# Patient Record
Sex: FEMALE | Race: WHITE | NOT HISPANIC OR LATINO | Employment: OTHER | URBAN - METROPOLITAN AREA
[De-identification: names, ages, dates, MRNs, and addresses within clinical notes are randomized per-mention and may not be internally consistent; named-entity substitution may affect disease eponyms.]

---

## 2017-01-03 VITALS
TEMPERATURE: 97.9 F | HEIGHT: 69 IN | RESPIRATION RATE: 16 BRPM | DIASTOLIC BLOOD PRESSURE: 62 MMHG | WEIGHT: 147 LBS | SYSTOLIC BLOOD PRESSURE: 152 MMHG | BODY MASS INDEX: 21.77 KG/M2 | HEART RATE: 66 BPM

## 2017-02-13 ENCOUNTER — OFFICE VISIT (OUTPATIENT)
Dept: CARDIOLOGY | Facility: MEDICAL CENTER | Age: 82
End: 2017-02-13
Payer: MEDICARE

## 2017-02-13 VITALS
HEIGHT: 69 IN | BODY MASS INDEX: 22.81 KG/M2 | HEART RATE: 92 BPM | SYSTOLIC BLOOD PRESSURE: 120 MMHG | DIASTOLIC BLOOD PRESSURE: 60 MMHG | WEIGHT: 154 LBS | OXYGEN SATURATION: 91 %

## 2017-02-13 DIAGNOSIS — I25.10 CORONARY ARTERY DISEASE INVOLVING NATIVE CORONARY ARTERY OF NATIVE HEART WITHOUT ANGINA PECTORIS: ICD-10-CM

## 2017-02-13 DIAGNOSIS — E78.1 PURE HYPERGLYCERIDEMIA: ICD-10-CM

## 2017-02-13 DIAGNOSIS — I10 ESSENTIAL HYPERTENSION: ICD-10-CM

## 2017-02-13 DIAGNOSIS — Z95.5 STENTED CORONARY ARTERY: ICD-10-CM

## 2017-02-13 DIAGNOSIS — R09.89 BILATERAL CAROTID BRUITS: ICD-10-CM

## 2017-02-13 PROCEDURE — 99214 OFFICE O/P EST MOD 30 MIN: CPT | Performed by: INTERNAL MEDICINE

## 2017-02-13 PROCEDURE — G8598 ASA/ANTIPLAT THER USED: HCPCS | Performed by: INTERNAL MEDICINE

## 2017-02-13 PROCEDURE — 1101F PT FALLS ASSESS-DOCD LE1/YR: CPT | Mod: 8P | Performed by: INTERNAL MEDICINE

## 2017-02-13 PROCEDURE — G8484 FLU IMMUNIZE NO ADMIN: HCPCS | Performed by: INTERNAL MEDICINE

## 2017-02-13 PROCEDURE — 1036F TOBACCO NON-USER: CPT | Performed by: INTERNAL MEDICINE

## 2017-02-13 PROCEDURE — G8432 DEP SCR NOT DOC, RNG: HCPCS | Performed by: INTERNAL MEDICINE

## 2017-02-13 PROCEDURE — G8419 CALC BMI OUT NRM PARAM NOF/U: HCPCS | Performed by: INTERNAL MEDICINE

## 2017-02-13 PROCEDURE — 4040F PNEUMOC VAC/ADMIN/RCVD: CPT | Performed by: INTERNAL MEDICINE

## 2017-02-13 RX ORDER — NITROGLYCERIN 0.4 MG/1
0.4 TABLET SUBLINGUAL PRN
Qty: 25 TAB | Refills: 1 | Status: SHIPPED | OUTPATIENT
Start: 2017-02-13 | End: 2018-09-01

## 2017-02-13 ASSESSMENT — ENCOUNTER SYMPTOMS
SHORTNESS OF BREATH: 1
ROS GI COMMENTS: DARK STOOLS
CONSTIPATION: 1
BRUISES/BLEEDS EASILY: 0
HEADACHES: 0
FEVER: 0
BACK PAIN: 1
NEUROLOGICAL NEGATIVE: 1

## 2017-02-13 NOTE — MR AVS SNAPSHOT
"        Brooke SOSA Lebron   2017 2:00 PM   Office Visit   MRN: [id number]    Department:  [facility]   Dept Phone:  [phone]    Description:  Female : 1934   Provider:  Severo Mancilla M.D.           Reason for Visit     Follow-Up           Allergies as of 2017     Allergen Noted Reactions    Penicillins 10/04/2013   Rash, Vomiting    Rxn = unknown  Pt tolerates cephalosporins    Hydrocodone 2016   Vomiting, Nausea    Rxn = unknown      You were diagnosed with     Coronary artery disease involving native coronary artery of native heart without angina pectoris   [0959969]       Essential hypertension   [6860670]       Bilateral carotid bruits   [5453670]       Stented coronary artery   [027155]       Pure hyperglyceridemia   [272.1.ICD-9-CM]         Vital Signs     Blood Pressure Pulse Height Weight Body Mass Index Oxygen Saturation    120/60 mmHg 92 1.753 m (5' 9\") 69.854 kg (154 lb) 22.73 kg/m2 91%    Smoking Status                   Former Smoker           Basic Information     Date Of Birth Sex Race Ethnicity Preferred Language    1934 Female White Non- English      Your appointments     2017  1:00 PM   Established Patient Pul with JIMENEZ Herrmann   [facility] Pulmonary Medicine (--)    [address] [facility] [address]  [place] [id number] [phone]              Problem List              ICD-10-CM Priority Class Noted - Resolved    Peripheral arterial disease (CMS-HCC) I73.9 Medium  2012 - Present    PVD (peripheral vascular disease) (CMS-HCC) I73.9 Medium  2013 - Present    Anxiety F41.9 Low  Unknown - Present    Hyperlipidemia E78.5 Medium  Unknown - Present    Hypertension I10 High  Unknown - Present    Angina effort (CMS-HCC) I20.8   2014 - Present    Peripheral neuropathy (CMS-HCC) G62.9 Low  2014 - Present    CAD (coronary artery disease) I25.10 High  2015 - Present    Fall W19.XXXA Low  2015 - Present    Tobacco " abuse Z72.0 Low  2/11/2016 - Present    Acute GI bleeding K92.2 Low  2/11/2016 - Present    Stented coronary artery Z95.5 Medium  4/19/2016 - Present    Hypoxia R09.02   8/17/2016 - Present    Bilateral carotid bruits R09.89   2/13/2017 - Present      Health Maintenance        Date Due Completion Dates    IMM DTaP/Tdap/Td Vaccine (1 - Tdap) 2/1/1953 ---    PAP SMEAR 2/1/1955 ---    MAMMOGRAM 2/1/1974 ---    COLONOSCOPY 2/1/1984 ---    IMM ZOSTER VACCINE 2/1/1994 ---    BONE DENSITY 2/1/1999 ---    IMM PNEUMOCOCCAL 65+ (ADULT) LOW/MEDIUM RISK SERIES (2 of 2 - PCV13) 1/1/2003 1/1/2002    IMM INFLUENZA (1) 9/1/2016 11/11/2015, 10/4/2013            Current Immunizations     Influenza TIV (IM) 10/4/2013  6:07 PM    Influenza Vaccine Adult HD 11/11/2015    Pneumococcal Vaccine (UF)Historical Data 11/11/2015    Pneumococcal polysaccharide vaccine (PPSV-23) 1/1/2002      Below and/or attached are the medications your provider expects you to take. Review all of your home medications and newly ordered medications with your provider and/or pharmacist. Follow medication instructions as directed by your provider and/or pharmacist. Please keep your medication list with you and share with your provider. Update the information when medications are discontinued, doses are changed, or new medications (including over-the-counter products) are added; and carry medication information at all times in the event of emergency situations     Allergies:  PENICILLINS - Rash,Vomiting     HYDROCODONE - Vomiting,Nausea               Medications  Valid as of: February 13, 2017 -  2:55 PM    Generic Name Brand Name Tablet Size Instructions for use    AmLODIPine Besylate (Tab) NORVASC 5 MG Take 1 Tab by mouth every day.        Aspirin (Tablet Delayed Response) ECOTRIN 81 MG Take 1 Tab by mouth every day.        Atorvastatin Calcium (Tab) LIPITOR 20 MG Take 1 Tab by mouth every evening.        Benazepril HCl (Tab) LOTENSIN 20 MG TAKE 1 TABLET TWICE A  DAY        Cholecalciferol (Tab) vitamin D 2000 UNIT Take 2,000 Units by mouth every day.        Clopidogrel Bisulfate (Tab) PLAVIX 75 MG TAKE 1 TABLET DAILY        Estradiol (Tab) ESTRACE 1 MG Take 1 mg by mouth every day.        Gabapentin (Cap) NEURONTIN 300 MG Take 300 mg by mouth every day. Indications: Neurogenic Pain        Isosorbide Mononitrate (TABLET SR 24 HR) IMDUR 30 MG Take 1 Tab by mouth every morning.        Metoprolol Tartrate (Tab) LOPRESSOR 50 MG Take 1.5 Tabs by mouth 2 times a day.        Nitroglycerin (SL Tab) NITROSTAT 0.4 MG Place 1 Tab under tongue as needed for Chest Pain.        .                 Medicines prescribed today were sent to:     [facility] [id number] [place] [address] AT [address] [address]    Phone: [phone] Fax: [phone]    Open 24 Hours?: No    [facility] - [address] [address]    Phone: [phone] Fax: [phone]    Open 24 Hours?: No      Medication refill instructions:       If your prescription bottle indicates you have medication refills left, it is not necessary to call your provider’s office. Please contact your pharmacy and they will refill your medication.    If your prescription bottle indicates you do not have any refills left, you may request refills at any time through one of the following ways: The online [employer] system (except Urgent Care), by calling your provider’s office, or by asking your pharmacy to contact your provider’s office with a refill request. Medication refills are processed only during regular business hours and may not be available until the next business day. Your provider may request additional information or to have a follow-up visit with you prior to refilling your medication.   *Please Note: Medication refills are assigned a new Rx number when refilled electronically. Your  pharmacy may indicate that no refills were authorized even though a new prescription for the same medication is available at the pharmacy. Please request the medicine by name with the pharmacy before contacting your provider for a refill.        Your To Do List     Future Labs/Procedures Complete By Expires    CAROTID DUPLEX  As directed 8/15/2017      Other Notes About Your Plan     MD Marianna           MyChart Status: Patient Declined

## 2017-02-13 NOTE — PROGRESS NOTES
Subjective:   Brooke Diana is a 83 y.o. female who presents today for follow-up of coronary artery disease with remote stenting of her circumflex marginal artery in 2010 and known chronic occlusion of her LAD with collateral filling. She's had no further angina. The patient is a tough time of low back pain and also noted that her stools are dark. She stopped her aspirin on her own but is taking Plavix. No yahaira rectal bleeding.  The patient's trying to get an appointment with her GI doctor.  Past Medical History   Diagnosis Date   • Unspecified urinary incontinence    • Hypertension    • Urinary bladder disorder    • Anxiety    • Dizziness    • Hyperlipidemia    • Unspecified hemorrhagic conditions (CMS-HCC)      pt takes plavix   • Dental disorder      Partial upper   • Cholesterol blood decreased    • Myocardial infarct (CMS-HCC) 6/10/10   • Acute myocardial infarction, true posterior wall infarction, episode of care unspecified    • CAD (coronary artery disease)      S/P stent placement   • Pulmonary disease      bronchitis   • GI bleed    • Peripheral vascular disease (CMS-HCC)      Past Surgical History   Procedure Laterality Date   • Rectus repair  7/13/2012     Performed by SHEILA PLATT at [facility]   • Recovery  11/26/2012     Performed by Ir-Recovery Surgery at SURGERY SAME DAY [facility] ORS   • Recovery  4/9/2013     Performed by Ir-Recovery Surgery at SURGERY SAME DAY [facility] ORS   • Femoral endarterectomy  10/4/2013     Performed by Kacie Baker M.D. at [facility] ORS   • Angioplasty balloon  10/4/2013     Performed by Kacie Baker M.D. at [facility]S   • Hysterectomy, total abdominal     • Other cardiac surgery       stent in heart   • Cholecystectomy     • Other  12/10/10     stents in legs   • Other  6/10/10     cardiac stents   • Other  2005     KNEE SURGERY   • Recovery  7/24/2014     Performed by Cath-Recovery Surgery at [facility] SAME DAY [facility]  ORS   • Cardiac cath  6/2010     BMS to Om   • Cardiac cath  7/24/14     Diffuse disease, see report.  % with collterals.   • Stent placement       treated by Dr Baker, multiple surgeries to legs.   • Gastroscopy with biopsy  2/13/2016     Procedure: GASTROSCOPY WITH BIOPSY;  Surgeon: Susan Miller M.D.;  Location: ENDOSCOPY [facility];  Service:      Family History   Problem Relation Age of Onset   • Stroke Mother 68     CVA   • Heart Disease Mother 65     valve surgery   • Other Son      wgt 465 lbs     History   Smoking status   • Former Smoker -- 0.25 packs/day for 45 years   • Types: Cigarettes   • Quit date: 02/01/2016   Smokeless tobacco   • Never Used     Allergies   Allergen Reactions   • Penicillins Rash and Vomiting     Rxn = unknown  Pt tolerates cephalosporins   • Hydrocodone Vomiting and Nausea     Rxn = unknown     Outpatient Encounter Prescriptions as of 2/13/2017   Medication Sig Dispense Refill   • nitroglycerin (NITROSTAT) 0.4 MG SL Tab Place 1 Tab under tongue as needed for Chest Pain. 25 Tab 1   • atorvastatin (LIPITOR) 20 MG Tab Take 1 Tab by mouth every evening. 90 Tab 3   • isosorbide mononitrate SR (IMDUR) 30 MG TABLET SR 24 HR Take 1 Tab by mouth every morning. 90 Tab 3   • estradiol (ESTRACE) 1 MG Tab Take 1 mg by mouth every day.     • amlodipine (NORVASC) 5 MG Tab Take 1 Tab by mouth every day. 90 Tab 3   • clopidogrel (PLAVIX) 75 MG Tab TAKE 1 TABLET DAILY 90 Tab 3   • benazepril (LOTENSIN) 20 MG Tab TAKE 1 TABLET TWICE A  Tab 3   • Cholecalciferol (VITAMIN D) 2000 UNIT Tab Take 2,000 Units by mouth every day.     • gabapentin (NEURONTIN) 300 MG CAPS Take 300 mg by mouth every day. Indications: Neurogenic Pain     • aspirin EC (ECOTRIN) 81 MG Tablet Delayed Response Take 1 Tab by mouth every day. 30 Tab 11   • metoprolol (LOPRESSOR) 50 MG Tab Take 1.5 Tabs by mouth 2 times a day. (Patient not taking: Reported on 2/13/2017) 90 Tab 3     No  "facility-administered encounter medications on file as of 2/13/2017.     Review of Systems   Constitutional: Negative for fever.   Respiratory: Positive for shortness of breath.    Cardiovascular: Negative for chest pain and leg swelling.   Gastrointestinal: Positive for constipation.        Dark stools   Genitourinary:        Kidney disease or kidney stones   Musculoskeletal: Positive for back pain.   Neurological: Negative.  Negative for headaches.   Endo/Heme/Allergies: Does not bruise/bleed easily.        Objective:   /60 mmHg  Pulse 92  Ht 1.753 m (5' 9\")  Wt 69.854 kg (154 lb)  BMI 22.73 kg/m2  SpO2 91%    Physical Exam   Constitutional: She is oriented to person, place, and time. She appears well-developed and well-nourished. No distress.   HENT:   Head: Atraumatic.   Mallampati score of one   Eyes: Conjunctivae and EOM are normal. Pupils are equal, round, and reactive to light.   Neck: Neck supple. No JVD present.   Cardiovascular: Normal rate and regular rhythm.   No extrasystoles are present. Exam reveals gallop and S4.    No murmur heard.  Pulses:       Carotid pulses are on the right side with bruit, and on the left side with bruit.       Femoral pulses are 2+ on the right side, and 1+ on the left side.       Dorsalis pedis pulses are 1+ on the right side, and 1+ on the left side.        Posterior tibial pulses are 1+ on the right side, and 0 on the left side.   Pulmonary/Chest: No respiratory distress. She has no wheezes. She has no rales.   Decreased breath sounds and increased resonance to percussion compatible with COPD   Abdominal: Soft. There is no tenderness.   Musculoskeletal: She exhibits no edema.   Neurological: She is alert and oriented to person, place, and time.   Skin: Skin is warm and dry. She is not diaphoretic.       Assessment:     1. Coronary artery disease involving native coronary artery of native heart without angina pectoris  nitroglycerin (NITROSTAT) 0.4 MG SL Tab   2. " Essential hypertension     3. Bilateral carotid bruits  CAROTID DUPLEX   4. Stented coronary artery  nitroglycerin (NITROSTAT) 0.4 MG SL Tab   5. Pure hyperglyceridemia  CAROTID DUPLEX       Medical Decision Making:  Today's Assessment / Status / Plan:     On exam today, she has bilateral carotid bruits I the right than the left. A carotid ultrasound will be obtained. She'll myocardial perfusion scan in 2016 revealed relative ischemia in the anterior wall which did be expected, as she has a known occlusion of her LAD.  Lab last November revealed the LDL is less than 70.    She has bilateral carotid bruits and a carotid ultrasound will be obtained. She is scheduled to see Dr. Baker in the near future.    The patient is having some GI upset, I recommend that she stop her Plavix and go back on enteric aspirin 81 mg a day.

## 2017-02-13 NOTE — Clinical Note
[facility]   [address] [facility] [address]  [place] [address]  Phone: [phone]  Fax: [phone]              Brooke Diana  2/1/1934    Encounter Date: 2/13/2017    Severo Mancilla M.D.          PROGRESS NOTE:  Subjective:   Brooke Diana is a 83 y.o. female who presents today for follow-up of coronary artery disease with remote stenting of her circumflex marginal artery in 2010 and known chronic occlusion of her LAD with collateral filling. She's had no further angina. The patient is a tough time of low back pain and also noted that her stools are dark. She stopped her aspirin on her own but is taking Plavix. No yahaira rectal bleeding.  The patient's trying to get an appointment with her GI doctor.  Past Medical History   Diagnosis Date   • Unspecified urinary incontinence    • Hypertension    • Urinary bladder disorder    • Anxiety    • Dizziness    • Hyperlipidemia    • Unspecified hemorrhagic conditions (CMS-HCC)      pt takes plavix   • Dental disorder      Partial upper   • Cholesterol blood decreased    • Myocardial infarct (CMS-[facility]) 6/10/10   • Acute myocardial infarction, true posterior wall infarction, episode of care unspecified    • CAD (coronary artery disease)      S/P stent placement   • Pulmonary disease      bronchitis   • GI bleed    • Peripheral vascular disease (CMS-HCC)      Past Surgical History   Procedure Laterality Date   • Rectus repair  7/13/2012     Performed by SHEILA PLATT [facility]   • Recovery  11/26/2012     Performed by Ir-Recovery Surgery at SURGERY [facility] DAY [facility] ORS   • Recovery  4/9/2013     Performed by Ir-Recovery Surgery at SURGERY [facility] DAY [facility] ORS   • Femoral endarterectomy  10/4/2013     Performed by Kacie Baker M.D. at SURGERY [facility] ORS   • Angioplasty balloon  10/4/2013     Performed by Kacie Baker M.D. at SURGERY [facility] ORS   • Hysterectomy, total abdominal     •  Other cardiac surgery       stent in heart   • Cholecystectomy     • Other  12/10/10     stents in legs   • Other  6/10/10     cardiac stents   • Other  2005     KNEE SURGERY   • Recovery  7/24/2014     Performed by Cath-Recovery Surgery at SURGERY SAME DAY [facility]   • Cardiac cath  6/2010     BMS to Om   • Cardiac cath  7/24/14     Diffuse disease, see report.  % with collterals.   • Stent placement       treated by Dr Baker, multiple surgeries to legs.   • Gastroscopy with biopsy  2/13/2016     Procedure: GASTROSCOPY WITH BIOPSY;  Surgeon: Susan Miller M.D.;  Location: ENDOSCOPY [facility];  Service:      Family History   Problem Relation Age of Onset   • Stroke Mother 68     CVA   • Heart Disease Mother 65     valve surgery   • Other Son      wgt 465 lbs     History   Smoking status   • Former Smoker -- 0.25 packs/day for 45 years   • Types: Cigarettes   • Quit date: 02/01/2016   Smokeless tobacco   • Never Used     Allergies   Allergen Reactions   • Penicillins Rash and Vomiting     Rxn = unknown  Pt tolerates cephalosporins   • Hydrocodone Vomiting and Nausea     Rxn = unknown     Outpatient Encounter Prescriptions as of 2/13/2017   Medication Sig Dispense Refill   • nitroglycerin (NITROSTAT) 0.4 MG SL Tab Place 1 Tab under tongue as needed for Chest Pain. 25 Tab 1   • atorvastatin (LIPITOR) 20 MG Tab Take 1 Tab by mouth every evening. 90 Tab 3   • isosorbide mononitrate SR (IMDUR) 30 MG TABLET SR 24 HR Take 1 Tab by mouth every morning. 90 Tab 3   • estradiol (ESTRACE) 1 MG Tab Take 1 mg by mouth every day.     • amlodipine (NORVASC) 5 MG Tab Take 1 Tab by mouth every day. 90 Tab 3   • clopidogrel (PLAVIX) 75 MG Tab TAKE 1 TABLET DAILY 90 Tab 3   • benazepril (LOTENSIN) 20 MG Tab TAKE 1 TABLET TWICE A  Tab 3   • Cholecalciferol (VITAMIN D) 2000 UNIT Tab Take 2,000 Units by mouth every day.     • gabapentin (NEURONTIN) 300 MG CAPS Take 300 mg by mouth every day. Indications:  "Neurogenic Pain     • aspirin EC (ECOTRIN) 81 MG Tablet Delayed Response Take 1 Tab by mouth every day. 30 Tab 11   • metoprolol (LOPRESSOR) 50 MG Tab Take 1.5 Tabs by mouth 2 times a day. (Patient not taking: Reported on 2/13/2017) 90 Tab 3     No facility-administered encounter medications on file as of 2/13/2017.     Review of Systems   Constitutional: Negative for fever.   Respiratory: Positive for shortness of breath.    Cardiovascular: Negative for chest pain and leg swelling.   Gastrointestinal: Positive for constipation.        Dark stools   Genitourinary:        Kidney disease or kidney stones   Musculoskeletal: Positive for back pain.   Neurological: Negative.  Negative for headaches.   Endo/Heme/Allergies: Does not bruise/bleed easily.        Objective:   /60 mmHg  Pulse 92  Ht 1.753 m (5' 9\")  Wt 69.854 kg (154 lb)  BMI 22.73 kg/m2  SpO2 91%    Physical Exam   Constitutional: She is oriented to person, place, and time. She appears well-developed and well-nourished. No distress.   HENT:   Head: Atraumatic.   Mallampati score of one   Eyes: Conjunctivae and EOM are normal. Pupils are equal, round, and reactive to light.   Neck: Neck supple. No JVD present.   Cardiovascular: Normal rate and regular rhythm.   No extrasystoles are present. Exam reveals gallop and S4.    No murmur heard.  Pulses:       Carotid pulses are on the right side with bruit, and on the left side with bruit.       Femoral pulses are 2+ on the right side, and 1+ on the left side.       Dorsalis pedis pulses are 1+ on the right side, and 1+ on the left side.        Posterior tibial pulses are 1+ on the right side, and 0 on the left side.   Pulmonary/Chest: No respiratory distress. She has no wheezes. She has no rales.   Decreased breath sounds and increased resonance to percussion compatible with COPD   Abdominal: Soft. There is no tenderness.   Musculoskeletal: She exhibits no edema.   Neurological: She is alert and oriented " to person, place, and time.   Skin: Skin is warm and dry. She is not diaphoretic.       Assessment:     1. Coronary artery disease involving native coronary artery of native heart without angina pectoris  nitroglycerin (NITROSTAT) 0.4 MG SL Tab   2. Essential hypertension     3. Bilateral carotid bruits  CAROTID DUPLEX   4. Stented coronary artery  nitroglycerin (NITROSTAT) 0.4 MG SL Tab   5. Pure hyperglyceridemia  CAROTID DUPLEX       Medical Decision Making:  Today's Assessment / Status / Plan:     On exam today, she has bilateral carotid bruits I the right than the left. A carotid ultrasound will be obtained. She'll myocardial perfusion scan in 2016 revealed relative ischemia in the anterior wall which did be expected, as she has a known occlusion of her LAD.  Lab last November revealed the LDL is less than 70.    She has bilateral carotid bruits and a carotid ultrasound will be obtained. She is scheduled to see Dr. Baker in the near future.    The patient is having some GI upset, I recommend that she stop her Plavix and go back on enteric aspirin 81 mg a day.      Benjamin Bernard M.D.  [address]  [place] [address]  VIA Facsimile: [phone]     Kacie Baker M.D.  [address]  [address]  [place] [address]  VIA Facsimile: [phone]

## 2017-02-17 ENCOUNTER — APPOINTMENT (OUTPATIENT)
Dept: PULMONOLOGY | Facility: HOSPICE | Age: 82
End: 2017-02-17
Payer: MEDICARE

## 2017-02-20 ENCOUNTER — HOSPITAL ENCOUNTER (OUTPATIENT)
Dept: RADIOLOGY | Facility: MEDICAL CENTER | Age: 82
End: 2017-02-20
Attending: INTERNAL MEDICINE
Payer: MEDICARE

## 2017-02-20 DIAGNOSIS — R09.89 BILATERAL CAROTID BRUITS: ICD-10-CM

## 2017-02-20 DIAGNOSIS — E78.1 PURE HYPERGLYCERIDEMIA: ICD-10-CM

## 2017-02-20 PROCEDURE — 93880 EXTRACRANIAL BILAT STUDY: CPT | Mod: 26 | Performed by: INTERNAL MEDICINE

## 2017-02-20 PROCEDURE — 93880 EXTRACRANIAL BILAT STUDY: CPT

## 2017-02-24 ENCOUNTER — OFFICE VISIT (OUTPATIENT)
Dept: PULMONOLOGY | Facility: HOSPICE | Age: 82
End: 2017-02-24
Payer: MEDICARE

## 2017-02-24 VITALS
DIASTOLIC BLOOD PRESSURE: 62 MMHG | WEIGHT: 156 LBS | RESPIRATION RATE: 16 BRPM | TEMPERATURE: 98.1 F | HEART RATE: 81 BPM | SYSTOLIC BLOOD PRESSURE: 128 MMHG | OXYGEN SATURATION: 90 % | HEIGHT: 69 IN | BODY MASS INDEX: 23.11 KG/M2

## 2017-02-24 DIAGNOSIS — G47.34 NOCTURNAL HYPOXIA: ICD-10-CM

## 2017-02-24 PROCEDURE — 1101F PT FALLS ASSESS-DOCD LE1/YR: CPT | Mod: 8P | Performed by: NURSE PRACTITIONER

## 2017-02-24 PROCEDURE — G8420 CALC BMI NORM PARAMETERS: HCPCS | Performed by: NURSE PRACTITIONER

## 2017-02-24 PROCEDURE — 99214 OFFICE O/P EST MOD 30 MIN: CPT | Mod: 25 | Performed by: NURSE PRACTITIONER

## 2017-02-24 PROCEDURE — 1036F TOBACCO NON-USER: CPT | Performed by: NURSE PRACTITIONER

## 2017-02-24 PROCEDURE — 4040F PNEUMOC VAC/ADMIN/RCVD: CPT | Performed by: NURSE PRACTITIONER

## 2017-02-24 PROCEDURE — G8598 ASA/ANTIPLAT THER USED: HCPCS | Performed by: NURSE PRACTITIONER

## 2017-02-24 PROCEDURE — G8484 FLU IMMUNIZE NO ADMIN: HCPCS | Performed by: NURSE PRACTITIONER

## 2017-02-24 PROCEDURE — G8432 DEP SCR NOT DOC, RNG: HCPCS | Performed by: NURSE PRACTITIONER

## 2017-02-24 PROCEDURE — 94761 N-INVAS EAR/PLS OXIMETRY MLT: CPT | Performed by: NURSE PRACTITIONER

## 2017-02-24 NOTE — PROGRESS NOTES
CC:  Here for f/u pulmonary issues as listed below    HPI:   Brooke presents today for follow up for hypoxia and to requalify for nocturnal oxygen. PFTs from 4/2015 indicate a Fev1 of 2.20L or 100% predicted without bronchodilator response, Fev1/FVC ratio of 70, DLCO 70%. Patient is not interested in completing these until she becomes symptomatic. Patient is wearing her 2 L of oxygen at night every day for 6-8 hours. Patient originally qualify for nocturnal oxygen April 2015 without overnight oximetry showing desaturation with a low of 76% and time below 88% was over 3 hours. Multi-ox today to requalify her for oxygen did not show desaturation with exertion with a low of 91%. Reviewed with patient Medicare guidelines to complete a sleep study at this time to continue nocturnal oxygen, which the patient declines. We reviewed pathophysiology of hypoxemia including cardio neurological risk factors which the patient understands and still will decline a further sleep study to qualify for oxygen at this time. She understands she is welcome to come back at any time in case she changes her mind.  Patient has not had any hospitalizations for pulmonary reasons or infections since we last saw her.   Patient denies shortness of breath. She does cough occasionally producing white phlegm. She is not on any inhalers at this time.      Patient Active Problem List    Diagnosis Date Noted   • CAD (coronary artery disease) 06/26/2015     Priority: High   • Hypertension      Priority: High   • Stented coronary artery 04/19/2016     Priority: Medium   • Hyperlipidemia      Priority: Medium   • PVD (peripheral vascular disease) (CMS-HCC) 04/09/2013     Priority: Medium   • Peripheral arterial disease (CMS-HCC) 11/26/2012     Priority: Medium   • Tobacco abuse 02/11/2016     Priority: Low   • Acute GI bleeding 02/11/2016     Priority: Low   • Fall 12/23/2015     Priority: Low   • Peripheral neuropathy (CMS-HCC) 08/14/2014     Priority: Low    • Anxiety      Priority: Low   • Bilateral carotid bruits 02/13/2017   • Hypoxia 08/17/2016   • Angina effort (CMS-HCC) 07/17/2014       Past Medical History   Diagnosis Date   • Unspecified urinary incontinence    • Hypertension    • Urinary bladder disorder    • Anxiety    • Dizziness    • Hyperlipidemia    • Unspecified hemorrhagic conditions (CMS-HCC)      pt takes plavix   • Dental disorder      Partial upper   • Cholesterol blood decreased    • Myocardial infarct (CMS-HCC) 6/10/10   • Acute myocardial infarction, true posterior wall infarction, episode of care unspecified    • CAD (coronary artery disease)      S/P stent placement   • Pulmonary disease      bronchitis   • GI bleed    • Peripheral vascular disease (CMS-HCC)        Past Surgical History   Procedure Laterality Date   • Rectus repair  7/13/2012     Performed by SHEILA PLATT at SURGERY SURGICAL [facility] ORS   • Recovery  11/26/2012     Performed by [facility] at SURGERY SAME DAY [facility] ORS   • Recovery  4/9/2013     Performed by [facility]Rec[facility] Surgery at SURGERY SAME DAY [facility] ORS   • Femoral endarterectomy  10/4/2013     Performed by Kacie Baker M.D. at SURGERY [facility] ORS   • Angioplasty balloon  10/4/2013     Performed by Kacie Baker M.D. at SURGERY [facility] ORS   • Hysterectomy, total abdominal     • Other cardiac surgery       stent in heart   • Cholecystectomy     • Other  12/10/10     stents in legs   • Other  6/10/10     cardiac stents   • Other  2005     KNEE SURGERY   • Recovery  7/24/2014     Performed by Cath-Recovery Surgery at SURGERY SAME DAY [facility] ORS   • Cardiac cath  6/2010     BMS to Om   • Cardiac cath  7/24/14     Diffuse disease, see report.  % with collterals.   • Stent placement       treated by Dr Baker, multiple surgeries to legs.   • Gastroscopy with biopsy  2/13/2016     Procedure: GASTROSCOPY WITH BIOPSY;  Surgeon: Susan Miller M.D.;  Location: ENDOSCOPY [facility]  ORS;  Service:        Family History   Problem Relation Age of Onset   • Stroke Mother 68     CVA   • Heart Disease Mother 65     valve surgery   • Other Son      wgt 465 lbs       Social History   Substance Use Topics   • Smoking status: Former Smoker -- 0.25 packs/day for 45 years     Types: Cigarettes     Quit date: 02/01/2016   • Smokeless tobacco: Never Used   • Alcohol Use: 0.6 oz/week     1 Glasses of wine per week      Comment: 1 drink per month       Current Outpatient Prescriptions   Medication Sig Dispense Refill   • atorvastatin (LIPITOR) 20 MG Tab Take 1 Tab by mouth every evening. 90 Tab 3   • estradiol (ESTRACE) 1 MG Tab Take 1 mg by mouth every day.     • aspirin EC (ECOTRIN) 81 MG Tablet Delayed Response Take 1 Tab by mouth every day. 30 Tab 11   • amlodipine (NORVASC) 5 MG Tab Take 1 Tab by mouth every day. 90 Tab 3   • metoprolol (LOPRESSOR) 50 MG Tab Take 1.5 Tabs by mouth 2 times a day. 90 Tab 3   • Cholecalciferol (VITAMIN D) 2000 UNIT Tab Take 2,000 Units by mouth every day.     • gabapentin (NEURONTIN) 300 MG CAPS Take 300 mg by mouth every day. Indications: Neurogenic Pain     • nitroglycerin (NITROSTAT) 0.4 MG SL Tab Place 1 Tab under tongue as needed for Chest Pain. 25 Tab 1   • isosorbide mononitrate SR (IMDUR) 30 MG TABLET SR 24 HR Take 1 Tab by mouth every morning. 90 Tab 3   • benazepril (LOTENSIN) 20 MG Tab TAKE 1 TABLET TWICE A  Tab 3     No current facility-administered medications for this visit.          Allergies: Penicillins and Hydrocodone          ROS   Gen: Denies fever, chills, unintentional weight loss, fatigue, night sweats  E/N/T: Denies nasal congestion, ear pain  Resp:Denies Dyspnea, wheezing, cough, sputum production, hemoptysis  CV: Denies chest pain, chest tightness, palpitations  Sleep:Denies morning headache  Neuro: Denies frequent headaches, weakness, dizziness  GI: Denies acid reflux, N/V  See HPI.  All other systems reviewed and negative          Vital  "signs for this encounter:  Filed Vitals:    02/24/17 1412   Height: 1.753 m (5' 9.02\")   Weight: 70.761 kg (156 lb)   Weight % change since last entry.: 0 %   BP: 128/62   Pulse: 81   BMI (Calculated): 23.03   Resp: 16   Temp: 36.7 °C (98.1 °F)   O2 sat % room air: 90 %     Multi-Ox Readings  Multi Ox #1     O2 sat % at rest     O2 sat % on exertion     O2 sat average on exertion     Multi Ox #2     O2 sat % at rest     O2 sat % on exertion     O2 sat average on exertion       Oxygen Use 2   Oxygen Frequency Nocturnal   Duration of need     Is the patient mobile within the home?     CPAP Use?     BIPAP Use?     Servo Titration                     Physical Exam:   Appearance: well developed, well nourished, no acute distress.   Eyes: PERRL, EOM intact, sclere white, conjunctiva moist.  Ears: no lesions or deformities.  Hearing: grossly intact.  Nose: no lesions or deformities.  Dentition: good dentition.   Oropharynx: tongue normal, posterior pharynx without erythema or exudate.  Neck: supple, trachea midline, no masses.  Respiratory effort: no intercostal retractions or use of accessory muscles.  Lung auscultation: Bilateral diminished   Heart auscultation: no murmur, rub, or gallop   Extremities: no cyanosis or edema.  Abdomen: soft, non-tender, no masses.  Gait and station: without difficulty   Digits and Nails: no clubbing, cyanosis, petechiae, or nodes.  Cranial nerves: grossly normal.  Motor: no focal deficits observed.   Skin: no rashes, lesions, or ulcers noted.  Orientation: oriented to time, place, and person.  Mood and affect: mood and affect appropriate, normal interaction with examiner.      Assessment   1. Nocturnal hypoxia  DME O2 NEW SET UP    AMB MULTIPLE OXIMETRY       PLAN:   Patient Instructions   1) Continue without inhalers  2) Continue nocturnal oxygen at 2L   3) Vaccines: Up to date with flu, pneumovax in 2015  4) Return in about 1 year (around 2/24/2018) for review of symptoms, if not sooner, " follow up with SAMAN Franz.

## 2017-02-24 NOTE — MR AVS SNAPSHOT
"        Brooke SOSA Lebron   2017 2:20 PM   Office Visit   MRN: [id number]    Department:  [facility]   Dept Phone:  [phone]    Description:  Female : 1934   Provider:  JIMENEZ Herrmann           Reason for Visit     Follow-Up hypoxia Last seen 16      Allergies as of 2017     Allergen Noted Reactions    Penicillins 10/04/2013   Rash, Vomiting    Rxn = unknown  Pt tolerates cephalosporins    Hydrocodone 2016   Vomiting, Nausea    Rxn = unknown      You were diagnosed with     Nocturnal hypoxia   [029502]         Vital Signs     Blood Pressure Pulse Temperature Respirations Height Weight    128/62 mmHg 81 36.7 °C (98.1 °F) 16 1.753 m (5' 9.02\") 70.761 kg (156 lb)    Body Mass Index Oxygen Saturation Smoking Status             23.03 kg/m2 90% Former Smoker         Basic Information     Date Of Birth Sex Race Ethnicity Preferred Language    1934 Female White Non- English      Problem List              ICD-10-CM Priority Class Noted - Resolved    Peripheral arterial disease (CMS-HCC) I73.9 Medium  2012 - Present    PVD (peripheral vascular disease) (CMS-[facility]) I73.9 Medium  2013 - Present    Anxiety F41.9 Low  Unknown - Present    Hyperlipidemia E78.5 Medium  Unknown - Present    Hypertension I10 High  Unknown - Present    Angina effort (CMS-[facility]) I20.8   2014 - Present    Peripheral neuropathy (CMS-[facility]) G62.9 Low  2014 - Present    CAD (coronary artery disease) I25.10 High  2015 - Present    Fall W19.XXXA Low  2015 - Present    Tobacco abuse Z72.0 Low  2016 - Present    Acute GI bleeding K92.2 Low  2016 - Present    Stented coronary artery Z95.5 Medium  2016 - Present    Hypoxia R09.02   2016 - Present    Bilateral carotid bruits R09.89   2017 - Present      Health Maintenance        Date Due Completion Dates    IMM DTaP/Tdap/Td Vaccine (1 - Tdap) 1953 ---    PAP SMEAR 1955 ---    MAMMOGRAM 1974 " ---    COLONOSCOPY 2/1/1984 ---    IMM ZOSTER VACCINE 2/1/1994 ---    BONE DENSITY 2/1/1999 ---    IMM PNEUMOCOCCAL 65+ (ADULT) LOW/MEDIUM RISK SERIES (2 of 2 - PCV13) 1/1/2003 1/1/2002    IMM INFLUENZA (1) 9/1/2016 11/11/2015, 10/4/2013            Current Immunizations     Influenza TIV (IM) 10/4/2013  6:07 PM    Influenza Vaccine Adult HD 11/11/2015    Pneumococcal Vaccine (UF)Historical Data 11/11/2015    Pneumococcal polysaccharide vaccine (PPSV-23) 1/1/2002      Below and/or attached are the medications your provider expects you to take. Review all of your home medications and newly ordered medications with your provider and/or pharmacist. Follow medication instructions as directed by your provider and/or pharmacist. Please keep your medication list with you and share with your provider. Update the information when medications are discontinued, doses are changed, or new medications (including over-the-counter products) are added; and carry medication information at all times in the event of emergency situations     Allergies:  PENICILLINS - Rash,Vomiting     HYDROCODONE - Vomiting,Nausea               Medications  Valid as of: February 24, 2017 -  3:06 PM    Generic Name Brand Name Tablet Size Instructions for use    AmLODIPine Besylate (Tab) NORVASC 5 MG Take 1 Tab by mouth every day.        Aspirin (Tablet Delayed Response) ECOTRIN 81 MG Take 1 Tab by mouth every day.        Atorvastatin Calcium (Tab) LIPITOR 20 MG Take 1 Tab by mouth every evening.        Benazepril HCl (Tab) LOTENSIN 20 MG TAKE 1 TABLET TWICE A DAY        Cholecalciferol (Tab) vitamin D 2000 UNIT Take 2,000 Units by mouth every day.        Estradiol (Tab) ESTRACE 1 MG Take 1 mg by mouth every day.        Gabapentin (Cap) NEURONTIN 300 MG Take 300 mg by mouth every day. Indications: Neurogenic Pain        Isosorbide Mononitrate (TABLET SR 24 HR) IMDUR 30 MG Take 1 Tab by mouth every morning.        Metoprolol Tartrate (Tab) LOPRESSOR 50 MG  Take 1.5 Tabs by mouth 2 times a day.        Nitroglycerin (SL Tab) NITROSTAT 0.4 MG Place 1 Tab under tongue as needed for Chest Pain.        .                 Medicines prescribed today were sent to:     [id number] - [place], [place] - [address] [facility] [address]    [address] [place] [address]    Phone: [phone] Fax: [phone]    Open 24 Hours?: No    [facility] - [place], [place] - [address]    [address] [address]    Phone: [phone] Fax: [phone]    Open 24 Hours?: No      Medication refill instructions:       If your prescription bottle indicates you have medication refills left, it is not necessary to call your provider’s office. Please contact your pharmacy and they will refill your medication.    If your prescription bottle indicates you do not have any refills left, you may request refills at any time through one of the following ways: The online [employer] system (except Urgent Care), by calling your provider’s office, or by asking your pharmacy to contact your provider’s office with a refill request. Medication refills are processed only during regular business hours and may not be available until the next business day. Your provider may request additional information or to have a follow-up visit with you prior to refilling your medication.   *Please Note: Medication refills are assigned a new Rx number when refilled electronically. Your pharmacy may indicate that no refills were authorized even though a new prescription for the same medication is available at the pharmacy. Please request the medicine by name with the pharmacy before contacting your provider for a refill.        Your To Do List     Future Labs/Procedures Complete By Expires    AMB MULTIPLE OXIMETRY  As directed 2/24/2018      Instructions    1) Continue without inhalers  2) Continue nocturnal oxygen at 2L   3) Vaccines: Up to date with  flu, pneumovax in 2015  4) Return in about 1 year (around 2/24/2018) for review of symptoms, if not sooner, follow up with SAMAN Franz.           Other Notes About Your Plan     MD Marianna           MyChart Status: Patient Declined

## 2017-02-24 NOTE — PATIENT INSTRUCTIONS
1) Continue without inhalers  2) Continue nocturnal oxygen at 2L   3) Vaccines: Up to date with flu, pneumovax in 2015  4) Return in about 1 year (around 2/24/2018) for review of symptoms, if not sooner, follow up with SAMAN Franz.

## 2017-02-27 ENCOUNTER — TELEPHONE (OUTPATIENT)
Dept: CARDIOLOGY | Facility: MEDICAL CENTER | Age: 82
End: 2017-02-27

## 2017-02-27 ENCOUNTER — TELEPHONE (OUTPATIENT)
Dept: PULMONOLOGY | Facility: HOSPICE | Age: 82
End: 2017-02-27

## 2017-02-27 NOTE — TELEPHONE ENCOUNTER
----- Message from Deisi Gaona sent at 2/27/2017 11:03 AM PST -----  Regarding: Patient calling for test results  Reyes    Patient is calling to get her test results and can be reached at [phone].

## 2017-02-27 NOTE — TELEPHONE ENCOUNTER
Per Dr. Mancilla: carotid US shows only mild blockages, no need for surgery. Cholesterol medication will help blockage.  Called pt. To advise.

## 2017-02-28 NOTE — TELEPHONE ENCOUNTER
Patient confused on why she needs to do the OPO through [facility]. I do not see one ordered through our office. I will call [facility]. There is an order for new oxygen set up, I explained to the patient that Medicare might need OPO for them to pay for oxygen and that I would work on it as fast as I can. She is very upset and confused on everything that has happened today. She has called multiple times today and left messages.

## 2017-02-28 NOTE — TELEPHONE ENCOUNTER
"Called patient, LEFT VOICEMAIL, to advise further research on this \"requalification\" for oxygen is needed.  [facility] is not open until 9am.  It looks like this patient has already had her oxygen for about a year and just needs the recert CMN completed with an office visit within 90 calendar days prior to the recert date.  Last two office notes with SAMAN Flowers do not specify RAMESH as a diagnosis.  "

## 2017-02-28 NOTE — TELEPHONE ENCOUNTER
Spoke to Winnie at [facility], she states they did not call her for OPO but she told them to  her concentrator but would not sign AMA form.   Winnie states we can try OPO but does not guarentee it will work since her chart mentions RAMESH. Please order OPO through [facility] and we need to call patient back with our plan. Patient was very frustrated when she was on the phone with me.

## 2017-02-28 NOTE — TELEPHONE ENCOUNTER
Spoke with Yamel regarding patient case.  She is Medicare and per last OV, she needed to requalify for oxygen.  Given medicare new guidelines she would need to complete a sleep study to qualify for nocturnal oxygen, not a OPO.

## 2017-03-02 NOTE — TELEPHONE ENCOUNTER
VOICEMAIL received from patient regarding the oxygen.  Called [facility], spoke with Kandace.  She needs a more recent note that states patient is using and benefits from O2 therapy.  Will fax 2/24/17 office note to [facility].  Patient notified there is nothing further for her to do while we work on the paperwork, except to continue using her O2 everyday.

## 2017-03-07 DIAGNOSIS — Z01.812 PRE-OPERATIVE LABORATORY EXAMINATION: ICD-10-CM

## 2017-03-07 LAB
ANION GAP SERPL CALC-SCNC: 6 MMOL/L (ref 0–11.9)
BASOPHILS # BLD AUTO: 0.9 % (ref 0–1.8)
BASOPHILS # BLD: 0.09 K/UL (ref 0–0.12)
BUN SERPL-MCNC: 16 MG/DL (ref 8–22)
CALCIUM SERPL-MCNC: 9.5 MG/DL (ref 8.5–10.5)
CHLORIDE SERPL-SCNC: 106 MMOL/L (ref 96–112)
CO2 SERPL-SCNC: 26 MMOL/L (ref 20–33)
CREAT SERPL-MCNC: 1.11 MG/DL (ref 0.5–1.4)
EOSINOPHIL # BLD AUTO: 0.19 K/UL (ref 0–0.51)
EOSINOPHIL NFR BLD: 2 % (ref 0–6.9)
ERYTHROCYTE [DISTWIDTH] IN BLOOD BY AUTOMATED COUNT: 47.5 FL (ref 35.9–50)
GFR SERPL CREATININE-BSD FRML MDRD: 47 ML/MIN/1.73 M 2
GLUCOSE SERPL-MCNC: 97 MG/DL (ref 65–99)
HCT VFR BLD AUTO: 44.9 % (ref 37–47)
HGB BLD-MCNC: 14.5 G/DL (ref 12–16)
IMM GRANULOCYTES # BLD AUTO: 0.03 K/UL (ref 0–0.11)
IMM GRANULOCYTES NFR BLD AUTO: 0.3 % (ref 0–0.9)
LYMPHOCYTES # BLD AUTO: 1.81 K/UL (ref 1–4.8)
LYMPHOCYTES NFR BLD: 18.9 % (ref 22–41)
MCH RBC QN AUTO: 32 PG (ref 27–33)
MCHC RBC AUTO-ENTMCNC: 32.3 G/DL (ref 33.6–35)
MCV RBC AUTO: 99.1 FL (ref 81.4–97.8)
MONOCYTES # BLD AUTO: 0.75 K/UL (ref 0–0.85)
MONOCYTES NFR BLD AUTO: 7.8 % (ref 0–13.4)
NEUTROPHILS # BLD AUTO: 6.73 K/UL (ref 2–7.15)
NEUTROPHILS NFR BLD: 70.1 % (ref 44–72)
NRBC # BLD AUTO: 0 K/UL
NRBC BLD AUTO-RTO: 0 /100 WBC
PLATELET # BLD AUTO: 268 K/UL (ref 164–446)
PMV BLD AUTO: 10.1 FL (ref 9–12.9)
POTASSIUM SERPL-SCNC: 4.3 MMOL/L (ref 3.6–5.5)
RBC # BLD AUTO: 4.53 M/UL (ref 4.2–5.4)
SODIUM SERPL-SCNC: 138 MMOL/L (ref 135–145)
WBC # BLD AUTO: 9.6 K/UL (ref 4.8–10.8)

## 2017-03-07 PROCEDURE — 36415 COLL VENOUS BLD VENIPUNCTURE: CPT

## 2017-03-07 PROCEDURE — 85025 COMPLETE CBC W/AUTO DIFF WBC: CPT

## 2017-03-07 PROCEDURE — 80048 BASIC METABOLIC PNL TOTAL CA: CPT

## 2017-03-09 ENCOUNTER — APPOINTMENT (OUTPATIENT)
Dept: RADIOLOGY | Facility: MEDICAL CENTER | Age: 82
End: 2017-03-09
Attending: SURGERY
Payer: MEDICARE

## 2017-03-09 ENCOUNTER — HOSPITAL ENCOUNTER (OUTPATIENT)
Facility: MEDICAL CENTER | Age: 82
End: 2017-03-09
Attending: SURGERY | Admitting: SURGERY
Payer: MEDICARE

## 2017-03-09 VITALS
DIASTOLIC BLOOD PRESSURE: 67 MMHG | SYSTOLIC BLOOD PRESSURE: 125 MMHG | RESPIRATION RATE: 14 BRPM | WEIGHT: 156.75 LBS | OXYGEN SATURATION: 95 % | TEMPERATURE: 98 F | HEART RATE: 70 BPM | HEIGHT: 69 IN | BODY MASS INDEX: 23.22 KG/M2

## 2017-03-09 DIAGNOSIS — I77.1 STRICTURE OF ARTERY (HCC): ICD-10-CM

## 2017-03-09 PROCEDURE — 37236 OPEN/PERQ PLACE STENT 1ST: CPT

## 2017-03-09 PROCEDURE — 700111 HCHG RX REV CODE 636 W/ 250 OVERRIDE (IP): Performed by: SURGERY

## 2017-03-09 PROCEDURE — 700111 HCHG RX REV CODE 636 W/ 250 OVERRIDE (IP)

## 2017-03-09 PROCEDURE — 75726 ARTERY X-RAYS ABDOMEN: CPT

## 2017-03-09 PROCEDURE — C1876 STENT, NON-COA/NON-COV W/DEL: HCPCS

## 2017-03-09 RX ORDER — SODIUM CHLORIDE 9 MG/ML
500 INJECTION, SOLUTION INTRAVENOUS PRN
Status: DISCONTINUED | OUTPATIENT
Start: 2017-03-09 | End: 2017-03-09 | Stop reason: HOSPADM

## 2017-03-09 RX ORDER — ONDANSETRON 2 MG/ML
4 INJECTION INTRAMUSCULAR; INTRAVENOUS EVERY 4 HOURS PRN
Status: DISCONTINUED | OUTPATIENT
Start: 2017-03-09 | End: 2017-03-09 | Stop reason: HOSPADM

## 2017-03-09 RX ORDER — DEXTROSE AND SODIUM CHLORIDE 5; .45 G/100ML; G/100ML
INJECTION, SOLUTION INTRAVENOUS CONTINUOUS
Status: DISCONTINUED | OUTPATIENT
Start: 2017-03-09 | End: 2017-03-09 | Stop reason: HOSPADM

## 2017-03-09 RX ORDER — MIDAZOLAM HYDROCHLORIDE 1 MG/ML
INJECTION INTRAMUSCULAR; INTRAVENOUS
Status: COMPLETED
Start: 2017-03-09 | End: 2017-03-09

## 2017-03-09 RX ORDER — HEPARIN SODIUM,PORCINE 1000/ML
VIAL (ML) INJECTION
Status: COMPLETED
Start: 2017-03-09 | End: 2017-03-09

## 2017-03-09 RX ORDER — HEPARIN SODIUM 1000 [USP'U]/ML
5000 INJECTION, SOLUTION INTRAVENOUS; SUBCUTANEOUS ONCE
Status: COMPLETED | OUTPATIENT
Start: 2017-03-09 | End: 2017-03-09

## 2017-03-09 RX ORDER — MORPHINE SULFATE 4 MG/ML
1-2 INJECTION, SOLUTION INTRAMUSCULAR; INTRAVENOUS
Status: DISCONTINUED | OUTPATIENT
Start: 2017-03-09 | End: 2017-03-09 | Stop reason: HOSPADM

## 2017-03-09 RX ORDER — MIDAZOLAM HYDROCHLORIDE 1 MG/ML
.5-2 INJECTION INTRAMUSCULAR; INTRAVENOUS PRN
Status: ACTIVE | OUTPATIENT
Start: 2017-03-09 | End: 2017-03-09

## 2017-03-09 RX ADMIN — MIDAZOLAM HYDROCHLORIDE 1 MG: 1 INJECTION, SOLUTION INTRAMUSCULAR; INTRAVENOUS at 08:25

## 2017-03-09 RX ADMIN — DEXTROSE AND SODIUM CHLORIDE: 5; .45 INJECTION, SOLUTION INTRAVENOUS at 07:37

## 2017-03-09 RX ADMIN — MIDAZOLAM HYDROCHLORIDE 1 MG: 1 INJECTION, SOLUTION INTRAMUSCULAR; INTRAVENOUS at 08:15

## 2017-03-09 RX ADMIN — FENTANYL CITRATE 25 MCG: 50 INJECTION, SOLUTION INTRAMUSCULAR; INTRAVENOUS at 08:42

## 2017-03-09 RX ADMIN — MIDAZOLAM HYDROCHLORIDE 1 MG: 1 INJECTION, SOLUTION INTRAMUSCULAR; INTRAVENOUS at 09:28

## 2017-03-09 RX ADMIN — FENTANYL CITRATE 25 MCG: 50 INJECTION, SOLUTION INTRAMUSCULAR; INTRAVENOUS at 09:28

## 2017-03-09 RX ADMIN — FENTANYL CITRATE 25 MCG: 50 INJECTION, SOLUTION INTRAMUSCULAR; INTRAVENOUS at 08:15

## 2017-03-09 RX ADMIN — HEPARIN SODIUM 5000 UNITS: 1000 INJECTION, SOLUTION INTRAVENOUS; SUBCUTANEOUS at 09:00

## 2017-03-09 RX ADMIN — FENTANYL CITRATE 25 MCG: 50 INJECTION, SOLUTION INTRAMUSCULAR; INTRAVENOUS at 09:35

## 2017-03-09 RX ADMIN — MIDAZOLAM HYDROCHLORIDE 2 MG: 1 INJECTION, SOLUTION INTRAMUSCULAR; INTRAVENOUS at 08:33

## 2017-03-09 RX ADMIN — MIDAZOLAM 1 MG: 1 INJECTION INTRAMUSCULAR; INTRAVENOUS at 08:15

## 2017-03-09 RX ADMIN — MIDAZOLAM HYDROCHLORIDE 1 MG: 1 INJECTION, SOLUTION INTRAMUSCULAR; INTRAVENOUS at 09:31

## 2017-03-09 ASSESSMENT — PAIN SCALES - GENERAL
PAINLEVEL_OUTOF10: 0

## 2017-03-09 NOTE — OR SURGEON
Immediate Post-Operative Note      PreOp Diagnosis: Mesenteric arterial stenosis    PostOp Diagnosis:  Same    Procedure(s):AOROTOGRAM WITH MESENTERIC ANGIOGRAM  Celiac and superior mesenteric artery angioplasty  Celiac artery stent - 5 x 18mm    Surgeon(s):KAYLA Baker MD  [facility]    Anesthesiologist/Type of Anesthesia:Conscious sedation    Specimen: none    Estimated Blood Loss: <100    Findings: Celiac artery stenosis - severe, SMA stenosis, mild    Complications: None    [id number]    3/9/2017 9:47 AM Kacie Baker

## 2017-03-09 NOTE — PROGRESS NOTES
IR RN note:    Aortogram with mesenteric Angiogram possible Angioplasty/ Stent by MD Baker assisted by RT Salamanca, right groin femoral artery access site;  Stent placed in celiac artery    ABBOTT RX Herculink Elite Renal and Biliary Stent 5.0 mm x 18 mm, 135 cm  REF#  [id number]  LOT#  [id number]   Angio seal Right femoral artery     [facility] Angio Seal VIP Vascular Closure Device 6 Fr REF# [id number] LOT# [id number]    Patient tolerated procedure, hemodynamically stable; report given to PPU DOUGLAS Zepeda;   patient transported back to PPU via RN

## 2017-03-09 NOTE — OR NURSING
0945: Patient from radiology to PPU via gurney. Patient is awake and on bedrest/flat. VSS. RUE CMS intact. R groin incision site soft and dressing clean, dry and intact. Vascular access care management per MD order (see assessment). No c/o pain or nausea at this time. Will monitor closely. Vital signs q 15 min x 4, q 30 min x 2 and q 1 hour per MD order.   1015: VSS. Patient tolerating PO well. R groin soft, CDI.  1200: VSS. R groin soft, CDI.  1230: DC instructions given. Questions answered. Family at bedside. R groin soft,CDI. No c/o pain or nausea. Patient wide awake. VSS. Patient met criteria for discharge.

## 2017-03-09 NOTE — IP AVS SNAPSHOT
" Home Care Instructions                                                                                                                Name:Brooke Diana  Medical Record Number:[id number]  CSN: [id number]    YOB: 1934   Age: 83 y.o.  Sex: female  HT:1.753 m (5' 9\") WT: 71.1 kg (156 lb 12 oz)          Admit Date: 3/9/2017     Discharge Date:   Today's Date: 3/9/2017  Attending Doctor:  Kacie Baker M.D.                  Allergies:  Penicillins; Hydrocodone; and Tape                Discharge Instructions         ACTIVITY: Rest and take it easy for the first 24 hours.  A responsible adult is recommended to remain with you during that time.  It is normal to feel sleepy.  We encourage you to not do anything that requires balance, judgment or coordination.    MILD FLU-LIKE SYMPTOMS ARE NORMAL. YOU MAY EXPERIENCE GENERALIZED MUSCLE ACHES, THROAT IRRITATION, HEADACHE AND/OR SOME NAUSEA.    FOR 24 HOURS DO NOT:  Drive, operate machinery or run household appliances.  Drink beer or alcoholic beverages.   Make important decisions or sign legal documents.      DIET: To avoid nausea, slowly advance diet as tolerated, avoiding spicy or greasy foods for the first day.  Add more substantial food to your diet according to your physician's instructions.  Babies can be fed formula or breast milk as soon as they are hungry.  INCREASE FLUIDS AND FIBER TO AVOID CONSTIPATION.    SURGICAL DRESSING/BATHING:     Keep the dressing clean and dry for 48 hours.  May remove dressing tomorrow at 11 am and shower.  Do not submerge for 1 week.  No heavy lifting.    FOLLOW-UP APPOINTMENT:  A follow-up appointment should be arranged with your doctor ; call to schedule.  In 1-2 weeks or 7 - 10 days, call [phone] to make appointment.    Or if appointment not made yet, call [phone] after 4/3 in our new office to make appointment    You should CALL YOUR PHYSICIAN if you develop:  Fever greater than 101 degrees F.  Pain not relieved by " medication, or persistent nausea or vomiting.  Excessive bleeding (blood soaking through dressing) or unexpected drainage from the wound.  Extreme redness or swelling around the incision site, drainage of pus or foul smelling drainage.  Inability to urinate or empty your bladder within 8 hours.  Problems with breathing or chest pain.    You should call 911 if you develop problems with breathing or chest pain.  If you are unable to contact your doctor or surgical center, you should go to the nearest emergency room or urgent care center.      Physician's telephone #: ROBERTA [phone]    If any questions arise, call your doctor.  If your doctor is not available, please feel free to call the Surgical Center at [phone].  The Center is open Monday through Friday from 7AM to 7PM.  You can also call the [employer] HOTLINE open 24 hours/day, 7 days/week and speak to a nurse at [phone], or toll free at [phone].    A registered nurse may call you a few days after your surgery to see how you are doing after your procedure.    MEDICATIONS: Resume taking daily medication.  Take prescribed pain medication with food.  If no medication is prescribed, you may take non-aspirin pain medication if needed.  PAIN MEDICATION CAN BE VERY CONSTIPATING.  Take a stool softener or laxative such as senokot, pericolace, or milk of magnesia if needed.      If your physician has prescribed pain medication that includes Acetaminophen (Tylenol), do not take additional Acetaminophen (Tylenol) while taking the prescribed medication.    Depression / Suicide Risk    As you are discharged from this [facility] facility, it is important to learn how to keep safe from harming yourself.    Recognize the warning signs:  · Abrupt changes in personality, positive or negative- including increase in energy   · Giving away possessions  · Change in eating patterns- significant weight changes-  positive or negative  · Change in sleeping patterns-  "unable to sleep or sleeping all the time   · Unwillingness or inability to communicate  · Depression  · Unusual sadness, discouragement and loneliness  · Talk of wanting to die  · Neglect of personal appearance   · Rebelliousness- reckless behavior  · Withdrawal from people/activities they love  · Confusion- inability to concentrate     If you or a loved one observes any of these behaviors or has concerns about self-harm, here's what you can do:  · Talk about it- your feelings and reasons for harming yourself  · Remove any means that you might use to hurt yourself (examples: pills, rope, extension cords, firearm)  · Get professional help from the community (Mental Health, Substance Abuse, psychological counseling)  · Do not be alone:Call your Safe Contact- someone whom you trust who will be there for you.  · Call your local CRISIS HOTLINE 682-9098 or 706-457-2301  · Call your local Children's Mobile Crisis Response Team [place] (170) 269-3000 or www.[employer]  · Call the toll free National Suicide Prevention Hotlines   · National Suicide Prevention Lifeline 379-631-MTLR (2010)  · National Hope Line Network 800-SUICIDE (584-6131)    Post Angiogram Groin Care Instructions     INSTRUCTIONS  2. Examine (look and feel) the site of your incision site TODAY so you can recognize changes that should be called to your doctor (see below).  3. Avoid straining either by lifting or pulling objects for 4-5 days. Avoid lifting over 5 pounds.   4. For at least 72 hours, if you should sneeze or cough, please hold pressure over your groin area.  5. If you should begin to have oozing from the catheterization site, please hold firm pressure and call your doctor's office immediately.  6. If profuse bleeding occurs from the catheterization site, hold firm pressure and call \"[phone]\" immediately for assistance.  7. Remove bandage after 24 hours.     ACTIVITY  2. Limit activity as instructed by your doctor.  3. No driving or very " limited driving with frequent stops for one week.   4. If you must take a long car ride, stop every hour and walk around the car.   5. Warm showers or baths are permitted after the bandage is removed. Avoid hot showers, baths, hot tubs, and swimming for one week.    PLEASE CALL YOUR DOCTOR IF:  1. Temperature elevation occurs.  2. Catheterization site becomes reddened or begins to drain.   3. Bruising appears to be new or not resolving. The bruise may move down your leg. This is normal.  4. The small round lump in the groin increases in size.  5. Any leg numbness, aching, or discomfort (immediately).  6. Increasing discomfort in the leg at the insertion site.  7. Chest pains, even if relieved by Nitroglycerin.    MISCELLANEOUS INSTRUCTIONS  1. Bruising may occur as a result of heart catheterization. Some of the discoloration may travel down the leg, going from blue to green in color.  2. A small round lump under the catheterization site will remain for up to six weeks.  3. If any questions arise call your physician's office. You can also call the [employer] HOTLINE open 24 hours/day, 7 days/week and speak to a nurse at [phone], or toll free at [phone].   4. You should call 911 if you develop problems with breathing or chest pain.      I acknowledge receipt and understanding of these Home Care instructions.         Medication List      ASK your doctor about these medications        Instructions    amlodipine 5 MG Tabs   Commonly known as:  NORVASC    Take 1 Tab by mouth every day.   Dose:  5 mg       aspirin EC 81 MG Tbec   Commonly known as:  ECOTRIN    Take 1 Tab by mouth every day.   Dose:  81 mg       atorvastatin 20 MG Tabs   Commonly known as:  LIPITOR    Take 1 Tab by mouth every evening.   Dose:  20 mg       benazepril 20 MG Tabs   Commonly known as:  LOTENSIN    TAKE 1 TABLET TWICE A DAY       estradiol 1 MG Tabs   Commonly known as:  ESTRACE    Take 1 mg by mouth every day.   Dose:  1 mg        isosorbide mononitrate SR 30 MG Tb24   Commonly known as:  IMDUR    Take 1 Tab by mouth every morning.   Dose:  30 mg       metoprolol 50 MG Tabs   Commonly known as:  LOPRESSOR    Take 1.5 Tabs by mouth 2 times a day.   Dose:  75 mg       NEURONTIN 300 MG Caps   Generic drug:  gabapentin    Take 300 mg by mouth every day. Indications: Neurogenic Pain   Dose:  300 mg       nitroglycerin 0.4 MG Subl   Commonly known as:  NITROSTAT    Place 1 Tab under tongue as needed for Chest Pain.   Dose:  0.4 mg       vitamin D 2000 UNIT Tabs    Take 2,000 Units by mouth every day.   Dose:  2000 Units               Medication Information     Above and/or attached are the medications your physician expects you to take upon discharge. Review all of your home medications and newly ordered medications with your doctor and/or pharmacist. Follow medication instructions as directed by your doctor and/or pharmacist. Please keep your medication list with you and share with your physician. Update the information when medications are discontinued, doses are changed, or new medications (including over-the-counter products) are added; and carry medication information at all times in the event of emergency situations.        Resources     Quit Smoking / Tobacco Use:    I understand the use of any tobacco products increases my chance of suffering from future heart disease or stroke and could cause other illnesses which may shorten my life. Quitting the use of tobacco products is the single most important thing I can do to improve my health. For further information on smoking / tobacco cessation call a Toll Free Quit Line at 1-187.813.7056 (*National Cancer [place]) or 1-570.388.4349 (American Lung Association) or you can access the web based program at www.lungusa.org.    [place] Tobacco Users Help Line:  (113) 506-7697       Toll Free: 1-231.511.1310  Quit Tobacco Program [facility] [phone]    [facility]  Hotline:    National Crisis Hotline:  1-824-GSHSFZD or 1-814.530.5540    [place] Crisis Hotline:    1-685.420.3965 or 189-829-0632    Discharge Survey:   Thank you for choosing [facility]. We hope we did everything we could to make your hospital stay a pleasant one. You may be receiving a survey and we would appreciate your time and participation in answering the questions. Your input is very valuable to us in our efforts to improve our service to our patients and their families.            Signatures     My signature on this form indicates that:    1. I acknowledge receipt and understanding of these Home Care Instruction.  2. My questions regarding this information have been answered to my satisfaction.  3. I have formulated a plan with my discharge nurse to obtain my prescribed medications for home.    __________________________________      __________________________________                   Patient Signature                                 Guardian/Responsible Adult Signature      __________________________________                 __________       ________                       Nurse Signature                                               Date                 Time

## 2017-03-09 NOTE — DISCHARGE INSTRUCTIONS
ACTIVITY: Rest and take it easy for the first 24 hours.  A responsible adult is recommended to remain with you during that time.  It is normal to feel sleepy.  We encourage you to not do anything that requires balance, judgment or coordination.    MILD FLU-LIKE SYMPTOMS ARE NORMAL. YOU MAY EXPERIENCE GENERALIZED MUSCLE ACHES, THROAT IRRITATION, HEADACHE AND/OR SOME NAUSEA.    FOR 24 HOURS DO NOT:  Drive, operate machinery or run household appliances.  Drink beer or alcoholic beverages.   Make important decisions or sign legal documents.      DIET: To avoid nausea, slowly advance diet as tolerated, avoiding spicy or greasy foods for the first day.  Add more substantial food to your diet according to your physician's instructions.  Babies can be fed formula or breast milk as soon as they are hungry.  INCREASE FLUIDS AND FIBER TO AVOID CONSTIPATION.    SURGICAL DRESSING/BATHING:     Keep the dressing clean and dry for 48 hours.  May remove dressing tomorrow at 11 am and shower.  Do not submerge for 1 week.  No heavy lifting.    FOLLOW-UP APPOINTMENT:  A follow-up appointment should be arranged with your doctor ; call to schedule.  In 1-2 weeks or 7 - 10 days, call [phone] to make appointment.    Or if appointment not made yet, call [phone] after 4/3 in our new office to make appointment    You should CALL YOUR PHYSICIAN if you develop:  Fever greater than 101 degrees F.  Pain not relieved by medication, or persistent nausea or vomiting.  Excessive bleeding (blood soaking through dressing) or unexpected drainage from the wound.  Extreme redness or swelling around the incision site, drainage of pus or foul smelling drainage.  Inability to urinate or empty your bladder within 8 hours.  Problems with breathing or chest pain.    You should call [phone] if you develop problems with breathing or chest pain.  If you are unable to contact your doctor or surgical center, you should go to the nearest emergency room or urgent  care center.      Physician's telephone #: ROBERTA [phone]    If any questions arise, call your doctor.  If your doctor is not available, please feel free to call the Surgical Center at [phone].  The Center is open Monday through Friday from 7AM to 7PM.  You can also call the HEALTH HOTLINE open 24 hours/day, 7 days/week and speak to a nurse at [phone], or toll free at [phone].    A registered nurse may call you a few days after your surgery to see how you are doing after your procedure.    MEDICATIONS: Resume taking daily medication.  Take prescribed pain medication with food.  If no medication is prescribed, you may take non-aspirin pain medication if needed.  PAIN MEDICATION CAN BE VERY CONSTIPATING.  Take a stool softener or laxative such as senokot, pericolace, or milk of magnesia if needed.      If your physician has prescribed pain medication that includes Acetaminophen (Tylenol), do not take additional Acetaminophen (Tylenol) while taking the prescribed medication.    Depression / Suicide Risk    As you are discharged from this [facility] facility, it is important to learn how to keep safe from harming yourself.    Recognize the warning signs:  · Abrupt changes in personality, positive or negative- including increase in energy   · Giving away possessions  · Change in eating patterns- significant weight changes-  positive or negative  · Change in sleeping patterns- unable to sleep or sleeping all the time   · Unwillingness or inability to communicate  · Depression  · Unusual sadness, discouragement and loneliness  · Talk of wanting to die  · Neglect of personal appearance   · Rebelliousness- reckless behavior  · Withdrawal from people/activities they love  · Confusion- inability to concentrate     If you or a loved one observes any of these behaviors or has concerns about self-harm, here's what you can do:  · Talk about it- your feelings and reasons for harming yourself  · Remove any  "means that you might use to hurt yourself (examples: pills, rope, extension cords, firearm)  · Get professional help from the community (Mental Health, Substance Abuse, psychological counseling)  · Do not be alone:Call your Safe Contact- someone whom you trust who will be there for you.  · Call your local CRISIS HOTLINE 669-5297 or 487-062-1583  · Call your local Children's Mobile Crisis Response Team [place] (478) 679-8908 or wwwOpen Network Entertainment  · Call the toll free National Suicide Prevention Hotlines   · National Suicide Prevention Lifeline 338-254-VRIZ (5871)  · National Hope Line Network 800-SUICIDE (976-9546)    Post Angiogram Groin Care Instructions     INSTRUCTIONS  2. Examine (look and feel) the site of your incision site TODAY so you can recognize changes that should be called to your doctor (see below).  3. Avoid straining either by lifting or pulling objects for 4-5 days. Avoid lifting over 5 pounds.   4. For at least 72 hours, if you should sneeze or cough, please hold pressure over your groin area.  5. If you should begin to have oozing from the catheterization site, please hold firm pressure and call your doctor's office immediately.  6. If profuse bleeding occurs from the catheterization site, hold firm pressure and call \"[phone]\" immediately for assistance.  7. Remove bandage after 24 hours.     ACTIVITY  2. Limit activity as instructed by your doctor.  3. No driving or very limited driving with frequent stops for one week.   4. If you must take a long car ride, stop every hour and walk around the car.   5. Warm showers or baths are permitted after the bandage is removed. Avoid hot showers, baths, hot tubs, and swimming for one week.    PLEASE CALL YOUR DOCTOR IF:  1. Temperature elevation occurs.  2. Catheterization site becomes reddened or begins to drain.   3. Bruising appears to be new or not resolving. The bruise may move down your leg. This is normal.  4. The small round lump in the groin " increases in size.  5. Any leg numbness, aching, or discomfort (immediately).  6. Increasing discomfort in the leg at the insertion site.  7. Chest pains, even if relieved by Nitroglycerin.    MISCELLANEOUS INSTRUCTIONS  1. Bruising may occur as a result of heart catheterization. Some of the discoloration may travel down the leg, going from blue to green in color.  2. A small round lump under the catheterization site will remain for up to six weeks.  3. If any questions arise call your physician's office. You can also call the [employer] HOTLINE open 24 hours/day, 7 days/week and speak to a nurse at [phone], or toll free at [phone].   4. You should call 911 if you develop problems with breathing or chest pain.      I acknowledge receipt and understanding of these Home Care instructions.

## 2017-03-09 NOTE — OP REPORT
DATE OF SERVICE:  03/09/2017    PREOPERATIVE DIAGNOSIS:  Mesenteric arterial stenosis.    POSTPROCEDURE DIAGNOSIS:  Mesenteric arterial stenosis.    PROCEDURES PERFORMED:  1.  Aortogram.  2.  Mesenteric angiogram.  3.  Celiac artery angioplasty and stent.  4.  Superior mesenteric artery angioplasty.    SURGEON:  Kacie Baker MD.    ANESTHESIA:  Conscious sedation, DOUGLAS [place].    INDICATIONS:  The patient is an 83-year-old woman who presents with known   peripheral arterial disease of fairly severe significance.  She presents with   no evidence of lower extremity complaints.  She has no claudication, but   presents with a history of an ulcer and symptoms consistent with mesenteric   arterial stenosis such as postprandial pain and weight loss.  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  She is brought to the   operating room for treatment as indicated by findings.  Risks and benefits   were explained to the patient and include bleeding, infection, access site   complications and arterial thrombosis requiring surgery.  She understands and   agrees to proceed.    DESCRIPTION OF PROCEDURE:  The patient was taken to the angio suite, placed in   the supine position.  After adequate sedation, both groins were prepped and   draped in the usual sterile fashion with Chloraseptic prep, cloth towels and   paper drapes.  A time-out was called and the intended procedure confirmed with   all members of the team.    Using a sterile ultrasound probe, the right femoral artery was identified and   accessed with a 19-gauge thin-walled needle.  A guidewire was threaded and due   to a previous patch in the femoral artery, access was difficult.  I exchanged   the standard wire for a stiff wire and was able to then place a 4-Croatian   sheath.  Omni flush catheter was placed at the level of what I believe were   the mesenteric arteries.  An aortogram was performed.  Images of the   mesenteric artery could not be  clearly identified, so stenosis could not be   ruled out, but no evidence of significant aortoiliac or renal artery stenosis   was identified.    The image intensifier was then placed in a lateral plane.  An Amplatz sheath   was placed into the infrarenal aorta and Omni flush catheter used at the level   of the mesenteric arteries.  With some difficulty, I was able to put a 0.018   wire into the celiac artery.  Its size was monitored and I then attempted to   place a 5x18 mm stent in the celiac artery.  This would not thread past the   celiac artery orifice.  It was backed out with the wire position confirmed,   having exchanged the wire for a [employer] stiff wire.  We then passed a 4 mm   balloon into the celiac artery orifice and performed simple angioplasty.  This   allowed placement of a 5x18 mm balloon expandable stent.  It was postdilated   above and below with the balloon used to deploy the stent.  I attempted to   pass a 6 mm balloon into the orifice to post-dilate, but could not gain   position within the stent.  Final images showed good and full expansion of the   stent and therefore further pursuit of angioplasty was not performed.    I then placed the Omni flush catheter into the aorta and engage the superior   mesenteric artery.  A 0.018 wire was used and the images were performed.  For   diagnostic and therapeutic methods, I then passed a 6 mm balloon and performed   simple angioplasty of the orifice of the superior mesenteric artery with full   dilatation of the balloon and only a slight waste.  I did not feel there was   significant stenosis to place a stent in the superior mesenteric artery.    Final images showed good flow through both the celiac and superior mesenteric   artery.  The stiff wire was in place and the sheath was removed with a   6-Malay Angio-Seal deployed.    FINDINGS:  Aortogram demonstrates 2 right renal arteries.  The left renal   artery is patent.  Both the right renal  arteries are patent with only mild   stenosis of the more superior renal artery.  The infrarenal aorta is patent   and a more inferior left renal artery is seen perfusing the lower pole of the   left kidney from the mid aorta.  The bilateral common iliac arteries are   widely patent without evidence of stenosis above or below previously placed   stents.  The external iliac artery in a single view shows no evidence of   significant restenosis.    Mesenteric angiogram suggested a significant stenosis in the celiac artery.    The superior mesenteric artery appeared to fill well, but stenosis was   difficult to rule out on initial images.    The celiac artery appeared to have a stenosis at the orifice and it was   successfully treated with stent.  The superior mesenteric artery was treated   with simple balloon angioplasty both as a diagnostic and therapeutic maneuver.    No evidence of significant stenosis was identified after angioplasty that   required stent placement.  The inferior mesenteric artery is widely patent.    IMPRESSION:  1.  No evidence of significant renal artery stenosis with dual renal arteries   bilaterally.  2.  No evidence of infrarenal aortic or iliac artery stenosis.  3.  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  4.  The superior mesenteric artery shows no evidence of residual stenosis   after simple angioplasty.       ____________________________________     MD RAFAELA BAIG / SONIA    DD:  03/09/2017 09:59:59  DT:  03/09/2017 10:59:08    D#:  [id number]  Job#:  [id number]

## 2017-03-09 NOTE — IP AVS SNAPSHOT
3/9/2017          Brooke Diana  [address] [address]    Dear Brooke:    Critical access hospital wants to ensure your discharge home is safe and you or your loved ones have had all your questions answered regarding your care after you leave the hospital.    You may receive a telephone call within two days of your discharge.  This call is to make certain you understand your discharge instructions as well as ensure we provided you with the best care possible during your stay with us.     The call will only last approximately 3-5 minutes and will be done by a nurse.    Once again, we want to ensure your discharge home is safe and that you have a clear understanding of any next steps in your care.  If you have any questions or concerns, please do not hesitate to contact us, we are here for you.  Thank you for choosing [facility] for your healthcare needs.    Sincerely,    David Odell    [facility]

## 2017-03-21 DIAGNOSIS — I25.119 CORONARY ARTERY DISEASE INVOLVING NATIVE CORONARY ARTERY OF NATIVE HEART WITH ANGINA PECTORIS (HCC): ICD-10-CM

## 2017-03-21 DIAGNOSIS — E78.5 HYPERLIPIDEMIA, UNSPECIFIED HYPERLIPIDEMIA TYPE: ICD-10-CM

## 2017-03-21 DIAGNOSIS — I10 ESSENTIAL HYPERTENSION: ICD-10-CM

## 2017-03-23 RX ORDER — BENAZEPRIL HYDROCHLORIDE 20 MG/1
20 TABLET ORAL 2 TIMES DAILY
Qty: 180 TAB | Refills: 3 | Status: SHIPPED | OUTPATIENT
Start: 2017-03-23 | End: 2018-03-21 | Stop reason: SDUPTHER

## 2017-06-10 DIAGNOSIS — I10 ESSENTIAL HYPERTENSION: ICD-10-CM

## 2017-06-10 DIAGNOSIS — I25.10 CORONARY ARTERY DISEASE INVOLVING NATIVE CORONARY ARTERY OF NATIVE HEART WITHOUT ANGINA PECTORIS: ICD-10-CM

## 2017-06-12 RX ORDER — ISOSORBIDE MONONITRATE 30 MG/1
30 TABLET, EXTENDED RELEASE ORAL EVERY MORNING
Qty: 90 TAB | Refills: 2 | Status: SHIPPED | OUTPATIENT
Start: 2017-06-12 | End: 2018-03-12 | Stop reason: SDUPTHER

## 2017-08-31 ENCOUNTER — OFFICE VISIT (OUTPATIENT)
Dept: CARDIOLOGY | Facility: MEDICAL CENTER | Age: 82
End: 2017-08-31
Payer: MEDICARE

## 2017-08-31 VITALS
HEIGHT: 69 IN | SYSTOLIC BLOOD PRESSURE: 110 MMHG | BODY MASS INDEX: 22.36 KG/M2 | DIASTOLIC BLOOD PRESSURE: 50 MMHG | HEART RATE: 62 BPM | OXYGEN SATURATION: 92 % | WEIGHT: 151 LBS

## 2017-08-31 DIAGNOSIS — I25.10 CORONARY ARTERY DISEASE INVOLVING NATIVE CORONARY ARTERY OF NATIVE HEART, ANGINA PRESENCE UNSPECIFIED: ICD-10-CM

## 2017-08-31 DIAGNOSIS — E78.2 MIXED HYPERLIPIDEMIA: ICD-10-CM

## 2017-08-31 DIAGNOSIS — Z95.5 STENTED CORONARY ARTERY: ICD-10-CM

## 2017-08-31 DIAGNOSIS — I20.0 UNSTABLE ANGINA PECTORIS (HCC): ICD-10-CM

## 2017-08-31 DIAGNOSIS — R09.89 BILATERAL CAROTID BRUITS: ICD-10-CM

## 2017-08-31 PROCEDURE — 99214 OFFICE O/P EST MOD 30 MIN: CPT | Performed by: INTERNAL MEDICINE

## 2017-08-31 ASSESSMENT — ENCOUNTER SYMPTOMS
SHORTNESS OF BREATH: 1
CONSTIPATION: 1
HEADACHES: 0
FEVER: 0
BACK PAIN: 1
ROS GI COMMENTS: DARK STOOLS
NEUROLOGICAL NEGATIVE: 1
BRUISES/BLEEDS EASILY: 0

## 2017-08-31 NOTE — PROGRESS NOTES
Subjective:   Brooke Diana is a 83 y.o. female who presents today For follow-up of coronary disease. She underwent angiography initially in June 2010 at [place] that revealed a chronically occluded mid LAD and a subtotal lesion occluded large OM. Her OM was stented with a 3.0×15 mm Vision stent. Repeat angiography in 2014, revealed patent stent in the OM, and nonobstructive disease of the RCA.  For the past month the patient has not felt well and complained of fatigue. Also, she complains of exertional chest discomfort that lasts 5-10 minutes and is relieved by rest. She has not used any of her nitroglycerin. She underwent recent stenting of the mesenteric artery by Dr. Baker. The patient has had no melena or bright red rectal bleeding.    Past Medical History:   Diagnosis Date   • Breath shortness 2017    uses oxygen at 2 liters/min; AT NIGHT ONLY   • Myocardial infarct (CMS-[facility]) 6/10/10   • Acute myocardial infarction, true posterior wall infarction, episode of care unspecified    • Anxiety    • Bowel habit changes    • CAD (coronary artery disease)     S/P stent placement   • Cholesterol blood decreased    • Dental disorder     Partial upper   • Dizziness    • GI bleed    • Glaucoma    • Heart burn    • Hyperlipidemia    • Hypertension    • Peripheral vascular disease (CMS-[facility])    • Pulmonary disease     bronchitis   • Unspecified hemorrhagic conditions     pt takes plavix   • Unspecified urinary incontinence    • Urinary bladder disorder      Past Surgical History:   Procedure Laterality Date   • GASTROSCOPY WITH BIOPSY  2/13/2016    Procedure: GASTROSCOPY WITH BIOPSY;  Surgeon: Susan Miller M.D.;  Location: ENDOSCOPY [facility]  Service:    • RECOVERY  7/24/2014    Performed by Cath-Recovery Surgery at SURGERY SAME DAY [facility]   • CARDIAC CATH  7/24/14    Diffuse disease, see report.  % with collterals.   • FEMORAL ENDARTERECTOMY  10/4/2013    Performed by Kacie Baker,  M.D. at SURGERY [facility] ORS   • ANGIOPLASTY BALLOON  10/4/2013    Performed by Kacie Baker M.D. at SURGERY [facility] ORS   • RECOVERY  4/9/2013    Performed by Ir-Recovery Surgery at SURGERY SAME DAY [facility] ORS   • RECOVERY  11/26/2012    Performed by Ir-Recovery Surgery at SURGERY SAME DAY [facility] ORS   • RECTUS REPAIR  7/13/2012    Performed by SHEILA PLATT at SURGERY [facility]   • OTHER  12/10/10    stents in legs   • OTHER  6/10/10    cardiac stents   • CARDIAC CATH  6/2010    BMS to Om   • OTHER  2005    KNEE SURGERY   • CHOLECYSTECTOMY     • HYSTERECTOMY, TOTAL ABDOMINAL     • OTHER CARDIAC SURGERY      stent in heart   • STENT PLACEMENT      treated by Dr Baker, multiple surgeries to legs.     Family History   Problem Relation Age of Onset   • Stroke Mother 68     CVA   • Heart Disease Mother 65     valve surgery   • Other Son      wgt 465 lbs     History   Smoking Status   • Former Smoker   • Packs/day: 0.25   • Years: 45.00   • Types: Cigarettes   • Quit date: 2/1/2016   Smokeless Tobacco   • Never Used     Allergies   Allergen Reactions   • Penicillins Rash and Vomiting     Rxn = unknown  Pt tolerates cephalosporins   • Hydrocodone Vomiting and Nausea     Rxn = unknown   • Tape Rash     RASH     Outpatient Encounter Prescriptions as of 8/31/2017   Medication Sig Dispense Refill   • isosorbide mononitrate SR (IMDUR) 30 MG TABLET SR 24 HR Take 1 Tab by mouth every morning. 90 Tab 2   • amlodipine (NORVASC) 5 MG Tab TAKE 1 TABLET DAILY 90 Tab 3   • benazepril (LOTENSIN) 20 MG Tab Take 1 Tab by mouth 2 Times a Day. 180 Tab 3   • atorvastatin (LIPITOR) 20 MG Tab Take 1 Tab by mouth every evening. 90 Tab 3   • estradiol (ESTRACE) 1 MG Tab Take 1 mg by mouth every day.     • aspirin EC (ECOTRIN) 81 MG Tablet Delayed Response Take 1 Tab by mouth every day. 30 Tab 11   • metoprolol (LOPRESSOR) 50 MG Tab Take 1.5 Tabs by mouth 2 times a day. 90 Tab 3   • Cholecalciferol (VITAMIN D) 2000  "UNIT Tab Take 2,000 Units by mouth every day.     • gabapentin (NEURONTIN) 300 MG CAPS Take 300 mg by mouth every day. Indications: Neurogenic Pain     • nitroglycerin (NITROSTAT) 0.4 MG SL Tab Place 1 Tab under tongue as needed for Chest Pain. 25 Tab 1     No facility-administered encounter medications on file as of 8/31/2017.      Review of Systems   Constitutional: Negative for fever.   Respiratory: Positive for shortness of breath.    Cardiovascular: Positive for chest pain. Negative for leg swelling.   Gastrointestinal: Positive for constipation.        Dark stools   Genitourinary:        Kidney disease or kidney stones   Musculoskeletal: Positive for back pain.   Neurological: Negative.  Negative for headaches.   Endo/Heme/Allergies: Does not bruise/bleed easily.        Objective:   /50   Pulse 62   Ht 1.753 m (5' 9.02\")   Wt 68.5 kg (151 lb)   SpO2 92%   BMI 22.29 kg/m²     Physical Exam   Constitutional: She is oriented to person, place, and time. She appears well-developed and well-nourished. No distress.   HENT:   Head: Atraumatic.   Mallampati score of one   Eyes: Conjunctivae and EOM are normal. Pupils are equal, round, and reactive to light.   Neck: Neck supple. No JVD present.   Cardiovascular: Normal rate and regular rhythm.   No extrasystoles are present. Exam reveals gallop and S4.    No murmur heard.  Pulses:       Carotid pulses are on the right side with bruit, and on the left side with bruit.       Femoral pulses are 2+ on the right side, and 1+ on the left side.       Dorsalis pedis pulses are 1+ on the right side, and 1+ on the left side.        Posterior tibial pulses are 1+ on the right side, and 0 on the left side.   Pulmonary/Chest: No respiratory distress. She has no wheezes. She has no rales.   Decreased breath sounds and increased resonance to percussion compatible with COPD   Abdominal: Soft. There is no tenderness.   Musculoskeletal: She exhibits no edema.   Neurological: " She is alert and oriented to person, place, and time.   Skin: Skin is warm and dry. She is not diaphoretic.       Assessment:     1. Stented coronary artery  PET SCAN MYOCARDIAL PERFUSION   2. Bilateral carotid bruits     3. Mixed hyperlipidemia     4. Coronary artery disease involving native coronary artery of native heart, angina presence unspecified  PET SCAN MYOCARDIAL PERFUSION       Medical Decision Making:  Today's Assessment / Status / Plan:   The patient has known coronary disease. For the last month she's been having chest pain which is quite worrisome for angina. She has known carotid artery disease, peripheral vascular disease and there is mesenteric disease.  A myocardial PET scan will be obtained to ensure there are no flow-limiting lesions in the right coronary artery and circumflex distributions. If the scan is abnormal, would recommend angiography. In the interim, her blood pressure is well-controlled. The use of nitroglycerin was discussed with her. Return for follow-up one month, sooner if problems arise.

## 2017-09-11 ENCOUNTER — APPOINTMENT (OUTPATIENT)
Dept: RADIOLOGY | Facility: MEDICAL CENTER | Age: 82
End: 2017-09-11
Attending: INTERNAL MEDICINE
Payer: MEDICARE

## 2017-09-11 PROCEDURE — 700111 HCHG RX REV CODE 636 W/ 250 OVERRIDE (IP)

## 2017-09-11 PROCEDURE — A9555 RB82 RUBIDIUM: HCPCS

## 2017-09-12 ENCOUNTER — TELEPHONE (OUTPATIENT)
Dept: CARDIOLOGY | Facility: MEDICAL CENTER | Age: 82
End: 2017-09-12

## 2017-09-12 NOTE — TELEPHONE ENCOUNTER
----- Message from Severo Mancilla M.D. sent at 9/12/2017  2:34 PM PDT -----  Scan shows expected result. She has known occlusion of LAD and muscle in this territory has reduced blood flow.  Muscle is the distribution of RCA and Circ have normal flow flow.  No evidence of coronary stenosis in these other arteries, continue same meds.

## 2017-09-25 DIAGNOSIS — E78.5 HYPERLIPIDEMIA, UNSPECIFIED HYPERLIPIDEMIA TYPE: ICD-10-CM

## 2017-09-26 RX ORDER — ATORVASTATIN CALCIUM 20 MG/1
TABLET, FILM COATED ORAL
Qty: 90 TAB | Refills: 3 | Status: ON HOLD | OUTPATIENT
Start: 2017-09-26 | End: 2018-09-05

## 2017-10-18 ENCOUNTER — OFFICE VISIT (OUTPATIENT)
Dept: CARDIOLOGY | Facility: MEDICAL CENTER | Age: 82
End: 2017-10-18
Payer: MEDICARE

## 2017-10-18 VITALS
SYSTOLIC BLOOD PRESSURE: 128 MMHG | HEIGHT: 69 IN | OXYGEN SATURATION: 94 % | DIASTOLIC BLOOD PRESSURE: 58 MMHG | HEART RATE: 80 BPM | WEIGHT: 151 LBS | BODY MASS INDEX: 22.36 KG/M2

## 2017-10-18 DIAGNOSIS — Z72.0 TOBACCO ABUSE: ICD-10-CM

## 2017-10-18 DIAGNOSIS — I10 ESSENTIAL HYPERTENSION: ICD-10-CM

## 2017-10-18 DIAGNOSIS — E78.2 MIXED HYPERLIPIDEMIA: ICD-10-CM

## 2017-10-18 DIAGNOSIS — Z95.5 STENTED CORONARY ARTERY: ICD-10-CM

## 2017-10-18 DIAGNOSIS — I25.10 CORONARY ARTERY DISEASE INVOLVING NATIVE CORONARY ARTERY OF NATIVE HEART, ANGINA PRESENCE UNSPECIFIED: ICD-10-CM

## 2017-10-18 DIAGNOSIS — R09.89 BILATERAL CAROTID BRUITS: ICD-10-CM

## 2017-10-18 DIAGNOSIS — I73.9 PVD (PERIPHERAL VASCULAR DISEASE) (HCC): ICD-10-CM

## 2017-10-18 DIAGNOSIS — I20.89 ANGINA EFFORT: ICD-10-CM

## 2017-10-18 DIAGNOSIS — I73.9 PERIPHERAL ARTERIAL DISEASE (HCC): ICD-10-CM

## 2017-10-18 PROCEDURE — 99214 OFFICE O/P EST MOD 30 MIN: CPT | Performed by: NURSE PRACTITIONER

## 2017-10-18 RX ORDER — INFLUENZA A VIRUS A/MICHIGAN/45/2015 X-275 (H1N1) ANTIGEN (FORMALDEHYDE INACTIVATED), INFLUENZA A VIRUS A/SINGAPORE/INFIMH-16-0019/2016 IVR-186 (H3N2) ANTIGEN (FORMALDEHYDE INACTIVATED), AND INFLUENZA B VIRUS B/MARYLAND/15/2016 BX-69A (A B/COLORADO/6/2017-LIKE VIRUS) ANTIGEN (FORMALDEHYDE INACTIVATED) 60; 60; 60 UG/.5ML; UG/.5ML; UG/.5ML
INJECTION, SUSPENSION INTRAMUSCULAR
Refills: 0 | COMMUNITY
Start: 2017-10-10 | End: 2017-10-18

## 2017-10-18 ASSESSMENT — ENCOUNTER SYMPTOMS
MYALGIAS: 0
ORTHOPNEA: 0
FEVER: 0
FALLS: 0
DIZZINESS: 0
PALPITATIONS: 0
CLAUDICATION: 0
SHORTNESS OF BREATH: 0
ABDOMINAL PAIN: 0
PND: 0
COUGH: 0

## 2017-10-18 NOTE — LETTER
[facility]   [address]  [place] [address]  Phone: [phone]  Fax: [phone]              Brooke Diana  2/1/1934    Encounter Date: 10/18/2017    JIMENEZ Brooks          PROGRESS NOTE:  Subjective:   Brooke Diana is a 81 y.o. female who presents today for two week follow up on PET scan results.    She is a patient of Dr. Mancilla in our office. Hx of HTN, CAD with previous stent to the obtuse marginal artery and  LAD, HLD, previous smoker, hypoxemia at night with oxygen, PVD and PAD.    She is overall doing okay. She has been more active. She has intermittent chest discomfort that comes on at times with heavy exertion.     We discussed her stress imaging.    She has had no episodes of palpitations, dizziness/lightheadedness, shortness of breath, orthopnea, or peripheral edema.    Past Medical History:   Diagnosis Date   • Acute myocardial infarction, true posterior wall infarction, episode of care unspecified    • Anxiety    • Bowel habit changes    • Breath shortness 2017    uses oxygen at 2 liters/min; AT NIGHT ONLY   • CAD (coronary artery disease)     S/P stent placement   • Cholesterol blood decreased    • Dental disorder     Partial upper   • Dizziness    • GI bleed    • Glaucoma    • Heart burn    • Hyperlipidemia    • Hypertension    • Myocardial infarct 6/10/10   • Peripheral vascular disease (CMS-[facility])    • Pulmonary disease     bronchitis   • Unspecified hemorrhagic conditions     pt takes plavix   • Unspecified urinary incontinence    • Urinary bladder disorder      Past Surgical History:   Procedure Laterality Date   • ANGIOPLASTY BALLOON  10/4/2013    Performed by Kacie Baker M.D. at SURGERY [facility]S   • CARDIAC CATH  6/2010    BMS to Om   • CARDIAC CATH  7/24/14    Diffuse disease, see report.  % with collterals.   • CHOLECYSTECTOMY     • FEMORAL ENDARTERECTOMY  10/4/2013    Performed by Kacie SOSA  DAVON Baker at SURGERY [facility] ORS   • GASTROSCOPY WITH BIOPSY  2/13/2016    Procedure: GASTROSCOPY WITH BIOPSY;  Surgeon: Susan Miller M.D.;  Location: ENDOSCOPY [facility];  Service:    • HYSTERECTOMY, TOTAL ABDOMINAL     • OTHER  12/10/10    stents in legs   • OTHER  6/10/10    cardiac stents   • OTHER  2005    KNEE SURGERY   • OTHER CARDIAC SURGERY      stent in heart   • RECOVERY  11/26/2012    Performed by Ir-Recovery Surgery at SURGERY SAME DAY [facility] ORS   • RECOVERY  4/9/2013    Performed by Ir-Recovery Surgery at SURGERY SAME DAY [facility] ORS   • RECOVERY  7/24/2014    Performed by Cath-Recovery Surgery at SURGERY SAME DAY [facility] ORS   • RECTUS REPAIR  7/13/2012    Performed by SHEILA PLATT at SURGERY [facility] ORS   • STENT PLACEMENT      treated by Dr Baker, multiple surgeries to legs.     Family History   Problem Relation Age of Onset   • Stroke Mother 68     CVA   • Heart Disease Mother 65     valve surgery   • Other Son      wgt 465 lbs     History   Smoking Status   • Former Smoker   • Packs/day: 0.25   • Years: 45.00   • Types: Cigarettes   • Quit date: 2/1/2016   Smokeless Tobacco   • Never Used     Allergies   Allergen Reactions   • Penicillins Rash and Vomiting     Rxn = unknown  Pt tolerates cephalosporins   • Hydrocodone Vomiting and Nausea     Rxn = unknown   • Tape Rash     RASH     Outpatient Encounter Prescriptions as of 10/18/2017   Medication Sig Dispense Refill   • atorvastatin (LIPITOR) 20 MG Tab TAKE 1 TABLET EVERY EVENING 90 Tab 3   • isosorbide mononitrate SR (IMDUR) 30 MG TABLET SR 24 HR Take 1 Tab by mouth every morning. 90 Tab 2   • amlodipine (NORVASC) 5 MG Tab TAKE 1 TABLET DAILY 90 Tab 3   • benazepril (LOTENSIN) 20 MG Tab Take 1 Tab by mouth 2 Times a Day. 180 Tab 3   • estradiol (ESTRACE) 1 MG Tab Take 1 mg by mouth every day.     • aspirin EC (ECOTRIN) 81 MG Tablet Delayed Response Take 1 Tab by mouth every day. 30 Tab 11   • metoprolol  "(LOPRESSOR) 50 MG Tab Take 1.5 Tabs by mouth 2 times a day. 90 Tab 3   • Cholecalciferol (VITAMIN D) 2000 UNIT Tab Take 2,000 Units by mouth every day.     • gabapentin (NEURONTIN) 300 MG CAPS Take 300 mg by mouth every day. Indications: Neurogenic Pain     • [DISCONTINUED] FLUZONE HIGH-DOSE 0.5 ML Suspension Prefilled Syringe injection TO BE ADMINISTERED BY PHARMACIST FOR IMMUNIZATION  0   • nitroglycerin (NITROSTAT) 0.4 MG SL Tab Place 1 Tab under tongue as needed for Chest Pain. 25 Tab 1     No facility-administered encounter medications on file as of 10/18/2017.      Review of Systems   Constitutional: Negative for fever and malaise/fatigue.   Respiratory: Negative for cough and shortness of breath.    Cardiovascular: Positive for chest pain. Negative for palpitations, orthopnea, claudication, leg swelling and PND.   Gastrointestinal: Negative for abdominal pain.   Musculoskeletal: Negative for falls and myalgias.   Neurological: Negative for dizziness.   All other systems reviewed and are negative.       Objective:   /58   Pulse 80   Ht 1.753 m (5' 9\")   Wt 68.5 kg (151 lb)   SpO2 94%   BMI 22.30 kg/m²      Physical Exam   Constitutional: She is oriented to person, place, and time. She appears well-developed and well-nourished. No distress.   HENT:   Head: Normocephalic and atraumatic.   Eyes: EOM are normal. Pupils are equal, round, and reactive to light.   Neck: Normal range of motion. No JVD present.   Cardiovascular: Normal rate, regular rhythm, normal heart sounds and intact distal pulses.    No murmur heard.  Pulmonary/Chest: Effort normal and breath sounds normal. No respiratory distress.   Abdominal: Soft. Bowel sounds are normal.   Musculoskeletal: Normal range of motion. She exhibits no edema.   Neurological: She is alert and oriented to person, place, and time.   Skin: Skin is warm and dry.   Psychiatric: She has a normal mood and affect.   Nursing note and vitals reviewed.    CATH 2014   "   BMS to the obtuse marginal artery, 80% proximal LAD lesion followed by complete occlusion of the LAD with collateral filling. The right coronary artery at a 50% lesion.    4/19/16 ECHO CONCLUSIONS  No prior study is available for comparison.   Normal left ventricular chamber size. Moderate concentric left   ventricular hypertrophy. Normal left ventricular systolic function.   Left ventricular ejection fraction is visually estimated to be 65%.   Normal regional wall motion. No significant diastolic dysfunction.  Moderate to severe aortic insufficiency.  Ascending aorta diameter is 3.4 cm.    2/11/16 NUCLEAR IMAGING IMPRESSIONS   Moderate sized partial fixed defect of moderately decreased counts in the    apical anterior, apical septal, apical inferior wall, and mid anteroseptum    consistent with mainly ischemia with some scar.    Hypokinesis of the anteroseptum and septal walls.  LV ejection fraction =    69%.    2017 PET CARDIAC   SCINTOGRAPHIC FINDINGS:  There is positive evidence of large area of ischemia seen at the mid to apical septo-anterior wall of the left ventricle.  GATED WALL MOTION FINDINGS:  The left ventricle wall motion is normal with stress and rest  imagings.  Measured resting ejection fraction is 64 %.       Reviewed with patient  Lab Results   Component Value Date/Time    CHOLSTRLTOT 126 10/18/2016 08:42 AM    LDL 45 10/18/2016 08:42 AM    HDL 47 10/18/2016 08:42 AM    TRIGLYCERIDE 172 (H) 10/18/2016 08:42 AM       Lab Results   Component Value Date/Time    SODIUM 138 03/07/2017 03:06 PM    POTASSIUM 4.3 03/07/2017 03:06 PM    CHLORIDE 106 03/07/2017 03:06 PM    CO2 26 03/07/2017 03:06 PM    GLUCOSE 97 03/07/2017 03:06 PM    BUN 16 03/07/2017 03:06 PM    CREATININE 1.11 03/07/2017 03:06 PM     Lab Results   Component Value Date/Time    WBC 9.6 03/07/2017 03:06 PM    RBC 4.53 03/07/2017 03:06 PM    HEMOGLOBIN 14.5 03/07/2017 03:06 PM    HEMATOCRIT 44.9 03/07/2017 03:06 PM    MCV 99.1 (H)  03/07/2017 03:06 PM    MCH 32.0 03/07/2017 03:06 PM    MCHC 32.3 (L) 03/07/2017 03:06 PM    MPV 10.1 03/07/2017 03:06 PM      Assessment:     1. Coronary artery disease involving native coronary artery of native heart, angina presence unspecified     2. Mixed hyperlipidemia  LIPID PANEL    COMP METABOLIC PANEL   3. Essential hypertension     4. Peripheral arterial disease (CMS-HCC)     5. PVD (peripheral vascular disease) (CMS-HCC)     6. Stented coronary artery     7. Bilateral carotid bruits     8. Tobacco abuse     9. Angina effort (CMS-HCC)       Medical Decision Making:  Today's Assessment / Status / Plan:     1. HTN, good control. Continue amlodipine 5 mg D, metoprolol at 75 mg BID, lotensin 20 mg BID, and isosorbide 30 mg QD.    2. CAD with previous BMS to OM,  to LAD, large area of ischemia seen on PET again, Dr Mancilla reviewed and believes the area of ischemia is related to her  of LAD, no further interventions per his recommendation and continue with med management at this time. If patient is to have worsening angina, we can trial ranexa on top of isosorbide if wanted. Continue ASA, plavix, lipitor, and metoprolol.    3. PVD and PAD, stable no problems. Continue ASA, plavix, Lipitor. FU with vascular yearly.    4. Smoker, cessation continued.    5. Hypoxemia at night, oxygen at night. Tolerating well.    6. HLD, Lipitor. Great control. LDL goal <70 with CAD. Yearly CMP and lipid ordered     FU in clinic in 6 months with RS or sooner if chest pain worsens    Patient verbalizes understanding and agrees with the plan of care.     Collaborating MD: Kristen CLOUD         No Recipients

## 2017-10-18 NOTE — PROGRESS NOTES
Subjective:   Brooke Diana is a 81 y.o. female who presents today for two week follow up on PET scan results.    She is a patient of Dr. Mancilla in our office. Hx of HTN, CAD with previous stent to the obtuse marginal artery and  LAD, HLD, previous smoker, hypoxemia at night with oxygen, PVD and PAD.    She is overall doing okay. She has been more active. She has intermittent chest discomfort that comes on at times with heavy exertion.     We discussed her stress imaging.    She has had no episodes of palpitations, dizziness/lightheadedness, shortness of breath, orthopnea, or peripheral edema.    Past Medical History:   Diagnosis Date   • Acute myocardial infarction, true posterior wall infarction, episode of care unspecified    • Anxiety    • Bowel habit changes    • Breath shortness 2017    uses oxygen at 2 liters/min; AT NIGHT ONLY   • CAD (coronary artery disease)     S/P stent placement   • Cholesterol blood decreased    • Dental disorder     Partial upper   • Dizziness    • GI bleed    • Glaucoma    • Heart burn    • Hyperlipidemia    • Hypertension    • Myocardial infarct 6/10/10   • Peripheral vascular disease (CMS-[facility])    • Pulmonary disease     bronchitis   • Unspecified hemorrhagic conditions     pt takes plavix   • Unspecified urinary incontinence    • Urinary bladder disorder      Past Surgical History:   Procedure Laterality Date   • ANGIOPLASTY BALLOON  10/4/2013    Performed by Kacie Baker M.D. at SURGERY [facility]   • CARDIAC CATH  6/2010    BMS to Om   • CARDIAC CATH  7/24/14    Diffuse disease, see report.  % with collterals.   • CHOLECYSTECTOMY     • FEMORAL ENDARTERECTOMY  10/4/2013    Performed by Kacie Baker M.D. at SURGERY [facility]   • GASTROSCOPY WITH BIOPSY  2/13/2016    Procedure: GASTROSCOPY WITH BIOPSY;  Surgeon: Susan Miller M.D.;  Location: ENDOSCOPY [facility];  Service:    • HYSTERECTOMY, TOTAL ABDOMINAL     • OTHER  12/10/10    stents  in legs   • OTHER  6/10/10    cardiac stents   • OTHER  2005    KNEE SURGERY   • OTHER CARDIAC SURGERY      stent in heart   • RECOVERY  11/26/2012    Performed by [facility] at SURGERY SAME DAY [facility]   • RECOVERY  4/9/2013    Performed by [facility] at SURGERY SAME DAY [facility]   • RECOVERY  7/24/2014    Performed by Cath-Recovery Surgery at SURGERY SAME DAY [facility]   • RECTUS REPAIR  7/13/2012    Performed by SHEILA PLATT at SURGERY [facility]   • STENT PLACEMENT      treated by Dr Baker, multiple surgeries to legs.     Family History   Problem Relation Age of Onset   • Stroke Mother 68     CVA   • Heart Disease Mother 65     valve surgery   • Other Son      wgt 465 lbs     History   Smoking Status   • Former Smoker   • Packs/day: 0.25   • Years: 45.00   • Types: Cigarettes   • Quit date: 2/1/2016   Smokeless Tobacco   • Never Used     Allergies   Allergen Reactions   • Penicillins Rash and Vomiting     Rxn = unknown  Pt tolerates cephalosporins   • Hydrocodone Vomiting and Nausea     Rxn = unknown   • Tape Rash     RASH     Outpatient Encounter Prescriptions as of 10/18/2017   Medication Sig Dispense Refill   • atorvastatin (LIPITOR) 20 MG Tab TAKE 1 TABLET EVERY EVENING 90 Tab 3   • isosorbide mononitrate SR (IMDUR) 30 MG TABLET SR 24 HR Take 1 Tab by mouth every morning. 90 Tab 2   • amlodipine (NORVASC) 5 MG Tab TAKE 1 TABLET DAILY 90 Tab 3   • benazepril (LOTENSIN) 20 MG Tab Take 1 Tab by mouth 2 Times a Day. 180 Tab 3   • estradiol (ESTRACE) 1 MG Tab Take 1 mg by mouth every day.     • aspirin EC (ECOTRIN) 81 MG Tablet Delayed Response Take 1 Tab by mouth every day. 30 Tab 11   • metoprolol (LOPRESSOR) 50 MG Tab Take 1.5 Tabs by mouth 2 times a day. 90 Tab 3   • Cholecalciferol (VITAMIN D) 2000 UNIT Tab Take 2,000 Units by mouth every day.     • gabapentin (NEURONTIN) 300 MG CAPS Take 300 mg by mouth every day. Indications: Neurogenic Pain     • [DISCONTINUED]  "FLUZONE HIGH-DOSE 0.5 ML Suspension Prefilled Syringe injection TO BE ADMINISTERED BY PHARMACIST FOR IMMUNIZATION  0   • nitroglycerin (NITROSTAT) 0.4 MG SL Tab Place 1 Tab under tongue as needed for Chest Pain. 25 Tab 1     No facility-administered encounter medications on file as of 10/18/2017.      Review of Systems   Constitutional: Negative for fever and malaise/fatigue.   Respiratory: Negative for cough and shortness of breath.    Cardiovascular: Positive for chest pain. Negative for palpitations, orthopnea, claudication, leg swelling and PND.   Gastrointestinal: Negative for abdominal pain.   Musculoskeletal: Negative for falls and myalgias.   Neurological: Negative for dizziness.   All other systems reviewed and are negative.       Objective:   /58   Pulse 80   Ht 1.753 m (5' 9\")   Wt 68.5 kg (151 lb)   SpO2 94%   BMI 22.30 kg/m²     Physical Exam   Constitutional: She is oriented to person, place, and time. She appears well-developed and well-nourished. No distress.   HENT:   Head: Normocephalic and atraumatic.   Eyes: EOM are normal. Pupils are equal, round, and reactive to light.   Neck: Normal range of motion. No JVD present.   Cardiovascular: Normal rate, regular rhythm, normal heart sounds and intact distal pulses.    No murmur heard.  Pulmonary/Chest: Effort normal and breath sounds normal. No respiratory distress.   Abdominal: Soft. Bowel sounds are normal.   Musculoskeletal: Normal range of motion. She exhibits no edema.   Neurological: She is alert and oriented to person, place, and time.   Skin: Skin is warm and dry.   Psychiatric: She has a normal mood and affect.   Nursing note and vitals reviewed.    CATH 2014   BMS to the obtuse marginal artery, 80% proximal LAD lesion followed by complete occlusion of the LAD with collateral filling. The right coronary artery at a 50% lesion.    4/19/16 ECHO CONCLUSIONS  No prior study is available for comparison.   Normal left ventricular chamber " size. Moderate concentric left   ventricular hypertrophy. Normal left ventricular systolic function.   Left ventricular ejection fraction is visually estimated to be 65%.   Normal regional wall motion. No significant diastolic dysfunction.  Moderate to severe aortic insufficiency.  Ascending aorta diameter is 3.4 cm.    2/11/16 NUCLEAR IMAGING IMPRESSIONS   Moderate sized partial fixed defect of moderately decreased counts in the    apical anterior, apical septal, apical inferior wall, and mid anteroseptum    consistent with mainly ischemia with some scar.    Hypokinesis of the anteroseptum and septal walls.  LV ejection fraction =    69%.    2017 PET CARDIAC   SCINTOGRAPHIC FINDINGS:  There is positive evidence of large area of ischemia seen at the mid to apical septo-anterior wall of the left ventricle.  GATED WALL MOTION FINDINGS:  The left ventricle wall motion is normal with stress and rest  imagings.  Measured resting ejection fraction is 64 %.       Reviewed with patient  Lab Results   Component Value Date/Time    CHOLSTRLTOT 126 10/18/2016 08:42 AM    LDL 45 10/18/2016 08:42 AM    HDL 47 10/18/2016 08:42 AM    TRIGLYCERIDE 172 (H) 10/18/2016 08:42 AM       Lab Results   Component Value Date/Time    SODIUM 138 03/07/2017 03:06 PM    POTASSIUM 4.3 03/07/2017 03:06 PM    CHLORIDE 106 03/07/2017 03:06 PM    CO2 26 03/07/2017 03:06 PM    GLUCOSE 97 03/07/2017 03:06 PM    BUN 16 03/07/2017 03:06 PM    CREATININE 1.11 03/07/2017 03:06 PM     Lab Results   Component Value Date/Time    WBC 9.6 03/07/2017 03:06 PM    RBC 4.53 03/07/2017 03:06 PM    HEMOGLOBIN 14.5 03/07/2017 03:06 PM    HEMATOCRIT 44.9 03/07/2017 03:06 PM    MCV 99.1 (H) 03/07/2017 03:06 PM    MCH 32.0 03/07/2017 03:06 PM    MCHC 32.3 (L) 03/07/2017 03:06 PM    MPV 10.1 03/07/2017 03:06 PM      Assessment:     1. Coronary artery disease involving native coronary artery of native heart, angina presence unspecified     2. Mixed hyperlipidemia  LIPID  PANEL    COMP METABOLIC PANEL   3. Essential hypertension     4. Peripheral arterial disease (CMS-HCC)     5. PVD (peripheral vascular disease) (CMS-HCC)     6. Stented coronary artery     7. Bilateral carotid bruits     8. Tobacco abuse     9. Angina effort (CMS-HCC)       Medical Decision Making:  Today's Assessment / Status / Plan:     1. HTN, good control. Continue amlodipine 5 mg D, metoprolol at 75 mg BID, lotensin 20 mg BID, and isosorbide 30 mg QD.    2. CAD with previous BMS to OM,  to LAD, large area of ischemia seen on PET again, Dr Mancilla reviewed and believes the area of ischemia is related to her  of LAD, no further interventions per his recommendation and continue with med management at this time. If patient is to have worsening angina, we can trial ranexa on top of isosorbide if wanted. Continue ASA, plavix, lipitor, and metoprolol.    3. PVD and PAD, stable no problems. Continue ASA, plavix, Lipitor. FU with vascular yearly.    4. Smoker, cessation continued.    5. Hypoxemia at night, oxygen at night. Tolerating well.    6. HLD, Lipitor. Great control. LDL goal <70 with CAD. Yearly CMP and lipid ordered     FU in clinic in 6 months with RS or sooner if chest pain worsens    Patient verbalizes understanding and agrees with the plan of care.     Collaborating MD: Kristen CLOUD

## 2017-12-14 ENCOUNTER — HOSPITAL ENCOUNTER (OUTPATIENT)
Dept: LAB | Facility: MEDICAL CENTER | Age: 82
End: 2017-12-14
Attending: NURSE PRACTITIONER
Payer: MEDICARE

## 2017-12-14 LAB — 25(OH)D3 SERPL-MCNC: 35 NG/ML (ref 30–100)

## 2017-12-14 PROCEDURE — 36415 COLL VENOUS BLD VENIPUNCTURE: CPT

## 2017-12-14 PROCEDURE — 82306 VITAMIN D 25 HYDROXY: CPT

## 2018-03-07 ENCOUNTER — OFFICE VISIT (OUTPATIENT)
Dept: PULMONOLOGY | Facility: HOSPICE | Age: 83
End: 2018-03-07
Payer: MEDICARE

## 2018-03-07 VITALS
TEMPERATURE: 97.7 F | SYSTOLIC BLOOD PRESSURE: 130 MMHG | BODY MASS INDEX: 22.22 KG/M2 | HEART RATE: 89 BPM | WEIGHT: 150 LBS | DIASTOLIC BLOOD PRESSURE: 62 MMHG | RESPIRATION RATE: 16 BRPM | OXYGEN SATURATION: 92 % | HEIGHT: 69 IN

## 2018-03-07 DIAGNOSIS — G47.34 NOCTURNAL HYPOXIA: ICD-10-CM

## 2018-03-07 PROCEDURE — 99213 OFFICE O/P EST LOW 20 MIN: CPT | Performed by: NURSE PRACTITIONER

## 2018-03-07 NOTE — PROGRESS NOTES
CC:  Here for f/u pulmonary issues as listed below    HPI:   Brooke presents today for follow up for hypoxia for nocturnal oxygen. PFTs from 4/2015 indicate a Fev1 of 2.20L or 100% predicted without bronchodilator response, Fev1/FVC ratio of 70, DLCO 70%. Patient is not interested in completing these until she becomes symptomatic. Patient is wearing her 2 L of oxygen at night every day for 6-8 hours. Patient originally qualify for nocturnal oxygen April 2015 with overnight oximetry showing desaturation with a low of 76% and time below 88% was over 3 hours. She continues to benefit from nocturnal oxygen. She sleeps better and denies morning H/A.   Patient has not had any hospitalizations for pulmonary reasons or infections since we last saw her.  She did require hospitalization for internal bleeding with recovery in 2017.  Patient denies shortness of breath. She does cough occasionally producing white phlegm, typically in the morning. She is not on any inhalers at this time.  She admits she is sedentary. Encouraged light conditioning. Sister has recently been hospitalized for a few months.            Patient Active Problem List    Diagnosis Date Noted   • Bilateral carotid bruits 02/13/2017     Priority: Medium   • Stented coronary artery 04/19/2016     Priority: Medium   • CAD (coronary artery disease) 06/26/2015     Priority: Medium   • Angina effort (CMS-HCC) 07/17/2014     Priority: Medium   • Hyperlipidemia      Priority: Medium   • Hypertension      Priority: Medium   • PVD (peripheral vascular disease) (CMS-HCC) 04/09/2013     Priority: Medium   • Peripheral arterial disease (CMS-HCC) 11/26/2012     Priority: Medium   • Tobacco abuse 02/11/2016     Priority: Low   • Acute GI bleeding 02/11/2016     Priority: Low   • Fall 12/23/2015     Priority: Low   • Peripheral neuropathy (CMS-HCC) 08/14/2014     Priority: Low   • Anxiety      Priority: Low   • Nocturnal hypoxia 08/17/2016       Past Medical History:    Diagnosis Date   • Acute myocardial infarction, true posterior wall infarction, episode of care unspecified    • Anxiety    • Bowel habit changes    • Breath shortness 2017    uses oxygen at 2 liters/min; AT NIGHT ONLY   • CAD (coronary artery disease)     S/P stent placement   • Cholesterol blood decreased    • Dental disorder     Partial upper   • Dizziness    • GI bleed    • Glaucoma    • Heart burn    • Hyperlipidemia    • Hypertension    • Myocardial infarct 6/10/10   • Peripheral vascular disease (CMS-[facility])    • Pulmonary disease     bronchitis   • Unspecified hemorrhagic conditions     pt takes plavix   • Unspecified urinary incontinence    • Urinary bladder disorder        Past Surgical History:   Procedure Laterality Date   • GASTROSCOPY WITH BIOPSY  2/13/2016    Procedure: GASTROSCOPY WITH BIOPSY;  Surgeon: Susan Miller M.D.;  Location: ENDOSCOPY [facility];  Service:    • RECOVERY  7/24/2014    Performed by [facility]Rec[facility] Surgery at SURGERY SAME DAY [facility]   • CARDIAC CATH  7/24/14    Diffuse disease, see report.  % with collterals.   • FEMORAL ENDARTERECTOMY  10/4/2013    Performed by Kacie Baker M.D. at SURGERY [facility]   • ANGIOPLASTY BALLOON  10/4/2013    Performed by Kacie Baker M.D. at SURGERY [facility]   • RECOVERY  4/9/2013    Performed by [facility] at SURGERY SAME DAY [facility]   • RECOVERY  11/26/2012    Performed by [facility] at SURGERY SAME DAY [facility]   • RECTUS REPAIR  7/13/2012    Performed by SHEILA PLATT at SURGERY [facility]   • OTHER  12/10/10    stents in legs   • OTHER  6/10/10    cardiac stents   • CARDIAC CATH  6/2010    BMS to Om   • OTHER  2005    KNEE SURGERY   • CHOLECYSTECTOMY     • HYSTERECTOMY, TOTAL ABDOMINAL     • OTHER CARDIAC SURGERY      stent in heart   • STENT PLACEMENT      treated by Dr Baker, multiple surgeries to legs.       Family History   Problem Relation Age of Onset   •  "Stroke Mother 68     CVA   • Heart Disease Mother 65     valve surgery   • Other Son      wgt 465 lbs       Social History   Substance Use Topics   • Smoking status: Former Smoker     Packs/day: 0.25     Years: 45.00     Types: Cigarettes     Quit date: 2/1/2016   • Smokeless tobacco: Never Used   • Alcohol use 0.0 - 0.6 oz/week      Comment: 1 drink per month       Current Outpatient Prescriptions   Medication Sig Dispense Refill   • atorvastatin (LIPITOR) 20 MG Tab TAKE 1 TABLET EVERY EVENING 90 Tab 3   • isosorbide mononitrate SR (IMDUR) 30 MG TABLET SR 24 HR Take 1 Tab by mouth every morning. 90 Tab 2   • amlodipine (NORVASC) 5 MG Tab TAKE 1 TABLET DAILY 90 Tab 3   • benazepril (LOTENSIN) 20 MG Tab Take 1 Tab by mouth 2 Times a Day. 180 Tab 3   • aspirin EC (ECOTRIN) 81 MG Tablet Delayed Response Take 1 Tab by mouth every day. 30 Tab 11   • metoprolol (LOPRESSOR) 50 MG Tab Take 1.5 Tabs by mouth 2 times a day. 90 Tab 3   • Cholecalciferol (VITAMIN D) 2000 UNIT Tab Take 2,000 Units by mouth every day.     • gabapentin (NEURONTIN) 300 MG CAPS Take 300 mg by mouth every day. Indications: Neurogenic Pain     • nitroglycerin (NITROSTAT) 0.4 MG SL Tab Place 1 Tab under tongue as needed for Chest Pain. 25 Tab 1   • estradiol (ESTRACE) 1 MG Tab Take 1 mg by mouth every day.       No current facility-administered medications for this visit.           Allergies: Penicillins; Hydrocodone; and Tape          ROS   Gen: Denies fever, chills, unintentional weight loss, fatigue, night sweats  E/N/T: Denies nasal congestion, ear pain  Resp:Denies Dyspnea, wheezing, cough, sputum production, hemoptysis  CV: Denies chest pain, chest tightness, palpitations  Sleep:Denies morning headache  Neuro: Denies frequent headaches, weakness, dizziness  GI: Denies acid reflux, N/V  See HPI.  All other systems reviewed and negative          Vital signs for this encounter:  Vitals:    03/07/18 1312   Height: 1.753 m (5' 9\")   Weight: 68 kg (150 " lb)   Weight % change since last entry.: 0 %   BP: 130/62   Pulse: 89   BMI (Calculated): 22.15   Resp: 16   Temp: 36.5 °C (97.7 °F)   O2 sat % room air: 92 %                 Physical Exam:   Appearance: well developed, well nourished, no acute distress.   Eyes: PERRL, EOM intact, sclere white, conjunctiva moist.  Ears: no lesions or deformities.  Hearing: grossly intact.  Nose: no lesions or deformities.  Dentition: good dentition.   Oropharynx: tongue normal, posterior pharynx without erythema or exudate.  Neck: supple, trachea midline, no masses.  Respiratory effort: no intercostal retractions or use of accessory muscles.  Lung auscultation: Bilateral diminished   Heart auscultation: no murmur, rub, or gallop   Extremities: no cyanosis or edema.  Abdomen: soft, non-tender, no masses.  Gait and station: without difficulty   Digits and Nails: no clubbing, cyanosis, petechiae, or nodes.  Cranial nerves: grossly normal.  Motor: no focal deficits observed.   Skin: no rashes, lesions, or ulcers noted.  Orientation: oriented to time, place, and person.  Mood and affect: mood and affect appropriate, normal interaction with examiner.      Assessment   1. Nocturnal hypoxia         Patient was seen for 20 minutes, more than 50% of time spent in face to face review, counseling, and arranging future evaluation and follow up of medical conditions and care.     PLAN:   Patient Instructions   1) Continue without inhalers  2) Continue nocturnal oxygen at 2L   3) Vaccines: Up to date with flu, pneumovax in 2015, Prevnar 13  4) Return in about 1 year (around 3/7/2019) for review of symptoms, if not sooner, follow up with SAMAN Franz.

## 2018-03-07 NOTE — PATIENT INSTRUCTIONS
1) Continue without inhalers  2) Continue nocturnal oxygen at 2L   3) Vaccines: Up to date with flu, pneumovax in 2015, Prevnar 13  4) Return in about 1 year (around 3/7/2019) for review of symptoms, if not sooner, follow up with SAMAN Franz.

## 2018-03-12 DIAGNOSIS — I10 ESSENTIAL HYPERTENSION: ICD-10-CM

## 2018-03-12 DIAGNOSIS — I25.10 CORONARY ARTERY DISEASE INVOLVING NATIVE CORONARY ARTERY OF NATIVE HEART WITHOUT ANGINA PECTORIS: ICD-10-CM

## 2018-03-12 RX ORDER — ISOSORBIDE MONONITRATE 30 MG/1
30 TABLET, EXTENDED RELEASE ORAL EVERY MORNING
Qty: 90 TAB | Refills: 3 | Status: SHIPPED | OUTPATIENT
Start: 2018-03-12 | End: 2018-05-09 | Stop reason: SDUPTHER

## 2018-03-21 DIAGNOSIS — E78.5 HYPERLIPIDEMIA, UNSPECIFIED HYPERLIPIDEMIA TYPE: ICD-10-CM

## 2018-03-21 DIAGNOSIS — I10 ESSENTIAL HYPERTENSION: ICD-10-CM

## 2018-03-21 DIAGNOSIS — I25.119 CORONARY ARTERY DISEASE INVOLVING NATIVE CORONARY ARTERY OF NATIVE HEART WITH ANGINA PECTORIS (HCC): ICD-10-CM

## 2018-03-22 RX ORDER — BENAZEPRIL HYDROCHLORIDE 20 MG/1
TABLET ORAL
Qty: 180 TAB | Refills: 2 | Status: ON HOLD | OUTPATIENT
Start: 2018-03-22 | End: 2018-09-05

## 2018-04-28 DIAGNOSIS — I10 BENIGN ESSENTIAL HTN: ICD-10-CM

## 2018-04-30 RX ORDER — AMLODIPINE BESYLATE 5 MG/1
TABLET ORAL
Qty: 90 TAB | Refills: 2 | Status: ON HOLD | OUTPATIENT
Start: 2018-04-30 | End: 2018-11-04

## 2018-05-04 ENCOUNTER — TELEPHONE (OUTPATIENT)
Dept: CARDIOLOGY | Facility: MEDICAL CENTER | Age: 83
End: 2018-05-04

## 2018-05-04 NOTE — TELEPHONE ENCOUNTER
Patient has upcoming office visit with [facility], spoke to patient and she will try and get labs done prior to her visit.

## 2018-05-09 ENCOUNTER — OFFICE VISIT (OUTPATIENT)
Dept: CARDIOLOGY | Facility: MEDICAL CENTER | Age: 83
End: 2018-05-09
Payer: MEDICARE

## 2018-05-09 VITALS
SYSTOLIC BLOOD PRESSURE: 113 MMHG | OXYGEN SATURATION: 89 % | BODY MASS INDEX: 21.18 KG/M2 | HEIGHT: 69 IN | DIASTOLIC BLOOD PRESSURE: 45 MMHG | HEART RATE: 89 BPM | WEIGHT: 143 LBS

## 2018-05-09 DIAGNOSIS — I20.89 ANGINA EFFORT: ICD-10-CM

## 2018-05-09 DIAGNOSIS — I25.10 CORONARY ARTERY DISEASE INVOLVING NATIVE CORONARY ARTERY OF NATIVE HEART, ANGINA PRESENCE UNSPECIFIED: ICD-10-CM

## 2018-05-09 DIAGNOSIS — I10 ESSENTIAL HYPERTENSION: ICD-10-CM

## 2018-05-09 DIAGNOSIS — I25.10 CORONARY ARTERY DISEASE INVOLVING NATIVE CORONARY ARTERY OF NATIVE HEART WITHOUT ANGINA PECTORIS: ICD-10-CM

## 2018-05-09 DIAGNOSIS — I73.9 PERIPHERAL ARTERIAL DISEASE (HCC): ICD-10-CM

## 2018-05-09 DIAGNOSIS — E78.2 MIXED HYPERLIPIDEMIA: ICD-10-CM

## 2018-05-09 DIAGNOSIS — Z95.5 STENTED CORONARY ARTERY: ICD-10-CM

## 2018-05-09 DIAGNOSIS — I73.9 PVD (PERIPHERAL VASCULAR DISEASE) (HCC): ICD-10-CM

## 2018-05-09 DIAGNOSIS — R09.89 BILATERAL CAROTID BRUITS: ICD-10-CM

## 2018-05-09 PROCEDURE — 99214 OFFICE O/P EST MOD 30 MIN: CPT | Performed by: NURSE PRACTITIONER

## 2018-05-09 RX ORDER — ISOSORBIDE MONONITRATE 30 MG/1
30 TABLET, EXTENDED RELEASE ORAL DAILY
Qty: 30 TAB | Refills: 11 | Status: ON HOLD | COMMUNITY
Start: 2018-05-09 | End: 2018-11-04

## 2018-05-09 RX ORDER — CLOPIDOGREL BISULFATE 75 MG/1
75 TABLET ORAL DAILY
Qty: 90 TAB | Refills: 3 | Status: ON HOLD | OUTPATIENT
Start: 2018-05-09 | End: 2018-10-18

## 2018-05-09 ASSESSMENT — ENCOUNTER SYMPTOMS
PND: 0
PALPITATIONS: 0
DIZZINESS: 0
COUGH: 0
ORTHOPNEA: 0
CLAUDICATION: 0
MYALGIAS: 0
FEVER: 0
ABDOMINAL PAIN: 0
FALLS: 0
SHORTNESS OF BREATH: 0

## 2018-05-09 NOTE — PROGRESS NOTES
Subjective:   Brooke Diana is a 81 y.o. female who presents today for two week follow up on PET scan results.    She is a patient of Dr. Mancilla in our office. Hx of HTN, CAD with previous stent to the obtuse marginal artery and  LAD, HLD, previous smoker, hypoxemia at night with oxygen, PVD and PAD.    She is suffering from an upper respiratory infection with congestion, cough, and mild shortness of breath.    Her SBP is a little lower than normal and her oxygen saturation is at 89%. Recommend PCP review of her symptoms and wheezing on exam. Encouraged hydration with low BP and to watch BP closely, if SBP <100 to hold BP medications until improved in afternoon. She will call for concerns.    She has had no episodes of palpitations, dizziness/lightheadedness, shortness of breath, orthopnea, or peripheral edema.    Past Medical History:   Diagnosis Date   • Acute myocardial infarction, true posterior wall infarction, episode of care unspecified    • Anxiety    • Bowel habit changes    • Breath shortness 2017    uses oxygen at 2 liters/min; AT NIGHT ONLY   • CAD (coronary artery disease)     S/P stent placement   • Cholesterol blood decreased    • Dental disorder     Partial upper   • Dizziness    • GI bleed    • Glaucoma    • Heart burn    • Hyperlipidemia    • Hypertension    • Myocardial infarct (HCC) 6/10/10   • Peripheral vascular disease (HCC)    • Pulmonary disease     bronchitis   • Unspecified hemorrhagic conditions     pt takes plavix   • Unspecified urinary incontinence    • Urinary bladder disorder      Past Surgical History:   Procedure Laterality Date   • GASTROSCOPY WITH BIOPSY  2/13/2016    Procedure: GASTROSCOPY WITH BIOPSY;  Surgeon: Susan Miller M.D.;  Location: ENDOSCOPY [facility];  Service:    • RECOVERY  7/24/2014    Performed by Cath-Recovery Surgery at SURGERY SAME DAY [facility]   • CARDIAC CATH  7/24/14    Diffuse disease, see report.  % with collterals.   • FEMORAL  ENDARTERECTOMY  10/4/2013    Performed by Kacie Baker M.D. at SURGERY [facility] ORS   • ANGIOPLASTY BALLOON  10/4/2013    Performed by Kacie Baker M.D. at SURGERY [facility] ORS   • RECOVERY  4/9/2013    Performed by Ir-Recovery Surgery at SURGERY SAME DAY [facility] ORS   • RECOVERY  11/26/2012    Performed by Ir-Recovery Surgery at SURGERY SAME DAY [facility] ORS   • RECTUS REPAIR  7/13/2012    Performed by SHEILA PLATT at SURGERY SURGICAL [facility] ORS   • OTHER  12/10/10    stents in legs   • OTHER  6/10/10    cardiac stents   • CARDIAC CATH  6/2010    BMS to Om   • OTHER  2005    KNEE SURGERY   • CHOLECYSTECTOMY     • HYSTERECTOMY, TOTAL ABDOMINAL     • OTHER CARDIAC SURGERY      stent in heart   • STENT PLACEMENT      treated by Dr Baker, multiple surgeries to legs.     Family History   Problem Relation Age of Onset   • Stroke Mother 68     CVA   • Heart Disease Mother 65     valve surgery   • Other Son      wgt 465 lbs     History   Smoking Status   • Former Smoker   • Packs/day: 0.25   • Years: 45.00   • Types: Cigarettes   • Quit date: 2/1/2016   Smokeless Tobacco   • Never Used     Allergies   Allergen Reactions   • Penicillins Rash and Vomiting     Rxn = unknown  Pt tolerates cephalosporins   • Hydrocodone Vomiting and Nausea     Rxn = unknown   • Tape Rash     RASH     Outpatient Encounter Prescriptions as of 5/9/2018   Medication Sig Dispense Refill   • OXYGEN-HELIUM INH Inhale 2 L by mouth.     • clopidogrel (PLAVIX) 75 MG Tab Take 1 Tab by mouth every day. 90 Tab 3   • isosorbide mononitrate SR (IMDUR) 30 MG TABLET SR 24 HR Take 1 Tab by mouth every day. 30 Tab 11   • amLODIPine (NORVASC) 5 MG Tab TAKE 1 TABLET DAILY 90 Tab 2   • benazepril (LOTENSIN) 20 MG Tab TAKE 1 TABLET TWICE A  Tab 2   • atorvastatin (LIPITOR) 20 MG Tab TAKE 1 TABLET EVERY EVENING 90 Tab 3   • estradiol (ESTRACE) 1 MG Tab Take 1 mg by mouth every day.     • aspirin EC (ECOTRIN) 81 MG Tablet Delayed Response  "Take 1 Tab by mouth every day. 30 Tab 11   • metoprolol (LOPRESSOR) 50 MG Tab Take 1.5 Tabs by mouth 2 times a day. 90 Tab 3   • Cholecalciferol (VITAMIN D) 2000 UNIT Tab Take 2,000 Units by mouth every day.     • gabapentin (NEURONTIN) 300 MG CAPS Take 300 mg by mouth every day. Indications: Neurogenic Pain     • [DISCONTINUED] isosorbide mononitrate SR (IMDUR) 30 MG TABLET SR 24 HR Take 1 Tab by mouth every morning. 90 Tab 3   • nitroglycerin (NITROSTAT) 0.4 MG SL Tab Place 1 Tab under tongue as needed for Chest Pain. 25 Tab 1     No facility-administered encounter medications on file as of 5/9/2018.      Review of Systems   Constitutional: Negative for fever and malaise/fatigue.   Respiratory: Negative for cough and shortness of breath.    Cardiovascular: Negative for chest pain, palpitations, orthopnea, claudication, leg swelling and PND.   Gastrointestinal: Negative for abdominal pain.   Musculoskeletal: Negative for falls and myalgias.   Neurological: Negative for dizziness.   All other systems reviewed and are negative.       Objective:   /45   Pulse 89   Ht 1.753 m (5' 9\")   Wt 64.9 kg (143 lb)   SpO2 89%   BMI 21.12 kg/m²     Physical Exam   Constitutional: She is oriented to person, place, and time. She appears well-developed and well-nourished. No distress.   HENT:   Head: Normocephalic and atraumatic.   Eyes: EOM are normal. Pupils are equal, round, and reactive to light.   Neck: Normal range of motion. No JVD present.   Cardiovascular: Normal rate, regular rhythm, normal heart sounds and intact distal pulses.    No murmur heard.  Pulmonary/Chest: Effort normal. No respiratory distress. She has wheezes.   Abdominal: Soft. Bowel sounds are normal.   Musculoskeletal: Normal range of motion. She exhibits no edema.   Neurological: She is alert and oriented to person, place, and time.   Skin: Skin is warm and dry.   Psychiatric: She has a normal mood and affect.   Nursing note and vitals " reviewed.    CATH 2014   BMS to the obtuse marginal artery, 80% proximal LAD lesion followed by complete occlusion of the LAD with collateral filling. The right coronary artery at a 50% lesion.    4/19/16 ECHO CONCLUSIONS  No prior study is available for comparison.   Normal left ventricular chamber size. Moderate concentric left   ventricular hypertrophy. Normal left ventricular systolic function.   Left ventricular ejection fraction is visually estimated to be 65%.   Normal regional wall motion. No significant diastolic dysfunction.  Moderate to severe aortic insufficiency.  Ascending aorta diameter is 3.4 cm.    2/11/16 NUCLEAR IMAGING IMPRESSIONS   Moderate sized partial fixed defect of moderately decreased counts in the    apical anterior, apical septal, apical inferior wall, and mid anteroseptum    consistent with mainly ischemia with some scar.    Hypokinesis of the anteroseptum and septal walls.  LV ejection fraction =    69%.    2017 PET CARDIAC   SCINTOGRAPHIC FINDINGS:  There is positive evidence of large area of ischemia seen at the mid to apical septo-anterior wall of the left ventricle.  GATED WALL MOTION FINDINGS:  The left ventricle wall motion is normal with stress and rest  imagings.  Measured resting ejection fraction is 64 %.       Reviewed with patient  Lab Results   Component Value Date/Time    CHOLSTRLTOT 126 10/18/2016 08:42 AM    LDL 45 10/18/2016 08:42 AM    HDL 47 10/18/2016 08:42 AM    TRIGLYCERIDE 172 (H) 10/18/2016 08:42 AM       Lab Results   Component Value Date/Time    SODIUM 138 03/07/2017 03:06 PM    POTASSIUM 4.3 03/07/2017 03:06 PM    CHLORIDE 106 03/07/2017 03:06 PM    CO2 26 03/07/2017 03:06 PM    GLUCOSE 97 03/07/2017 03:06 PM    BUN 16 03/07/2017 03:06 PM    CREATININE 1.11 03/07/2017 03:06 PM     Lab Results   Component Value Date/Time    WBC 9.6 03/07/2017 03:06 PM    RBC 4.53 03/07/2017 03:06 PM    HEMOGLOBIN 14.5 03/07/2017 03:06 PM    HEMATOCRIT 44.9 03/07/2017 03:06 PM     MCV 99.1 (H) 03/07/2017 03:06 PM    MCH 32.0 03/07/2017 03:06 PM    MCHC 32.3 (L) 03/07/2017 03:06 PM    MPV 10.1 03/07/2017 03:06 PM      Assessment:     1. Bilateral carotid bruits  CAROTID DUPLEX   2. Coronary artery disease involving native coronary artery of native heart, angina presence unspecified     3. Mixed hyperlipidemia  CAROTID DUPLEX   4. Essential hypertension  isosorbide mononitrate SR (IMDUR) 30 MG TABLET SR 24 HR   5. Peripheral arterial disease (HCC)     6. PVD (peripheral vascular disease) (HCC)     7. Stented coronary artery     8. Angina effort ([facility])     9. Coronary artery disease involving native coronary artery of native heart without angina pectoris  isosorbide mononitrate SR (IMDUR) 30 MG TABLET SR 24 HR     Medical Decision Making:  Today's Assessment / Status / Plan:     1. HTN, good control but low normal today (asymptomatic to this reading but has recent chest cold-recommend FU with PCP and continue to monitor BP readings at home). Continue amlodipine 5 mg QD, metoprolol 75 mg BID, lotensin 20 mg BID, and isosorbide 30 mg QD.    2. CAD with prior BMS to OM,  to LAD, large area of ischemia seen on PET again, Dr Mancilla reviewed and believes the area of ischemia is related to her  of LAD, no further interventions per his recommendation and continue with med management at this time. If patient is to have worsening angina, we can trial ranexa on top of isosorbide if wanted. Continue ASA, plavix, lipitor, and metoprolol. Chest pain has resolved    3. PVD and PAD, stable no problems. Continue ASA, plavix, Lipitor. FU with vascular yearly.    4. Smoker, cessation continued for 9 months per patient, congratulated her!     5. Hypoxemia at night, oxygen at night. Tolerating well.    6. HLD, Lipitor. Great control. LDL goal <70 with CAD. Yearly CMP and lipid ordered     FU in clinic in 6 months with RS; recommend FU with PCP soon regarding chest cold with wheezing    Patient verbalizes  understanding and agrees with the plan of care.     Collaborating MD: Jaswinder CLOUD     Physical Exam   Constitutional: She is oriented to person, place, and time. She appears well-developed and well-nourished. No distress.   HENT:   Head: Normocephalic and atraumatic.   Eyes: EOM are normal. Pupils are equal, round, and reactive to light.   Neck: Normal range of motion. No JVD present.   Cardiovascular: Normal rate, regular rhythm, normal heart sounds and intact distal pulses.    No murmur heard.  Pulmonary/Chest: Effort normal. No respiratory distress. She has wheezes.   Abdominal: Soft. Bowel sounds are normal.   Musculoskeletal: Normal range of motion. She exhibits no edema.   Neurological: She is alert and oriented to person, place, and time.   Skin: Skin is warm and dry.   Psychiatric: She has a normal mood and affect.   Nursing note and vitals reviewed.

## 2018-05-09 NOTE — LETTER
[facility]   [address], [address]  [place] [address]  Phone: [phone]  Fax: [phone]              Brooke Diana  2/1/1934    Encounter Date: 5/9/2018    JIMENEZ Brooks          PROGRESS NOTE:  Subjective:   Brooke Diana is a 81 y.o. female who presents today for two week follow up on PET scan results.    She is a patient of Dr. Mancilla in our office. Hx of HTN, CAD with previous stent to the obtuse marginal artery and  LAD, HLD, previous smoker, hypoxemia at night with oxygen, PVD and PAD.    She is suffering from an upper respiratory infection with congestion, cough, and mild shortness of breath.    Her SBP is a little lower than normal and her oxygen saturation is at 89%. Recommend PCP review of her symptoms and wheezing on exam. Encouraged hydration with low BP and to watch BP closely, if SBP <100 to hold BP medications until improved in afternoon. She will call for concerns.    She has had no episodes of palpitations, dizziness/lightheadedness, shortness of breath, orthopnea, or peripheral edema.    Past Medical History:   Diagnosis Date   • Acute myocardial infarction, true posterior wall infarction, episode of care unspecified    • Anxiety    • Bowel habit changes    • Breath shortness 2017    uses oxygen at 2 liters/min; AT NIGHT ONLY   • CAD (coronary artery disease)     S/P stent placement   • Cholesterol blood decreased    • Dental disorder     Partial upper   • Dizziness    • GI bleed    • Glaucoma    • Heart burn    • Hyperlipidemia    • Hypertension    • Myocardial infarct (HCC) 6/10/10   • Peripheral vascular disease (HCC)    • Pulmonary disease     bronchitis   • Unspecified hemorrhagic conditions     pt takes plavix   • Unspecified urinary incontinence    • Urinary bladder disorder      Past Surgical History:   Procedure Laterality Date   • GASTROSCOPY WITH BIOPSY  2/13/2016    Procedure: GASTROSCOPY WITH BIOPSY;   Surgeon: Susan Miller M.D.;  Location: [facility];  Service:    • RECOVERY  7/24/2014    Performed by Cath-Recovery Surgery at SURGERY SAME DAY [facility]   • CARDIAC CATH  7/24/14    Diffuse disease, see report.  % with collterals.   • FEMORAL ENDARTERECTOMY  10/4/2013    Performed by Kacie Baker M.D. at SURGERY [facility]   • ANGIOPLASTY BALLOON  10/4/2013    Performed by Kacie Baker M.D. at SURGERY [facility] ORS   • RECOVERY  4/9/2013    Performed by Ir-Recovery Surgery at SURGERY SAME DAY [facility] ORS   • RECOVERY  11/26/2012    Performed by Ir-Recovery Surgery at SURGERY SAME DAY [facility]   • RECTUS REPAIR  7/13/2012    Performed by SHEILA PLATT at SURGERY [facility]   • OTHER  12/10/10    stents in legs   • OTHER  6/10/10    cardiac stents   • CARDIAC CATH  6/2010    BMS to Om   • OTHER  2005    KNEE SURGERY   • CHOLECYSTECTOMY     • HYSTERECTOMY, TOTAL ABDOMINAL     • OTHER CARDIAC SURGERY      stent in heart   • STENT PLACEMENT      treated by Dr Baker, multiple surgeries to legs.     Family History   Problem Relation Age of Onset   • Stroke Mother 68     CVA   • Heart Disease Mother 65     valve surgery   • Other Son      wgt 465 lbs     History   Smoking Status   • Former Smoker   • Packs/day: 0.25   • Years: 45.00   • Types: Cigarettes   • Quit date: 2/1/2016   Smokeless Tobacco   • Never Used     Allergies   Allergen Reactions   • Penicillins Rash and Vomiting     Rxn = unknown  Pt tolerates cephalosporins   • Hydrocodone Vomiting and Nausea     Rxn = unknown   • Tape Rash     RASH     Outpatient Encounter Prescriptions as of 5/9/2018   Medication Sig Dispense Refill   • OXYGEN-HELIUM INH Inhale 2 L by mouth.     • clopidogrel (PLAVIX) 75 MG Tab Take 1 Tab by mouth every day. 90 Tab 3   • isosorbide mononitrate SR (IMDUR) 30 MG TABLET SR 24 HR Take 1 Tab by mouth every day. 30 Tab 11   • amLODIPine (NORVASC) 5 MG Tab TAKE 1 TABLET DAILY  "90 Tab 2   • benazepril (LOTENSIN) 20 MG Tab TAKE 1 TABLET TWICE A  Tab 2   • atorvastatin (LIPITOR) 20 MG Tab TAKE 1 TABLET EVERY EVENING 90 Tab 3   • estradiol (ESTRACE) 1 MG Tab Take 1 mg by mouth every day.     • aspirin EC (ECOTRIN) 81 MG Tablet Delayed Response Take 1 Tab by mouth every day. 30 Tab 11   • metoprolol (LOPRESSOR) 50 MG Tab Take 1.5 Tabs by mouth 2 times a day. 90 Tab 3   • Cholecalciferol (VITAMIN D) 2000 UNIT Tab Take 2,000 Units by mouth every day.     • gabapentin (NEURONTIN) 300 MG CAPS Take 300 mg by mouth every day. Indications: Neurogenic Pain     • [DISCONTINUED] isosorbide mononitrate SR (IMDUR) 30 MG TABLET SR 24 HR Take 1 Tab by mouth every morning. 90 Tab 3   • nitroglycerin (NITROSTAT) 0.4 MG SL Tab Place 1 Tab under tongue as needed for Chest Pain. 25 Tab 1     No facility-administered encounter medications on file as of 5/9/2018.      Review of Systems   Constitutional: Negative for fever and malaise/fatigue.   Respiratory: Negative for cough and shortness of breath.    Cardiovascular: Negative for chest pain, palpitations, orthopnea, claudication, leg swelling and PND.   Gastrointestinal: Negative for abdominal pain.   Musculoskeletal: Negative for falls and myalgias.   Neurological: Negative for dizziness.   All other systems reviewed and are negative.       Objective:   /45   Pulse 89   Ht 1.753 m (5' 9\")   Wt 64.9 kg (143 lb)   SpO2 89%   BMI 21.12 kg/m²      Physical Exam   Constitutional: She is oriented to person, place, and time. She appears well-developed and well-nourished. No distress.   HENT:   Head: Normocephalic and atraumatic.   Eyes: EOM are normal. Pupils are equal, round, and reactive to light.   Neck: Normal range of motion. No JVD present.   Cardiovascular: Normal rate, regular rhythm, normal heart sounds and intact distal pulses.    No murmur heard.  Pulmonary/Chest: Effort normal. No respiratory distress. She has wheezes.   Abdominal: Soft. " Bowel sounds are normal.   Musculoskeletal: Normal range of motion. She exhibits no edema.   Neurological: She is alert and oriented to person, place, and time.   Skin: Skin is warm and dry.   Psychiatric: She has a normal mood and affect.   Nursing note and vitals reviewed.    CATH 2014   BMS to the obtuse marginal artery, 80% proximal LAD lesion followed by complete occlusion of the LAD with collateral filling. The right coronary artery at a 50% lesion.    4/19/16 ECHO CONCLUSIONS  No prior study is available for comparison.   Normal left ventricular chamber size. Moderate concentric left   ventricular hypertrophy. Normal left ventricular systolic function.   Left ventricular ejection fraction is visually estimated to be 65%.   Normal regional wall motion. No significant diastolic dysfunction.  Moderate to severe aortic insufficiency.  Ascending aorta diameter is 3.4 cm.    2/11/16 NUCLEAR IMAGING IMPRESSIONS   Moderate sized partial fixed defect of moderately decreased counts in the    apical anterior, apical septal, apical inferior wall, and mid anteroseptum    consistent with mainly ischemia with some scar.    Hypokinesis of the anteroseptum and septal walls.  LV ejection fraction =    69%.    2017 PET CARDIAC   SCINTOGRAPHIC FINDINGS:  There is positive evidence of large area of ischemia seen at the mid to apical septo-anterior wall of the left ventricle.  GATED WALL MOTION FINDINGS:  The left ventricle wall motion is normal with stress and rest  imagings.  Measured resting ejection fraction is 64 %.       Reviewed with patient  Lab Results   Component Value Date/Time    CHOLSTRLTOT 126 10/18/2016 08:42 AM    LDL 45 10/18/2016 08:42 AM    HDL 47 10/18/2016 08:42 AM    TRIGLYCERIDE 172 (H) 10/18/2016 08:42 AM       Lab Results   Component Value Date/Time    SODIUM 138 03/07/2017 03:06 PM    POTASSIUM 4.3 03/07/2017 03:06 PM    CHLORIDE 106 03/07/2017 03:06 PM    CO2 26 03/07/2017 03:06 PM    GLUCOSE 97  03/07/2017 03:06 PM    BUN 16 03/07/2017 03:06 PM    CREATININE 1.11 03/07/2017 03:06 PM     Lab Results   Component Value Date/Time    WBC 9.6 03/07/2017 03:06 PM    RBC 4.53 03/07/2017 03:06 PM    HEMOGLOBIN 14.5 03/07/2017 03:06 PM    HEMATOCRIT 44.9 03/07/2017 03:06 PM    MCV 99.1 (H) 03/07/2017 03:06 PM    MCH 32.0 03/07/2017 03:06 PM    MCHC 32.3 (L) 03/07/2017 03:06 PM    MPV 10.1 03/07/2017 03:06 PM      Assessment:     1. Bilateral carotid bruits  CAROTID DUPLEX   2. Coronary artery disease involving native coronary artery of native heart, angina presence unspecified     3. Mixed hyperlipidemia  CAROTID DUPLEX   4. Essential hypertension  isosorbide mononitrate SR (IMDUR) 30 MG TABLET SR 24 HR   5. Peripheral arterial disease ([facility])     6. PVD (peripheral vascular disease) ([facility])     7. Stented coronary artery     8. Angina effort ([facility])     9. Coronary artery disease involving native coronary artery of native heart without angina pectoris  isosorbide mononitrate SR (IMDUR) 30 MG TABLET SR 24 HR     Medical Decision Making:  Today's Assessment / Status / Plan:     1. HTN, good control but low normal today (asymptomatic to this reading but has recent chest cold-recommend FU with PCP and continue to monitor BP readings at home). Continue amlodipine 5 mg QD, metoprolol 75 mg BID, lotensin 20 mg BID, and isosorbide 30 mg QD.    2. CAD with prior BMS to OM,  to LAD, large area of ischemia seen on PET again, Dr Mancilla reviewed and believes the area of ischemia is related to her  of LAD, no further interventions per his recommendation and continue with med management at this time. If patient is to have worsening angina, we can trial ranexa on top of isosorbide if wanted. Continue ASA, plavix, lipitor, and metoprolol. Chest pain has resolved    3. PVD and PAD, stable no problems. Continue ASA, plavix, Lipitor. FU with vascular yearly.    4. Smoker, cessation continued for 9 months per patient, congratulated her!       5. Hypoxemia at night, oxygen at night. Tolerating well.    6. HLD, Lipitor. Great control. LDL goal <70 with CAD. Yearly CMP and lipid ordered     FU in clinic in 6 months with RS; recommend FU with PCP soon regarding chest cold with wheezing    Patient verbalizes understanding and agrees with the plan of care.     Collaborating MD: Jaswinder CLOUD     Physical Exam   Constitutional: She is oriented to person, place, and time. She appears well-developed and well-nourished. No distress.   HENT:   Head: Normocephalic and atraumatic.   Eyes: EOM are normal. Pupils are equal, round, and reactive to light.   Neck: Normal range of motion. No JVD present.   Cardiovascular: Normal rate, regular rhythm, normal heart sounds and intact distal pulses.    No murmur heard.  Pulmonary/Chest: Effort normal. No respiratory distress. She has wheezes.   Abdominal: Soft. Bowel sounds are normal.   Musculoskeletal: Normal range of motion. She exhibits no edema.   Neurological: She is alert and oriented to person, place, and time.   Skin: Skin is warm and dry.   Psychiatric: She has a normal mood and affect.   Nursing note and vitals reviewed.          No Recipients

## 2018-05-13 ENCOUNTER — OFFICE VISIT (OUTPATIENT)
Dept: URGENT CARE | Facility: PHYSICIAN GROUP | Age: 83
End: 2018-05-13
Payer: MEDICARE

## 2018-05-13 ENCOUNTER — HOSPITAL ENCOUNTER (OUTPATIENT)
Dept: RADIOLOGY | Facility: MEDICAL CENTER | Age: 83
End: 2018-05-13
Attending: PHYSICIAN ASSISTANT
Payer: MEDICARE

## 2018-05-13 VITALS
RESPIRATION RATE: 18 BRPM | OXYGEN SATURATION: 92 % | BODY MASS INDEX: 21.33 KG/M2 | WEIGHT: 144 LBS | DIASTOLIC BLOOD PRESSURE: 52 MMHG | HEIGHT: 69 IN | SYSTOLIC BLOOD PRESSURE: 138 MMHG | HEART RATE: 78 BPM | TEMPERATURE: 98.2 F

## 2018-05-13 DIAGNOSIS — R06.02 SOB (SHORTNESS OF BREATH): ICD-10-CM

## 2018-05-13 DIAGNOSIS — J44.0 CHRONIC OBSTRUCTIVE PULMONARY DISEASE WITH ACUTE LOWER RESPIRATORY INFECTION (HCC): ICD-10-CM

## 2018-05-13 PROCEDURE — 99204 OFFICE O/P NEW MOD 45 MIN: CPT | Performed by: PHYSICIAN ASSISTANT

## 2018-05-13 PROCEDURE — 71046 X-RAY EXAM CHEST 2 VIEWS: CPT

## 2018-05-13 RX ORDER — ALBUTEROL SULFATE 90 UG/1
2 AEROSOL, METERED RESPIRATORY (INHALATION) EVERY 6 HOURS PRN
Qty: 8.5 G | Refills: 0 | Status: SHIPPED | OUTPATIENT
Start: 2018-05-13 | End: 2018-08-28

## 2018-05-13 RX ORDER — DOXYCYCLINE HYCLATE 100 MG
100 TABLET ORAL 2 TIMES DAILY
Qty: 20 TAB | Refills: 0 | Status: SHIPPED | OUTPATIENT
Start: 2018-05-13 | End: 2018-08-28

## 2018-05-13 ASSESSMENT — ENCOUNTER SYMPTOMS
COUGH: 1
HEADACHES: 0
SPUTUM PRODUCTION: 0
SINUS PAIN: 0
GASTROINTESTINAL NEGATIVE: 1
WHEEZING: 1
PALPITATIONS: 0
SORE THROAT: 0
HEMOPTYSIS: 0
FEVER: 0
MYALGIAS: 0
RHINORRHEA: 0
CHILLS: 0
CARDIOVASCULAR NEGATIVE: 1
DIZZINESS: 0
SHORTNESS OF BREATH: 1

## 2018-05-13 ASSESSMENT — COPD QUESTIONNAIRES: COPD: 1

## 2018-05-13 NOTE — PROGRESS NOTES
Subjective:      Brooke Diana is a 84 y.o. female who presents with Cough (congestion x 4 days)            Cough   This is a new problem. The current episode started in the past 7 days (3-4 days). The problem has been unchanged. The problem occurs every few minutes. The cough is non-productive. Associated symptoms include shortness of breath and wheezing. Pertinent negatives include no chest pain, chills, ear pain, fever, headaches, hemoptysis, myalgias, nasal congestion, postnasal drip, rhinorrhea or sore throat. The symptoms are aggravated by lying down. She has tried OTC cough suppressant for the symptoms. The treatment provided mild relief. Her past medical history is significant for bronchitis and COPD. There is no history of asthma or pneumonia.       PMH:  has a past medical history of Acute myocardial infarction, true posterior wall infarction, episode of care unspecified; Anxiety; Bowel habit changes; Breath shortness (2017); CAD (coronary artery disease); Cholesterol blood decreased; Dental disorder; Dizziness; GI bleed; Glaucoma; Heart burn; Hyperlipidemia; Hypertension; Myocardial infarct (HCC) (6/10/10); Peripheral vascular disease (HCC); Pulmonary disease; Unspecified hemorrhagic conditions; Unspecified urinary incontinence; and Urinary bladder disorder.  MEDS:   Current Outpatient Prescriptions:   •  albuterol 108 (90 Base) MCG/ACT Aero Soln inhalation aerosol, Inhale 2 Puffs by mouth every 6 hours as needed for Shortness of Breath., Disp: 8.5 g, Rfl: 0  •  doxycycline (VIBRAMYCIN) 100 MG Tab, Take 1 Tab by mouth 2 times a day., Disp: 20 Tab, Rfl: 0  •  amLODIPine (NORVASC) 5 MG Tab, TAKE 1 TABLET DAILY, Disp: 90 Tab, Rfl: 2  •  benazepril (LOTENSIN) 20 MG Tab, TAKE 1 TABLET TWICE A DAY, Disp: 180 Tab, Rfl: 2  •  atorvastatin (LIPITOR) 20 MG Tab, TAKE 1 TABLET EVERY EVENING, Disp: 90 Tab, Rfl: 3  •  estradiol (ESTRACE) 1 MG Tab, Take 1 mg by mouth every day., Disp: , Rfl:   •  aspirin EC (ECOTRIN)  81 MG Tablet Delayed Response, Take 1 Tab by mouth every day., Disp: 30 Tab, Rfl: 11  •  metoprolol (LOPRESSOR) 50 MG Tab, Take 1.5 Tabs by mouth 2 times a day., Disp: 90 Tab, Rfl: 3  •  Cholecalciferol (VITAMIN D) 2000 UNIT Tab, Take 2,000 Units by mouth every day., Disp: , Rfl:   •  gabapentin (NEURONTIN) 300 MG CAPS, Take 300 mg by mouth every day. Indications: Neurogenic Pain, Disp: , Rfl:   •  OXYGEN-HELIUM INH, Inhale 2 L by mouth., Disp: , Rfl:   •  clopidogrel (PLAVIX) 75 MG Tab, Take 1 Tab by mouth every day., Disp: 90 Tab, Rfl: 3  •  isosorbide mononitrate SR (IMDUR) 30 MG TABLET SR 24 HR, Take 1 Tab by mouth every day., Disp: 30 Tab, Rfl: 11  •  nitroglycerin (NITROSTAT) 0.4 MG SL Tab, Place 1 Tab under tongue as needed for Chest Pain., Disp: 25 Tab, Rfl: 1  ALLERGIES:   Allergies   Allergen Reactions   • Penicillins Rash and Vomiting     Rxn = unknown  Pt tolerates cephalosporins   • Hydrocodone Vomiting and Nausea     Rxn = unknown   • Tape Rash     RASH     SURGHX:   Past Surgical History:   Procedure Laterality Date   • GASTROSCOPY WITH BIOPSY  2/13/2016    Procedure: GASTROSCOPY WITH BIOPSY;  Surgeon: Susan Miller M.D.;  Location: [facility];  Service:    • RECOVERY  7/24/2014    Performed by Cath-Recovery Surgery at SURGERY SAME DAY [facility]   • CARDIAC CATH  7/24/14    Diffuse disease, see report.  % with collterals.   • FEMORAL ENDARTERECTOMY  10/4/2013    Performed by Kacie Baker M.D. at SURGERY [facility]   • ANGIOPLASTY BALLOON  10/4/2013    Performed by Kacie Baker M.D. at [facility]   • RECOVERY  4/9/2013    Performed by Ir-Recovery Surgery at SURGERY SAME DAY [facility]   • RECOVERY  11/26/2012    Performed by Ir-Recovery Surgery at SURGERY SAME DAY [facility]   • RECTUS REPAIR  7/13/2012    Performed by SHEILA PLATT at SURGERY [facility]   • OTHER  12/10/10    stents in legs   • OTHER  6/10/10    cardiac stents  "  • CARDIAC CATH  6/2010    BMS to Om   • OTHER  2005    KNEE SURGERY   • CHOLECYSTECTOMY     • HYSTERECTOMY, TOTAL ABDOMINAL     • OTHER CARDIAC SURGERY      stent in heart   • STENT PLACEMENT      treated by Dr Baker, multiple surgeries to legs.     SOCHX:  reports that she quit smoking about 2 years ago. Her smoking use included Cigarettes. She has a 11.25 pack-year smoking history. She has never used smokeless tobacco. She reports that she drinks alcohol. She reports that she does not use drugs.  FH: family history includes Heart Disease (age of onset: 65) in her mother; Other in her son; Stroke (age of onset: 68) in her mother.    Review of Systems   Constitutional: Negative for chills and fever.   HENT: Negative for congestion, ear pain, postnasal drip, rhinorrhea, sinus pain and sore throat.    Respiratory: Positive for cough, shortness of breath and wheezing. Negative for hemoptysis and sputum production.    Cardiovascular: Negative.  Negative for chest pain, palpitations and leg swelling.   Gastrointestinal: Negative.    Musculoskeletal: Negative for joint pain and myalgias.   Neurological: Negative for dizziness and headaches.   All other systems reviewed and are negative.      Medications, Allergies, and current problem list reviewed today in Epic  Family history reviewed with patient and is not pertinent for today's visit     Objective:     /52   Pulse 78   Temp 36.8 °C (98.2 °F)   Resp 18   Ht 1.753 m (5' 9\")   Wt 65.3 kg (144 lb)   SpO2 92%   BMI 21.27 kg/m²      Physical Exam   Constitutional: She is oriented to person, place, and time. She appears well-developed and well-nourished. No distress.   HENT:   Head: Normocephalic and atraumatic.   Right Ear: Tympanic membrane and external ear normal.   Left Ear: Tympanic membrane and external ear normal.   Nose: Nose normal.   Mouth/Throat: Oropharynx is clear and moist. No oropharyngeal exudate.   Eyes: Conjunctivae and EOM are normal. " Pupils are equal, round, and reactive to light. Right eye exhibits no discharge. Left eye exhibits no discharge.   Neck: Normal range of motion. Neck supple.   Cardiovascular: Normal rate, regular rhythm, normal heart sounds and intact distal pulses.    Pulmonary/Chest: Effort normal. No accessory muscle usage. No tachypnea. No respiratory distress. She has decreased breath sounds. She has no wheezes. She has no rhonchi. She has no rales.   Musculoskeletal:   No leg swelling or tenderness bilaterally   Lymphadenopathy:     She has no cervical adenopathy.   Neurological: She is alert and oriented to person, place, and time.   Skin: Skin is warm and dry. She is not diaphoretic.   Psychiatric: She has a normal mood and affect. Her behavior is normal. Judgment and thought content normal.   Nursing note and vitals reviewed.              Assessment/Plan:     1. SOB (shortness of breath)  DX-CHEST-2 VIEWS    doxycycline (VIBRAMYCIN) 100 MG Tab   2. Chronic obstructive pulmonary disease with acute lower respiratory infection (HCC)  albuterol 108 (90 Base) MCG/ACT Aero Soln inhalation aerosol    doxycycline (VIBRAMYCIN) 100 MG Tab     PO2 mildly decreased. Decreased breath sounds. Dry cough or shortness of breath and wheezing. History of COPD and bronchitis. An x-ray was ordered which was negative for acute cardiopulmonary pathology.  Doxycycline  Albuterol  OTC meds and conservative measures as discussed  Return to clinic or go to ED if symptoms worsen or persist. Indications for ED discussed at length. Patient voices understanding. Follow-up with your primary care provider in 3-5 days. Red flags discussed. All side effects of medication discussed including allergic response, GI upset, tendon injury, etc.    Please note that this dictation was created using voice recognition software. I have made every reasonable attempt to correct obvious errors, but I expect that there are errors of grammar and possibly content that I did  not discover before finalizing the note.

## 2018-06-11 ENCOUNTER — HOSPITAL ENCOUNTER (OUTPATIENT)
Dept: LAB | Facility: MEDICAL CENTER | Age: 83
End: 2018-06-11
Attending: PHYSICIAN ASSISTANT
Payer: MEDICARE

## 2018-06-11 LAB
ALBUMIN SERPL BCP-MCNC: 3.8 G/DL (ref 3.2–4.9)
ALBUMIN/GLOB SERPL: 1.4 G/DL
ALP SERPL-CCNC: 64 U/L (ref 30–99)
ALT SERPL-CCNC: 16 U/L (ref 2–50)
ANION GAP SERPL CALC-SCNC: 10 MMOL/L (ref 0–11.9)
AST SERPL-CCNC: 20 U/L (ref 12–45)
BASOPHILS # BLD AUTO: 1 % (ref 0–1.8)
BASOPHILS # BLD: 0.08 K/UL (ref 0–0.12)
BILIRUB SERPL-MCNC: 0.4 MG/DL (ref 0.1–1.5)
BUN SERPL-MCNC: 15 MG/DL (ref 8–22)
CALCIUM SERPL-MCNC: 9.5 MG/DL (ref 8.5–10.5)
CHLORIDE SERPL-SCNC: 107 MMOL/L (ref 96–112)
CHOLEST SERPL-MCNC: 121 MG/DL (ref 100–199)
CO2 SERPL-SCNC: 25 MMOL/L (ref 20–33)
CREAT SERPL-MCNC: 1.35 MG/DL (ref 0.5–1.4)
EOSINOPHIL # BLD AUTO: 0.23 K/UL (ref 0–0.51)
EOSINOPHIL NFR BLD: 2.8 % (ref 0–6.9)
ERYTHROCYTE [DISTWIDTH] IN BLOOD BY AUTOMATED COUNT: 50.3 FL (ref 35.9–50)
GLOBULIN SER CALC-MCNC: 2.7 G/DL (ref 1.9–3.5)
GLUCOSE SERPL-MCNC: 97 MG/DL (ref 65–99)
HCT VFR BLD AUTO: 41.5 % (ref 37–47)
HDLC SERPL-MCNC: 45 MG/DL
HGB BLD-MCNC: 13.4 G/DL (ref 12–16)
IMM GRANULOCYTES # BLD AUTO: 0.03 K/UL (ref 0–0.11)
IMM GRANULOCYTES NFR BLD AUTO: 0.4 % (ref 0–0.9)
LDLC SERPL CALC-MCNC: 42 MG/DL
LYMPHOCYTES # BLD AUTO: 1.27 K/UL (ref 1–4.8)
LYMPHOCYTES NFR BLD: 15.6 % (ref 22–41)
MCH RBC QN AUTO: 32.5 PG (ref 27–33)
MCHC RBC AUTO-ENTMCNC: 32.3 G/DL (ref 33.6–35)
MCV RBC AUTO: 100.7 FL (ref 81.4–97.8)
MONOCYTES # BLD AUTO: 0.55 K/UL (ref 0–0.85)
MONOCYTES NFR BLD AUTO: 6.8 % (ref 0–13.4)
NEUTROPHILS # BLD AUTO: 5.98 K/UL (ref 2–7.15)
NEUTROPHILS NFR BLD: 73.4 % (ref 44–72)
NRBC # BLD AUTO: 0 K/UL
NRBC BLD-RTO: 0 /100 WBC
PLATELET # BLD AUTO: 293 K/UL (ref 164–446)
PMV BLD AUTO: 10.2 FL (ref 9–12.9)
POTASSIUM SERPL-SCNC: 4.1 MMOL/L (ref 3.6–5.5)
PROT SERPL-MCNC: 6.5 G/DL (ref 6–8.2)
RBC # BLD AUTO: 4.12 M/UL (ref 4.2–5.4)
SODIUM SERPL-SCNC: 142 MMOL/L (ref 135–145)
TRIGL SERPL-MCNC: 172 MG/DL (ref 0–149)
TSH SERPL DL<=0.005 MIU/L-ACNC: 2.82 UIU/ML (ref 0.38–5.33)
WBC # BLD AUTO: 8.1 K/UL (ref 4.8–10.8)

## 2018-06-11 PROCEDURE — 85025 COMPLETE CBC W/AUTO DIFF WBC: CPT

## 2018-06-11 PROCEDURE — 84443 ASSAY THYROID STIM HORMONE: CPT

## 2018-06-11 PROCEDURE — 36415 COLL VENOUS BLD VENIPUNCTURE: CPT

## 2018-06-11 PROCEDURE — 80061 LIPID PANEL: CPT

## 2018-06-11 PROCEDURE — 80053 COMPREHEN METABOLIC PANEL: CPT

## 2018-06-20 ENCOUNTER — APPOINTMENT (RX ONLY)
Dept: URBAN - METROPOLITAN AREA CLINIC 4 | Facility: CLINIC | Age: 83
Setting detail: DERMATOLOGY
End: 2018-06-20

## 2018-06-20 DIAGNOSIS — L82.1 OTHER SEBORRHEIC KERATOSIS: ICD-10-CM

## 2018-06-20 DIAGNOSIS — L82.0 INFLAMED SEBORRHEIC KERATOSIS: ICD-10-CM

## 2018-06-20 PROBLEM — I10 ESSENTIAL (PRIMARY) HYPERTENSION: Status: ACTIVE | Noted: 2018-06-20

## 2018-06-20 PROBLEM — D48.5 NEOPLASM OF UNCERTAIN BEHAVIOR OF SKIN: Status: ACTIVE | Noted: 2018-06-20

## 2018-06-20 PROBLEM — L57.0 ACTINIC KERATOSIS: Status: ACTIVE | Noted: 2018-06-20

## 2018-06-20 PROCEDURE — ? BIOPSY BY SHAVE METHOD

## 2018-06-20 PROCEDURE — 99212 OFFICE O/P EST SF 10 MIN: CPT | Mod: 25

## 2018-06-20 PROCEDURE — 11101: CPT

## 2018-06-20 PROCEDURE — 11100: CPT

## 2018-06-20 ASSESSMENT — LOCATION SIMPLE DESCRIPTION DERM
LOCATION SIMPLE: LEFT CHEEK
LOCATION SIMPLE: RIGHT FOREARM
LOCATION SIMPLE: POSTERIOR NECK
LOCATION SIMPLE: RIGHT CHEEK
LOCATION SIMPLE: LEFT UPPER BACK
LOCATION SIMPLE: LEFT ANTERIOR NECK
LOCATION SIMPLE: LEFT FOREARM

## 2018-06-20 ASSESSMENT — LOCATION DETAILED DESCRIPTION DERM
LOCATION DETAILED: LEFT DISTAL DORSAL FOREARM
LOCATION DETAILED: LEFT SUPERIOR MEDIAL UPPER BACK
LOCATION DETAILED: RIGHT INFERIOR LATERAL MALAR CHEEK
LOCATION DETAILED: LEFT INFERIOR ANTERIOR NECK
LOCATION DETAILED: LEFT INFERIOR CENTRAL MALAR CHEEK
LOCATION DETAILED: RIGHT LATERAL TRAPEZIAL NECK
LOCATION DETAILED: RIGHT DISTAL DORSAL FOREARM

## 2018-06-20 ASSESSMENT — LOCATION ZONE DERM
LOCATION ZONE: TRUNK
LOCATION ZONE: ARM
LOCATION ZONE: NECK
LOCATION ZONE: FACE

## 2018-06-20 NOTE — PROCEDURE: BIOPSY BY SHAVE METHOD
Anesthesia Type: 1% lidocaine with epinephrine and a 1:10 solution of 8.4% sodium bicarbonate
X Size Of Lesion In Cm: 0
Detail Level: Detailed
Biopsy Method: Personna blade
Dressing: Band-Aid
Render Post-Care Instructions In Note?: yes
Curettage Text: The wound bed was treated with curettage after the biopsy was performed.
Anticipated Plan (Based On Presumed Biopsy Results): Exc if +
Biopsy Type: H and E
Electrodesiccation Text: The wound bed was treated with electrodesiccation after the biopsy was performed.
Anesthesia Volume In Cc: 0.5
Bill For Surgical Tray: no
Post-Care Instructions: I reviewed with the patient in detail post-care instructions. Patient is to keep the biopsy site dry overnight, and then apply vasaline twice daily until healed.
Billing Type: Third-Party Bill
Consent: Written consent was obtained and risks were reviewed including but not limited to scarring, infection, bleeding, scabbing, incomplete removal, nerve damage and allergy to anesthesia.
Type Of Destruction Used: Curettage
Hemostasis: Drysol and Electrocautery
Lab Facility: 
Notification Instructions: Patient will be notified of biopsy results. However, patient instructed to call the office if not contacted within 2 weeks.
Depth Of Biopsy: dermis
Wound Care: Vaseline
Lab: [id number]
Electrodesiccation And Curettage Text: The wound bed was treated with electrodesiccation and curettage after the biopsy was performed.

## 2018-07-18 ENCOUNTER — APPOINTMENT (RX ONLY)
Dept: URBAN - METROPOLITAN AREA CLINIC 4 | Facility: CLINIC | Age: 83
Setting detail: DERMATOLOGY
End: 2018-07-18

## 2018-07-18 PROBLEM — D04.39 CARCINOMA IN SITU OF SKIN OF OTHER PARTS OF FACE: Status: ACTIVE | Noted: 2018-07-18

## 2018-07-18 PROBLEM — E78.5 HYPERLIPIDEMIA, UNSPECIFIED: Status: ACTIVE | Noted: 2018-07-18

## 2018-07-18 PROCEDURE — 11642 EXC F/E/E/N/L MAL+MRG 1.1-2: CPT

## 2018-07-18 PROCEDURE — 12052 INTMD RPR FACE/MM 2.6-5.0 CM: CPT

## 2018-07-18 PROCEDURE — ? EXCISION

## 2018-07-25 ENCOUNTER — APPOINTMENT (RX ONLY)
Dept: URBAN - METROPOLITAN AREA CLINIC 4 | Facility: CLINIC | Age: 83
Setting detail: DERMATOLOGY
End: 2018-07-25

## 2018-07-25 DIAGNOSIS — Z48.02 ENCOUNTER FOR REMOVAL OF SUTURES: ICD-10-CM

## 2018-07-25 PROBLEM — Z85.828 PERSONAL HISTORY OF OTHER MALIGNANT NEOPLASM OF SKIN: Status: ACTIVE | Noted: 2018-07-25

## 2018-07-25 PROCEDURE — 99024 POSTOP FOLLOW-UP VISIT: CPT

## 2018-07-25 PROCEDURE — ? SUTURE REMOVAL (GLOBAL PERIOD)

## 2018-07-25 ASSESSMENT — LOCATION ZONE DERM: LOCATION ZONE: FACE

## 2018-07-25 ASSESSMENT — LOCATION DETAILED DESCRIPTION DERM: LOCATION DETAILED: RIGHT INFERIOR LATERAL MALAR CHEEK

## 2018-07-25 ASSESSMENT — LOCATION SIMPLE DESCRIPTION DERM: LOCATION SIMPLE: RIGHT CHEEK

## 2018-07-25 NOTE — PROCEDURE: SUTURE REMOVAL (GLOBAL PERIOD)
Detail Level: Detailed
Add 41223 Cpt? (Important Note: In 2017 The Use Of 36623 Is Being Tracked By Cms To Determine Future Global Period Reimbursement For Global Periods): yes

## 2018-08-28 ENCOUNTER — OFFICE VISIT (OUTPATIENT)
Dept: CARDIOLOGY | Facility: MEDICAL CENTER | Age: 83
End: 2018-08-28
Payer: MEDICARE

## 2018-08-28 ENCOUNTER — TELEPHONE (OUTPATIENT)
Dept: CARDIOLOGY | Facility: MEDICAL CENTER | Age: 83
End: 2018-08-28

## 2018-08-28 VITALS
OXYGEN SATURATION: 93 % | BODY MASS INDEX: 20.03 KG/M2 | DIASTOLIC BLOOD PRESSURE: 50 MMHG | HEART RATE: 92 BPM | HEIGHT: 69 IN | SYSTOLIC BLOOD PRESSURE: 122 MMHG | WEIGHT: 135.25 LBS

## 2018-08-28 DIAGNOSIS — I73.9 PERIPHERAL ARTERIAL DISEASE (HCC): ICD-10-CM

## 2018-08-28 DIAGNOSIS — I10 ESSENTIAL HYPERTENSION: ICD-10-CM

## 2018-08-28 DIAGNOSIS — Z72.0 TOBACCO ABUSE: ICD-10-CM

## 2018-08-28 DIAGNOSIS — R27.8 CLUMSINESS: ICD-10-CM

## 2018-08-28 DIAGNOSIS — E78.2 MIXED HYPERLIPIDEMIA: ICD-10-CM

## 2018-08-28 DIAGNOSIS — R29.898 RIGHT ARM WEAKNESS: ICD-10-CM

## 2018-08-28 DIAGNOSIS — R42 LIGHTHEADEDNESS: ICD-10-CM

## 2018-08-28 DIAGNOSIS — Z95.5 STENTED CORONARY ARTERY: ICD-10-CM

## 2018-08-28 DIAGNOSIS — R09.89 BILATERAL CAROTID BRUITS: ICD-10-CM

## 2018-08-28 DIAGNOSIS — I73.9 PVD (PERIPHERAL VASCULAR DISEASE) (HCC): ICD-10-CM

## 2018-08-28 DIAGNOSIS — I25.10 CORONARY ARTERY DISEASE INVOLVING NATIVE CORONARY ARTERY OF NATIVE HEART, ANGINA PRESENCE UNSPECIFIED: ICD-10-CM

## 2018-08-28 PROCEDURE — 99214 OFFICE O/P EST MOD 30 MIN: CPT | Performed by: NURSE PRACTITIONER

## 2018-08-28 ASSESSMENT — ENCOUNTER SYMPTOMS
CLAUDICATION: 0
FALLS: 0
ORTHOPNEA: 0
DIZZINESS: 0
MYALGIAS: 0
WEAKNESS: 1
ABDOMINAL PAIN: 0
FEVER: 0
PND: 0
PALPITATIONS: 0
COUGH: 0
SHORTNESS OF BREATH: 0

## 2018-08-28 NOTE — PROGRESS NOTES
Subjective:   Brooke Diana is a 81 y.o. female who presents today for follow up regarding some right sided arm weakness, clumsiness as well. She sees her PCP tomorrow but wanted to move her carotid duplex up.    She is a patient of Dr. Mancilla in our office.     Hx of HTN, CAD with previous stent to the obtuse marginal artery and  of her LAD, HLD, previous smoker, hypoxemia at night with oxygen, PVD and PAD.     She tells me she came in today due to some right hand weakness. She has been clumsy lately. No memory loss or slurred speech. She is able to eat but is not hungry. She sees her PCP tomorrow. She wanted to move her carotid duplex up. She doesn't want to get a head CT or go to the ER. She knows to call 911 for worsening stroke symptoms we discussed today.    She will get a life alert button too.    She has had no episodes of palpitations, dizziness/lightheadedness, shortness of breath, orthopnea, or peripheral edema.    Past Medical History:   Diagnosis Date   • Acute myocardial infarction, true posterior wall infarction, episode of care unspecified    • Anxiety    • Bowel habit changes    • Breath shortness 2017    uses oxygen at 2 liters/min; AT NIGHT ONLY   • CAD (coronary artery disease)     S/P stent placement   • Cholesterol blood decreased    • Dental disorder     Partial upper   • Dizziness    • GI bleed    • Glaucoma    • Heart burn    • Hyperlipidemia    • Hypertension    • Myocardial infarct (HCC) 6/10/10   • Peripheral vascular disease (HCC)    • Pulmonary disease     bronchitis   • Unspecified hemorrhagic conditions     pt takes plavix   • Unspecified urinary incontinence    • Urinary bladder disorder      Past Surgical History:   Procedure Laterality Date   • GASTROSCOPY WITH BIOPSY  2/13/2016    Procedure: GASTROSCOPY WITH BIOPSY;  Surgeon: Susan Miller M.D.;  Location: ENDOSCOPY [facility];  Service:    • RECOVERY  7/24/2014    Performed by Cath-Recovery Surgery at SURGERY [facility]  DAY [facility] ORS   • CARDIAC CATH  7/24/14    Diffuse disease, see report.  % with collterals.   • FEMORAL ENDARTERECTOMY  10/4/2013    Performed by Kacie Baker M.D. at SURGERY [facility] ORS   • ANGIOPLASTY BALLOON  10/4/2013    Performed by Kacie Baker M.D. at SURGERY [facility] ORS   • RECOVERY  4/9/2013    Performed by Ir-Recovery Surgery at SURGERY SAME DAY [facility] ORS   • RECOVERY  11/26/2012    Performed by Ir-Recovery Surgery at SURGERY SAME DAY [facility] ORS   • RECTUS REPAIR  7/13/2012    Performed by SHEILA PLATT at SURGERY SURGICAL [facility] ORS   • OTHER  12/10/10    stents in legs   • OTHER  6/10/10    cardiac stents   • CARDIAC CATH  6/2010    BMS to Om   • OTHER  2005    KNEE SURGERY   • CHOLECYSTECTOMY     • HYSTERECTOMY, TOTAL ABDOMINAL     • OTHER CARDIAC SURGERY      stent in heart   • STENT PLACEMENT      treated by Dr Baker, multiple surgeries to legs.     Family History   Problem Relation Age of Onset   • Stroke Mother 68        CVA   • Heart Disease Mother 65        valve surgery   • Other Son         wgt 465 lbs     History   Smoking Status   • Former Smoker   • Packs/day: 0.25   • Years: 45.00   • Types: Cigarettes   • Quit date: 2/1/2016   Smokeless Tobacco   • Never Used     Allergies   Allergen Reactions   • Penicillins Rash and Vomiting     Rxn = unknown  Pt tolerates cephalosporins   • Hydrocodone Vomiting and Nausea     Rxn = unknown   • Tape Rash     RASH     Outpatient Encounter Prescriptions as of 8/28/2018   Medication Sig Dispense Refill   • OXYGEN-HELIUM INH Inhale 2 L by mouth.     • clopidogrel (PLAVIX) 75 MG Tab Take 1 Tab by mouth every day. 90 Tab 3   • isosorbide mononitrate SR (IMDUR) 30 MG TABLET SR 24 HR Take 1 Tab by mouth every day. 30 Tab 11   • amLODIPine (NORVASC) 5 MG Tab TAKE 1 TABLET DAILY 90 Tab 2   • benazepril (LOTENSIN) 20 MG Tab TAKE 1 TABLET TWICE A  Tab 2   • atorvastatin (LIPITOR) 20 MG Tab TAKE 1 TABLET EVERY EVENING 90 Tab  "3   • estradiol (ESTRACE) 1 MG Tab Take 1 mg by mouth every day.     • aspirin EC (ECOTRIN) 81 MG Tablet Delayed Response Take 1 Tab by mouth every day. 30 Tab 11   • metoprolol (LOPRESSOR) 50 MG Tab Take 1.5 Tabs by mouth 2 times a day. 90 Tab 3   • Cholecalciferol (VITAMIN D) 2000 UNIT Tab Take 2,000 Units by mouth every day.     • gabapentin (NEURONTIN) 300 MG CAPS Take 300 mg by mouth every day. Indications: Neurogenic Pain     • [DISCONTINUED] albuterol 108 (90 Base) MCG/ACT Aero Soln inhalation aerosol Inhale 2 Puffs by mouth every 6 hours as needed for Shortness of Breath. 8.5 g 0   • [DISCONTINUED] doxycycline (VIBRAMYCIN) 100 MG Tab Take 1 Tab by mouth 2 times a day. 20 Tab 0   • nitroglycerin (NITROSTAT) 0.4 MG SL Tab Place 1 Tab under tongue as needed for Chest Pain. 25 Tab 1     No facility-administered encounter medications on file as of 8/28/2018.      Review of Systems   Constitutional: Negative for fever and malaise/fatigue.        R sided weakness slightly in hand, feels clumsy    Respiratory: Negative for cough and shortness of breath.    Cardiovascular: Negative for chest pain, palpitations, orthopnea, claudication, leg swelling and PND.   Gastrointestinal: Negative for abdominal pain.   Musculoskeletal: Negative for falls and myalgias.   Neurological: Positive for weakness. Negative for dizziness.   All other systems reviewed and are negative.       Objective:   /50   Pulse 92   Ht 1.753 m (5' 9\")   Wt 61.4 kg (135 lb 4 oz)   SpO2 93%   BMI 19.97 kg/m²     Physical Exam   Constitutional: She is oriented to person, place, and time. She appears well-developed and well-nourished. No distress.   HENT:   Head: Normocephalic and atraumatic.   Eyes: Pupils are equal, round, and reactive to light. EOM are normal.   Neck: Normal range of motion. No JVD present.   Cardiovascular: Normal rate, regular rhythm, normal heart sounds and intact distal pulses.    No murmur heard.  Pulmonary/Chest: " Effort normal and breath sounds normal. No respiratory distress. She has no wheezes.   Abdominal: Soft. Bowel sounds are normal.   Musculoskeletal: Normal range of motion. She exhibits no edema.   Neurological: She is alert and oriented to person, place, and time.   Skin: Skin is warm and dry.   Psychiatric: She has a normal mood and affect.   Nursing note and vitals reviewed.    CATH 2014   BMS to the obtuse marginal artery, 80% proximal LAD lesion followed by complete occlusion of the LAD with collateral filling. The right coronary artery at a 50% lesion.    4/19/16 ECHO CONCLUSIONS  No prior study is available for comparison.   Normal left ventricular chamber size. Moderate concentric left   ventricular hypertrophy. Normal left ventricular systolic function.   Left ventricular ejection fraction is visually estimated to be 65%.   Normal regional wall motion. No significant diastolic dysfunction.  Moderate to severe aortic insufficiency.  Ascending aorta diameter is 3.4 cm.    2/11/16 NUCLEAR IMAGING IMPRESSIONS   Moderate sized partial fixed defect of moderately decreased counts in the    apical anterior, apical septal, apical inferior wall, and mid anteroseptum    consistent with mainly ischemia with some scar.    Hypokinesis of the anteroseptum and septal walls.  LV ejection fraction =    69%.    2017 PET CARDIAC   SCINTOGRAPHIC FINDINGS:  There is positive evidence of large area of ischemia seen at the mid to apical septo-anterior wall of the left ventricle.  GATED WALL MOTION FINDINGS:  The left ventricle wall motion is normal with stress and rest  imagings.  Measured resting ejection fraction is 64 %.       Reviewed with patient  Lab Results   Component Value Date/Time    CHOLSTRLTOT 121 06/11/2018 08:43 AM    LDL 42 06/11/2018 08:43 AM    HDL 45 06/11/2018 08:43 AM    TRIGLYCERIDE 172 (H) 06/11/2018 08:43 AM       Lab Results   Component Value Date/Time    SODIUM 142 06/11/2018 08:43 AM    POTASSIUM 4.1  06/11/2018 08:43 AM    CHLORIDE 107 06/11/2018 08:43 AM    CO2 25 06/11/2018 08:43 AM    GLUCOSE 97 06/11/2018 08:43 AM    BUN 15 06/11/2018 08:43 AM    CREATININE 1.35 06/11/2018 08:43 AM     Lab Results   Component Value Date/Time    WBC 8.1 06/11/2018 08:43 AM    RBC 4.12 (L) 06/11/2018 08:43 AM    HEMOGLOBIN 13.4 06/11/2018 08:43 AM    HEMATOCRIT 41.5 06/11/2018 08:43 AM    .7 (H) 06/11/2018 08:43 AM    MCH 32.5 06/11/2018 08:43 AM    MCHC 32.3 (L) 06/11/2018 08:43 AM    MPV 10.2 06/11/2018 08:43 AM      Assessment:     1. Bilateral carotid bruits     2. Coronary artery disease involving native coronary artery of native heart, angina presence unspecified     3. Mixed hyperlipidemia     4. Essential hypertension     5. Peripheral arterial disease (HCC)     6. PVD (peripheral vascular disease) (HCC)     7. Stented coronary artery     8. Tobacco abuse     9. Right arm weakness     10. Lightheadedness     11. Clumsiness       Medical Decision Making:  Today's Assessment / Status / Plan:     1. HTN, good control. Continue amlodipine 5 mg QD, metoprolol 75 mg BID, lotensin 20 mg BID, and isosorbide 30 mg QD.    2. CAD with prior BMS to OM,  to LAD, large area of ischemia seen on prior PET scan, Dr Mancilla reviewed and believes the area of ischemia is related to her  of LAD, no further interventions per his recommendation and continue with med management at this time. If patient is to have worsening angina, we can trial ranexa on top of isosorbide if wanted. Continue ASA, plavix, lipitor, and metoprolol. Chest pain has resolved since our last apt.    3. PVD and PAD, stable no problems. Continue ASA, plavix, Lipitor. FU with vascular yearly.     4. Smoker, cessation continued.    5. Hypoxemia at night, oxygen at night. Tolerating well.    6. HLD, Lipitor. Great control. LDL goal <70 with CAD. Yearly CMP and lipid     7. New onset R sided weakness (mild), move carotid duplex up and see PCP tomorrow as planned.  ER or 911 for worsening symptoms. Recommended life alert button with living alone, she agrees.    FU in clinic in 3 months with RS; PCP tomorrow and move carotid duplex up, ER or 911 with worsening symptoms    Patient verbalizes understanding and agrees with the plan of care.     Collaborating MD: NO ADD TODAY     Physical Exam   Constitutional: She is oriented to person, place, and time. She appears well-developed and well-nourished. No distress.   HENT:   Head: Normocephalic and atraumatic.   Eyes: Pupils are equal, round, and reactive to light. EOM are normal.   Neck: Normal range of motion. No JVD present.   Cardiovascular: Normal rate, regular rhythm, normal heart sounds and intact distal pulses.    No murmur heard.  Pulmonary/Chest: Effort normal and breath sounds normal. No respiratory distress. She has no wheezes.   Abdominal: Soft. Bowel sounds are normal.   Musculoskeletal: Normal range of motion. She exhibits no edema.   Neurological: She is alert and oriented to person, place, and time.   Skin: Skin is warm and dry.   Psychiatric: She has a normal mood and affect.   Nursing note and vitals reviewed.

## 2018-08-28 NOTE — LETTER
[facility]   [address], [facility] [address]  [place] [address]  Phone: [phone]  Fax: [phone]              Brooke Diana  2/1/1934    Encounter Date: 8/28/2018    JIMENEZ Brooks          PROGRESS NOTE:  Subjective:   Brooke Diana is a 81 y.o. female who presents today for follow up regarding some right sided arm weakness, clumsiness as well. She sees her PCP tomorrow but wanted to move her carotid duplex up.    She is a patient of Dr. Mancilla in our office.     Hx of HTN, CAD with previous stent to the obtuse marginal artery and  of her LAD, HLD, previous smoker, hypoxemia at night with oxygen, PVD and PAD.     She tells me she came in today due to some right hand weakness. She has been clumsy lately. No memory loss or slurred speech. She is able to eat but is not hungry. She sees her PCP tomorrow. She wanted to move her carotid duplex up. She doesn't want to get a head CT or go to the ER. She knows to call 911 for worsening stroke symptoms we discussed today.    She will get a life alert button too.    She has had no episodes of palpitations, dizziness/lightheadedness, shortness of breath, orthopnea, or peripheral edema.    Past Medical History:   Diagnosis Date   • Acute myocardial infarction, true posterior wall infarction, episode of care unspecified    • Anxiety    • Bowel habit changes    • Breath shortness 2017    uses oxygen at 2 liters/min; AT NIGHT ONLY   • CAD (coronary artery disease)     S/P stent placement   • Cholesterol blood decreased    • Dental disorder     Partial upper   • Dizziness    • GI bleed    • Glaucoma    • Heart burn    • Hyperlipidemia    • Hypertension    • Myocardial infarct (HCC) 6/10/10   • Peripheral vascular disease (HCC)    • Pulmonary disease     bronchitis   • Unspecified hemorrhagic conditions     pt takes plavix   • Unspecified urinary incontinence    • Urinary bladder disorder      Past Surgical History:    Procedure Laterality Date   • GASTROSCOPY WITH BIOPSY  2/13/2016    Procedure: GASTROSCOPY WITH BIOPSY;  Surgeon: Susan Miller M.D.;  Location: [facility];  Service:    • RECOVERY  7/24/2014    Performed by Cath-Recovery Surgery at SURGERY SAME DAY [facility]   • CARDIAC CATH  7/24/14    Diffuse disease, see report.  % with collterals.   • FEMORAL ENDARTERECTOMY  10/4/2013    Performed by Kacie Baker M.D. at SURGERY [facility]   • ANGIOPLASTY BALLOON  10/4/2013    Performed by Kacie Baker M.D. at SURGERY [facility]   • RECOVERY  4/9/2013    Performed by Ir-Recovery Surgery at SURGERY SAME DAY [facility]   • RECOVERY  11/26/2012    Performed by Ir-Recovery Surgery at SURGERY SAME DAY [facility]   • RECTUS REPAIR  7/13/2012    Performed by SHEILA PLATT at SURGERY [facility]   • OTHER  12/10/10    stents in legs   • OTHER  6/10/10    cardiac stents   • CARDIAC CATH  6/2010    BMS to Om   • OTHER  2005    KNEE SURGERY   • CHOLECYSTECTOMY     • HYSTERECTOMY, TOTAL ABDOMINAL     • OTHER CARDIAC SURGERY      stent in heart   • STENT PLACEMENT      treated by Dr Baker, multiple surgeries to legs.     Family History   Problem Relation Age of Onset   • Stroke Mother 68        CVA   • Heart Disease Mother 65        valve surgery   • Other Son         wgt 465 lbs     History   Smoking Status   • Former Smoker   • Packs/day: 0.25   • Years: 45.00   • Types: Cigarettes   • Quit date: 2/1/2016   Smokeless Tobacco   • Never Used     Allergies   Allergen Reactions   • Penicillins Rash and Vomiting     Rxn = unknown  Pt tolerates cephalosporins   • Hydrocodone Vomiting and Nausea     Rxn = unknown   • Tape Rash     RASH     Outpatient Encounter Prescriptions as of 8/28/2018   Medication Sig Dispense Refill   • OXYGEN-HELIUM INH Inhale 2 L by mouth.     • clopidogrel (PLAVIX) 75 MG Tab Take 1 Tab by mouth every day. 90 Tab 3   • isosorbide mononitrate SR (IMDUR)  "30 MG TABLET SR 24 HR Take 1 Tab by mouth every day. 30 Tab 11   • amLODIPine (NORVASC) 5 MG Tab TAKE 1 TABLET DAILY 90 Tab 2   • benazepril (LOTENSIN) 20 MG Tab TAKE 1 TABLET TWICE A  Tab 2   • atorvastatin (LIPITOR) 20 MG Tab TAKE 1 TABLET EVERY EVENING 90 Tab 3   • estradiol (ESTRACE) 1 MG Tab Take 1 mg by mouth every day.     • aspirin EC (ECOTRIN) 81 MG Tablet Delayed Response Take 1 Tab by mouth every day. 30 Tab 11   • metoprolol (LOPRESSOR) 50 MG Tab Take 1.5 Tabs by mouth 2 times a day. 90 Tab 3   • Cholecalciferol (VITAMIN D) 2000 UNIT Tab Take 2,000 Units by mouth every day.     • gabapentin (NEURONTIN) 300 MG CAPS Take 300 mg by mouth every day. Indications: Neurogenic Pain     • [DISCONTINUED] albuterol 108 (90 Base) MCG/ACT Aero Soln inhalation aerosol Inhale 2 Puffs by mouth every 6 hours as needed for Shortness of Breath. 8.5 g 0   • [DISCONTINUED] doxycycline (VIBRAMYCIN) 100 MG Tab Take 1 Tab by mouth 2 times a day. 20 Tab 0   • nitroglycerin (NITROSTAT) 0.4 MG SL Tab Place 1 Tab under tongue as needed for Chest Pain. 25 Tab 1     No facility-administered encounter medications on file as of 8/28/2018.      Review of Systems   Constitutional: Negative for fever and malaise/fatigue.        R sided weakness slightly in hand, feels clumsy    Respiratory: Negative for cough and shortness of breath.    Cardiovascular: Negative for chest pain, palpitations, orthopnea, claudication, leg swelling and PND.   Gastrointestinal: Negative for abdominal pain.   Musculoskeletal: Negative for falls and myalgias.   Neurological: Positive for weakness. Negative for dizziness.   All other systems reviewed and are negative.       Objective:   /50   Pulse 92   Ht 1.753 m (5' 9\")   Wt 61.4 kg (135 lb 4 oz)   SpO2 93%   BMI 19.97 kg/m²      Physical Exam   Constitutional: She is oriented to person, place, and time. She appears well-developed and well-nourished. No distress.   HENT:   Head: Normocephalic " and atraumatic.   Eyes: Pupils are equal, round, and reactive to light. EOM are normal.   Neck: Normal range of motion. No JVD present.   Cardiovascular: Normal rate, regular rhythm, normal heart sounds and intact distal pulses.    No murmur heard.  Pulmonary/Chest: Effort normal and breath sounds normal. No respiratory distress. She has no wheezes.   Abdominal: Soft. Bowel sounds are normal.   Musculoskeletal: Normal range of motion. She exhibits no edema.   Neurological: She is alert and oriented to person, place, and time.   Skin: Skin is warm and dry.   Psychiatric: She has a normal mood and affect.   Nursing note and vitals reviewed.    CATH 2014   BMS to the obtuse marginal artery, 80% proximal LAD lesion followed by complete occlusion of the LAD with collateral filling. The right coronary artery at a 50% lesion.    4/19/16 ECHO CONCLUSIONS  No prior study is available for comparison.   Normal left ventricular chamber size. Moderate concentric left   ventricular hypertrophy. Normal left ventricular systolic function.   Left ventricular ejection fraction is visually estimated to be 65%.   Normal regional wall motion. No significant diastolic dysfunction.  Moderate to severe aortic insufficiency.  Ascending aorta diameter is 3.4 cm.    2/11/16 NUCLEAR IMAGING IMPRESSIONS   Moderate sized partial fixed defect of moderately decreased counts in the    apical anterior, apical septal, apical inferior wall, and mid anteroseptum    consistent with mainly ischemia with some scar.    Hypokinesis of the anteroseptum and septal walls.  LV ejection fraction =    69%.    2017 PET CARDIAC   SCINTOGRAPHIC FINDINGS:  There is positive evidence of large area of ischemia seen at the mid to apical septo-anterior wall of the left ventricle.  GATED WALL MOTION FINDINGS:  The left ventricle wall motion is normal with stress and rest  imagings.  Measured resting ejection fraction is 64 %.       Reviewed with patient  Lab Results      Component Value Date/Time    CHOLSTRLTOT 121 06/11/2018 08:43 AM    LDL 42 06/11/2018 08:43 AM    HDL 45 06/11/2018 08:43 AM    TRIGLYCERIDE 172 (H) 06/11/2018 08:43 AM       Lab Results   Component Value Date/Time    SODIUM 142 06/11/2018 08:43 AM    POTASSIUM 4.1 06/11/2018 08:43 AM    CHLORIDE 107 06/11/2018 08:43 AM    CO2 25 06/11/2018 08:43 AM    GLUCOSE 97 06/11/2018 08:43 AM    BUN 15 06/11/2018 08:43 AM    CREATININE 1.35 06/11/2018 08:43 AM     Lab Results   Component Value Date/Time    WBC 8.1 06/11/2018 08:43 AM    RBC 4.12 (L) 06/11/2018 08:43 AM    HEMOGLOBIN 13.4 06/11/2018 08:43 AM    HEMATOCRIT 41.5 06/11/2018 08:43 AM    .7 (H) 06/11/2018 08:43 AM    MCH 32.5 06/11/2018 08:43 AM    MCHC 32.3 (L) 06/11/2018 08:43 AM    MPV 10.2 06/11/2018 08:43 AM      Assessment:     1. Bilateral carotid bruits     2. Coronary artery disease involving native coronary artery of native heart, angina presence unspecified     3. Mixed hyperlipidemia     4. Essential hypertension     5. Peripheral arterial disease (HCC)     6. PVD (peripheral vascular disease) (HCC)     7. Stented coronary artery     8. Tobacco abuse     9. Right arm weakness     10. Lightheadedness     11. Clumsiness       Medical Decision Making:  Today's Assessment / Status / Plan:     1. HTN, good control. Continue amlodipine 5 mg QD, metoprolol 75 mg BID, lotensin 20 mg BID, and isosorbide 30 mg QD.    2. CAD with prior BMS to OM,  to LAD, large area of ischemia seen on prior PET scan, Dr Mancilla reviewed and believes the area of ischemia is related to her  of LAD, no further interventions per his recommendation and continue with med management at this time. If patient is to have worsening angina, we can trial ranexa on top of isosorbide if wanted. Continue ASA, plavix, lipitor, and metoprolol. Chest pain has resolved since our last apt.    3. PVD and PAD, stable no problems. Continue ASA, plavix, Lipitor. FU with vascular yearly.      4. Smoker, cessation continued.    5. Hypoxemia at night, oxygen at night. Tolerating well.    6. HLD, Lipitor. Great control. LDL goal <70 with CAD. Yearly CMP and lipid     7. New onset R sided weakness (mild), move carotid duplex up and see PCP tomorrow as planned. ER or 911 for worsening symptoms. Recommended life alert button with living alone, she agrees.    FU in clinic in 3 months with RS; PCP tomorrow and move carotid duplex up, ER or 911 with worsening symptoms    Patient verbalizes understanding and agrees with the plan of care.     Collaborating MD: NO ADD TODAY     Physical Exam   Constitutional: She is oriented to person, place, and time. She appears well-developed and well-nourished. No distress.   HENT:   Head: Normocephalic and atraumatic.   Eyes: Pupils are equal, round, and reactive to light. EOM are normal.   Neck: Normal range of motion. No JVD present.   Cardiovascular: Normal rate, regular rhythm, normal heart sounds and intact distal pulses.    No murmur heard.  Pulmonary/Chest: Effort normal and breath sounds normal. No respiratory distress. She has no wheezes.   Abdominal: Soft. Bowel sounds are normal.   Musculoskeletal: Normal range of motion. She exhibits no edema.   Neurological: She is alert and oriented to person, place, and time.   Skin: Skin is warm and dry.   Psychiatric: She has a normal mood and affect.   Nursing note and vitals reviewed.          No Recipients

## 2018-09-01 ENCOUNTER — APPOINTMENT (OUTPATIENT)
Dept: RADIOLOGY | Facility: MEDICAL CENTER | Age: 83
DRG: 037 | End: 2018-09-01
Attending: EMERGENCY MEDICINE
Payer: MEDICARE

## 2018-09-01 ENCOUNTER — HOSPITAL ENCOUNTER (INPATIENT)
Facility: MEDICAL CENTER | Age: 83
LOS: 3 days | DRG: 037 | End: 2018-09-05
Attending: EMERGENCY MEDICINE | Admitting: SURGERY
Payer: MEDICARE

## 2018-09-01 ENCOUNTER — APPOINTMENT (OUTPATIENT)
Dept: RADIOLOGY | Facility: MEDICAL CENTER | Age: 83
DRG: 037 | End: 2018-09-01
Attending: HOSPITALIST
Payer: MEDICARE

## 2018-09-01 DIAGNOSIS — G89.18 POSTOPERATIVE PAIN: ICD-10-CM

## 2018-09-01 DIAGNOSIS — R29.898 RIGHT HAND WEAKNESS: ICD-10-CM

## 2018-09-01 DIAGNOSIS — I65.21 SYMPTOMATIC STENOSIS OF RIGHT CAROTID ARTERY: Primary | ICD-10-CM

## 2018-09-01 DIAGNOSIS — I63.9 CEREBROVASCULAR ACCIDENT (CVA), UNSPECIFIED MECHANISM (HCC): ICD-10-CM

## 2018-09-01 DIAGNOSIS — I65.29 STENOSIS OF CAROTID ARTERY, UNSPECIFIED LATERALITY: ICD-10-CM

## 2018-09-01 PROBLEM — G45.9 TIA (TRANSIENT ISCHEMIC ATTACK): Status: ACTIVE | Noted: 2018-09-01

## 2018-09-01 LAB
ALBUMIN SERPL BCP-MCNC: 3.9 G/DL (ref 3.2–4.9)
ALBUMIN/GLOB SERPL: 1.6 G/DL
ALP SERPL-CCNC: 60 U/L (ref 30–99)
ALT SERPL-CCNC: 8 U/L (ref 2–50)
ANION GAP SERPL CALC-SCNC: 8 MMOL/L (ref 0–11.9)
AST SERPL-CCNC: 15 U/L (ref 12–45)
BASOPHILS # BLD AUTO: 0.4 % (ref 0–1.8)
BASOPHILS # BLD: 0.05 K/UL (ref 0–0.12)
BILIRUB SERPL-MCNC: 0.3 MG/DL (ref 0.1–1.5)
BUN SERPL-MCNC: 16 MG/DL (ref 8–22)
CALCIUM SERPL-MCNC: 9.3 MG/DL (ref 8.5–10.5)
CHLORIDE SERPL-SCNC: 108 MMOL/L (ref 96–112)
CO2 SERPL-SCNC: 23 MMOL/L (ref 20–33)
CREAT SERPL-MCNC: 1.23 MG/DL (ref 0.5–1.4)
EOSINOPHIL # BLD AUTO: 0.11 K/UL (ref 0–0.51)
EOSINOPHIL NFR BLD: 0.8 % (ref 0–6.9)
ERYTHROCYTE [DISTWIDTH] IN BLOOD BY AUTOMATED COUNT: 44.5 FL (ref 35.9–50)
EST. AVERAGE GLUCOSE BLD GHB EST-MCNC: 108 MG/DL
FERRITIN SERPL-MCNC: 13.1 NG/ML (ref 10–291)
FOLATE SERPL-MCNC: 23.3 NG/ML
GLOBULIN SER CALC-MCNC: 2.4 G/DL (ref 1.9–3.5)
GLUCOSE SERPL-MCNC: 112 MG/DL (ref 65–99)
HBA1C MFR BLD: 5.4 % (ref 0–5.6)
HCT VFR BLD AUTO: 32.8 % (ref 37–47)
HGB BLD-MCNC: 10.5 G/DL (ref 12–16)
IMM GRANULOCYTES # BLD AUTO: 0.06 K/UL (ref 0–0.11)
IMM GRANULOCYTES NFR BLD AUTO: 0.4 % (ref 0–0.9)
LYMPHOCYTES # BLD AUTO: 1.33 K/UL (ref 1–4.8)
LYMPHOCYTES NFR BLD: 9.7 % (ref 22–41)
MCH RBC QN AUTO: 30.3 PG (ref 27–33)
MCHC RBC AUTO-ENTMCNC: 32 G/DL (ref 33.6–35)
MCV RBC AUTO: 94.8 FL (ref 81.4–97.8)
MONOCYTES # BLD AUTO: 1.01 K/UL (ref 0–0.85)
MONOCYTES NFR BLD AUTO: 7.3 % (ref 0–13.4)
NEUTROPHILS # BLD AUTO: 11.21 K/UL (ref 2–7.15)
NEUTROPHILS NFR BLD: 81.4 % (ref 44–72)
NRBC # BLD AUTO: 0 K/UL
NRBC BLD-RTO: 0 /100 WBC
PLATELET # BLD AUTO: 313 K/UL (ref 164–446)
PMV BLD AUTO: 9.3 FL (ref 9–12.9)
POTASSIUM SERPL-SCNC: 4.3 MMOL/L (ref 3.6–5.5)
PROT SERPL-MCNC: 6.3 G/DL (ref 6–8.2)
RBC # BLD AUTO: 3.46 M/UL (ref 4.2–5.4)
SODIUM SERPL-SCNC: 139 MMOL/L (ref 135–145)
TROPONIN I SERPL-MCNC: <0.01 NG/ML (ref 0–0.04)
TSH SERPL DL<=0.005 MIU/L-ACNC: 1.3 UIU/ML (ref 0.38–5.33)
VIT B12 SERPL-MCNC: 498 PG/ML (ref 211–911)
WBC # BLD AUTO: 13.8 K/UL (ref 4.8–10.8)

## 2018-09-01 PROCEDURE — 84443 ASSAY THYROID STIM HORMONE: CPT

## 2018-09-01 PROCEDURE — A9270 NON-COVERED ITEM OR SERVICE: HCPCS | Performed by: HOSPITALIST

## 2018-09-01 PROCEDURE — G0378 HOSPITAL OBSERVATION PER HR: HCPCS

## 2018-09-01 PROCEDURE — 70551 MRI BRAIN STEM W/O DYE: CPT

## 2018-09-01 PROCEDURE — 82746 ASSAY OF FOLIC ACID SERUM: CPT

## 2018-09-01 PROCEDURE — 84484 ASSAY OF TROPONIN QUANT: CPT

## 2018-09-01 PROCEDURE — 36415 COLL VENOUS BLD VENIPUNCTURE: CPT

## 2018-09-01 PROCEDURE — 85025 COMPLETE CBC W/AUTO DIFF WBC: CPT

## 2018-09-01 PROCEDURE — 99220 PR INITIAL OBSERVATION CARE,LEVL III: CPT | Performed by: HOSPITALIST

## 2018-09-01 PROCEDURE — 83036 HEMOGLOBIN GLYCOSYLATED A1C: CPT

## 2018-09-01 PROCEDURE — 82728 ASSAY OF FERRITIN: CPT

## 2018-09-01 PROCEDURE — 306588 SLEEVE,VASO CALF MED: Performed by: HOSPITALIST

## 2018-09-01 PROCEDURE — 700102 HCHG RX REV CODE 250 W/ 637 OVERRIDE(OP): Performed by: HOSPITALIST

## 2018-09-01 PROCEDURE — 82607 VITAMIN B-12: CPT

## 2018-09-01 PROCEDURE — 99285 EMERGENCY DEPT VISIT HI MDM: CPT

## 2018-09-01 PROCEDURE — 80053 COMPREHEN METABOLIC PANEL: CPT

## 2018-09-01 PROCEDURE — 70450 CT HEAD/BRAIN W/O DYE: CPT

## 2018-09-01 RX ORDER — ATORVASTATIN CALCIUM 80 MG/1
80 TABLET, FILM COATED ORAL
Status: DISCONTINUED | OUTPATIENT
Start: 2018-09-01 | End: 2018-09-05 | Stop reason: HOSPADM

## 2018-09-01 RX ORDER — BISACODYL 10 MG
10 SUPPOSITORY, RECTAL RECTAL
Status: DISCONTINUED | OUTPATIENT
Start: 2018-09-01 | End: 2018-09-05 | Stop reason: HOSPADM

## 2018-09-01 RX ORDER — AMLODIPINE BESYLATE 5 MG/1
5 TABLET ORAL
Status: DISCONTINUED | OUTPATIENT
Start: 2018-09-02 | End: 2018-09-02

## 2018-09-01 RX ORDER — LABETALOL HYDROCHLORIDE 5 MG/ML
10 INJECTION, SOLUTION INTRAVENOUS EVERY 4 HOURS PRN
Status: DISCONTINUED | OUTPATIENT
Start: 2018-09-01 | End: 2018-09-05 | Stop reason: HOSPADM

## 2018-09-01 RX ORDER — AMOXICILLIN 250 MG
2 CAPSULE ORAL 2 TIMES DAILY
Status: DISCONTINUED | OUTPATIENT
Start: 2018-09-01 | End: 2018-09-05 | Stop reason: HOSPADM

## 2018-09-01 RX ORDER — ONDANSETRON 2 MG/ML
4 INJECTION INTRAMUSCULAR; INTRAVENOUS EVERY 4 HOURS PRN
Status: DISCONTINUED | OUTPATIENT
Start: 2018-09-01 | End: 2018-09-05 | Stop reason: HOSPADM

## 2018-09-01 RX ORDER — ISOSORBIDE MONONITRATE 30 MG/1
30 TABLET, EXTENDED RELEASE ORAL DAILY
Status: DISCONTINUED | OUTPATIENT
Start: 2018-09-02 | End: 2018-09-02

## 2018-09-01 RX ORDER — ONDANSETRON 4 MG/1
4 TABLET, ORALLY DISINTEGRATING ORAL EVERY 4 HOURS PRN
Status: DISCONTINUED | OUTPATIENT
Start: 2018-09-01 | End: 2018-09-05 | Stop reason: HOSPADM

## 2018-09-01 RX ORDER — GABAPENTIN 300 MG/1
300 CAPSULE ORAL
Status: DISCONTINUED | OUTPATIENT
Start: 2018-09-01 | End: 2018-09-05 | Stop reason: HOSPADM

## 2018-09-01 RX ORDER — BENAZEPRIL HYDROCHLORIDE 20 MG/1
20 TABLET ORAL
Status: DISCONTINUED | OUTPATIENT
Start: 2018-09-02 | End: 2018-09-02

## 2018-09-01 RX ORDER — HYDRALAZINE HYDROCHLORIDE 20 MG/ML
10 INJECTION INTRAMUSCULAR; INTRAVENOUS
Status: DISCONTINUED | OUTPATIENT
Start: 2018-09-01 | End: 2018-09-05 | Stop reason: HOSPADM

## 2018-09-01 RX ORDER — CLOPIDOGREL BISULFATE 75 MG/1
75 TABLET ORAL DAILY
Status: DISCONTINUED | OUTPATIENT
Start: 2018-09-02 | End: 2018-09-03

## 2018-09-01 RX ORDER — POLYETHYLENE GLYCOL 3350 17 G/17G
1 POWDER, FOR SOLUTION ORAL
Status: DISCONTINUED | OUTPATIENT
Start: 2018-09-01 | End: 2018-09-05 | Stop reason: HOSPADM

## 2018-09-01 RX ORDER — METOPROLOL TARTRATE 50 MG/1
75 TABLET, FILM COATED ORAL 2 TIMES DAILY
Status: DISCONTINUED | OUTPATIENT
Start: 2018-09-01 | End: 2018-09-02

## 2018-09-01 RX ADMIN — ATORVASTATIN CALCIUM 80 MG: 80 TABLET, FILM COATED ORAL at 20:28

## 2018-09-01 RX ADMIN — GABAPENTIN 300 MG: 300 CAPSULE ORAL at 20:28

## 2018-09-01 RX ADMIN — METOPROLOL TARTRATE 75 MG: 50 TABLET ORAL at 21:43

## 2018-09-01 ASSESSMENT — ENCOUNTER SYMPTOMS
CHILLS: 0
ABDOMINAL PAIN: 0
DIZZINESS: 0
SEIZURES: 0
COUGH: 0
DIARRHEA: 0
FEVER: 0
WHEEZING: 0
HEMOPTYSIS: 0
RESPIRATORY NEGATIVE: 1
LOSS OF CONSCIOUSNESS: 0
BRUISES/BLEEDS EASILY: 0
BLOOD IN STOOL: 0
GASTROINTESTINAL NEGATIVE: 1
PALPITATIONS: 0
PSYCHIATRIC NEGATIVE: 1
EYES NEGATIVE: 1
DOUBLE VISION: 0
SPEECH CHANGE: 1
HEADACHES: 1
VOMITING: 0
DIAPHORESIS: 0
MUSCULOSKELETAL NEGATIVE: 1
NERVOUS/ANXIOUS: 0
HEARTBURN: 0
CONSTIPATION: 0
CARDIOVASCULAR NEGATIVE: 1
CONSTITUTIONAL NEGATIVE: 1
NAUSEA: 0
FOCAL WEAKNESS: 1

## 2018-09-01 ASSESSMENT — PATIENT HEALTH QUESTIONNAIRE - PHQ9
1. LITTLE INTEREST OR PLEASURE IN DOING THINGS: NOT AT ALL
2. FEELING DOWN, DEPRESSED, IRRITABLE, OR HOPELESS: NOT AT ALL
SUM OF ALL RESPONSES TO PHQ9 QUESTIONS 1 AND 2: 0

## 2018-09-01 ASSESSMENT — PAIN SCALES - GENERAL
PAINLEVEL_OUTOF10: 0

## 2018-09-01 ASSESSMENT — LIFESTYLE VARIABLES
DO YOU DRINK ALCOHOL: NO
ALCOHOL_USE: NO
EVER_SMOKED: YES

## 2018-09-01 NOTE — ED TRIAGE NOTES
Chief Complaint   Patient presents with   • Extremity Weakness     right hand weakness for 1 week, pt c/o dropping a lot of things this week

## 2018-09-01 NOTE — ED PROVIDER NOTES
ED Provider Note    Scribed for Lia Ward M.D. by Jono Amor. 9/1/2018, 2:44 PM.    Primary care provider: Benjamin Bernard M.D.  Means of arrival: Ambulance  History obtained from: Patient  History limited by: None    CHIEF COMPLAINT  Chief Complaint   Patient presents with   • Extremity Weakness     right hand weakness for 1 week, pt c/o dropping a lot of things this week       HPI  Brooke Diana is a 84 y.o. female who presents to the Emergency Department for evaluation of intermittent weakness with associated numbness in her right hand onset one week ago. Per patient, she continuously keeps dropping things out of her right hand. Initially, the weakness lasted about 5-15 minutes every time she experienced it, however, today she says she has been feeling the weakness all day. The patient reports that she saw her cardiologist for this issue a couple of days ago but today she came into the ED because the issue became constant. The patient also endorses a headache that she experienced both yesterday and today. She denies pain to the hand and similar effects in other extremities.      REVIEW OF SYSTEMS  Pertinent positives include right hand weakness, right hand numbness. Pertinent negatives include no pain to right hand, similar effects in other extremities.  All other systems reviewed and negative.  C.     PAST MEDICAL HISTORY   has a past medical history of Acute myocardial infarction, true posterior wall infarction, episode of care unspecified; Anxiety; Bowel habit changes; Breath shortness (2017); CAD (coronary artery disease); Cholesterol blood decreased; Dental disorder; Dizziness; GI bleed; Glaucoma; Heart burn; Hyperlipidemia; Hypertension; Myocardial infarct (HCC) (6/10/10); Peripheral vascular disease (HCC); Pulmonary disease; Unspecified hemorrhagic conditions; Unspecified urinary incontinence; and Urinary bladder disorder.    SURGICAL HISTORY   has a past surgical history that includes  "rectus repair (7/13/2012); recovery (11/26/2012); recovery (4/9/2013); femoral endarterectomy (10/4/2013); angioplasty balloon (10/4/2013); hysterectomy, total abdominal; other cardiac surgery; cholecystectomy; other (12/10/10); other (6/10/10); other (2005); recovery (7/24/2014); cardiac cath (6/2010); cardiac cath (7/24/14); stent placement; and gastroscopy with biopsy (2/13/2016).    SOCIAL HISTORY  Social History   Substance Use Topics   • Smoking status: Former Smoker     Packs/day: 0.25     Years: 45.00     Types: Cigarettes     Quit date: 2/1/2016   • Smokeless tobacco: Never Used   • Alcohol use 0.0 - 0.6 oz/week      Comment: 1 drink per month      History   Drug Use No       FAMILY HISTORY  Family History   Problem Relation Age of Onset   • Stroke Mother 68        CVA   • Heart Disease Mother 65        valve surgery   • Other Son         wgt 465 lbs       CURRENT MEDICATIONS  None reported.     ALLERGIES  Allergies   Allergen Reactions   • Penicillins Rash and Vomiting     Rxn = unknown  Pt tolerates cephalosporins   • Hydrocodone Vomiting and Nausea     Rxn = unknown   • Tape Rash     RASH       PHYSICAL EXAM  VITAL SIGNS: Pulse 77   Temp 37 °C (98.6 °F)   Resp (!) 23   Ht 1.753 m (5' 9\")   Wt 61.2 kg (135 lb)   SpO2 96%   BMI 19.94 kg/m²   Constitutional: Alert in no apparent distress.  HENT: No signs of trauma, Bilateral external ears normal, Nose normal.   Eyes: Pupils are equal and reactive, Conjunctiva normal, Non-icteric.   Neck: Normal range of motion, No tenderness, Supple, No stridor.   Cardiovascular: Regular rate and rhythm, no murmurs.   Thorax & Lungs: Normal breath sounds, No respiratory distress, No wheezing, No chest tenderness.   Abdomen: Bowel sounds normal, Soft, No tenderness, No masses, No peritoneal signs.  Skin: Warm, Dry, No erythema, No rash.   Musculoskeletal:  No major deformities noted.   Neurologic: Alert, moving all extremities without difficulty, no focal deficits. " Normal finger to nose test. Normal 4/4 strength in all extremities.     LABS  Labs Reviewed   CBC WITH DIFFERENTIAL - Abnormal; Notable for the following:        Result Value    WBC 13.8 (*)     RBC 3.46 (*)     Hemoglobin 10.5 (*)     Hematocrit 32.8 (*)     MCHC 32.0 (*)     Neutrophils-Polys 81.40 (*)     Lymphocytes 9.70 (*)     Neutrophils (Absolute) 11.21 (*)     Monos (Absolute) 1.01 (*)     All other components within normal limits   COMP METABOLIC PANEL - Abnormal; Notable for the following:     Glucose 112 (*)     All other components within normal limits   ESTIMATED GFR - Abnormal; Notable for the following:     GFR If  50 (*)     GFR If Non  42 (*)     All other components within normal limits   TROPONIN   HEMOGLOBIN A1C   TSH   FERRITIN   FOLATE   VITAMIN B12   MAGNESIUM   URINE DRUG SCREEN   TROPONIN   IRON/TOTAL IRON BIND     All labs reviewed by me.      RADIOLOGY  CT-HEAD W/O   Final Result      1.  Cerebral atrophy.      2.  White matter lucencies most consistent with small vessel ischemic change versus demyelination or gliosis.      3.  Otherwise, Head CT without contrast with no acute findings. No evidence of acute cerebral infarction, hemorrhage or mass lesion.      MR-BRAIN-W/O    (Results Pending)   Carotid Duplex ([facility] [place] [facility] Only) - Do not order if CTA - Neck done in ER    (Results Pending)   Echocardiogram Comp W/O cont    (Results Pending)     The radiologist's interpretation of all radiological studies have been reviewed by me.    COURSE & MEDICAL DECISION MAKING  Pertinent Labs & Imaging studies reviewed. (See chart for details)    2:47 PM I obtained and independently reviewed the patient's old records indicating that she saw her cardiologist last week for the same thing. They tried to move up her carotid duplex. She has a history of cardiac disease.     Differential diagnoses include but are not limited to: TIA, CVA, and Brachial Plexus  Neuropathy.     2:52 PM - Patient seen and examined at bedside. Ordered CT-head, CBC, CMP, and Troponin to evaluate her symptoms. I informed the patient of the likely possibility of hospital admission to rule out stroke. She understands and agrees to plan of care.     3:57 PM Consult with Dr. Green (Hospitalist) who agrees to admit the patient. The patient's care was transferred at this time.     4:22 PM Patient reevaluated at bedside. I informed her of the diagnostic results. I updated her on the plan of care including plan of admission. The patient understands and agrees to hospital admission.     Decision Making:  This is a 84 y.o. year old female who presents with right arm discoordination and weakness.  She does not have any objective findings on exam.  However she does have a significant vasculopath history and is at risk for stroke.  She is scheduled for outpatient workup however given her worsening symptoms I am concerned for TIA and will admit her to the CDU for appropriate workup.    DISPOSITION:  Patient will be admitted to Dr. Green (Hospitalist) in guarded condition.      FINAL IMPRESSION  1. Right hand weakness         This dictation has been created using voice recognition software and/or scribes. The accuracy of the dictation is limited by the abilities of the software and the expertise of the scribes. I expect there may be some errors of grammar and possibly content. I made every attempt to manually correct the errors within my dictation. However, errors related to voice recognition software and/or scribes may still exist and should be interpreted within the appropriate context.     I, Jono Amor (Maryibnicolas), am scribing for, and in the presence of, Lia Ward M.D..    Electronically signed by: Jono Amor (Scribe), 9/1/2018    ILia M.D. personally performed the services described in this documentation, as scribed by Jono Amor in my presence, and it is  both accurate and complete.    The note accurately reflects work and decisions made by me.  Lia Ward  9/1/2018  5:15 PM

## 2018-09-02 ENCOUNTER — APPOINTMENT (OUTPATIENT)
Dept: RADIOLOGY | Facility: MEDICAL CENTER | Age: 83
DRG: 037 | End: 2018-09-02
Attending: SURGERY
Payer: MEDICARE

## 2018-09-02 ENCOUNTER — APPOINTMENT (OUTPATIENT)
Dept: RADIOLOGY | Facility: MEDICAL CENTER | Age: 83
DRG: 037 | End: 2018-09-02
Attending: PSYCHIATRY & NEUROLOGY
Payer: MEDICARE

## 2018-09-02 ENCOUNTER — PATIENT OUTREACH (OUTPATIENT)
Dept: HEALTH INFORMATION MANAGEMENT | Facility: OTHER | Age: 83
End: 2018-09-02

## 2018-09-02 PROBLEM — N18.30 CKD (CHRONIC KIDNEY DISEASE) STAGE 3, GFR 30-59 ML/MIN: Status: ACTIVE | Noted: 2018-09-02

## 2018-09-02 PROBLEM — I63.9 CVA (CEREBRAL VASCULAR ACCIDENT) (HCC): Status: ACTIVE | Noted: 2018-09-01

## 2018-09-02 PROBLEM — I65.29 CAROTID STENOSIS: Status: ACTIVE | Noted: 2018-09-02

## 2018-09-02 PROBLEM — D50.9 IRON DEFICIENCY ANEMIA: Status: ACTIVE | Noted: 2018-09-02

## 2018-09-02 LAB
ANION GAP SERPL CALC-SCNC: 7 MMOL/L (ref 0–11.9)
BASOPHILS # BLD AUTO: 0.5 % (ref 0–1.8)
BASOPHILS # BLD: 0.05 K/UL (ref 0–0.12)
BUN SERPL-MCNC: 15 MG/DL (ref 8–22)
CALCIUM SERPL-MCNC: 8.9 MG/DL (ref 8.5–10.5)
CHLORIDE SERPL-SCNC: 111 MMOL/L (ref 96–112)
CHOLEST SERPL-MCNC: 77 MG/DL (ref 100–199)
CO2 SERPL-SCNC: 22 MMOL/L (ref 20–33)
CREAT SERPL-MCNC: 1.03 MG/DL (ref 0.5–1.4)
EOSINOPHIL # BLD AUTO: 0.25 K/UL (ref 0–0.51)
EOSINOPHIL NFR BLD: 2.6 % (ref 0–6.9)
ERYTHROCYTE [DISTWIDTH] IN BLOOD BY AUTOMATED COUNT: 45.1 FL (ref 35.9–50)
ERYTHROCYTE [SEDIMENTATION RATE] IN BLOOD BY WESTERGREN METHOD: 34 MM/HOUR (ref 0–30)
GLUCOSE SERPL-MCNC: 100 MG/DL (ref 65–99)
HCT VFR BLD AUTO: 31.6 % (ref 37–47)
HDLC SERPL-MCNC: 37 MG/DL
HGB BLD-MCNC: 10.1 G/DL (ref 12–16)
IMM GRANULOCYTES # BLD AUTO: 0.04 K/UL (ref 0–0.11)
IMM GRANULOCYTES NFR BLD AUTO: 0.4 % (ref 0–0.9)
IRON SATN MFR SERPL: 5 % (ref 15–55)
IRON SERPL-MCNC: 16 UG/DL (ref 40–170)
LDLC SERPL CALC-MCNC: 18 MG/DL
LV EJECT FRACT  99904: 70
LV EJECT FRACT MOD 2C 99903: 65.97
LV EJECT FRACT MOD 4C 99902: 76.57
LV EJECT FRACT MOD BP 99901: 72.45
LYMPHOCYTES # BLD AUTO: 1.52 K/UL (ref 1–4.8)
LYMPHOCYTES NFR BLD: 15.8 % (ref 22–41)
MAGNESIUM SERPL-MCNC: 1.9 MG/DL (ref 1.5–2.5)
MCH RBC QN AUTO: 30.9 PG (ref 27–33)
MCHC RBC AUTO-ENTMCNC: 32 G/DL (ref 33.6–35)
MCV RBC AUTO: 96.6 FL (ref 81.4–97.8)
MONOCYTES # BLD AUTO: 1.06 K/UL (ref 0–0.85)
MONOCYTES NFR BLD AUTO: 11 % (ref 0–13.4)
NEUTROPHILS # BLD AUTO: 6.72 K/UL (ref 2–7.15)
NEUTROPHILS NFR BLD: 69.7 % (ref 44–72)
NRBC # BLD AUTO: 0 K/UL
NRBC BLD-RTO: 0 /100 WBC
PLATELET # BLD AUTO: 298 K/UL (ref 164–446)
PMV BLD AUTO: 9.5 FL (ref 9–12.9)
POTASSIUM SERPL-SCNC: 5.1 MMOL/L (ref 3.6–5.5)
RBC # BLD AUTO: 3.27 M/UL (ref 4.2–5.4)
SODIUM SERPL-SCNC: 140 MMOL/L (ref 135–145)
TIBC SERPL-MCNC: 319 UG/DL (ref 250–450)
TRIGL SERPL-MCNC: 112 MG/DL (ref 0–149)
TROPONIN I SERPL-MCNC: <0.01 NG/ML (ref 0–0.04)
WBC # BLD AUTO: 9.6 K/UL (ref 4.8–10.8)

## 2018-09-02 PROCEDURE — 700102 HCHG RX REV CODE 250 W/ 637 OVERRIDE(OP): Performed by: HOSPITALIST

## 2018-09-02 PROCEDURE — 83735 ASSAY OF MAGNESIUM: CPT

## 2018-09-02 PROCEDURE — 70553 MRI BRAIN STEM W/O & W/DYE: CPT

## 2018-09-02 PROCEDURE — G8988 SELF CARE GOAL STATUS: HCPCS | Mod: CI

## 2018-09-02 PROCEDURE — 80048 BASIC METABOLIC PNL TOTAL CA: CPT

## 2018-09-02 PROCEDURE — 770006 HCHG ROOM/CARE - MED/SURG/GYN SEMI*

## 2018-09-02 PROCEDURE — 700105 HCHG RX REV CODE 258: Performed by: PSYCHIATRY & NEUROLOGY

## 2018-09-02 PROCEDURE — A9585 GADOBUTROL INJECTION: HCPCS | Performed by: PSYCHIATRY & NEUROLOGY

## 2018-09-02 PROCEDURE — 96365 THER/PROPH/DIAG IV INF INIT: CPT

## 2018-09-02 PROCEDURE — 80061 LIPID PANEL: CPT

## 2018-09-02 PROCEDURE — 70496 CT ANGIOGRAPHY HEAD: CPT

## 2018-09-02 PROCEDURE — 70498 CT ANGIOGRAPHY NECK: CPT

## 2018-09-02 PROCEDURE — 700111 HCHG RX REV CODE 636 W/ 250 OVERRIDE (IP): Performed by: PSYCHIATRY & NEUROLOGY

## 2018-09-02 PROCEDURE — 36415 COLL VENOUS BLD VENIPUNCTURE: CPT

## 2018-09-02 PROCEDURE — 96366 THER/PROPH/DIAG IV INF ADDON: CPT

## 2018-09-02 PROCEDURE — 700102 HCHG RX REV CODE 250 W/ 637 OVERRIDE(OP): Performed by: NURSE PRACTITIONER

## 2018-09-02 PROCEDURE — 85025 COMPLETE CBC W/AUTO DIFF WBC: CPT

## 2018-09-02 PROCEDURE — 700117 HCHG RX CONTRAST REV CODE 255: Performed by: PSYCHIATRY & NEUROLOGY

## 2018-09-02 PROCEDURE — G8987 SELF CARE CURRENT STATUS: HCPCS | Mod: CJ

## 2018-09-02 PROCEDURE — 84484 ASSAY OF TROPONIN QUANT: CPT

## 2018-09-02 PROCEDURE — 700117 HCHG RX CONTRAST REV CODE 255: Performed by: SURGERY

## 2018-09-02 PROCEDURE — 97165 OT EVAL LOW COMPLEX 30 MIN: CPT

## 2018-09-02 PROCEDURE — 93306 TTE W/DOPPLER COMPLETE: CPT | Mod: 26 | Performed by: INTERNAL MEDICINE

## 2018-09-02 PROCEDURE — 99233 SBSQ HOSP IP/OBS HIGH 50: CPT | Performed by: HOSPITALIST

## 2018-09-02 PROCEDURE — A9270 NON-COVERED ITEM OR SERVICE: HCPCS | Performed by: NURSE PRACTITIONER

## 2018-09-02 PROCEDURE — 85652 RBC SED RATE AUTOMATED: CPT

## 2018-09-02 PROCEDURE — 83550 IRON BINDING TEST: CPT

## 2018-09-02 PROCEDURE — 93880 EXTRACRANIAL BILAT STUDY: CPT

## 2018-09-02 PROCEDURE — 93306 TTE W/DOPPLER COMPLETE: CPT

## 2018-09-02 PROCEDURE — A9270 NON-COVERED ITEM OR SERVICE: HCPCS | Performed by: HOSPITALIST

## 2018-09-02 PROCEDURE — 83540 ASSAY OF IRON: CPT

## 2018-09-02 RX ORDER — FERROUS GLUCONATE 324(38)MG
324 TABLET ORAL
Status: DISCONTINUED | OUTPATIENT
Start: 2018-09-02 | End: 2018-09-05 | Stop reason: HOSPADM

## 2018-09-02 RX ORDER — GADOBUTROL 604.72 MG/ML
6 INJECTION INTRAVENOUS ONCE
Status: COMPLETED | OUTPATIENT
Start: 2018-09-02 | End: 2018-09-02

## 2018-09-02 RX ORDER — HEPARIN SODIUM 5000 [USP'U]/ML
5000 INJECTION, SOLUTION INTRAVENOUS; SUBCUTANEOUS EVERY 8 HOURS
Status: DISCONTINUED | OUTPATIENT
Start: 2018-09-02 | End: 2018-09-05 | Stop reason: HOSPADM

## 2018-09-02 RX ADMIN — ISOSORBIDE MONONITRATE 30 MG: 30 TABLET ORAL at 05:05

## 2018-09-02 RX ADMIN — VALPROATE SODIUM 500 MG: 100 INJECTION, SOLUTION INTRAVENOUS at 23:36

## 2018-09-02 RX ADMIN — BENAZEPRIL HYDROCHLORIDE 20 MG: 20 TABLET, COATED ORAL at 05:06

## 2018-09-02 RX ADMIN — ATORVASTATIN CALCIUM 80 MG: 80 TABLET, FILM COATED ORAL at 21:49

## 2018-09-02 RX ADMIN — ASPIRIN 162 MG: 81 TABLET, DELAYED RELEASE ORAL at 05:06

## 2018-09-02 RX ADMIN — AMLODIPINE BESYLATE 5 MG: 5 TABLET ORAL at 05:07

## 2018-09-02 RX ADMIN — CLOPIDOGREL 75 MG: 75 TABLET, FILM COATED ORAL at 05:06

## 2018-09-02 RX ADMIN — METOPROLOL TARTRATE 75 MG: 50 TABLET ORAL at 05:05

## 2018-09-02 RX ADMIN — VALPROATE SODIUM 500 MG: 100 INJECTION, SOLUTION INTRAVENOUS at 13:54

## 2018-09-02 RX ADMIN — FERROUS GLUCONATE 324 MG: 324 TABLET ORAL at 13:49

## 2018-09-02 RX ADMIN — GADOBUTROL 6 ML: 604.72 INJECTION INTRAVENOUS at 21:27

## 2018-09-02 RX ADMIN — GABAPENTIN 300 MG: 300 CAPSULE ORAL at 21:49

## 2018-09-02 RX ADMIN — IOHEXOL 100 ML: 350 INJECTION, SOLUTION INTRAVENOUS at 10:23

## 2018-09-02 RX ADMIN — VITAMIN D, TAB 1000IU (100/BT) 2000 UNITS: 25 TAB at 05:06

## 2018-09-02 ASSESSMENT — ENCOUNTER SYMPTOMS
HEADACHES: 1
CONSTIPATION: 0
FEVER: 0
CHILLS: 0
BLURRED VISION: 1
ABDOMINAL PAIN: 0
PALPITATIONS: 0
SENSORY CHANGE: 0
LOSS OF CONSCIOUSNESS: 0
NAUSEA: 0
MEMORY LOSS: 0
FOCAL WEAKNESS: 1
SPUTUM PRODUCTION: 0
DIARRHEA: 0
COUGH: 0
SINUS PAIN: 0
DIZZINESS: 1
SHORTNESS OF BREATH: 0
VOMITING: 0
TINGLING: 0
SPEECH CHANGE: 1
SORE THROAT: 0
SEIZURES: 0
MUSCULOSKELETAL NEGATIVE: 1

## 2018-09-02 ASSESSMENT — COGNITIVE AND FUNCTIONAL STATUS - GENERAL
DRESSING REGULAR LOWER BODY CLOTHING: A LITTLE
TOILETING: A LITTLE
SUGGESTED CMS G CODE MODIFIER DAILY ACTIVITY: CJ
DAILY ACTIVITIY SCORE: 21
HELP NEEDED FOR BATHING: A LITTLE

## 2018-09-02 ASSESSMENT — PAIN SCALES - GENERAL
PAINLEVEL_OUTOF10: 0
PAINLEVEL_OUTOF10: 0

## 2018-09-02 ASSESSMENT — ACTIVITIES OF DAILY LIVING (ADL): TOILETING: INDEPENDENT

## 2018-09-02 NOTE — ASSESSMENT & PLAN NOTE
Acute CVA noted on MRI, but likely not responsible for her right sided weakness.  PT/OT eval pending. May need outpatient therapy.  No overt weakness noted on exam.

## 2018-09-02 NOTE — ASSESSMENT & PLAN NOTE
Held home antihypertensives & allowed for permissive hypertension as above  - will restart home BP meds as tolerated  - Continue ASA & atorvastatin. Also on plavix.

## 2018-09-02 NOTE — PROGRESS NOTES
On sinus rhythm, HR is 60. VS stable. Neuro the same.  Ambulated to bathroom, unsteady and slightly dizzy (orthostatics?). No other needs at this time. Will continue to monitor

## 2018-09-02 NOTE — PROGRESS NOTES
Received report from evening RN.  Pt is A/Ox4.  Respirations are even and unlabored on 2L NC.  Pt speech is clear, pt states she had an episode last night where she had trouble finding her words.  Pt states continued left arm dysfunction, where she drops things.   equal and strong.  Plan of care reviewed, verbalized understanding. Call light within reach.

## 2018-09-02 NOTE — PROGRESS NOTES
Assumed  Care at 1600, patient is A&O x4, DNR, denied any distress a this time. Vital WNL,oriented to new unit. Call light within reach, bed in low position, belongings within reach. Will continue to monitor patient.

## 2018-09-02 NOTE — PROGRESS NOTES
Seen pt, AOx 4. On cardiac monitor with SR. Denies any pain. Neuro intact, except she is having trouble on/off when it comes to grasping things on Rt hand (muscle strength 5/5), slight tremors seen. Plan of care discussed includes Safety, labs, neuro monitoring, MRI, ECHO, Car Dup, PT/OT and pt understands.

## 2018-09-02 NOTE — PROGRESS NOTES
Received report from Cris COLE. Assume Pt care. Pt is sitting up on bed, family at bedtime. Instructed to call for assistance. Will continue to monitor

## 2018-09-02 NOTE — H&P
Hospital Medicine History & Physical Note    Date of Service  9/1/2018    Primary Care Physician  Benjamin Bernard M.D.    Consultants  None    Code Status  DNR/DNI    Chief Complaint  Right-sided weakness    History of Presenting Illness  84 y.o. female who presented 9/1/2018 with right-sided weakness. The patient presented with weakness about a week ago. The weakness then resolves. It returned today. It lasted a bout a 15 minutes duration. Since then it has not been there. Patient at this point we'll get a complete neurological workup for TIA. In the meantime we'll put on Lipitor and statin if this is all normal the patient will be discharged tomorrow if not the patient will have a neurological evaluation with a cupful consult.    Review of Systems  Review of Systems   Constitutional: Negative.  Negative for chills, diaphoresis and fever.   HENT: Negative.    Eyes: Negative.  Negative for double vision.   Respiratory: Negative.  Negative for cough, hemoptysis and wheezing.    Cardiovascular: Negative.  Negative for chest pain, palpitations and leg swelling.   Gastrointestinal: Negative.  Negative for abdominal pain, blood in stool, constipation, diarrhea, heartburn, nausea and vomiting.   Genitourinary: Negative.  Negative for frequency, hematuria and urgency.   Musculoskeletal: Negative.  Negative for joint pain.   Skin: Negative.  Negative for itching and rash.   Neurological: Positive for speech change, focal weakness (right side with right hand having lost  strength) and headaches. Negative for dizziness, seizures and loss of consciousness.   Endo/Heme/Allergies: Negative.  Does not bruise/bleed easily.   Psychiatric/Behavioral: Negative.  Negative for suicidal ideas. The patient is not nervous/anxious.    All other systems reviewed and are negative.      Past Medical History   has a past medical history of Acute myocardial infarction, true posterior wall infarction, episode of care unspecified; Anxiety;  Bowel habit changes; Breath shortness (2017); CAD (coronary artery disease); Cholesterol blood decreased; Dental disorder; Dizziness; GI bleed; Glaucoma; Heart burn; Hyperlipidemia; Hypertension; Myocardial infarct (HCC) (6/10/10); Peripheral vascular disease (HCC); Pulmonary disease; Unspecified hemorrhagic conditions; Unspecified urinary incontinence; and Urinary bladder disorder.    Surgical History   has a past surgical history that includes rectus repair (7/13/2012); recovery (11/26/2012); recovery (4/9/2013); femoral endarterectomy (10/4/2013); angioplasty balloon (10/4/2013); hysterectomy, total abdominal; other cardiac surgery; cholecystectomy; other (12/10/10); other (6/10/10); other (2005); recovery (7/24/2014); cardiac cath (6/2010); cardiac cath (7/24/14); stent placement; and gastroscopy with biopsy (2/13/2016).     Family History  family history includes Heart Disease (age of onset: 65) in her mother; Other in her son; Stroke (age of onset: 68) in her mother.     Social History   reports that she quit smoking about 2 years ago. Her smoking use included Cigarettes. She has a 11.25 pack-year smoking history. She has never used smokeless tobacco. She reports that she drinks alcohol. She reports that she does not use drugs.    Allergies  Allergies   Allergen Reactions   • Penicillins Rash and Vomiting     Rxn = unknown  Pt tolerates cephalosporins   • Hydrocodone Vomiting and Nausea     Rxn = unknown   • Tape Rash     RASH       Medications  Prior to Admission Medications   Prescriptions Last Dose Informant Patient Reported? Taking?   Cholecalciferol (VITAMIN D) 2000 UNIT Tab 9/1/2018 at 0800 Patient Yes No   Sig: Take 2,000 Units by mouth every day.   amLODIPine (NORVASC) 5 MG Tab 9/1/2018 at 0800 Patient No No   Sig: TAKE 1 TABLET DAILY   aspirin EC (ECOTRIN) 81 MG Tablet Delayed Response 9/1/2018 at 0800 Patient Yes No   Sig: Take 1 Tab by mouth every day.   atorvastatin (LIPITOR) 20 MG Tab 8/31/2018 at  1930 Patient No No   Sig: TAKE 1 TABLET EVERY EVENING   benazepril (LOTENSIN) 20 MG Tab 9/1/2018 at 0800 Patient No No   Sig: TAKE 1 TABLET TWICE A DAY   clopidogrel (PLAVIX) 75 MG Tab 9/1/2018 at 0800 Patient No No   Sig: Take 1 Tab by mouth every day.   gabapentin (NEURONTIN) 300 MG CAPS 8/31/2018 at 2130 Patient Yes No   Sig: Take 300 mg by mouth every bedtime.   isosorbide mononitrate SR (IMDUR) 30 MG TABLET SR 24 HR 9/1/2018 at 0800 Patient Yes No   Sig: Take 1 Tab by mouth every day.   metoprolol (LOPRESSOR) 50 MG Tab 9/1/2018 at 0800 Patient No No   Sig: Take 1.5 Tabs by mouth 2 times a day.      Facility-Administered Medications: None       Physical Exam  Blood Pressure : 116/58   Temperature: 36.1 °C (97 °F)   Pulse: 86   Respiration: 17   Pulse Oximetry: 91 %     Physical Exam   Constitutional: She is oriented to person, place, and time. She appears well-developed and well-nourished.   HENT:   Head: Normocephalic and atraumatic.   Right Ear: External ear normal.   Left Ear: External ear normal.   Nose: Nose normal.   Mouth/Throat: Oropharynx is clear and moist.   Eyes: Pupils are equal, round, and reactive to light. Conjunctivae and EOM are normal.   Neck: Normal range of motion. Neck supple. No JVD present. No thyromegaly present.   Cardiovascular: Normal rate and regular rhythm.    No murmur heard.  Pulmonary/Chest: She has no wheezes. She has no rales. She exhibits no tenderness.   Abdominal: She exhibits no distension and no mass. There is no tenderness. There is no rebound and no guarding.   Musculoskeletal: Normal range of motion. She exhibits no edema or tenderness.   Lymphadenopathy:     She has no cervical adenopathy.   Neurological: She is alert and oriented to person, place, and time. She has normal reflexes. No cranial nerve deficit.   Skin: Skin is warm and dry. No rash noted. No erythema.   Psychiatric: She has a normal mood and affect.   Nursing note and vitals  reviewed.      Laboratory:  Recent Labs      09/01/18   1523   WBC  13.8*   RBC  3.46*   HEMOGLOBIN  10.5*   HEMATOCRIT  32.8*   MCV  94.8   MCH  30.3   MCHC  32.0*   RDW  44.5   PLATELETCT  313   MPV  9.3     Recent Labs      09/01/18   1523   SODIUM  139   POTASSIUM  4.3   CHLORIDE  108   CO2  23   GLUCOSE  112*   BUN  16   CREATININE  1.23   CALCIUM  9.3     Recent Labs      09/01/18   1523   ALTSGPT  8   ASTSGOT  15   ALKPHOSPHAT  60   TBILIRUBIN  0.3   GLUCOSE  112*                 Lab Results   Component Value Date    TROPONINI <0.01 09/01/2018       Urinalysis:    No results found     Imaging:  CT-HEAD W/O   Final Result      1.  Cerebral atrophy.      2.  White matter lucencies most consistent with small vessel ischemic change versus demyelination or gliosis.      3.  Otherwise, Head CT without contrast with no acute findings. No evidence of acute cerebral infarction, hemorrhage or mass lesion.      MR-BRAIN-W/O    (Results Pending)   Carotid Duplex ([facility] Only) - Do not order if CTA - Neck done in ER    (Results Pending)   Echocardiogram Comp W/O cont    (Results Pending)         Assessment/Plan:  I anticipate this patient is appropriate for observation status at this time.    TIA (transient ischemic attack)   Assessment & Plan    Patient at this point was having some weakness about a week ago on the right side. It lasted for about 15 minutes and resolved this morning A came back when she dropped the coffee out of her hand. Patient does came back to the emergency room for evaluation. At this point she is admitted to clinical decision unit will put her at this point on statin and aspirin. The patient at this point will be value with an echocardiogram, carotid ultrasound, MRI of the head. If those come back positive we will then take further action with neurology consult and physical therapy and occupational therapy evaluations.        CAD (coronary artery disease)- (present on admission)    Assessment & Plan    Continue at this point with metoprolol, Imdur, been as approach, Lipitor, Norvasc.  Monitor at this point telemetrically. Monitor for any chest pain.        Hypertension- (present on admission)   Assessment & Plan    For right now patient will be allowed to have permissive hypertension minus her medications that she takes at home she will continue at this point with the home blood pressure management And this includes metoprolol, benazepril and Norvasc.        Tobacco abuse- (present on admission)   Assessment & Plan    -nicotine replacement protocol and cessation education provided for more than 10 minutes, discussed options of nicotine patch, acupuncture, medical treatment with wellbutrin and chantix. Discussed other options. Code 39867        Hyperlipidemia- (present on admission)   Assessment & Plan    Lab Results   Component Value Date/Time    CHOLSTRLTOT 121 06/11/2018 08:43 AM    LDL 42 06/11/2018 08:43 AM    HDL 45 06/11/2018 08:43 AM    TRIGLYCERIDE 172 (H) 06/11/2018 08:43 AM      Continue at this point low-fat low-cholesterol diet and statin. The patient is on Lipitor 80 mg daily at bedtime.            VTE prophylaxis: SCDs

## 2018-09-02 NOTE — ASSESSMENT & PLAN NOTE
Carotid duplex done this morning showed increasing stenosis from last exam, now severe.  Vascular surgery Dr. Abad consulted. Per him, the patient needs either a CEA or carotid stent.  He ordered a CTA which will help determine which is more appropriate.  Continue atorvastatin, ASA, & plavix.  On 9/3 Dr. Singleton plans to do CEA

## 2018-09-02 NOTE — ASSESSMENT & PLAN NOTE
Acute stroke confirmed by MRI.  Allowed permissive hypertension x 72 hours.   - restarting Imdur, ACE-I and Amlodipine; monitoring BPs

## 2018-09-02 NOTE — THERAPY
"Occupational Therapy Evaluation completed.   Functional Status:  Pt is a delightful 85 y/o female admitted for R sided weakness.Pt lives alone in a 1st floor apartment.  MRI revealed L frontal and parietal infarcts. Pt found to have R ICA stenosis of >80% as well. Pt reports she has been feeling lightheaded for about a month when standing up initially. Pt reports numbness of distal fingers in R hand for past 2 days. Pt has gross bilateral strength of 3+/5. Pt transfers to EOB and to ST level at SP. Pt ambulated to bathroom 50' away with CGA. Pt has fair+ balance when walking. Pt transfers Mod I to toilet. Pt would benefit from home health OT to ensure good transition home and home evaluation to ensure safety.   Plan of Care: Will benefit from Occupational Therapy 2 times per week  Discharge Recommendations:  Equipment: Will Continue to Assess for Equipment Needs. Post-acute therapy Discharge to home with outpatient or home health for additional skilled therapy services.    See \"Rehab Therapy-Acute\" Patient Summary Report for complete documentation.    "

## 2018-09-02 NOTE — PROGRESS NOTES
Received pt from ED.  Pt is A/Ox4.  Respirations are even and unlabored on RA, pt wear O2 in the evening.  Plan of care reviewed, verbalized understanding call light within reach.  Tena COLE completed swallow eval, pt passed and diet ordered

## 2018-09-02 NOTE — CONSULTS
Vascular Surgery Consult Note  -------------------------------------------------------------------------------------------------  Date: 9/2/2018    Consulting Physician: Naga Abad M.D. [facility]  -------------------------------------------------------------------------------------------------    Reason for consultation:  Carotid stenosis    HPI:  This is a 84 y.o. female who is presenting with worsening right arm weakness over the past 2 weeks. Stroke workup was done and MRI showed two small resolving infarcts in the left brain and on acute infarct in the right front lobe. Right carotid is severely stenosed, the left is less than 50%.    Currently alert and conversant with no acute complaints. Says her right arm may be feeling a little better. She says she was dropping things over the past 2 weeks. Today she hasn't noticed this as much.    Past Medical History:   Diagnosis Date   • Acute myocardial infarction, true posterior wall infarction, episode of care unspecified    • Anxiety    • Bowel habit changes    • Breath shortness 2017    uses oxygen at 2 liters/min; AT NIGHT ONLY   • CAD (coronary artery disease)     S/P stent placement   • Cholesterol blood decreased    • Dental disorder     Partial upper   • Dizziness    • GI bleed    • Glaucoma    • Heart burn    • Hyperlipidemia    • Hypertension    • Myocardial infarct (HCC) 6/10/10   • Peripheral vascular disease (HCC)    • Pulmonary disease     bronchitis   • Unspecified hemorrhagic conditions     pt takes plavix   • Unspecified urinary incontinence    • Urinary bladder disorder        Past Surgical History:   Procedure Laterality Date   • GASTROSCOPY WITH BIOPSY  2/13/2016    Procedure: GASTROSCOPY WITH BIOPSY;  Surgeon: Susan Miller M.D.;  Location: ENDOSCOPY [facility];  Service:    • RECOVERY  7/24/2014    Performed by Cath-Recovery Surgery at SURGERY SAME DAY [facility]   • CARDIAC CATH  7/24/14    Diffuse disease, see  report.  % with collterals.   • FEMORAL ENDARTERECTOMY  10/4/2013    Performed by Kacie Baker M.D. at SURGERY [facility]   • ANGIOPLASTY BALLOON  10/4/2013    Performed by Kacie Baker M.D. at SURGERY [facility]S   • RECOVERY  4/9/2013    Performed by Ir-Recovery Surgery at SURGERY SAME DAY [facility] ORS   • RECOVERY  11/26/2012    Performed by Ir-Recovery Surgery at SURGERY SAME DAY [facility] ORS   • RECTUS REPAIR  7/13/2012    Performed by SHEILA PLATT at SURGERY SURG[facility] ORS   • OTHER  12/10/10    stents in legs   • OTHER  6/10/10    cardiac stents   • CARDIAC CATH  6/2010    BMS to Om   • OTHER  2005    KNEE SURGERY   • CHOLECYSTECTOMY     • HYSTERECTOMY, TOTAL ABDOMINAL     • OTHER CARDIAC SURGERY      stent in heart   • STENT PLACEMENT      treated by Dr Baker, multiple surgeries to legs.       Current Facility-Administered Medications   Medication Dose Route Frequency Provider Last Rate Last Dose   • amLODIPine (NORVASC) tablet 5 mg  5 mg Oral Q DAY Levshannon Green, M.D.   5 mg at 09/02/18 0507   • aspirin EC (ECOTRIN) tablet 162 mg  162 mg Oral DAILY Levente Levkeyon, M.D.   162 mg at 09/02/18 0506   • atorvastatin (LIPITOR) tablet 80 mg  80 mg Oral QHS Levshannon Green, M.D.   80 mg at 09/01/18 2028   • benazepril (LOTENSIN) tablet 20 mg  20 mg Oral Q DAY Levente Norma, M.D.   20 mg at 09/02/18 0506   • vitamin D (cholecalciferol) tablet 2,000 Units  2,000 Units Oral DAILY Levente Levkeyon, M.D.   2,000 Units at 09/02/18 0506   • clopidogrel (PLAVIX) tablet 75 mg  75 mg Oral DAILY Levente Levkeyon, M.D.   75 mg at 09/02/18 0506   • gabapentin (NEURONTIN) capsule 300 mg  300 mg Oral QHS Levente Levkeyon, M.D.   300 mg at 09/01/18 2028   • isosorbide mononitrate SR (IMDUR) tablet 30 mg  30 mg Oral DAILY Levente Norma, M.D.   30 mg at 09/02/18 0505   • metoprolol (LOPRESSOR) tablet 75 mg  75 mg Oral BID Levente Levkeyon, M.D.   75 mg at 09/02/18 0505   • Pharmacy Consult Request - Allow for  "Permissive Hypertension   Other PRN Casey Green M.D.       • senna-docusate (PERICOLACE or SENOKOT S) 8.6-50 MG per tablet 2 Tab  2 Tab Oral BID Casey Green M.D.        And   • polyethylene glycol/lytes (MIRALAX) PACKET 1 Packet  1 Packet Oral QDAY PRN Casey Green M.D.        And   • magnesium hydroxide (MILK OF MAGNESIA) suspension 30 mL  30 mL Oral QDAY PRN Casey Green M.D.        And   • bisacodyl (DULCOLAX) suppository 10 mg  10 mg Rectal QDAY PRN Casey Green M.D.       • labetalol (NORMODYNE,TRANDATE) injection 10 mg  10 mg Intravenous Q4HRS PRN Casey Green M.D.        Or   • hydrALAZINE (APRESOLINE) injection 10 mg  10 mg Intravenous Q2HRS PRN Casey Green M.D.       • ondansetron (ZOFRAN) syringe/vial injection 4 mg  4 mg Intravenous Q4HRS PRGAVI Green M.D.       • ondansetron (ZOFRAN ODT) dispertab 4 mg  4 mg Oral Q4HRS PRN Casey Green M.D.           Social History     Social History   • Marital status:      Spouse name: N/A   • Number of children: N/A   • Years of education: N/A     Occupational History   • Not on file.     Social History Main Topics   • Smoking status: Former Smoker     Packs/day: 0.25     Years: 45.00     Types: Cigarettes     Quit date: 2/1/2016   • Smokeless tobacco: Never Used   • Alcohol use 0.0 - 0.6 oz/week      Comment: 1 drink per month   • Drug use: No   • Sexual activity: Not on file     Other Topics Concern   • Not on file     Social History Narrative   • No narrative on file       Family History   Problem Relation Age of Onset   • Stroke Mother 68        CVA   • Heart Disease Mother 65        valve surgery   • Other Son         wgt 465 lbs       Allergies:  Penicillins; Hydrocodone; and Tape    Review of Systems:  Noncontributory except as per HPI      Physical Exam:  Blood pressure 138/59, pulse 61, temperature 36.4 °C (97.5 °F), resp. rate 13, height 1.753 m (5' 9\"), weight 60.8 kg (134 lb 0.6 oz), SpO2 93 %, not currently " breastfeeding.    Constitutional: Alert, oriented, no acute distress  HEENT:  Normocephalic and atraumatic, EOMI  Neck:   Supple, no JVD, no bruits  Cardiovascular: Regular rate and rhythm, no murmurs  Pulmonary:  Good air entry bilaterally, no wheezing   Abdominal:  Soft, non-tender, non-distended     Aortic impulse not widened  Musculoskeletal: No edema, no tenderness  Neurological:  CN II-XII grossly intact, no focal deficits  Skin:   Skin is warm and dry. No rash noted.  Psychiatric:  Normal mood and affect.    Labs:  Recent Labs      09/01/18   1523  09/02/18   0916   WBC  13.8*  9.6   RBC  3.46*  3.27*   HEMOGLOBIN  10.5*  10.1*   HEMATOCRIT  32.8*  31.6*   MCV  94.8  96.6   MCH  30.3  30.9   MCHC  32.0*  32.0*   RDW  44.5  45.1   PLATELETCT  313  298   MPV  9.3  9.5     Recent Labs      09/01/18   1523  09/02/18   0916   SODIUM  139  140   POTASSIUM  4.3  5.1   CHLORIDE  108  111   CO2  23  22   GLUCOSE  112*  100*   BUN  16  15   CREATININE  1.23  1.03   CALCIUM  9.3  8.9         Recent Labs      09/01/18   1523   ASTSGOT  15   ALTSGPT  8   TBILIRUBIN  0.3   ALKPHOSPHAT  60   GLOBULIN  2.4       Radiology:  MRI:  1.  Mild cerebral atrophy. Age-appropriate.  2.  Mild-moderate supratentorial white matter disease most consistent with microvascular ischemic change.  3.  Acute lacunar size infarct in the right frontal deep white matter which would not correlate with the patient's right upper extremity weakness.  4.  2 Subtle punctate foci of slightly increased diffusion signal in the left frontal deep white matter and left parietal subcortical white matter respectively. These could represent subacute diminishing lacunar size white matter infarcts.  5.  10 mm focus of gradient echo hypointensity in the left medial basal ganglia near the genu of the internal capsule. Likely hemosiderin deposition from an old hemorrhage. No corresponding acute hemorrhagic density or calcific density seen on today's CT   scan.  6.   Moderate pontine ischemic gliosis    Carotid Duplex:  Severe stenosis of the right internal carotid (>70%).   Mild stenosis of the left internal carotid (< 50%).     CTA: pending      Assessment/Plan:  - Symptomatic right carotid stenosis  - Hypertension  - Hyperlipidemia  - Diabetes  - Coronary Artery Disease  - COPD on home oxygen    This is a 84 y.o. female with symptomatic high-grade right carotid stenosis.    Despite her right arm weakness and two small resolving infarcts in the left brain, her left carotid is less than 50% stenosed so no carotid intervention is indicated on the left side.    However, the unexpected finding of the right frontal stroke combined with the finding of severe right ICA stenosis means the right ICA should be considered for intervention, either endarterectomy or stent.    The stenosis is located more distally in the right ICA and so stent may be a better option than endarterectomy.  I have ordered a CTA to further delineate the anatomy and based on those results I will discuss the options with her.      ASA and Plavix       Naga Abad MD  [facility] (General and Vascular Surgery)  Cell: [phone] (text or call is fine, if you don't reach me please try my office)  Office: [phone]    9/2/2018    10:01 AM  ___________________________________________________________________  Patient:Brooke Diana   MRN:[id number]   CSN:[id number]

## 2018-09-02 NOTE — CARE PLAN
Problem: Knowledge Deficit  Goal: Patient/Significant other demonstrates understanding of disease process, treatment plan, medications and discharge instructions  Outcome: PROGRESSING AS EXPECTED      Problem: Hemodynamic Status  Goal: Stable Vital Signs and Fluid Balance  Outcome: PROGRESSING AS EXPECTED

## 2018-09-02 NOTE — PROGRESS NOTES
Vascular    CTA reviewed and discussed with patient.  Stent and endarterectomy options discussed and patient would like to proceed with right endarterectomy.  She ate breakfast this morning and finished around 10:30 so wouldn't be able to start surgery today until 18:30, so will schedule for tomorrow, time TBD.    Continue ASA and Plavix until after surgery.      Naga Abad MD  [facility] (General and Vascular Surgery)  Cell: [phone] (text or call is fine, if you don't reach me please try my office)  Office: [phone]  __________________________________________________________________  Patient:Brooke Diana   MRN:[id number]   CSN:[id number]    9/2/2018    12:17 PM

## 2018-09-02 NOTE — PROGRESS NOTES
[facility] Hospitalist Progress Note    Date of Service: 2018    Chief Complaint  84 y.o. female admitted 2018 with right sided weakness x 1 week.      Interval Problem Update  Minimal right-sided weakness noted on exam. MRI did confirm stroke so we will allow for permissive hypertension for 72 hours.    Vital signs are stable    Abnormal carotid duplex, consulted vascular surgery.  Will obtain CTA to determine carotid endarterectomy versus carotid stent.    Consultants/Specialty  Neurology-Dr. Ramires  Vascular surgery-Dr. Abad    Disposition  Admit to neurology as inpatient.        Review of Systems   Constitutional: Negative for chills, fever and malaise/fatigue.   HENT: Negative for congestion, sinus pain and sore throat.    Eyes: Positive for blurred vision (Chronic, left eye).   Respiratory: Negative for cough, sputum production and shortness of breath.    Cardiovascular: Negative for chest pain, palpitations and leg swelling.   Gastrointestinal: Negative for abdominal pain, constipation, diarrhea, nausea and vomiting.   Genitourinary: Negative for dysuria, frequency, hematuria and urgency.   Musculoskeletal: Negative.    Neurological: Positive for dizziness, speech change (Word finding impairment, aphasia), focal weakness (Right side) and headaches (Intermittent, located in the right frontal area). Negative for tingling, sensory change, seizures and loss of consciousness.   Psychiatric/Behavioral: Negative for memory loss.      Physical Exam  Laboratory/Imaging   Hemodynamics  Temp (24hrs), Av.7 °C (98 °F), Min:36.1 °C (97 °F), Max:37 °C (98.6 °F)   Temperature: 36.4 °C (97.5 °F)  Pulse  Av.4  Min: 60  Max: 89 Heart Rate (Monitored): 79  Blood Pressure : 138/59, NIBP: 152/43      Respiratory  Respiration: 13, Pulse Oximetry: 93 %    Fluids    Intake/Output Summary (Last 24 hours) at 18 1048  Last data filed at 18   Gross per 24 hour   Intake              240 ml   Output                 0 ml   Net              240 ml     Nutrition  Orders Placed This Encounter   Procedures   • Diet Order Cardiac     Standing Status:   Standing     Number of Occurrences:   1     Order Specific Question:   Diet:     Answer:   Cardiac [6]     Physical Exam   Constitutional: She is oriented to person, place, and time. She appears well-developed and well-nourished. No distress.   HENT:   Head: Normocephalic and atraumatic.   Eyes: Pupils are equal, round, and reactive to light. Right eye exhibits no discharge. Left eye exhibits no discharge. No scleral icterus.   Neck: Normal range of motion. Neck supple. No JVD present. No thyromegaly present.   Cardiovascular: Normal rate, regular rhythm and intact distal pulses.  Exam reveals no gallop and no friction rub.    Murmur heard.  Pulmonary/Chest: Effort normal and breath sounds normal. No stridor. No respiratory distress. She has no wheezes. She has no rales.   Not currently requiring oxygen   Abdominal: Soft. She exhibits no distension. Bowel sounds are decreased. There is no tenderness. There is no rebound and no guarding.   Musculoskeletal: Normal range of motion. She exhibits no edema.   Neurological: She is alert and oriented to person, place, and time. She has normal strength. No cranial nerve deficit or sensory deficit. GCS eye subscore is 4. GCS verbal subscore is 5. GCS motor subscore is 6.   Skin: Skin is warm and dry. No rash noted. She is not diaphoretic. No erythema. No pallor.   Psychiatric: She has a normal mood and affect. Her speech is normal and behavior is normal. Judgment and thought content normal. Cognition and memory are normal.   Nursing note and vitals reviewed.    Recent Labs      09/01/18   1523  09/02/18   0916   WBC  13.8*  9.6   RBC  3.46*  3.27*   HEMOGLOBIN  10.5*  10.1*   HEMATOCRIT  32.8*  31.6*   MCV  94.8  96.6   MCH  30.3  30.9   MCHC  32.0*  32.0*   RDW  44.5  45.1   PLATELETCT  313  298   MPV  9.3  9.5     Recent Labs       09/01/18   1523  09/02/18   0916   SODIUM  139  140   POTASSIUM  4.3  5.1   CHLORIDE  108  111   CO2  23  22   GLUCOSE  112*  100*   BUN  16  15   CREATININE  1.23  1.03   CALCIUM  9.3  8.9             Recent Labs      09/02/18   0044   TRIGLYCERIDE  112   HDL  37*   LDL  18          Assessment/Plan     CVA (cerebral vascular accident), acute, right frontal deep white matter   Assessment & Plan    MRI findings confirmed acute stroke.  Allow permissive hypertension x 72 hours.  Continue ASA, plavix, & lipitor.  Carotids prelim showing severe ICA stenosis & irregular plaque.  Echo showed moderate LVH, moderate AI, mild MR. EF 60%.  PT/OT pending.  Neuro consulted.          Carotid stenosis, bilateral   Assessment & Plan    Carotid duplex done this morning showed increasing stenosis from last exam, now severe.  Vascular surgery Dr. Abad consulted. Per him, the patient needs either a CEA or carotid stent.  He ordered a CTA which will help determine which is more appropriate.  Continue atorvastatin, ASA, & plavix.        Right arm weakness- (present on admission)   Assessment & Plan    Acute CVA noted on MRI, but likely not responsible for her right sided weakness.  PT/OT eval pending. May need outpatient therapy.  No overt weakness noted on exam.        CAD (coronary artery disease)- (present on admission)   Assessment & Plan    Holding home antihypertensives & allowing for permissive hypertension.  Continue ASA & atorvastatin. Also on plavix.        CKD (chronic kidney disease) stage 3, GFR 30-59 ml/min   Assessment & Plan    At baseline.  Recheck tomorrow.        Iron deficiency anemia   Assessment & Plan    Start PO iron.  Follow up outpatient.        Nocturnal hypoxia- (present on admission)   Assessment & Plan    Use O2 at night as needed to maintain O2 > 92%.        Tobacco abuse- (present on admission)   Assessment & Plan    Smoking cessation discussed by previous hospitalist.  We will continue to  re-encourage.        Hypertension- (present on admission)   Assessment & Plan    Acute stroke confirmed by MRI.  Will allow permissive hypertension x 72 hours. Discontinued home antihypertensives.          Hyperlipidemia- (present on admission)   Assessment & Plan    Lab Results   Component Value Date/Time    CHOLSTRLTOT 121 06/11/2018 08:43 AM    LDL 42 06/11/2018 08:43 AM    HDL 45 06/11/2018 08:43 AM    TRIGLYCERIDE 172 (H) 06/11/2018 08:43 AM     Continue high dose atorvastatin.          Quality-Core Measures   Reviewed items::  Radiology images reviewed, EKG reviewed, Labs reviewed and Medications reviewed  Benavides catheter::  No Benavides  DVT prophylaxis pharmacological::  Heparin  DVT prophylaxis - mechanical:  SCDs  Ulcer Prophylaxis::  Not indicated  Assessed for rehabilitation services:  Patient was assess for and/or received rehabilitation services during this hospitalization

## 2018-09-02 NOTE — ASSESSMENT & PLAN NOTE
MRI findings confirmed acute stroke.  Allowed permissive hypertension x 72 hours; restarting meds as tolerated  Continue ASA, plavix & lipitor.  Carotids prelim showing severe ICA stenosis & irregular plaque.  Echo showed moderate LVH, moderate AI, mild MR. EF 60%.  Neuro following  Dr. Abad, Vascular Surgery completed R CEA 9/3; post op recs appreciated  - post op recs and PT recs appreciated; SNF referral placed

## 2018-09-02 NOTE — PROGRESS NOTES
Patients necklace was placed in a pink denture cup, sealed, taped, and patients information was written in permanent marker on the cup. Patient has it in her hand on her way back to her room from CT scan

## 2018-09-02 NOTE — ASSESSMENT & PLAN NOTE
Lab Results   Component Value Date/Time    CHOLSTRLTOT 121 06/11/2018 08:43 AM    LDL 42 06/11/2018 08:43 AM    HDL 45 06/11/2018 08:43 AM    TRIGLYCERIDE 172 (H) 06/11/2018 08:43 AM     Continue high dose atorvastatin.

## 2018-09-03 LAB
ANION GAP SERPL CALC-SCNC: 8 MMOL/L (ref 0–11.9)
BASOPHILS # BLD AUTO: 0.7 % (ref 0–1.8)
BASOPHILS # BLD: 0.06 K/UL (ref 0–0.12)
BUN SERPL-MCNC: 17 MG/DL (ref 8–22)
CALCIUM SERPL-MCNC: 9.2 MG/DL (ref 8.5–10.5)
CHLORIDE SERPL-SCNC: 108 MMOL/L (ref 96–112)
CO2 SERPL-SCNC: 26 MMOL/L (ref 20–33)
CREAT SERPL-MCNC: 1.02 MG/DL (ref 0.5–1.4)
EOSINOPHIL # BLD AUTO: 0.28 K/UL (ref 0–0.51)
EOSINOPHIL NFR BLD: 3.3 % (ref 0–6.9)
ERYTHROCYTE [DISTWIDTH] IN BLOOD BY AUTOMATED COUNT: 43.1 FL (ref 35.9–50)
GLUCOSE SERPL-MCNC: 89 MG/DL (ref 65–99)
HCT VFR BLD AUTO: 29.7 % (ref 37–47)
HGB BLD-MCNC: 9.7 G/DL (ref 12–16)
IMM GRANULOCYTES # BLD AUTO: 0.01 K/UL (ref 0–0.11)
IMM GRANULOCYTES NFR BLD AUTO: 0.1 % (ref 0–0.9)
LYMPHOCYTES # BLD AUTO: 1.98 K/UL (ref 1–4.8)
LYMPHOCYTES NFR BLD: 23.7 % (ref 22–41)
MAGNESIUM SERPL-MCNC: 1.9 MG/DL (ref 1.5–2.5)
MCH RBC QN AUTO: 30.7 PG (ref 27–33)
MCHC RBC AUTO-ENTMCNC: 32.7 G/DL (ref 33.6–35)
MCV RBC AUTO: 94 FL (ref 81.4–97.8)
MONOCYTES # BLD AUTO: 0.89 K/UL (ref 0–0.85)
MONOCYTES NFR BLD AUTO: 10.6 % (ref 0–13.4)
NEUTROPHILS # BLD AUTO: 5.15 K/UL (ref 2–7.15)
NEUTROPHILS NFR BLD: 61.6 % (ref 44–72)
NRBC # BLD AUTO: 0 K/UL
NRBC BLD-RTO: 0 /100 WBC
PLATELET # BLD AUTO: 285 K/UL (ref 164–446)
PMV BLD AUTO: 9.6 FL (ref 9–12.9)
POTASSIUM SERPL-SCNC: 3.9 MMOL/L (ref 3.6–5.5)
RBC # BLD AUTO: 3.16 M/UL (ref 4.2–5.4)
SODIUM SERPL-SCNC: 142 MMOL/L (ref 135–145)
WBC # BLD AUTO: 8.4 K/UL (ref 4.8–10.8)

## 2018-09-03 PROCEDURE — 700111 HCHG RX REV CODE 636 W/ 250 OVERRIDE (IP)

## 2018-09-03 PROCEDURE — 501838 HCHG SUTURE GENERAL: Performed by: SURGERY

## 2018-09-03 PROCEDURE — 160041 HCHG SURGERY MINUTES - EA ADDL 1 MIN LEVEL 4: Performed by: SURGERY

## 2018-09-03 PROCEDURE — 700111 HCHG RX REV CODE 636 W/ 250 OVERRIDE (IP): Performed by: NURSE PRACTITIONER

## 2018-09-03 PROCEDURE — G8979 MOBILITY GOAL STATUS: HCPCS | Mod: CI

## 2018-09-03 PROCEDURE — 160009 HCHG ANES TIME/MIN: Performed by: SURGERY

## 2018-09-03 PROCEDURE — C1768 GRAFT, VASCULAR: HCPCS | Performed by: SURGERY

## 2018-09-03 PROCEDURE — 500368 HCHG DRAIN, 7MM FLAT-FLUTED: Performed by: SURGERY

## 2018-09-03 PROCEDURE — 160002 HCHG RECOVERY MINUTES (STAT): Performed by: SURGERY

## 2018-09-03 PROCEDURE — 85025 COMPLETE CBC W/AUTO DIFF WBC: CPT

## 2018-09-03 PROCEDURE — 83735 ASSAY OF MAGNESIUM: CPT

## 2018-09-03 PROCEDURE — 03UK0KZ SUPPLEMENT RIGHT INTERNAL CAROTID ARTERY WITH NONAUTOLOGOUS TISSUE SUBSTITUTE, OPEN APPROACH: ICD-10-PCS | Performed by: SURGERY

## 2018-09-03 PROCEDURE — 700102 HCHG RX REV CODE 250 W/ 637 OVERRIDE(OP): Performed by: SURGERY

## 2018-09-03 PROCEDURE — 97161 PT EVAL LOW COMPLEX 20 MIN: CPT

## 2018-09-03 PROCEDURE — 770006 HCHG ROOM/CARE - MED/SURG/GYN SEMI*

## 2018-09-03 PROCEDURE — 501445 HCHG STAPLER, SKIN DISP: Performed by: SURGERY

## 2018-09-03 PROCEDURE — 700102 HCHG RX REV CODE 250 W/ 637 OVERRIDE(OP): Performed by: NURSE PRACTITIONER

## 2018-09-03 PROCEDURE — A9270 NON-COVERED ITEM OR SERVICE: HCPCS | Performed by: HOSPITALIST

## 2018-09-03 PROCEDURE — 110454 HCHG SHELL REV 250: Performed by: SURGERY

## 2018-09-03 PROCEDURE — 160036 HCHG PACU - EA ADDL 30 MINS PHASE I: Performed by: SURGERY

## 2018-09-03 PROCEDURE — 36415 COLL VENOUS BLD VENIPUNCTURE: CPT

## 2018-09-03 PROCEDURE — 95937 NEUROMUSCULAR JUNCTION TEST: CPT | Performed by: SURGERY

## 2018-09-03 PROCEDURE — P9045 ALBUMIN (HUMAN), 5%, 250 ML: HCPCS | Mod: JG

## 2018-09-03 PROCEDURE — 4A11X4G MONITORING OF PERIPHERAL NERVOUS ELECTRICAL ACTIVITY, INTRAOPERATIVE, EXTERNAL APPROACH: ICD-10-PCS | Performed by: SURGERY

## 2018-09-03 PROCEDURE — 700102 HCHG RX REV CODE 250 W/ 637 OVERRIDE(OP): Performed by: HOSPITALIST

## 2018-09-03 PROCEDURE — 95938 SOMATOSENSORY TESTING: CPT | Performed by: SURGERY

## 2018-09-03 PROCEDURE — A9270 NON-COVERED ITEM OR SERVICE: HCPCS | Performed by: NURSE PRACTITIONER

## 2018-09-03 PROCEDURE — 500389 HCHG DRAIN, RESERVOIR SUCT JP 100CC: Performed by: SURGERY

## 2018-09-03 PROCEDURE — 99232 SBSQ HOSP IP/OBS MODERATE 35: CPT | Performed by: INTERNAL MEDICINE

## 2018-09-03 PROCEDURE — 03CK0ZZ EXTIRPATION OF MATTER FROM RIGHT INTERNAL CAROTID ARTERY, OPEN APPROACH: ICD-10-PCS | Performed by: SURGERY

## 2018-09-03 PROCEDURE — 160035 HCHG PACU - 1ST 60 MINS PHASE I: Performed by: SURGERY

## 2018-09-03 PROCEDURE — A9270 NON-COVERED ITEM OR SERVICE: HCPCS | Performed by: SURGERY

## 2018-09-03 PROCEDURE — 700111 HCHG RX REV CODE 636 W/ 250 OVERRIDE (IP): Mod: JG

## 2018-09-03 PROCEDURE — 95955 EEG DURING SURGERY: CPT | Performed by: SURGERY

## 2018-09-03 PROCEDURE — 4A10X4G MONITORING OF CENTRAL NERVOUS ELECTRICAL ACTIVITY, INTRAOPERATIVE, EXTERNAL APPROACH: ICD-10-PCS | Performed by: SURGERY

## 2018-09-03 PROCEDURE — G8978 MOBILITY CURRENT STATUS: HCPCS | Mod: CI

## 2018-09-03 PROCEDURE — 700105 HCHG RX REV CODE 258: Performed by: PSYCHIATRY & NEUROLOGY

## 2018-09-03 PROCEDURE — 80048 BASIC METABOLIC PNL TOTAL CA: CPT

## 2018-09-03 PROCEDURE — A6402 STERILE GAUZE <= 16 SQ IN: HCPCS | Performed by: SURGERY

## 2018-09-03 PROCEDURE — 501837 HCHG SUTURE CV: Performed by: SURGERY

## 2018-09-03 PROCEDURE — 700111 HCHG RX REV CODE 636 W/ 250 OVERRIDE (IP): Performed by: PSYCHIATRY & NEUROLOGY

## 2018-09-03 PROCEDURE — 95940 IONM IN OPERATNG ROOM 15 MIN: CPT | Performed by: SURGERY

## 2018-09-03 PROCEDURE — 500257: Performed by: SURGERY

## 2018-09-03 PROCEDURE — 700101 HCHG RX REV CODE 250

## 2018-09-03 PROCEDURE — 160029 HCHG SURGERY MINUTES - 1ST 30 MINS LEVEL 4: Performed by: SURGERY

## 2018-09-03 PROCEDURE — 160048 HCHG OR STATISTICAL LEVEL 1-5: Performed by: SURGERY

## 2018-09-03 DEVICE — PATCH .8X8CM XENOSURE BIOLOGIC VASCULAR---ORDER IN MULTIPLES OF 5---: Type: IMPLANTABLE DEVICE | Status: FUNCTIONAL

## 2018-09-03 RX ORDER — HEPARIN SODIUM,PORCINE 1000/ML
VIAL (ML) INJECTION
Status: DISCONTINUED | OUTPATIENT
Start: 2018-09-03 | End: 2018-09-03 | Stop reason: HOSPADM

## 2018-09-03 RX ORDER — ALBUMIN, HUMAN INJ 5% 5 %
12.5 SOLUTION INTRAVENOUS ONCE
Status: COMPLETED | OUTPATIENT
Start: 2018-09-03 | End: 2018-09-03

## 2018-09-03 RX ORDER — BUPIVACAINE HYDROCHLORIDE AND EPINEPHRINE 5; 5 MG/ML; UG/ML
INJECTION, SOLUTION EPIDURAL; INTRACAUDAL; PERINEURAL
Status: DISCONTINUED | OUTPATIENT
Start: 2018-09-03 | End: 2018-09-03 | Stop reason: HOSPADM

## 2018-09-03 RX ORDER — ALBUMIN, HUMAN INJ 5% 5 %
SOLUTION INTRAVENOUS
Status: COMPLETED
Start: 2018-09-03 | End: 2018-09-03

## 2018-09-03 RX ORDER — HYDROCODONE BITARTRATE AND ACETAMINOPHEN 5; 325 MG/1; MG/1
1-2 TABLET ORAL EVERY 4 HOURS PRN
Status: DISCONTINUED | OUTPATIENT
Start: 2018-09-03 | End: 2018-09-05 | Stop reason: HOSPADM

## 2018-09-03 RX ADMIN — HEPARIN SODIUM 5000 UNITS: 5000 INJECTION, SOLUTION INTRAVENOUS; SUBCUTANEOUS at 21:13

## 2018-09-03 RX ADMIN — ALBUMIN (HUMAN) 12.5 G: 2.5 SOLUTION INTRAVENOUS at 12:40

## 2018-09-03 RX ADMIN — ALBUMIN, HUMAN INJ 5% 12.5 G: 5 SOLUTION at 12:40

## 2018-09-03 RX ADMIN — ATORVASTATIN CALCIUM 80 MG: 80 TABLET, FILM COATED ORAL at 21:13

## 2018-09-03 RX ADMIN — VALPROATE SODIUM 500 MG: 100 INJECTION, SOLUTION INTRAVENOUS at 05:10

## 2018-09-03 RX ADMIN — ASPIRIN 162 MG: 81 TABLET, DELAYED RELEASE ORAL at 19:52

## 2018-09-03 RX ADMIN — GABAPENTIN 300 MG: 300 CAPSULE ORAL at 21:14

## 2018-09-03 RX ADMIN — HYDROCODONE BITARTRATE AND ACETAMINOPHEN 1 TABLET: 5; 325 TABLET ORAL at 21:14

## 2018-09-03 RX ADMIN — FERROUS GLUCONATE 324 MG: 324 TABLET ORAL at 19:23

## 2018-09-03 ASSESSMENT — ENCOUNTER SYMPTOMS
FOCAL WEAKNESS: 1
NAUSEA: 0
PALPITATIONS: 0
FEVER: 0
TINGLING: 0
DIZZINESS: 1
CONSTIPATION: 0
SPUTUM PRODUCTION: 0
SORE THROAT: 0
SHORTNESS OF BREATH: 0
LOSS OF CONSCIOUSNESS: 0
MUSCULOSKELETAL NEGATIVE: 1
ABDOMINAL PAIN: 0
HEADACHES: 1
SEIZURES: 0
VOMITING: 0
BLURRED VISION: 1
SPEECH CHANGE: 1
MEMORY LOSS: 0
DIARRHEA: 0
SENSORY CHANGE: 0
SINUS PAIN: 0
CHILLS: 0
COUGH: 0

## 2018-09-03 ASSESSMENT — PAIN SCALES - GENERAL
PAINLEVEL_OUTOF10: 0
PAINLEVEL_OUTOF10: 8
PAINLEVEL_OUTOF10: 0

## 2018-09-03 ASSESSMENT — GAIT ASSESSMENTS
GAIT LEVEL OF ASSIST: CONTACT GUARD ASSIST
ASSISTIVE DEVICE: SINGLE POINT CANE
DISTANCE (FEET): 200
DEVIATION: DECREASED BASE OF SUPPORT

## 2018-09-03 ASSESSMENT — COGNITIVE AND FUNCTIONAL STATUS - GENERAL
WALKING IN HOSPITAL ROOM: A LITTLE
SUGGESTED CMS G CODE MODIFIER MOBILITY: CJ
MOBILITY SCORE: 22
CLIMB 3 TO 5 STEPS WITH RAILING: A LITTLE

## 2018-09-03 NOTE — THERAPY
"Physical Therapy Evaluation completed.   Bed Mobility:  Supine to Sit: Supervised  Transfers: Sit to Stand: Stand by Assist  Gait: Level Of Assist: Contact Guard Assist with Single Point Cane       Plan of Care: Will benefit from Physical Therapy 3 times per week (anticipate 1x more)  Discharge Recommendations: Equipment: Will Continue to Assess for Equipment Needs. Post-acute therapy Discharge to home with home health for additional skilled therapy services.    Pt admitted for stroke workup after a week of difficulty moving R UE. Pt presents minorly unsteady during gait with SPC and reports she would not utilize a FWW or 4WW if given one. She demonstrated x2 LOB initially during gait requiring PT CGA to min A for stabilization. Anticipate she will self-progress as she continues to mobilize. Per chart, she is scheduled for carotid endarectomy later today. Will see again post-op as able to ensure safety. Anticipate pt to be appropriate to return home.     See \"Rehab Therapy-Acute\" Patient Summary Report for complete documentation.     "

## 2018-09-03 NOTE — OP REPORT
Vascular Surgery Operative Note  --------------------------------------------    Date of Service: 9/3/2018    Patient Name:  Brooke Diana    Patient MRN:  [id number]    --------------------------------------------------------------------------------------------------    Preoperative Diagnosis:  - Symptomatic right carotid stenosis  - Hypertension  - Hyperlipidemia  - Diabetes  - Coronary Artery Disease  - COPD on home oxygen    Postoperative Diagnosis:  same    Procedure:  1) right carotid endarterectomy    -------------------------------------------------------------------------------------------------    Surgeon:   Naga Abad MD    Assistant:   Jack DAVISON    Anesthesia:   GETA    UOP:    Jacque miller    EBL:    40 cc    Blood Products:  none    Heparin:   Systemically heparinized, 6000 units    Specimen:   None sent    Complications:  None     Disposition:   PACU in stable condition     -----------------------------------------------------------------------------------------------------    History:  Brooke Diana is a 84 y.o. female with symptomatic right carotid stenosis, with right frontal stroke diagnosed on MRI.    I had a very detailed, thorough discussion with the patient regarding the severity of her carotid disease and that she is at high risk of stroke.  I explained the primary purpose of this operation is to prevent stroke from the plaque in the carotid artery.  I explained the details of the operation including neuro monitoring and possible use of a shunt.  I discussed the alternatives of optimal medical management or stenting, although carotid endarterectomy is preferable in the setting of such severe plaque and in someone who is acceptable risk for surgery.  I discussed the potential risks, including but not limited to bleeding, infection, injury to vessels or nerves, and risks of anesthesia. I explained the risk that the operation itself can cause a stroke, although the risk of stroke from  the operation is less than the risk of stroke if the plaque is not treated, and thus carotid endarterectomy is recommended.  All of their questions were answered. Patient understands and agrees to proceed.    Procedure Summary:  Following informed consent, patient was brought to the OR where general anesthesia was administered. Patient was positioned, neuromointoring was put in place and the right neck was prepped and draped in the usual sterile fashion.  Timeout was called to identify the correct patient, team and equipment.  Everyone was in agreement.  Procedure began with injection of local anesthesia along the right SCM and then a longitudinal incision was made and carried down through each layer using cautery to assure hemostasis.  The common facial vein was identified and ligated.  The common, external and internal carotid were identified and dissected circumferentially proximally and distally and encircled with loops.    Patient was systemically heparinized and then the artery was clamped, with the ICA being applied first.  The carotid was opened with a longitudinal arteriotomy and an ulcerative plaque was seen. There were changes on neuromonitoring.  An argyle shunt was inserted first into the ICA and then the common was flushed and the shunt inserted. Endarterectomy of the plaque was performed and the distal ICA endpoint was tacked with prolene sutures.  Once complete, a bovine pericardial patch was sutured with a running 5-0 prolene suture.  The artery was flushed and copiously irrigated, the shunt was removed and the suture line was completed.  The clamps were removed, with the ICA being removed last, and flow was restored.  There was a continuous flow signal in the ICA at this point.  The heparin was reversed with protamine. Topical hemostatic was applied.  A 7 flat TYRELL drain was placed and secured with a nylon suture.     At this point we had good hemostasis.  The platysma was reapproximated with  interrupted vicryl sutures.  The skin was reapproximated with a running subcuticular suture.  A sterile dressing was placed. Patient was extubated and returned to PACU in stable condition.  All counts were correct at the conclusion of the case.         Naga Abad MD  General and Vascular Surgery  [facility]  Cell: [phone]

## 2018-09-03 NOTE — OR NURSING
Pt on 2 L NC. Pt uses 2 L NC at night.  No c/o nausea, tolerated PO fluid.  No difficulty swallowing.   Denies pain.  Right neck dressing CDI, TYRELL drain compressed, patent and draining.  Due to low BP in OR and post procedure pt has received 250 ml of 5% Albumin per surgeon on Recovery and was on Phenylephrine gtt initiated in surgery and continued in PACU.  Phenylephrine weaned down and off at 1440.  VSS, afberile, SANCHEZ, A/O x4.  Pt is sleepy but wakes to voice.      No belongings in Recovery.   Notified pt's RN, Berna from Neuro [id number] that pt was transferring to [place] [id number].    1503: Surgeon Pollo phoned and updated on pt BP status off Phenylephrine gtt.    Right BP cuff 109/42 MAP 56.   Surgeon ok with pt going to the floor.      Pt's grand-daughter, Ingrid and pt's sister, Erin both phoned and updated on pt status in Recovery and transfer to her new room in [place] [id number].

## 2018-09-03 NOTE — CARE PLAN
Problem: Safety  Goal: Free from accidental injury    Intervention: Initiate Safety Measures  Patient is at risk for falls. Fall precautions in place at this time. Bed in lowest position, call light within reach, bed alarm on, nonskid socks are on, and patient belongings/bedside table are within reach. Nurse will continue to monitor through shift.       Problem: Safety  Goal: Will remain free from injury    Intervention: Provide assistance with mobility  Patient assisted to bathroom with use of a walker and rn. Patient tolerated well

## 2018-09-03 NOTE — PROGRESS NOTES
Assumed care of patient at 0700.  Report received at bedside.  Patient is A&O x 4, no apparent neurological deficits present.  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  Ambulates with FWW.  NIHSS of 0, completed during report.  Hourly rounding in place.

## 2018-09-03 NOTE — PROGRESS NOTES
Neurology Consult - Progress Note:  Resident:  Praful Devlin M.D.  Attending:  Benoit Galan M.D.  Date:  9/3/2018     Patient ID:  Brooke Mcclure [id number]   Age/Sex 84 y.o. female   MRN [id number]       Reason for Consult:  Right-hand weakness    Interval Problem, Daily Status Update:  Patient had multiple episodes of dropping things with her right hand, over the past week.    Today, she notes that she has previously been moving her hand ok, but that she has noted some decrease sensation to the right hand (e.g., when holding the coffee cups, that fell), and has had trouble dropping items, as a result.   She states that recently, this has been intermittent, over the past week.    She states that currently it feels ok.      She is previously noted to have plaquing of the internal carotid, on the right, with approximately 70-80% stenosis.  Prior notes had considered stenting versus surgery.    Today, 9/3/2018, she was taken to (vascular) surgery, for right internal carotid endarterectomy.  Patient seen, after return from PACU.  Notes a mild discomfort, over right neck (site of surgery).        Review of Symptoms  + occasional / intermittent decrease sensation to right hand.  + mild discomfort to right neck (post-surgery).  + poor handwriting for a couple months.   Denies confusion, joint/muscle pains, difficulty moving.    Denies current change or problem with sensation (currently).     All other systems were reviewed and were negative.         Physical Exam     Vitals:    18 0000 18 0400 18 0700 18 0951   BP: 110/40 121/45 (!) 136/38    Pulse: 71 67 66    Resp: 16 16 16 14   Temp: 36.7 °C (98 °F) 36.7 °C (98 °F) 36.2 °C (97.2 °F) 36.3 °C (97.4 °F)   SpO2: 98% 100% 96% 92%   Weight:       Height:         Body mass index is 19.79 kg/m².    Oxygen Therapy:  Pulse Oximetry: 92 %, O2 (LPM): 2, O2 Delivery: None (Room Air)    GEN:   Alert and oriented, but a bit tired  (post-surgery).  No apparent distress.    H / E:   Normocephalic, Atraumatic.  No scleral icterus.     Mild cloudiness to eyes.  But PERRLA / EOMI.   ENT:   No erythema.  No exudates or discharges from eyes.    Trachea midline.  Bandage and drain, in place on right side of neck from surgery.    (wound exam deferred to surgical team / just post-surgery).   HEART:  Regular rate and rhythm. No murmurs, rubs or gallops.    LUNGS:   Clear to auscultation and percussion bilaterally.    Occasional sonorous breathing, on 2L O2.    (especially through nose, decreased through mouth exhales).   ABD/GI:  Benign. No rebound or guarding noted.   EXT/MSK:  No pedal edema.    NEURO:    Mental status: Awake, alert, oriented x3, but slightly tired (post-surgery).    Fund of knowledge - within normal.   Cranial nerves:  CN II-XII - intact.  PERRLA.  EOMI.  No nystagmus   Motor - good general movement (but slightly weak -  5- /5) globally (and symmetrical).    Difficulty with fine motor movements, on right hand / writing.   Sensory - UE and LE-  good sensation to light touch, bilaterally.      Coordination/Gait - not tested (post-surgery).    Reflexes - DTR - UE and LE - brisk, but symmetrical bilaterally and upper and lower.   Testing of plantar reflexes, limited by jerking of foot, away from test (bilaterally).    Negative/normal Cortez's test.   No tremor.   PSYCH:  Normal thought process, but tired (post-surgery).       Lab Data Review:     Recent Labs      09/01/18   1523  09/02/18 0916 09/03/18   0218   WBC  13.8*  9.6  8.4   NEUTSPOLYS  81.40*  69.70  61.60   LYMPHOCYTES  9.70*  15.80*  23.70   MONOCYTES  7.30  11.00  10.60   EOSINOPHILS  0.80  2.60  3.30   BASOPHILS  0.40  0.50  0.70   ASTSGOT  15   --    --    ALTSGPT  8   --    --    ALKPHOSPHAT  60   --    --    TBILIRUBIN  0.3   --    --      Recent Labs      09/01/18   1523  09/02/18   0044  09/02/18   0916  09/03/18   0218   RBC  3.46*   --   3.27*  3.16*   HEMOGLOBIN   10.5*   --   10.1*  9.7*   HEMATOCRIT  32.8*   --   31.6*  29.7*   PLATELETCT  313   --   298  285   IRON   --   16*   --    --    FERRITIN  13.1   --    --    --    TOTIRONBC   --   319   --    --        Recent Labs      09/01/18   1523  09/02/18   0044  09/02/18   0916  09/03/18   0218   SODIUM  139   --   140  142   POTASSIUM  4.3   --   5.1  3.9   CHLORIDE  108   --   111  108   CO2  23   --   22  26   BUN  16   --   15  17   CREATININE  1.23   --   1.03  1.02   MAGNESIUM   --   1.9   --   1.9   CALCIUM  9.3   --   8.9  9.2       Recent Labs      09/01/18   1523  09/02/18   0916  09/03/18   0218   ALTSGPT  8   --    --    ASTSGOT  15   --    --    ALKPHOSPHAT  60   --    --    TBILIRUBIN  0.3   --    --    GLUCOSE  112*  100*  89       Carotid duplex and CTA neck, noted plaquing of the internal carotid on the right, with 70-80% stenosis.    MRI-brain, with/without:  (9/2/18):   1.  Mild cerebral atrophy.  Age-appropriate.  2.  Mild-moderate supratentorial white matter disease most consistent with microvascular ischemic change.  3.  Acute lacunar size right frontal deep white matter infarct. No change from yesterday's exam.  4.  No new area of acute cerebral infarction. The questionable punctate foci of slightly increased diffusion signal seen in the left frontal deep white matter and left parietal white matter on yesterday's exam are not conspicuous on today's exam and may have represented artifact.  5.  10 mm focus of gradient echo hypointensity in the left basal ganglia which may represent hemosiderin deposition from old hemorrhage.  6.  Moderate pontine ischemic gliosis.  7.  No abnormal parenchymal or meningeal enhancement on today's exam.        Assessment & Plan       Right Hand - weakness :    Weakness likely from prior stroke (per history) that is not currently visualized on MRI, due to timeframe.  No LVO noted.    Continue on high-dose statins.  Will defer use of antiplatelets, to vascular surgery (as  she was on ASA 81 and plavix prior to stroke, and was adherent).      Encephalopathy:  Resolved     Right internal Carotid artery stenosis/ clinically silent, MR proven, small subcortical right hemispheric acute ischemic stroke:    underwent carotid endarterectomy today.   Returned from surgery, without complication noted.     Left ICA mod stenosis - may be symptomatic.   Will defer care to CV surgery.   Until then, max-medical therapy.  - dual antiplatelet and max statin.       Recommend cardiology consult, as concern for bilateral strokes, with abnormal TTE and stroke on aspirin and plavix.       A computerized dictation system may have been used for this note.    Despite review, there may be some spelling or grammatical errors.    Praful Devlin M.D.  9/3/2018   Service:  Neurology   Attending:  Benoit Galan M.D.

## 2018-09-03 NOTE — PROGRESS NOTES
Patient is resting in bed comfortably at this time and no c/o of pain. Patient is npo for procedure today. Fall precautions are in place, call light within reach, bed alarm on, bed in lowest position, and belongings within reach. Patient is a&o x4, is in no distress at this time, and nurse will continue to monitor patient.

## 2018-09-03 NOTE — PROGRESS NOTES
[facility] Hospitalist Progress Note    Date of Service: 9/3/2018    Chief Complaint  84 y.o. female admitted 2018 with right sided weakness x 1 week.      Interval Problem Update  9/3. Anticipating CEA today.    Consultants/Specialty  Neurology-Dr. Ramires  Vascular surgery-Dr. Abad    Disposition  CEA today  PT/OT rehab        Review of Systems   Constitutional: Negative for chills, fever and malaise/fatigue.   HENT: Negative for congestion, sinus pain and sore throat.    Eyes: Positive for blurred vision (Chronic, left eye).   Respiratory: Negative for cough, sputum production and shortness of breath.    Cardiovascular: Negative for chest pain, palpitations and leg swelling.   Gastrointestinal: Negative for abdominal pain, constipation, diarrhea, nausea and vomiting.   Genitourinary: Negative for dysuria, frequency, hematuria and urgency.   Musculoskeletal: Negative.    Neurological: Positive for dizziness, speech change (Word finding impairment, aphasia), focal weakness (Right side) and headaches (Intermittent, located in the right frontal area). Negative for tingling, sensory change, seizures and loss of consciousness.   Psychiatric/Behavioral: Negative for memory loss.      Physical Exam  Laboratory/Imaging   Hemodynamics  Temp (24hrs), Av.6 °C (97.8 °F), Min:36.2 °C (97.2 °F), Max:36.8 °C (98.2 °F)   Temperature: 36.4 °C (97.6 °F)  Pulse  Av.4  Min: 60  Max: 89 Heart Rate (Monitored): 76  Blood Pressure : (!) 136/38 (R.N. notified), Arterial BP: (!) 112/27, NIBP: (!) 129/37      Respiratory  Respiration: 14, Pulse Oximetry: 97 %    Fluids    Intake/Output Summary (Last 24 hours) at 18 1542  Last data filed at 18 1538   Gross per 24 hour   Intake             2020 ml   Output              150 ml   Net             1870 ml     Nutrition  Orders Placed This Encounter   Procedures   • Diet Order Regular     Standing Status:   Standing     Number of Occurrences:   1     Order Specific Question:    Diet:     Answer:   Regular [1]     Physical Exam   Constitutional: She appears well-developed and well-nourished.   Elderly, frail   HENT:   Head: Normocephalic and atraumatic.   Eyes: Conjunctivae and EOM are normal. No scleral icterus.   Neck: Normal range of motion. Neck supple.   Cardiovascular: Normal rate and regular rhythm.  Exam reveals no gallop and no friction rub.    Murmur heard.  Pulmonary/Chest: Effort normal and breath sounds normal. No respiratory distress. She has no wheezes. She has no rales.   Abdominal: Soft. Bowel sounds are normal. She exhibits no distension. There is no tenderness. There is no rebound and no guarding.   Musculoskeletal: She exhibits no edema or tenderness.   Neurological: She is alert.   R sided hemiparesis, mild and improvedMild cognitive deficits   Skin: Skin is warm.   Psychiatric: She has a normal mood and affect. Her behavior is normal.     Recent Labs      09/01/18   1523  09/02/18   0916  09/03/18   0218   WBC  13.8*  9.6  8.4   RBC  3.46*  3.27*  3.16*   HEMOGLOBIN  10.5*  10.1*  9.7*   HEMATOCRIT  32.8*  31.6*  29.7*   MCV  94.8  96.6  94.0   MCH  30.3  30.9  30.7   MCHC  32.0*  32.0*  32.7*   RDW  44.5  45.1  43.1   PLATELETCT  313  298  285   MPV  9.3  9.5  9.6     Recent Labs      09/01/18   1523  09/02/18   0916  09/03/18   0218   SODIUM  139  140  142   POTASSIUM  4.3  5.1  3.9   CHLORIDE  108  111  108   CO2  23  22  26   GLUCOSE  112*  100*  89   BUN  16  15  17   CREATININE  1.23  1.03  1.02   CALCIUM  9.3  8.9  9.2             Recent Labs      09/02/18   0044   TRIGLYCERIDE  112   HDL  37*   LDL  18          Assessment/Plan     * CVA (cerebral vascular accident), acute, right frontal deep white matter   Assessment & Plan    MRI findings confirmed acute stroke.  Allow permissive hypertension x 72 hours.  Continue ASA, plavix, & lipitor.  Carotids prelim showing severe ICA stenosis & irregular plaque.  Echo showed moderate LVH, moderate AI, mild MR. EF  60%.  PT/OT pending.  Neuro following  Dr. Singleton, Vascular Surgery plans CEA 9/3.          Carotid stenosis, bilateral   Assessment & Plan    Carotid duplex done this morning showed increasing stenosis from last exam, now severe.  Vascular surgery Dr. Abad consulted. Per him, the patient needs either a CEA or carotid stent.  He ordered a CTA which will help determine which is more appropriate.  Continue atorvastatin, ASA, & plavix.  On 9/3 Dr. Singleton plans to do CEA        Right arm weakness- (present on admission)   Assessment & Plan    Acute CVA noted on MRI, but likely not responsible for her right sided weakness.  PT/OT eval pending. May need outpatient therapy.  No overt weakness noted on exam.        CAD (coronary artery disease)- (present on admission)   Assessment & Plan    Holding home antihypertensives & allowing for permissive hypertension.  Continue ASA & atorvastatin. Also on plavix.        CKD (chronic kidney disease) stage 3, GFR 30-59 ml/min   Assessment & Plan    At baseline.  Recheck tomorrow.        Iron deficiency anemia   Assessment & Plan    Start PO iron.  Follow up outpatient.        Nocturnal hypoxia- (present on admission)   Assessment & Plan    Use O2 at night as needed to maintain O2 > 92%.        Tobacco abuse- (present on admission)   Assessment & Plan    Smoking cessation discussed by previous hospitalist.  We will continue to re-encourage.        Hypertension- (present on admission)   Assessment & Plan    Acute stroke confirmed by MRI.  Will allow permissive hypertension x 72 hours. Discontinued home antihypertensives.          Hyperlipidemia- (present on admission)   Assessment & Plan    Lab Results   Component Value Date/Time    CHOLSTRLTOT 121 06/11/2018 08:43 AM    LDL 42 06/11/2018 08:43 AM    HDL 45 06/11/2018 08:43 AM    TRIGLYCERIDE 172 (H) 06/11/2018 08:43 AM     Continue high dose atorvastatin.          Quality-Core Measures   Reviewed items::  Radiology images reviewed,  EKG reviewed, Labs reviewed and Medications reviewed  Benavides catheter::  No Benavides  DVT prophylaxis pharmacological::  Heparin  DVT prophylaxis - mechanical:  SCDs  Ulcer Prophylaxis::  Not indicated  Assessed for rehabilitation services:  Patient was assess for and/or received rehabilitation services during this hospitalization

## 2018-09-03 NOTE — PROGRESS NOTES
Vascular    OR today for right carotid endarterectomy with neuromonitoring    Naga Abad MD  [facility] (General and Vascular Surgery)  Cell: [phone] (text or call is fine, if you don't reach me please try my office)  Office: [phone]  __________________________________________________________________  Patient:Brooke Diana   MRN:[id number]   CSN:[id number]    9/3/2018    10:20 AM

## 2018-09-04 ENCOUNTER — HOSPITAL ENCOUNTER (INPATIENT)
Facility: REHABILITATION | Age: 83
End: 2018-09-04
Attending: PHYSICAL MEDICINE & REHABILITATION | Admitting: PHYSICAL MEDICINE & REHABILITATION
Payer: MEDICARE

## 2018-09-04 ENCOUNTER — PATIENT OUTREACH (OUTPATIENT)
Dept: HEALTH INFORMATION MANAGEMENT | Facility: OTHER | Age: 83
End: 2018-09-04

## 2018-09-04 PROBLEM — I63.9 CVA (CEREBRAL VASCULAR ACCIDENT) (HCC): Status: RESOLVED | Noted: 2018-09-01 | Resolved: 2018-09-04

## 2018-09-04 PROBLEM — I65.21 SYMPTOMATIC STENOSIS OF RIGHT CAROTID ARTERY: Status: RESOLVED | Noted: 2018-09-02 | Resolved: 2018-09-04

## 2018-09-04 PROBLEM — I65.21 SYMPTOMATIC STENOSIS OF RIGHT CAROTID ARTERY: Status: ACTIVE | Noted: 2018-09-02

## 2018-09-04 PROBLEM — I65.22 ASYMPTOMATIC STENOSIS OF LEFT CAROTID ARTERY: Status: ACTIVE | Noted: 2018-09-04

## 2018-09-04 PROCEDURE — 700102 HCHG RX REV CODE 250 W/ 637 OVERRIDE(OP): Performed by: INTERNAL MEDICINE

## 2018-09-04 PROCEDURE — 97530 THERAPEUTIC ACTIVITIES: CPT

## 2018-09-04 PROCEDURE — 99232 SBSQ HOSP IP/OBS MODERATE 35: CPT | Performed by: INTERNAL MEDICINE

## 2018-09-04 PROCEDURE — 770006 HCHG ROOM/CARE - MED/SURG/GYN SEMI*

## 2018-09-04 PROCEDURE — A9270 NON-COVERED ITEM OR SERVICE: HCPCS | Performed by: INTERNAL MEDICINE

## 2018-09-04 PROCEDURE — 700102 HCHG RX REV CODE 250 W/ 637 OVERRIDE(OP): Performed by: NURSE PRACTITIONER

## 2018-09-04 PROCEDURE — A9270 NON-COVERED ITEM OR SERVICE: HCPCS | Performed by: NURSE PRACTITIONER

## 2018-09-04 PROCEDURE — A9270 NON-COVERED ITEM OR SERVICE: HCPCS | Performed by: SURGERY

## 2018-09-04 PROCEDURE — 97116 GAIT TRAINING THERAPY: CPT

## 2018-09-04 PROCEDURE — 700111 HCHG RX REV CODE 636 W/ 250 OVERRIDE (IP): Performed by: NURSE PRACTITIONER

## 2018-09-04 PROCEDURE — 700102 HCHG RX REV CODE 250 W/ 637 OVERRIDE(OP): Performed by: SURGERY

## 2018-09-04 PROCEDURE — A9270 NON-COVERED ITEM OR SERVICE: HCPCS | Performed by: HOSPITALIST

## 2018-09-04 PROCEDURE — 700102 HCHG RX REV CODE 250 W/ 637 OVERRIDE(OP): Performed by: HOSPITALIST

## 2018-09-04 RX ORDER — TRAMADOL HYDROCHLORIDE 50 MG/1
50-100 TABLET ORAL EVERY 6 HOURS PRN
Qty: 20 TAB | Refills: 0 | Status: SHIPPED | OUTPATIENT
Start: 2018-09-04 | End: 2018-09-05

## 2018-09-04 RX ORDER — CLOPIDOGREL BISULFATE 75 MG/1
75 TABLET ORAL DAILY
Status: DISCONTINUED | OUTPATIENT
Start: 2018-09-04 | End: 2018-09-05 | Stop reason: HOSPADM

## 2018-09-04 RX ADMIN — VITAMIN D, TAB 1000IU (100/BT) 2000 UNITS: 25 TAB at 04:50

## 2018-09-04 RX ADMIN — HEPARIN SODIUM 5000 UNITS: 5000 INJECTION, SOLUTION INTRAVENOUS; SUBCUTANEOUS at 15:09

## 2018-09-04 RX ADMIN — ASPIRIN 162 MG: 81 TABLET, DELAYED RELEASE ORAL at 04:50

## 2018-09-04 RX ADMIN — FERROUS GLUCONATE 324 MG: 324 TABLET ORAL at 09:52

## 2018-09-04 RX ADMIN — SENNOSIDES AND DOCUSATE SODIUM 2 TABLET: 8.6; 5 TABLET ORAL at 04:51

## 2018-09-04 RX ADMIN — ATORVASTATIN CALCIUM 80 MG: 80 TABLET, FILM COATED ORAL at 22:19

## 2018-09-04 RX ADMIN — HEPARIN SODIUM 5000 UNITS: 5000 INJECTION, SOLUTION INTRAVENOUS; SUBCUTANEOUS at 04:51

## 2018-09-04 RX ADMIN — GABAPENTIN 300 MG: 300 CAPSULE ORAL at 22:19

## 2018-09-04 RX ADMIN — CLOPIDOGREL 75 MG: 75 TABLET, FILM COATED ORAL at 09:52

## 2018-09-04 RX ADMIN — HEPARIN SODIUM 5000 UNITS: 5000 INJECTION, SOLUTION INTRAVENOUS; SUBCUTANEOUS at 22:19

## 2018-09-04 RX ADMIN — HYDROCODONE BITARTRATE AND ACETAMINOPHEN 2 TABLET: 5; 325 TABLET ORAL at 20:34

## 2018-09-04 ASSESSMENT — COGNITIVE AND FUNCTIONAL STATUS - GENERAL
SUGGESTED CMS G CODE MODIFIER MOBILITY: CK
CLIMB 3 TO 5 STEPS WITH RAILING: A LOT
MOBILITY SCORE: 19
MOVING FROM LYING ON BACK TO SITTING ON SIDE OF FLAT BED: A LITTLE
STANDING UP FROM CHAIR USING ARMS: A LITTLE
WALKING IN HOSPITAL ROOM: A LITTLE

## 2018-09-04 ASSESSMENT — ENCOUNTER SYMPTOMS
LOSS OF CONSCIOUSNESS: 0
MEMORY LOSS: 0
FEVER: 0
ABDOMINAL PAIN: 0
BLURRED VISION: 1
SEIZURES: 0
DIZZINESS: 0
COUGH: 0
HEADACHES: 0
VOMITING: 0
NAUSEA: 0
SENSORY CHANGE: 0
SPEECH CHANGE: 0
SHORTNESS OF BREATH: 0
SORE THROAT: 0
PALPITATIONS: 0
CONSTIPATION: 0
DIARRHEA: 0
TINGLING: 0
FOCAL WEAKNESS: 1
CHILLS: 0
MUSCULOSKELETAL NEGATIVE: 1
SPUTUM PRODUCTION: 0
SINUS PAIN: 0

## 2018-09-04 ASSESSMENT — GAIT ASSESSMENTS
GAIT LEVEL OF ASSIST: MINIMAL ASSIST
DISTANCE (FEET): 75
ASSISTIVE DEVICE: SINGLE POINT CANE;NONE

## 2018-09-04 ASSESSMENT — PAIN SCALES - GENERAL
PAINLEVEL_OUTOF10: 2
PAINLEVEL_OUTOF10: 8

## 2018-09-04 NOTE — PROGRESS NOTES
Assumed care of patient at 1900    Pt is A&O X  4  Pain 8/10.  Pt medicated per MAR  Pt denies nausea  Tolerating Diet  Rt neck incision.  Dressing in place, small amount of old drainage  TYRELL insertion, Incisional dressing in place.  TYRELL compressed to self suction, line stripped, small amount of serosanguineous  Positive Urine Void   Positive Flatus  Positive BM Void  Up 1 assist   SCD's on  Bed in lowest position and locked.    ** Bed alarm on      Reviewed plan of care with patient, bed in lowest position and locked, pt resting comfortably now, call light within reach, all needs met at this time

## 2018-09-04 NOTE — CARE PLAN
Problem: Safety  Goal: Will remain free from falls  Outcome: PROGRESSING AS EXPECTED  Pt remains free from fall at this time.  Pt educated on fall risk.  Pt demonstrates understanding by appropriate use of call light to call for assistance.  CLIP, hourly rounding in place    Problem: Venous Thromboembolism (VTW)/Deep Vein Thrombosis (DVT) Prevention:  Goal: Patient will participate in Venous Thrombosis (VTE)/Deep Vein Thrombosis (DVT)Prevention Measures  Outcome: PROGRESSING AS EXPECTED   09/03/18 2104   Mechanical/VTE Prophylaxis   Mechanical Prophylaxis  SCDs, Sequential Compression Device   SCDs, Sequential Compression Device On   OTHER   Risk Assessment Score 2   VTE RISK Moderate   Pharmacologic Prophylaxis Used Unfractionated Heparin

## 2018-09-04 NOTE — CONSULTS
Medical chart review completed. Patient is a 84 y.o. year-old female admitted on 9/1 with right sided weakness. Negative Head CT. MRI with right frontal deep white matter infarction, left frontal deep white matter infarction (not seen on repeat MRI with contrast the following day), remote infarction in left basal ganglia, with moderate pontine ischemic gliosis. ECHO EF 70%, moderate concentric LVH, moderate AI. Labs with anemia, acute versus chronic kidney injury, iron deficiency, HgbA1c 5.4. LDL 18. She is now s/p right CEA on 9/3 with Dr. Abad. She apparently has mild right hand fine motor deficits, which is though due to old stroke as her current imaging shows small infarct on the right.     Patient with multiple co-morbidities(including but not limited to iron deficiency anemia, tobacco use, hypertension, chronic kidney disease, nocturnal hypoxia); with cognitive deficits and functional deficits in mobility/self-care/swallowing/speech, and Severe de-conditioning. Pre-morbidly, this patient lived in a single level home with One steps to enter, alone and able to care for self. The patient was evaluated by acute care Physical Therapy and Occupational Therapy; currently requiring contact-guard to minimum assistance for mobility and contact-guard assistance for ADLs.    6 clicks score 19 mobility, 21 ADLs    The patient is not a good candidate for acute inpatient as she is too high level with therapy services (contact guard assist), and she does not have a new deficit that corresponds with the findings of an acute stroke in the right brain (so would not expect a significant practical improvement).    Recommend discharge home with home health therapies, versus a short course of rehab at SNF as she lives alone.    Joseline Gibson M.D.  Physical Medicine and Rehabilitation

## 2018-09-04 NOTE — PROGRESS NOTES
Discharge Instructions  Procedure: carotid endarterectomy    1. ACTIVITIES: Upon discharge from the hospital, the day of surgery it is requested that you do no significant physical activity and no driving as you have had sedation. The day after surgery, you may resume activities of daily living, but for two weeks, no strenuous activities or heavy lifting (greater than 15 pounds).     2. DRIVING: You may drive whenever you are off pain medications and are able to perform the activities needed to drive, i.e. turning, bending, twisting, etc.     3. WOUND: It is not unusual for patients to experience swelling and even bruising, as well as a small amount of drainage from the incision. This is normal and will resolve over the next few days.     4. ICE: you may place ice on the wound to decrease the swelling for the first 24 hours and then discontinue. Apply ice for 20 minutes and then remove for 20 minutes, alternating while awake.    5. BATHING: Remove your white bandage 2 days after discharge, no new bandage is required. OK to shower with the bandage on and after the bandage is removed the incision can get wet directly.    6. PAIN MEDICATION: You will be given a prescription for pain medication at discharge. Please take these as directed. It is important to remember not to take medications on an empty stomach as this may cause nausea.     7. BOWEL FUNCTION: After surgery, it is not uncommon for patients to experience constipation. This is due to decreasing activity levels as well as pain medications. You may wish to use a stool softener beginning immediately after surgery, and you may or may not need to use a laxative (Milk of Magnesia, Ex-lax; Senokot, etc.) as well.     8 .CALL IF YOU HAVE: (1) Fevers to more than 101.0 F, (2) Unusual or excessive pain, (3) Drainage or fluid from incision that may be foul smelling, increased tenderness or soreness at the wound or the wound edges are no longer together, redness or  swelling at the incision site. Please do not hesitate to call with any other questions.     9. APPOINTMENT: Contact our office at [phone] for a follow-up appointment 2 weeks following your procedure.     If you have any additional questions, please do not hesitate to call the office and speak to either myself or the physician on call.     Office addresses:   [address] [place] [address] [address]  [facility] [address]  [phone]    Naga Abad MD  [facility]  [phone]

## 2018-09-04 NOTE — PROGRESS NOTES
Assumed care at 0700. Received report from night shift RN. Bedside report completed. AOx4.    Denies pain.  Denies nausea.  Tolerating diet. +voiding. LBM 9/3. -flatus  R. Neck dressing changed.  Ambulating with 1 assist and FWW. Pt call light and belongings within reach, fall precautions in place. Bed alarm on.

## 2018-09-04 NOTE — PROGRESS NOTES
RN called to room by PT who stated pt was having difficulty speaking.  RN performed neuro check, pt appears to be at baseline, able to speak, stick tongue out straight, good  B/L and follow commands.    Pt agrees a FWW will be better for her to use than a cane due to continued inability to hold onto objects.

## 2018-09-04 NOTE — DISCHARGE PLANNING
PMR referral from Dr. Mosqueda     Acute lacunar size right frontal deep white matter infarct ongoing medical management as well as therapy need Anticipate post acute services to facilitate a successful transition to community home alone with outpatient/family support. Dr. Gibson to consult per protocol.

## 2018-09-04 NOTE — PROGRESS NOTES
[facility] Hospitalist Progress Note    Date of Service: 2018    Chief Complaint  84 y.o. female admitted 2018 with right sided weakness x 1 week.      Interval Problem Update  S/P CEA.  Resting comfortably.  No acute issues or complaints.  Denies lightheadedness, dizziness, right sided weakness.    Consultants/Specialty  Neurology-Dr. Ramires/Kiner  Vascular surgery-Dr. Abad    Disposition  TBD; SNF referral sent        Review of Systems   Constitutional: Negative for chills, fever and malaise/fatigue.   HENT: Negative for congestion, sinus pain and sore throat.    Eyes: Positive for blurred vision (Chronic, left eye).   Respiratory: Negative for cough, sputum production and shortness of breath.    Cardiovascular: Negative for chest pain, palpitations and leg swelling.   Gastrointestinal: Negative for abdominal pain, constipation, diarrhea, nausea and vomiting.   Genitourinary: Negative for dysuria, frequency, hematuria and urgency.   Musculoskeletal: Negative.    Neurological: Positive for focal weakness (Right side; much improved). Negative for dizziness, tingling, sensory change, speech change, seizures, loss of consciousness and headaches.   Psychiatric/Behavioral: Negative for memory loss.      Physical Exam  Laboratory/Imaging   Hemodynamics  Temp (24hrs), Av.6 °C (97.8 °F), Min:36.4 °C (97.5 °F), Max:36.8 °C (98.3 °F)   Temperature: 36.7 °C (98 °F)  Pulse  Av.1  Min: 60  Max: 89    Blood Pressure : 137/52      Respiratory  Respiration: 18, Pulse Oximetry: 93 %    Fluids    Intake/Output Summary (Last 24 hours) at 18 1622  Last data filed at 18 0900   Gross per 24 hour   Intake              400 ml   Output              540 ml   Net             -140 ml     Nutrition  Orders Placed This Encounter   Procedures   • Diet Order Regular     Standing Status:   Standing     Number of Occurrences:   1     Order Specific Question:   Diet:     Answer:   Regular [1]     Physical Exam    Constitutional: She appears well-developed and well-nourished.   Elderly, frail   HENT:   Head: Normocephalic and atraumatic.   Eyes: Conjunctivae and EOM are normal. No scleral icterus.   Neck: Normal range of motion. Neck supple.   Cardiovascular: Normal rate and regular rhythm.  Exam reveals no gallop and no friction rub.    Murmur heard.  Pulmonary/Chest: Effort normal and breath sounds normal. No respiratory distress. She has no wheezes. She has no rales.   Abdominal: Soft. Bowel sounds are normal. She exhibits no distension. There is no tenderness. There is no rebound and no guarding.   Musculoskeletal: She exhibits no edema or tenderness.   Neurological: She is alert.   R sided hemiparesis, mild and improvedMild cognitive deficits   Skin: Skin is warm.   Psychiatric: She has a normal mood and affect. Her behavior is normal.     Recent Labs      09/02/18   0916  09/03/18   0218   WBC  9.6  8.4   RBC  3.27*  3.16*   HEMOGLOBIN  10.1*  9.7*   HEMATOCRIT  31.6*  29.7*   MCV  96.6  94.0   MCH  30.9  30.7   MCHC  32.0*  32.7*   RDW  45.1  43.1   PLATELETCT  298  285   MPV  9.5  9.6     Recent Labs      09/02/18   0916  09/03/18   0218   SODIUM  140  142   POTASSIUM  5.1  3.9   CHLORIDE  111  108   CO2  22  26   GLUCOSE  100*  89   BUN  15  17   CREATININE  1.03  1.02   CALCIUM  8.9  9.2             Recent Labs      09/02/18   0044   TRIGLYCERIDE  112   HDL  37*   LDL  18          Assessment/Plan     * CVA (cerebral vascular accident), acute, right frontal deep white matter- (present on admission)   Assessment & Plan    MRI findings confirmed acute stroke.  Allowed permissive hypertension x 72 hours; restarting meds as tolerated  Continue ASA, plavix & lipitor.  Carotids prelim showing severe ICA stenosis & irregular plaque.  Echo showed moderate LVH, moderate AI, mild MR. EF 60%.  Neuro following  Dr. Abad, Vascular Surgery completed R CEA 9/3; post op recs appreciated  - post op recs and PT recs appreciated;  SNF referral placed          Right arm weakness- (present on admission)   Assessment & Plan    Acute CVA noted on MRI, but likely not responsible for her right sided weakness.  PT/OT eval pending. May need outpatient therapy.  No overt weakness noted on exam.        CAD (coronary artery disease)- (present on admission)   Assessment & Plan    Held home antihypertensives & allowed for permissive hypertension as above  - will restart home BP meds as tolerated  - Continue ASA & atorvastatin. Also on plavix.        CKD (chronic kidney disease) stage 3, GFR 30-59 ml/min- (present on admission)   Assessment & Plan    - stable        Iron deficiency anemia- (present on admission)   Assessment & Plan    Cont PO iron.  Follow up outpatient.        Nocturnal hypoxia- (present on admission)   Assessment & Plan    Use O2 at night as needed to maintain O2 > 92%.        Tobacco abuse- (present on admission)   Assessment & Plan    Smoking cessation discussed by previous hospitalist.  We will continue to re-encourage.        Hypertension- (present on admission)   Assessment & Plan    Acute stroke confirmed by MRI.  Allowed permissive hypertension x 72 hours.   - will restart home BP meds as tolerated        Hyperlipidemia- (present on admission)   Assessment & Plan    Lab Results   Component Value Date/Time    CHOLSTRLTOT 121 06/11/2018 08:43 AM    LDL 42 06/11/2018 08:43 AM    HDL 45 06/11/2018 08:43 AM    TRIGLYCERIDE 172 (H) 06/11/2018 08:43 AM     Continue high dose atorvastatin.          Quality-Core Measures   Reviewed items::  Radiology images reviewed, EKG reviewed, Labs reviewed and Medications reviewed  Benavides catheter::  No Benavides  DVT prophylaxis pharmacological::  Heparin  DVT prophylaxis - mechanical:  SCDs  Ulcer Prophylaxis::  Not indicated  Assessed for rehabilitation services:  Patient was assess for and/or received rehabilitation services during this hospitalization

## 2018-09-04 NOTE — PROGRESS NOTES
Surgery    Minimal pain  Tolerating PO  Ambulating  AFVSS  Abd SND mild TTP  Incisions CDI  Drain removed    A/P)  POD 1 Right carotid endarterectomy doing well    - The left carotid has less than 50% stenosis on duplex velocity images and on CTA measurements (specifically on the CTA the narrowest point on the ICA is 3.06mm and the normal caliber artery distal to this is 4.21mm, which equals about a 25% stenosis)    - Patient only needs ASA 81 mg daily from carotid standpoint, however it appears she was already taking plavix at home, possibly for coronary stents, so we can continue the plavix with or without aspirin    - Home today from my standpoint  - Instructions and Rx for tramadol printed and in the chart.    Naga Abad MD  General and Vascular Surgery  [facility]  Cell: [phone]

## 2018-09-04 NOTE — DISCHARGE PLANNING
ZANDER met with patient and grandson at bedside to discuss her transitional care options of SNF vs HH. Patient lives in a ground floor apartment with her grandson who assists her intermittently.     ZANDER offered information on SNF vs HH. followign discussion of her assistance at home compared to her current functional needs patient was agreeable to SNF. Choice faxed to [facility], [facility] and [facility]. Grandson plans to visit SNF facilities today and tomorrow and make final choice tomorrow.

## 2018-09-04 NOTE — PROGRESS NOTES
Received report from PAC-U.  Pt arrived to T415 bed 1  No immediate distress.  A&Ox4  C/O pain, medicated per MAR  Denies n/v  R. Neck TYRELL drain with SS drainage, dressing CDI   Oriented to room, white board, and call light.  Call light and personal belongings within reach.  Fall precautions and hourly rounding in place

## 2018-09-04 NOTE — DISCHARGE PLANNING
Received Choice form at [phone]  Agency/Facility Name: [facility], [facility], and [facility]  Referral sent per Choice form @ [phone]

## 2018-09-04 NOTE — THERAPY
"Physical Therapy Treatment completed.   Bed Mobility:  Supine to Sit: Supervised  Transfers: Sit to Stand: Stand by Assist  Gait: Level Of Assist: Minimum Assist with Single Point Cane       Plan of Care: Will benefit from Physical Therapy 3 times per week  Discharge Recommendations: Equipment: Will Continue to Assess for Equipment Needs.     See \"Rehab Therapy-Acute\" Patient Summary Report for complete documentation.     Pt initially presenting w/ improved functional mobility. During ambulation pt began to drag the cane and then was unable to hold it in her R hand. Pt also w/ increased dysarthria and poor topic maintenance. Pt w/ increased R facial droop as well. Pt veering to the R to return to room. Once sitting upon neuro assessment, pt sticking tongue to the R and unable to bring to midline w/ continued speech deficits. Discussed w/ RN and then came back to visit the pt w/ speech symptoms starting to resolve. Pt aware stating \"I don't know what just happened, I can't explain it.\" Currently pt is not safe for DC home as she lives alone and presenting w/ signs and symptoms that require medical attention and make pt a high fall risk. Will continue to follow to assess pts functional mobility.  "

## 2018-09-05 VITALS
HEIGHT: 69 IN | WEIGHT: 134.04 LBS | BODY MASS INDEX: 19.85 KG/M2 | SYSTOLIC BLOOD PRESSURE: 151 MMHG | DIASTOLIC BLOOD PRESSURE: 50 MMHG | RESPIRATION RATE: 18 BRPM | OXYGEN SATURATION: 96 % | HEART RATE: 63 BPM | TEMPERATURE: 97.5 F

## 2018-09-05 PROCEDURE — A9270 NON-COVERED ITEM OR SERVICE: HCPCS | Performed by: INTERNAL MEDICINE

## 2018-09-05 PROCEDURE — 700102 HCHG RX REV CODE 250 W/ 637 OVERRIDE(OP): Performed by: NURSE PRACTITIONER

## 2018-09-05 PROCEDURE — 700102 HCHG RX REV CODE 250 W/ 637 OVERRIDE(OP): Performed by: INTERNAL MEDICINE

## 2018-09-05 PROCEDURE — A9270 NON-COVERED ITEM OR SERVICE: HCPCS | Performed by: NURSE PRACTITIONER

## 2018-09-05 PROCEDURE — 99239 HOSP IP/OBS DSCHRG MGMT >30: CPT | Performed by: INTERNAL MEDICINE

## 2018-09-05 PROCEDURE — 700111 HCHG RX REV CODE 636 W/ 250 OVERRIDE (IP): Performed by: NURSE PRACTITIONER

## 2018-09-05 PROCEDURE — A9270 NON-COVERED ITEM OR SERVICE: HCPCS | Performed by: HOSPITALIST

## 2018-09-05 PROCEDURE — 700102 HCHG RX REV CODE 250 W/ 637 OVERRIDE(OP): Performed by: HOSPITALIST

## 2018-09-05 RX ORDER — TRAMADOL HYDROCHLORIDE 50 MG/1
50-100 TABLET ORAL EVERY 6 HOURS PRN
Qty: 20 TAB | Refills: 0 | Status: SHIPPED | OUTPATIENT
Start: 2018-09-05 | End: 2018-09-08

## 2018-09-05 RX ORDER — BENAZEPRIL HYDROCHLORIDE 20 MG/1
20 TABLET ORAL
Status: DISCONTINUED | OUTPATIENT
Start: 2018-09-05 | End: 2018-09-05 | Stop reason: HOSPADM

## 2018-09-05 RX ORDER — BENAZEPRIL HYDROCHLORIDE 20 MG/1
20 TABLET ORAL DAILY
Qty: 30 TAB | Status: ON HOLD
Start: 2018-09-06 | End: 2018-11-04

## 2018-09-05 RX ORDER — ATORVASTATIN CALCIUM 80 MG/1
80 TABLET, FILM COATED ORAL
Qty: 30 TAB | Status: ON HOLD
Start: 2018-09-05 | End: 2018-11-04

## 2018-09-05 RX ORDER — FERROUS GLUCONATE 324(38)MG
324 TABLET ORAL
Qty: 30 TAB
Start: 2018-09-06 | End: 2018-10-12 | Stop reason: CLARIF

## 2018-09-05 RX ORDER — AMLODIPINE BESYLATE 10 MG/1
5 TABLET ORAL
Status: DISCONTINUED | OUTPATIENT
Start: 2018-09-05 | End: 2018-09-05 | Stop reason: HOSPADM

## 2018-09-05 RX ORDER — ISOSORBIDE MONONITRATE 30 MG/1
30 TABLET, EXTENDED RELEASE ORAL DAILY
Status: DISCONTINUED | OUTPATIENT
Start: 2018-09-05 | End: 2018-09-05 | Stop reason: HOSPADM

## 2018-09-05 RX ADMIN — CLOPIDOGREL 75 MG: 75 TABLET, FILM COATED ORAL at 05:53

## 2018-09-05 RX ADMIN — VITAMIN D, TAB 1000IU (100/BT) 2000 UNITS: 25 TAB at 05:55

## 2018-09-05 RX ADMIN — SENNOSIDES AND DOCUSATE SODIUM 2 TABLET: 8.6; 5 TABLET ORAL at 05:55

## 2018-09-05 RX ADMIN — ISOSORBIDE MONONITRATE 30 MG: 30 TABLET ORAL at 09:32

## 2018-09-05 RX ADMIN — FERROUS GLUCONATE 324 MG: 324 TABLET ORAL at 05:56

## 2018-09-05 RX ADMIN — ASPIRIN 162 MG: 81 TABLET, DELAYED RELEASE ORAL at 05:53

## 2018-09-05 RX ADMIN — HEPARIN SODIUM 5000 UNITS: 5000 INJECTION, SOLUTION INTRAVENOUS; SUBCUTANEOUS at 14:00

## 2018-09-05 RX ADMIN — AMLODIPINE BESYLATE 5 MG: 10 TABLET ORAL at 09:32

## 2018-09-05 RX ADMIN — HEPARIN SODIUM 5000 UNITS: 5000 INJECTION, SOLUTION INTRAVENOUS; SUBCUTANEOUS at 05:55

## 2018-09-05 RX ADMIN — BENAZEPRIL HYDROCHLORIDE 20 MG: 20 TABLET, COATED ORAL at 09:32

## 2018-09-05 NOTE — DISCHARGE INSTRUCTIONS
Discharge Instructions    Discharged to skilled nursing facility by medical transportation with escort. Discharged via wheelchair, hospital escort: Yes.  Special equipment needed: Wheelchair    Be sure to schedule a follow-up appointment with your primary care doctor or any specialists as instructed.     Discharge Plan:   Diet Plan: Discussed  Activity Level: Discussed  Smoking Cessation Offered: Patient Counseled  Confirmed Follow up Appointment: Patient to Call and Schedule Appointment  Confirmed Symptoms Management: Discussed  Medication Reconciliation Updated: Yes  Influenza Vaccine Indication: Patient Refuses    I understand that a diet low in cholesterol, fat, and sodium is recommended for good health. Unless I have been given specific instructions below for another diet, I accept this instruction as my diet prescription.   Other diet: Regular diet as tolerated    Special Instructions:   Monitor for signs and symptoms of infection (fever, chills, nausea, vomiting)  Monitor incisions for swelling, redness, or drainage    Discharge Instructions  Procedure: carotid endarterectomy     1. ACTIVITIES: Upon discharge from the hospital, the day of surgery it is requested that you do no significant physical activity and no driving as you have had sedation. The day after surgery, you may resume activities of daily living, but for two weeks, no strenuous activities or heavy lifting (greater than 15 pounds).      2. DRIVING: You may drive whenever you are off pain medications and are able to perform the activities needed to drive, i.e. turning, bending, twisting, etc.      3. WOUND: It is not unusual for patients to experience swelling and even bruising, as well as a small amount of drainage from the incision. This is normal and will resolve over the next few days.      4. ICE: you may place ice on the wound to decrease the swelling for the first 24 hours and then discontinue. Apply ice for 20 minutes and then remove for  20 minutes, alternating while awake.     5. BATHING: Remove your white bandage 2 days after discharge, no new bandage is required. OK to shower with the bandage on and after the bandage is removed the incision can get wet directly.     6. PAIN MEDICATION: You will be given a prescription for pain medication at discharge. Please take these as directed. It is important to remember not to take medications on an empty stomach as this may cause nausea.      7. BOWEL FUNCTION: After surgery, it is not uncommon for patients to experience constipation. This is due to decreasing activity levels as well as pain medications. You may wish to use a stool softener beginning immediately after surgery, and you may or may not need to use a laxative (Milk of Magnesia, Ex-lax; Senokot, etc.) as well.      8 .CALL IF YOU HAVE: (1) Fevers to more than 101.0 F, (2) Unusual or excessive pain, (3) Drainage or fluid from incision that may be foul smelling, increased tenderness or soreness at the wound or the wound edges are no longer together, redness or swelling at the incision site. Please do not hesitate to call with any other questions.      9. APPOINTMENT: Contact our office at [phone] for a follow-up appointment 2 weeks following your procedure.      If you have any additional questions, please do not hesitate to call the office and speak to either myself or the physician on call.      Office addresses:   [address] [place] [address] [address]  [facility] [address]  [phone]     Naga Abad MD  [facility]  [phone]        Carotid Endarterectomy   A carotid endarterectomy is a surgery to remove a blockage in the carotid arteries. The carotid arteries are the large blood vessels on both sides of the neck that supply blood to the brain. Carotid artery disease, also called carotid artery stenosis, is the narrowing or blockage of one or both carotid arteries. Carotid artery disease is usually caused by atherosclerosis, which is a  buildup of fat and plaque in the arteries. Some plaque buildup normally occurs with aging. The plaque may partially or totally block blood flow or cause a clot to form in the carotid arteries.   LET YOUR HEALTH CARE PROVIDER KNOW ABOUT:  · Any allergies you have.    · All medicines you are taking, including vitamins, herbs, eye drops, creams, and over-the-counter medicines.    · Use of steroids (by mouth or creams).    · Previous problems you or members of your family have had with the use of anesthetics.    · Any blood disorders you have.    · Previous surgeries you have had.    · Medical conditions you have, including diabetes and kidney problems.    · Possibility of pregnancy, if this applies.    RISKS AND COMPLICATIONS   Generally, carotid endarterectomy is a safe procedure. However, problems can occur and include:  · Bleeding.    · Infection.    · Transient ischemic attacks (TIAs). A TIA results from poor blood flow to the brain, but there is no permanent loss of brain function.    · Stroke.    · Heart attack (myocardial infarction).    · High blood pressure (hypertension).    · Injury to nerves near the carotid arteries.    BEFORE THE PROCEDURE     · Ask your health care provider about changing or stopping your regular medicines. This is especially important if you are taking diabetes medicines or blood thinners.  · Do not eat or drink anything after midnight on the night before the procedure or as directed by your health care provider. Ask your health care provider if it is okay to take any needed medicines with a sip of water.    · Do not smoke for as long as possible before the surgery. Smoking will increase the chance of a healing problem after surgery.    · You may need to have blood tests, a test to check heart rhythm (electrocardiography), or a test to check blood flow (angiography) done before the surgery.    PROCEDURE   · You will be given a medicine that makes you fall asleep (general  anesthetic).  · After you receive the anesthetic, the surgeon will make a small cut (incision) in your neck to expose the carotid artery.  · A tube may be inserted into the carotid artery above and below the blockage. This tube will temporarily allow blood to flow around the blockage during the surgery.  · An incision will be made in the carotid artery at the location of the blockage, and the blockage will then be removed. In some cases, a section of the carotid artery may be removed and a graft patch used to repair the artery.  · The carotid artery will then be closed with stitches (sutures).  · If a tube was inserted into the artery to allow blood flow around the blockage during surgery, the tube will be removed. With the tube removal, blood flow to the brain will be restored through the carotid artery.  · The incision in the neck will then be closed with sutures.  AFTER THE PROCEDURE     You may have some pain or an ache in your neck for a couple weeks. This is normal.   This information is not intended to replace advice given to you by your health care provider. Make sure you discuss any questions you have with your health care provider.  Document Released: 08/20/2014 Document Revised: 01/08/2016 Document Reviewed: 08/20/2014  [employer] Interactive Patient Education © 2017 [employer]    Carotid Endarterectomy, Care After  Refer to this sheet in the next few weeks. These instructions provide you with information on caring for yourself after your procedure. Your health care provider may also give you more specific instructions. Your treatment has been planned according to current medical practices, but problems sometimes occur. Call your health care provider if you have any problems or questions after your procedure.   WHAT TO EXPECT AFTER THE PROCEDURE  You may have some pain or an ache in your neck for up to 2 weeks. This is normal. Recovery time varies depending on your age, condition, general health, and  other factors. You will likely be able to return to a normal lifestyle within a few weeks.   HOME CARE INSTRUCTIONS   · Take showers if your health care provider approves. Do not take baths, swim, or use a hot tub until your health care provider approves.    · Take medicines only as directed by your health care provider. If a blood thinner (anticoagulant) is prescribed after surgery, take this medicine exactly as directed.  · Change bandages (dressings) as directed by your health care provider.    · Avoid heavy lifting or strenuous activity until your health care provider says it is okay. Resume your normal activities as directed.    · Stop smoking if you smoke. This is a risk factor for poor wound healing.    · Stop taking the pill (oral contraceptives) unless your health care provider recommends otherwise.    · Maintain good control of your blood pressure.    · Exercise regularly or as instructed by your health care provider.    · Eat a heart-healthy diet. Talk to your health care provider about how to lower blood lipids (cholesterol and triglycerides).    · Keep all follow-up visits as directed by your health care provider. Make an appointment for the removal of stitches (sutures) or staples.  SEEK MEDICAL CARE IF:   · You have increased bleeding from the incision site.    · You notice redness, swelling, or increasing pain at the incision site.    · You notice swelling in your neck or have difficulty breathing or talking.    · You notice a bad smell or pus coming from the incision site or dressing.    · You have a fever.   · You develop a rash.    · You develop any reaction or side effects to medicine given.    SEEK IMMEDIATE MEDICAL CARE IF:   · Your initial symptoms are getting worse rather than better.    · You develop any abnormal bruising or bleeding.    · You have difficulty breathing.    · You develop chest pain, shortness of breath, or pain or swelling in your legs.    · You have a return of symptoms or  problems that caused you to have this surgery.    · You develop a temporary loss of vision.    · You develop temporary numbness on one side.    · You develop a temporary inability to speak (aphasia).    · You develop temporary weakness.    MAKE SURE YOU:   · Understand these instructions.  · Will watch your condition.  · Will get help right away if you are not doing well or get worse.     This information is not intended to replace advice given to you by your health care provider. Make sure you discuss any questions you have with your health care provider.     Document Released: 07/07/2006 Document Revised: 01/08/2016 Document Reviewed: 05/21/2014  [employer] Interactive Patient Education ©2016 [employer]        Depression / Suicide Risk    As you are discharged from this [facility] facility, it is important to learn how to keep safe from harming yourself.    Recognize the warning signs:  · Abrupt changes in personality, positive or negative- including increase in energy   · Giving away possessions  · Change in eating patterns- significant weight changes-  positive or negative  · Change in sleeping patterns- unable to sleep or sleeping all the time   · Unwillingness or inability to communicate  · Depression  · Unusual sadness, discouragement and loneliness  · Talk of wanting to die  · Neglect of personal appearance   · Rebelliousness- reckless behavior  · Withdrawal from people/activities they love  · Confusion- inability to concentrate     If you or a loved one observes any of these behaviors or has concerns about self-harm, here's what you can do:  · Talk about it- your feelings and reasons for harming yourself  · Remove any means that you might use to hurt yourself (examples: pills, rope, extension cords, firearm)  · Get professional help from the community (Mental Health, Substance Abuse, psychological counseling)  · Do not be alone:Call your Safe Contact- someone whom you trust who will be there for  you.  · Call your local CRISIS HOTLINE 423-5519 or 344-165-4974  · Call your local Children's Mobile Crisis Response Team [place] (202) 879-9504 or www.[employer]  · Call the toll free National Suicide Prevention Hotlines   · National Suicide Prevention Lifeline 615-160-WQMA (3459)  · National [employer] Line Network 800-SUICIDE (250-7120)

## 2018-09-05 NOTE — PROGRESS NOTES
Bedside report received.  Assessment and neuro assessment complete.  A&O x 4. Patient calls appropriately.  Patient up with standby assist, and FWW. Bed alarm on.   Patient has 8/10 pain. Medicated per MAR.  Denies N&V. Tolerating Regular diet..  + void, + flatus  Patient denies SOB.  SCD's on.  Patient laying in bed resting.  Review plan with of care with patient. Call light and personal belongings with in reach. Hourly rounding in place. All needs met at this time.

## 2018-09-05 NOTE — DISCHARGE PLANNING
Received Transport Form @ 1110  Spoke to Nubia @ [facility]    Transport is scheduled for 9/5/18 @1300 going to [facility].    DOUGLAS Carpio aware

## 2018-09-05 NOTE — THERAPY
Attempted to see pt for Occupational Therapy treatment today.  RN/SW informed OT that pt is discharging to SNF and transport in route. OT treatment withheld. Recommend continued OT services to increase strength, safety and Indep with self care tasks, I ADL's and ADL transfers to return to PLOF. RN/SW informed and agreed.

## 2018-09-05 NOTE — PROGRESS NOTES
Assumed care at 0700. Received report from night shift RN. Bedside report completed. AOx4.    Denies pain.  Denies nausea.  Tolerating diet. +voiding. LBM 9/4.  R. Neck dressing CDI.  Ambulating with 1 assist and FWW. Pt call light and belongings within reach, fall precautions in place. Bed alarm on

## 2018-09-05 NOTE — DISCHARGE SUMMARY
Discharge Summary    CHIEF COMPLAINT ON ADMISSION  Chief Complaint   Patient presents with   • Extremity Weakness     right hand weakness for 1 week, pt c/o dropping a lot of things this week       Reason for Admission  EMS 09     CODE STATUS  DNAR/DNI    HPI & HOSPITAL COURSE  This is a 84 y.o. female here with PMHx of HTN, CAD, PAD s/p femoral endarterectomy was admitted to the hospital for acute right-sided weakness due to ischemic infarct involving right frontal lobe as well as lfeft frontal and left parietal regions as noted on MRI findings.  CVA workup revealed severe right ICA stenosis on carotid U/S.  Vascular Surgery and Neurology were consulted.  Patient was already on ASA/Plavix and Statin due to CAD.  CTA was ordered per Vasc Surg recommendation, confirming severe R ICA stenosis.  Vasc Surgery performed R CEA on 9/3 with no complications.  Patient resumed outpatient BP medications and antiplatelet therapies with no complications.  PT/OT evaluated patient and recommended SNF for further PT/OT services.  At this time, patient is medically stable for discharge to SNF to continue PT/OT services.           Therefore, she is discharged in fair and stable condition to skilled nursing facility.    The patient met 2-midnight criteria for an inpatient stay at the time of discharge.      FOLLOW UP ITEMS POST DISCHARGE  NONE    DISCHARGE DIAGNOSES  Principal Problem:    CVA (cerebral vascular accident), acute, right frontal deep white matter POA: Yes  Active Problems:    CAD (coronary artery disease) POA: Yes    Right arm weakness POA: Yes    Hyperlipidemia POA: Yes    Hypertension POA: Yes    Tobacco abuse POA: Yes    Nocturnal hypoxia POA: Yes    Iron deficiency anemia POA: Yes    CKD (chronic kidney disease) stage 3, GFR 30-59 ml/min POA: Yes    Mild atherosclerosis of left carotid artery POA: Yes  Resolved Problems:    Symptomatic stenosis of right carotid artery POA: Yes      FOLLOW UP  Future  Appointments  Date Time Provider Department Center   10/29/2018 2:40 PM [facility] None   11/19/2018 1:20 PM Severo Mancilla M.D. [facility] None     Benjamin Bernard M.D.  [address] [facility]  [address]  [facility] [address]  [phone]    Go on 9/11/2018  Please arrive at 10:20am for your appointment with Ilene PAYAN as Dr Borrego was booked out . Thank you     [facility] ([facility])  [address] [place] [address]  [phone]          MEDICATIONS ON DISCHARGE     Medication List      START taking these medications      Instructions   ferrous gluconate 324 (38 Fe) MG Tabs  Commonly known as:  FERGON   Take 1 Tab by mouth every morning with breakfast.  Dose:  324 mg     tramadol 50 MG Tabs  Commonly known as:  ULTRAM   Take 1-2 Tabs by mouth every 6 hours as needed for Moderate Pain or Severe Pain for up to 3 days.  Dose:   mg        CHANGE how you take these medications      Instructions   atorvastatin 80 MG tablet  What changed:  · medication strength  · See the new instructions.  Commonly known as:  LIPITOR   Take 1 Tab by mouth every bedtime.  Dose:  80 mg     benazepril 20 MG Tabs  What changed:  See the new instructions.  Commonly known as:  LOTENSIN   Take 1 Tab by mouth every day.  Dose:  20 mg        CONTINUE taking these medications      Instructions   amLODIPine 5 MG Tabs  Commonly known as:  NORVASC   TAKE 1 TABLET DAILY     aspirin EC 81 MG Tbec  Commonly known as:  ECOTRIN   Take 1 Tab by mouth every day.  Dose:  81 mg     clopidogrel 75 MG Tabs  Commonly known as:  PLAVIX   Take 1 Tab by mouth every day.  Dose:  75 mg     isosorbide mononitrate SR 30 MG Tb24  Commonly known as:  IMDUR   Take 1 Tab by mouth every day.  Dose:  30 mg     metoprolol 50 MG Tabs  Commonly known as:  LOPRESSOR   Take 1.5 Tabs by mouth 2 times a day.  Dose:  75 mg     NEURONTIN 300 MG Caps  Generic drug:  gabapentin   Take 300 mg by mouth every bedtime.  Dose:  300 mg     vitamin D 2000 UNIT  Tabs   Take 2,000 Units by mouth every day.  Dose:  2000 Units            Allergies  Allergies   Allergen Reactions   • Penicillins Rash and Vomiting     Rxn = unknown  Pt tolerates cephalosporins   • Hydrocodone Vomiting and Nausea     Rxn = unknown   • Tape Rash     RASH       DIET  Orders Placed This Encounter   Procedures   • Diet Order Regular     Standing Status:   Standing     Number of Occurrences:   1     Order Specific Question:   Diet:     Answer:   Regular [1]       ACTIVITY  As tolerated.  Weight bearing as tolerated    LINES, DRAINS, AND WOUNDS  This is an automated list. Peripheral IVs will be removed prior to discharge.  PIV Group Left Forearm 22g Flexible Catheter (Active)   Line Secured Taped;Transparent 9/5/2018  8:00 AM   Site Condition / Description Assessed;Patent;Clean;Dry;Intact 9/5/2018  8:00 AM   Dressing Type / Description Transparent;Clean;Dry;Intact 9/5/2018  8:00 AM   Dressing Status Initial Dressing 9/5/2018  8:00 AM   Saline Locked Yes 9/5/2018  8:00 AM   Infiltration Grading Used by [facility] 0 9/5/2018  8:00 AM   Phlebitis Scale (Used by [facility]) 0 9/5/2018  8:00 AM   Date Primary Tubing Changed 09/03/18 9/3/2018  4:30 PM   NEXT Primary Tubing Change  09/04/18 9/3/2018  9:04 PM       PIV Group Right Hand 20g Flexible Catheter (Active)   Line Secured Taped;Transparent 9/5/2018  8:00 AM   Site Condition / Description Assessed;Patent;Clean;Dry;Intact 9/5/2018  8:00 AM   Dressing Type / Description Transparent;Clean;Dry;Intact 9/5/2018  8:00 AM   Dressing Status Initial Dressing 9/5/2018  8:00 AM   Saline Locked Yes 9/5/2018  8:00 AM   Infiltration Grading Used by [facility] 0 9/5/2018  8:00 AM   Phlebitis Scale ([facility] 0 9/5/2018  8:00 AM       Surgical Incision  Incision Right Neck (Active)   Wound Bed Other (comment) 9/5/2018  8:00 AM   Drainage  Minimal;Serosanguinous 9/5/2018  8:00 AM   Periwound Skin Pink;Normal;Intact 9/3/2018  4:30 PM   Daily - Wound Closure  Not Assessed 9/5/2018  8:00 AM   Dressing Options Dry Gauze;Transparent Film 9/5/2018  8:00 AM   Dressing Status / Change Old Drainage;Observed 9/5/2018  8:00 AM   Daily - Dressing Change Observed 9/5/2018  8:00 AM                  MENTAL STATUS ON TRANSFER  Level of Consciousness: Alert  Orientation : Oriented x 4  Speech: Speech Clear    CONSULTATIONS  Neurology  Vascular Surgery  Physical Medicine    PROCEDURES  9/3: Right Carotid Endarterectomy    LABORATORY  Lab Results   Component Value Date    SODIUM 142 09/03/2018    POTASSIUM 3.9 09/03/2018    CHLORIDE 108 09/03/2018    CO2 26 09/03/2018    GLUCOSE 89 09/03/2018    BUN 17 09/03/2018    CREATININE 1.02 09/03/2018        Lab Results   Component Value Date    WBC 8.4 09/03/2018    HEMOGLOBIN 9.7 (L) 09/03/2018    HEMATOCRIT 29.7 (L) 09/03/2018    PLATELETCT 285 09/03/2018        Total time of the discharge process exceeds 42 minutes.

## 2018-09-05 NOTE — PROGRESS NOTES
Discharging Patient to skilled nursing facility per physician order.  Discharged with Jakub turner for [facility].  Demonstrated understanding of discharge instructions, follow up appointments, prescriptions, home care for surgical wound and nursing care instructions.  Ambulating with 1 assist and FWW, voiding without difficulty, pain well controlled, tolerating oral medications, oxygen saturation greater than 90% on 2L O2, tolerating diet.  Educational handouts given and discussed.  Verbalized understanding of discharge instructions and educational handouts.  All questions answered.  Belongings with patient at time of discharge.

## 2018-09-05 NOTE — CARE PLAN
Problem: Acute Care of the Stroke Patient  Goal: Optimal Outcome for the Stroke patient  Outcome: PROGRESSING AS EXPECTED  Neuro assessment every four hours in place.     Problem: Safety  Goal: Will remain free from falls  Outcome: PROGRESSING AS EXPECTED  Will educate pt. On need to utilize call light for assistance and before getting up.   Bed locked and in lowest position, Non-slid slippers on, bed alarm is on.

## 2018-09-05 NOTE — PROGRESS NOTES
Surgery    Minimal pain  Tolerating PO  Ambulating  AFVSS  Abd SND mild TTP  Incision CDI  Drain removed    A/P)  POD 2 Right carotid endarterectomy doing well    - The left carotid has less than 50% stenosis on duplex velocity measurements and on CTA measurements (specifically on the CTA the narrowest point on the ICA is 3.06mm and the normal caliber artery distal to this is 4.21mm, which equals about a 25% stenosis)    - Patient only needs ASA 81 mg daily from carotid standpoint, however it appears she was already taking plavix at home, possibly for coronary stents, so we can continue the plavix with or without aspirin    - Home today from my standpoint  - Instructions and Rx for tramadol printed and in the chart.    Naga Abad MD  General and Vascular Surgery  [facility]  Cell: [phone]

## 2018-09-05 NOTE — DISCHARGE PLANNING
Anticipated Discharge Disposition: [facility]    Action:     This RN CM spoke with the pt and pt's granddaughter at bedside. Per the pt her [facility] choice [facility] then [facility].  The pt has been accepted by [facility].  Dr. Read updated.  Dr. Read spoke with the pt and pt's granddaughter at bedside.  The pt will transfer today.    Barriers to Discharge: none    Plan: Transport form faxed to [facility].       Transport time set up for 1300.

## 2018-09-05 NOTE — DISCHARGE PLANNING
Agency/Facility Name: [facility]  Spoke To: Nubia  Outcome: Bed  available today, requesting  at 1PM.    RN REAL Carpio aware

## 2018-10-02 ENCOUNTER — TELEPHONE (OUTPATIENT)
Dept: CARDIOLOGY | Facility: MEDICAL CENTER | Age: 83
End: 2018-10-02

## 2018-10-02 NOTE — TELEPHONE ENCOUNTER
"metoprolol causing numbness   Received: Today   Message Contents   Ellyn Branch R.N.   Phone Number: [phone]             SC/jaswant Monterroso RN with [facility], calling to report pt is experiencing numbness on right side of body which could be caused by metoprolol. Please call Kriss on cell# [phone]      Attempted to contact Kriss, no answer. LVM    Attempted to contact patient, no answer. LVM    ______________________________________________________    Kriss called back, transferred by .  She reports:    Patient states when she started Metoprolol she develops right sided numbness.  (upon chart review, patient has existing right sided weakness, previous stroke).      [facility] nurse, notes that BP was: 142/76.  Patient has not taken metoprolol consistently and avoided taking it because of the \"side effects\".  Advised for patient to try taking 1 tab (vs. 1.5 tabs) and see how she feels.  Her BP trends remain high and patient should benefit by taking this medication.  Discussed s/s of stroke, chest pain, chest pressure, shortness of breath, difficulty breathing and when to initiate EMS.  [facility] nurse verbalizes understanding.     Next FV November with RS      "

## 2018-10-12 ENCOUNTER — HOSPITAL ENCOUNTER (INPATIENT)
Facility: MEDICAL CENTER | Age: 83
LOS: 6 days | DRG: 065 | End: 2018-10-18
Attending: EMERGENCY MEDICINE | Admitting: INTERNAL MEDICINE
Payer: MEDICARE

## 2018-10-12 ENCOUNTER — APPOINTMENT (OUTPATIENT)
Dept: RADIOLOGY | Facility: MEDICAL CENTER | Age: 83
DRG: 065 | End: 2018-10-12
Attending: EMERGENCY MEDICINE
Payer: MEDICARE

## 2018-10-12 DIAGNOSIS — I73.9 PERIPHERAL ARTERIAL DISEASE (HCC): ICD-10-CM

## 2018-10-12 DIAGNOSIS — I63.9 CEREBROVASCULAR ACCIDENT (CVA), UNSPECIFIED MECHANISM (HCC): ICD-10-CM

## 2018-10-12 PROBLEM — I63.512 CEREBROVASCULAR ACCIDENT (CVA) DUE TO STENOSIS OF LEFT MIDDLE CEREBRAL ARTERY (HCC): Status: ACTIVE | Noted: 2018-10-12

## 2018-10-12 LAB
ABO GROUP BLD: NORMAL
ALBUMIN SERPL BCP-MCNC: 3.3 G/DL (ref 3.2–4.9)
ALBUMIN/GLOB SERPL: 1.5 G/DL
ALP SERPL-CCNC: 52 U/L (ref 30–99)
ALT SERPL-CCNC: 12 U/L (ref 2–50)
ANION GAP SERPL CALC-SCNC: 12 MMOL/L (ref 0–11.9)
APPEARANCE UR: CLEAR
APTT PPP: 26.7 SEC (ref 24.7–36)
AST SERPL-CCNC: 21 U/L (ref 12–45)
BASOPHILS # BLD AUTO: 0.6 % (ref 0–1.8)
BASOPHILS # BLD: 0.05 K/UL (ref 0–0.12)
BILIRUB SERPL-MCNC: 0.3 MG/DL (ref 0.1–1.5)
BILIRUB UR QL STRIP.AUTO: NEGATIVE
BLD GP AB SCN SERPL QL: NORMAL
BUN SERPL-MCNC: 14 MG/DL (ref 8–22)
CALCIUM SERPL-MCNC: 9.3 MG/DL (ref 8.4–10.2)
CHLORIDE SERPL-SCNC: 102 MMOL/L (ref 96–112)
CO2 SERPL-SCNC: 24 MMOL/L (ref 20–33)
COLOR UR: YELLOW
CREAT SERPL-MCNC: 1.08 MG/DL (ref 0.5–1.4)
EKG IMPRESSION: NORMAL
EOSINOPHIL # BLD AUTO: 0.13 K/UL (ref 0–0.51)
EOSINOPHIL NFR BLD: 1.6 % (ref 0–6.9)
ERYTHROCYTE [DISTWIDTH] IN BLOOD BY AUTOMATED COUNT: 48.3 FL (ref 35.9–50)
GLOBULIN SER CALC-MCNC: 2.2 G/DL (ref 1.9–3.5)
GLUCOSE SERPL-MCNC: 121 MG/DL (ref 65–99)
GLUCOSE UR STRIP.AUTO-MCNC: NEGATIVE MG/DL
HCT VFR BLD AUTO: 28.8 % (ref 37–47)
HGB BLD-MCNC: 9.1 G/DL (ref 12–16)
IMM GRANULOCYTES # BLD AUTO: 0.03 K/UL (ref 0–0.11)
IMM GRANULOCYTES NFR BLD AUTO: 0.4 % (ref 0–0.9)
INR PPP: 0.99 (ref 0.87–1.13)
KETONES UR STRIP.AUTO-MCNC: NEGATIVE MG/DL
LEUKOCYTE ESTERASE UR QL STRIP.AUTO: NEGATIVE
LYMPHOCYTES # BLD AUTO: 1.5 K/UL (ref 1–4.8)
LYMPHOCYTES NFR BLD: 18.4 % (ref 22–41)
MCH RBC QN AUTO: 28.8 PG (ref 27–33)
MCHC RBC AUTO-ENTMCNC: 31.6 G/DL (ref 33.6–35)
MCV RBC AUTO: 91.1 FL (ref 81.4–97.8)
MICRO URNS: NORMAL
MONOCYTES # BLD AUTO: 0.77 K/UL (ref 0–0.85)
MONOCYTES NFR BLD AUTO: 9.5 % (ref 0–13.4)
NEUTROPHILS # BLD AUTO: 5.66 K/UL (ref 2–7.15)
NEUTROPHILS NFR BLD: 69.5 % (ref 44–72)
NITRITE UR QL STRIP.AUTO: NEGATIVE
NRBC # BLD AUTO: 0 K/UL
NRBC BLD-RTO: 0 /100 WBC
PH UR STRIP.AUTO: 6.5 [PH]
PLATELET # BLD AUTO: 314 K/UL (ref 164–446)
PMV BLD AUTO: 8.9 FL (ref 9–12.9)
POTASSIUM SERPL-SCNC: 4 MMOL/L (ref 3.6–5.5)
PROT SERPL-MCNC: 5.5 G/DL (ref 6–8.2)
PROT UR QL STRIP: NEGATIVE MG/DL
PROTHROMBIN TIME: 13.2 SEC (ref 12–14.6)
RBC # BLD AUTO: 3.16 M/UL (ref 4.2–5.4)
RBC UR QL AUTO: NEGATIVE
RH BLD: NORMAL
SODIUM SERPL-SCNC: 138 MMOL/L (ref 135–145)
SP GR UR STRIP.AUTO: 1.03
TROPONIN I SERPL-MCNC: <0.01 NG/ML (ref 0–0.04)
UROBILINOGEN UR STRIP.AUTO-MCNC: 0.2 MG/DL
WBC # BLD AUTO: 8.1 K/UL (ref 4.8–10.8)

## 2018-10-12 PROCEDURE — 99223 1ST HOSP IP/OBS HIGH 75: CPT | Mod: AI | Performed by: INTERNAL MEDICINE

## 2018-10-12 PROCEDURE — 85610 PROTHROMBIN TIME: CPT

## 2018-10-12 PROCEDURE — 84484 ASSAY OF TROPONIN QUANT: CPT

## 2018-10-12 PROCEDURE — A9270 NON-COVERED ITEM OR SERVICE: HCPCS | Performed by: NURSE PRACTITIONER

## 2018-10-12 PROCEDURE — 700111 HCHG RX REV CODE 636 W/ 250 OVERRIDE (IP): Performed by: INTERNAL MEDICINE

## 2018-10-12 PROCEDURE — 93005 ELECTROCARDIOGRAM TRACING: CPT | Performed by: EMERGENCY MEDICINE

## 2018-10-12 PROCEDURE — 71045 X-RAY EXAM CHEST 1 VIEW: CPT

## 2018-10-12 PROCEDURE — 700117 HCHG RX CONTRAST REV CODE 255: Performed by: EMERGENCY MEDICINE

## 2018-10-12 PROCEDURE — 86900 BLOOD TYPING SEROLOGIC ABO: CPT

## 2018-10-12 PROCEDURE — 85025 COMPLETE CBC W/AUTO DIFF WBC: CPT

## 2018-10-12 PROCEDURE — 36415 COLL VENOUS BLD VENIPUNCTURE: CPT

## 2018-10-12 PROCEDURE — 770006 HCHG ROOM/CARE - MED/SURG/GYN SEMI*

## 2018-10-12 PROCEDURE — 700102 HCHG RX REV CODE 250 W/ 637 OVERRIDE(OP): Performed by: INTERNAL MEDICINE

## 2018-10-12 PROCEDURE — 0042T CT-CEREBRAL PERFUSION ANALYSIS: CPT

## 2018-10-12 PROCEDURE — 70498 CT ANGIOGRAPHY NECK: CPT

## 2018-10-12 PROCEDURE — 85730 THROMBOPLASTIN TIME PARTIAL: CPT

## 2018-10-12 PROCEDURE — 70450 CT HEAD/BRAIN W/O DYE: CPT

## 2018-10-12 PROCEDURE — 80053 COMPREHEN METABOLIC PANEL: CPT

## 2018-10-12 PROCEDURE — 70496 CT ANGIOGRAPHY HEAD: CPT

## 2018-10-12 PROCEDURE — 700102 HCHG RX REV CODE 250 W/ 637 OVERRIDE(OP): Performed by: NURSE PRACTITIONER

## 2018-10-12 PROCEDURE — 86901 BLOOD TYPING SEROLOGIC RH(D): CPT

## 2018-10-12 PROCEDURE — 86850 RBC ANTIBODY SCREEN: CPT

## 2018-10-12 PROCEDURE — 81003 URINALYSIS AUTO W/O SCOPE: CPT

## 2018-10-12 PROCEDURE — 304561 HCHG STAT O2

## 2018-10-12 PROCEDURE — 99285 EMERGENCY DEPT VISIT HI MDM: CPT

## 2018-10-12 PROCEDURE — A9270 NON-COVERED ITEM OR SERVICE: HCPCS | Performed by: INTERNAL MEDICINE

## 2018-10-12 PROCEDURE — 94760 N-INVAS EAR/PLS OXIMETRY 1: CPT

## 2018-10-12 RX ORDER — GABAPENTIN 300 MG/1
300 CAPSULE ORAL 2 TIMES DAILY
Status: DISCONTINUED | OUTPATIENT
Start: 2018-10-12 | End: 2018-10-18 | Stop reason: HOSPADM

## 2018-10-12 RX ORDER — ASPIRIN 300 MG/1
300 SUPPOSITORY RECTAL DAILY
Status: CANCELLED | OUTPATIENT
Start: 2018-10-12

## 2018-10-12 RX ORDER — ONDANSETRON 2 MG/ML
4 INJECTION INTRAMUSCULAR; INTRAVENOUS EVERY 4 HOURS PRN
Status: DISCONTINUED | OUTPATIENT
Start: 2018-10-12 | End: 2018-10-18 | Stop reason: HOSPADM

## 2018-10-12 RX ORDER — CLOPIDOGREL BISULFATE 75 MG/1
75 TABLET ORAL DAILY
Status: DISCONTINUED | OUTPATIENT
Start: 2018-10-13 | End: 2018-10-12

## 2018-10-12 RX ORDER — CLOPIDOGREL BISULFATE 75 MG/1
75 TABLET ORAL DAILY
Status: DISCONTINUED | OUTPATIENT
Start: 2018-10-12 | End: 2018-10-13

## 2018-10-12 RX ORDER — ONDANSETRON 4 MG/1
4 TABLET, ORALLY DISINTEGRATING ORAL EVERY 4 HOURS PRN
Status: DISCONTINUED | OUTPATIENT
Start: 2018-10-12 | End: 2018-10-18 | Stop reason: HOSPADM

## 2018-10-12 RX ORDER — ATORVASTATIN CALCIUM 20 MG/1
80 TABLET, FILM COATED ORAL
Status: DISCONTINUED | OUTPATIENT
Start: 2018-10-12 | End: 2018-10-12

## 2018-10-12 RX ORDER — POLYETHYLENE GLYCOL 3350 17 G/17G
1 POWDER, FOR SOLUTION ORAL
Status: DISCONTINUED | OUTPATIENT
Start: 2018-10-12 | End: 2018-10-18 | Stop reason: HOSPADM

## 2018-10-12 RX ORDER — ASPIRIN 325 MG
325 TABLET ORAL DAILY
Status: DISCONTINUED | OUTPATIENT
Start: 2018-10-12 | End: 2018-10-13

## 2018-10-12 RX ORDER — BISACODYL 10 MG
10 SUPPOSITORY, RECTAL RECTAL
Status: DISCONTINUED | OUTPATIENT
Start: 2018-10-12 | End: 2018-10-18 | Stop reason: HOSPADM

## 2018-10-12 RX ORDER — LATANOPROST 50 UG/ML
1 SOLUTION/ DROPS OPHTHALMIC
Status: DISCONTINUED | OUTPATIENT
Start: 2018-10-12 | End: 2018-10-18 | Stop reason: HOSPADM

## 2018-10-12 RX ORDER — ATORVASTATIN CALCIUM 40 MG/1
40 TABLET, FILM COATED ORAL EVERY EVENING
Status: DISCONTINUED | OUTPATIENT
Start: 2018-10-12 | End: 2018-10-18 | Stop reason: HOSPADM

## 2018-10-12 RX ORDER — AMOXICILLIN 250 MG
2 CAPSULE ORAL 2 TIMES DAILY
Status: DISCONTINUED | OUTPATIENT
Start: 2018-10-12 | End: 2018-10-18 | Stop reason: HOSPADM

## 2018-10-12 RX ORDER — HEPARIN SODIUM 5000 [USP'U]/ML
5000 INJECTION, SOLUTION INTRAVENOUS; SUBCUTANEOUS EVERY 8 HOURS
Status: DISCONTINUED | OUTPATIENT
Start: 2018-10-12 | End: 2018-10-18 | Stop reason: HOSPADM

## 2018-10-12 RX ORDER — ASPIRIN 81 MG/1
324 TABLET, CHEWABLE ORAL DAILY
Status: CANCELLED | OUTPATIENT
Start: 2018-10-12

## 2018-10-12 RX ADMIN — ATORVASTATIN CALCIUM 40 MG: 40 TABLET, FILM COATED ORAL at 21:26

## 2018-10-12 RX ADMIN — ASPIRIN 325 MG: 325 TABLET, COATED ORAL at 22:55

## 2018-10-12 RX ADMIN — IOHEXOL 100 ML: 350 INJECTION, SOLUTION INTRAVENOUS at 16:33

## 2018-10-12 RX ADMIN — CLOPIDOGREL 75 MG: 75 TABLET, FILM COATED ORAL at 22:54

## 2018-10-12 RX ADMIN — IOHEXOL 40 ML: 350 INJECTION, SOLUTION INTRAVENOUS at 16:32

## 2018-10-12 RX ADMIN — HEPARIN SODIUM 5000 UNITS: 5000 INJECTION, SOLUTION INTRAVENOUS; SUBCUTANEOUS at 22:55

## 2018-10-12 RX ADMIN — GABAPENTIN 300 MG: 300 CAPSULE ORAL at 21:26

## 2018-10-12 RX ADMIN — LATANOPROST 1 DROP: 50 SOLUTION OPHTHALMIC at 22:55

## 2018-10-12 ASSESSMENT — ENCOUNTER SYMPTOMS
BRUISES/BLEEDS EASILY: 0
DIZZINESS: 0
BLURRED VISION: 0
WEAKNESS: 1
HEMOPTYSIS: 0
MYALGIAS: 0
LOSS OF CONSCIOUSNESS: 0
NAUSEA: 0
NECK PAIN: 0
PALPITATIONS: 0
DEPRESSION: 0
SPUTUM PRODUCTION: 0
FOCAL WEAKNESS: 1
SENSORY CHANGE: 1
PSYCHIATRIC NEGATIVE: 1
CHILLS: 0
TINGLING: 0
VOMITING: 0
FEVER: 0
SHORTNESS OF BREATH: 0
HEADACHES: 0
ABDOMINAL PAIN: 0

## 2018-10-12 ASSESSMENT — COGNITIVE AND FUNCTIONAL STATUS - GENERAL
DRESSING REGULAR UPPER BODY CLOTHING: A LITTLE
CLIMB 3 TO 5 STEPS WITH RAILING: A LITTLE
HELP NEEDED FOR BATHING: A LITTLE
MOBILITY SCORE: 21
TOILETING: A LITTLE
STANDING UP FROM CHAIR USING ARMS: A LITTLE
WALKING IN HOSPITAL ROOM: A LITTLE
DAILY ACTIVITIY SCORE: 21
SUGGESTED CMS G CODE MODIFIER DAILY ACTIVITY: CJ
SUGGESTED CMS G CODE MODIFIER MOBILITY: CJ

## 2018-10-12 ASSESSMENT — PATIENT HEALTH QUESTIONNAIRE - PHQ9
2. FEELING DOWN, DEPRESSED, IRRITABLE, OR HOPELESS: NOT AT ALL
SUM OF ALL RESPONSES TO PHQ9 QUESTIONS 1 AND 2: 0
1. LITTLE INTEREST OR PLEASURE IN DOING THINGS: NOT AT ALL
SUM OF ALL RESPONSES TO PHQ9 QUESTIONS 1 AND 2: 0
2. FEELING DOWN, DEPRESSED, IRRITABLE, OR HOPELESS: NOT AT ALL
1. LITTLE INTEREST OR PLEASURE IN DOING THINGS: NOT AT ALL

## 2018-10-12 ASSESSMENT — LIFESTYLE VARIABLES
ALCOHOL_USE: NO
EVER_SMOKED: YES
SUBSTANCE_ABUSE: 0

## 2018-10-12 ASSESSMENT — COPD QUESTIONNAIRES
DO YOU EVER COUGH UP ANY MUCUS OR PHLEGM?: NO/ONLY WITH OCCASIONAL COLDS OR INFECTIONS
COPD SCREENING SCORE: 5
DURING THE PAST 4 WEEKS HOW MUCH DID YOU FEEL SHORT OF BREATH: SOME OF THE TIME
IN THE PAST 12 MONTHS DO YOU DO LESS THAN YOU USED TO BECAUSE OF YOUR BREATHING PROBLEMS: DISAGREE/UNSURE
HAVE YOU SMOKED AT LEAST 100 CIGARETTES IN YOUR ENTIRE LIFE: YES

## 2018-10-12 ASSESSMENT — PAIN SCALES - GENERAL
PAINLEVEL_OUTOF10: 0
PAINLEVEL_OUTOF10: 0

## 2018-10-12 NOTE — TELEPHONE ENCOUNTER
I don't know what you are wanting me to do. She needs to be seen to discuss her symptoms. She has severe carotid disease, recent hospitalization for weakness, she needs further discussion. I don't think her weakness is from metoprolol. Please advise for further questions. SC

## 2018-10-12 NOTE — TELEPHONE ENCOUNTER
Received fax from [place] notifying us that pts metoprolol was stopped 10/03 due to side effects including numbness to the right side.     Called Fredy to clarify and s/w Enriqueta. She will have Kriss, the nurse who is seeing the pt call back to discuss. Would like to know her current blood pressure readings off metoprolol and her current symptoms.      S/w Kriss RN, who reports her BP readings which are labile since off metoprolol:  10/08  BP 90/59, 9th 141/65, 10th 110/60, 11th 148/64. Kriss explains that since being off of metoprolol she did have another episode of one sided weakness and was instructed to go to the ER for stroke evaluation however pt refused. Kriss said she just talked to pt today and she has not had symptoms for the last few days and PT is working with her daily to improve strength.  They were hoping to get her in sooner with cardiology (current follow up 11/19 w/ Dr. Mccallum) however pt does not want to see SC and only wants to see Dr. mccallum and he has no sooner openings.       Kriss can be reached at [phone].     ANT to SAMAN

## 2018-10-12 NOTE — CONSULTS
"Chief Complaint   Patient presents with   • Possible Stroke     pt states numbness to midline of tongue starting at 0600.  pt reports a 1330 she had right hand weakness, dropping several drinking cups.  pt reports hx of TIA a few months ago. TIA symptoms last week that self resolved.     • Weakness   • Numbness       Problem List Items Addressed This Visit     None        Stroke Alert Consult    History of present illness:  This is an 84-year old female with PMHx significant for hypertension, dyslipidemia, CAD, MI, Right CEA following a Right frontal ischemic CVA (September 2018), remote hx of tobacco abuse (quite 6 months ago) and multiple recent TIAs (self-diagnosed) who presented to [facility] on 10/12/18 for a chief complaint of slurred speech, Right hand numbness/weakness and RLE weakness. The patient states that she awoke this morning around 0800 and noted her tongue felt \"heavy.\" Noted that her speech was slurred as she began to talk, also reports feeling generally fatigued. Shortly after she began to do work around the house; noted persistent Right hand weakness (was dropping items, spilled coffee) thus she decided to lay down/rest. When she awoke this afternoon (around 1400), she reports that symptoms were mildly improved but not resolved, prompting her to call EMS. At time of presentation here Stroke Alert was activated, however patient was determined not to be a candidate for IV tPA secondary to LKW > 4.5 hours. CT Brain at time of presentation revealed no acute intracranial abnormality. Of note, patient admits to multiple (2-3) similar episodes over the past several weeks; transient, with resolved symptoms after each episode.  Currently, patient is sitting up in bed; awake and alert. Still admits to very mild Right hand weakness and mild RLE weakness. Denies headache or dizziness, denies new problems with vision or problems swallowing. Denies recent falls or trauma. Neurology consulted by the ED to " further evaluate the findings noted above.     Past medical history:   Past Medical History:   Diagnosis Date   • Acute myocardial infarction, true posterior wall infarction, episode of care unspecified    • Anxiety    • Bowel habit changes    • Breath shortness 2017    uses oxygen at 2 liters/min; AT NIGHT ONLY   • CAD (coronary artery disease)     S/P stent placement   • Cholesterol blood decreased    • Dental disorder     Partial upper   • Dizziness    • GI bleed    • Glaucoma    • Heart burn    • Hyperlipidemia    • Hypertension    • Myocardial infarct (HCC) 6/10/10   • Peripheral vascular disease (HCC)    • Pulmonary disease     bronchitis   • TIA (transient ischemic attack)    • Unspecified hemorrhagic conditions     pt takes plavix   • Unspecified urinary incontinence    • Urinary bladder disorder        Past surgical history:   Past Surgical History:   Procedure Laterality Date   • CAROTID ENDARTERECTOMY Right 9/3/2018    Procedure: CAROTID ENDARTERECTOMY WITH NEUROMONITORING;  Surgeon: Naga Abad M.D.;  Location: SURGERY [facility];  Service: Vascular   • GASTROSCOPY WITH BIOPSY  2/13/2016    Procedure: GASTROSCOPY WITH BIOPSY;  Surgeon: Susan Miller M.D.;  Location: [facility];  Service:    • RECOVERY  7/24/2014    Performed by [facility]Recovery Surgery at [facility] SAME DAY [facility]   • CARDIAC CATH  7/24/14    Diffuse disease, see report.  % with collterals.   • FEMORAL ENDARTERECTOMY  10/4/2013    Performed by Kacie Baker M.D. at [facility]   • ANGIOPLASTY BALLOON  10/4/2013    Performed by Kacie Baker M.D. at [facility]   • RECOVERY  4/9/2013    Performed by Ir[facility]Recovery Surgery at [facility] SAME DAY [facility]   • RECOVERY  11/26/2012    Performed by Ir[facility]Recovery Surgery at [facility] SAME DAY [facility]   • RECTUS REPAIR  7/13/2012    Performed by SHEILA PLATT at [facility]   • OTHER  12/10/10    stents in  legs   • OTHER  6/10/10    cardiac stents   • CARDIAC CATH  6/2010    BMS to Om   • OTHER  2005    KNEE SURGERY   • CHOLECYSTECTOMY     • HYSTERECTOMY, TOTAL ABDOMINAL     • OTHER CARDIAC SURGERY      stent in heart   • STENT PLACEMENT      treated by Dr Baker, multiple surgeries to legs.       Family history:   Family History   Problem Relation Age of Onset   • Stroke Mother 68        CVA   • Heart Disease Mother 65        valve surgery   • Other Son         wgt 465 lbs       Social history:   Social History     Social History   • Marital status:      Spouse name: N/A   • Number of children: N/A   • Years of education: N/A     Occupational History   • Not on file.     Social History Main Topics   • Smoking status: Former Smoker     Packs/day: 0.25     Years: 45.00     Types: Cigarettes     Quit date: 2/1/2016   • Smokeless tobacco: Never Used   • Alcohol use 0.0 - 0.6 oz/week      Comment: 1 drink per month   • Drug use: No   • Sexual activity: Not on file     Other Topics Concern   • Not on file     Social History Narrative   • No narrative on file       Current medications:   No current facility-administered medications for this encounter.      Current Outpatient Prescriptions   Medication   • atorvastatin (LIPITOR) 80 MG tablet   • benazepril (LOTENSIN) 20 MG Tab   • ferrous gluconate (FERGON) 324 (38 Fe) MG Tab   • clopidogrel (PLAVIX) 75 MG Tab   • isosorbide mononitrate SR (IMDUR) 30 MG TABLET SR 24 HR   • amLODIPine (NORVASC) 5 MG Tab   • aspirin EC (ECOTRIN) 81 MG Tablet Delayed Response   • metoprolol (LOPRESSOR) 50 MG Tab   • Cholecalciferol (VITAMIN D) 2000 UNIT Tab   • gabapentin (NEURONTIN) 300 MG CAPS       Medication Allergy:  Allergies   Allergen Reactions   • Penicillins Rash and Vomiting     Rxn = unknown  Pt tolerates cephalosporins   • Hydrocodone Vomiting and Nausea     Rxn = unknown   • Tape Rash     RASH       Review of systems:   Constitutional: denies fever, night sweats, weight  "loss, or malaise/fatigue.   Eyes: denies acute vision change, eye pain or secretion.   Ears, Nose, Mouth, Throat: denies nasal bleeding, difficulty swallowing, hearing loss, tinnitus, vertigo, ear pain, acute dental problems, oral ulcers or lesions.   Endocrine: denies recent weight changes, heat or cold intolerance, neck pain or swelling, polyuria, polydypsia, polyphagia.  Cardiovascular: denies new onset of chest pain, palpitations, syncope, lower extremity edema, or dyspnea of exertion.  Pulmonary: denies shortness of breath, new onset of cough, hemoptysis, wheezing, chest pain or flu-like symptoms.   GI: denies nausea, vomiting, diarrhea, GI bleeding, change in appetite, abdominal pain, and change in bowel habits.  : denies dysuria, urinary incontinence, hematuria.  Heme/oncology: denies history of easy bruising or bleeding. No history of cancer or DVT/PE.  Allergy/immunology: denies hives/urticaria.   Dermatologic: denies new rash or lesions.  Musculoskeletal:denies joint swelling or pain, muscle pain, neck and back pain.  Neurologic: As noted above.   Psychiatric: denies symptoms of depression, anxiety, hallucinations, mood swings or changes, suicidal or homicidal thoughts.     Physical examination:   Vitals:    10/12/18 1521 10/12/18 1522   BP:  (!) 170/60   Pulse:  92   Resp:  18   Temp:  36 °C (96.8 °F)   SpO2:  99%   Weight: 60.8 kg (134 lb)    Height: 1.651 m (5' 5\")      General: Patient in no acute distress, pleasant and cooperative.  HEENT: Normocephalic, no signs of acute trauma.   Neck: supple, no meningeal signs or carotid bruits. There is normal range of motion. No tenderness on exam.   Chest: clear to auscultation. No cough.   CV: RRR, no murmurs.   Abdomen: soft, non distended or tender.   Skin: no signs of acute rashes or trauma.   Musculoskeletal: joints exhibit full range of motion, without any pain to palpation. There are no signs of joint or muscle swelling. There is no tenderness to deep " palpation of muscles.   Psychiatric: No hallucinatory behavior. Denies symptoms of depression or suicidal ideation. Mood and affect appear normal on exam.     NEUROLOGICAL EXAM:  Mental status, orientation: Awake, alert and fully oriented.   Speech and language: speech is clear and fluent. The patient is able to name, repeat and comprehend.   Memory: There is intact recollection of recent and remote events.   Cranial nerve exam: Pupils are 3-4 mm bilaterally and equally reactive to light and accommodation. Visual fields are intact by confrontation. Fundoscopic exam was unremarkable. There is no nystagmus on primary or secondary gaze. Intact full EOM in all directions of gaze. Face appears symmetric. Sensation in the face is intact to light touch. Uvula is midline. Palate elevates symmetrically. Tongue is midline and without any signs of tongue biting or fasciculations. Sternocleidomastoid muscles exhibit is normal strength bilaterally. Shoulder shrug is intact bilaterally.   Motor exam: Strength is 5/5 BUE; 5/5 LLE, 4/5 RLE with very mild drift. Tone is normal. No abnormal movements were seen on exam.   Sensory exam Admits to mild decreased sensation to light touch to Right face-- admits that this has been present since CEA in September. Otherwise normal sense of light touch, proprioception, vibration and pinprick in all extremities.   Deep tendon reflexes:  2+ throughout. Plantar responses are flexor. There is no clonus.   Coordination: shows a normal finger-nose-finger. Normal rapidly alternating movements.   Gait: not assessed at this time.     NIH Stroke Scale     1a Level of Consciousness  1b Orientation Questions  1c Response to Commands  2 Gaze  3 Visual Fields  4 Facial Movement  5 Motor Function (arm)      a Left      b Right  6 Motor Function (leg)      a Left      b Right 1  7 Limb Ataxia  8 Sensory  9 Language  10 Articulation  11 Extinction/Inattention     Score: 1    ANCILLARY DATA REVIEWED:     Lab  Data Review:  Recent Results (from the past 24 hour(s))   CBC WITH DIFFERENTIAL    Collection Time: 10/12/18  3:35 PM   Result Value Ref Range    WBC 8.1 4.8 - 10.8 K/uL    RBC 3.16 (L) 4.20 - 5.40 M/uL    Hemoglobin 9.1 (L) 12.0 - 16.0 g/dL    Hematocrit 28.8 (L) 37.0 - 47.0 %    MCV 91.1 81.4 - 97.8 fL    MCH 28.8 27.0 - 33.0 pg    MCHC 31.6 (L) 33.6 - 35.0 g/dL    RDW 48.3 35.9 - 50.0 fL    Platelet Count 314 164 - 446 K/uL    MPV 8.9 (L) 9.0 - 12.9 fL    Neutrophils-Polys 69.50 44.00 - 72.00 %    Lymphocytes 18.40 (L) 22.00 - 41.00 %    Monocytes 9.50 0.00 - 13.40 %    Eosinophils 1.60 0.00 - 6.90 %    Basophils 0.60 0.00 - 1.80 %    Immature Granulocytes 0.40 0.00 - 0.90 %    Nucleated RBC 0.00 /100 WBC    Neutrophils (Absolute) 5.66 2.00 - 7.15 K/uL    Lymphs (Absolute) 1.50 1.00 - 4.80 K/uL    Monos (Absolute) 0.77 0.00 - 0.85 K/uL    Eos (Absolute) 0.13 0.00 - 0.51 K/uL    Baso (Absolute) 0.05 0.00 - 0.12 K/uL    Immature Granulocytes (abs) 0.03 0.00 - 0.11 K/uL    NRBC (Absolute) 0.00 K/uL       Imaging:     CT Brain:  No acute intracranial abnormality.     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     All imaging reviewed by me.     ASSESSMENT AND PLAN:  84-year old female with hypertension, dyslipidemia, CAD, MI, Right CEA following a Right frontal ischemia CVA (September 2018), remote history of tobacco abuse (quite 6 months ago) and multiple recent TIAs (self-diagnosed) who presented to [facility] on 10/12/18 for a chief complaint of slurred speech, Right hand numbness/weakness and RLE weakness. Patient was determined not to be a candidate for IV tPA secondary to LKW > 4.5 hours. NIHSS 1, significant only for mild RLE hemiparesis/drift. CT Brain revealed no acute intracranial abnormality, however will note that CTA brain/neck revealed 50-69% stenosis to Left ICA with chronic appearing Left M1 occlusion, with sufficient  collateral blood supply.     Impression:   Acute ischemic stroke (small vessel vs atheroembolic) vs. TIA.   Hypertension  Dyslipidemia  Remote hx of tobacco abuse.     Recommendations/Plan:     -q4h and PRN neuro assessment, VS per unit protocol. Permissive hypertension OK for 24 hours, not to exceed SBP > 220, DBP >105.   -MRI Brain wo contrast has been ordered and is pending.   -EKG, screen for arrhythmia/A fib. Echocardiogram with bubble study.  -Check Hemoglobin A1c, Lipid panel, TSH.  -ASA, 325 mg PO q day, Plavix 75 mg PO q day (patient admits to taking both of these at home)  -Atorvastatin 80 mg PO q HS.   -PT/OT/Speech eval and treat.  -Counseled patient at length regarding lifestyle and risk factor modification for secondary stroke prevention.   -DVT Prophylaxis: SCDs.     The plan of care above has been discussed with Dr. Ramires.       AMISHA Vickers.

## 2018-10-12 NOTE — ED PROVIDER NOTES
ED Provider Note    Scribed for Naveen Paz M.D. by Juan Malin. 10/12/2018  3:30 PM    Primary care provider: Benjamin Bernard M.D.  Means of arrival: EMS  History obtained from: Patient  History limited by: None    CHIEF COMPLAINT  Chief Complaint   Patient presents with   • Possible Stroke     pt states numbness to midline of tongue starting at 0600.  pt reports a 1330 she had right hand weakness, dropping several drinking cups.  pt reports hx of TIA a few months ago. TIA symptoms last week that self resolved.     • Weakness   • Numbness       HPI  Brooke Diana is a 84 y.o. female with a history of TIA who presents to the Emergency Department for evaluation of a possible wake up stroke. The patient awoke to tongue numbness and right sided facial tingling at 6:30 AM. The numbness and tingling have resolved. Around 1:30 PM the patient began to drop experienced right hand numbness and dropped several cups. She states the numbness in her hand is still present, but slowly resolving. She denies new vision changes. Patient experienced a TIA on 09/01 and she underwent a right carotid endarterectomy on 09/03/2018. She also reports a history of glaucoma. The patient is taking regular Plavix and 81 mg aspirin.    REVIEW OF SYSTEMS  Pertinent positives include tongue numbness, facial tingling, and hand numbness. Pertinent negatives include no new vision changes.  All other systems reviewed and negative.    PAST MEDICAL HISTORY   has a past medical history of Acute myocardial infarction, true posterior wall infarction, episode of care unspecified; Anxiety; Bowel habit changes; Breath shortness (2017); CAD (coronary artery disease); Cholesterol blood decreased; Dental disorder; Dizziness; GI bleed; Glaucoma; Heart burn; Hyperlipidemia; Hypertension; Myocardial infarct (HCC) (6/10/10); Peripheral vascular disease (HCC); Pulmonary disease; TIA (transient ischemic attack); Unspecified hemorrhagic conditions;  Unspecified urinary incontinence; and Urinary bladder disorder.    SURGICAL HISTORY   has a past surgical history that includes rectus repair (7/13/2012); recovery (11/26/2012); recovery (4/9/2013); femoral endarterectomy (10/4/2013); angioplasty balloon (10/4/2013); hysterectomy, total abdominal; other cardiac surgery; cholecystectomy; other (12/10/10); other (6/10/10); other (2005); recovery (7/24/2014); cardiac cath (6/2010); cardiac cath (7/24/14); stent placement; gastroscopy with biopsy (2/13/2016); and carotid endarterectomy (Right, 9/3/2018).    SOCIAL HISTORY  Social History   Substance Use Topics   • Smoking status: Former Smoker     Packs/day: 0.25     Years: 45.00     Types: Cigarettes     Quit date: 2/1/2016   • Smokeless tobacco: Never Used   • Alcohol use 0.0 - 0.6 oz/week      Comment: 1 drink per month      History   Drug Use No       FAMILY HISTORY  Family History   Problem Relation Age of Onset   • Stroke Mother 68        CVA   • Heart Disease Mother 65        valve surgery   • Other Son         wgt 465 lbs       CURRENT MEDICATIONS  Home Medications     Reviewed by Rick Tobar R.N. (Registered Nurse) on 10/12/18 at 1524  Med List Status: Partial   Medication Last Dose Status   amLODIPine (NORVASC) 5 MG Tab 9/1/2018 Active   aspirin EC (ECOTRIN) 81 MG Tablet Delayed Response 9/1/2018 Active   atorvastatin (LIPITOR) 80 MG tablet  Active   benazepril (LOTENSIN) 20 MG Tab  Active   Cholecalciferol (VITAMIN D) 2000 UNIT Tab 9/1/2018 Active   clopidogrel (PLAVIX) 75 MG Tab 9/1/2018 Active   ferrous gluconate (FERGON) 324 (38 Fe) MG Tab  Active   gabapentin (NEURONTIN) 300 MG CAPS 8/31/2018 Active   isosorbide mononitrate SR (IMDUR) 30 MG TABLET SR 24 HR 9/1/2018 Active   metoprolol (LOPRESSOR) 50 MG Tab 9/1/2018 Active                ALLERGIES  Allergies   Allergen Reactions   • Penicillins Rash and Vomiting     Rxn = unknown  Pt tolerates cephalosporins   • Hydrocodone Vomiting and Nausea     Rxn  "= unknown   • Tape Rash     RASH       PHYSICAL EXAM  VITAL SIGNS: BP (!) 170/60   Pulse 92   Temp 36 °C (96.8 °F)   Resp 18   Ht 1.651 m (5' 5\")   Wt 60.8 kg (134 lb)   SpO2 99%   BMI 22.30 kg/m²     Vital signs reviewed.  Constitutional:  No acute distress  Head: Atraumatic  Mouth/Throat: Oropharynx is moist  Eyes: Normal conjunctiva, pupils equal and reactive  Neck: Well-healing right carotid endarterectomy scar.   Cardiovascular: Regular rate and rhythm  Pulmonary/Chest: Clear to auscultation bilaterally  Abdominal: Non tender, normal bowel sounds, soft  Musculoskeletal: Moves all extremities  Neurological: She has an NIH score of Zero. No pronator drift of the arm.  I did not appreciate  drift in the lower extremities either.  Normal strength and sensation  Skin: Warm, dry, no rash    LABS  Results for orders placed or performed during the hospital encounter of 10/12/18   CBC WITH DIFFERENTIAL   Result Value Ref Range    WBC 8.1 4.8 - 10.8 K/uL    RBC 3.16 (L) 4.20 - 5.40 M/uL    Hemoglobin 9.1 (L) 12.0 - 16.0 g/dL    Hematocrit 28.8 (L) 37.0 - 47.0 %    MCV 91.1 81.4 - 97.8 fL    MCH 28.8 27.0 - 33.0 pg    MCHC 31.6 (L) 33.6 - 35.0 g/dL    RDW 48.3 35.9 - 50.0 fL    Platelet Count 314 164 - 446 K/uL    MPV 8.9 (L) 9.0 - 12.9 fL    Neutrophils-Polys 69.50 44.00 - 72.00 %    Lymphocytes 18.40 (L) 22.00 - 41.00 %    Monocytes 9.50 0.00 - 13.40 %    Eosinophils 1.60 0.00 - 6.90 %    Basophils 0.60 0.00 - 1.80 %    Immature Granulocytes 0.40 0.00 - 0.90 %    Nucleated RBC 0.00 /100 WBC    Neutrophils (Absolute) 5.66 2.00 - 7.15 K/uL    Lymphs (Absolute) 1.50 1.00 - 4.80 K/uL    Monos (Absolute) 0.77 0.00 - 0.85 K/uL    Eos (Absolute) 0.13 0.00 - 0.51 K/uL    Baso (Absolute) 0.05 0.00 - 0.12 K/uL    Immature Granulocytes (abs) 0.03 0.00 - 0.11 K/uL    NRBC (Absolute) 0.00 K/uL   COMP METABOLIC PANEL   Result Value Ref Range    Sodium 138 135 - 145 mmol/L    Potassium 4.0 3.6 - 5.5 mmol/L    Chloride 102 96 - " 112 mmol/L    Co2 24 20 - 33 mmol/L    Anion Gap 12.0 (H) 0.0 - 11.9    Glucose 121 (H) 65 - 99 mg/dL    Bun 14 8 - 22 mg/dL    Creatinine 1.08 0.50 - 1.40 mg/dL    Calcium 9.3 8.4 - 10.2 mg/dL    AST(SGOT) 21 12 - 45 U/L    ALT(SGPT) 12 2 - 50 U/L    Alkaline Phosphatase 52 30 - 99 U/L    Total Bilirubin 0.3 0.1 - 1.5 mg/dL    Albumin 3.3 3.2 - 4.9 g/dL    Total Protein 5.5 (L) 6.0 - 8.2 g/dL    Globulin 2.2 1.9 - 3.5 g/dL    A-G Ratio 1.5 g/dL   PROTHROMBIN TIME   Result Value Ref Range    PT 13.2 12.0 - 14.6 sec    INR 0.99 0.87 - 1.13   APTT   Result Value Ref Range    APTT 26.7 24.7 - 36.0 sec   COD (ADULT)   Result Value Ref Range    ABO Grouping Only O     Rh Grouping Only NEG     Antibody Screen-Cod NEG    TROPONIN   Result Value Ref Range    Troponin I <0.01 0.00 - 0.04 ng/mL   ESTIMATED GFR   Result Value Ref Range    GFR If  58 (A) >60 mL/min/1.73 m 2    GFR If Non  48 (A) >60 mL/min/1.73 m 2   EKG (NOW)   Result Value Ref Range    Report       [facility] Emergency Dept.    Test Date:  2018-10-12  Pt Name:    LEIDA RENNER                Department: ER  MRN:        [id number]                      Room:       RD 07  Gender:     Female                       Technician: [id number]  :        [id number]                   Requested By:PATRICK LUNA  Order #:    [id number]                    Reading MD:    Measurements  Intervals                                Axis  Rate:       89                           P:          72  WI:         160                          QRS:        41  QRSD:       86                           T:          -68  QT:         400  QTc:        487    Interpretive Statements  SINUS RHYTHM  PROBABLE LEFT ATRIAL ABNORMALITY  ABNRM R PROG, CONSIDER ASMI OR LEAD PLACEMENT  BORDERLINE REPOLARIZATION ABNORMALITY  BORDERLINE PROLONGED QT INTERVAL  Compared to ECG 2016 14:50:54  T-wave abnormality no longer present  Myocardial infarct finding still  present         All labs reviewed by me.    EKG  12 Lead EKG interpreted by me to show sinus rhythm at 90.  Normal P waves.  Abnormal QRS with Q waves in V1 through V4.  Normal ST segments normal T waves.  Abnormal EKG    RADIOLOGY  CT-CTA NECK WITH & W/O-POST PROCESSING   Final Result         1. Postendarterectomy changes in the right neck. There is now approximately 20-30% stenosis of the right carotid bulb.   2. Unchanged 50-69% stenosis of the left carotid bulb.   3. No evidence of carotid dissection or aneurysm.   4. Intracranial vessels will be discussed in a separate report.      CT-CEREBRAL PERFUSION ANALYSIS   Final Result      1.  CT perfusion examination over the limited section of brain reveals 6 mL of brain parenchyma has less than 30% of cerebral blood flow (CBF).      2.  Abnormal perfusion of the left hemisphere in the left MCA territory. Please see head CTA report for further detail.      Case was discussed with Dr. Patrick Paz at approximately 4:38 PM on 10/12/2018.      CT-CTA HEAD WITH & W/O-POST PROCESS   Final Result      1.  Occlusion of the left M1 segment of the middle cerebral artery.   2.  Extensive cortical collateral supply.      These findings were discussed with PATRICK PAZ on 10/12/2018 4:38 PM.      CT-HEAD W/O   Final Result         1. No CT evidence of acute infarct, hemorrhage or mass.   2. Mild global parenchymal atrophy and chronic small vessel ischemic changes.      DX-CHEST-PORTABLE (1 VIEW)    (Results Pending)   MR-BRAIN-W/O    (Results Pending)   EC-ECHOCARDIOGRAM COMPLETE W/O CONT    (Results Pending)     The radiologist's interpretation of all radiological studies have been reviewed by me.    COURSE & MEDICAL DECISION MAKING  Pertinent Labs & Imaging studies reviewed. (See chart for details) The patient's [facility] and past medical records were reviewed    3:30 PM - Patient seen and examined at bedside. Ordered DX chest portable 1 view, CT head without contrast,  CT cerebral perfusion analysis, CT CTA head with and without post processing, CT CTA neck with and without post processing, CBC with differential, CMP, PTT, APTT, COD, Troponin, EKG, and U/A culture if indicated to evaluate her symptoms. I informed the patient that she does not meet the criteria for treatment with thrombolytics because her symptoms have been present since 6:30 AM and are outside the window for treatment. She understands and agrees. The differential diagnoses include but are not limited to: CVA, TIA, hypoglycemia.    4:34 PM Radiology informed me that the patient's CT demonstrated a possible left distal M1 occlusion.  I ordered MR brain without contrast and EC echocardiogram complete without contrast to evaluate. Neurologist and interventionalist and reviewing possible thrombectomy.    4:41 PM Paged DAYANNA Crockett.    CRITICAL CARE  The very real possibility of a deterioration of this patient's condition required the highest level of my preparedness for sudden, emergent intervention.  I provided critical care services, which included medication orders, frequent reevaluations of the patient's condition and response to treatment, ordering and reviewing test results, and discussing the case with various consultants.  The critical care time associated with the care of the patient was 35 minutes. Review chart for interventions. This time is exclusive of any other billable procedures.     DISPOSITION:  Patient will be admitted to Dr. Weinstein with neurologic and  interventional  radiology consultation in critical condition.    FINAL IMPRESSION  1. Cerebrovascular accident (CVA), unspecified mechanism (HCC)    2.       Total critical care time of 35 minutes, separate of any billable procedures.     Juan GREEN ([email or web address]), am scribing for, and in the presence of, Naveen Paz M.D..    Electronically signed by: Juan Malin ([email or web address]), 10/12/2018    Naveen GREEN M.D. personally performed  the services described in this documentation, as scribed by Juan Malin in my presence, and it is both accurate and complete. C    The note accurately reflects work and decisions made by me.  Naveen Paz  10/12/2018  5:17 PM

## 2018-10-13 ENCOUNTER — APPOINTMENT (OUTPATIENT)
Dept: RADIOLOGY | Facility: MEDICAL CENTER | Age: 83
DRG: 065 | End: 2018-10-13
Attending: NURSE PRACTITIONER
Payer: MEDICARE

## 2018-10-13 LAB
ANION GAP SERPL CALC-SCNC: 7 MMOL/L (ref 0–11.9)
BASOPHILS # BLD AUTO: 0.6 % (ref 0–1.8)
BASOPHILS # BLD: 0.05 K/UL (ref 0–0.12)
BUN SERPL-MCNC: 15 MG/DL (ref 8–22)
CALCIUM SERPL-MCNC: 8.9 MG/DL (ref 8.5–10.5)
CFT BLD TEG: 6.2 MIN (ref 5–10)
CFT P HPASE BLD TEG: 5.1 MIN (ref 5–10)
CHLORIDE SERPL-SCNC: 106 MMOL/L (ref 96–112)
CHOLEST SERPL-MCNC: 91 MG/DL (ref 100–199)
CLOT ANGLE BLD TEG: 65.8 DEGREES (ref 53–72)
CLOT ANGLE P HPASE BLD TEG: 74.8 DEGREES (ref 53–72)
CLOT INIT P HPASE BLD TEG: 1 MIN (ref 1–3)
CLOT LYSIS 30M P MA LENFR BLD TEG: 0 % (ref 0–8)
CLOT LYSIS 30M P MA LENFR BLD TEG: 0.1 % (ref 0–8)
CO2 SERPL-SCNC: 26 MMOL/L (ref 20–33)
CREAT SERPL-MCNC: 1.09 MG/DL (ref 0.5–1.4)
CT.EXTRINSIC BLD ROTEM: 1.6 MIN (ref 1–3)
EOSINOPHIL # BLD AUTO: 0.26 K/UL (ref 0–0.51)
EOSINOPHIL NFR BLD: 3.2 % (ref 0–6.9)
ERYTHROCYTE [DISTWIDTH] IN BLOOD BY AUTOMATED COUNT: 47.2 FL (ref 35.9–50)
EST. AVERAGE GLUCOSE BLD GHB EST-MCNC: 123 MG/DL
GLUCOSE SERPL-MCNC: 93 MG/DL (ref 65–99)
HBA1C MFR BLD: 5.9 % (ref 0–5.6)
HCT VFR BLD AUTO: 30 % (ref 37–47)
HDLC SERPL-MCNC: 42 MG/DL
HGB BLD-MCNC: 9.4 G/DL (ref 12–16)
IMM GRANULOCYTES # BLD AUTO: 0.03 K/UL (ref 0–0.11)
IMM GRANULOCYTES NFR BLD AUTO: 0.4 % (ref 0–0.9)
LDLC SERPL CALC-MCNC: 28 MG/DL
LYMPHOCYTES # BLD AUTO: 2.01 K/UL (ref 1–4.8)
LYMPHOCYTES NFR BLD: 24.8 % (ref 22–41)
MCF BLD TEG: 73.5 MM (ref 50–70)
MCF P HPASE BLD TEG: 74.4 MM (ref 50–70)
MCH RBC QN AUTO: 28.2 PG (ref 27–33)
MCHC RBC AUTO-ENTMCNC: 31.3 G/DL (ref 33.6–35)
MCV RBC AUTO: 90.1 FL (ref 81.4–97.8)
MONOCYTES # BLD AUTO: 0.74 K/UL (ref 0–0.85)
MONOCYTES NFR BLD AUTO: 9.1 % (ref 0–13.4)
NEUTROPHILS # BLD AUTO: 5 K/UL (ref 2–7.15)
NEUTROPHILS NFR BLD: 61.9 % (ref 44–72)
NRBC # BLD AUTO: 0 K/UL
NRBC BLD-RTO: 0 /100 WBC
PA AA BLD-ACNC: 58.2 %
PA ADP BLD-ACNC: 16.6 %
PLATELET # BLD AUTO: 327 K/UL (ref 164–446)
PMV BLD AUTO: 9.2 FL (ref 9–12.9)
POTASSIUM SERPL-SCNC: 4.4 MMOL/L (ref 3.6–5.5)
RBC # BLD AUTO: 3.33 M/UL (ref 4.2–5.4)
SODIUM SERPL-SCNC: 139 MMOL/L (ref 135–145)
TEG ALGORITHM TGALG: ABNORMAL
TRIGL SERPL-MCNC: 107 MG/DL (ref 0–149)
WBC # BLD AUTO: 8.1 K/UL (ref 4.8–10.8)

## 2018-10-13 PROCEDURE — 99233 SBSQ HOSP IP/OBS HIGH 50: CPT | Performed by: HOSPITALIST

## 2018-10-13 PROCEDURE — G8979 MOBILITY GOAL STATUS: HCPCS | Mod: CI

## 2018-10-13 PROCEDURE — 80048 BASIC METABOLIC PNL TOTAL CA: CPT

## 2018-10-13 PROCEDURE — 97166 OT EVAL MOD COMPLEX 45 MIN: CPT

## 2018-10-13 PROCEDURE — G8988 SELF CARE GOAL STATUS: HCPCS | Mod: CI

## 2018-10-13 PROCEDURE — A9270 NON-COVERED ITEM OR SERVICE: HCPCS | Performed by: INTERNAL MEDICINE

## 2018-10-13 PROCEDURE — 700111 HCHG RX REV CODE 636 W/ 250 OVERRIDE (IP): Performed by: INTERNAL MEDICINE

## 2018-10-13 PROCEDURE — G8978 MOBILITY CURRENT STATUS: HCPCS | Mod: CJ

## 2018-10-13 PROCEDURE — 700102 HCHG RX REV CODE 250 W/ 637 OVERRIDE(OP): Performed by: PSYCHIATRY & NEUROLOGY

## 2018-10-13 PROCEDURE — 700102 HCHG RX REV CODE 250 W/ 637 OVERRIDE(OP): Performed by: INTERNAL MEDICINE

## 2018-10-13 PROCEDURE — 770020 HCHG ROOM/CARE - TELE (206)

## 2018-10-13 PROCEDURE — 99233 SBSQ HOSP IP/OBS HIGH 50: CPT | Performed by: PSYCHIATRY & NEUROLOGY

## 2018-10-13 PROCEDURE — 36415 COLL VENOUS BLD VENIPUNCTURE: CPT

## 2018-10-13 PROCEDURE — 83036 HEMOGLOBIN GLYCOSYLATED A1C: CPT

## 2018-10-13 PROCEDURE — 85025 COMPLETE CBC W/AUTO DIFF WBC: CPT

## 2018-10-13 PROCEDURE — G8987 SELF CARE CURRENT STATUS: HCPCS | Mod: CJ

## 2018-10-13 PROCEDURE — 700102 HCHG RX REV CODE 250 W/ 637 OVERRIDE(OP): Performed by: NURSE PRACTITIONER

## 2018-10-13 PROCEDURE — 85576 BLOOD PLATELET AGGREGATION: CPT | Mod: 91

## 2018-10-13 PROCEDURE — 85384 FIBRINOGEN ACTIVITY: CPT

## 2018-10-13 PROCEDURE — A9270 NON-COVERED ITEM OR SERVICE: HCPCS | Performed by: PSYCHIATRY & NEUROLOGY

## 2018-10-13 PROCEDURE — 85347 COAGULATION TIME ACTIVATED: CPT | Mod: 91

## 2018-10-13 PROCEDURE — 97162 PT EVAL MOD COMPLEX 30 MIN: CPT

## 2018-10-13 PROCEDURE — A9270 NON-COVERED ITEM OR SERVICE: HCPCS | Performed by: NURSE PRACTITIONER

## 2018-10-13 PROCEDURE — 80061 LIPID PANEL: CPT

## 2018-10-13 PROCEDURE — 70551 MRI BRAIN STEM W/O DYE: CPT

## 2018-10-13 RX ORDER — ASPIRIN 81 MG/1
81 TABLET, CHEWABLE ORAL DAILY
Status: DISCONTINUED | OUTPATIENT
Start: 2018-10-14 | End: 2018-10-18 | Stop reason: HOSPADM

## 2018-10-13 RX ADMIN — HEPARIN SODIUM 5000 UNITS: 5000 INJECTION, SOLUTION INTRAVENOUS; SUBCUTANEOUS at 22:00

## 2018-10-13 RX ADMIN — LATANOPROST 1 DROP: 50 SOLUTION OPHTHALMIC at 21:23

## 2018-10-13 RX ADMIN — HEPARIN SODIUM 5000 UNITS: 5000 INJECTION, SOLUTION INTRAVENOUS; SUBCUTANEOUS at 05:11

## 2018-10-13 RX ADMIN — STANDARDIZED SENNA CONCENTRATE AND DOCUSATE SODIUM 2 TABLET: 8.6; 5 TABLET, FILM COATED ORAL at 18:12

## 2018-10-13 RX ADMIN — ASPIRIN 325 MG: 325 TABLET, COATED ORAL at 05:12

## 2018-10-13 RX ADMIN — HEPARIN SODIUM 5000 UNITS: 5000 INJECTION, SOLUTION INTRAVENOUS; SUBCUTANEOUS at 14:30

## 2018-10-13 RX ADMIN — GABAPENTIN 300 MG: 300 CAPSULE ORAL at 18:12

## 2018-10-13 RX ADMIN — CLOPIDOGREL 75 MG: 75 TABLET, FILM COATED ORAL at 05:11

## 2018-10-13 RX ADMIN — GABAPENTIN 300 MG: 300 CAPSULE ORAL at 05:11

## 2018-10-13 RX ADMIN — ATORVASTATIN CALCIUM 40 MG: 40 TABLET, FILM COATED ORAL at 18:11

## 2018-10-13 RX ADMIN — TICAGRELOR 180 MG: 90 TABLET ORAL at 10:20

## 2018-10-13 ASSESSMENT — GAIT ASSESSMENTS
DISTANCE (FEET): 150
ASSISTIVE DEVICE: FRONT WHEEL WALKER
GAIT LEVEL OF ASSIST: CONTACT GUARD ASSIST

## 2018-10-13 ASSESSMENT — ENCOUNTER SYMPTOMS
BACK PAIN: 0
HEADACHES: 0
DOUBLE VISION: 0
COUGH: 0
NERVOUS/ANXIOUS: 0
DEPRESSION: 0
SHORTNESS OF BREATH: 0
MYALGIAS: 0
CHILLS: 0
NAUSEA: 0
FOCAL WEAKNESS: 1
BLURRED VISION: 0
SORE THROAT: 0
PALPITATIONS: 0
DIZZINESS: 0
FEVER: 0
VOMITING: 0

## 2018-10-13 ASSESSMENT — COGNITIVE AND FUNCTIONAL STATUS - GENERAL
DAILY ACTIVITIY SCORE: 21
MOBILITY SCORE: 22
SUGGESTED CMS G CODE MODIFIER MOBILITY: CJ
HELP NEEDED FOR BATHING: A LITTLE
WALKING IN HOSPITAL ROOM: A LITTLE
CLIMB 3 TO 5 STEPS WITH RAILING: A LITTLE
SUGGESTED CMS G CODE MODIFIER DAILY ACTIVITY: CJ
DRESSING REGULAR LOWER BODY CLOTHING: A LITTLE
TOILETING: A LITTLE

## 2018-10-13 ASSESSMENT — PAIN SCALES - GENERAL: PAINLEVEL_OUTOF10: 0

## 2018-10-13 ASSESSMENT — ACTIVITIES OF DAILY LIVING (ADL): TOILETING: INDEPENDENT

## 2018-10-13 ASSESSMENT — PATIENT HEALTH QUESTIONNAIRE - PHQ9
SUM OF ALL RESPONSES TO PHQ9 QUESTIONS 1 AND 2: 0
SUM OF ALL RESPONSES TO PHQ9 QUESTIONS 1 AND 2: 0
1. LITTLE INTEREST OR PLEASURE IN DOING THINGS: NOT AT ALL
2. FEELING DOWN, DEPRESSED, IRRITABLE, OR HOPELESS: NOT AT ALL
1. LITTLE INTEREST OR PLEASURE IN DOING THINGS: NOT AT ALL
2. FEELING DOWN, DEPRESSED, IRRITABLE, OR HOPELESS: NOT AT ALL

## 2018-10-13 NOTE — CARE PLAN
Problem: Communication  Goal: The ability to communicate needs accurately and effectively will improve  Outcome: PROGRESSING AS EXPECTED  Pt. Is able to clearly express her needs, expresses understanding of why she is here, her disease process, and plan of care.

## 2018-10-13 NOTE — PROGRESS NOTES
Patient Diagnosis and Management daily plan per neurology:        1) Acute  Left MCA stroke: due to intracranial stenosis; was on dual antiplatelet, but TEG showed no plavix response  We will switch to ticagrelor 180 mg and then 90 mg bid  Possible angio on Monday per Dr Bhatti to see if possible consideration for stenting.  Keep permissive HTN for now.    2) recent a month ago  R side stroke: unclear etiology, was questionable due to large artery ( carotid disease); but in her case , A FIB was never quite excluded.    3) Carotid Stenosis: Bilateral R>L  Has supposedly follow up with Vascular soon.            Complete neurological note to support plan is below.    SUBJECTIVE: No changes, no further weakness episodes        ROS:unchanged      PHYSICAL EXAMINATION:    Constitutional: lying in bed, resting, comfortable    CARDIOVASCULAR:S1,S2    PULMONARY: CTA      EXTREMITIES:No edema    NEUROLOGICAL:    MENTAL:awake, alert, oriented #5  Normal speech and language    CRANIAL NERVES:2-12 normal.    MOTOR:5/5    SENSORY:intact    DTR:+1    BABINSKI:neg    Movements: No abnormal noticed.    CEREBELLAR:  No   dysmetria     GAIT: not tested    NIH 0                      Vitals:    10/12/18 1830 10/12/18 2150 10/12/18 2348 10/13/18 0400   BP:  (!) 182/58 149/51 150/50   Pulse: 89 88 85 (!) 55   Resp: 18 18 16 16   Temp:  36.5 °C (97.7 °F) 36.2 °C (97.2 °F) 36.2 °C (97.1 °F)   SpO2: 90% 92% 94% 93%   Weight:  61.5 kg (135 lb 9.3 oz)     Height:                      Imaging: neuroimaging reviewed and directly visualized by me  DX-CHEST-PORTABLE (1 VIEW)   Final Result      No acute cardiopulmonary abnormality. No interstitial edema.      CT-CTA NECK WITH & W/O-POST PROCESSING   Final Result         1. Postendarterectomy changes in the right neck. There is now approximately 20-30% stenosis of the right carotid bulb.   2. Unchanged 50-69% stenosis of the left carotid bulb.   3. No evidence of carotid dissection or aneurysm.   4.  Intracranial vessels will be discussed in a separate report.      CT-CEREBRAL PERFUSION ANALYSIS   Final Result      1.  CT perfusion examination over the limited section of brain reveals 6 mL of brain parenchyma has less than 30% of cerebral blood flow (CBF).      2.  Abnormal perfusion of the left hemisphere in the left MCA territory. Please see head CTA report for further detail.      Case was discussed with Dr. Patrick Paz at approximately 4:38 PM on 10/12/2018.      CT-CTA HEAD WITH & W/O-POST PROCESS   Final Result      1.  Occlusion of the left M1 segment of the middle cerebral artery.   2.  Extensive cortical collateral supply.      These findings were discussed with PATRICK PAZ on 10/12/2018 4:38 PM.      CT-HEAD W/O   Final Result         1. No CT evidence of acute infarct, hemorrhage or mass.   2. Mild global parenchymal atrophy and chronic small vessel ischemic changes.      MR-BRAIN-W/O    (Results Pending)   EC-ECHOCARDIOGRAM COMPLETE W/O CONT    (Results Pending)         Labs:  Recent Labs      10/12/18   1535  10/13/18   0227   WBC  8.1  8.1   RBC  3.16*  3.33*   HEMOGLOBIN  9.1*  9.4*   HEMATOCRIT  28.8*  30.0*   MCV  91.1  90.1   MCH  28.8  28.2   MCHC  31.6*  31.3*   RDW  48.3  47.2   PLATELETCT  314  327   MPV  8.9*  9.2     Recent Labs      10/12/18   1535  10/13/18   0227   SODIUM  138  139   POTASSIUM  4.0  4.4   CHLORIDE  102  106   CO2  24  26   GLUCOSE  121*  93   BUN  14  15   CREATININE  1.08  1.09   CALCIUM  9.3  8.9     Recent Labs      10/12/18   1535   APTT  26.7   INR  0.99         Recent Labs      10/12/18   1535   TROPONINI  <0.01     Recent Labs      10/13/18   0227   TRIGLYCERIDE  107   HDL  42   LDL  28     Recent Labs      10/12/18   1535  10/13/18   0227   SODIUM  138  139   POTASSIUM  4.0  4.4   CHLORIDE  102  106   CO2  24  26   GLUCOSE  121*  93   BUN  14  15     Recent Labs      10/12/18   1535  10/13/18   0227   SODIUM  138  139   POTASSIUM  4.0  4.4   CHLORIDE  102   106   CO2  24  26   BUN  14  15   CREATININE  1.08  1.09   CALCIUM  9.3  8.9     Recent Labs      10/12/18   1535   APTT  26.7   INR  0.99     Results for orders placed or performed during the hospital encounter of 09/01/18   Echocardiogram Comp W/O cont   Result Value Ref Range    Eject.Frac. MOD BP 72.45     Eject.Frac. MOD 4C 76.57     Eject.Frac. MOD 2C 65.97     Left Ventrical Ejection Fraction 70                      Jessie Fernandez M.D.    Diplomate Neurology, Vascular Neurology and Neurophysiology

## 2018-10-13 NOTE — THERAPY
"85 y/o female adm for right hand numbness and decrease strength. Had CVA 1 month ago sent to rehab and DC home with  services. Pt with stenosis left MCA , to undergo possible angio for stenting as pt outside window for embolectomy.  Pt presents with  decreased RUE strength ( BLE strength intact)impacting her safe ambulation with FWW use, fall risk. Acute PT to address transfers, gait with FWW, balance and activity tolerance for pt to achieve higher level of function  to facilitate a safe DC.    Physical Therapy Evaluation completed.   Bed Mobility:  Supine to Sit: Supervised  Transfers: Sit to Stand: Stand by Assist  Gait: Level Of Assist: Contact Guard Assist with Front-Wheel Walker       Plan of Care: Will benefit from Physical Therapy 5 times per week  Discharge Recommendations: Equipment: Will Continue to Assess for Equipment Needs. Post-acute therapy Discharge to home with outpatient or home health for additional skilled therapy services.    See \"Rehab Therapy-Acute\" Patient Summary Report for complete documentation.     "

## 2018-10-13 NOTE — PROGRESS NOTES
Pt. O2 sat 85 on RA when sleeping, placed on NC with O2 @ 2L. Pt. O2 sat came up to 94%.  Pt. stated that she uses 2L O2 NC at home when sleeping.

## 2018-10-13 NOTE — ED NOTES
Pt updated on POC regarding room placement and verbalized understanding. VSS. Pt denies nay needs at this time.

## 2018-10-13 NOTE — ED NOTES
Lydia COLE from SICU called and relayed a doctor's conversation that pt would be more appropriate for the neuroscience floor. Lydia RN to call to update bed control.

## 2018-10-13 NOTE — RESPIRATORY CARE
COPD EDUCATION by COPD CLINICAL EDUCATOR  10/13/2018 at 6:40 AM by Aga Perez     Patient reviewed by COPD education team. Patient does not qualify for COPD program.

## 2018-10-13 NOTE — H&P
Hospital Medicine History & Physical Note    Date of Service  10/12/2018    Primary Care Physician  Benjamin Bernard M.D.    Consultants  neurology    Code Status  dnar/dni    Chief Complaint  Dropping things and numbness of part of tongue    History of Presenting Illness  84 y.o. female who presented 10/12/2018 with above medical issues. Patient was recently here at this hospital for a stroke and found to have an occluded NEELA that underwent successful CEA. Patient claims she has been doing ok at home but today she noticed that when she woke up she had some numbness of tongue and it felt weird along with increasing clumsiness of her hand and dropping cups of coffee repeatedly. She is adherent to all of her medications and denies any other neurological symptoms at this time. She has been off cigarettes for 6 months now. Patient feels that her neurological symptoms have slowly improved but are still present at this time.    Review of Systems  Review of Systems   Constitutional: Negative for chills and fever.   HENT: Negative for hearing loss.    Eyes: Negative for blurred vision.   Respiratory: Negative for hemoptysis, sputum production and shortness of breath.    Cardiovascular: Negative for chest pain, palpitations and leg swelling.   Gastrointestinal: Negative for abdominal pain, nausea and vomiting.   Genitourinary: Negative for dysuria, frequency and urgency.   Musculoskeletal: Negative for myalgias and neck pain.   Skin: Negative for rash.   Neurological: Positive for sensory change, focal weakness and weakness. Negative for dizziness, tingling, loss of consciousness and headaches.   Endo/Heme/Allergies: Negative.  Negative for environmental allergies. Does not bruise/bleed easily.   Psychiatric/Behavioral: Negative.  Negative for depression, substance abuse and suicidal ideas.   All other systems reviewed and are negative.      Past Medical History   has a past medical history of Acute myocardial infarction,  true posterior wall infarction, episode of care unspecified; Anxiety; Bowel habit changes; Breath shortness (2017); CAD (coronary artery disease); Cholesterol blood decreased; Dental disorder; Dizziness; GI bleed; Glaucoma; Heart burn; Hyperlipidemia; Hypertension; Myocardial infarct (HCC) (6/10/10); Peripheral vascular disease (HCC); Pulmonary disease; TIA (transient ischemic attack); Unspecified hemorrhagic conditions; Unspecified urinary incontinence; and Urinary bladder disorder.    Surgical History   has a past surgical history that includes rectus repair (7/13/2012); recovery (11/26/2012); recovery (4/9/2013); femoral endarterectomy (10/4/2013); angioplasty balloon (10/4/2013); hysterectomy, total abdominal; other cardiac surgery; cholecystectomy; other (12/10/10); other (6/10/10); other (2005); recovery (7/24/2014); cardiac cath (6/2010); cardiac cath (7/24/14); stent placement; gastroscopy with biopsy (2/13/2016); and carotid endarterectomy (Right, 9/3/2018).     Family History  family history includes Heart Disease (age of onset: 65) in her mother; Other in her son; Stroke (age of onset: 68) in her mother.     Social History   reports that she quit smoking about 2 years ago. Her smoking use included Cigarettes. She has a 11.25 pack-year smoking history. She has never used smokeless tobacco. She reports that she drinks alcohol. She reports that she does not use drugs.    Allergies  Allergies   Allergen Reactions   • Penicillins Rash and Vomiting     Rxn = unknown  Pt tolerates cephalosporins   • Hydrocodone Vomiting and Nausea     Rxn = unknown   • Tape Rash     RASH       Medications  Prior to Admission Medications   Prescriptions Last Dose Informant Patient Reported? Taking?   Cholecalciferol (VITAMIN D) 2000 UNIT Tab 10/12/2018 at AM Patient's Home Pharmacy Yes No   Sig: Take 2,000 Units by mouth every day.   amLODIPine (NORVASC) 5 MG Tab 10/12/2018 at AM Patient's Home Pharmacy No No   Sig: TAKE 1 TABLET  DAILY   atorvastatin (LIPITOR) 80 MG tablet 10/11/2018 at PM Patient's Home Pharmacy No No   Sig: Take 1 Tab by mouth every bedtime.   benazepril (LOTENSIN) 20 MG Tab 10/12/2018 at AM Patient's Home Pharmacy No No   Sig: Take 1 Tab by mouth every day.   bimatoprost (LUMIGAN) 0.01 % Solution UNK at UNK Patient's Home Pharmacy Yes Yes   Sig: Place 1 Drop in both eyes every bedtime. 1 gtts both eyes   clopidogrel (PLAVIX) 75 MG Tab 10/12/2018 at AM Patient's Home Pharmacy No No   Sig: Take 1 Tab by mouth every day.   gabapentin (NEURONTIN) 300 MG Cap 10/12/2018 at AM Patient's Home Pharmacy Yes No   Sig: Take 300 mg by mouth 2 Times a Day.   isosorbide mononitrate SR (IMDUR) 30 MG TABLET SR 24 HR 10/12/2018 at AM Patient's Home Pharmacy Yes No   Sig: Take 1 Tab by mouth every day.      Facility-Administered Medications: None       Physical Exam  Temp:  [36 °C (96.8 °F)] 36 °C (96.8 °F)  Pulse:  [89-93] 89  Resp:  [17-18] 18  BP: (170)/(60) 170/60    Physical Exam   Constitutional: She is oriented to person, place, and time. She appears well-developed and well-nourished. No distress.   HENT:   Head: Normocephalic and atraumatic.   Mouth/Throat: No oropharyngeal exudate.   Eyes: Pupils are equal, round, and reactive to light. No scleral icterus.   Neck: Normal range of motion. Neck supple. No thyromegaly present.   Cardiovascular: Normal rate, regular rhythm, normal heart sounds and intact distal pulses.    No murmur heard.  Pulmonary/Chest: Effort normal and breath sounds normal. No respiratory distress. She has no wheezes.   Abdominal: Soft. Bowel sounds are normal. She exhibits no distension. There is no tenderness.   Musculoskeletal: Normal range of motion. She exhibits no edema or tenderness.   Neurological: She is alert and oriented to person, place, and time. No cranial nerve deficit. Coordination abnormal.   4/5 strength of the RUE   Skin: Skin is warm and dry. No rash noted.   Psychiatric: She has a normal mood  and affect.   Nursing note and vitals reviewed.      Laboratory:  Recent Labs      10/12/18   1535   WBC  8.1   RBC  3.16*   HEMOGLOBIN  9.1*   HEMATOCRIT  28.8*   MCV  91.1   MCH  28.8   MCHC  31.6*   RDW  48.3   PLATELETCT  314   MPV  8.9*     Recent Labs      10/12/18   1535   SODIUM  138   POTASSIUM  4.0   CHLORIDE  102   CO2  24   GLUCOSE  121*   BUN  14   CREATININE  1.08   CALCIUM  9.3     Recent Labs      10/12/18   1535   ALTSGPT  12   ASTSGOT  21   ALKPHOSPHAT  52   TBILIRUBIN  0.3   GLUCOSE  121*     Recent Labs      10/12/18   1535   APTT  26.7   INR  0.99             Recent Labs      10/12/18   1535   TROPONINI  <0.01       Urinalysis:    Recent Labs      10/12/18   2043   SPECGRAVITY  1.028   GLUCOSEUR  Negative   KETONES  Negative   NITRITE  Negative   LEUKESTERAS  Negative        Imaging:  DX-CHEST-PORTABLE (1 VIEW)   Final Result      No acute cardiopulmonary abnormality. No interstitial edema.      CT-CTA NECK WITH & W/O-POST PROCESSING   Final Result         1. Postendarterectomy changes in the right neck. There is now approximately 20-30% stenosis of the right carotid bulb.   2. Unchanged 50-69% stenosis of the left carotid bulb.   3. No evidence of carotid dissection or aneurysm.   4. Intracranial vessels will be discussed in a separate report.      CT-CEREBRAL PERFUSION ANALYSIS   Final Result      1.  CT perfusion examination over the limited section of brain reveals 6 mL of brain parenchyma has less than 30% of cerebral blood flow (CBF).      2.  Abnormal perfusion of the left hemisphere in the left MCA territory. Please see head CTA report for further detail.      Case was discussed with Dr. Patrick Paz at approximately 4:38 PM on 10/12/2018.      CT-CTA HEAD WITH & W/O-POST PROCESS   Final Result      1.  Occlusion of the left M1 segment of the middle cerebral artery.   2.  Extensive cortical collateral supply.      These findings were discussed with PATRICK PAZ on 10/12/2018 4:38 PM.       CT-HEAD W/O   Final Result         1. No CT evidence of acute infarct, hemorrhage or mass.   2. Mild global parenchymal atrophy and chronic small vessel ischemic changes.      MR-BRAIN-W/O    (Results Pending)   EC-ECHOCARDIOGRAM COMPLETE W/O CONT    (Results Pending)         Assessment/Plan:  I anticipate this patient will require at least two midnights for appropriate medical management, necessitating inpatient admission.    * Cerebrovascular accident (CVA) due to stenosis of left middle cerebral artery (HCC)- (present on admission)   Assessment & Plan    -noted on M1 L side today  -neurology consulted, outside window of embolectomy  -full dose statin, asa, and plavix  -TEG scan  -ICU, Q1 hour neuro checks  SLp/pt/ot  -Neuro IR to consult to see if lesion stentable?  -permissive HTN at this time        PVD (peripheral vascular disease) ([facility])- (present on admission)   Assessment & Plan    -severe, s/p R CEA, functioning well, now with M1 occlusion on the L side as well  -ASA and plavix, will get TEG scan to see if patient non responder to plavix and consider brilinta  -L carotid with 50-69% stenosis, will need outpatient/future treatment        CKD (chronic kidney disease) stage 3, GFR 30-59 ml/min ([facility])- (present on admission)   Assessment & Plan    -at her baseline, monitor closely        Peripheral neuropathy- (present on admission)   Assessment & Plan    -gabapentin, well controlled        Hypertension- (present on admission)   Assessment & Plan    -permissive HTN at this time given new CVA  -BP upwards of 220 allowed, hold outpatient bP medications        Hyperlipidemia- (present on admission)   Assessment & Plan    -full dose statin            VTE prophylaxis: heparin 5k units [id number]

## 2018-10-13 NOTE — DISCHARGE PLANNING
Aware of PMR referral from Dr. Weinstein. Woke with stroke-like symptoms. Recent R side stroke one month ago. CVA due to noted stenosis of left middle cerebral artery. Outside of window for embolectomy. Neurology recommending Possible angio on Monday per Dr Bhatti to see if possible consideration for stenting. Would appreciate therapy evals s/p embolectomy Monday  - as medically appropriate - to assist with determination for post acute needs. No Physiatry consult as yet per protocol. TCC following.

## 2018-10-13 NOTE — PROGRESS NOTES
Pt. Skin is dry and flaky threw out, calloses noted on heels and elbows. Skin is fully intact with not areas of redness.

## 2018-10-13 NOTE — PROGRESS NOTES
Monitor Summary:  SR 76-91, OH .20, QRS .10, QT .42 with Rare coup, R pvc per strip from monitor room.

## 2018-10-13 NOTE — PROGRESS NOTES
This RN spoke with Dr. Victoria, no need for ICU care if this pt has q4hr neuro checks ordered and received no intervention, will notify ER and bed control.

## 2018-10-13 NOTE — PROGRESS NOTES
[facility] Medicine Daily Progress Note    Date of Service  10/13/2018    Chief Complaint  84 y.o. female admitted 10/12/2018 with tongue numbness as well as dropping things.  She is found to have a left-sided CVA secondary to left middle cerebral artery stenosis    Hospital Course     Patient was admitted for stroke workup.  She has a previous stroke secondary to an occluded right internal carotid artery for which she underwent successful carotid endarterectomy.  When she presented she had reported that she awakened and noticed numbness on her tongue as well as clumsiness in her hands and she kept dropping cups of coffee repeatedly.      Interval Problem Update  No improvement in her symptoms  No issues overnight  No events on telemetry    Consultants/Specialty  Neurology  Interventional radiology    Code Status  DNR/DNI    Disposition  To be determined    Review of Systems  Review of Systems   Constitutional: Negative for chills and fever.   HENT: Negative for hearing loss and sore throat.    Eyes: Negative for blurred vision and double vision.   Respiratory: Negative for cough and shortness of breath.    Cardiovascular: Negative for chest pain and palpitations.   Gastrointestinal: Negative for nausea and vomiting.   Genitourinary: Negative for dysuria and frequency.   Musculoskeletal: Negative for back pain and myalgias.   Skin: Negative for itching and rash.   Neurological: Positive for focal weakness. Negative for dizziness and headaches.   Psychiatric/Behavioral: Negative for depression. The patient is not nervous/anxious.         Physical Exam  Temp:  [36 °C (96.8 °F)-36.8 °C (98.3 °F)] 36.8 °C (98.3 °F)  Pulse:  [55-93] 76  Resp:  [16-18] 16  BP: (123-182)/(50-65) 126/65    Physical Exam   Constitutional: She is oriented to person, place, and time. She appears well-developed and well-nourished.   HENT:   Head: Normocephalic and atraumatic.   Eyes: Pupils are equal, round, and reactive to light. Conjunctivae  and EOM are normal.   Neck: Normal range of motion. Neck supple.   Cardiovascular: Normal rate and regular rhythm.    Pulmonary/Chest: Effort normal and breath sounds normal.   Abdominal: Soft. Bowel sounds are normal. She exhibits no distension.   Musculoskeletal: Normal range of motion. She exhibits no edema or tenderness.   Neurological: She is alert and oriented to person, place, and time.   Right-sided weakness   Skin: Skin is warm and dry.   Nursing note and vitals reviewed.      Fluids  No intake or output data in the 24 hours ending 10/13/18 1228    Laboratory  Recent Labs      10/12/18   1535  10/13/18   0227   WBC  8.1  8.1   RBC  3.16*  3.33*   HEMOGLOBIN  9.1*  9.4*   HEMATOCRIT  28.8*  30.0*   MCV  91.1  90.1   MCH  28.8  28.2   MCHC  31.6*  31.3*   RDW  48.3  47.2   PLATELETCT  314  327   MPV  8.9*  9.2     Recent Labs      10/12/18   1535  10/13/18   0227   SODIUM  138  139   POTASSIUM  4.0  4.4   CHLORIDE  102  106   CO2  24  26   GLUCOSE  121*  93   BUN  14  15   CREATININE  1.08  1.09   CALCIUM  9.3  8.9     Recent Labs      10/12/18   1535   APTT  26.7   INR  0.99         Recent Labs      10/13/18   0227   TRIGLYCERIDE  107   HDL  42   LDL  28       Imaging  MR-BRAIN-W/O   Final Result      1.  Small areas of acute infarcts in the left centrum semiovale.   2.  Severe stenosis/occlusion of left M1 segment. This finding is seen in the previous CT angiogram dated 10/12/2018. Intracranial stenosis may be indicated for the patient's with repeated strokes despite of medical management for the dual antiplatelet    therapy. If indicated this finding can be further evaluated with the cerebral angiogram .      DX-CHEST-PORTABLE (1 VIEW)   Final Result      No acute cardiopulmonary abnormality. No interstitial edema.      CT-CTA NECK WITH & W/O-POST PROCESSING   Final Result         1. Postendarterectomy changes in the right neck. There is now approximately 20-30% stenosis of the right carotid bulb.   2.  Unchanged 50-69% stenosis of the left carotid bulb.   3. No evidence of carotid dissection or aneurysm.   4. Intracranial vessels will be discussed in a separate report.      CT-CEREBRAL PERFUSION ANALYSIS   Final Result      1.  CT perfusion examination over the limited section of brain reveals 6 mL of brain parenchyma has less than 30% of cerebral blood flow (CBF).      2.  Abnormal perfusion of the left hemisphere in the left MCA territory. Please see head CTA report for further detail.      Case was discussed with Dr. Patrcik Paz at approximately 4:38 PM on 10/12/2018.      CT-CTA HEAD WITH & W/O-POST PROCESS   Final Result      1.  Occlusion of the left M1 segment of the middle cerebral artery.   2.  Extensive cortical collateral supply.      These findings were discussed with PATRICK PAZ on 10/12/2018 4:38 PM.      CT-HEAD W/O   Final Result         1. No CT evidence of acute infarct, hemorrhage or mass.   2. Mild global parenchymal atrophy and chronic small vessel ischemic changes.      EC-ECHOCARDIOGRAM COMPLETE W/O CONT    (Results Pending)        Assessment/Plan  * Cerebrovascular accident (CVA) due to stenosis of left middle cerebral artery (HCC)- (present on admission)   Assessment & Plan    -noted on M1 L side  -neurology following  -full dose statin, asa  -TEG scan shows no Plavix response  -Started on Brilinta per neurology  -Continue with the neurochecks  SLp/pt/ot  -Dr. Lockett with interventional radiology will be consulted for possible angiogram  -permissive HTN at this time        PVD (peripheral vascular disease) ([facility])- (present on admission)   Assessment & Plan    -severe, s/p R CEA, functioning well, now with M1 occlusion on the L side as well  -ASA and plavix, will get TEG scan to see if patient non responder to plavix and consider brilinta  -L carotid with 50-69% stenosis, will need outpatient/future treatment        CKD (chronic kidney disease) stage 3, GFR 30-59 ml/min ([facility])- (present on  admission)   Assessment & Plan    -at her baseline, monitor closely        Peripheral neuropathy- (present on admission)   Assessment & Plan    -gabapentin, well controlled        Hypertension- (present on admission)   Assessment & Plan    -permissive HTN at this time given new CVA  -BP upwards of 220 allowed, hold outpatient bP medications        Hyperlipidemia- (present on admission)   Assessment & Plan    -full dose statin             VTE prophylaxis: Heparin 5000 units 3 times daily

## 2018-10-13 NOTE — ED NOTES
Med Rec Updated and Complete per Pt and Pts Home Pharmacy  Allergies Reviewed with Pt  No PO ABX last 30 days    Pt states she takes Plavix daily on a regular basis with her last dose being 10/12/18 AM.

## 2018-10-13 NOTE — PROGRESS NOTES
Pt. arrived to room via transport in , pt. ambulated to be stand by assist and was only slightly unsteady. NIHSS don at bedside, admit profile completed. Pt. has no c/o pain at this time. Discussed plan of care with pt. Stroke book given to pt., discussed stroke education with pt.     Talked to pt. Son-in-law and daughter, updated them on pt. Condition, and plan of care.

## 2018-10-13 NOTE — PROGRESS NOTES
Monitor summary: SR 80-85, [place] .12, QRS .10, QT .36 with rare PVCs per strip from monitor room.

## 2018-10-13 NOTE — THERAPY
"Occupational Therapy Evaluation completed.   Functional Status:  Mild right hand weakness, dyscoordination  Plan of Care: will benefit from 1-2 further ot visits, then return to community based therapy intervention  Discharge Recommendations:  Equipment: NA. Post-acute therapy - yes.    See \"Rehab Therapy-Acute\" Patient Summary Report for complete documentation.    "

## 2018-10-14 ENCOUNTER — APPOINTMENT (OUTPATIENT)
Dept: CARDIOLOGY | Facility: MEDICAL CENTER | Age: 83
DRG: 065 | End: 2018-10-14
Attending: NURSE PRACTITIONER
Payer: MEDICARE

## 2018-10-14 PROBLEM — R55 SYNCOPE AND COLLAPSE: Status: ACTIVE | Noted: 2018-10-14

## 2018-10-14 LAB
ALBUMIN SERPL BCP-MCNC: 3.8 G/DL (ref 3.2–4.9)
ALBUMIN/GLOB SERPL: 1.5 G/DL
ALP SERPL-CCNC: 58 U/L (ref 30–99)
ALT SERPL-CCNC: 11 U/L (ref 2–50)
ANION GAP SERPL CALC-SCNC: 9 MMOL/L (ref 0–11.9)
AST SERPL-CCNC: 16 U/L (ref 12–45)
BASOPHILS # BLD AUTO: 0.6 % (ref 0–1.8)
BASOPHILS # BLD: 0.06 K/UL (ref 0–0.12)
BILIRUB SERPL-MCNC: 0.3 MG/DL (ref 0.1–1.5)
BUN SERPL-MCNC: 20 MG/DL (ref 8–22)
CALCIUM SERPL-MCNC: 9.6 MG/DL (ref 8.5–10.5)
CFT BLD TEG: 8.6 MIN (ref 5–10)
CHLORIDE SERPL-SCNC: 109 MMOL/L (ref 96–112)
CLOT ANGLE BLD TEG: 46.1 DEGREES (ref 53–72)
CLOT LYSIS 30M P MA LENFR BLD TEG: 12.2 % (ref 0–8)
CO2 SERPL-SCNC: 24 MMOL/L (ref 20–33)
CREAT SERPL-MCNC: 0.98 MG/DL (ref 0.5–1.4)
CT.EXTRINSIC BLD ROTEM: 1.3 MIN (ref 1–3)
EKG IMPRESSION: NORMAL
EOSINOPHIL # BLD AUTO: 0.28 K/UL (ref 0–0.51)
EOSINOPHIL NFR BLD: 2.9 % (ref 0–6.9)
ERYTHROCYTE [DISTWIDTH] IN BLOOD BY AUTOMATED COUNT: 49.3 FL (ref 35.9–50)
GLOBULIN SER CALC-MCNC: 2.5 G/DL (ref 1.9–3.5)
GLUCOSE BLD-MCNC: 120 MG/DL (ref 65–99)
GLUCOSE SERPL-MCNC: 118 MG/DL (ref 65–99)
HCT VFR BLD AUTO: 32.3 % (ref 37–47)
HGB BLD-MCNC: 9.8 G/DL (ref 12–16)
IMM GRANULOCYTES # BLD AUTO: 0.03 K/UL (ref 0–0.11)
IMM GRANULOCYTES NFR BLD AUTO: 0.3 % (ref 0–0.9)
LV EJECT FRACT  99904: 65
LV EJECT FRACT MOD 2C 99903: 69.14
LV EJECT FRACT MOD 4C 99902: 64.42
LV EJECT FRACT MOD BP 99901: 67.26
LYMPHOCYTES # BLD AUTO: 2.55 K/UL (ref 1–4.8)
LYMPHOCYTES NFR BLD: 26.1 % (ref 22–41)
MCF BLD TEG: 64.1 MM (ref 50–70)
MCH RBC QN AUTO: 28.3 PG (ref 27–33)
MCHC RBC AUTO-ENTMCNC: 30.3 G/DL (ref 33.6–35)
MCV RBC AUTO: 93.4 FL (ref 81.4–97.8)
MONOCYTES # BLD AUTO: 0.84 K/UL (ref 0–0.85)
MONOCYTES NFR BLD AUTO: 8.6 % (ref 0–13.4)
NEUTROPHILS # BLD AUTO: 6.02 K/UL (ref 2–7.15)
NEUTROPHILS NFR BLD: 61.5 % (ref 44–72)
NRBC # BLD AUTO: 0 K/UL
NRBC BLD-RTO: 0 /100 WBC
PA AA BLD-ACNC: 64.5 %
PA ADP BLD-ACNC: 21.5 %
PLATELET # BLD AUTO: 334 K/UL (ref 164–446)
PMV BLD AUTO: 9.2 FL (ref 9–12.9)
POTASSIUM SERPL-SCNC: 4 MMOL/L (ref 3.6–5.5)
PROT SERPL-MCNC: 6.3 G/DL (ref 6–8.2)
RBC # BLD AUTO: 3.46 M/UL (ref 4.2–5.4)
SODIUM SERPL-SCNC: 142 MMOL/L (ref 135–145)
TEG ALGORITHM TGALG: ABNORMAL
TROPONIN I SERPL-MCNC: <0.01 NG/ML (ref 0–0.04)
WBC # BLD AUTO: 9.8 K/UL (ref 4.8–10.8)

## 2018-10-14 PROCEDURE — 90662 IIV NO PRSV INCREASED AG IM: CPT | Performed by: PSYCHIATRY & NEUROLOGY

## 2018-10-14 PROCEDURE — A9270 NON-COVERED ITEM OR SERVICE: HCPCS | Performed by: PSYCHIATRY & NEUROLOGY

## 2018-10-14 PROCEDURE — 85384 FIBRINOGEN ACTIVITY: CPT

## 2018-10-14 PROCEDURE — A9270 NON-COVERED ITEM OR SERVICE: HCPCS | Performed by: NURSE PRACTITIONER

## 2018-10-14 PROCEDURE — 99233 SBSQ HOSP IP/OBS HIGH 50: CPT | Performed by: HOSPITALIST

## 2018-10-14 PROCEDURE — 93010 ELECTROCARDIOGRAM REPORT: CPT | Performed by: INTERNAL MEDICINE

## 2018-10-14 PROCEDURE — 85025 COMPLETE CBC W/AUTO DIFF WBC: CPT

## 2018-10-14 PROCEDURE — 36415 COLL VENOUS BLD VENIPUNCTURE: CPT

## 2018-10-14 PROCEDURE — 700102 HCHG RX REV CODE 250 W/ 637 OVERRIDE(OP): Performed by: PSYCHIATRY & NEUROLOGY

## 2018-10-14 PROCEDURE — 93306 TTE W/DOPPLER COMPLETE: CPT | Mod: 26 | Performed by: INTERNAL MEDICINE

## 2018-10-14 PROCEDURE — 85347 COAGULATION TIME ACTIVATED: CPT

## 2018-10-14 PROCEDURE — 99233 SBSQ HOSP IP/OBS HIGH 50: CPT | Performed by: PSYCHIATRY & NEUROLOGY

## 2018-10-14 PROCEDURE — 93306 TTE W/DOPPLER COMPLETE: CPT

## 2018-10-14 PROCEDURE — 700111 HCHG RX REV CODE 636 W/ 250 OVERRIDE (IP): Performed by: PSYCHIATRY & NEUROLOGY

## 2018-10-14 PROCEDURE — 80053 COMPREHEN METABOLIC PANEL: CPT

## 2018-10-14 PROCEDURE — 95951 EEG: CPT | Mod: 26,52 | Performed by: PSYCHIATRY & NEUROLOGY

## 2018-10-14 PROCEDURE — 95951 EEG: CPT | Mod: 52 | Performed by: PSYCHIATRY & NEUROLOGY

## 2018-10-14 PROCEDURE — 700102 HCHG RX REV CODE 250 W/ 637 OVERRIDE(OP): Performed by: NURSE PRACTITIONER

## 2018-10-14 PROCEDURE — 93005 ELECTROCARDIOGRAM TRACING: CPT | Performed by: HOSPITALIST

## 2018-10-14 PROCEDURE — 3E02340 INTRODUCTION OF INFLUENZA VACCINE INTO MUSCLE, PERCUTANEOUS APPROACH: ICD-10-PCS | Performed by: PSYCHIATRY & NEUROLOGY

## 2018-10-14 PROCEDURE — A9270 NON-COVERED ITEM OR SERVICE: HCPCS | Performed by: INTERNAL MEDICINE

## 2018-10-14 PROCEDURE — 700102 HCHG RX REV CODE 250 W/ 637 OVERRIDE(OP): Performed by: INTERNAL MEDICINE

## 2018-10-14 PROCEDURE — A9270 NON-COVERED ITEM OR SERVICE: HCPCS | Performed by: HOSPITALIST

## 2018-10-14 PROCEDURE — 84484 ASSAY OF TROPONIN QUANT: CPT

## 2018-10-14 PROCEDURE — 700102 HCHG RX REV CODE 250 W/ 637 OVERRIDE(OP): Performed by: HOSPITALIST

## 2018-10-14 PROCEDURE — 770020 HCHG ROOM/CARE - TELE (206)

## 2018-10-14 PROCEDURE — 82962 GLUCOSE BLOOD TEST: CPT

## 2018-10-14 PROCEDURE — 90471 IMMUNIZATION ADMIN: CPT

## 2018-10-14 PROCEDURE — 700111 HCHG RX REV CODE 636 W/ 250 OVERRIDE (IP): Performed by: INTERNAL MEDICINE

## 2018-10-14 PROCEDURE — 85576 BLOOD PLATELET AGGREGATION: CPT

## 2018-10-14 RX ORDER — ACETAMINOPHEN 325 MG/1
650 TABLET ORAL EVERY 6 HOURS PRN
Status: DISCONTINUED | OUTPATIENT
Start: 2018-10-14 | End: 2018-10-18 | Stop reason: HOSPADM

## 2018-10-14 RX ADMIN — ONDANSETRON 4 MG: 4 TABLET, ORALLY DISINTEGRATING ORAL at 02:49

## 2018-10-14 RX ADMIN — GABAPENTIN 300 MG: 300 CAPSULE ORAL at 17:42

## 2018-10-14 RX ADMIN — LATANOPROST 1 DROP: 50 SOLUTION OPHTHALMIC at 20:11

## 2018-10-14 RX ADMIN — ACETAMINOPHEN 650 MG: 325 TABLET, FILM COATED ORAL at 17:52

## 2018-10-14 RX ADMIN — HEPARIN SODIUM 5000 UNITS: 5000 INJECTION, SOLUTION INTRAVENOUS; SUBCUTANEOUS at 06:00

## 2018-10-14 RX ADMIN — ATORVASTATIN CALCIUM 40 MG: 40 TABLET, FILM COATED ORAL at 17:42

## 2018-10-14 RX ADMIN — TICAGRELOR 90 MG: 90 TABLET ORAL at 09:16

## 2018-10-14 RX ADMIN — TICAGRELOR 90 MG: 90 TABLET ORAL at 17:42

## 2018-10-14 RX ADMIN — INFLUENZA A VIRUS A/MICHIGAN/45/2015 X-275 (H1N1) ANTIGEN (FORMALDEHYDE INACTIVATED), INFLUENZA A VIRUS A/SINGAPORE/INFIMH-16-0019/2016 IVR-186 (H3N2) ANTIGEN (FORMALDEHYDE INACTIVATED), AND INFLUENZA B VIRUS B/MARYLAND/15/2016 BX-69A (A B/COLORADO/6/2017-LIKE VIRUS) ANTIGEN (FORMALDEHYDE INACTIVATED) 0.5 ML: 60; 60; 60 INJECTION, SUSPENSION INTRAMUSCULAR at 15:53

## 2018-10-14 RX ADMIN — GABAPENTIN 300 MG: 300 CAPSULE ORAL at 09:15

## 2018-10-14 RX ADMIN — Medication 81 MG: at 09:16

## 2018-10-14 ASSESSMENT — COGNITIVE AND FUNCTIONAL STATUS - GENERAL
DAILY ACTIVITIY SCORE: 21
SUGGESTED CMS G CODE MODIFIER DAILY ACTIVITY: CJ
WALKING IN HOSPITAL ROOM: A LITTLE
SUGGESTED CMS G CODE MODIFIER MOBILITY: CJ
MOBILITY SCORE: 22
DRESSING REGULAR LOWER BODY CLOTHING: A LITTLE
CLIMB 3 TO 5 STEPS WITH RAILING: A LITTLE
TOILETING: A LITTLE
HELP NEEDED FOR BATHING: A LITTLE

## 2018-10-14 ASSESSMENT — ENCOUNTER SYMPTOMS
LOSS OF CONSCIOUSNESS: 1
BLURRED VISION: 0
FEVER: 0
VOMITING: 0
DIZZINESS: 0
SHORTNESS OF BREATH: 0
WEAKNESS: 0
NERVOUS/ANXIOUS: 0
COUGH: 0
BACK PAIN: 0
MYALGIAS: 0
FOCAL WEAKNESS: 1
HEADACHES: 0
SORE THROAT: 0
DOUBLE VISION: 0
DEPRESSION: 0
NAUSEA: 0

## 2018-10-14 ASSESSMENT — PAIN SCALES - GENERAL
PAINLEVEL_OUTOF10: 0
PAINLEVEL_OUTOF10: 4

## 2018-10-14 NOTE — PROGRESS NOTES
[facility] Medicine Daily Progress Note    Date of Service  10/14/2018    Chief Complaint  84 y.o. female admitted 10/12/2018 with tongue numbness as well as dropping things.  She is found to have a left-sided CVA secondary to left middle cerebral artery stenosis    Hospital Course     Patient was admitted for stroke workup.  She has a previous stroke secondary to an occluded right internal carotid artery for which she underwent successful carotid endarterectomy.  When she presented she had reported that she awakened and noticed numbness on her tongue as well as clumsiness in her hands and she kept dropping cups of coffee repeatedly.      Interval Problem Update  Had a syncopal event overnight while using the restroom and having a bowel movement; bradycardic on telemetry overnight  Today feels tired and lethargic  No other complaints at this time    Consultants/Specialty  Neurology  Interventional radiology    Code Status  DNR/DNI    Disposition  To be determined    Review of Systems  Review of Systems   Constitutional: Positive for malaise/fatigue. Negative for fever.   HENT: Negative for hearing loss and sore throat.    Eyes: Negative for blurred vision and double vision.   Respiratory: Negative for cough and shortness of breath.    Cardiovascular: Negative for chest pain and leg swelling.   Gastrointestinal: Negative for nausea and vomiting.   Genitourinary: Negative for dysuria and frequency.   Musculoskeletal: Negative for back pain and myalgias.   Skin: Negative for itching and rash.   Neurological: Positive for focal weakness and loss of consciousness (Had syncopal event overnight when using the restroom). Negative for dizziness, weakness and headaches.   Psychiatric/Behavioral: Negative for depression. The patient is not nervous/anxious.         Physical Exam  Temp:  [35.4 °C (95.8 °F)-36.8 °C (98.3 °F)] 36.5 °C (97.7 °F)  Pulse:  [] 74  Resp:  [14-18] 15  BP: (103-179)/(38-71) 128/38    Physical Exam    Constitutional: She is oriented to person, place, and time. No distress.   HENT:   Head: Normocephalic and atraumatic.   Mouth/Throat: No oropharyngeal exudate.   Eyes: Pupils are equal, round, and reactive to light. EOM are normal. No scleral icterus.   Neck: Normal range of motion. Neck supple.   Cardiovascular: Normal rate and regular rhythm.    No murmur heard.  Pulmonary/Chest: Effort normal and breath sounds normal. No respiratory distress.   Abdominal: Soft. Bowel sounds are normal. She exhibits no distension.   Musculoskeletal: Normal range of motion. She exhibits no edema or tenderness.   Neurological: She is alert and oriented to person, place, and time.   Right-sided weakness   Skin: Skin is warm and dry. She is not diaphoretic.   Nursing note and vitals reviewed.      Fluids    Intake/Output Summary (Last 24 hours) at 10/14/18 1108  Last data filed at 10/14/18 0800   Gross per 24 hour   Intake              250 ml   Output                0 ml   Net              250 ml       Laboratory  Recent Labs      10/12/18   1535  10/13/18   0227  10/14/18   0305   WBC  8.1  8.1  9.8   RBC  3.16*  3.33*  3.46*   HEMOGLOBIN  9.1*  9.4*  9.8*   HEMATOCRIT  28.8*  30.0*  32.3*   MCV  91.1  90.1  93.4   MCH  28.8  28.2  28.3   MCHC  31.6*  31.3*  30.3*   RDW  48.3  47.2  49.3   PLATELETCT  314  327  334   MPV  8.9*  9.2  9.2     Recent Labs      10/12/18   1535  10/13/18   0227  10/14/18   0305   SODIUM  138  139  142   POTASSIUM  4.0  4.4  4.0   CHLORIDE  102  106  109   CO2  24  26  24   GLUCOSE  121*  93  118*   BUN  14  15  20   CREATININE  1.08  1.09  0.98   CALCIUM  9.3  8.9  9.6     Recent Labs      10/12/18   1535   APTT  26.7   INR  0.99         Recent Labs      10/13/18   0227   TRIGLYCERIDE  107   HDL  42   LDL  28       Imaging  MR-BRAIN-W/O   Final Result      1.  Small areas of acute infarcts in the left centrum semiovale.   2.  Severe stenosis/occlusion of left M1 segment. This finding is seen in the  previous CT angiogram dated 10/12/2018. Intracranial stenosis may be indicated for the patient's with repeated strokes despite of medical management for the dual antiplatelet    therapy. If indicated this finding can be further evaluated with the cerebral angiogram .      DX-CHEST-PORTABLE (1 VIEW)   Final Result      No acute cardiopulmonary abnormality. No interstitial edema.      CT-CTA NECK WITH & W/O-POST PROCESSING   Final Result         1. Postendarterectomy changes in the right neck. There is now approximately 20-30% stenosis of the right carotid bulb.   2. Unchanged 50-69% stenosis of the left carotid bulb.   3. No evidence of carotid dissection or aneurysm.   4. Intracranial vessels will be discussed in a separate report.      CT-CEREBRAL PERFUSION ANALYSIS   Final Result      1.  CT perfusion examination over the limited section of brain reveals 6 mL of brain parenchyma has less than 30% of cerebral blood flow (CBF).      2.  Abnormal perfusion of the left hemisphere in the left MCA territory. Please see head CTA report for further detail.      Case was discussed with Dr. Patrick Paz at approximately 4:38 PM on 10/12/2018.      CT-CTA HEAD WITH & W/O-POST PROCESS   Final Result      1.  Occlusion of the left M1 segment of the middle cerebral artery.   2.  Extensive cortical collateral supply.      These findings were discussed with PATRICK PAZ on 10/12/2018 4:38 PM.      CT-HEAD W/O   Final Result         1. No CT evidence of acute infarct, hemorrhage or mass.   2. Mild global parenchymal atrophy and chronic small vessel ischemic changes.      EC-ECHOCARDIOGRAM COMPLETE W/O CONT    (Results Pending)   IR-CONSULT AND TREAT    (Results Pending)   IR-CAROTID ART ANGIO    (Results Pending)        Assessment/Plan  * Cerebrovascular accident (CVA) due to stenosis of left middle cerebral artery (HCC)- (present on admission)   Assessment & Plan    -noted on M1 L side  -neurology following  -full dose statin,  asa  -TEG scan shows no Plavix response; repeat tach scan today  Continue on Brilinta per neurology  -Continue with the neurochecks  SLp/pt/ot  -Dr. Lockett with interventional radiology has scheduled angiogram for tomorrow  -permissive HTN at this time        PVD (peripheral vascular disease) ([facility])- (present on admission)   Assessment & Plan    -severe, s/p R CEA, functioning well, now with M1 occlusion on the L side as well  -ASA  -Teg scan shows no Plavix response therefore started on Brilinta  -L carotid with 50-69% stenosis; will consult vascular surgery in the morning for recommendations and possible intervention during this hospitalization        Syncope and collapse   Assessment & Plan    She had a syncopal event last night  Likely vasovagal as she was having a bowel movement  Noted to be bradycardic on telemetry  Echocardiogram still pending        CKD (chronic kidney disease) stage 3, GFR 30-59 ml/min ([facility])- (present on admission)   Assessment & Plan    -at her baseline, monitor closely        Peripheral neuropathy- (present on admission)   Assessment & Plan    -gabapentin, well controlled        Hypertension- (present on admission)   Assessment & Plan    -permissive HTN at this time given new CVA  -BP upwards of 220 allowed, hold outpatient bP medications        Hyperlipidemia- (present on admission)   Assessment & Plan    -full dose statin             VTE prophylaxis: Heparin 5000 units 3 times daily

## 2018-10-14 NOTE — PROCEDURES
VIDEO ELECTROENCEPHALOGRAM REPORT      Referring provider: Dr. Ramires.     DOS: 10/14/2018 (total recording of 27 minutes).     INDICATION:  Brooke Diana 84 y.o. female presenting with episode of loss of consciousness.     CURRENT ANTIEPILEPTIC REGIMEN: Gabapentin.     TECHNIQUE: 30 channel video electroencephalogram (EEG) was performed in accordance with the international 10-20 system. The study was reviewed in bipolar and referential montages. The recording examined the patient during wakeful and drowsy state(s).     DESCRIPTION OF THE RECORD:  During the wakefulness, the background showed a symmetrical 10 Hz alpha activity posteriorly with amplitude of 70 mV.  There was reactivity to eye closure/opening.  A normal anterior-posterior gradient was noted with faster beta frequencies seen anteriorly.  During drowsiness, theta frequencies were seen.    ACTIVATION PROCEDURES:   Intermittent Photic stimulation was performed in a stepwise fashion from 1 to 30 Hz and elicited a normal response (photic driving), most noticeable in the posterior leads.      ICTAL AND/OR INTERICTAL FINDINGS:   No focal or generalized epileptiform activity noted. No regional slowing was seen during this routine study.  No clinical events or seizures were reported or recorded during the study.     EKG: sampling of the EKG recording demonstrated sinus rhythm.     EVENTS:  None.     INTERPRETATION:  This is a normal video EEG recording in the awake and drowsy state(s).  Clinical correlation is recommended.    Note: A normal EEG does not rule out epilepsy.  If the clinical suspicion remains high for seizures, a prolonged recording to capture clinical or subclinical events may be helpful.        Tyler Genao MD   Epilepsy and Neurodiagnostics.   Clinical  of Neurology [facility].   Diplomate in Neurology, Epilepsy, and Electrodiagnostic Medicine.   Office: [phone]  Fax:  [phone]

## 2018-10-14 NOTE — PROGRESS NOTES
Patient asked to go to the bathroom. Stood up, then slumped over. Unresponsive, tele reported HR was 38, diaphoretic, pupils fixed and unresponsive, patient grunting and slightly drooling. This lasted for 2 minutes. Rapid response called. See RR Note in [facility] for orders. EKG-SR rate 75. VSS now stable, patient a&ox4-baseline. Slightly nauseous-PRN zofran given. Dr. Macias notified of epsiode and RR.

## 2018-10-14 NOTE — PROGRESS NOTES
Patient Diagnosis and Management daily plan per neurology:        1) Acute  Left MCA stroke: due to intracranial stenosis; was on dual antiplatelet, but TEG showed no plavix response  Will get another TEG today.  Angio ordered for tomorrow per Dr Bhatti    2) recent a month ago  R side stroke: unclear etiology, was questionable due to large artery ( carotid disease); but in her case , A FIB was never quite excluded.    3) Carotid Stenosis: Bilateral R>L  Has supposedly follow up with Vascular soon.    3) Transient LOC : for 2 min last night, while in the bathroom; telemetry showed bradicardia, and she was having a bowel movement  Suspected vasovagal; but since she has carotid stenosis as well as strokes, we will need : 1) Get vascular surgeon evaluation again while she is here ; and perhaps they will consider earlier surgery  2) EEG to be done just in case, as after all strokes are risk factor for seizures.    5pm: The TEG showed still decreased % inh ADP;   She will have angio tomorrow, but she cannot be stented unless her ADP inh is >or equal than 60  Reload of ticagrelor can be an option is she will be a candidate for stenting accordingly to angio  Otherwise is not done commonly in stroke patients  Still she cannot be discharged on Ticagrelor unless her ADP inh is more than 60% because if that is the case, it will mean that she still not responsive.    Dr Pereirar to follow this patient as of tomorrow.            Complete neurological note to support plan is below.    SUBJECTIVE: No changes, no further weakness episodes        ROS:unchanged      PHYSICAL EXAMINATION:    Constitutional: lying in bed, resting, comfortable    CARDIOVASCULAR:S1,S2    PULMONARY: CTA      EXTREMITIES:No edema    NEUROLOGICAL:    MENTAL:awake, alert, oriented #5  Normal speech and language    CRANIAL NERVES:2-12 normal.    MOTOR:5/5    SENSORY:intact    DTR:+1    BABINSKI:neg    Movements: No abnormal noticed.    CEREBELLAR:  No   dysmetria      GAIT: not tested    NIH 0                      Vitals:    10/14/18 0249 10/14/18 0257 10/14/18 0400 10/14/18 0800   BP: 138/68 158/66 141/55 (!) 128/38   Pulse: 77 76 78 74   Resp:  14 14 15   Temp:   35.8 °C (96.5 °F) 36.5 °C (97.7 °F)   SpO2: 94% 95% 96% 100%   Weight:       Height:                      Imaging: neuroimaging reviewed and directly visualized by me  MR-BRAIN-W/O   Final Result      1.  Small areas of acute infarcts in the left centrum semiovale.   2.  Severe stenosis/occlusion of left M1 segment. This finding is seen in the previous CT angiogram dated 10/12/2018. Intracranial stenosis may be indicated for the patient's with repeated strokes despite of medical management for the dual antiplatelet    therapy. If indicated this finding can be further evaluated with the cerebral angiogram .      DX-CHEST-PORTABLE (1 VIEW)   Final Result      No acute cardiopulmonary abnormality. No interstitial edema.      CT-CTA NECK WITH & W/O-POST PROCESSING   Final Result         1. Postendarterectomy changes in the right neck. There is now approximately 20-30% stenosis of the right carotid bulb.   2. Unchanged 50-69% stenosis of the left carotid bulb.   3. No evidence of carotid dissection or aneurysm.   4. Intracranial vessels will be discussed in a separate report.      CT-CEREBRAL PERFUSION ANALYSIS   Final Result      1.  CT perfusion examination over the limited section of brain reveals 6 mL of brain parenchyma has less than 30% of cerebral blood flow (CBF).      2.  Abnormal perfusion of the left hemisphere in the left MCA territory. Please see head CTA report for further detail.      Case was discussed with Dr. Patrick Paz at approximately 4:38 PM on 10/12/2018.      CT-CTA HEAD WITH & W/O-POST PROCESS   Final Result      1.  Occlusion of the left M1 segment of the middle cerebral artery.   2.  Extensive cortical collateral supply.      These findings were discussed with PATRICK PAZ on 10/12/2018  4:38 PM.      CT-HEAD W/O   Final Result         1. No CT evidence of acute infarct, hemorrhage or mass.   2. Mild global parenchymal atrophy and chronic small vessel ischemic changes.      EC-ECHOCARDIOGRAM COMPLETE W/O CONT    (Results Pending)   IR-CONSULT AND TREAT    (Results Pending)   IR-CAROTID ART ANGIO    (Results Pending)         Labs:  Recent Labs      10/12/18   1535  10/13/18   0227  10/14/18   0305   WBC  8.1  8.1  9.8   RBC  3.16*  3.33*  3.46*   HEMOGLOBIN  9.1*  9.4*  9.8*   HEMATOCRIT  28.8*  30.0*  32.3*   MCV  91.1  90.1  93.4   MCH  28.8  28.2  28.3   MCHC  31.6*  31.3*  30.3*   RDW  48.3  47.2  49.3   PLATELETCT  314  327  334   MPV  8.9*  9.2  9.2     Recent Labs      10/12/18   1535  10/13/18   0227  10/14/18   0305   SODIUM  138  139  142   POTASSIUM  4.0  4.4  4.0   CHLORIDE  102  106  109   CO2  24  26  24   GLUCOSE  121*  93  118*   BUN  14  15  20   CREATININE  1.08  1.09  0.98   CALCIUM  9.3  8.9  9.6     Recent Labs      10/12/18   1535   APTT  26.7   INR  0.99         Recent Labs      10/12/18   1535  10/14/18   0305   TROPONINI  <0.01  <0.01     Recent Labs      10/13/18   0227   TRIGLYCERIDE  107   HDL  42   LDL  28     Recent Labs      10/12/18   1535  10/13/18   0227  10/14/18   0305   SODIUM  138  139  142   POTASSIUM  4.0  4.4  4.0   CHLORIDE  102  106  109   CO2  24  26  24   GLUCOSE  121*  93  118*   BUN  14  15  20     Recent Labs      10/12/18   1535  10/13/18   0227  10/14/18   0305   SODIUM  138  139  142   POTASSIUM  4.0  4.4  4.0   CHLORIDE  102  106  109   CO2  24  26  24   BUN  14  15  20   CREATININE  1.08  1.09  0.98   CALCIUM  9.3  8.9  9.6     Recent Labs      10/12/18   1535   APTT  26.7   INR  0.99     Results for orders placed or performed during the hospital encounter of 09/01/18   Echocardiogram Comp W/O cont   Result Value Ref Range    Eject.Frac. MOD BP 72.45     Eject.Frac. MOD 4C 76.57     Eject.Frac. MOD 2C 65.97     Left Ventrical Ejection Fraction 70                       Jessie Fernandez M.D.    Diplomate Neurology, Vascular Neurology and Neurophysiology

## 2018-10-14 NOTE — CARE PLAN
Problem: Communication  Goal: The ability to communicate needs accurately and effectively will improve  Outcome: PROGRESSING AS EXPECTED  Updated patient on plan of care, all questions answered.     Problem: Bowel/Gastric:  Goal: Normal bowel function is maintained or improved  Outcome: PROGRESSING AS EXPECTED  Patient has had loose stools and some stomach pain today, holding stool softeners per patient request.

## 2018-10-14 NOTE — PROGRESS NOTES
Pt up out of bed to bathroom for shower. Pt did not require assistance. Student nurse present in bathroom on stand by. Pt tolerated well.

## 2018-10-14 NOTE — PROGRESS NOTES
Received report from day shift RN. Patient resting in bed, call bell in reach, bed alarm on, A&Ox4, right sided weakness, 2L oxygen at bedtime. Patient agrees to call for any needs.

## 2018-10-14 NOTE — CARE PLAN
Problem: Safety  Goal: Will remain free from injury  Outcome: PROGRESSING AS EXPECTED  Patient is A&Ox4, call bell in reach, steady on feet when ambulating, knows to call for assistance.     Problem: Pain Management  Goal: Pain level will decrease to patient's comfort goal  Outcome: PROGRESSING AS EXPECTED   10/13/18 1156 10/13/18 1939   OTHER   Pain Scale 0 - 10  --  0   Therapist Pain Assessment During Activity;Post Activity Pain Same as Prior to Activity;Nurse Notified --        Problem: Safety:  Goal: Will remain free from injury  Outcome: PROGRESSING AS EXPECTED  Patient is A&Ox4, call bell in reach, steady on feet when ambulating, knows to call for assistance.

## 2018-10-14 NOTE — PROGRESS NOTES
Monitor Summary: SR 73-97, NC .18, QRS .10, QT .38 with occasional PVC per strip from monitor room

## 2018-10-14 NOTE — PROGRESS NOTES
Hope from Lab called with critical result of LY30 at 12.2. Critical lab result read back to Hope at 1012.   Dr. Oates notified of critical lab result at 1020.  Critical lab result read back by Dr. Oates.

## 2018-10-15 PROCEDURE — A9270 NON-COVERED ITEM OR SERVICE: HCPCS | Performed by: NURSE PRACTITIONER

## 2018-10-15 PROCEDURE — 700102 HCHG RX REV CODE 250 W/ 637 OVERRIDE(OP): Performed by: PSYCHIATRY & NEUROLOGY

## 2018-10-15 PROCEDURE — 99232 SBSQ HOSP IP/OBS MODERATE 35: CPT | Performed by: HOSPITALIST

## 2018-10-15 PROCEDURE — A9270 NON-COVERED ITEM OR SERVICE: HCPCS | Performed by: PSYCHIATRY & NEUROLOGY

## 2018-10-15 PROCEDURE — 700102 HCHG RX REV CODE 250 W/ 637 OVERRIDE(OP): Performed by: NURSE PRACTITIONER

## 2018-10-15 PROCEDURE — 770020 HCHG ROOM/CARE - TELE (206)

## 2018-10-15 PROCEDURE — 700102 HCHG RX REV CODE 250 W/ 637 OVERRIDE(OP): Performed by: INTERNAL MEDICINE

## 2018-10-15 PROCEDURE — A9270 NON-COVERED ITEM OR SERVICE: HCPCS | Performed by: INTERNAL MEDICINE

## 2018-10-15 RX ADMIN — ATORVASTATIN CALCIUM 40 MG: 40 TABLET, FILM COATED ORAL at 18:25

## 2018-10-15 RX ADMIN — TICAGRELOR 90 MG: 90 TABLET ORAL at 18:37

## 2018-10-15 RX ADMIN — LATANOPROST 1 DROP: 50 SOLUTION OPHTHALMIC at 20:02

## 2018-10-15 RX ADMIN — TICAGRELOR 90 MG: 90 TABLET ORAL at 05:21

## 2018-10-15 RX ADMIN — GABAPENTIN 300 MG: 300 CAPSULE ORAL at 05:19

## 2018-10-15 RX ADMIN — GABAPENTIN 300 MG: 300 CAPSULE ORAL at 18:25

## 2018-10-15 RX ADMIN — Medication 81 MG: at 05:19

## 2018-10-15 ASSESSMENT — ENCOUNTER SYMPTOMS
DOUBLE VISION: 0
CHILLS: 0
LOSS OF CONSCIOUSNESS: 0
BLURRED VISION: 0
DEPRESSION: 0
HEADACHES: 0
SHORTNESS OF BREATH: 0
VOMITING: 0
DIZZINESS: 0
NAUSEA: 0
SORE THROAT: 0
BACK PAIN: 0
FOCAL WEAKNESS: 1
MYALGIAS: 0
NERVOUS/ANXIOUS: 0
COUGH: 0
PALPITATIONS: 0
FEVER: 0

## 2018-10-15 ASSESSMENT — PAIN SCALES - GENERAL
PAINLEVEL_OUTOF10: 0

## 2018-10-15 NOTE — CARE PLAN
Problem: Communication  Goal: The ability to communicate needs accurately and effectively will improve  Outcome: PROGRESSING AS EXPECTED  Patient is alert and oriented times 4. No aphasia noted during assessment. Uses call light appropriately.     Problem: Safety  Goal: Will remain free from injury  Outcome: PROGRESSING AS EXPECTED  Patient uses call light appropriately. Bed alarm maintained. Bed in lowest position with wheels locked. Hourly rounding utilized.     Problem: Venous Thromboembolism (VTW)/Deep Vein Thrombosis (DVT) Prevention:  Goal: Patient will participate in Venous Thrombosis (VTE)/Deep Vein Thrombosis (DVT)Prevention Measures  Outcome: PROGRESSING AS EXPECTED  Patient refuses to wear SCD's. States that she cannot sleep with them on. Education provided. Verbalizes understanding.    Problem: Safety:  Goal: Will remain free from injury  Outcome: PROGRESSING AS EXPECTED  Patient uses call light appropriately. Bed alarm maintained. Bed in lowest position with wheels locked. Hourly rounding utilized.

## 2018-10-15 NOTE — PROGRESS NOTES
[facility] Medicine Daily Progress Note    Date of Service  10/15/2018    Chief Complaint  84 y.o. female admitted 10/12/2018 with tongue numbness as well as dropping things.  She is found to have a left-sided CVA secondary to left middle cerebral artery stenosis    Hospital Course     Patient was admitted for stroke workup.  She has a previous stroke secondary to an occluded right internal carotid artery for which she underwent successful carotid endarterectomy.  When she presented she had reported that she awakened and noticed numbness on her tongue as well as clumsiness in her hands and she kept dropping cups of coffee repeatedly.      Interval Problem Update  No new issues overnight  N.p.o. for angiogram by interventional radiology today  Still complains of weakness and fatigue    Consultants/Specialty  Neurology  Interventional radiology    Code Status  DNR/DNI    Disposition  To be determined    Review of Systems  Review of Systems   Constitutional: Positive for malaise/fatigue. Negative for chills and fever.   HENT: Negative for hearing loss and sore throat.    Eyes: Negative for blurred vision and double vision.   Respiratory: Negative for cough and shortness of breath.    Cardiovascular: Negative for chest pain and palpitations.   Gastrointestinal: Negative for nausea and vomiting.   Genitourinary: Negative for dysuria and frequency.   Musculoskeletal: Negative for back pain and myalgias.   Skin: Negative for itching and rash.   Neurological: Positive for focal weakness. Negative for dizziness, loss of consciousness and headaches.   Psychiatric/Behavioral: Negative for depression. The patient is not nervous/anxious.         Physical Exam  Temp:  [36.2 °C (97.2 °F)-37 °C (98.6 °F)] 36.8 °C (98.2 °F)  Pulse:  [73-84] 81  Resp:  [16-18] 16  BP: (108-152)/(37-98) 143/59    Physical Exam   Constitutional: She is oriented to person, place, and time. She appears well-developed and well-nourished.   HENT:   Head:  Normocephalic and atraumatic.   Mouth/Throat: No oropharyngeal exudate.   Eyes: Pupils are equal, round, and reactive to light. Conjunctivae and EOM are normal.   Neck: Normal range of motion. Neck supple. No JVD present.   Cardiovascular: Normal rate and regular rhythm.    No murmur heard.  Pulmonary/Chest: Effort normal and breath sounds normal. No respiratory distress.   Abdominal: Soft. Bowel sounds are normal. She exhibits no distension.   Musculoskeletal: She exhibits no edema or tenderness.   Neurological: She is alert and oriented to person, place, and time.   Right-sided weakness   Skin: Skin is warm and dry.   Nursing note and vitals reviewed.      Fluids    Intake/Output Summary (Last 24 hours) at 10/15/18 1129  Last data filed at 10/14/18 1800   Gross per 24 hour   Intake              400 ml   Output                0 ml   Net              400 ml       Laboratory  Recent Labs      10/12/18   1535  10/13/18   0227  10/14/18   0305   WBC  8.1  8.1  9.8   RBC  3.16*  3.33*  3.46*   HEMOGLOBIN  9.1*  9.4*  9.8*   HEMATOCRIT  28.8*  30.0*  32.3*   MCV  91.1  90.1  93.4   MCH  28.8  28.2  28.3   MCHC  31.6*  31.3*  30.3*   RDW  48.3  47.2  49.3   PLATELETCT  314  327  334   MPV  8.9*  9.2  9.2     Recent Labs      10/12/18   1535  10/13/18   0227  10/14/18   0305   SODIUM  138  139  142   POTASSIUM  4.0  4.4  4.0   CHLORIDE  102  106  109   CO2  24  26  24   GLUCOSE  121*  93  118*   BUN  14  15  20   CREATININE  1.08  1.09  0.98   CALCIUM  9.3  8.9  9.6     Recent Labs      10/12/18   1535   APTT  26.7   INR  0.99         Recent Labs      10/13/18   0227   TRIGLYCERIDE  107   HDL  42   LDL  28       Imaging  EC-ECHOCARDIOGRAM COMPLETE W/O CONT   Final Result      MR-BRAIN-W/O   Final Result      1.  Small areas of acute infarcts in the left centrum semiovale.   2.  Severe stenosis/occlusion of left M1 segment. This finding is seen in the previous CT angiogram dated 10/12/2018. Intracranial stenosis may be  indicated for the patient's with repeated strokes despite of medical management for the dual antiplatelet    therapy. If indicated this finding can be further evaluated with the cerebral angiogram .      DX-CHEST-PORTABLE (1 VIEW)   Final Result      No acute cardiopulmonary abnormality. No interstitial edema.      CT-CTA NECK WITH & W/O-POST PROCESSING   Final Result         1. Postendarterectomy changes in the right neck. There is now approximately 20-30% stenosis of the right carotid bulb.   2. Unchanged 50-69% stenosis of the left carotid bulb.   3. No evidence of carotid dissection or aneurysm.   4. Intracranial vessels will be discussed in a separate report.      CT-CEREBRAL PERFUSION ANALYSIS   Final Result      1.  CT perfusion examination over the limited section of brain reveals 6 mL of brain parenchyma has less than 30% of cerebral blood flow (CBF).      2.  Abnormal perfusion of the left hemisphere in the left MCA territory. Please see head CTA report for further detail.      Case was discussed with Dr. Patrick Paz at approximately 4:38 PM on 10/12/2018.      CT-CTA HEAD WITH & W/O-POST PROCESS   Final Result      1.  Occlusion of the left M1 segment of the middle cerebral artery.   2.  Extensive cortical collateral supply.      These findings were discussed with PATRICK PAZ on 10/12/2018 4:38 PM.      CT-HEAD W/O   Final Result         1. No CT evidence of acute infarct, hemorrhage or mass.   2. Mild global parenchymal atrophy and chronic small vessel ischemic changes.      IR-CONSULT AND TREAT    (Results Pending)   IR-CAROTID ART ANGIO    (Results Pending)        Assessment/Plan  * Cerebrovascular accident (CVA) due to stenosis of left middle cerebral artery (HCC)- (present on admission)   Assessment & Plan    -noted on M1 L side  -neurology following  -full dose statin, asa  -TEG scan shows no Plavix response  Continue on Brilinta per neurology  -Continue with the neurochecks  SLp/pt/ot  -Dr. Lockett  with interventional radiology has scheduled angiogram today  -permissive HTN at this time        PVD (peripheral vascular disease) ([facility])- (present on admission)   Assessment & Plan    -severe, s/p R CEA, functioning well, now with M1 occlusion on the L side as well  -ASA  -Teg scan shows no Plavix response therefore started on Brilinta  -L carotid with 50-69% stenosis; will need to consult vascular surgery but will determine plan of care with interventional radiology regarding the M1 occlusion first        Syncope and collapse   Assessment & Plan    No further syncopal events  No events on telemetry  Echocardiogram done and reviewed        CKD (chronic kidney disease) stage 3, GFR 30-59 ml/min ([facility])- (present on admission)   Assessment & Plan    -at her baseline, monitor closely        Peripheral neuropathy- (present on admission)   Assessment & Plan    -gabapentin, well controlled        Hypertension- (present on admission)   Assessment & Plan    -permissive HTN at this time given new CVA  -BP upwards of 220 allowed, hold outpatient bP medications        Hyperlipidemia- (present on admission)   Assessment & Plan    -full dose statin             VTE prophylaxis: Heparin 5000 units 3 times daily

## 2018-10-15 NOTE — PROGRESS NOTES
Patient did have an episode of dizziness after ambulating to the bathroom this morning. Feeling resolved after getting back into bed.

## 2018-10-15 NOTE — PROGRESS NOTES
Spoke with IR and they state they are still trying to get to pt but won't know if/when; pt very hungry and doesn't want to keep waiting. Plan to let pt eat now and test in the morning. Notified SAMAN Rausch, okay to order diet.

## 2018-10-15 NOTE — PROGRESS NOTES
Monitor summary: SR 72-88, KS .20, QRS .08, QT .39, with occasional PVCs and 1.1 sec pause per strip from monitor room.

## 2018-10-15 NOTE — PROGRESS NOTES
Pt AOx4, plan to angio in AM. Pt ambulated in hallway, became lightheaded shelter though, helped pt to chair; once pt sat she was able to walk the rest the way back to her room. No c/o pain. Fall precautions in place.

## 2018-10-15 NOTE — CARE PLAN
Problem: Safety  Goal: Will remain free from injury  Outcome: PROGRESSING AS EXPECTED  Precautions in place, pt calls for help.    Problem: Knowledge Deficit  Goal: Knowledge of disease process/condition, treatment plan, diagnostic tests, and medications will improve  Outcome: PROGRESSING AS EXPECTED   Discussed plan for today- Pt for art angio today.     Problem: Safety:  Goal: Will remain free from injury  Outcome: PROGRESSING AS EXPECTED  Precautions in place, pt calls for help.    Problem: Knowledge Deficit:  Goal: Knowledge of disease process/condition, treatment plan, diagnostic tests, and medications will improve  Outcome: PROGRESSING AS EXPECTED   Discussed plan for today- Pt for art angio today.

## 2018-10-16 ENCOUNTER — APPOINTMENT (OUTPATIENT)
Dept: RADIOLOGY | Facility: MEDICAL CENTER | Age: 83
DRG: 065 | End: 2018-10-16
Attending: PSYCHIATRY & NEUROLOGY
Payer: MEDICARE

## 2018-10-16 PROBLEM — D64.9 ANEMIA: Status: ACTIVE | Noted: 2018-10-16

## 2018-10-16 LAB
ANION GAP SERPL CALC-SCNC: 6 MMOL/L (ref 0–11.9)
BASOPHILS # BLD AUTO: 0.6 % (ref 0–1.8)
BASOPHILS # BLD: 0.05 K/UL (ref 0–0.12)
BUN SERPL-MCNC: 21 MG/DL (ref 8–22)
CALCIUM SERPL-MCNC: 10.1 MG/DL (ref 8.5–10.5)
CHLORIDE SERPL-SCNC: 106 MMOL/L (ref 96–112)
CO2 SERPL-SCNC: 28 MMOL/L (ref 20–33)
CREAT SERPL-MCNC: 1.07 MG/DL (ref 0.5–1.4)
EOSINOPHIL # BLD AUTO: 0.12 K/UL (ref 0–0.51)
EOSINOPHIL NFR BLD: 1.4 % (ref 0–6.9)
ERYTHROCYTE [DISTWIDTH] IN BLOOD BY AUTOMATED COUNT: 48 FL (ref 35.9–50)
GLUCOSE SERPL-MCNC: 107 MG/DL (ref 65–99)
HCT VFR BLD AUTO: 35.4 % (ref 37–47)
HGB BLD-MCNC: 10.7 G/DL (ref 12–16)
IMM GRANULOCYTES # BLD AUTO: 0.02 K/UL (ref 0–0.11)
IMM GRANULOCYTES NFR BLD AUTO: 0.2 % (ref 0–0.9)
LYMPHOCYTES # BLD AUTO: 1.38 K/UL (ref 1–4.8)
LYMPHOCYTES NFR BLD: 16.2 % (ref 22–41)
MCH RBC QN AUTO: 27.5 PG (ref 27–33)
MCHC RBC AUTO-ENTMCNC: 30.2 G/DL (ref 33.6–35)
MCV RBC AUTO: 91 FL (ref 81.4–97.8)
MONOCYTES # BLD AUTO: 0.73 K/UL (ref 0–0.85)
MONOCYTES NFR BLD AUTO: 8.6 % (ref 0–13.4)
NEUTROPHILS # BLD AUTO: 6.23 K/UL (ref 2–7.15)
NEUTROPHILS NFR BLD: 73 % (ref 44–72)
NRBC # BLD AUTO: 0 K/UL
NRBC BLD-RTO: 0 /100 WBC
PLATELET # BLD AUTO: 353 K/UL (ref 164–446)
PMV BLD AUTO: 9.3 FL (ref 9–12.9)
POTASSIUM SERPL-SCNC: 5.5 MMOL/L (ref 3.6–5.5)
RBC # BLD AUTO: 3.89 M/UL (ref 4.2–5.4)
SODIUM SERPL-SCNC: 140 MMOL/L (ref 135–145)
WBC # BLD AUTO: 8.5 K/UL (ref 4.8–10.8)

## 2018-10-16 PROCEDURE — B3171ZZ FLUOROSCOPY OF LEFT INTERNAL CAROTID ARTERY USING LOW OSMOLAR CONTRAST: ICD-10-PCS | Performed by: RADIOLOGY

## 2018-10-16 PROCEDURE — 700111 HCHG RX REV CODE 636 W/ 250 OVERRIDE (IP): Performed by: RADIOLOGY

## 2018-10-16 PROCEDURE — A9270 NON-COVERED ITEM OR SERVICE: HCPCS | Performed by: INTERNAL MEDICINE

## 2018-10-16 PROCEDURE — 700102 HCHG RX REV CODE 250 W/ 637 OVERRIDE(OP): Performed by: INTERNAL MEDICINE

## 2018-10-16 PROCEDURE — 80048 BASIC METABOLIC PNL TOTAL CA: CPT

## 2018-10-16 PROCEDURE — A9270 NON-COVERED ITEM OR SERVICE: HCPCS | Performed by: NURSE PRACTITIONER

## 2018-10-16 PROCEDURE — 99232 SBSQ HOSP IP/OBS MODERATE 35: CPT | Performed by: FAMILY MEDICINE

## 2018-10-16 PROCEDURE — 36415 COLL VENOUS BLD VENIPUNCTURE: CPT

## 2018-10-16 PROCEDURE — 97535 SELF CARE MNGMENT TRAINING: CPT

## 2018-10-16 PROCEDURE — B3141ZZ FLUOROSCOPY OF LEFT COMMON CAROTID ARTERY USING LOW OSMOLAR CONTRAST: ICD-10-PCS | Performed by: RADIOLOGY

## 2018-10-16 PROCEDURE — 99153 MOD SED SAME PHYS/QHP EA: CPT

## 2018-10-16 PROCEDURE — 85025 COMPLETE CBC W/AUTO DIFF WBC: CPT

## 2018-10-16 PROCEDURE — 700111 HCHG RX REV CODE 636 W/ 250 OVERRIDE (IP)

## 2018-10-16 PROCEDURE — 700102 HCHG RX REV CODE 250 W/ 637 OVERRIDE(OP): Performed by: NURSE PRACTITIONER

## 2018-10-16 PROCEDURE — 770020 HCHG ROOM/CARE - TELE (206)

## 2018-10-16 PROCEDURE — 97116 GAIT TRAINING THERAPY: CPT

## 2018-10-16 PROCEDURE — 97530 THERAPEUTIC ACTIVITIES: CPT

## 2018-10-16 PROCEDURE — 700117 HCHG RX CONTRAST REV CODE 255: Performed by: RADIOLOGY

## 2018-10-16 RX ORDER — SODIUM CHLORIDE 9 MG/ML
500 INJECTION, SOLUTION INTRAVENOUS
Status: ACTIVE | OUTPATIENT
Start: 2018-10-16 | End: 2018-10-16

## 2018-10-16 RX ORDER — MIDAZOLAM HYDROCHLORIDE 1 MG/ML
INJECTION INTRAMUSCULAR; INTRAVENOUS
Status: COMPLETED
Start: 2018-10-16 | End: 2018-10-16

## 2018-10-16 RX ORDER — MIDAZOLAM HYDROCHLORIDE 1 MG/ML
.5-2 INJECTION INTRAMUSCULAR; INTRAVENOUS PRN
Status: ACTIVE | OUTPATIENT
Start: 2018-10-16 | End: 2018-10-16

## 2018-10-16 RX ORDER — ONDANSETRON 2 MG/ML
4 INJECTION INTRAMUSCULAR; INTRAVENOUS PRN
Status: ACTIVE | OUTPATIENT
Start: 2018-10-16 | End: 2018-10-16

## 2018-10-16 RX ORDER — ONDANSETRON 2 MG/ML
INJECTION INTRAMUSCULAR; INTRAVENOUS
Status: COMPLETED
Start: 2018-10-16 | End: 2018-10-16

## 2018-10-16 RX ADMIN — GABAPENTIN 300 MG: 300 CAPSULE ORAL at 05:04

## 2018-10-16 RX ADMIN — MIDAZOLAM HYDROCHLORIDE 1 MG: 1 INJECTION INTRAMUSCULAR; INTRAVENOUS at 17:18

## 2018-10-16 RX ADMIN — IOHEXOL 30 ML: 300 INJECTION, SOLUTION INTRAVENOUS at 17:52

## 2018-10-16 RX ADMIN — FENTANYL CITRATE 50 MCG: 50 INJECTION, SOLUTION INTRAMUSCULAR; INTRAVENOUS at 17:41

## 2018-10-16 RX ADMIN — LATANOPROST 1 DROP: 50 SOLUTION OPHTHALMIC at 20:45

## 2018-10-16 RX ADMIN — MIDAZOLAM 1 MG: 1 INJECTION INTRAMUSCULAR; INTRAVENOUS at 17:18

## 2018-10-16 RX ADMIN — ATORVASTATIN CALCIUM 40 MG: 40 TABLET, FILM COATED ORAL at 18:34

## 2018-10-16 RX ADMIN — FENTANYL CITRATE 25 MCG: 50 INJECTION, SOLUTION INTRAMUSCULAR; INTRAVENOUS at 17:22

## 2018-10-16 RX ADMIN — GABAPENTIN 300 MG: 300 CAPSULE ORAL at 18:34

## 2018-10-16 RX ADMIN — ONDANSETRON 4 MG: 2 INJECTION INTRAMUSCULAR; INTRAVENOUS at 17:14

## 2018-10-16 RX ADMIN — FENTANYL CITRATE 25 MCG: 50 INJECTION, SOLUTION INTRAMUSCULAR; INTRAVENOUS at 17:17

## 2018-10-16 ASSESSMENT — ENCOUNTER SYMPTOMS
FOCAL WEAKNESS: 1
SHORTNESS OF BREATH: 0
SORE THROAT: 0
SPEECH CHANGE: 1
ABDOMINAL PAIN: 0
NECK PAIN: 0
COUGH: 0
HEADACHES: 0
BACK PAIN: 0
DIARRHEA: 0
SENSORY CHANGE: 0
BLURRED VISION: 0
DIZZINESS: 0
WHEEZING: 0
CHILLS: 0
HEARTBURN: 0
WEAKNESS: 0
NERVOUS/ANXIOUS: 0
VOMITING: 0
NAUSEA: 0
FEVER: 0

## 2018-10-16 ASSESSMENT — PAIN SCALES - GENERAL
PAINLEVEL_OUTOF10: 0

## 2018-10-16 ASSESSMENT — COGNITIVE AND FUNCTIONAL STATUS - GENERAL
HELP NEEDED FOR BATHING: A LITTLE
DRESSING REGULAR LOWER BODY CLOTHING: A LITTLE
TOILETING: A LITTLE
EATING MEALS: A LITTLE
SUGGESTED CMS G CODE MODIFIER DAILY ACTIVITY: CK
DAILY ACTIVITIY SCORE: 19
PERSONAL GROOMING: A LITTLE

## 2018-10-16 ASSESSMENT — GAIT ASSESSMENTS
DISTANCE (FEET): 150
GAIT LEVEL OF ASSIST: CONTACT GUARD ASSIST
ASSISTIVE DEVICE: FRONT WHEEL WALKER

## 2018-10-16 NOTE — CARE PLAN
Problem: Nutritional:  Goal: Achieve adequate nutritional intake  Advance diet as feasible and patient will consume >50% of meals  Outcome: PROGRESSING AS EXPECTED

## 2018-10-16 NOTE — CARE PLAN
Problem: Communication:  Goal: The ability to communicate needs accurately and effectively will improve  Outcome: PROGRESSING AS EXPECTED  Stroke Book provided for pt and reviewed at bedside   Educated patient about modifiable risk factors related to stroke such as:  High blood pressure   Atherosclerosis   Circulation problems   Tobacco use and smoking   Alcohol use   Physical Inactivity          Problem: Nutritional:  Goal: Maintenance of adequate nutrition will improve  Outcome: PROGRESSING AS EXPECTED  Pt tolerating diet, -N/V   No s/s of aspiration or dysphagia   NPO at midnight

## 2018-10-16 NOTE — THERAPY
"Occupational Therapy Treatment completed with focus on ADLs, ADL transfers and patient education.  Functional Status:  Pt seen for OT tx. Up in chair at beginning of session. Pt w/ impaired strength and coordination R UE. R hand continues to drop items and has difficulty manipulating ADL objects and requires extra time to complete tasks. Min A for LB dressing. Pt given red built up  to assist w/ self-feeding and ADL tasks. Pt pleasant and cooperative during session.   Plan of Care: Will benefit from Occupational Therapy 3 times per week  Discharge Recommendations:  Equipment Will Continue to Assess for Equipment Needs.     See \"Rehab Therapy-Acute\" Patient Summary Report for complete documentation.   "

## 2018-10-16 NOTE — PROGRESS NOTES
[facility] Medicine Daily Progress Note    Date of Service  10/16/2018    Chief Complaint  84 y.o. female admitted 10/12/2018 with stroke.    Hospital Course  Admitted with stroke, MRI showed small areas of acute infarcts in the left centrum semiovale, with left M1 stenosis or occlusion.  Recent carotid endarterectomy with known history of stroke as well.    Interval Problem Update  Stroke -still some right leg weakness and trouble finding words  HTN - controlled    Consultants/Specialty  Neurology - Kiner    Code Status  DNR/DNI    Disposition  TBD    Review of Systems  Review of Systems   Constitutional: Negative for chills, fever and malaise/fatigue.   HENT: Negative for hearing loss and sore throat.    Eyes: Negative for blurred vision.   Respiratory: Negative for cough, shortness of breath and wheezing.    Cardiovascular: Negative for chest pain and leg swelling.   Gastrointestinal: Negative for abdominal pain, diarrhea, heartburn, nausea and vomiting.   Genitourinary: Negative for dysuria.   Musculoskeletal: Negative for back pain and neck pain.   Skin: Negative for rash.   Neurological: Positive for speech change and focal weakness. Negative for dizziness, sensory change, weakness and headaches.   Psychiatric/Behavioral: The patient is not nervous/anxious.         Physical Exam  Temp:  [36.4 °C (97.5 °F)-37.2 °C (98.9 °F)] 36.4 °C (97.5 °F)  Pulse:  [74-81] 77  Resp:  [14-16] 14  BP: (105-157)/(35-56) 117/45    Physical Exam   Constitutional: She is oriented to person, place, and time. She appears well-developed and well-nourished.   HENT:   Head: Normocephalic and atraumatic.   Eyes: Pupils are equal, round, and reactive to light. Conjunctivae are normal.   Neck: No tracheal deviation present. No thyromegaly present.   Cardiovascular: Normal rate and regular rhythm.    Pulmonary/Chest: Effort normal and breath sounds normal.   Abdominal: Soft. Bowel sounds are normal. She exhibits no distension. There is no  tenderness.   Musculoskeletal: She exhibits no edema.   Lymphadenopathy:     She has no cervical adenopathy.   Neurological: She is alert and oriented to person, place, and time. No cranial nerve deficit.   MMT BUE 5/5, RLE 4+/5, LLE 5/5   Skin: Skin is warm and dry.   Nursing note and vitals reviewed.      Fluids    Intake/Output Summary (Last 24 hours) at 10/16/18 1220  Last data filed at 10/16/18 1200   Gross per 24 hour   Intake              300 ml   Output                0 ml   Net              300 ml       Laboratory  Recent Labs      10/14/18   0305  10/16/18   1103   WBC  9.8  8.5   RBC  3.46*  3.89*   HEMOGLOBIN  9.8*  10.7*   HEMATOCRIT  32.3*  35.4*   MCV  93.4  91.0   MCH  28.3  27.5   MCHC  30.3*  30.2*   RDW  49.3  48.0   PLATELETCT  334  353   MPV  9.2  9.3     Recent Labs      10/14/18   0305   SODIUM  142   POTASSIUM  4.0   CHLORIDE  109   CO2  24   GLUCOSE  118*   BUN  20   CREATININE  0.98   CALCIUM  9.6                   Imaging  EC-ECHOCARDIOGRAM COMPLETE W/O CONT   Final Result      MR-BRAIN-W/O   Final Result      1.  Small areas of acute infarcts in the left centrum semiovale.   2.  Severe stenosis/occlusion of left M1 segment. This finding is seen in the previous CT angiogram dated 10/12/2018. Intracranial stenosis may be indicated for the patient's with repeated strokes despite of medical management for the dual antiplatelet    therapy. If indicated this finding can be further evaluated with the cerebral angiogram .      DX-CHEST-PORTABLE (1 VIEW)   Final Result      No acute cardiopulmonary abnormality. No interstitial edema.      CT-CTA NECK WITH & W/O-POST PROCESSING   Final Result         1. Postendarterectomy changes in the right neck. There is now approximately 20-30% stenosis of the right carotid bulb.   2. Unchanged 50-69% stenosis of the left carotid bulb.   3. No evidence of carotid dissection or aneurysm.   4. Intracranial vessels will be discussed in a separate report.       CT-CEREBRAL PERFUSION ANALYSIS   Final Result      1.  CT perfusion examination over the limited section of brain reveals 6 mL of brain parenchyma has less than 30% of cerebral blood flow (CBF).      2.  Abnormal perfusion of the left hemisphere in the left MCA territory. Please see head CTA report for further detail.      Case was discussed with Dr. Patrick Paz at approximately 4:38 PM on 10/12/2018.      CT-CTA HEAD WITH & W/O-POST PROCESS   Final Result      1.  Occlusion of the left M1 segment of the middle cerebral artery.   2.  Extensive cortical collateral supply.      These findings were discussed with PATRICK PAZ on 10/12/2018 4:38 PM.      CT-HEAD W/O   Final Result         1. No CT evidence of acute infarct, hemorrhage or mass.   2. Mild global parenchymal atrophy and chronic small vessel ischemic changes.      IR-CAROTID ART ANGIO    (Results Pending)        Assessment/Plan  * Cerebrovascular accident (CVA) due to stenosis of left middle cerebral artery (HCC)- (present on admission)   Assessment & Plan    ASA, Brilinta, Lipitor  permissive HTN  For IR angiogram today        Syncope and collapse- (present on admission)   Assessment & Plan    Telemetry monitoring        Anemia- (present on admission)   Assessment & Plan    Follow CBC        CKD (chronic kidney disease) stage 3, GFR 30-59 ml/min ([facility])- (present on admission)   Assessment & Plan    Follow BMP        CAD (coronary artery disease)- (present on admission)   Assessment & Plan    History of stent  Aspirin, Brilinta, Lipitor        Hypertension- (present on admission)   Assessment & Plan    permissive HTN  Hold Norvasc, Lotensin, Imdur        PVD (peripheral vascular disease) ([facility])- (present on admission)   Assessment & Plan    s/p right carotid endarterectomy 9/3/2018  Aspirin, Brilinta, Lipitor        Peripheral neuropathy- (present on admission)   Assessment & Plan    Gabapentin        Hyperlipidemia- (present on admission)   Assessment &  Plan    Lipitor             VTE prophylaxis: Heparin

## 2018-10-16 NOTE — DIETARY
"Nutrition services: Day 4 of admit.  Brooke Diana is a 84 y.o. female with admitting DX of CVA.  Pt noted with poor PO intake and wt loss on nutrition admit screen.  RD visited pt at bedside however pt did not want to be disturbed.  RD to follow-up with pt at another appropriate time.    Assessment:  Height: 165.1 cm (5' 5\")  Weight: 61.5 kg (135 lb 9.3 oz)  Body mass index is 22.56 kg/m². - WNL.  Diet/Intake: NPO x 1.    Evaluation:   1. Pt noted with syncope and collapse, anemia, CKD stage 3, CAD, HTN, PVD, and HLD.  2. Pt has a hx of MI, GI bleed, TIA, and pulmonary disease.  3. Per chart review, pt weighed 68 kg on 3/7, 65.3 kg on 5/13, 60.8 kg on 9/1, and wt upon current admit is 61.5 kg via bed scale.  Pt appears to be steadily loosing wt - pt noted with a 9.6% wt loss over the last 7 months, which is not severe however notable.  4. Per chart, pt was previously consuming % x 2 on a regular diet.  5. Plan for IR angiogram today per MD and RN progress notes.  6. Pt denies n/v, diarrhea, or abdominal pain per MD note.  7. Labs: Glucose: 107, A1C: 5.9.  Labs and other meds reviewed.  8. Last BM: 10/15.    Recommendations/Plan:  1. Advance diet as medically feasible.  2. Monitor weight.  3. Nutrition rep will continue to see patient for ongoing meal and snack preferences.     RD following.            "

## 2018-10-16 NOTE — PROGRESS NOTES
Planning for IR-carotid angio with possible stenting of left MCA. Spoke with Dr. Macias  to clarify morning heparin, aspirin, and ticagrelor. Telephone order with readback to hold those medications in the morning prior to procedure.

## 2018-10-16 NOTE — THERAPY
"Physical Therapy Treatment completed.   See \"Rehab Therapy-Acute\" Patient Summary Report for complete documentation.       "

## 2018-10-16 NOTE — CARE PLAN
Problem: Safety  Goal: Will remain free from injury  Outcome: PROGRESSING AS EXPECTED  Reviewed pt's mobility status, discussed with care team pt's needs, verifying appropriate safety precautions in place, providing pt education, ensuring call light within reach, non slip socks in use, evaluating needs, alarms and monitoring every shift, continuing with current plan of care.       Problem: Knowledge Deficit  Goal: Knowledge of disease process/condition, treatment plan, diagnostic tests, and medications will improve  Outcome: PROGRESSING AS EXPECTED  Educated pt on POC, activities, encouraging pt to ask questions, providing answerers to pt questions, educating pt about medications, encouraging pt evolvement in care process, and continuing with current POC.       Problem: Safety:  Goal: Will remain free from injury  Outcome: PROGRESSING AS EXPECTED  Reviewed pt's mobility status, discussed with care team pt's needs, verifying appropriate safety precautions in place, providing pt education, ensuring call light within reach, non slip socks in use, evaluating needs, alarms and monitoring every shift, continuing with current plan of care.       Problem: Knowledge Deficit:  Goal: Knowledge of disease process/condition, treatment plan, diagnostic tests, and medications will improve  Outcome: PROGRESSING AS EXPECTED  Educated pt on POC, activities, encouraging pt to ask questions, providing answerers to pt questions, educating pt about medications, encouraging pt evolvement in care process, and continuing with current POC.

## 2018-10-16 NOTE — PROGRESS NOTES
Pt A&Ox4, denies pain, still c/o intermittent numbness/tingling to right hand with decreased fine motor coordination. Pt tolerating diet, -N/V, voiding, ambulating with SBA, tolerates well. 2 L O2 at night. Pt verbalizes understanding of NPO at midnight for radiology in the morning.

## 2018-10-16 NOTE — PROGRESS NOTES
Assumed pt care at 0700 and received bedside report.    Pt alert and oriented X 4. R hand weakness. Pt denies chest pain, sob, nausea and vomiting, headache, and blurry or double vision. Pt c/o of R hand numbness and tingling. Pt denies pain. Pt ambulating SBA for safety. Up to chair this AM. Called IR for plan and time today. No plan at this time, IR aware will notify this RN when plan in place. Pt notified, NPO at this time, oral care provided. Pt performing IS.  POC discussed.  All questions and concerns addressed. Fall precautions, hourly rounding and Q4 hour neuro checks in place.

## 2018-10-17 ENCOUNTER — APPOINTMENT (OUTPATIENT)
Dept: RADIOLOGY | Facility: MEDICAL CENTER | Age: 83
DRG: 065 | End: 2018-10-17
Attending: PSYCHIATRY & NEUROLOGY
Payer: MEDICARE

## 2018-10-17 DIAGNOSIS — I65.22 INTRACRANIAL CAROTID STENOSIS, LEFT: ICD-10-CM

## 2018-10-17 PROCEDURE — 770020 HCHG ROOM/CARE - TELE (206)

## 2018-10-17 PROCEDURE — 97535 SELF CARE MNGMENT TRAINING: CPT

## 2018-10-17 PROCEDURE — 700102 HCHG RX REV CODE 250 W/ 637 OVERRIDE(OP): Performed by: INTERNAL MEDICINE

## 2018-10-17 PROCEDURE — A9270 NON-COVERED ITEM OR SERVICE: HCPCS | Performed by: PSYCHIATRY & NEUROLOGY

## 2018-10-17 PROCEDURE — A9270 NON-COVERED ITEM OR SERVICE: HCPCS | Performed by: INTERNAL MEDICINE

## 2018-10-17 PROCEDURE — 700102 HCHG RX REV CODE 250 W/ 637 OVERRIDE(OP): Performed by: NURSE PRACTITIONER

## 2018-10-17 PROCEDURE — A9270 NON-COVERED ITEM OR SERVICE: HCPCS | Performed by: NURSE PRACTITIONER

## 2018-10-17 PROCEDURE — 700111 HCHG RX REV CODE 636 W/ 250 OVERRIDE (IP): Performed by: INTERNAL MEDICINE

## 2018-10-17 PROCEDURE — 97110 THERAPEUTIC EXERCISES: CPT

## 2018-10-17 PROCEDURE — 99232 SBSQ HOSP IP/OBS MODERATE 35: CPT | Performed by: PSYCHIATRY & NEUROLOGY

## 2018-10-17 PROCEDURE — 99232 SBSQ HOSP IP/OBS MODERATE 35: CPT | Performed by: FAMILY MEDICINE

## 2018-10-17 PROCEDURE — 700102 HCHG RX REV CODE 250 W/ 637 OVERRIDE(OP): Performed by: PSYCHIATRY & NEUROLOGY

## 2018-10-17 RX ADMIN — GABAPENTIN 300 MG: 300 CAPSULE ORAL at 17:11

## 2018-10-17 RX ADMIN — HEPARIN SODIUM 5000 UNITS: 5000 INJECTION, SOLUTION INTRAVENOUS; SUBCUTANEOUS at 20:00

## 2018-10-17 RX ADMIN — ATORVASTATIN CALCIUM 40 MG: 40 TABLET, FILM COATED ORAL at 17:11

## 2018-10-17 RX ADMIN — GABAPENTIN 300 MG: 300 CAPSULE ORAL at 05:00

## 2018-10-17 RX ADMIN — HEPARIN SODIUM 5000 UNITS: 5000 INJECTION, SOLUTION INTRAVENOUS; SUBCUTANEOUS at 14:34

## 2018-10-17 RX ADMIN — TICAGRELOR 90 MG: 90 TABLET ORAL at 17:11

## 2018-10-17 RX ADMIN — LATANOPROST 1 DROP: 50 SOLUTION OPHTHALMIC at 20:00

## 2018-10-17 ASSESSMENT — COGNITIVE AND FUNCTIONAL STATUS - GENERAL
EATING MEALS: A LITTLE
DRESSING REGULAR LOWER BODY CLOTHING: A LITTLE
PERSONAL GROOMING: A LITTLE
SUGGESTED CMS G CODE MODIFIER DAILY ACTIVITY: CJ
DAILY ACTIVITIY SCORE: 20
HELP NEEDED FOR BATHING: A LITTLE

## 2018-10-17 ASSESSMENT — ENCOUNTER SYMPTOMS
BACK PAIN: 0
ABDOMINAL PAIN: 0
WHEEZING: 0
NERVOUS/ANXIOUS: 0
COUGH: 0
SHORTNESS OF BREATH: 0
FEVER: 0
NECK PAIN: 0
HEADACHES: 0
DIARRHEA: 0
NAUSEA: 0
CHILLS: 0
SORE THROAT: 0
HEARTBURN: 0
VOMITING: 0
WEAKNESS: 0
FOCAL WEAKNESS: 1
BLURRED VISION: 0
SPEECH CHANGE: 1
DIZZINESS: 0
SENSORY CHANGE: 0

## 2018-10-17 ASSESSMENT — PAIN SCALES - GENERAL
PAINLEVEL_OUTOF10: 0

## 2018-10-17 NOTE — DISCHARGE PLANNING
Anticipated Discharge Disposition: Home w/ HH    Action: Pt discussed during rounds. Pt is medically clear to dc. Pt will need prior auth for prescription Birlinta 90 mg #60.    Prescription faxed to pt's preferred pharmacy, [facility] ([phone])    LSW made tc to [facility] ([phone])  This worker spoke with Ashtyn who did not get the fax. LSW re-faxed prescription. Ashtyn will call this worker back with price.     LSW spoke with Ashtyn at [facility]. They will have Birlinta back in stock tomorrow after 2 pm. The price will be $28    LSW placed written prescription in pt's chart.     Barriers to Discharge: RX     Plan: LSW to f/u with pharmacy.

## 2018-10-17 NOTE — PROGRESS NOTES
Pt arrived back to unit at 1810. R puncture site assessed. Soft and CDI.  placed on pt. Vitals complete. Assessment complete. Diet ordered per Dr. Wilkerson. Seizure precautions placed per order. Dysphagia screen completed.

## 2018-10-17 NOTE — CARE PLAN
Problem: Communication:  Goal: The ability to communicate needs accurately and effectively will improve  Outcome: PROGRESSING AS EXPECTED  Stroke Book provided for pt and reviewed at bedside   Educated patient about modifiable risk factors related to stroke such as:  High blood pressure   Atherosclerosis   Circulation problems   Tobacco use and smoking   Alcohol use   Physical Inactivity          Problem: Nutritional:  Goal: Maintenance of adequate nutrition will improve  Outcome: PROGRESSING AS EXPECTED  Pt tolerating diet, -N/V   No s/s of aspiration or dysphagia   Pt tolerating finger foods while in bed rest

## 2018-10-17 NOTE — PROGRESS NOTES
Assumed pt care at 0700 and received bedside report.    Pt alert and oriented X 4. Pt denies chest pain, sob, nausea and vomiting, headache, and blurry or double vision. Pt c/o of numbness and tingling to R hand. Pt denies pain. Dressing to groin CDI and site soft with no signs of hematoma. CMS intact. Pt refusing SCD's education provided, pt verbalizes understanding. Pt ambulating SBA. Okay per Dr. Wilkerson to allow to eat.   POC discussed. All questions and concerns addressed. Fall precautions, Seizure precautions, hourly rounding and Q4 hour neuro checks in place.

## 2018-10-17 NOTE — THERAPY
"Occupational Therapy Treatment completed with focus on ADLs, ADL transfers and patient education.  Functional Status:  Pt seen for OT tx. Pt continues to amb w/ SBA. Discussed HEP for R UE to increased strength and coordination. Pt receptive to education and able to given return understanding of HEP. Pt pleasant and cooperative. Pt motivated to regain strength and independence in ADLs and ADL transfers.   Plan of Care: Will benefit from Occupational Therapy 3 times per week  Discharge Recommendations:  Equipment Will Continue to Assess for Equipment Needs.     See \"Rehab Therapy-Acute\" Patient Summary Report for complete documentation.   "

## 2018-10-17 NOTE — CARE PLAN
Problem: Mobility  Goal: Risk for activity intolerance will decrease  Outcome: PROGRESSING AS EXPECTED  Encourage OOB and ambulation.     Problem: Communication:  Goal: The ability to communicate needs accurately and effectively will improve  Outcome: PROGRESSING AS EXPECTED  Discuss POC with pt, update POC on whiteboard, instruct the use of the call light to communicate needs with RN and CNA's.

## 2018-10-17 NOTE — PROGRESS NOTES
0000: pulses +1, CMS intact, dressing CDI to R groin.   0400: pulses +1, CMS intact, dressing CDI to R groin.

## 2018-10-17 NOTE — PROGRESS NOTES
Pt A&Ox4, fatigued, denies pain. Dressing to right groin CDI, pulses +1, BLE warm, denies N/T. Bed rest until 2210. Pt tolerated finger food diet, -N/V, on 2L O2,  in use, VSS. Pt reports right hand continues to have difficulty with find motor coordination. Seizure precautions in place.  and tele in use. Pt continues to refused SCDs, educated about preventing blood clots, pt verbalizes understanding, pt demonstrates ability to perform ROM exercises in bed.

## 2018-10-17 NOTE — CONSULTS
Procedure Requested: intracranial PTA or stent placement  Date of request: 10/17/2018  Date of consultation: 10/17/2018  Requesting Provider: Jessie Fernandez MD    Summary:  84 year old patient presented with right arm/hand weakness and dysarthria. Had been on Plavix but platelet mapping showed poor response; now on ticagrelor & ASA. Found to have intracranial stenosis on angiogram.       Ref. Range 10/13/2018 02:27 10/14/2018 08:10   Reaction Time Initial-R Latest Ref Range: 5.0 - 10.0 min 6.2 8.6   Clot Kinetics-K Latest Ref Range: 1.0 - 3.0 min 1.6 1.3   Clot Angle-Angle Latest Ref Range: 53.0 - 72.0 degrees 65.8 46.1 (L)   Maximum Clot Strength-MA Latest Ref Range: 50.0 - 70.0 mm 73.5 (H) 64.1   Lysis 30 minutes-LY30 Latest Ref Range: 0.0 - 8.0 % 0.0 12.2 (HH)   % Inhibition AA Latest Units: % 58.2 64.5   % Inhibition ADP Latest Units: % 16.6 21.5   React Time Initial Hep Latest Ref Range: 5.0 - 10.0 min 5.1    Clot Kinetics Heparinase Latest Ref Range: 1.0 - 3.0 min 1.0    Clot Angle Heparinase Latest Ref Range: 53.0 - 72.0 degrees 74.8 (H)    Max Clot Heparinase Latest Ref Range: 50.0 - 70.0 mm 74.4 (H)    Lysis 30 min Heparinase Latest Ref Range: 0.0 - 8.0 % 0.1      Imaging Findings:  Angiogram October 16, 2018:    Interventional Radiology Recommendation:  After discussion with the patient, she would like to pursue conservative management with aspirin and ticagrelor. Will continue her antihypertensives and atorvastatin. Pt to f/u in clinic in 2-3 months (we will call).

## 2018-10-17 NOTE — PROGRESS NOTES
Left carotid arteriogram performed by Dr Parson via right femoral access. Procedure BARs explained to patient and consent obtained. Patient was continuously assessed and monitored throughout procedure; baseline ETCO2 35 with consistent waveform. Angio completed without incidence; starclose vascular closure system used; puncture site CDI without swelling and sterile guaze/tegaderm applied. Patient tolerated procedure quite well and was returned to her room in good condition, awake and conversant.

## 2018-10-17 NOTE — OR SURGEON
Immediate Post- Operative Note        PostOp Diagnosis: Very high grade LEFT M1 stenosis      Procedure(s): LEFT ICA arteriogram      Estimated Blood Loss: Less than 5 ml        Complications: None            10/16/2018     5:57 PM     Chris Parson

## 2018-10-17 NOTE — DISCHARGE INSTRUCTIONS
Discharge Instructions    Discharged to home by car with relative. Discharged via wheelchair, hospital escort: Yes.  Special equipment needed: Not Applicable    Be sure to schedule a follow-up appointment with your primary care doctor or any specialists as instructed.     Discharge Plan:   Influenza Vaccine Indication: Indicated: 65 years and older  Influenza Vaccine Given - only chart on this line when given: Influenza Vaccine Given (See MAR)    I understand that a diet low in cholesterol, fat, and sodium is recommended for good health. Unless I have been given specific instructions below for another diet, I accept this instruction as my diet prescription.   Other diet: Regular    Special Instructions:     Stroke/CVA/TIA/Hemorrhagic Ischemia Discharge Instructions  You have had a stroke. Your risk factors have been identified as follows:  Age - Over 55  High blood pressure  Carotid Stenosis   Smoking  High Cholesterol and lipids  It is important that you reduce your risk factors to avoid another stroke in the future. Here are some general guidelines to follow:  · Eat healthy - avoid food high in fat.  · Get regular exercise.  · Maintain a healthy weight.  · Avoid smoking.  · Avoid alcohol and illegal drug use.  · Take your medications as directed.  For more information regarding risk factors, refer to pages 17-19 in your Stroke Patient Education Guide. Stroke Education Guide was given to patient.    Warning signs of a stroke include (which can also be found on page 3 of your Stroke Patient Education Guide):  · Sudden numbness of weakness of the face, arm or leg (especially on one side of the body).  · Sudden confusion, trouble speaking or understanding.  · Sudden trouble seeing in one or both eyes.  · Sudden trouble walking, dizziness, loss of balance or coordination.  · Sudden severe headache with no known cause.  It is very important to get treatment quickly when a stroke occurs. If you experience any of the  above warning signs, call [phone] immediately.     Some patients who have had a stroke will be going home on a blood thinner medication called Warfarin (Coumadin).  This medication requires very close monitoring and follow up.  This follow up can be provided by either your Primary Care Physician or by [facility] Outpatient Anticoagulation Service.  The Outpatient Anticoagulation Service is located at the [facility] at [facility]  If you do not know when your follow up appointment is scheduled, call [phone] to verify your appointment time.      · Is patient discharged on Warfarin / Coumadin?   No     Depression / Suicide Risk    As you are discharged from this [facility], it is important to learn how to keep safe from harming yourself.    Recognize the warning signs:  · Abrupt changes in personality, positive or negative- including increase in energy   · Giving away possessions  · Change in eating patterns- significant weight changes-  positive or negative  · Change in sleeping patterns- unable to sleep or sleeping all the time   · Unwillingness or inability to communicate  · Depression  · Unusual sadness, discouragement and loneliness  · Talk of wanting to die  · Neglect of personal appearance   · Rebelliousness- reckless behavior  · Withdrawal from people/activities they love  · Confusion- inability to concentrate     If you or a loved one observes any of these behaviors or has concerns about self-harm, here's what you can do:  · Talk about it- your feelings and reasons for harming yourself  · Remove any means that you might use to hurt yourself (examples: pills, rope, extension cords, firearm)  · Get professional help from the community (Mental Health, Substance Abuse, psychological counseling)  · Do not be alone:Call your Safe Contact- someone whom you trust who will be there for you.  · Call your local CRISIS HOTLINE 431-1396 or  480.409.1374  · Call your local Children's Mobile Crisis Response Team [place] (143) 368-4679 or www.Job36  · Call the toll free National Suicide Prevention Hotlines   · National Suicide Prevention Lifeline 669-019-YTJU (2161)  · [employer] 800-SUICIDE (248-8271)      Ischemic Stroke  An ischemic stroke is the sudden death of brain tissue. Blood carries oxygen to all areas of the body. This type of stroke happens when your blood does not flow to your brain like normal. Your brain cannot get the oxygen it needs. This is an emergency. It must be treated right away.  Symptoms of a stroke usually happen all of a sudden. You may notice them when you wake up. They can include:  Weakness or loss of feeling in your face, arm, or leg. This often happens on one side of the body.  Trouble walking.  Trouble moving your arms or legs.  Loss of balance or coordination.  Feeling confused.  Trouble talking or understanding what people are saying.  Slurred speech.  Trouble seeing.  Seeing two of one object (double vision).  Feeling dizzy.  Feeling sick to your stomach (nauseous) and throwing up (vomiting).  A very bad headache for no reason.  Get help as soon as any of these problems start. This is important. Some treatments work better if they are given right away. These include:  Aspirin.  Medicines to control blood pressure.  A shot (injection) of medicine to break up the blood clot.  Treatments given in the blood vessel (artery) to take out the clot or break it up.  Other treatments may include:  Oxygen.  Fluids given through an IV tube.  Medicines to thin out your blood.  Procedures to help your blood flow better.  What increases the risk?  Certain things may make you more likely to have a stroke. Some of these are things that you can change, such as:  Being very overweight (obesity).  Smoking.  Taking birth control pills.  Not being active.  Drinking too much alcohol.  Using drugs.  Other risk  factors include:  High blood pressure.  High cholesterol.  Diabetes.  Heart disease.  Being , , , or .  Being over age 60.  Family history of stroke.  Having had blood clots, stroke, or warning stroke (transient ischemic attack, TIA) in the past.  Sickle cell disease.  Being a woman with a history of high blood pressure in pregnancy (preeclampsia).  Migraine headache.  Sleep apnea.  Having an irregular heartbeat (atrial fibrillation).  Long-term (chronic) diseases that cause soreness and swelling (inflammation).  Disorders that affect how your blood clots.  Follow these instructions at home:  Medicines  Take over-the-counter and prescription medicines only as told by your doctor.  If you were told to take aspirin or another medicine to thin your blood, take it exactly as told by your doctor.  Taking too much of the medicine can cause bleeding.  If you do not take enough, it may not work as well.  Know the side effects of your medicines. If you are taking a blood thinner, make sure you:  Hold pressure over any cuts for longer than usual.  Tell your dentist and other doctors that you take this medicine.  Avoid activities that may cause damage or injury to your body.  Eating and drinking  Follow instructions from your doctor about what you cannot eat or drink.  Eat healthy foods.  If you have trouble with swallowing, do these things to avoid choking:  Take small bites when eating.  Eat foods that are soft or pureed.  Safety  Follow instructions from your health care team about physical activity.  Use a walker or cane as told by your doctor.  Keep your home safe so you do not fall. This may include:  Having experts look at your home to make sure it is safe.  Putting grab bars in the bedroom and bathroom.  Using raised toilets.  Putting a seat in the shower.  General instructions  Do not use any tobacco products.  Examples of these are cigarettes, chewing tobacco, and  e-cigarettes.  If you need help quitting, ask your doctor.  Limit how much alcohol you drink. This means no more than 1 drink a day for nonpregnant women and 2 drinks a day for men. One drink equals 12 oz of beer, 5 oz of wine, or 1½ oz of hard liquor.  If you need help to stop using drugs or alcohol, ask your doctor to refer you to a program or specialist.  Stay active. Exercise as told by your doctor.  Keep all follow-up visits as told by your doctor. This is important.  Get help right away if:  You suddenly:  Have weakness or loss of feeling in your face, arm, or leg.  Feel confused.  Have trouble talking or understanding what people are saying.  Have trouble seeing.  Have trouble walking.  Have trouble moving your arms or legs.  Feel dizzy.  Lose your balance or coordination.  Have a very bad headache and you do not know why.  You pass out (lose consciousness) or almost pass out.  You have jerky movements that you cannot control (seizure).  These symptoms may be an emergency. Do not wait to see if the symptoms will go away. Get medical help right away. Call your local emergency services (911 in the [place] Do not drive yourself to the hospital.   This information is not intended to replace advice given to you by your health care provider. Make sure you discuss any questions you have with your health care provider.  Document Released: 12/06/2012 Document Revised: 05/30/2017 Document Reviewed: 03/15/2017  [employer] Interactive Patient Education © 2017 [employer]

## 2018-10-18 ENCOUNTER — HOSPITAL ENCOUNTER (INPATIENT)
Facility: REHABILITATION | Age: 83
End: 2018-10-18
Attending: PHYSICAL MEDICINE & REHABILITATION | Admitting: PHYSICAL MEDICINE & REHABILITATION
Payer: MEDICARE

## 2018-10-18 VITALS
RESPIRATION RATE: 18 BRPM | DIASTOLIC BLOOD PRESSURE: 40 MMHG | WEIGHT: 135.58 LBS | SYSTOLIC BLOOD PRESSURE: 143 MMHG | TEMPERATURE: 97.4 F | OXYGEN SATURATION: 96 % | HEART RATE: 68 BPM | BODY MASS INDEX: 22.59 KG/M2 | HEIGHT: 65 IN

## 2018-10-18 LAB
ANION GAP SERPL CALC-SCNC: 6 MMOL/L (ref 0–11.9)
BUN SERPL-MCNC: 27 MG/DL (ref 8–22)
CALCIUM SERPL-MCNC: 9.2 MG/DL (ref 8.5–10.5)
CHLORIDE SERPL-SCNC: 107 MMOL/L (ref 96–112)
CO2 SERPL-SCNC: 28 MMOL/L (ref 20–33)
CREAT SERPL-MCNC: 1.03 MG/DL (ref 0.5–1.4)
GLUCOSE SERPL-MCNC: 96 MG/DL (ref 65–99)
POTASSIUM SERPL-SCNC: 4.2 MMOL/L (ref 3.6–5.5)
SODIUM SERPL-SCNC: 141 MMOL/L (ref 135–145)

## 2018-10-18 PROCEDURE — 36415 COLL VENOUS BLD VENIPUNCTURE: CPT

## 2018-10-18 PROCEDURE — A9270 NON-COVERED ITEM OR SERVICE: HCPCS | Performed by: PSYCHIATRY & NEUROLOGY

## 2018-10-18 PROCEDURE — 99239 HOSP IP/OBS DSCHRG MGMT >30: CPT | Performed by: FAMILY MEDICINE

## 2018-10-18 PROCEDURE — A9270 NON-COVERED ITEM OR SERVICE: HCPCS | Performed by: INTERNAL MEDICINE

## 2018-10-18 PROCEDURE — 700111 HCHG RX REV CODE 636 W/ 250 OVERRIDE (IP): Performed by: INTERNAL MEDICINE

## 2018-10-18 PROCEDURE — 700102 HCHG RX REV CODE 250 W/ 637 OVERRIDE(OP): Performed by: INTERNAL MEDICINE

## 2018-10-18 PROCEDURE — 80048 BASIC METABOLIC PNL TOTAL CA: CPT

## 2018-10-18 PROCEDURE — 700102 HCHG RX REV CODE 250 W/ 637 OVERRIDE(OP): Performed by: PSYCHIATRY & NEUROLOGY

## 2018-10-18 RX ORDER — ASPIRIN 81 MG/1
81 TABLET, CHEWABLE ORAL DAILY
Qty: 100 TAB | Refills: 0 | Status: SHIPPED | OUTPATIENT
Start: 2018-10-19 | End: 2018-12-26 | Stop reason: CLARIF

## 2018-10-18 RX ADMIN — TICAGRELOR 90 MG: 90 TABLET ORAL at 05:10

## 2018-10-18 RX ADMIN — Medication 81 MG: at 05:10

## 2018-10-18 RX ADMIN — HEPARIN SODIUM 5000 UNITS: 5000 INJECTION, SOLUTION INTRAVENOUS; SUBCUTANEOUS at 05:11

## 2018-10-18 RX ADMIN — GABAPENTIN 300 MG: 300 CAPSULE ORAL at 05:11

## 2018-10-18 ASSESSMENT — PAIN SCALES - GENERAL: PAINLEVEL_OUTOF10: 0

## 2018-10-18 NOTE — CARE PLAN
Problem: Pain Management  Goal: Pain level will decrease to patient's comfort goal  Outcome: PROGRESSING AS EXPECTED  Patient has no complaints of pain at this time; Will continue to assess pain during hourly rounding;     Problem: Discharge Barriers/Planning  Goal: Patient's continuum of care needs will be met  Outcome: PROGRESSING AS EXPECTED  Anticipated discharge home tomorrow 10/18/18. Patient verbalizes understanding.

## 2018-10-18 NOTE — CARE PLAN
Problem: Discharge Barriers/Planning  Goal: Patient's continuum of care needs will be met    Intervention: Assess potential discharge barriers on admission and throughout hospital stay  Updated on plan of care, plan for discharge once medications at pharmacy available.       Problem: Mobility:  Goal: Mobility will improve    Intervention: Encourage mobilization to extent of ability  Ambulating with steady gait.

## 2018-10-18 NOTE — PROGRESS NOTES
Hospital Medicine Daily Progress Note    Date of Service  10/17/2018    Chief Complaint  84 y.o. female admitted 10/12/2018 with stroke.    Hospital Course  Admitted with stroke, MRI showed small areas of acute infarcts in the left centrum semiovale, with left M1 stenosis or occlusion.  Recent carotid endarterectomy with known history of stroke as well.    Interval Problem Update  Stroke - IR angiogram done today, discussed case with Neurology  HTN - controlled    Consultants/Specialty  Neurology - Kiner    Code Status  DNR/DNI    Disposition  SW checked with Pharmacy, Brilinta will not be available until tomorrow afternoon.  Home health    Review of Systems  Review of Systems   Constitutional: Negative for chills, fever and malaise/fatigue.   HENT: Negative for hearing loss and sore throat.    Eyes: Negative for blurred vision.   Respiratory: Negative for cough, shortness of breath and wheezing.    Cardiovascular: Negative for chest pain and leg swelling.   Gastrointestinal: Negative for abdominal pain, diarrhea, heartburn, nausea and vomiting.   Genitourinary: Negative for dysuria.   Musculoskeletal: Negative for back pain and neck pain.   Skin: Negative for rash.   Neurological: Positive for speech change and focal weakness. Negative for dizziness, sensory change, weakness and headaches.   Psychiatric/Behavioral: The patient is not nervous/anxious.         Physical Exam  Temp:  [36.4 °C (97.5 °F)-36.9 °C (98.5 °F)] 36.9 °C (98.5 °F)  Pulse:  [64-96] 83  Resp:  [12-20] 17  BP: ()/(43-75) 153/46    Physical Exam   Constitutional: She is oriented to person, place, and time. She appears well-developed and well-nourished.   HENT:   Head: Normocephalic and atraumatic.   Eyes: Pupils are equal, round, and reactive to light. Conjunctivae are normal.   Neck: No tracheal deviation present. No thyromegaly present.   Cardiovascular: Normal rate and regular rhythm.    Pulmonary/Chest: Effort normal and breath sounds  normal.   Abdominal: Soft. Bowel sounds are normal. She exhibits no distension. There is no tenderness.   Musculoskeletal: She exhibits no edema.   Lymphadenopathy:     She has no cervical adenopathy.   Neurological: She is alert and oriented to person, place, and time. No cranial nerve deficit.   MMT BUE 5/5, RLE 4+/5, LLE 5/5   Skin: Skin is warm and dry.   Nursing note and vitals reviewed.      Fluids    Intake/Output Summary (Last 24 hours) at 10/17/18 1703  Last data filed at 10/17/18 1600   Gross per 24 hour   Intake              860 ml   Output                0 ml   Net              860 ml       Laboratory  Recent Labs      10/16/18   1103   WBC  8.5   RBC  3.89*   HEMOGLOBIN  10.7*   HEMATOCRIT  35.4*   MCV  91.0   MCH  27.5   MCHC  30.2*   RDW  48.0   PLATELETCT  353   MPV  9.3     Recent Labs      10/16/18   1103   SODIUM  140   POTASSIUM  5.5   CHLORIDE  106   CO2  28   GLUCOSE  107*   BUN  21   CREATININE  1.07   CALCIUM  10.1                   Imaging  EC-ECHOCARDIOGRAM COMPLETE W/O CONT   Final Result      MR-BRAIN-W/O   Final Result      1.  Small areas of acute infarcts in the left centrum semiovale.   2.  Severe stenosis/occlusion of left M1 segment. This finding is seen in the previous CT angiogram dated 10/12/2018. Intracranial stenosis may be indicated for the patient's with repeated strokes despite of medical management for the dual antiplatelet    therapy. If indicated this finding can be further evaluated with the cerebral angiogram .      DX-CHEST-PORTABLE (1 VIEW)   Final Result      No acute cardiopulmonary abnormality. No interstitial edema.      CT-CTA NECK WITH & W/O-POST PROCESSING   Final Result         1. Postendarterectomy changes in the right neck. There is now approximately 20-30% stenosis of the right carotid bulb.   2. Unchanged 50-69% stenosis of the left carotid bulb.   3. No evidence of carotid dissection or aneurysm.   4. Intracranial vessels will be discussed in a separate  report.      CT-CEREBRAL PERFUSION ANALYSIS   Final Result      1.  CT perfusion examination over the limited section of brain reveals 6 mL of brain parenchyma has less than 30% of cerebral blood flow (CBF).      2.  Abnormal perfusion of the left hemisphere in the left MCA territory. Please see head CTA report for further detail.      Case was discussed with Dr. Patrick Paz at approximately 4:38 PM on 10/12/2018.      CT-CTA HEAD WITH & W/O-POST PROCESS   Final Result      1.  Occlusion of the left M1 segment of the middle cerebral artery.   2.  Extensive cortical collateral supply.      These findings were discussed with PATRICK PAZ on 10/12/2018 4:38 PM.      CT-HEAD W/O   Final Result         1. No CT evidence of acute infarct, hemorrhage or mass.   2. Mild global parenchymal atrophy and chronic small vessel ischemic changes.      IR-CAROTID ART ANGIO    (Results Pending)   IR-NEURO INTERVENTIONAL CONSULT-IP    (Results Pending)        Assessment/Plan  * Cerebrovascular accident (CVA) due to stenosis of left middle cerebral artery (HCC)- (present on admission)   Assessment & Plan    ASA, Brilinta, Lipitor  permissive HTN  IR angiogram done today        Syncope and collapse- (present on admission)   Assessment & Plan    Telemetry monitoring        Anemia- (present on admission)   Assessment & Plan    Follow CBC        CKD (chronic kidney disease) stage 3, GFR 30-59 ml/min ([facility])- (present on admission)   Assessment & Plan    Follow BMP        CAD (coronary artery disease)- (present on admission)   Assessment & Plan    History of stent  Aspirin, Brilinta, Lipitor        Hypertension- (present on admission)   Assessment & Plan    permissive HTN  Hold Norvasc, Lotensin, Imdur        PVD (peripheral vascular disease) ([facility])- (present on admission)   Assessment & Plan    s/p right carotid endarterectomy 9/3/2018  Aspirin, Brilinta, Lipitor        Peripheral neuropathy- (present on admission)   Assessment & Plan     Gabapentin        Hyperlipidemia- (present on admission)   Assessment & Plan    Lipitor             VTE prophylaxis: Heparin

## 2018-10-18 NOTE — PROGRESS NOTES
Patient seen Aox4 and resting comfortably in bed.   Plan of care updated. Pending discharge home tomorrow. Patient verbalizes understanding.   No complaints of pain or numbness/tingling at this time;   Patient SANCHEZ without difficulty.  Dressing to right groin appears CDI. No signs of hematoma present.   Patient ambulatory with standby assist.   Patient placed on 2L of Oxygen via NC (baseline at night time).   All needs addressed.   Call light and personal belongings within reach, bed locked and in lowest position, bed alarm on and hourly rounding in place.

## 2018-10-18 NOTE — PROGRESS NOTES
CC- F/U FOR ISCHEMIC STROKE  S- RIGHT FACE A LITTLE NUMB ONLY REMAINS.  No other complaints.     O-   Allergies:   Allergies   Allergen Reactions   • Penicillins Rash and Vomiting     Rxn = unknown  Pt tolerates cephalosporins   • Hydrocodone Vomiting and Nausea     Rxn = unknown   • Tape Rash     RASH         Current Facility-Administered Medications:   •  Pharmacy Consult Request ...Pain Management Review 1 Each, 1 Each, Other, PRN, Chris Parson M.D.  •  acetaminophen (TYLENOL) tablet 650 mg, 650 mg, Oral, Q6HRS PRN, Kranthi Oates M.D., 650 mg at 10/14/18 1752  •  aspirin (ASA) chewable tab 81 mg, 81 mg, Oral, DAILY, Jessie Fernandez M.D., Stopped at 10/16/18 0600  •  ticagrelor (BRILINTA) tablet 90 mg, 90 mg, Oral, BID, Jessie Fernandez M.D., 90 mg at 10/17/18 1711  •  atorvastatin (LIPITOR) tablet 40 mg, 40 mg, Oral, Q EVENING, Cris Tucker A.P.R.N., 40 mg at 10/17/18 1711  •  latanoprost (XALATAN) 0.005 % ophthalmic solution 1 Drop, 1 Drop, Both Eyes, QHS, Manas Weinstein M.D., 1 Drop at 10/16/18 2045  •  gabapentin (NEURONTIN) capsule 300 mg, 300 mg, Oral, BID, Manas Weinstein M.D., 300 mg at 10/17/18 1711  •  senna-docusate (PERICOLACE or SENOKOT S) 8.6-50 MG per tablet 2 Tab, 2 Tab, Oral, BID, 2 Tab at 10/13/18 1812 **AND** polyethylene glycol/lytes (MIRALAX) PACKET 1 Packet, 1 Packet, Oral, QDAY PRN **AND** magnesium hydroxide (MILK OF MAGNESIA) suspension 30 mL, 30 mL, Oral, QDAY PRN **AND** bisacodyl (DULCOLAX) suppository 10 mg, 10 mg, Rectal, QDAY PRN, Manas Weinstein M.D.  •  heparin injection 5,000 Units, 5,000 Units, Subcutaneous, Q8HRS, Manas Weinstein M.D., 5,000 Units at 10/17/18 9344  •  ondansetron (ZOFRAN) syringe/vial injection 4 mg, 4 mg, Intravenous, Q4HRS PRN, Manas Weinstein M.D.  •  ondansetron (ZOFRAN ODT) dispertab 4 mg, 4 mg, Oral, Q4HRS PRN, Manas Weinstein M.D., 4 mg at 10/14/18 0249      PHYSICAL EXAM    Vitals:    10/17/18 0400 10/17/18 0730  10/17/18 1200 10/17/18 1600   BP: 103/59 134/75 134/43 153/46   Pulse: 64 75 96 83   Resp: 18 18 16 17   Temp: 36.7 °C (98 °F) 36.4 °C (97.6 °F) 36.4 °C (97.5 °F) 36.9 °C (98.5 °F)   SpO2: 97% 98% 93% 96%   Weight:       Height:           Head/Neck: NCAT. no meningismus neg kernig neg brudzinski. No obvious mass or heard bruit. No tender arteries or lost pulses. No rash of head or neck.    Skin: Warm, dry, intact. No rashes observed head/neck or body    Eyes/Funduscopic:  unable    Mental Status: Awake, alert, oriented x 3. Name/repeat/fluent/command follow intact. No neglect/extinction. Attention and concentration, recent & remote memory, Fund of Knowledge wnl.     Cranial Nerves:  RIGHT FACE SENS LOSS MILD OTHER CN II-XII intact. PERRL 3MM.   No afferent pupillary defect. EOM full. VF full. No nystagmus.       Motor:  bulk & tone wnl.  intact, no abn mvmts.       Sensory: symmetric to sharp     Coordination: dysmetria absent    DTR's: intact/sym. no clonus. Toes mute.    Gait/Station: n/a fall concern      Labs:  Recent Labs      10/16/18   1103   WBC  8.5   RBC  3.89*   HEMOGLOBIN  10.7*   HEMATOCRIT  35.4*   MCV  91.0   MCH  27.5   MCHC  30.2*   RDW  48.0   PLATELETCT  353   MPV  9.3     Recent Labs      10/16/18   1103   SODIUM  140   POTASSIUM  5.5   CHLORIDE  106   CO2  28   GLUCOSE  107*   BUN  21   CREATININE  1.07   CALCIUM  10.1                     Recent Labs      10/16/18   1103   SODIUM  140   POTASSIUM  5.5   CHLORIDE  106   CO2  28   GLUCOSE  107*   BUN  21     Recent Labs      10/16/18   1103   SODIUM  140   POTASSIUM  5.5   CHLORIDE  106   CO2  28   BUN  21   CREATININE  1.07   CALCIUM  10.1         Results for orders placed or performed during the hospital encounter of 09/01/18   Echocardiogram Comp W/O cont   Result Value Ref Range    Eject.Frac. MOD BP 72.45     Eject.Frac. MOD 4C 76.57     Eject.Frac. MOD 2C 65.97     Left Ventrical Ejection Fraction 70               Imaging: neuroimaging  reviewed and directly visualized by me  EC-ECHOCARDIOGRAM COMPLETE W/O CONT   Final Result      MR-BRAIN-W/O   Final Result      1.  Small areas of acute infarcts in the left centrum semiovale.   2.  Severe stenosis/occlusion of left M1 segment. This finding is seen in the previous CT angiogram dated 10/12/2018. Intracranial stenosis may be indicated for the patient's with repeated strokes despite of medical management for the dual antiplatelet    therapy. If indicated this finding can be further evaluated with the cerebral angiogram .      DX-CHEST-PORTABLE (1 VIEW)   Final Result      No acute cardiopulmonary abnormality. No interstitial edema.      CT-CTA NECK WITH & W/O-POST PROCESSING   Final Result         1. Postendarterectomy changes in the right neck. There is now approximately 20-30% stenosis of the right carotid bulb.   2. Unchanged 50-69% stenosis of the left carotid bulb.   3. No evidence of carotid dissection or aneurysm.   4. Intracranial vessels will be discussed in a separate report.      CT-CEREBRAL PERFUSION ANALYSIS   Final Result      1.  CT perfusion examination over the limited section of brain reveals 6 mL of brain parenchyma has less than 30% of cerebral blood flow (CBF).      2.  Abnormal perfusion of the left hemisphere in the left MCA territory. Please see head CTA report for further detail.      Case was discussed with Dr. Patrick Paz at approximately 4:38 PM on 10/12/2018.      CT-CTA HEAD WITH & W/O-POST PROCESS   Final Result      1.  Occlusion of the left M1 segment of the middle cerebral artery.   2.  Extensive cortical collateral supply.      These findings were discussed with PATRICK PAZ on 10/12/2018 4:38 PM.      CT-HEAD W/O   Final Result         1. No CT evidence of acute infarct, hemorrhage or mass.   2. Mild global parenchymal atrophy and chronic small vessel ischemic changes.      IR-CAROTID ART ANGIO    (Results Pending)   IR-NEURO INTERVENTIONAL CONSULT-IP    (Results  Pending)       Assessment/Plan:    ISCH STROKE 2' SEVERE IC STENOSIS L M1    JAMIN MD MALIK and I discussed case and reviewed imaging at length, agree with him tech difficult lesion could be catastrophic to intervene.  Max med tx with aspirin, max statin, and now brilinta. Follow up LICA stenosis with CV surgery not the primary symptomatic lesion.      No further neuro workup as inpatient if maintains neuro baseline.  Neuro sign off, reconsult PRN.  F/u otpt Neuro MD in coming months.       Benoit Galan M.D.  , Neurohospitalist & Stroke  Clinical Professor, [employer]  Diplomate, Neurology & Neuroimaging

## 2018-10-18 NOTE — PROGRESS NOTES
Pt Aox4. SANCHEZ. Denies pain. Denies n/t. Voiding. BS+. Patient states looking forward to going home. Pending medications at pharmacy. Able to make needs known.

## 2018-10-18 NOTE — PROGRESS NOTES
Educated on discharge instructions, diet, medications, follow up, CVA, seeking medical attention. Verbalized understanding. Verified with pt's pharmacy they did get Birlinta in stock and will be ready to .

## 2018-10-18 NOTE — CONSULTS
Medical chart review completed. Patient is a 84 y.o. year-old female admitted on 10/12 with right tongue and facial numbness and slurred speech that resolved, then she had right handed numbness that caused her to drop a cup. NIHSS 1. Head CT normal, CTA with occlusion of left M1 segment. She was seen and evaluated by neurology. MRI with stroke in left centrum ovale. HgbA1c 5.9, LDL 28, ECHO EF 65%, moderate to severe AI. Patient was seen and evaluated by IR. Had angiogram, patient wanted to continue with medical management and didn't want stenting - she is on aspirin and ticagrelor and will follow up with IR in 2-3 months.      Patients with multiple co-morbidities(including but not limited to glaucoma, hypertension, anemia, acute versus chronic kidney injury, peripheral neuropathy); with cognitive deficits and functional deficits in mobility, and Moderate de-conditioning. Pre-morbidly, this patient lived in a single level home with no steps to enter, alone and able to care for self. The patient was evaluated by acute care Physical Therapy and Occupational Therapy; currently requiring supervision to contact-guard assistance for mobility and supervision to contact-guard assistance for ADLs.     6 clicks score 22 mobility, 20 ADLs    The patient is not a good candidate for an acute inpatient rehabilitation program as she is too high level (supervision mostly).    Recommend discharge to SNF, versus home with home health if she progresses while waiting for medical clearance.    Joseline Gibson M.D.  Physical Medicine and Rehabilitation

## 2018-10-18 NOTE — DISCHARGE SUMMARY
Discharge Summary    CHIEF COMPLAINT ON ADMISSION  Chief Complaint   Patient presents with   • Possible Stroke     pt states numbness to midline of tongue starting at 0600.  pt reports a 1330 she had right hand weakness, dropping several drinking cups.  pt reports hx of TIA a few months ago. TIA symptoms last week that self resolved.     • Weakness   • Numbness       Reason for Admission  EMS     Admission Date  10/12/2018    CODE STATUS  DNAR/DNI    HPI & HOSPITAL COURSE  This is a 84 y.o. female here with stroke, MRI showed acute infarcts in the left centrum semi-ovale, she had a left M1 stenosis.  She had a recent history of right carotid endarterectomy with history of stroke.  Interventional radiology was consulted, IR angiogram was done however the left M1 stenosis could not be stented.  She was already on aspirin and Plavix.  She was changed to aspirin and Brilinta.  Her left carotid stenosis is 50-69%, she will follow-up with vascular surgery with regards to this. Neurology has cleared her for discharge.  She will continue home health which is been originally set up for her for her previous stroke.       Therefore, she is discharged in good and stable condition to home with organized home healthcare and close outpatient follow-up.    The patient met 2-midnight criteria for an inpatient stay at the time of discharge.    Discharge Date  10/18/2018    FOLLOW UP ITEMS POST DISCHARGE  Follow-up with vascular surgery    DISCHARGE DIAGNOSES  Principal Problem:    Cerebrovascular accident (CVA) due to stenosis of left middle cerebral artery (HCC) POA: Yes  Active Problems:    Syncope and collapse POA: Yes    PVD (peripheral vascular disease) ([facility]) POA: Yes    Hypertension POA: Yes    CAD (coronary artery disease) POA: Yes    CKD (chronic kidney disease) stage 3, GFR 30-59 ml/min ([facility]) POA: Yes    Anemia POA: Yes    Hyperlipidemia POA: Yes    Peripheral neuropathy POA: Yes  Resolved Problems:    * No resolved hospital  problems. *      FOLLOW UP  Future Appointments  Date Time Provider Department Center   10/29/2018 2:40 PM [facility] None   11/19/2018 1:20 PM Severo Mancilla M.D. [facility] None     Benjamin Bernard M.D.  [address] [facility] [address]  [address]  [place] [address]  [phone]            MEDICATIONS ON DISCHARGE     Medication List      START taking these medications      Instructions   aspirin 81 MG Chew chewable tablet  Commonly known as:  ASA   Take 1 Tab by mouth every day.  Dose:  81 mg     ticagrelor 90 MG Tabs tablet  Commonly known as:  BRILINTA   Take 1 Tab by mouth 2 Times a Day.  Dose:  90 mg        CONTINUE taking these medications      Instructions   amLODIPine 5 MG Tabs  Commonly known as:  NORVASC   TAKE 1 TABLET DAILY     atorvastatin 80 MG tablet  Commonly known as:  LIPITOR   Take 1 Tab by mouth every bedtime.  Dose:  80 mg     benazepril 20 MG Tabs  Commonly known as:  LOTENSIN   Take 1 Tab by mouth every day.  Dose:  20 mg     isosorbide mononitrate SR 30 MG Tb24  Commonly known as:  IMDUR   Take 1 Tab by mouth every day.  Dose:  30 mg     LUMIGAN 0.01 % Soln  Generic drug:  bimatoprost   Place 1 Drop in both eyes every bedtime. 1 gtts both eyes  Dose:  1 Drop     NEURONTIN 300 MG Caps  Generic drug:  gabapentin   Take 300 mg by mouth 2 Times a Day.  Dose:  300 mg     vitamin D 2000 UNIT Tabs   Take 2,000 Units by mouth every day.  Dose:  2000 Units        STOP taking these medications    clopidogrel 75 MG Tabs  Commonly known as:  PLAVIX            Allergies  Allergies   Allergen Reactions   • Penicillins Rash and Vomiting     Rxn = unknown  Pt tolerates cephalosporins   • Hydrocodone Vomiting and Nausea     Rxn = unknown   • Tape Rash     RASH       DIET  Orders Placed This Encounter   Procedures   • Diet Order Regular     Standing Status:   Standing     Number of Occurrences:   1     Order Specific Question:   Diet:     Answer:   Regular [1]       ACTIVITY  As tolerated.  Weight bearing as  tolerated    CONSULTATIONS  Neurology - [facility]  IR       PROCEDURES  IR cerebral angiogram which showed left M1 stenosis    LABORATORY  Lab Results   Component Value Date    SODIUM 141 10/18/2018    POTASSIUM 4.2 10/18/2018    CHLORIDE 107 10/18/2018    CO2 28 10/18/2018    GLUCOSE 96 10/18/2018    BUN 27 (H) 10/18/2018    CREATININE 1.03 10/18/2018        Lab Results   Component Value Date    WBC 8.5 10/16/2018    HEMOGLOBIN 10.7 (L) 10/16/2018    HEMATOCRIT 35.4 (L) 10/16/2018    PLATELETCT 353 10/16/2018        Total time of the discharge process exceeds 40 minutes.

## 2018-10-18 NOTE — THERAPY
Witnessed pt up self around the room w/FWW and Indep amb hallways w/FWW. Pt uses [employer] @ [facility] and has groceries delivered to home. Pt has good family support. PT will be available for any d/c ?'s or concerns.

## 2018-10-19 ENCOUNTER — TELEPHONE (OUTPATIENT)
Dept: NEUROLOGY | Facility: MEDICAL CENTER | Age: 83
End: 2018-10-19

## 2018-10-19 NOTE — TELEPHONE ENCOUNTER
"Patient's caregiver called stated that patient was released from the hospital yesterday and was given a medication that was supposed to help her not get surgery. Caregiver did not state what the medication was. He then stated that she was having an allergic reaction to the medication. Caregiver was told to have the patient stop taking the medication and to bring the patient to the emergency room if symptoms worsen or fail to improve. Caregiver questioned \"I will give her one more pill do you think that is okay\" caregiver was told that I could not advise him to give the medication or not as that was out of my scope. Caregiver was then told again that if the medication was giving the patient an allergic reaction the medication needs to be stopped and the patient should be brought back to the emergency room to be evaluated. Caregiver verbally said he understood.   "

## 2018-10-22 ENCOUNTER — APPOINTMENT (OUTPATIENT)
Dept: RADIOLOGY | Facility: MEDICAL CENTER | Age: 83
DRG: 378 | End: 2018-10-22
Attending: EMERGENCY MEDICINE
Payer: MEDICARE

## 2018-10-22 ENCOUNTER — HOSPITAL ENCOUNTER (INPATIENT)
Facility: MEDICAL CENTER | Age: 83
LOS: 3 days | DRG: 378 | End: 2018-10-25
Attending: EMERGENCY MEDICINE | Admitting: INTERNAL MEDICINE
Payer: MEDICARE

## 2018-10-22 DIAGNOSIS — R55 SYNCOPE, UNSPECIFIED SYNCOPE TYPE: ICD-10-CM

## 2018-10-22 DIAGNOSIS — K92.2 GASTROINTESTINAL HEMORRHAGE, UNSPECIFIED GASTROINTESTINAL HEMORRHAGE TYPE: ICD-10-CM

## 2018-10-22 DIAGNOSIS — Z86.73 HISTORY OF CVA (CEREBROVASCULAR ACCIDENT): ICD-10-CM

## 2018-10-22 DIAGNOSIS — K92.1 GASTROINTESTINAL HEMORRHAGE WITH MELENA: ICD-10-CM

## 2018-10-22 LAB
ABO GROUP BLD: NORMAL
ALBUMIN SERPL BCP-MCNC: 3.7 G/DL (ref 3.2–4.9)
ALBUMIN/GLOB SERPL: 1.8 G/DL
ALP SERPL-CCNC: 47 U/L (ref 30–99)
ALT SERPL-CCNC: 9 U/L (ref 2–50)
ANION GAP SERPL CALC-SCNC: 8 MMOL/L (ref 0–11.9)
APTT PPP: 26.4 SEC (ref 24.7–36)
AST SERPL-CCNC: 15 U/L (ref 12–45)
BARCODED ABORH UBTYP: 9500
BARCODED PRD CODE UBPRD: NORMAL
BARCODED UNIT NUM UBUNT: NORMAL
BASOPHILS # BLD AUTO: 0.4 % (ref 0–1.8)
BASOPHILS # BLD: 0.06 K/UL (ref 0–0.12)
BILIRUB SERPL-MCNC: 0.3 MG/DL (ref 0.1–1.5)
BLD GP AB SCN SERPL QL: NORMAL
BUN SERPL-MCNC: 36 MG/DL (ref 8–22)
CALCIUM SERPL-MCNC: 9.3 MG/DL (ref 8.5–10.5)
CHLORIDE SERPL-SCNC: 107 MMOL/L (ref 96–112)
CO2 SERPL-SCNC: 24 MMOL/L (ref 20–33)
COMPONENT R 8504R: NORMAL
CREAT SERPL-MCNC: 1.23 MG/DL (ref 0.5–1.4)
EKG IMPRESSION: NORMAL
EOSINOPHIL # BLD AUTO: 0.02 K/UL (ref 0–0.51)
EOSINOPHIL NFR BLD: 0.1 % (ref 0–6.9)
ERYTHROCYTE [DISTWIDTH] IN BLOOD BY AUTOMATED COUNT: 47.7 FL (ref 35.9–50)
GLOBULIN SER CALC-MCNC: 2.1 G/DL (ref 1.9–3.5)
GLUCOSE SERPL-MCNC: 128 MG/DL (ref 65–99)
HCT VFR BLD AUTO: 24.3 % (ref 37–47)
HGB BLD-MCNC: 7 G/DL (ref 12–16)
HGB BLD-MCNC: 7.6 G/DL (ref 12–16)
IMM GRANULOCYTES # BLD AUTO: 0.08 K/UL (ref 0–0.11)
IMM GRANULOCYTES NFR BLD AUTO: 0.5 % (ref 0–0.9)
INR PPP: 1.08 (ref 0.87–1.13)
LYMPHOCYTES # BLD AUTO: 0.96 K/UL (ref 1–4.8)
LYMPHOCYTES NFR BLD: 5.7 % (ref 22–41)
MCH RBC QN AUTO: 28 PG (ref 27–33)
MCHC RBC AUTO-ENTMCNC: 31.3 G/DL (ref 33.6–35)
MCV RBC AUTO: 89.7 FL (ref 81.4–97.8)
MONOCYTES # BLD AUTO: 1.07 K/UL (ref 0–0.85)
MONOCYTES NFR BLD AUTO: 6.4 % (ref 0–13.4)
NEUTROPHILS # BLD AUTO: 14.63 K/UL (ref 2–7.15)
NEUTROPHILS NFR BLD: 86.9 % (ref 44–72)
NRBC # BLD AUTO: 0 K/UL
NRBC BLD-RTO: 0 /100 WBC
PLATELET # BLD AUTO: 326 K/UL (ref 164–446)
PMV BLD AUTO: 9.9 FL (ref 9–12.9)
POTASSIUM SERPL-SCNC: 4.7 MMOL/L (ref 3.6–5.5)
PRODUCT TYPE UPROD: NORMAL
PROT SERPL-MCNC: 5.8 G/DL (ref 6–8.2)
PROTHROMBIN TIME: 14.1 SEC (ref 12–14.6)
RBC # BLD AUTO: 2.71 M/UL (ref 4.2–5.4)
RH BLD: NORMAL
SODIUM SERPL-SCNC: 139 MMOL/L (ref 135–145)
TROPONIN I SERPL-MCNC: <0.01 NG/ML (ref 0–0.04)
UNIT STATUS USTAT: NORMAL
WBC # BLD AUTO: 16.8 K/UL (ref 4.8–10.8)

## 2018-10-22 PROCEDURE — 700102 HCHG RX REV CODE 250 W/ 637 OVERRIDE(OP): Performed by: INTERNAL MEDICINE

## 2018-10-22 PROCEDURE — 73501 X-RAY EXAM HIP UNI 1 VIEW: CPT | Mod: LT

## 2018-10-22 PROCEDURE — 85610 PROTHROMBIN TIME: CPT

## 2018-10-22 PROCEDURE — 36415 COLL VENOUS BLD VENIPUNCTURE: CPT

## 2018-10-22 PROCEDURE — 700111 HCHG RX REV CODE 636 W/ 250 OVERRIDE (IP): Performed by: INTERNAL MEDICINE

## 2018-10-22 PROCEDURE — 71045 X-RAY EXAM CHEST 1 VIEW: CPT

## 2018-10-22 PROCEDURE — 160035 HCHG PACU - 1ST 60 MINS PHASE I: Performed by: INTERNAL MEDICINE

## 2018-10-22 PROCEDURE — A9270 NON-COVERED ITEM OR SERVICE: HCPCS | Performed by: INTERNAL MEDICINE

## 2018-10-22 PROCEDURE — 700105 HCHG RX REV CODE 258: Performed by: INTERNAL MEDICINE

## 2018-10-22 PROCEDURE — 700111 HCHG RX REV CODE 636 W/ 250 OVERRIDE (IP)

## 2018-10-22 PROCEDURE — 99152 MOD SED SAME PHYS/QHP 5/>YRS: CPT | Performed by: INTERNAL MEDICINE

## 2018-10-22 PROCEDURE — 700105 HCHG RX REV CODE 258: Performed by: EMERGENCY MEDICINE

## 2018-10-22 PROCEDURE — 73590 X-RAY EXAM OF LOWER LEG: CPT | Mod: RT

## 2018-10-22 PROCEDURE — 99223 1ST HOSP IP/OBS HIGH 75: CPT | Mod: AI | Performed by: INTERNAL MEDICINE

## 2018-10-22 PROCEDURE — 96374 THER/PROPH/DIAG INJ IV PUSH: CPT

## 2018-10-22 PROCEDURE — 72131 CT LUMBAR SPINE W/O DYE: CPT

## 2018-10-22 PROCEDURE — 501159 HCHG PROBE, LAP: Performed by: INTERNAL MEDICINE

## 2018-10-22 PROCEDURE — 30233N1 TRANSFUSION OF NONAUTOLOGOUS RED BLOOD CELLS INTO PERIPHERAL VEIN, PERCUTANEOUS APPROACH: ICD-10-PCS | Performed by: INTERNAL MEDICINE

## 2018-10-22 PROCEDURE — 70450 CT HEAD/BRAIN W/O DYE: CPT

## 2018-10-22 PROCEDURE — 80053 COMPREHEN METABOLIC PANEL: CPT

## 2018-10-22 PROCEDURE — 36430 TRANSFUSION BLD/BLD COMPNT: CPT

## 2018-10-22 PROCEDURE — 86900 BLOOD TYPING SEROLOGIC ABO: CPT

## 2018-10-22 PROCEDURE — 160203 HCHG ENDO MINUTES - 1ST 30 MINS LEVEL 4: Performed by: INTERNAL MEDICINE

## 2018-10-22 PROCEDURE — 99291 CRITICAL CARE FIRST HOUR: CPT

## 2018-10-22 PROCEDURE — 160048 HCHG OR STATISTICAL LEVEL 1-5: Performed by: INTERNAL MEDICINE

## 2018-10-22 PROCEDURE — 85018 HEMOGLOBIN: CPT

## 2018-10-22 PROCEDURE — 84484 ASSAY OF TROPONIN QUANT: CPT

## 2018-10-22 PROCEDURE — P9016 RBC LEUKOCYTES REDUCED: HCPCS

## 2018-10-22 PROCEDURE — 700111 HCHG RX REV CODE 636 W/ 250 OVERRIDE (IP): Performed by: EMERGENCY MEDICINE

## 2018-10-22 PROCEDURE — 500066 HCHG BITE BLOCK, ECT: Performed by: INTERNAL MEDICINE

## 2018-10-22 PROCEDURE — 86850 RBC ANTIBODY SCREEN: CPT

## 2018-10-22 PROCEDURE — 96375 TX/PRO/DX INJ NEW DRUG ADDON: CPT

## 2018-10-22 PROCEDURE — 93005 ELECTROCARDIOGRAM TRACING: CPT

## 2018-10-22 PROCEDURE — 304561 HCHG STAT O2

## 2018-10-22 PROCEDURE — C9113 INJ PANTOPRAZOLE SODIUM, VIA: HCPCS | Performed by: EMERGENCY MEDICINE

## 2018-10-22 PROCEDURE — 160002 HCHG RECOVERY MINUTES (STAT): Performed by: INTERNAL MEDICINE

## 2018-10-22 PROCEDURE — 770020 HCHG ROOM/CARE - TELE (206)

## 2018-10-22 PROCEDURE — 160036 HCHG PACU - EA ADDL 30 MINS PHASE I: Performed by: INTERNAL MEDICINE

## 2018-10-22 PROCEDURE — 85025 COMPLETE CBC W/AUTO DIFF WBC: CPT

## 2018-10-22 PROCEDURE — 160208 HCHG ENDO MINUTES - EA ADDL 1 MIN LEVEL 4: Performed by: INTERNAL MEDICINE

## 2018-10-22 PROCEDURE — 99153 MOD SED SAME PHYS/QHP EA: CPT | Performed by: INTERNAL MEDICINE

## 2018-10-22 PROCEDURE — 85730 THROMBOPLASTIN TIME PARTIAL: CPT

## 2018-10-22 PROCEDURE — 93005 ELECTROCARDIOGRAM TRACING: CPT | Performed by: EMERGENCY MEDICINE

## 2018-10-22 PROCEDURE — 86922 COMPATIBILITY TEST ANTIGLOB: CPT

## 2018-10-22 PROCEDURE — 86901 BLOOD TYPING SEROLOGIC RH(D): CPT

## 2018-10-22 PROCEDURE — 0W3P8ZZ CONTROL BLEEDING IN GASTROINTESTINAL TRACT, VIA NATURAL OR ARTIFICIAL OPENING ENDOSCOPIC: ICD-10-PCS | Performed by: INTERNAL MEDICINE

## 2018-10-22 RX ORDER — BISACODYL 10 MG
10 SUPPOSITORY, RECTAL RECTAL
Status: DISCONTINUED | OUTPATIENT
Start: 2018-10-22 | End: 2018-10-25 | Stop reason: HOSPADM

## 2018-10-22 RX ORDER — OMEPRAZOLE 20 MG/1
20 CAPSULE, DELAYED RELEASE ORAL 2 TIMES DAILY
Status: DISCONTINUED | OUTPATIENT
Start: 2018-10-22 | End: 2018-10-25 | Stop reason: HOSPADM

## 2018-10-22 RX ORDER — PEG-3350, SODIUM SULFATE, SODIUM CHLORIDE, POTASSIUM CHLORIDE, SODIUM ASCORBATE AND ASCORBIC ACID 7.5-2.691G
100 KIT ORAL 2 TIMES DAILY
Status: COMPLETED | OUTPATIENT
Start: 2018-10-22 | End: 2018-10-23

## 2018-10-22 RX ORDER — MORPHINE SULFATE 4 MG/ML
2 INJECTION, SOLUTION INTRAMUSCULAR; INTRAVENOUS ONCE
Status: COMPLETED | OUTPATIENT
Start: 2018-10-22 | End: 2018-10-22

## 2018-10-22 RX ORDER — AMOXICILLIN 250 MG
2 CAPSULE ORAL 2 TIMES DAILY
Status: DISCONTINUED | OUTPATIENT
Start: 2018-10-22 | End: 2018-10-25 | Stop reason: HOSPADM

## 2018-10-22 RX ORDER — SODIUM CHLORIDE 9 MG/ML
INJECTION, SOLUTION INTRAVENOUS CONTINUOUS
Status: DISCONTINUED | OUTPATIENT
Start: 2018-10-22 | End: 2018-10-25 | Stop reason: HOSPADM

## 2018-10-22 RX ORDER — LATANOPROST 50 UG/ML
1 SOLUTION/ DROPS OPHTHALMIC EVERY EVENING
Status: DISCONTINUED | OUTPATIENT
Start: 2018-10-22 | End: 2018-10-25 | Stop reason: HOSPADM

## 2018-10-22 RX ORDER — MIDAZOLAM HYDROCHLORIDE 1 MG/ML
INJECTION INTRAMUSCULAR; INTRAVENOUS
Status: DISCONTINUED | OUTPATIENT
Start: 2018-10-22 | End: 2018-10-22 | Stop reason: HOSPADM

## 2018-10-22 RX ORDER — PANTOPRAZOLE SODIUM 40 MG/10ML
40 INJECTION, POWDER, LYOPHILIZED, FOR SOLUTION INTRAVENOUS 2 TIMES DAILY
Status: DISCONTINUED | OUTPATIENT
Start: 2018-10-22 | End: 2018-10-22

## 2018-10-22 RX ORDER — ONDANSETRON 2 MG/ML
4 INJECTION INTRAMUSCULAR; INTRAVENOUS ONCE
Status: COMPLETED | OUTPATIENT
Start: 2018-10-22 | End: 2018-10-22

## 2018-10-22 RX ORDER — ACETAMINOPHEN 325 MG/1
650 TABLET ORAL EVERY 6 HOURS PRN
Status: DISCONTINUED | OUTPATIENT
Start: 2018-10-22 | End: 2018-10-25 | Stop reason: HOSPADM

## 2018-10-22 RX ORDER — POLYETHYLENE GLYCOL 3350 17 G/17G
1 POWDER, FOR SOLUTION ORAL
Status: DISCONTINUED | OUTPATIENT
Start: 2018-10-22 | End: 2018-10-25 | Stop reason: HOSPADM

## 2018-10-22 RX ORDER — SODIUM CHLORIDE 9 MG/ML
INJECTION, SOLUTION INTRAVENOUS CONTINUOUS
Status: DISCONTINUED | OUTPATIENT
Start: 2018-10-22 | End: 2018-10-22

## 2018-10-22 RX ORDER — ONDANSETRON 2 MG/ML
4 INJECTION INTRAMUSCULAR; INTRAVENOUS EVERY 4 HOURS PRN
Status: DISCONTINUED | OUTPATIENT
Start: 2018-10-22 | End: 2018-10-25 | Stop reason: HOSPADM

## 2018-10-22 RX ORDER — ONDANSETRON 4 MG/1
4 TABLET, ORALLY DISINTEGRATING ORAL EVERY 4 HOURS PRN
Status: DISCONTINUED | OUTPATIENT
Start: 2018-10-22 | End: 2018-10-25 | Stop reason: HOSPADM

## 2018-10-22 RX ORDER — ATORVASTATIN CALCIUM 80 MG/1
80 TABLET, FILM COATED ORAL
Status: DISCONTINUED | OUTPATIENT
Start: 2018-10-22 | End: 2018-10-25 | Stop reason: HOSPADM

## 2018-10-22 RX ORDER — GABAPENTIN 300 MG/1
300 CAPSULE ORAL 2 TIMES DAILY
Status: DISCONTINUED | OUTPATIENT
Start: 2018-10-22 | End: 2018-10-25 | Stop reason: HOSPADM

## 2018-10-22 RX ADMIN — ACETAMINOPHEN 650 MG: 325 TABLET, FILM COATED ORAL at 15:02

## 2018-10-22 RX ADMIN — LATANOPROST 1 DROP: 50 SOLUTION OPHTHALMIC at 17:55

## 2018-10-22 RX ADMIN — SODIUM CHLORIDE: 9 INJECTION, SOLUTION INTRAVENOUS at 15:09

## 2018-10-22 RX ADMIN — ONDANSETRON 4 MG: 2 INJECTION INTRAMUSCULAR; INTRAVENOUS at 15:02

## 2018-10-22 RX ADMIN — POLYETHYLENE GLYCOL 3350, SODIUM SULFATE, SODIUM CHLORIDE, POTASSIUM CHLORIDE, ASCORBIC ACID, SODIUM ASCORBATE 100 G: KIT at 20:52

## 2018-10-22 RX ADMIN — SODIUM CHLORIDE: 9 INJECTION, SOLUTION INTRAVENOUS at 20:53

## 2018-10-22 RX ADMIN — MORPHINE SULFATE 2 MG: 4 INJECTION INTRAVENOUS at 06:28

## 2018-10-22 RX ADMIN — SODIUM CHLORIDE 80 MG: 9 INJECTION, SOLUTION INTRAVENOUS at 07:41

## 2018-10-22 RX ADMIN — OMEPRAZOLE 20 MG: 20 CAPSULE, DELAYED RELEASE ORAL at 17:52

## 2018-10-22 RX ADMIN — ATORVASTATIN CALCIUM 80 MG: 80 TABLET, FILM COATED ORAL at 20:52

## 2018-10-22 RX ADMIN — GABAPENTIN 300 MG: 300 CAPSULE ORAL at 17:55

## 2018-10-22 RX ADMIN — ONDANSETRON 4 MG: 2 INJECTION INTRAMUSCULAR; INTRAVENOUS at 06:28

## 2018-10-22 RX ADMIN — SODIUM CHLORIDE: 9 INJECTION, SOLUTION INTRAVENOUS at 06:28

## 2018-10-22 ASSESSMENT — ENCOUNTER SYMPTOMS
BACK PAIN: 0
FEVER: 0
HEADACHES: 0
DEPRESSION: 0
DIARRHEA: 0
ABDOMINAL PAIN: 0
WEIGHT LOSS: 1
CHILLS: 0
EYE PAIN: 0
FALLS: 1
ORTHOPNEA: 0
MYALGIAS: 0
PALPITATIONS: 0
STRIDOR: 0
FOCAL WEAKNESS: 0
SHORTNESS OF BREATH: 0
WEAKNESS: 1
WEIGHT LOSS: 0
SPUTUM PRODUCTION: 0
BLOOD IN STOOL: 1
COUGH: 0
SEIZURES: 0
NAUSEA: 0
LOSS OF CONSCIOUSNESS: 1
NERVOUS/ANXIOUS: 0
INSOMNIA: 0
EYE REDNESS: 0
DIZZINESS: 1
EYE DISCHARGE: 0
BLURRED VISION: 0
DIZZINESS: 0
NECK PAIN: 0
VOMITING: 0
HEARTBURN: 0

## 2018-10-22 ASSESSMENT — COGNITIVE AND FUNCTIONAL STATUS - GENERAL
MOVING FROM LYING ON BACK TO SITTING ON SIDE OF FLAT BED: A LITTLE
MOBILITY SCORE: 17
DAILY ACTIVITIY SCORE: 24
WALKING IN HOSPITAL ROOM: A LITTLE
SUGGESTED CMS G CODE MODIFIER MOBILITY: CK
MOVING TO AND FROM BED TO CHAIR: A LITTLE
CLIMB 3 TO 5 STEPS WITH RAILING: A LOT
STANDING UP FROM CHAIR USING ARMS: A LITTLE
TURNING FROM BACK TO SIDE WHILE IN FLAT BAD: A LITTLE
SUGGESTED CMS G CODE MODIFIER DAILY ACTIVITY: CH

## 2018-10-22 ASSESSMENT — PATIENT HEALTH QUESTIONNAIRE - PHQ9
2. FEELING DOWN, DEPRESSED, IRRITABLE, OR HOPELESS: NOT AT ALL
1. LITTLE INTEREST OR PLEASURE IN DOING THINGS: NOT AT ALL
SUM OF ALL RESPONSES TO PHQ9 QUESTIONS 1 AND 2: 0
2. FEELING DOWN, DEPRESSED, IRRITABLE, OR HOPELESS: NOT AT ALL
1. LITTLE INTEREST OR PLEASURE IN DOING THINGS: NOT AT ALL
SUM OF ALL RESPONSES TO PHQ9 QUESTIONS 1 AND 2: 0

## 2018-10-22 ASSESSMENT — PAIN SCALES - GENERAL
PAINLEVEL_OUTOF10: 3
PAINLEVEL_OUTOF10: 4
PAINLEVEL_OUTOF10: 6
PAINLEVEL_OUTOF10: 5
PAINLEVEL_OUTOF10: 5

## 2018-10-22 ASSESSMENT — LIFESTYLE VARIABLES: EVER_SMOKED: YES

## 2018-10-22 NOTE — ED NOTES
Patient A&O. Smiling and speaking calmly with RN. Readjusted in bed for comfort. No needs at this time.

## 2018-10-22 NOTE — OR SURGEON
Immediate Post OP Note    PreOp Diagnosis: Hematochezia    PostOp Diagnosis: Gastric and duodenal AVMs with bleeding, mild erosive duodenitis, Schatzki ring, hiatal hernia    Procedure(s):  GASTROSCOPY-ENDO - Wound Class: Clean Contaminated    Surgeon(s):  Bakari Brown M.D.    Anesthesiologist/Type of Anesthesia:  Moderate Sedation    Surgical Staff:  Endoscopy Technician: Yue Esteban R.N.  Sedation/Monitoring Nurse: Abbe Vo R.N.    Specimens removed if any:  None    Estimated Blood Loss: None    Findings:   1. 2 small gastric fundus AVMs with scant oozing, treated with APC  2. Single duodenal AVM second portion with oozing, treated with APC  3. Small hiatal hernia  4. Schatzki ring, non-obstructing    Complications: None        10/22/2018 9:24 AM Bakari Brown M.D.

## 2018-10-22 NOTE — ASSESSMENT & PLAN NOTE
Vasovagal versus GI bleed  CT scan of the head negative  Monitor on telemetry closely for possible arrhythmia

## 2018-10-22 NOTE — ED NOTES
Pt roomed and connected to monitor, EKG performed, blood drawn and sent to lab. ERP at bedside for eval

## 2018-10-22 NOTE — ED NOTES
from Lab called with critical result of Hgb 7.6 and Hct 24.3 at 0657. Critical lab result read back to .   Dr. Davenport notified of critical lab result at 0658.  Critical lab result read back by Dr. Davenport. Lab change from 10/16/18 Hgb 10.7 and Hct 35.4.

## 2018-10-22 NOTE — ED NOTES
Med rec complete per pt. Per pt, started Brillinta-new Rx  prescribed on 10/18 after DC from [facility].

## 2018-10-22 NOTE — ASSESSMENT & PLAN NOTE
The patient denies abdominal pain or taking NSAIDs as an outpatient  History of a EGD in 2016 showed duodenitis  Gi consulted had EGD and colonoscopy done both showing AVM, s/p treatment  Appreciate GI rec.  Monitor   Transfuse if needed

## 2018-10-22 NOTE — ASSESSMENT & PLAN NOTE
Recent history of CVA and history of carotid artery stenosis  Discussed with GI today regarding her aspirin and brilinta  Per GI ok to restart aspirin and for the brilinta will need to wait for 72 hr after procedure

## 2018-10-22 NOTE — ED PROVIDER NOTES
ED Provider Note    CHIEF COMPLAINT  Chief Complaint   Patient presents with   • GLF     Fell earlier tonight   • Syncope     felt dizzy before her fall   • Hip Pain     left hip s/p fall       HPI  Brooke Diana is a 84 y.o. female who presents to the ER after syncopal episode.  Patient was walking using her walker and she collapsed.  She fell landing on her left side.  Reports that she thinks that she passed out.  Was dizzy prior to the fall characterized as a lightheaded sensation.  She called EMS and they helped her get up.  Since the event she has been too weak with too much left hip pain to ambulate.  Pain primarily left posterior low back radiates to the left hip area.  Throbbing worse with any movement.  No weakness numbness or focal neurologic symptoms however patient was recently admitted for acute strokes.  She has had a carotid endarterectomy.  She is on aspirin and Brilinta.  She denies headache, neck pain, upper back pain.  No chest pain or shortness of breath.    REVIEW OF SYSTEMS  As per HPI, otherwise a 10 point review of systems is negative    PAST MEDICAL HISTORY  Past Medical History:   Diagnosis Date   • Acute myocardial infarction, true posterior wall infarction, episode of care unspecified    • Anxiety    • Bowel habit changes    • Breath shortness 2017    uses oxygen at 2 liters/min; AT NIGHT ONLY   • CAD (coronary artery disease)     S/P stent placement   • Cholesterol blood decreased    • Dental disorder     Partial upper   • Dizziness    • GI bleed    • Glaucoma    • Heart burn    • Hyperlipidemia    • Hypertension    • Myocardial infarct (HCC) 6/10/10   • Peripheral vascular disease (HCC)    • Pulmonary disease     bronchitis   • TIA (transient ischemic attack)    • Unspecified hemorrhagic conditions     pt takes plavix   • Unspecified urinary incontinence    • Urinary bladder disorder        SOCIAL HISTORY  Social History   Substance Use Topics   • Smoking status: Former Smoker      Packs/day: 0.25     Years: 45.00     Types: Cigarettes     Quit date: 2/1/2016   • Smokeless tobacco: Never Used   • Alcohol use 0.0 - 0.6 oz/week      Comment: 1 drink per month       SURGICAL HISTORY  Past Surgical History:   Procedure Laterality Date   • CAROTID ENDARTERECTOMY Right 9/3/2018    Procedure: CAROTID ENDARTERECTOMY WITH NEUROMONITORING;  Surgeon: Naga Abad M.D.;  Location: SURGERY [facility];  Service: Vascular   • GASTROSCOPY WITH BIOPSY  2/13/2016    Procedure: GASTROSCOPY WITH BIOPSY;  Surgeon: Susan Miller M.D.;  Location: ENDOSCOPY [facility];  Service:    • RECOVERY  7/24/2014    Performed by Cath-Recovery Surgery at SURGERY SAME DAY [facility]   • CARDIAC CATH  7/24/14    Diffuse disease, see report.  % with collterals.   • FEMORAL ENDARTERECTOMY  10/4/2013    Performed by Kacie Baker M.D. at SURGERY [facility]   • ANGIOPLASTY BALLOON  10/4/2013    Performed by Kacie Baker M.D. at SURGERY [facility]   • RECOVERY  4/9/2013    Performed by Ir-Recovery Surgery at SURGERY SAME DAY [facility]   • RECOVERY  11/26/2012    Performed by Ir-Recovery Surgery at SURGERY SAME DAY [facility]   • RECTUS REPAIR  7/13/2012    Performed by SHEILA PLATT at [facility]   • OTHER  12/10/10    stents in legs   • OTHER  6/10/10    cardiac stents   • CARDIAC CATH  6/2010    [facility] to [facility]   • OTHER  2005    KNEE SURGERY   • CHOLECYSTECTOMY     • HYSTERECTOMY, TOTAL ABDOMINAL     • OTHER CARDIAC SURGERY      stent in heart   • STENT PLACEMENT      treated by Dr Baker, multiple surgeries to legs.       CURRENT MEDICATIONS  Home Medications    **Home medications have not yet been reviewed for this encounter**         ALLERGIES  Allergies   Allergen Reactions   • Penicillins Rash and Vomiting     Rxn = unknown  Pt tolerates cephalosporins   • Hydrocodone Vomiting and Nausea     Rxn = unknown   • Tape Rash     RASH       PHYSICAL EXAM  VITAL  "SIGNS: BP (!) 132/33   Pulse 89   Temp 36.6 °C (97.9 °F)   Resp 19   Ht 1.651 m (5' 5\")   Wt 61.2 kg (135 lb)   SpO2 96%   BMI 22.47 kg/m²    Constitutional: Awake and alert.  Elderly female  HENT:  Atraumatic, Normocephalic.Oropharynx dry mucus membranes, Nose normal inspection.   Eyes: Normal inspection  Neck: Supple  Cardiovascular: Normal heart rate, Normal rhythm.  Symmetric peripheral pulses.   Thorax & Lungs: No respiratory distress, No wheezing, No rales, No rhonchi, No chest tenderness.   Abdomen: Bowel sounds normal, soft, non-distended, nontender, no mass  Skin: Warm, Dry, No rash.   Back: Lower lumbar sacroiliac tenderness  Rectal: Dark red maroon stool  Extremities: Skin tear right anterior shin.  No pain with range of motion of the hip.  Neurologic: Grossly normal   Psychiatric: Anxious appearing    RADIOLOGY/PROCEDURES  CT-LSPINE W/O PLUS RECONS   Final Result         1.  Degenerative changes diminish diagnostic sensitivity of this examination, otherwise no acute traumatic bony injury of the lumbar spine is apparent.   2.  Dextroscoliosis   3.  Atherosclerosis      DX-HIP-UNILATERAL-WITH PELVIS-1 VIEW LEFT   Final Result         1.  No radiographic evidence of acute traumatic injury.   2.  Atherosclerosis      CT-HEAD W/O   Final Result         1.  No acute intracranial abnormality is identified, there are nonspecific white matter changes, commonly associated with small vessel ischemic disease.  Associated mild cerebral atrophy is noted.   2.  Atherosclerosis.      DX-CHEST-PORTABLE (1 VIEW)   Final Result         1.  No acute cardiopulmonary disease.   2.  Atherosclerosis      DX-TIBIA AND FIBULA RIGHT    (Results Pending)        Imaging is interpreted by radiologist    Labs:  Results for orders placed or performed during the hospital encounter of 10/22/18   CBC WITH DIFFERENTIAL   Result Value Ref Range    WBC 16.8 (H) 4.8 - 10.8 K/uL    RBC 2.71 (L) 4.20 - 5.40 M/uL    Hemoglobin 7.6 (L) " 12.0 - 16.0 g/dL    Hematocrit 24.3 (L) 37.0 - 47.0 %    MCV 89.7 81.4 - 97.8 fL    MCH 28.0 27.0 - 33.0 pg    MCHC 31.3 (L) 33.6 - 35.0 g/dL    RDW 47.7 35.9 - 50.0 fL    Platelet Count 326 164 - 446 K/uL    MPV 9.9 9.0 - 12.9 fL    Neutrophils-Polys 86.90 (H) 44.00 - 72.00 %    Lymphocytes 5.70 (L) 22.00 - 41.00 %    Monocytes 6.40 0.00 - 13.40 %    Eosinophils 0.10 0.00 - 6.90 %    Basophils 0.40 0.00 - 1.80 %    Immature Granulocytes 0.50 0.00 - 0.90 %    Nucleated RBC 0.00 /100 WBC    Neutrophils (Absolute) 14.63 (H) 2.00 - 7.15 K/uL    Lymphs (Absolute) 0.96 (L) 1.00 - 4.80 K/uL    Monos (Absolute) 1.07 (H) 0.00 - 0.85 K/uL    Eos (Absolute) 0.02 0.00 - 0.51 K/uL    Baso (Absolute) 0.06 0.00 - 0.12 K/uL    Immature Granulocytes (abs) 0.08 0.00 - 0.11 K/uL    NRBC (Absolute) 0.00 K/uL   COMP METABOLIC PANEL   Result Value Ref Range    Sodium 139 135 - 145 mmol/L    Potassium 4.7 3.6 - 5.5 mmol/L    Chloride 107 96 - 112 mmol/L    Co2 24 20 - 33 mmol/L    Anion Gap 8.0 0.0 - 11.9    Glucose 128 (H) 65 - 99 mg/dL    Bun 36 (H) 8 - 22 mg/dL    Creatinine 1.23 0.50 - 1.40 mg/dL    Calcium 9.3 8.5 - 10.5 mg/dL    AST(SGOT) 15 12 - 45 U/L    ALT(SGPT) 9 2 - 50 U/L    Alkaline Phosphatase 47 30 - 99 U/L    Total Bilirubin 0.3 0.1 - 1.5 mg/dL    Albumin 3.7 3.2 - 4.9 g/dL    Total Protein 5.8 (L) 6.0 - 8.2 g/dL    Globulin 2.1 1.9 - 3.5 g/dL    A-G Ratio 1.8 g/dL   TROPONIN   Result Value Ref Range    Troponin I <0.01 0.00 - 0.04 ng/mL   APTT   Result Value Ref Range    APTT 26.4 24.7 - 36.0 sec   PROTHROMBIN TIME (INR)   Result Value Ref Range    PT 14.1 12.0 - 14.6 sec    INR 1.08 0.87 - 1.13   ESTIMATED GFR   Result Value Ref Range    GFR If African American 50 (A) >60 mL/min/1.73 m 2    GFR If Non  42 (A) >60 mL/min/1.73 m 2   EKG (NOW)   Result Value Ref Range    Report       [facility] Emergency Dept.    Test Date:  2018-10-22  Pt Name:    LEIDA RENNER                 Department: ER  MRN:        [id number]                      Room:       BL 15  Gender:     Female                       Technician: [id number]  :        [id number]                   Requested By:ER TRIAGE PROTOCOL  Order #:    [id number]                    Reading MD: EULA SANDOVAL MD    Measurements  Intervals                                Axis  Rate:       80                           P:          72  NH:         164                          QRS:        46  QRSD:       84                           T:          122  QT:         404  QTc:        466    Interpretive Statements  SINUS RHYTHM  LOW VOLTAGE IN FRONTAL LEADS  BORDERLINE R WAVE PROGRESSION, ANTERIOR LEADS  BORDERLINE REPOLARIZATION ABNORMALITY  Compared to ECG 10/14/2018 02:55:44  Low QRS voltage now present  Myocardial infarct finding no longer present    Electronically Signed On 10- 7:45:08 PDT by EULA WAN MD         Medications   NS infusion ( Intravenous New Bag 10/22/18 0628)   pantoprazole (PROTONIX) 80 mg in  mL IVPB (80 mg Intravenous New Bag 10/22/18 0741)   ondansetron (ZOFRAN) syringe/vial injection 4 mg (4 mg Intravenous Given 10/22/18 0628)   morphine (pf) 4 mg/ml injection 2 mg (2 mg Intravenous Given 10/22/18 0628)         COURSE & MEDICAL DECISION MAKING  Patient presents to the ER after syncopal episode.  She is complaining of back pain.  She is identified to have GI bleeding on her examination.  Her vital signs are stable.  She is on aspirin and Brilinta.  Workup was initiated.  There is questionable head trauma therefore CT scan of the head was obtained.  CT lumbar negative for fracture.  X-ray of the left hip was negative.  She was started on normal saline infusion because of dry mucous membranes and active bleeding.  No oral fluids because of possible operative procedure.  On recheck her vital signs remained stable.  Pain was manageable with morphine and Zofran.  She was given Protonix 80 mg IV.  I discussed with  pharmacy.  We do not have enough Protonix for an infusion.  I consulted Dr. Brown with gastroenterology who will see the patient.  Patient will be admitted to the hospitalist service.    FINAL IMPRESSION  1.  Syncope  2.  Gastrointestinal hemorrhage  3.  Anemia  4.  Back pain    CRITICAL CARE TIME 30 minutes  There was a very real possibility of deterioration of the patient's condition.  This patient required the highest level of care.  I provided critical care services which included: review of the medical record, treatment orders, ordering and reviewing test results, frequent reevaluation of the patient's condition and response to treatment, as well as discussing the case with appropriate personnel and various consultants. The critical care time associated with the care of this patient is exclusive of any procedures or specific interventions.    This dictation was created using voice recognition software. The accuracy of the dictation is limited to the abilities of the software.  The nursing notes were reviewed and certain aspects of this information were incorporated into this note.      Electronically signed by: Audie Davenport, 10/22/2018 7:41 AM

## 2018-10-22 NOTE — ED NOTES
[place] fall assessment completed. Pt is high fall risk. Interventions complete. Pt placed in yellow non slip socks, wrist band placed, green sign on door. Bed locked in low position, call light in place. Personal possessions in place.  Personal needs assessed. Charge nurse notified to move pt to a more visible room if available. Safety assessed. Will monitor frequently.

## 2018-10-22 NOTE — CARE PLAN
Problem: Pain Management  Goal: Pain level will decrease to patient's comfort goal  Outcome: PROGRESSING AS EXPECTED      Problem: Safety  Goal: Will remain free from injury  Outcome: PROGRESSING AS EXPECTED  Patient informed to call before getting up    Problem: Bowel/Gastric:  Goal: Normal bowel function is maintained or improved  Outcome: PROGRESSING SLOWER THAN EXPECTED  No bloody bowel movements this shift but will keep monitoring. Informed patient to continue clear liquid diet and no reds.

## 2018-10-22 NOTE — ED TRIAGE NOTES
Chief Complaint   Patient presents with   • GLF     Fell earlier tonight   • Syncope     felt dizzy before her fall   • Hip Pain     left hip s/p fall       Pt brought in by MARY JO for left hip pain. On EMS Arrival pt fell earlier tonight, MARY JO came out to her house but she refused them then. The pain and tightness in the left hip has been getting worse so she called EMS. Pt reports she felt dizzy before her fall which may have led to it.     EMS Vitals:   BP: 148/75  HR: 83  RR: 18  Spo2: 96  GCS: 15   BGL:     EMS Interventions: 50 fentanyl    On arrival to ED pt is A&O x 4

## 2018-10-22 NOTE — PROCEDURES
DATE OF SERVICE:  10/22/2018    PROCEDURE:  Upper endoscopy with APC therapy.    :  Bakari Brown MD    INDICATION:  The patient is an 84-year-old female with history of CVA, on   aspirin and Brilinta who presented for evaluation of hematochezia with acute   blood loss anemia.    INFORMED CONSENT:  Written informed consent was obtained from the patient   after thorough explanation of indications, benefits and risks of the   procedure, which included but was not limited to bleeding, infection,   perforation, adverse reaction to medications and missed lesions.    MEDICATIONS GIVEN:  Versed 2 mg plus fentanyl 50 mcg was administered IV in   titrated doses throughout the procedure.    Time of sedation was from 09:01 a.m. to 09:21 a.m.    Continuous telemetry, BP, pulse oximetry were performed throughout the   procedure.    A midsize flexible upper endoscope was used for the procedure.    FINDINGS:  Patient was placed in the left lateral decubitus position.  After   sedation was achieved, the endoscope was passed into the posterior pharynx   under direct visualization and advanced to second portion of the duodenum   without difficulty.  The patient tolerated procedure well with the following   findings:  1.  Esophagus:  She did have a nonobstructing distal Schatzki's ring at the GE   junction.  This was not dilated.  2.  Stomach:  She had a very small hiatal hernia.  Within the stomach, there   was evidence of scant fresh blood with 2 focal areas of scant oozing in the   fundus.  This was washed and found to be consistent with underlying AVMs.    These were treated with APC therapy with good obliteration.  The remainder of   the stomach, mucosa appeared normal.  3.  Duodenum.  She did have some mild scattered erosions in the duodenal bulb.    Then, in the second portion of the duodenum, she did have a single small   duodenal AVM with scant oozing.  This was treated with APC therapy with good    obliteration.    IMPRESSION:  1.  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  2.  Erosive duodenitis, mild.  3.  Small hiatal hernia.  4.  Nonobstructing Schatzki's ring.    PLAN:  1.  Plan for colonoscopy tomorrow as I doubt that the upper GI arteriovenous   malformations were the cause of her hematochezia and acute blood loss anemia   and I am concerned that she could be bleeding from colonic arteriovenous   malformations.  2.  Clear liquid diet today, n.p.o. at midnight and bowel prep this evening.  3.  Monitor serial H and H's every 8 hours.    4.  P.o. PPI twice daily.       ____________________________________     MD NICOLE LOO / SONIA    DD:  10/22/2018 09:30:59  DT:  10/22/2018 09:57:02    D#:  [id number]  Job#:  [id number]

## 2018-10-22 NOTE — CONSULTS
Gastroenterology Consultation    Date of Service  10/22/2018    Referring Physician  Audie Davenport M.D.    Consulting Physician  Bakari Brown M.D.    Reason for Consultation  Hematochezia    History of Presenting Illness  84 y.o. female with recent CVA on aspirin and Brillinta who presented 10/22/2018 with syncope, hematochezia and anemia.    She was recently discharged from the hospital after suffering from recurrent CVA.  During that admission she was switched from aspirin and Plavix to aspirin and Brillinta.  She notes developing dark stools on Saturday and notes 3 episodes of dark stools.  Then last night she developed left hip pain and had syncopal episode.  She does note some lightheadedness.  Denies chest pain, dyspnea, heartburn, dysphagia, abdominal pain.    She did have anemia in 2016 and had EGD and colonoscopy.  EGD showed hiatal hernia, duodenitis.  Colonoscopy showed scattered angioectasias.    In ED, she is hemodynamically stable.  Hgb down to 7.6 from 10.7 at discharge.  BUN mildly elevated at 36 with Cr 1.2.     Review of Systems  Review of Systems   Constitutional: Positive for malaise/fatigue and weight loss.   Gastrointestinal: Positive for blood in stool.   Musculoskeletal: Positive for falls and joint pain.   Neurological: Positive for dizziness and weakness.   All other systems reviewed and are negative.      Past Medical History   has a past medical history of Acute myocardial infarction, true posterior wall infarction, episode of care unspecified; Anxiety; Bowel habit changes; Breath shortness (2017); CAD (coronary artery disease); Cholesterol blood decreased; Dental disorder; Dizziness; GI bleed; Glaucoma; Heart burn; Hyperlipidemia; Hypertension; Myocardial infarct (HCC) (6/10/10); Peripheral vascular disease (HCC); Pulmonary disease; TIA (transient ischemic attack); Unspecified hemorrhagic conditions; Unspecified urinary incontinence; and Urinary bladder disorder.    Surgical  History   has a past surgical history that includes rectus repair (7/13/2012); recovery (11/26/2012); recovery (4/9/2013); femoral endarterectomy (10/4/2013); angioplasty balloon (10/4/2013); hysterectomy, total abdominal; other cardiac surgery; cholecystectomy; other (12/10/10); other (6/10/10); other (2005); recovery (7/24/2014); cardiac cath (6/2010); cardiac cath (7/24/14); stent placement; gastroscopy with biopsy (2/13/2016); and carotid endarterectomy (Right, 9/3/2018).    Family History  family history includes Heart Disease (age of onset: 65) in her mother; Other in her son; Stroke (age of onset: 68) in her mother.    Social History   reports that she quit smoking about 2 years ago. Her smoking use included Cigarettes. She has a 11.25 pack-year smoking history. She has never used smokeless tobacco. She reports that she drinks alcohol. She reports that she does not use drugs.    Medications    Current Facility-Administered Medications:   •  NS  •  atorvastatin  •  bimatoprost  •  gabapentin  •  senna-docusate **AND** polyethylene glycol/lytes **AND** magnesium hydroxide **AND** bisacodyl  •  NS  •  acetaminophen  •  ondansetron  •  ondansetron  •  pantoprazole    Current Outpatient Prescriptions:   •  aspirin, 81 mg, Oral, DAILY, 10/21/2018 at AM  •  ticagrelor, 90 mg, Oral, BID, 10/21/2018 at AM  •  bimatoprost, 1 Drop, Both Eyes, QHS, 10/20/2018 at [facility]  •  atorvastatin, 80 mg, Oral, QHS, 10/20/2018 at PM  •  benazepril, 20 mg, Oral, DAILY, 10/21/2018 at AM  •  isosorbide mononitrate SR, 30 mg, Oral, DAILY, 10/21/2018 at AM  •  amLODIPine, TAKE 1 TABLET DAILY, 10/21/2018 at AM  •  vitamin D, 2,000 Units, Oral, DAILY, 10/21/2018 at AM  •  NEURONTIN, 300 mg, Oral, BID, 10/21/2018 at AM        Allergies  Allergies   Allergen Reactions   • Penicillins Rash and Vomiting     Rxn = unknown  Pt tolerates cephalosporins   • Hydrocodone Vomiting and Nausea     Rxn = unknown   • Tape Rash     RASH       Physical  Exam  Temp:  [36.6 °C (97.9 °F)] 36.6 °C (97.9 °F)  Pulse:  [76-89] 76  Resp:  [16-19] 16  BP: (132)/(33) 132/33    Physical Exam   Constitutional: She is oriented to person, place, and time. She appears well-developed and well-nourished.   HENT:   Head: Normocephalic and atraumatic.   Eyes: Pupils are equal, round, and reactive to light. Conjunctivae and EOM are normal. No scleral icterus.   Neck: Normal range of motion. Neck supple. No tracheal deviation present. No thyromegaly present.   Cardiovascular: Normal rate and regular rhythm.    Pulmonary/Chest: Effort normal and breath sounds normal. No respiratory distress. She has no wheezes. She has no rales. She exhibits no tenderness.   Abdominal: Soft. Bowel sounds are normal. She exhibits no distension and no mass. There is tenderness in the left upper quadrant and left lower quadrant. There is no rebound and no guarding.   Genitourinary: Rectal exam shows guaiac positive stool.   Genitourinary Comments: Small external hemorrhoids, hematochezia grossly in stool   Musculoskeletal: She exhibits no edema.   Neurological: She is alert and oriented to person, place, and time.   Skin: Skin is warm.   Psychiatric: She has a normal mood and affect.   Nursing note and vitals reviewed.      Fluids       Laboratory  Recent Labs      10/22/18   0600   WBC  16.8*   RBC  2.71*   HEMOGLOBIN  7.6*   HEMATOCRIT  24.3*   MCV  89.7   MCH  28.0   MCHC  31.3*   RDW  47.7   PLATELETCT  326   MPV  9.9     Recent Labs      10/22/18   0600   SODIUM  139   POTASSIUM  4.7   CHLORIDE  107   CO2  24   GLUCOSE  128*   BUN  36*   CREATININE  1.23   CALCIUM  9.3     Recent Labs      10/22/18   0600   APTT  26.4   INR  1.08                 Imaging  CT-LSPINE W/O PLUS RECONS   Final Result         1.  Degenerative changes diminish diagnostic sensitivity of this examination, otherwise no acute traumatic bony injury of the lumbar spine is apparent.   2.  Dextroscoliosis   3.  Atherosclerosis       DX-HIP-UNILATERAL-WITH PELVIS-1 VIEW LEFT   Final Result         1.  No radiographic evidence of acute traumatic injury.   2.  Atherosclerosis      CT-HEAD W/O   Final Result         1.  No acute intracranial abnormality is identified, there are nonspecific white matter changes, commonly associated with small vessel ischemic disease.  Associated mild cerebral atrophy is noted.   2.  Atherosclerosis.      DX-CHEST-PORTABLE (1 VIEW)   Final Result         1.  No acute cardiopulmonary disease.   2.  Atherosclerosis      DX-TIBIA AND FIBULA RIGHT    (Results Pending)         Assessment/Plan  83 y/o with recent CVA on aspirin and Brillinta that presented for hematochezia, syncope, acute blood loss anemia.  Rectal exam with hematochezia.  Question upper versus lower GI bleed currently.  Consider PUD versus AVMs in colon versus less likely ischemic colitis.  Recommend proceeding with EGD today.  If EGD unremarkable, then colonoscopy tomorrow.    PROBLEMS:  1. Hematochezia  2. Acute blood loss anemia on chronic anemia  3. Syncope  4. Left hip pain  5. Leukocytosis  6. Recent CVA  7. Long term anticoagulant use, aspirin and Brillinta  8. CAD  9. Chronic kidney disease stage 3  10. Hypertension  11. Hyperlipidemia  12. Peripheral vascular disease    PLAN:  1. EGD today  2. Protonix 40 mg IV q12  3. Serial H/H every 8 hours

## 2018-10-22 NOTE — H&P
Hospital Medicine History and Physical      Date of Service  10/22/2018    Chief Complaint  Chief Complaint   Patient presents with   • GLF     Fell earlier tonight   • Syncope     felt dizzy before her fall   • Hip Pain     left hip s/p fall       History of Presenting Illness  Lebron is a 84 y.o. female PMH of recent history of CVA, essential hypertension, who presents with syncope episode which happened around 3 AM.  She stated that she woke up to use the restroom and passed out.  She thinks she might have some dizziness before she passed out.  Nobody witnessed the episode.  She follow herself lying on the floor when she regained consciousness.  Then she has to crawl across the hallway to call 911.  She also complained about black colored stool for the past few days.  The patient denies any abdominal pain or taking over-the-counter NSAID's.  In the ER she was found to have GI bleed with a hemoglobin of 7.4.  Dr. Brown was consulted.  Patient will be admitted for further management.    Primary Care Physician  Benjamin Bernard M.D.      Code Status  Full code    Review of Systems  Review of Systems   Constitutional: Positive for malaise/fatigue. Negative for chills, fever and weight loss.   HENT: Negative for congestion and nosebleeds.    Eyes: Negative for blurred vision, pain, discharge and redness.   Respiratory: Negative for cough, sputum production, shortness of breath and stridor.    Cardiovascular: Negative for chest pain, palpitations and orthopnea.   Gastrointestinal: Positive for melena. Negative for abdominal pain, diarrhea, heartburn, nausea and vomiting.   Genitourinary: Negative for dysuria, frequency and urgency.   Musculoskeletal: Negative for back pain, myalgias and neck pain.   Skin: Negative for itching and rash.   Neurological: Positive for loss of consciousness and weakness. Negative for dizziness, focal weakness, seizures and headaches.   Psychiatric/Behavioral: Negative for depression.  The patient is not nervous/anxious and does not have insomnia.      Please see HPI, all other systems were reviewed and are negative (AMA/CMS criteria)     Past Medical History  Past Medical History:   Diagnosis Date   • Breath shortness 2017    uses oxygen at 2 liters/min; AT NIGHT ONLY   • Myocardial infarct (HCC) 6/10/10   • Acute myocardial infarction, true posterior wall infarction, episode of care unspecified    • Anxiety    • Bowel habit changes    • CAD (coronary artery disease)     S/P stent placement   • Cholesterol blood decreased    • Dental disorder     Partial upper   • Dizziness    • GI bleed    • Glaucoma    • Heart burn    • Hyperlipidemia    • Hypertension    • Peripheral vascular disease (HCC)    • Pulmonary disease     bronchitis   • TIA (transient ischemic attack)    • Unspecified hemorrhagic conditions     pt takes plavix   • Unspecified urinary incontinence    • Urinary bladder disorder        Surgical History  Past Surgical History:   Procedure Laterality Date   • CAROTID ENDARTERECTOMY Right 9/3/2018    Procedure: CAROTID ENDARTERECTOMY WITH NEUROMONITORING;  Surgeon: Naga Abad M.D.;  Location: SURGERY [facility];  Service: Vascular   • GASTROSCOPY WITH BIOPSY  2/13/2016    Procedure: GASTROSCOPY WITH BIOPSY;  Surgeon: Susan Miller M.D.;  Location: ENDOSCOPY [facility];  Service:    • RECOVERY  7/24/2014    Performed by Cath-Recovery Surgery at SURGERY SAME DAY [facility]   • CARDIAC CATH  7/24/14    Diffuse disease, see report.  % with collterals.   • FEMORAL ENDARTERECTOMY  10/4/2013    Performed by Kacie Baker M.D. at SURGERY [facility]   • ANGIOPLASTY BALLOON  10/4/2013    Performed by Kacie Baker M.D. at [facility]   • RECOVERY  4/9/2013    Performed by Ir-Recovery Surgery at SURGERY SAME DAY [facility]   • RECOVERY  11/26/2012    Performed by Ir-Recovery Surgery at SURGERY SAME DAY [facility]   • RECTUS REPAIR   7/13/2012    Performed by SHEILA PLATT at SURGERY [facility]   • OTHER  12/10/10    stents in legs   • OTHER  6/10/10    cardiac stents   • CARDIAC CATH  6/2010    BMS to Om   • OTHER  2005    KNEE SURGERY   • CHOLECYSTECTOMY     • HYSTERECTOMY, TOTAL ABDOMINAL     • OTHER CARDIAC SURGERY      stent in heart   • STENT PLACEMENT      treated by Dr Baker, multiple surgeries to legs.       Medications  No current facility-administered medications on file prior to encounter.      Current Outpatient Prescriptions on File Prior to Encounter   Medication Sig Dispense Refill   • aspirin (ASA) 81 MG Chew Tab chewable tablet Take 1 Tab by mouth every day. 100 Tab 0   • ticagrelor (BRILINTA) 90 MG Tab tablet Take 1 Tab by mouth 2 Times a Day. 60 Tab 2   • bimatoprost (LUMIGAN) 0.01 % Solution Place 1 Drop in both eyes every bedtime. 1 gtts both eyes     • atorvastatin (LIPITOR) 80 MG tablet Take 1 Tab by mouth every bedtime. 30 Tab    • benazepril (LOTENSIN) 20 MG Tab Take 1 Tab by mouth every day. 30 Tab    • isosorbide mononitrate SR (IMDUR) 30 MG TABLET SR 24 HR Take 1 Tab by mouth every day. 30 Tab 11   • amLODIPine (NORVASC) 5 MG Tab TAKE 1 TABLET DAILY 90 Tab 2   • Cholecalciferol (VITAMIN D) 2000 UNIT Tab Take 2,000 Units by mouth every day.     • gabapentin (NEURONTIN) 300 MG Cap Take 300 mg by mouth 2 Times a Day.       Family History  Family History   Problem Relation Age of Onset   • Stroke Mother 68        CVA   • Heart Disease Mother 65        valve surgery   • Other Son         wgt 465 lbs     466    Social History  Social History   Substance Use Topics   • Smoking status: Former Smoker     Packs/day: 0.25     Years: 45.00     Types: Cigarettes     Quit date: 2/1/2016   • Smokeless tobacco: Never Used   • Alcohol use 0.0 - 0.6 oz/week      Comment: 1 drink per month       Allergies  Allergies   Allergen Reactions   • Penicillins Rash and Vomiting     Rxn = unknown  Pt tolerates cephalosporins   •  Hydrocodone Vomiting and Nausea     Rxn = unknown   • Tape Rash     RASH        Physical Exam  Laboratory   Hemodynamics  Temp (24hrs), Av.6 °C (97.9 °F), Min:36.6 °C (97.9 °F), Max:36.6 °C (97.9 °F)   Temperature: 36.6 °C (97.9 °F)  Pulse  Av.5  Min: 76  Max: 89 Heart Rate (Monitored): 76  Blood Pressure : (!) 132/33, NIBP: (!) 114/38      Respiratory      Respiration: 16, Pulse Oximetry: 100 %             Physical Exam   Constitutional: She is oriented to person, place, and time. No distress.   HENT:   Head: Normocephalic and atraumatic.   Mouth/Throat: Oropharynx is clear and moist.   Eyes: Pupils are equal, round, and reactive to light. Conjunctivae and EOM are normal.   Neck: Normal range of motion. Neck supple. No tracheal deviation present. No thyromegaly present.   Cardiovascular: Normal rate and regular rhythm.    No murmur heard.  Pulmonary/Chest: Effort normal and breath sounds normal. No respiratory distress. She has no wheezes.   Abdominal: Soft. Bowel sounds are normal. She exhibits no distension. There is no tenderness.   Musculoskeletal: She exhibits no edema or tenderness.   Neurological: She is alert and oriented to person, place, and time. No cranial nerve deficit.   Skin: Skin is warm and dry. She is not diaphoretic. No erythema.   Psychiatric: She has a normal mood and affect. Her behavior is normal. Thought content normal.       Recent Labs      10/22/18   06   WBC  16.8*   RBC  2.71*   HEMOGLOBIN  7.6*   HEMATOCRIT  24.3*   MCV  89.7   MCH  28.0   MCHC  31.3*   RDW  47.7   PLATELETCT  326   MPV  9.9     Recent Labs      10/22/18   06   SODIUM  139   POTASSIUM  4.7   CHLORIDE  107   CO2  24   GLUCOSE  128*   BUN  36*   CREATININE  1.23   CALCIUM  9.3     Recent Labs      10/22/18   06   ALTSGPT  9   ASTSGOT  15   ALKPHOSPHAT  47   TBILIRUBIN  0.3   GLUCOSE  128*     Recent Labs      10/22/18   06   APTT  26.4   INR  1.08             Lab Results   Component Value Date     TROPONINI <0.01 10/22/2018       Imaging  CT-LSPINE W/O PLUS RECONS   Final Result         1.  Degenerative changes diminish diagnostic sensitivity of this examination, otherwise no acute traumatic bony injury of the lumbar spine is apparent.   2.  Dextroscoliosis   3.  Atherosclerosis      DX-HIP-UNILATERAL-WITH PELVIS-1 VIEW LEFT   Final Result         1.  No radiographic evidence of acute traumatic injury.   2.  Atherosclerosis      CT-HEAD W/O   Final Result         1.  No acute intracranial abnormality is identified, there are nonspecific white matter changes, commonly associated with small vessel ischemic disease.  Associated mild cerebral atrophy is noted.   2.  Atherosclerosis.      DX-CHEST-PORTABLE (1 VIEW)   Final Result         1.  No acute cardiopulmonary disease.   2.  Atherosclerosis      DX-TIBIA AND FIBULA RIGHT    (Results Pending)     EKG  per my independant read:  QTc: 466, HR: 80, Normal Sinus Rhythm, no ST/T changes     Assessment/Plan     I anticipate this patient will require at least two midnights for appropriate medical management, necessitating inpatient admission.    Syncope and collapse- (present on admission)   Assessment & Plan    Vasovagal versus GI bleed  CT scan of the head negative  Monitor on telemetry closely for possible arrhythmia          GI bleed- (present on admission)   Assessment & Plan    The patient denies abdominal pain or taking NSAIDs as an outpatient  History of a EGD in 2016 showed duodenitis  Monitor CBC closely  Transfuse as needed with target hemoglobin above 7  GI consulted  N.p.o.  Holding aspirin and Brilinta  EGD today        CKD (chronic kidney disease) stage 3, GFR 30-59 ml/min ([facility])- (present on admission)   Assessment & Plan    Stable        History of CVA (cerebrovascular accident)- (present on admission)   Assessment & Plan    Recent history of CVA and history of carotid artery stenosis  Was on aspirin and Brilinta  Currently holding them due to GI  bleed        Leukocytosis- (present on admission)   Assessment & Plan    Likely reactive            Prophylaxis:  SCDs

## 2018-10-23 LAB
ALBUMIN SERPL BCP-MCNC: 3.8 G/DL (ref 3.2–4.9)
ALBUMIN/GLOB SERPL: 1.6 G/DL
ALP SERPL-CCNC: 47 U/L (ref 30–99)
ALT SERPL-CCNC: 10 U/L (ref 2–50)
ANION GAP SERPL CALC-SCNC: 9 MMOL/L (ref 0–11.9)
AST SERPL-CCNC: 29 U/L (ref 12–45)
BILIRUB SERPL-MCNC: 0.8 MG/DL (ref 0.1–1.5)
BUN SERPL-MCNC: 27 MG/DL (ref 8–22)
CALCIUM SERPL-MCNC: 9.4 MG/DL (ref 8.5–10.5)
CHLORIDE SERPL-SCNC: 112 MMOL/L (ref 96–112)
CO2 SERPL-SCNC: 24 MMOL/L (ref 20–33)
CREAT SERPL-MCNC: 1.31 MG/DL (ref 0.5–1.4)
GLOBULIN SER CALC-MCNC: 2.4 G/DL (ref 1.9–3.5)
GLUCOSE SERPL-MCNC: 121 MG/DL (ref 65–99)
HGB BLD-MCNC: 8.1 G/DL (ref 12–16)
HGB BLD-MCNC: 8.8 G/DL (ref 12–16)
HGB BLD-MCNC: 8.9 G/DL (ref 12–16)
POTASSIUM SERPL-SCNC: 5 MMOL/L (ref 3.6–5.5)
PROT SERPL-MCNC: 6.2 G/DL (ref 6–8.2)
SODIUM SERPL-SCNC: 145 MMOL/L (ref 135–145)

## 2018-10-23 PROCEDURE — 160208 HCHG ENDO MINUTES - EA ADDL 1 MIN LEVEL 4: Performed by: INTERNAL MEDICINE

## 2018-10-23 PROCEDURE — 99152 MOD SED SAME PHYS/QHP 5/>YRS: CPT | Performed by: INTERNAL MEDICINE

## 2018-10-23 PROCEDURE — 700102 HCHG RX REV CODE 250 W/ 637 OVERRIDE(OP): Performed by: INTERNAL MEDICINE

## 2018-10-23 PROCEDURE — 0W3P8ZZ CONTROL BLEEDING IN GASTROINTESTINAL TRACT, VIA NATURAL OR ARTIFICIAL OPENING ENDOSCOPIC: ICD-10-PCS | Performed by: INTERNAL MEDICINE

## 2018-10-23 PROCEDURE — 503081 HCHG INK, SPOT LF (ENDO): Performed by: INTERNAL MEDICINE

## 2018-10-23 PROCEDURE — 700105 HCHG RX REV CODE 258: Performed by: INTERNAL MEDICINE

## 2018-10-23 PROCEDURE — 160002 HCHG RECOVERY MINUTES (STAT): Performed by: INTERNAL MEDICINE

## 2018-10-23 PROCEDURE — 80053 COMPREHEN METABOLIC PANEL: CPT

## 2018-10-23 PROCEDURE — 85018 HEMOGLOBIN: CPT

## 2018-10-23 PROCEDURE — 160203 HCHG ENDO MINUTES - 1ST 30 MINS LEVEL 4: Performed by: INTERNAL MEDICINE

## 2018-10-23 PROCEDURE — 770020 HCHG ROOM/CARE - TELE (206)

## 2018-10-23 PROCEDURE — A9270 NON-COVERED ITEM OR SERVICE: HCPCS | Performed by: INTERNAL MEDICINE

## 2018-10-23 PROCEDURE — 501159 HCHG PROBE, LAP: Performed by: INTERNAL MEDICINE

## 2018-10-23 PROCEDURE — 700111 HCHG RX REV CODE 636 W/ 250 OVERRIDE (IP)

## 2018-10-23 PROCEDURE — 160048 HCHG OR STATISTICAL LEVEL 1-5: Performed by: INTERNAL MEDICINE

## 2018-10-23 PROCEDURE — 36415 COLL VENOUS BLD VENIPUNCTURE: CPT

## 2018-10-23 PROCEDURE — 99232 SBSQ HOSP IP/OBS MODERATE 35: CPT | Performed by: INTERNAL MEDICINE

## 2018-10-23 PROCEDURE — 160035 HCHG PACU - 1ST 60 MINS PHASE I: Performed by: INTERNAL MEDICINE

## 2018-10-23 PROCEDURE — 99153 MOD SED SAME PHYS/QHP EA: CPT | Performed by: INTERNAL MEDICINE

## 2018-10-23 RX ORDER — MIDAZOLAM HYDROCHLORIDE 1 MG/ML
INJECTION INTRAMUSCULAR; INTRAVENOUS
Status: DISCONTINUED | OUTPATIENT
Start: 2018-10-23 | End: 2018-10-23 | Stop reason: HOSPADM

## 2018-10-23 RX ADMIN — SODIUM CHLORIDE: 9 INJECTION, SOLUTION INTRAVENOUS at 23:26

## 2018-10-23 RX ADMIN — ACETAMINOPHEN 650 MG: 325 TABLET, FILM COATED ORAL at 18:48

## 2018-10-23 RX ADMIN — GABAPENTIN 300 MG: 300 CAPSULE ORAL at 18:48

## 2018-10-23 RX ADMIN — LATANOPROST 1 DROP: 50 SOLUTION OPHTHALMIC at 18:48

## 2018-10-23 RX ADMIN — POLYETHYLENE GLYCOL 3350, SODIUM SULFATE, SODIUM CHLORIDE, POTASSIUM CHLORIDE, ASCORBIC ACID, SODIUM ASCORBATE 100 G: KIT at 01:59

## 2018-10-23 RX ADMIN — OMEPRAZOLE 20 MG: 20 CAPSULE, DELAYED RELEASE ORAL at 05:07

## 2018-10-23 RX ADMIN — GABAPENTIN 300 MG: 300 CAPSULE ORAL at 05:08

## 2018-10-23 RX ADMIN — SODIUM CHLORIDE: 9 INJECTION, SOLUTION INTRAVENOUS at 08:36

## 2018-10-23 RX ADMIN — ATORVASTATIN CALCIUM 80 MG: 80 TABLET, FILM COATED ORAL at 22:15

## 2018-10-23 RX ADMIN — OMEPRAZOLE 20 MG: 20 CAPSULE, DELAYED RELEASE ORAL at 18:48

## 2018-10-23 ASSESSMENT — ENCOUNTER SYMPTOMS
BLURRED VISION: 0
CHILLS: 0
MYALGIAS: 0
NECK PAIN: 0
COUGH: 0
SHORTNESS OF BREATH: 0
PALPITATIONS: 0
DOUBLE VISION: 0
VOMITING: 0
FEVER: 0
BLOOD IN STOOL: 1

## 2018-10-23 ASSESSMENT — PAIN SCALES - GENERAL
PAINLEVEL_OUTOF10: 2
PAINLEVEL_OUTOF10: 8
PAINLEVEL_OUTOF10: 3
PAINLEVEL_OUTOF10: 6
PAINLEVEL_OUTOF10: 3
PAINLEVEL_OUTOF10: 3

## 2018-10-23 NOTE — OR SURGEON
Immediate Post OP Note    PreOp Diagnosis: Acute GI blood loss anemia    PostOp Diagnosis: Same    Procedure(s):  COLONOSCOPY - ENDO - Wound Class: Clean Contaminated    Surgeon(s):  Anatoly Barragan M.D.    Anesthesiologist/Type of Anesthesia:  No anesthesia staff entered./Sedation    Surgical Staff:  Endoscopy Technician: Cris Gore  Sedation/Monitoring Nurse: Yue Esteban R.N.    Specimens removed if any:  * No specimens in log *    Dr. Barragan  [facility]  [facility]  [phone]    Colonoscopy with: Hemostasis - APC, epinephrine injection, clip     Tattoo    Indication:  Acute GI blood loss anemia    Sedation:  100mcg Fentanyl, 5mg Midazolam    Findings:   Colonoscopy    RAND     Unremarkable    Terminal ileum     Unremarkable    Colon     Tortuous with suspected adhesions     AVM      Proximal transverse colon      Stigmata of bleeding      Treated with APC with instigation of bleeding      Epinephrine injected 3mL      Endoclip placed with excellent hemostasis      SPOT tattoo 2mL immediately distal    Plan:   Follow CBC     If abdominal pain continues then order CT abdomen and pelvis     **  GI TO SIGN OFF / STAND BY - PLEASE CALL IF FURTHER CONCERNS  **      10/23/2018 11:07 AM Anatoly Barragan M.D.

## 2018-10-23 NOTE — DIETARY
"Nutrition services: Day 1 of admit.  Brooke Diana is a 84 y.o. female with admitting DX of GI bleed.    Poor PO, Unplanned wt loss noted on admit screen.  Attempted to speak with pt at bedside, however pt was off the floor for a procedure.  Per chart review, pt wt on 5/13 was 65.3 kg (144 lbs) - current wt is 61.2 kg (134 lbs).  RD will continue to follow for adequate PO intake.    Assessment:  Height: 165.1 cm (5' 5\")  Weight: 61.2 kg (134 lb 14.7 oz)  Body mass index is 22.45 kg/m².  Diet/Intake: Regular; PO 50-75% for one meal thus far    Evaluation:   1. Colonoscopy today.  2. Wt loss of 7% in five months is not significant, but worth noting.   3. Glucose 121, HbA1c 5.9 (10/13), BUN 27  4. Pertinent meds:  Neurontin, Prilosec, Pericolace    Recommendations/Plan:  1. Encourage intake of meals.  2. Document intake of all meals as % taken in ADLs to provide interdisciplinary communication across all shifts.   3. Monitor weight.  4. Nutrition rep will continue to see patient for ongoing meal and snack preferences.   5. Obtain supplement order per RD as needed.            "

## 2018-10-23 NOTE — PROGRESS NOTES
Pt drank 1.6/2L prep and refused to drink more. Pt isn't running clear Brown/burgundy at this time.

## 2018-10-23 NOTE — PROGRESS NOTES
Received pt during bedside report. Pt resting quietly receiving blood. Upper bed rails up x2, bed in low position, call light and belongings within reach, bed alarm on.

## 2018-10-23 NOTE — RESPIRATORY CARE
COPD EDUCATION by COPD CLINICAL EDUCATOR  10/23/2018 at 8:32 AM by Jenny Devine     Patient reviewed by COPD education team. Patient does not qualify for COPD program.

## 2018-10-23 NOTE — PROGRESS NOTES
GI progress note:    Patient seen and examined.  Chart reviewed.  Patient complains of abdominal pain, not improved with BM or flatus.    Will proceed with colonoscopy.

## 2018-10-23 NOTE — PROGRESS NOTES
[facility] Hospitalist Progress Note    Date of Service: 10/23/2018    Chief Complaint  84 y.o. female admitted 10/22/2018 with GI bleed and syncopal episode     Interval Problem Update  Pt seen and examined, no overnight events, had colonoscopy done today 10/23, showing AVM, s/p treatment. Her EGD was done on on 10/22 showing also AVM s/p treatment.  Appreciate  GI rec.     Consultants/Specialty  GI    Disposition  TBD         Review of Systems   Constitutional: Negative for chills and fever.   HENT: Negative for congestion and ear pain.    Eyes: Negative for blurred vision and double vision.   Respiratory: Negative for cough and shortness of breath.    Cardiovascular: Negative for chest pain and palpitations.   Gastrointestinal: Positive for blood in stool. Negative for vomiting.   Musculoskeletal: Negative for myalgias and neck pain.      Physical Exam  Laboratory/Imaging   Hemodynamics  Temp (24hrs), Av.5 °C (97.7 °F), Min:36.1 °C (97 °F), Max:37.6 °C (99.6 °F)   Temperature: 36.1 °C (97 °F)  Pulse  Av.6  Min: 71  Max: 108 Heart Rate (Monitored): 94  Blood Pressure : 149/57, NIBP: 158/76      Respiratory      Respiration: 18, Pulse Oximetry: 94 %             Fluids    Intake/Output Summary (Last 24 hours) at 10/23/18 1533  Last data filed at 10/23/18 0310   Gross per 24 hour   Intake             1976 ml   Output             1640 ml   Net              336 ml       Nutrition  Orders Placed This Encounter   Procedures   • Diet Order Regular     Standing Status:   Standing     Number of Occurrences:   1     Order Specific Question:   Diet:     Answer:   Regular [1]     Physical Exam   Constitutional: She is oriented to person, place, and time.   HENT:   Head: Normocephalic and atraumatic.   Eyes: Conjunctivae are normal. No scleral icterus.   Neck: Neck supple. No JVD present.   Cardiovascular: Normal heart sounds.  Exam reveals no gallop.    Pulmonary/Chest: She has no wheezes. She has no rales.   Abdominal:  Soft. Bowel sounds are normal. There is no tenderness. There is no rebound.   Musculoskeletal: She exhibits no edema.   Neurological: She is alert and oriented to person, place, and time.   Skin: Skin is warm and dry.   Nursing note and vitals reviewed.      Recent Labs      10/22/18   0600  10/22/18   1442  10/23/18   0006  10/23/18   0739   WBC  16.8*   --    --    --    RBC  2.71*   --    --    --    HEMOGLOBIN  7.6*  7.0*  8.9*  8.8*   HEMATOCRIT  24.3*   --    --    --    MCV  89.7   --    --    --    MCH  28.0   --    --    --    MCHC  31.3*   --    --    --    RDW  47.7   --    --    --    PLATELETCT  326   --    --    --    MPV  9.9   --    --    --      Recent Labs      10/22/18   0600  10/23/18   0006   SODIUM  139  145   POTASSIUM  4.7  5.0   CHLORIDE  107  112   CO2  24  24   GLUCOSE  128*  121*   BUN  36*  27*   CREATININE  1.23  1.31   CALCIUM  9.3  9.4     Recent Labs      10/22/18   0600   APTT  26.4   INR  1.08                  Assessment/Plan     Syncope and collapse- (present on admission)   Assessment & Plan    Vasovagal versus GI bleed  CT scan of the head negative  Monitor on telemetry closely for possible arrhythmia          GI bleed- (present on admission)   Assessment & Plan    The patient denies abdominal pain or taking NSAIDs as an outpatient  History of a EGD in 2016 showed duodenitis  Gi consulted had EGD and colonoscopy done both showing AVM, s/p treatment  Appreciate GI rec.  Monitor   Transfuse if needed         CKD (chronic kidney disease) stage 3, GFR 30-59 ml/min ([facility])- (present on admission)   Assessment & Plan    Stable        History of CVA (cerebrovascular accident)- (present on admission)   Assessment & Plan    Recent history of CVA and history of carotid artery stenosis  Was on aspirin and Brilinta  Currently holding them due to GI bleed        Leukocytosis- (present on admission)   Assessment & Plan    Likely reactive  Monitor           Quality-Core Measures   Reviewed  items::  Labs reviewed, Radiology images reviewed and Medications reviewed  Benavides catheter::  No Benavides  DVT prophylaxis pharmacological::  Contraindicated - High bleeding risk  DVT prophylaxis - mechanical:  SCDs

## 2018-10-23 NOTE — PROCEDURES
DATE OF SERVICE:  10/23/2018    PROCEDURES:  1.  Colonoscopy with hemostasis utilizing argon plasma coagulation,   epinephrine injection, and endoclip placement.  2.  SPOT tattoo of colonic lesion.    INDICATION:  Acute GI blood loss anemia, hematochezia.    FINAL IMPRESSION:  1.  Digital rectal exam unremarkable.  2.  Terminal ileum unremarkable.  3.  Colon, mild sigmoid diverticulosis.  4.  Tortuous colon cyst with suspected adhesions.  5.  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  Stigmata of bleeding with central eschar.  Successfully treated with   argon plasma coagulation, which instigated bleeding, epinephrine injection 3   mL, endoclip placement with excellent hemostasis and finally, SPOT 2 mL   tattooed immediately distal to the lesion.    RECOMMENDATIONS:  1.  Follow CBC.  2.  Transfuse p.r.n. to keep hemoglobin greater than or equal to 7.  3.  If abdominal pain continues, then order CT of the abdomen and pelvis.    GI to sign off/standby.  Please call if any concerns arise.    DESCRIPTION OF PROCEDURE:  Prior to the procedure, physical exam was stable.    During procedure, vital signs remained within normal limits.  Prior to   sedation, informed consent was obtained.  Risks, benefits, alternatives   including but not limited to risk of bleeding, infection, perforation, adverse   reaction to medication, failure to identify pathology and death explained to   the patient at length.  She accepted all risks.  Patient prepped in the left   lateral position.  IV sedation was given in the form of fentanyl 100 mcg,   midazolam 5 mg in a slow incremental fashion to achieve desired effect.    Digital rectal exam was performed and was unremarkable.  Scope tip of the   Olympus flexible pediatric colonoscope passed from the rectum through to the   cecum through a tortuous and fixed colon with suspected adhesions.  Ileocecal   valve area behind the ileocecal valve and appendiceal  orifice was well   visualized and was unremarkable.  Terminal ileum was intubated for 5 cm and   was unremarkable.  Retroflex in the right colon was unremarkable.  Scope was   slowly withdrawn from the cecum through to the rectum visualizing mucosa in a   methodical 360-degree manner.  At the proximal transverse colon, a 1 cm size   arteriovenous malformation with a central visible vessel with small amount of   ulcerative tissue seen.  Argon plasma coagulation catheter was utilized to   ablate the lesion.  The periphery of the lesion bled with APC application and   the central scar also started bleeding.  This was treated until the bleeding   slowed.  A sclerotherapy needle was then passed down the scope channel and 3   mL total of epinephrine was injected around the lesion.  The eschar was gently   probed with the tip of the catheter and torrential bleeding started at the   central visible vessel.  The sclerotherapy needle catheter was removed and a   single Endoclip was passed down the scope channel with excellent tissue   acquisition and excellent hemostasis.  The area was washed and watched.    Further argon plasma coagulation was made around this endoclip as well as on   the endoclip to pass the current through the clip.  Once again, excellent   hemostasis was observed.  The sclerotherapy needle was then passed once again   down the scope channel and injection of 2 mL of SPOT tattoo was made   immediately distal to the lesion.  The scope was further withdrawn then from   this area through the remainder of the colon.  No other lesions were seen.  In   the sigmoid colon, there was mild-to-moderate diverticulosis observed.    Retroflex in the rectum was unremarkable.  Scope was straightened.  Air and   liquid were suctioned.  The patient tolerated the procedure well and was sent   to recovery without immediate complications.       ____________________________________     CODI URIBE MD    JPP /  SONIA    DD:  10/23/2018 11:19:04  DT:  10/23/2018 11:40:32    D#:  [id number]  Job#:  [id number]

## 2018-10-24 ENCOUNTER — PATIENT OUTREACH (OUTPATIENT)
Dept: HEALTH INFORMATION MANAGEMENT | Facility: OTHER | Age: 83
End: 2018-10-24

## 2018-10-24 LAB
ANION GAP SERPL CALC-SCNC: 5 MMOL/L (ref 0–11.9)
BUN SERPL-MCNC: 18 MG/DL (ref 8–22)
CALCIUM SERPL-MCNC: 8.7 MG/DL (ref 8.5–10.5)
CHLORIDE SERPL-SCNC: 111 MMOL/L (ref 96–112)
CO2 SERPL-SCNC: 26 MMOL/L (ref 20–33)
CREAT SERPL-MCNC: 0.84 MG/DL (ref 0.5–1.4)
ERYTHROCYTE [DISTWIDTH] IN BLOOD BY AUTOMATED COUNT: 51.4 FL (ref 35.9–50)
GLUCOSE SERPL-MCNC: 100 MG/DL (ref 65–99)
HCT VFR BLD AUTO: 24.3 % (ref 37–47)
HGB BLD-MCNC: 7.4 G/DL (ref 12–16)
HGB BLD-MCNC: 7.6 G/DL (ref 12–16)
HGB BLD-MCNC: 7.8 G/DL (ref 12–16)
MCH RBC QN AUTO: 28.5 PG (ref 27–33)
MCHC RBC AUTO-ENTMCNC: 31.3 G/DL (ref 33.6–35)
MCV RBC AUTO: 91 FL (ref 81.4–97.8)
PLATELET # BLD AUTO: 248 K/UL (ref 164–446)
PMV BLD AUTO: 9.7 FL (ref 9–12.9)
POTASSIUM SERPL-SCNC: 4.1 MMOL/L (ref 3.6–5.5)
RBC # BLD AUTO: 2.67 M/UL (ref 4.2–5.4)
SODIUM SERPL-SCNC: 142 MMOL/L (ref 135–145)
WBC # BLD AUTO: 12.9 K/UL (ref 4.8–10.8)

## 2018-10-24 PROCEDURE — A9270 NON-COVERED ITEM OR SERVICE: HCPCS | Performed by: INTERNAL MEDICINE

## 2018-10-24 PROCEDURE — 700102 HCHG RX REV CODE 250 W/ 637 OVERRIDE(OP): Performed by: INTERNAL MEDICINE

## 2018-10-24 PROCEDURE — 99232 SBSQ HOSP IP/OBS MODERATE 35: CPT | Performed by: INTERNAL MEDICINE

## 2018-10-24 PROCEDURE — G8978 MOBILITY CURRENT STATUS: HCPCS | Mod: CI

## 2018-10-24 PROCEDURE — 700105 HCHG RX REV CODE 258: Performed by: INTERNAL MEDICINE

## 2018-10-24 PROCEDURE — 770020 HCHG ROOM/CARE - TELE (206)

## 2018-10-24 PROCEDURE — 85018 HEMOGLOBIN: CPT | Mod: 91

## 2018-10-24 PROCEDURE — 85027 COMPLETE CBC AUTOMATED: CPT

## 2018-10-24 PROCEDURE — G8979 MOBILITY GOAL STATUS: HCPCS | Mod: CI

## 2018-10-24 PROCEDURE — 80048 BASIC METABOLIC PNL TOTAL CA: CPT

## 2018-10-24 PROCEDURE — 36415 COLL VENOUS BLD VENIPUNCTURE: CPT

## 2018-10-24 PROCEDURE — 97161 PT EVAL LOW COMPLEX 20 MIN: CPT

## 2018-10-24 PROCEDURE — G8980 MOBILITY D/C STATUS: HCPCS | Mod: CI

## 2018-10-24 RX ORDER — OXYCODONE HYDROCHLORIDE 5 MG/1
5 TABLET ORAL EVERY 6 HOURS PRN
Status: DISCONTINUED | OUTPATIENT
Start: 2018-10-24 | End: 2018-10-25 | Stop reason: HOSPADM

## 2018-10-24 RX ADMIN — GABAPENTIN 300 MG: 300 CAPSULE ORAL at 17:20

## 2018-10-24 RX ADMIN — GABAPENTIN 300 MG: 300 CAPSULE ORAL at 05:15

## 2018-10-24 RX ADMIN — ASPIRIN 81 MG: 81 TABLET, COATED ORAL at 14:54

## 2018-10-24 RX ADMIN — OXYCODONE HYDROCHLORIDE 5 MG: 5 TABLET ORAL at 14:54

## 2018-10-24 RX ADMIN — ACETAMINOPHEN 650 MG: 325 TABLET, FILM COATED ORAL at 11:13

## 2018-10-24 RX ADMIN — LATANOPROST 1 DROP: 50 SOLUTION OPHTHALMIC at 17:21

## 2018-10-24 RX ADMIN — ACETAMINOPHEN 650 MG: 325 TABLET, FILM COATED ORAL at 17:20

## 2018-10-24 RX ADMIN — SODIUM CHLORIDE: 9 INJECTION, SOLUTION INTRAVENOUS at 14:55

## 2018-10-24 RX ADMIN — OXYCODONE HYDROCHLORIDE 5 MG: 5 TABLET ORAL at 22:00

## 2018-10-24 RX ADMIN — ATORVASTATIN CALCIUM 80 MG: 80 TABLET, FILM COATED ORAL at 19:56

## 2018-10-24 RX ADMIN — OMEPRAZOLE 20 MG: 20 CAPSULE, DELAYED RELEASE ORAL at 05:16

## 2018-10-24 RX ADMIN — OMEPRAZOLE 20 MG: 20 CAPSULE, DELAYED RELEASE ORAL at 17:20

## 2018-10-24 ASSESSMENT — ENCOUNTER SYMPTOMS
DIZZINESS: 0
BLOOD IN STOOL: 1
COUGH: 0
PALPITATIONS: 0
FEVER: 0
MYALGIAS: 0
DOUBLE VISION: 0
BLURRED VISION: 0
VOMITING: 0
CHILLS: 0
SHORTNESS OF BREATH: 0
NECK PAIN: 0

## 2018-10-24 ASSESSMENT — PAIN SCALES - GENERAL
PAINLEVEL_OUTOF10: 2
PAINLEVEL_OUTOF10: 5
PAINLEVEL_OUTOF10: 8
PAINLEVEL_OUTOF10: 5
PAINLEVEL_OUTOF10: 5
PAINLEVEL_OUTOF10: 0
PAINLEVEL_OUTOF10: 4
PAINLEVEL_OUTOF10: 8

## 2018-10-24 ASSESSMENT — COGNITIVE AND FUNCTIONAL STATUS - GENERAL
MOBILITY SCORE: 21
MOVING FROM LYING ON BACK TO SITTING ON SIDE OF FLAT BED: A LITTLE
MOVING TO AND FROM BED TO CHAIR: A LITTLE
SUGGESTED CMS G CODE MODIFIER MOBILITY: CJ
CLIMB 3 TO 5 STEPS WITH RAILING: A LITTLE

## 2018-10-24 ASSESSMENT — LIFESTYLE VARIABLES: ALCOHOL_USE: NO

## 2018-10-24 ASSESSMENT — GAIT ASSESSMENTS
ASSISTIVE DEVICE: FRONT WHEEL WALKER
DEVIATION: OTHER (COMMENT)
GAIT LEVEL OF ASSIST: SUPERVISED
DISTANCE (FEET): 200

## 2018-10-24 ASSESSMENT — PATIENT HEALTH QUESTIONNAIRE - PHQ9
2. FEELING DOWN, DEPRESSED, IRRITABLE, OR HOPELESS: NOT AT ALL
SUM OF ALL RESPONSES TO PHQ9 QUESTIONS 1 AND 2: 0
1. LITTLE INTEREST OR PLEASURE IN DOING THINGS: NOT AT ALL

## 2018-10-24 NOTE — PROGRESS NOTES
Received bedside report. Pt resting quietly states that she just received px meds for her stomach and it feels a little better. Upper bed rails up x2, bed in low position, call light and belongings within reach.

## 2018-10-24 NOTE — CARE PLAN
Problem: Safety  Goal: Will remain free from falls  Outcome: PROGRESSING AS EXPECTED  Pt educated on moderate fall risk precautions. Pt is alert and oriented and agrees to call for assistance prior to  Mobility.     Problem: Venous Thromboembolism (VTW)/Deep Vein Thrombosis (DVT) Prevention:  Goal: Patient will participate in Venous Thrombosis (VTE)/Deep Vein Thrombosis (DVT)Prevention Measures  Outcome: PROGRESSING SLOWER THAN EXPECTED  Pt refusing SCD placement. RN will mobilize today. Pt not candidate for pharmacological prophylaxis as she is admitted for an active GI bleed.

## 2018-10-24 NOTE — PROGRESS NOTES
Bedside report received from ОЛЕГ Montesinos, pt is sleeping comfortably with NS running at 75 mL/hr. Bed is locked in lowest position with call light, belongings within reach, white board updated. All needs met at this time.

## 2018-10-24 NOTE — PROGRESS NOTES
[facility] Hospitalist Progress Note    Date of Service: 10/24/2018    Chief Complaint  84 y.o. female admitted 10/22/2018 with GI bleed and syncopal episode     Interval Problem Update  No overnight events, had colonoscopy done ton 10/23, showing AVM, s/p treatment. Her EGD was done on on 10/22 showing also AVM s/p treatment.  Monitoring hemoglobin, today was 7.8   Discussed with GI , Dr. Barragan today regarding pt aspirin and brilinta, and per GI ok to start with aspirin and to wait at least 72 hours after procedure  before starting back Brilinta.     Consultants/Specialty  GI    Disposition  TBD         Review of Systems   Constitutional: Negative for chills and fever.   HENT: Negative for congestion and ear pain.    Eyes: Negative for blurred vision and double vision.   Respiratory: Negative for cough and shortness of breath.    Cardiovascular: Negative for chest pain and palpitations.   Gastrointestinal: Positive for blood in stool. Negative for vomiting.   Genitourinary: Negative for dysuria and urgency.   Musculoskeletal: Negative for myalgias and neck pain.   Neurological: Negative for dizziness.      Physical Exam  Laboratory/Imaging   Hemodynamics  Temp (24hrs), Av.3 °C (97.4 °F), Min:36.1 °C (97 °F), Max:36.9 °C (98.5 °F)   Temperature: 36.3 °C (97.3 °F)  Pulse  Av.1  Min: 71  Max: 108    Blood Pressure : 157/51      Respiratory      Respiration: 16, Pulse Oximetry: 97 %        RUL Breath Sounds: Clear, RLL Breath Sounds: Clear, REYNALDO Breath Sounds: Clear, LLL Breath Sounds: Clear    Fluids    Intake/Output Summary (Last 24 hours) at 10/24/18 1251  Last data filed at 10/24/18 0848   Gross per 24 hour   Intake              240 ml   Output                0 ml   Net              240 ml       Nutrition  Orders Placed This Encounter   Procedures   • Diet Order Regular     Standing Status:   Standing     Number of Occurrences:   1     Order Specific Question:   Diet:     Answer:   Regular [1]     Physical  Exam   Constitutional: She is oriented to person, place, and time.   HENT:   Head: Normocephalic and atraumatic.   Eyes: Conjunctivae are normal. No scleral icterus.   Neck: Neck supple. No JVD present.   Cardiovascular: Normal heart sounds.  Exam reveals no gallop.    Pulmonary/Chest: She has no wheezes. She has no rales.   Abdominal: Soft. Bowel sounds are normal. She exhibits no distension. There is no tenderness.   Musculoskeletal: She exhibits no edema.   Neurological: She is alert and oriented to person, place, and time.   Skin: Skin is warm and dry.   Nursing note and vitals reviewed.      Recent Labs      10/22/18   0600   10/23/18   1532  10/24/18   0003  10/24/18   0815   WBC  16.8*   --    --   12.9*   --    RBC  2.71*   --    --   2.67*   --    HEMOGLOBIN  7.6*   < >  8.1*  7.6*  7.8*   HEMATOCRIT  24.3*   --    --   24.3*   --    MCV  89.7   --    --   91.0   --    MCH  28.0   --    --   28.5   --    MCHC  31.3*   --    --   31.3*   --    RDW  47.7   --    --   51.4*   --    PLATELETCT  326   --    --   248   --    MPV  9.9   --    --   9.7   --     < > = values in this interval not displayed.     Recent Labs      10/22/18   0600  10/23/18   0006  10/24/18   0003   SODIUM  139  145  142   POTASSIUM  4.7  5.0  4.1   CHLORIDE  107  112  111   CO2  24  24  26   GLUCOSE  128*  121*  100*   BUN  36*  27*  18   CREATININE  1.23  1.31  0.84   CALCIUM  9.3  9.4  8.7     Recent Labs      10/22/18   0600   APTT  26.4   INR  1.08                  Assessment/Plan     Syncope and collapse- (present on admission)   Assessment & Plan    Vasovagal versus GI bleed  CT scan of the head negative  Monitor on telemetry closely for possible arrhythmia          GI bleed- (present on admission)   Assessment & Plan    The patient denies abdominal pain or taking NSAIDs as an outpatient  History of a EGD in 2016 showed duodenitis  Gi consulted had EGD and colonoscopy done both showing AVM, s/p treatment  Appreciate GI  rec.  Monitor   Transfuse if needed         CKD (chronic kidney disease) stage 3, GFR 30-59 ml/min (formerly [facility])- (present on admission)   Assessment & Plan    Stable        History of CVA (cerebrovascular accident)- (present on admission)   Assessment & Plan    Recent history of CVA and history of carotid artery stenosis  Discussed with GI today regarding her aspirin and brilinta  Per GI ok to restart aspirin and for the brilinta will need to wait for 72 hr after procedure         Leukocytosis- (present on admission)   Assessment & Plan    Likely reactive  Monitor           Quality-Core Measures   Reviewed items::  Labs reviewed, Radiology images reviewed and Medications reviewed  Benavides catheter::  No Benavides  DVT prophylaxis pharmacological::  Contraindicated - High bleeding risk  DVT prophylaxis - mechanical:  SCDs

## 2018-10-24 NOTE — THERAPY
"Physical Therapy Evaluation completed.   Bed Mobility:  Supine to Sit: Supervised  Transfers: Sit to Stand: Supervised  Gait: Level Of Assist: Supervised with Front-Wheel Walker       Plan of Care: Patient with no further skilled PT needs in the acute care setting at this time  Discharge Recommendations: Equipment: No Equipment Needed. See below    After initial evaluation and pt education pt has no further skilled acute PT needs. She was able to demonstrate hallway ambulation with FWW with Spv with no yahaira LOB and appears to be close to, if not at recent functional baseline. She does endorse 2 recent falls though these apepar likely 2' to symptoms from abnoramal hemodynamics given recent GI bleed/anemia. Pt reports feeling much improved currently. Would recommend resumption of home services (pt reports was receiving home nsg/PT/OT from [facility]). Pt reports plan to dc to home ASAP and has family support for transport and IADls if needed.     See \"Rehab Therapy-Acute\" Patient Summary Report for complete documentation.     "

## 2018-10-25 VITALS
HEART RATE: 76 BPM | RESPIRATION RATE: 16 BRPM | WEIGHT: 142.64 LBS | TEMPERATURE: 97.4 F | BODY MASS INDEX: 23.76 KG/M2 | HEIGHT: 65 IN | OXYGEN SATURATION: 97 % | SYSTOLIC BLOOD PRESSURE: 163 MMHG | DIASTOLIC BLOOD PRESSURE: 60 MMHG

## 2018-10-25 LAB
ANION GAP SERPL CALC-SCNC: 4 MMOL/L (ref 0–11.9)
BUN SERPL-MCNC: 14 MG/DL (ref 8–22)
CALCIUM SERPL-MCNC: 8.3 MG/DL (ref 8.5–10.5)
CHLORIDE SERPL-SCNC: 111 MMOL/L (ref 96–112)
CO2 SERPL-SCNC: 26 MMOL/L (ref 20–33)
CREAT SERPL-MCNC: 0.93 MG/DL (ref 0.5–1.4)
ERYTHROCYTE [DISTWIDTH] IN BLOOD BY AUTOMATED COUNT: 50.3 FL (ref 35.9–50)
GLUCOSE SERPL-MCNC: 103 MG/DL (ref 65–99)
HCT VFR BLD AUTO: 24.1 % (ref 37–47)
HGB BLD-MCNC: 7.3 G/DL (ref 12–16)
HGB BLD-MCNC: 8.7 G/DL (ref 12–16)
MCH RBC QN AUTO: 27.9 PG (ref 27–33)
MCHC RBC AUTO-ENTMCNC: 30.3 G/DL (ref 33.6–35)
MCV RBC AUTO: 92 FL (ref 81.4–97.8)
PLATELET # BLD AUTO: 254 K/UL (ref 164–446)
PMV BLD AUTO: 9.6 FL (ref 9–12.9)
POTASSIUM SERPL-SCNC: 4.5 MMOL/L (ref 3.6–5.5)
RBC # BLD AUTO: 2.62 M/UL (ref 4.2–5.4)
SODIUM SERPL-SCNC: 141 MMOL/L (ref 135–145)
WBC # BLD AUTO: 8.3 K/UL (ref 4.8–10.8)

## 2018-10-25 PROCEDURE — A9270 NON-COVERED ITEM OR SERVICE: HCPCS | Performed by: INTERNAL MEDICINE

## 2018-10-25 PROCEDURE — 85018 HEMOGLOBIN: CPT

## 2018-10-25 PROCEDURE — G8987 SELF CARE CURRENT STATUS: HCPCS | Mod: CI

## 2018-10-25 PROCEDURE — 700105 HCHG RX REV CODE 258: Performed by: INTERNAL MEDICINE

## 2018-10-25 PROCEDURE — G8988 SELF CARE GOAL STATUS: HCPCS | Mod: CI

## 2018-10-25 PROCEDURE — G8989 SELF CARE D/C STATUS: HCPCS | Mod: CI

## 2018-10-25 PROCEDURE — 97165 OT EVAL LOW COMPLEX 30 MIN: CPT

## 2018-10-25 PROCEDURE — 99239 HOSP IP/OBS DSCHRG MGMT >30: CPT | Performed by: INTERNAL MEDICINE

## 2018-10-25 PROCEDURE — 700102 HCHG RX REV CODE 250 W/ 637 OVERRIDE(OP): Performed by: INTERNAL MEDICINE

## 2018-10-25 PROCEDURE — 36415 COLL VENOUS BLD VENIPUNCTURE: CPT

## 2018-10-25 PROCEDURE — 80048 BASIC METABOLIC PNL TOTAL CA: CPT

## 2018-10-25 PROCEDURE — 85027 COMPLETE CBC AUTOMATED: CPT

## 2018-10-25 RX ORDER — SODIUM CHLORIDE 9 MG/ML
500 INJECTION, SOLUTION INTRAVENOUS
Status: ACTIVE | OUTPATIENT
Start: 2018-10-25 | End: 2018-10-25

## 2018-10-25 RX ORDER — MIDAZOLAM HYDROCHLORIDE 1 MG/ML
.5-2 INJECTION INTRAMUSCULAR; INTRAVENOUS PRN
Status: ACTIVE | OUTPATIENT
Start: 2018-10-25 | End: 2018-10-25

## 2018-10-25 RX ADMIN — OMEPRAZOLE 20 MG: 20 CAPSULE, DELAYED RELEASE ORAL at 05:35

## 2018-10-25 RX ADMIN — ACETAMINOPHEN 650 MG: 325 TABLET, FILM COATED ORAL at 10:14

## 2018-10-25 RX ADMIN — ASPIRIN 81 MG: 81 TABLET, COATED ORAL at 05:35

## 2018-10-25 RX ADMIN — SODIUM CHLORIDE: 9 INJECTION, SOLUTION INTRAVENOUS at 03:39

## 2018-10-25 RX ADMIN — GABAPENTIN 300 MG: 300 CAPSULE ORAL at 05:35

## 2018-10-25 ASSESSMENT — COGNITIVE AND FUNCTIONAL STATUS - GENERAL
HELP NEEDED FOR BATHING: A LITTLE
DAILY ACTIVITIY SCORE: 23
SUGGESTED CMS G CODE MODIFIER DAILY ACTIVITY: CI

## 2018-10-25 ASSESSMENT — PAIN SCALES - GENERAL
PAINLEVEL_OUTOF10: 6
PAINLEVEL_OUTOF10: 3
PAINLEVEL_OUTOF10: 0

## 2018-10-25 ASSESSMENT — ACTIVITIES OF DAILY LIVING (ADL): TOILETING: INDEPENDENT

## 2018-10-25 NOTE — PROGRESS NOTES
Discharge instructions reviewed with patient, verbalized understanding. All belongings taken with patient.

## 2018-10-25 NOTE — FACE TO FACE
Face to Face Supporting Documentation - Home Health    The encounter with this patient was in whole or in part the primary reason for home health admission.    Date of encounter:   Patient:                    MRN:                       YOB: 2018  Brooke Diana  [id number]  2/1/1934     Home health to see patient for:  Skilled Nursing care for assessment, interventions & education, Physical Therapy evaluation and treatment and Occupational therapy evaluation and treatment    Skilled need for:  Comment: disease education    Skilled nursing interventions to include:  Comment: disease education    Homebound status evidenced by:  Need the aid of supportive devices such as crutches, canes, wheelchairs or walkers. Leaving home requires a considerable and taxing effort. There is a normal inability to leave the home.    Community Physician to provide follow up care: Pcp Not In Computer     Optional Interventions? No      I certify the face to face encounter for this home health care referral meets the CMS requirements and the encounter/clinical assessment with the patient was, in whole, or in part, for the medical condition(s) listed above, which is the primary reason for home health care. Based on my clinical findings: the service(s) are medically necessary, support the need for home health care, and the homebound criteria are met.  I certify that this patient has had a face to face encounter by myself.  Hernan Lantigua M.D. - NPI: [id number]

## 2018-10-25 NOTE — DISCHARGE SUMMARY
Discharge Summary    CHIEF COMPLAINT ON ADMISSION  Chief Complaint   Patient presents with   • GLF     Fell earlier tonight   • Syncope     felt dizzy before her fall   • Hip Pain     left hip s/p fall       Reason for Admission  ems     Admission Date  10/22/2018    CODE STATUS  Full Code    HPI & HOSPITAL COURSE  This is a 84 y.o. female  PMH of recent history of CVA, essential hypertension, who presents with syncope episode. Pt was found to have GI bleed with anemia requiring transfusion she received 1 unit of rbc.   GI was also consulted and pt had EGD and colonoscopy done both of them showed AVM which were treated with clips and epinephrine. Her aspirin and brilinta were held, Pt is felling better, gi has signed off, per GI she can be restarted back on aspiring and also on brilinta 72 hr after procedure.  Pt will be discharged home today.She will need to follow up with GI as outpt.       The patient met 2-midnight criteria for an inpatient stay at the time of discharge.    Discharge Date  10/25/18    FOLLOW UP ITEMS POST DISCHARGE  GI     DISCHARGE DIAGNOSES  Active Problems:    GI bleed POA: Yes    Syncope and collapse POA: Yes    CKD (chronic kidney disease) stage 3, GFR 30-59 ml/min (Former[facility]) POA: Yes    Leukocytosis POA: Yes    History of CVA (cerebrovascular accident) POA: Yes  Resolved Problems:    * No resolved hospital problems. *      FOLLOW UP  Future Appointments  Date Time Provider Department Center   10/29/2018 2:40 PM [facility] None   11/19/2018 1:20 PM Severo Mancilla M.D. [facility] None     Ilene Canales (Primary Care)   [address]  [place] [address]  [phone]  Go on 10/30/2018  Please arrive at 8:50 am for your appointment at 9 am with ORA Canales. Thank you     Anatoly Barragan M.D.  [id number]  [place] [address]  [phone]    Schedule an appointment as soon as possible for a visit        MEDICATIONS ON DISCHARGE     Medication List      CONTINUE  taking these medications      Instructions   amLODIPine 5 MG Tabs  Commonly known as:  NORVASC   TAKE 1 TABLET DAILY     aspirin 81 MG Chew chewable tablet  Commonly known as:  ASA   Take 1 Tab by mouth every day.  Dose:  81 mg     atorvastatin 80 MG tablet  Commonly known as:  LIPITOR   Take 1 Tab by mouth every bedtime.  Dose:  80 mg     benazepril 20 MG Tabs  Commonly known as:  LOTENSIN   Take 1 Tab by mouth every day.  Dose:  20 mg     isosorbide mononitrate SR 30 MG Tb24  Commonly known as:  IMDUR   Take 1 Tab by mouth every day.  Dose:  30 mg     LUMIGAN 0.01 % Soln  Generic drug:  bimatoprost   Place 1 Drop in both eyes every bedtime. 1 gtts both eyes  Dose:  1 Drop     NEURONTIN 300 MG Caps  Generic drug:  gabapentin   Take 300 mg by mouth 2 Times a Day.  Dose:  300 mg     ticagrelor 90 MG Tabs tablet  Commonly known as:  BRILINTA   Take 1 Tab by mouth 2 Times a Day.  Dose:  90 mg     vitamin D 2000 UNIT Tabs   Take 2,000 Units by mouth every day.  Dose:  2000 Units            Allergies  Allergies   Allergen Reactions   • Penicillins Rash and Vomiting     Rxn = unknown  Pt tolerates cephalosporins   • Hydrocodone Vomiting and Nausea     Rxn = unknown   • Tape Rash     RASH       DIET  Orders Placed This Encounter   Procedures   • Diet Order Regular     Standing Status:   Standing     Number of Occurrences:   1     Order Specific Question:   Diet:     Answer:   Regular [1]       ACTIVITY  As tolerated.  Weight bearing as tolerated    CONSULTATIONS  GI: Dr. Barragan    PROCEDURES  EGD   Colonoscopy     LABORATORY  Lab Results   Component Value Date    SODIUM 141 10/25/2018    POTASSIUM 4.5 10/25/2018    CHLORIDE 111 10/25/2018    CO2 26 10/25/2018    GLUCOSE 103 (H) 10/25/2018    BUN 14 10/25/2018    CREATININE 0.93 10/25/2018        Lab Results   Component Value Date    WBC 8.3 10/25/2018    HEMOGLOBIN 8.7 (L) 10/25/2018    HEMATOCRIT 24.1 (L) 10/25/2018    PLATELETCT 254 10/25/2018        Total time of the  discharge process exceeds 40 minutes.

## 2018-10-25 NOTE — FACE TO FACE
Face to Face Supporting Documentation - Home Health    The encounter with this patient was in whole or in part the primary reason for home health admission.    Date of encounter:   Patient:                    MRN:                       YOB: 2018  Brooke Diana  [id number]  2/1/1934     Home health to see patient for:  Physical Therapy evaluation and treatment and Occupational therapy evaluation and treatment    Homebound status evidenced by:  Need the aid of supportive devices such as crutches, canes, wheelchairs or walkers. Leaving home requires a considerable and taxing effort. There is a normal inability to leave the home.    Community Physician to provide follow up care: Pcp Not In Computer     Optional Interventions? No      I certify the face to face encounter for this home health care referral meets the CMS requirements and the encounter/clinical assessment with the patient was, in whole, or in part, for the medical condition(s) listed above, which is the primary reason for home health care. Based on my clinical findings: the service(s) are medically necessary, support the need for home health care, and the homebound criteria are met.  I certify that this patient has had a face to face encounter by myself.  Hernan Lantigua M.D. - NPI: [id number]

## 2018-10-25 NOTE — DISCHARGE PLANNING
Agency/Facility Name: [facility]  Outcome: Refaxed new/updated orders to [facility] per verbal from Aissatou.

## 2018-10-25 NOTE — DISCHARGE PLANNING
Anticipated Discharge Disposition: home with home health    Action: LSW spoke with patient at bedside. Patient was on service with [employer] and signed choice form to resume. Patient uses 02 through [employer], has a walker and FWW she uses. Patient stated that she has an AD at home that still needs to be notarized.   Patient lives alone in a one story apartment.     LSW faxed choice to [facility]    Barriers to Discharge: Home health acceptance     Plan: LSW to follow    Care Transition Team Assessment    Information Source  Orientation : Oriented x 4  Information Given By: Patient  Informant's Name: reece  Who is responsible for making decisions for patient? : Patient    Readmission Evaluation  Is this a readmission?: Yes - unplanned readmission    Elopement Risk  Legal Hold: No  Ambulatory or Self Mobile in Wheelchair: No-Not an Elopement Risk  Disoriented: No  Psychiatric Symptoms: None  History of Wandering: No  Elopement this Admit: No  Vocalizing Wanting to Leave: No  Displays Behaviors, Body Language Wanting to Leave: No-Not at Risk for Elopement  Elopement Risk: Not at Risk for Elopement    Interdisciplinary Discharge Planning  Lives with - Patient's Self Care Capacity: Alone and Able to Care For Self  Patient or legal guardian wants to designate a caregiver (see row info): No  Housing / Facility: 1 Story Apartment / Condo  Prior Services: Skilled Home Health Services    Discharge Preparedness  What is your plan after discharge?: Home with help, Home health care  Prior Functional Level: Independent with Activities of Daily Living    Functional Assesment  Prior Functional Level: Independent with Activities of Daily Living    Finances  Financial Barriers to Discharge: No  Prescription Coverage: Yes    Vision / Hearing Impairment  Vision Impairment : Yes  Right Eye Vision: Impaired, Wears Glasses  Left Eye Vision: Impaired, Wears Glasses  Hearing Impairment : No    Values / Beliefs / Concerns  Values /  Beliefs Concerns : No    Advance Directive  Advance Directive?: POLST (Not on file)    Domestic Abuse  Have you ever been the victim of abuse or violence?: No  Physical Abuse or Sexual Abuse: No  Verbal Abuse or Emotional Abuse: No  Possible Abuse Reported to:: Not Applicable    Psychological Assessment  History of Substance Abuse: None  History of Psychiatric Problems: No  Non-compliant with Treatment: No    Discharge Risks or Barriers  Discharge risks or barriers?: Lives alone, no community support  Patient risk factors: Lives alone and no community support    Anticipated Discharge Information  Anticipated discharge disposition: [facility]  Discharge Address:  ([address] [place] [address] [address] [place] [address])  Discharge Contact Phone Number:  ([phone])

## 2018-10-25 NOTE — THERAPY
"Occupational Therapy Evaluation completed.   Functional Status:  SPV level for BADLs in this setting  Plan of Care: Patient with no further skilled OT needs in the acute care setting at this time  Discharge Recommendations:  Equipment: No Equipment Needed. Post-acute therapy Discharge to home with outpatient or home health for additional skilled therapy services.    See \"Rehab Therapy-Acute\" Patient Summary Report for complete documentation.    Pt is an 85 y/o female who presents to acute [facility] from syncopal episode. She was found to have a GI bleed upon admission. PMH includes CVA, HTN, CAD, and anxiety. Pt lives alone in ground level [facility] and has been receiving home health services. Pt appears to be at functional baseline from acute OT perspective. Recommend DC home and to resume home health services.     "

## 2018-10-25 NOTE — DISCHARGE PLANNING
Received Choice form at 0951  Agency/Facility Name: [facility]  Referral sent per Choice form at 0961

## 2018-10-25 NOTE — DISCHARGE INSTRUCTIONS
Discharge Instructions    Discharged to home by car with relative. Discharged via wheelchair, hospital escort: Yes.  Special equipment needed: Not Applicable    Be sure to schedule a follow-up appointment with your primary care doctor or any specialists as instructed.     Discharge Plan:   Influenza Vaccine Indication: Patient Refuses (recieved last admission)    I understand that a diet low in cholesterol, fat, and sodium is recommended for good health. Unless I have been given specific instructions below for another diet, I accept this instruction as my diet prescription.   Other diet: Regular diet    Special Instructions: None    · Is patient discharged on Warfarin / Coumadin?   No     Depression / Suicide Risk    As you are discharged from this [facility] facility, it is important to learn how to keep safe from harming yourself.    Recognize the warning signs:  · Abrupt changes in personality, positive or negative- including increase in energy   · Giving away possessions  · Change in eating patterns- significant weight changes-  positive or negative  · Change in sleeping patterns- unable to sleep or sleeping all the time   · Unwillingness or inability to communicate  · Depression  · Unusual sadness, discouragement and loneliness  · Talk of wanting to die  · Neglect of personal appearance   · Rebelliousness- reckless behavior  · Withdrawal from people/activities they love  · Confusion- inability to concentrate     If you or a loved one observes any of these behaviors or has concerns about self-harm, here's what you can do:  · Talk about it- your feelings and reasons for harming yourself  · Remove any means that you might use to hurt yourself (examples: pills, rope, extension cords, firearm)  · Get professional help from the community (Mental Health, Substance Abuse, psychological counseling)  · Do not be alone:Call your Safe Contact- someone whom you trust who will be there for you.  · Call your local CRISIS  HOTLINE 395-6823 or 867-314-7720  · Call your local Children's Mobile Crisis Response Team [place] (634) 505-5753 or www.StrikeAd  · Call the toll free National Suicide Prevention Hotlines   · National Suicide Prevention Lifeline 273-463-WGRM (2312)  · [place] [employer] Line Network 800-SUICIDE (081-1561)          Gastrointestinal Bleeding  Gastrointestinal (GI) bleeding is bleeding somewhere along the digestive tract, between the mouth and anus. This can be caused by various problems. The severity of these problems can range from mild to serious or even life-threatening. If you have GI bleeding, you may find blood in your stools (feces), you may have black stools, or you may vomit blood. If there is a lot of bleeding, you may need to stay in the hospital.  What are the causes?  This condition may be caused by:  · Esophagitis. This is inflammation, irritation, or swelling of the esophagus.  · Hemorrhoids. These are swollen veins in the rectum.  · Anal fissures. These are areas of painful tearing that are often caused by passing hard stool.  · Diverticulosis. These are pouches that form on the colon over time, with age, and may bleed a lot.  · Diverticulitis. This is inflammation in areas with diverticulosis. It can cause pain, fever, and bloody stools, although bleeding may be mild.  · Polyps and cancer. Colon cancer often starts out as precancerous polyps.  · Gastritis and ulcers. With these, bleeding may come from the upper GI tract, near the stomach.  What are the signs or symptoms?  Symptoms of this condition may include:  · Bright red blood in your vomit, or vomit that looks like coffee grounds.  · Bloody, black, or tarry stools.  ¨ Bleeding from the lower GI tract will usually cause red or maroon blood in the stools.  ¨ Bleeding from the upper GI tract may cause black, tarry, often bad-smelling stools.  ¨ In certain cases, if the bleeding is fast enough, the stools may be red.  · Pain or cramping in  the abdomen.  How is this diagnosed?  This condition may be diagnosed based on:  · Medical history and physical exam.  · Various tests, such as:  ¨ Blood tests.  ¨ X-rays and other imaging tests.  ¨ Esophagogastroduodenoscopy (EGD). In this test, a flexible, lighted tube is used to look at your esophagus, stomach, and small intestine.  ¨ Colonoscopy. In this test, a flexible, lighted tube is used to look at your colon.  How is this treated?  Treatment for this condition depends on the cause of the bleeding. For example:  · For bleeding from the esophagus, stomach, small intestine, or colon, the health care provider doing your EGD or colonoscopy may be able to stop the bleeding as part of the procedure.  · Inflammation or infection of the colon can be treated with medicines.  · Certain rectal problems can be treated with creams, suppositories, or warm baths.  · Surgery is sometimes needed.  · Blood transfusions are sometimes needed if a lot of blood has been lost.  If bleeding is slow, you may be allowed to go home. If there is a lot of bleeding, you will need to stay in the hospital for observation.  Follow these instructions at home:  · Take over-the-counter and prescription medicines only as told by your health care provider.  · Eat foods that are high in fiber. This will help to keep your stools soft. These foods include whole grains, legumes, fruits, and vegetables. Eating 1-3 prunes each day works well for many people.  · Drink enough fluid to keep your urine clear or pale yellow.  · Keep all follow-up visits as told by your health care provider. This is important.  Contact a health care provider if:  · Your symptoms do not improve.  Get help right away if:  · Your bleeding increases.  · You feel light-headed or you faint.  · You feel weak.  · You have severe cramps in your back or abdomen.  · You pass large blood clots in your stool.  · Your symptoms are getting worse.  This information is not intended to  replace advice given to you by your health care provider. Make sure you discuss any questions you have with your health care provider.  Document Released: 12/15/2001 Document Revised: 05/17/2017 Document Reviewed: 06/06/2016  [employer] Interactive Patient Education © 2017 [employer]

## 2018-10-29 ENCOUNTER — OFFICE VISIT (OUTPATIENT)
Dept: NEUROLOGY | Facility: MEDICAL CENTER | Age: 83
End: 2018-10-29
Payer: MEDICARE

## 2018-10-29 VITALS
SYSTOLIC BLOOD PRESSURE: 130 MMHG | WEIGHT: 136.2 LBS | HEIGHT: 69 IN | TEMPERATURE: 97.7 F | HEART RATE: 91 BPM | DIASTOLIC BLOOD PRESSURE: 62 MMHG | BODY MASS INDEX: 20.17 KG/M2 | OXYGEN SATURATION: 94 %

## 2018-10-29 DIAGNOSIS — I63.512 CEREBROVASCULAR ACCIDENT (CVA) DUE TO STENOSIS OF LEFT MIDDLE CEREBRAL ARTERY (HCC): ICD-10-CM

## 2018-10-29 DIAGNOSIS — Z86.73 HISTORY OF CVA (CEREBROVASCULAR ACCIDENT): ICD-10-CM

## 2018-10-29 PROCEDURE — 99215 OFFICE O/P EST HI 40 MIN: CPT | Performed by: PHYSICIAN ASSISTANT

## 2018-10-29 NOTE — PROGRESS NOTES
Subjective:      Brooke Diana is a 84 y.o. female who presents with New Patient (TIA)    Patient had a stroke in Sept and Oct of 2018.  She was put on brilinta and aspirin following the second stroke and subsequently developed a GI bleed which has now resulted in her taking only the aspirin.    First stroke occurred, followed by Right CEA, and then second stroke occurred, followed by another hospitalization for a GI bleed while taking brilanta and aspirin.    Symptoms associated with this episode:  tongue numbness and right hand weakness.  Hx of TIAs    Symptoms have resolved except memory, weakness from GI bleed which is why she is now using a walker    Patient was seen in the hospital by neurology.  Dr. Galan among others who notes:  ISCH STROKE 2' SEVERE IC STENOSIS L M1     JAMIN MD MALIK and I discussed case and reviewed imaging at length, agree with him tech difficult lesion could be catastrophic to intervene.  Max med tx with aspirin, max statin, and now brilinta. Follow up LICA stenosis with CV surgery not the primary symptomatic lesion.      Stroke Prevention Medications taking currently: aspirin    (PCP) Primary Doctor:  [facility] reviewed    Medications and Allergies reviewed    Social: lives in [place] alone   Works  - retired    Test Results Reviewed:    MRI:    1.  Small areas of acute infarcts in the left centrum semiovale.  2.  Severe stenosis/occlusion of left M1 segment. This finding is seen in the previous CT angiogram dated 10/12/2018. Intracranial stenosis may be indicated for the patient's with repeated strokes despite of medical management for the dual antiplatelet   therapy. If indicated this finding can be further evaluated with the cerebral angiogram .    CTA Head:  .  Occlusion of the left M1 segment of the middle cerebral artery.  2.  Extensive cortical collateral supply.    CTA Neck:  . Postendarterectomy changes in the right neck. There is now  "approximately 20-30% stenosis of the right carotid bulb.  2. Unchanged 50-69% stenosis of the left carotid bulb.  3. No evidence of carotid dissection or aneurysm.  4. Intracranial vessels will be discussed in a separate report.    Echo:  Compared to prior 9-2-2018, no significant change.  Left ventricular ejection fraction is visually estimated to be 65%.  Mild concentric left ventricular hypertrophy.  Moderate mitral annular calcification.  Mild mitral regurgitation.  Moderate to severe aortic insufficiency.  Mild tricuspid regurgitation.  Right ventricular systolic pressure is estimated to be 40 mmHg.    OTHER: EEG - normal      DX:  Stroke       Personal Risk factors associated with recurrent stroke:    Some risk factors for stroke are \"non-modifiable\" meaning we cannot change them.  Examples of these types of risk factors are:    *   Age - once we turn 55, stroke becomes more common.  It is associated with aging and in fact every decade over 55 our stroke risk doubles.  * Gender - Men have more strokes than women, although this gender difference is only slight  * Ethnicity and/or family history:  if your parents, grandparents, siblings or children have had stroke or if you have ancestors of , , or  descent, you may have inherited some genes pre-disposing your toward stroke    To reduce your risk of recurrent stroke, we want to focus on risk factors we can modify.  Any of the checked items below are your personal risk factors and you should work with your PCP (primary care provider) to get them to the goal (goals are in bold).   These risk factors are:     ___Diabetes - goal HA1c is <7.0%: Current:    5.9     _X__Hypertension - goal is <130 top number at all times.   Current: 130/62  Recommend taking bp once weekly at minimum, and taking log to PCP at every visit.    Continue current Blood pressure medication - avoid salt    _X__Hyperlipidemia - goal LDL is <70.  Current: " 28.  Recommend speaking with your primary but our recommendation would be to reduce the high dose statin from 80 to 40 mg or possibly even 20 mg.    You can also lower your cholesterol by going on a low cholesterol diet.  You should discuss this with your primary care provider if you wish to also reduce your cholesterol by changing your diet.  In general to reduce cholesterol - eat less cheese, and reduce intake of pork, shrimp, and beef.    _X__Smoking: Once you are 5 years out from when you quit smoking, the history of smoking does not appear to be a significant risk factor for recurrent stroke.  If you are quitting, implement stress reduction with exercise such as daily walking, yoga, or meditation.  If you are unable to quit on your own, ask your PCP about chantix or referral for smoking cessation    ___Overweight:  Current BMI - 20    Body Mass Index (BMI) is your height compared to your weight.  Goal BMI to reduce risk of recurrent stroke is <25   BMI 25-27 diet/exercise to get to goal BMI.    BMI >27 patient should begin with goal of 10% weight loss  Work on this risk factor aggressively with your PCP             _X__Physical Inactivity: Counseled on initiating 30 minutes of moderate exercise most days, but at least three times per week. Moderate exercise can be walking, swimming, cycling.  Work up to goal by setting realistic goal and then increasing it weekly or monthly.    _?__Atrial Fibrillation: no History of afib.  Ask your cardiologist what they think about wearing a heart monitor for about 2 weeks to rule out afib as a possible source of your recurrent strokes.  However, with recent GI bleed requiring transfusion this may be contraindicated due to inability to take stronger blood thinners.    ___Sleep Apnea: not diagnosed with sleep apnea but wears oxygen at night.   If you snore and have episodes where you stop breathing, speak to your PCP about whether a pulse oximetry test or referral for a sleep  "study are needed.   Untreated sleep apnea is associated with recurrent stroke    ___Alcohol or Drug use: denies    ___Estrogen  Use: Denies    _X__Other: Right CEA                    HPI    ROS       Objective:     /62 (BP Location: Right arm, Patient Position: Sitting, BP Cuff Size: Small adult)   Pulse 91   Temp 36.5 °C (97.7 °F) (Temporal)   Ht 1.753 m (5' 9\")   Wt 61.8 kg (136 lb 3.2 oz)   SpO2 94%   BMI 20.11 kg/m²      Physical Exam   Constitutional: She is oriented to person, place, and time. She appears well-developed and well-nourished.   HENT:   Head: Normocephalic.   Eyes: Pupils are equal, round, and reactive to light. EOM are normal.   Pulmonary/Chest: Effort normal.   Neurological: She is alert and oriented to person, place, and time. No cranial nerve deficit or sensory deficit. Coordination abnormal.   Skin: Skin is warm and dry.   Psychiatric: She has a normal mood and affect. Her behavior is normal. Thought content normal.         Cautious gait  Bilateral action end point tremor          Assessment/Plan:     Ischemic stroke - mechanism likely large vessel disease    Work with PCP on risk factors checked above to reduce risk of recurrent stroke.    Medication you are taking to thin your blood and reduce your risk of recurrent stroke is aspirin    Therapies:  Already ordered by Fredy    Counseled on when to call 911 and if desired additional information on stroke was provided    Return to our office:  not required - this is a one time visit to review your risk factors for stroke so you can discuss them with your primary care provider.    Total time with this visit:   40  Minutes face-to-face with patient. More than 50% of this visit was spent educating patient on their illness and/or coordinating care, as detailed above        "

## 2018-10-29 NOTE — PATIENT INSTRUCTIONS
"DX:  Stroke       Personal Risk factors associated with recurrent stroke:    Some risk factors for stroke are \"non-modifiable\" meaning we cannot change them.  Examples of these types of risk factors are:    *   Age - once we turn 55, stroke becomes more common.  It is associated with aging and in fact every decade over 55 our stroke risk doubles.  * Gender - Men have more strokes than women, although this gender difference is only slight  * Ethnicity and/or family history:  if your parents, grandparents, siblings or children have had stroke or if you have ancestors of , , or  descent, you may have inherited some genes pre-disposing your toward stroke    To reduce your risk of recurrent stroke, we want to focus on risk factors we can modify.  Any of the checked items below are your personal risk factors and you should work with your PCP (primary care provider) to get them to the goal (goals are in bold).   These risk factors are:     ___Diabetes - goal HA1c is <7.0%: Current:    5.9     _X__Hypertension - goal is <130 top number at all times.   Current: 130/62  Recommend taking bp once weekly at minimum, and taking log to PCP at every visit.    Continue current Blood pressure medication - avoid salt    _X__Hyperlipidemia - goal LDL is <70.  Current: 28.  Recommend speaking with your primary but our recommendation would be to reduce the high dose statin from 80 to 40 mg or possibly even 20 mg.    You can also lower your cholesterol by going on a low cholesterol diet.  You should discuss this with your primary care provider if you wish to also reduce your cholesterol by changing your diet.  In general to reduce cholesterol - eat less cheese, and reduce intake of pork, shrimp, and beef.    _X__Smoking: Once you are 5 years out from when you quit smoking, the history of smoking does not appear to be a significant risk factor for recurrent stroke.  If you are quitting, implement " stress reduction with exercise such as daily walking, yoga, or meditation.  If you are unable to quit on your own, ask your PCP about chantix or referral for smoking cessation    ___Overweight:  Current BMI - 20    Body Mass Index (BMI) is your height compared to your weight.  Goal BMI to reduce risk of recurrent stroke is <25   BMI 25-27 diet/exercise to get to goal BMI.    BMI >27 patient should begin with goal of 10% weight loss  Work on this risk factor aggressively with your PCP             _X__Physical Inactivity: Counseled on initiating 30 minutes of moderate exercise most days, but at least three times per week. Moderate exercise can be walking, swimming, cycling.  Work up to goal by setting realistic goal and then increasing it weekly or monthly.    _?__Atrial Fibrillation: no History of afib.  Ask your cardiologist what they think about wearing a heart monitor for about 2 weeks to rule out afib as a possible source of your recurrent strokes.  However, with recent GI bleed requiring transfusion this may be contraindicated due to inability to take stronger blood thinners.    ___Sleep Apnea: not diagnosed with sleep apnea but wears oxygen at night.   If you snore and have episodes where you stop breathing, speak to your PCP about whether a pulse oximetry test or referral for a sleep study are needed.   Untreated sleep apnea is associated with recurrent stroke    ___Alcohol or Drug use: denies    ___Estrogen  Use: Denies    _X__Other: Right CEA    Plan:    Ischemic stroke - mechanism likely large vessel disease    Work with PCP on risk factors checked above to reduce risk of recurrent stroke.    Medication you are taking to thin your blood and reduce your risk of recurrent stroke is aspirin    Therapies:  Already ordered by Fredy Christyed on when to call 911 and if desired additional information on stroke was provided    Return to our office:  not required - this is a one time visit to review your risk  factors for stroke so you can discuss them with your primary care provider.

## 2018-10-29 NOTE — Clinical Note
Pt was already stopped on brilinta and aspirin due to developing GI bleed within a month and requiring transfusion.  Now she just takes aspirin.

## 2018-10-30 ENCOUNTER — HOSPITAL ENCOUNTER (INPATIENT)
Facility: MEDICAL CENTER | Age: 83
LOS: 4 days | DRG: 064 | End: 2018-11-04
Attending: EMERGENCY MEDICINE | Admitting: HOSPITALIST
Payer: MEDICARE

## 2018-10-30 ENCOUNTER — APPOINTMENT (OUTPATIENT)
Dept: RADIOLOGY | Facility: MEDICAL CENTER | Age: 83
DRG: 064 | End: 2018-10-30
Attending: EMERGENCY MEDICINE
Payer: MEDICARE

## 2018-10-30 DIAGNOSIS — I50.9 ACUTE CONGESTIVE HEART FAILURE, UNSPECIFIED HEART FAILURE TYPE (HCC): ICD-10-CM

## 2018-10-30 DIAGNOSIS — I21.4 NSTEMI (NON-ST ELEVATED MYOCARDIAL INFARCTION) (HCC): ICD-10-CM

## 2018-10-30 LAB
BASOPHILS # BLD AUTO: 0.5 % (ref 0–1.8)
BASOPHILS # BLD: 0.08 K/UL (ref 0–0.12)
EKG IMPRESSION: NORMAL
EOSINOPHIL # BLD AUTO: 0.09 K/UL (ref 0–0.51)
EOSINOPHIL NFR BLD: 0.6 % (ref 0–6.9)
ERYTHROCYTE [DISTWIDTH] IN BLOOD BY AUTOMATED COUNT: 51.7 FL (ref 35.9–50)
HCT VFR BLD AUTO: 28.1 % (ref 37–47)
HGB BLD-MCNC: 8.5 G/DL (ref 12–16)
IMM GRANULOCYTES # BLD AUTO: 0.07 K/UL (ref 0–0.11)
IMM GRANULOCYTES NFR BLD AUTO: 0.4 % (ref 0–0.9)
LYMPHOCYTES # BLD AUTO: 1.66 K/UL (ref 1–4.8)
LYMPHOCYTES NFR BLD: 10.3 % (ref 22–41)
MCH RBC QN AUTO: 27.6 PG (ref 27–33)
MCHC RBC AUTO-ENTMCNC: 30.2 G/DL (ref 33.6–35)
MCV RBC AUTO: 91.2 FL (ref 81.4–97.8)
MONOCYTES # BLD AUTO: 1.45 K/UL (ref 0–0.85)
MONOCYTES NFR BLD AUTO: 9 % (ref 0–13.4)
NEUTROPHILS # BLD AUTO: 12.69 K/UL (ref 2–7.15)
NEUTROPHILS NFR BLD: 79.2 % (ref 44–72)
NRBC # BLD AUTO: 0 K/UL
NRBC BLD-RTO: 0 /100 WBC
PLATELET # BLD AUTO: 452 K/UL (ref 164–446)
PMV BLD AUTO: 9.5 FL (ref 9–12.9)
RBC # BLD AUTO: 3.08 M/UL (ref 4.2–5.4)
WBC # BLD AUTO: 16 K/UL (ref 4.8–10.8)

## 2018-10-30 PROCEDURE — 93005 ELECTROCARDIOGRAM TRACING: CPT | Performed by: EMERGENCY MEDICINE

## 2018-10-30 PROCEDURE — 84484 ASSAY OF TROPONIN QUANT: CPT

## 2018-10-30 PROCEDURE — 83880 ASSAY OF NATRIURETIC PEPTIDE: CPT

## 2018-10-30 PROCEDURE — 93005 ELECTROCARDIOGRAM TRACING: CPT

## 2018-10-30 PROCEDURE — 700102 HCHG RX REV CODE 250 W/ 637 OVERRIDE(OP): Performed by: EMERGENCY MEDICINE

## 2018-10-30 PROCEDURE — 85025 COMPLETE CBC W/AUTO DIFF WBC: CPT

## 2018-10-30 PROCEDURE — A9270 NON-COVERED ITEM OR SERVICE: HCPCS | Performed by: EMERGENCY MEDICINE

## 2018-10-30 PROCEDURE — 85610 PROTHROMBIN TIME: CPT

## 2018-10-30 PROCEDURE — 83690 ASSAY OF LIPASE: CPT

## 2018-10-30 PROCEDURE — 99223 1ST HOSP IP/OBS HIGH 75: CPT | Performed by: INTERNAL MEDICINE

## 2018-10-30 PROCEDURE — 99285 EMERGENCY DEPT VISIT HI MDM: CPT

## 2018-10-30 PROCEDURE — 80053 COMPREHEN METABOLIC PANEL: CPT

## 2018-10-30 PROCEDURE — 304561 HCHG STAT O2

## 2018-10-30 PROCEDURE — 85730 THROMBOPLASTIN TIME PARTIAL: CPT

## 2018-10-30 RX ORDER — ASPIRIN 81 MG/1
324 TABLET, CHEWABLE ORAL ONCE
Status: COMPLETED | OUTPATIENT
Start: 2018-10-31 | End: 2018-10-30

## 2018-10-30 RX ORDER — NITROGLYCERIN 0.4 MG/1
0.4 TABLET SUBLINGUAL
Status: DISCONTINUED | OUTPATIENT
Start: 2018-10-30 | End: 2018-10-31

## 2018-10-30 RX ORDER — ONDANSETRON 2 MG/ML
4 INJECTION INTRAMUSCULAR; INTRAVENOUS ONCE
Status: COMPLETED | OUTPATIENT
Start: 2018-10-31 | End: 2018-10-31

## 2018-10-30 RX ORDER — MORPHINE SULFATE 4 MG/ML
4 INJECTION, SOLUTION INTRAMUSCULAR; INTRAVENOUS ONCE
Status: COMPLETED | OUTPATIENT
Start: 2018-10-31 | End: 2018-10-31

## 2018-10-30 RX ORDER — HEPARIN SODIUM 1000 [USP'U]/ML
3900 INJECTION, SOLUTION INTRAVENOUS; SUBCUTANEOUS ONCE
Status: DISCONTINUED | OUTPATIENT
Start: 2018-10-31 | End: 2018-10-31

## 2018-10-30 RX ADMIN — ASPIRIN 324 MG: 81 TABLET, CHEWABLE ORAL at 23:45

## 2018-10-30 RX ADMIN — NITROGLYCERIN 0.4 MG: 0.4 TABLET, ORALLY DISINTEGRATING SUBLINGUAL at 23:46

## 2018-10-30 ASSESSMENT — PAIN SCALES - GENERAL: PAINLEVEL_OUTOF10: 4

## 2018-10-30 ASSESSMENT — PAIN DESCRIPTION - DESCRIPTORS: DESCRIPTORS: DULL

## 2018-10-31 ENCOUNTER — APPOINTMENT (OUTPATIENT)
Dept: RADIOLOGY | Facility: MEDICAL CENTER | Age: 83
DRG: 064 | End: 2018-10-31
Attending: EMERGENCY MEDICINE
Payer: MEDICARE

## 2018-10-31 PROBLEM — I21.4 NSTEMI (NON-ST ELEVATED MYOCARDIAL INFARCTION) (HCC): Status: ACTIVE | Noted: 2018-10-31

## 2018-10-31 LAB
ALBUMIN SERPL BCP-MCNC: 3.7 G/DL (ref 3.2–4.9)
ALBUMIN/GLOB SERPL: 1.6 G/DL
ALP SERPL-CCNC: 69 U/L (ref 30–99)
ALT SERPL-CCNC: 10 U/L (ref 2–50)
ANION GAP SERPL CALC-SCNC: 12 MMOL/L (ref 0–11.9)
APTT PPP: 103.5 SEC (ref 24.7–36)
APTT PPP: 109.6 SEC (ref 24.7–36)
APTT PPP: 27.8 SEC (ref 24.7–36)
AST SERPL-CCNC: 20 U/L (ref 12–45)
BASOPHILS # BLD AUTO: 0.4 % (ref 0–1.8)
BASOPHILS # BLD: 0.07 K/UL (ref 0–0.12)
BILIRUB SERPL-MCNC: 0.3 MG/DL (ref 0.1–1.5)
BNP SERPL-MCNC: 454 PG/ML (ref 0–100)
BUN SERPL-MCNC: 17 MG/DL (ref 8–22)
CALCIUM SERPL-MCNC: 9.6 MG/DL (ref 8.5–10.5)
CHLORIDE SERPL-SCNC: 108 MMOL/L (ref 96–112)
CO2 SERPL-SCNC: 21 MMOL/L (ref 20–33)
CREAT SERPL-MCNC: 0.9 MG/DL (ref 0.5–1.4)
EOSINOPHIL # BLD AUTO: 0.1 K/UL (ref 0–0.51)
EOSINOPHIL NFR BLD: 0.6 % (ref 0–6.9)
ERYTHROCYTE [DISTWIDTH] IN BLOOD BY AUTOMATED COUNT: 51 FL (ref 35.9–50)
EST. AVERAGE GLUCOSE BLD GHB EST-MCNC: 123 MG/DL
GLOBULIN SER CALC-MCNC: 2.3 G/DL (ref 1.9–3.5)
GLUCOSE SERPL-MCNC: 145 MG/DL (ref 65–99)
HBA1C MFR BLD: 5.9 % (ref 0–5.6)
HCT VFR BLD AUTO: 26.5 % (ref 37–47)
HGB BLD-MCNC: 8.4 G/DL (ref 12–16)
IMM GRANULOCYTES # BLD AUTO: 0.07 K/UL (ref 0–0.11)
IMM GRANULOCYTES NFR BLD AUTO: 0.4 % (ref 0–0.9)
INR PPP: 0.96 (ref 0.87–1.13)
LIPASE SERPL-CCNC: 15 U/L (ref 11–82)
LYMPHOCYTES # BLD AUTO: 1.16 K/UL (ref 1–4.8)
LYMPHOCYTES NFR BLD: 7.1 % (ref 22–41)
MCH RBC QN AUTO: 28.2 PG (ref 27–33)
MCHC RBC AUTO-ENTMCNC: 31.7 G/DL (ref 33.6–35)
MCV RBC AUTO: 88.9 FL (ref 81.4–97.8)
MONOCYTES # BLD AUTO: 1.09 K/UL (ref 0–0.85)
MONOCYTES NFR BLD AUTO: 6.6 % (ref 0–13.4)
NEUTROPHILS # BLD AUTO: 13.91 K/UL (ref 2–7.15)
NEUTROPHILS NFR BLD: 84.9 % (ref 44–72)
NRBC # BLD AUTO: 0 K/UL
NRBC BLD-RTO: 0 /100 WBC
PLATELET # BLD AUTO: 384 K/UL (ref 164–446)
PMV BLD AUTO: 9.5 FL (ref 9–12.9)
POTASSIUM SERPL-SCNC: 4.1 MMOL/L (ref 3.6–5.5)
PROT SERPL-MCNC: 6 G/DL (ref 6–8.2)
PROTHROMBIN TIME: 12.9 SEC (ref 12–14.6)
RBC # BLD AUTO: 2.98 M/UL (ref 4.2–5.4)
SODIUM SERPL-SCNC: 141 MMOL/L (ref 135–145)
TROPONIN I SERPL-MCNC: 0.32 NG/ML (ref 0–0.04)
TROPONIN I SERPL-MCNC: 12.87 NG/ML (ref 0–0.04)
TROPONIN I SERPL-MCNC: 15.47 NG/ML (ref 0–0.04)
WBC # BLD AUTO: 16.4 K/UL (ref 4.8–10.8)

## 2018-10-31 PROCEDURE — 71275 CT ANGIOGRAPHY CHEST: CPT

## 2018-10-31 PROCEDURE — 770020 HCHG ROOM/CARE - TELE (206)

## 2018-10-31 PROCEDURE — A9270 NON-COVERED ITEM OR SERVICE: HCPCS | Performed by: HOSPITALIST

## 2018-10-31 PROCEDURE — 85730 THROMBOPLASTIN TIME PARTIAL: CPT

## 2018-10-31 PROCEDURE — 700102 HCHG RX REV CODE 250 W/ 637 OVERRIDE(OP): Performed by: HOSPITALIST

## 2018-10-31 PROCEDURE — 700111 HCHG RX REV CODE 636 W/ 250 OVERRIDE (IP): Performed by: INTERNAL MEDICINE

## 2018-10-31 PROCEDURE — 36415 COLL VENOUS BLD VENIPUNCTURE: CPT

## 2018-10-31 PROCEDURE — 83036 HEMOGLOBIN GLYCOSYLATED A1C: CPT

## 2018-10-31 PROCEDURE — 96366 THER/PROPH/DIAG IV INF ADDON: CPT

## 2018-10-31 PROCEDURE — 94760 N-INVAS EAR/PLS OXIMETRY 1: CPT

## 2018-10-31 PROCEDURE — 99223 1ST HOSP IP/OBS HIGH 75: CPT | Mod: AI | Performed by: HOSPITALIST

## 2018-10-31 PROCEDURE — A9270 NON-COVERED ITEM OR SERVICE: HCPCS | Performed by: EMERGENCY MEDICINE

## 2018-10-31 PROCEDURE — 700102 HCHG RX REV CODE 250 W/ 637 OVERRIDE(OP): Performed by: EMERGENCY MEDICINE

## 2018-10-31 PROCEDURE — 96365 THER/PROPH/DIAG IV INF INIT: CPT

## 2018-10-31 PROCEDURE — 85025 COMPLETE CBC W/AUTO DIFF WBC: CPT

## 2018-10-31 PROCEDURE — 700117 HCHG RX CONTRAST REV CODE 255: Performed by: EMERGENCY MEDICINE

## 2018-10-31 PROCEDURE — 700111 HCHG RX REV CODE 636 W/ 250 OVERRIDE (IP): Performed by: EMERGENCY MEDICINE

## 2018-10-31 PROCEDURE — 93005 ELECTROCARDIOGRAM TRACING: CPT | Performed by: FAMILY MEDICINE

## 2018-10-31 PROCEDURE — 93010 ELECTROCARDIOGRAM REPORT: CPT | Performed by: INTERNAL MEDICINE

## 2018-10-31 PROCEDURE — 700111 HCHG RX REV CODE 636 W/ 250 OVERRIDE (IP): Performed by: FAMILY MEDICINE

## 2018-10-31 PROCEDURE — 96375 TX/PRO/DX INJ NEW DRUG ADDON: CPT

## 2018-10-31 PROCEDURE — C9113 INJ PANTOPRAZOLE SODIUM, VIA: HCPCS | Performed by: FAMILY MEDICINE

## 2018-10-31 PROCEDURE — 84484 ASSAY OF TROPONIN QUANT: CPT

## 2018-10-31 RX ORDER — ASPIRIN 325 MG
325 TABLET ORAL DAILY
Status: DISCONTINUED | OUTPATIENT
Start: 2018-11-01 | End: 2018-11-01

## 2018-10-31 RX ORDER — M-VIT,TX,IRON,MINS/CALC/FOLIC 27MG-0.4MG
1 TABLET ORAL DAILY
COMMUNITY
End: 2018-12-26 | Stop reason: CLARIF

## 2018-10-31 RX ORDER — CLOPIDOGREL BISULFATE 75 MG/1
75 TABLET ORAL DAILY
COMMUNITY
End: 2018-12-26 | Stop reason: CLARIF

## 2018-10-31 RX ORDER — LATANOPROST 50 UG/ML
1 SOLUTION/ DROPS OPHTHALMIC
Status: DISCONTINUED | OUTPATIENT
Start: 2018-10-31 | End: 2018-11-04 | Stop reason: HOSPADM

## 2018-10-31 RX ORDER — ASPIRIN 300 MG/1
300 SUPPOSITORY RECTAL DAILY
Status: DISCONTINUED | OUTPATIENT
Start: 2018-11-01 | End: 2018-11-01

## 2018-10-31 RX ORDER — ASPIRIN 81 MG/1
81 TABLET, CHEWABLE ORAL DAILY
Status: DISCONTINUED | OUTPATIENT
Start: 2018-10-31 | End: 2018-10-31

## 2018-10-31 RX ORDER — MORPHINE SULFATE 4 MG/ML
2-4 INJECTION, SOLUTION INTRAMUSCULAR; INTRAVENOUS
Status: DISCONTINUED | OUTPATIENT
Start: 2018-10-31 | End: 2018-11-04 | Stop reason: HOSPADM

## 2018-10-31 RX ORDER — ACETAMINOPHEN 500 MG
1500 TABLET ORAL
COMMUNITY
End: 2018-12-26 | Stop reason: CLARIF

## 2018-10-31 RX ORDER — METOPROLOL TARTRATE 50 MG/1
50 TABLET, FILM COATED ORAL 2 TIMES DAILY
Status: ON HOLD | COMMUNITY
End: 2018-11-04

## 2018-10-31 RX ORDER — POLYETHYLENE GLYCOL 3350 17 G/17G
1 POWDER, FOR SOLUTION ORAL
Status: DISCONTINUED | OUTPATIENT
Start: 2018-10-31 | End: 2018-11-04 | Stop reason: HOSPADM

## 2018-10-31 RX ORDER — ATORVASTATIN CALCIUM 80 MG/1
80 TABLET, FILM COATED ORAL
Status: DISCONTINUED | OUTPATIENT
Start: 2018-10-31 | End: 2018-11-03

## 2018-10-31 RX ORDER — ACETAMINOPHEN 325 MG/1
650 TABLET ORAL EVERY 6 HOURS PRN
Status: DISCONTINUED | OUTPATIENT
Start: 2018-10-31 | End: 2018-11-04 | Stop reason: HOSPADM

## 2018-10-31 RX ORDER — AMLODIPINE BESYLATE 5 MG/1
5 TABLET ORAL
Status: DISCONTINUED | OUTPATIENT
Start: 2018-10-31 | End: 2018-11-02

## 2018-10-31 RX ORDER — PANTOPRAZOLE SODIUM 40 MG/10ML
40 INJECTION, POWDER, LYOPHILIZED, FOR SOLUTION INTRAVENOUS 2 TIMES DAILY
Status: DISCONTINUED | OUTPATIENT
Start: 2018-10-31 | End: 2018-11-01

## 2018-10-31 RX ORDER — ISOSORBIDE MONONITRATE 30 MG/1
30 TABLET, EXTENDED RELEASE ORAL DAILY
Status: DISCONTINUED | OUTPATIENT
Start: 2018-10-31 | End: 2018-11-02

## 2018-10-31 RX ORDER — HEPARIN SODIUM 1000 [USP'U]/ML
2600 INJECTION, SOLUTION INTRAVENOUS; SUBCUTANEOUS PRN
Status: DISCONTINUED | OUTPATIENT
Start: 2018-10-31 | End: 2018-11-01

## 2018-10-31 RX ORDER — ONDANSETRON 2 MG/ML
4 INJECTION INTRAMUSCULAR; INTRAVENOUS EVERY 4 HOURS PRN
Status: DISCONTINUED | OUTPATIENT
Start: 2018-10-31 | End: 2018-11-04 | Stop reason: HOSPADM

## 2018-10-31 RX ORDER — AMOXICILLIN 250 MG
2 CAPSULE ORAL 2 TIMES DAILY
Status: DISCONTINUED | OUTPATIENT
Start: 2018-10-31 | End: 2018-11-04 | Stop reason: HOSPADM

## 2018-10-31 RX ORDER — BENAZEPRIL HYDROCHLORIDE 20 MG/1
20 TABLET ORAL DAILY
Status: DISCONTINUED | OUTPATIENT
Start: 2018-10-31 | End: 2018-11-02

## 2018-10-31 RX ORDER — GABAPENTIN 300 MG/1
300 CAPSULE ORAL 2 TIMES DAILY
Status: DISCONTINUED | OUTPATIENT
Start: 2018-10-31 | End: 2018-11-04 | Stop reason: HOSPADM

## 2018-10-31 RX ORDER — HEPARIN SODIUM 1000 [USP'U]/ML
5000 INJECTION, SOLUTION INTRAVENOUS; SUBCUTANEOUS ONCE
Status: COMPLETED | OUTPATIENT
Start: 2018-10-31 | End: 2018-10-31

## 2018-10-31 RX ORDER — ONDANSETRON 4 MG/1
4 TABLET, ORALLY DISINTEGRATING ORAL EVERY 4 HOURS PRN
Status: DISCONTINUED | OUTPATIENT
Start: 2018-10-31 | End: 2018-11-04 | Stop reason: HOSPADM

## 2018-10-31 RX ORDER — CARVEDILOL 6.25 MG/1
6.25 TABLET ORAL 2 TIMES DAILY WITH MEALS
Status: DISCONTINUED | OUTPATIENT
Start: 2018-10-31 | End: 2018-11-02

## 2018-10-31 RX ORDER — NITROGLYCERIN 0.4 MG/1
0.4 TABLET SUBLINGUAL
Status: DISCONTINUED | OUTPATIENT
Start: 2018-10-31 | End: 2018-11-02

## 2018-10-31 RX ORDER — ASPIRIN 81 MG/1
324 TABLET, CHEWABLE ORAL DAILY
Status: DISCONTINUED | OUTPATIENT
Start: 2018-11-01 | End: 2018-11-01

## 2018-10-31 RX ORDER — BISACODYL 10 MG
10 SUPPOSITORY, RECTAL RECTAL
Status: DISCONTINUED | OUTPATIENT
Start: 2018-10-31 | End: 2018-11-04 | Stop reason: HOSPADM

## 2018-10-31 RX ADMIN — HEPARIN SODIUM 25000 UNITS: 5000 INJECTION, SOLUTION INTRAVENOUS at 03:31

## 2018-10-31 RX ADMIN — AMLODIPINE BESYLATE 5 MG: 5 TABLET ORAL at 06:50

## 2018-10-31 RX ADMIN — MORPHINE SULFATE 4 MG: 4 INJECTION INTRAVENOUS at 00:05

## 2018-10-31 RX ADMIN — ISOSORBIDE MONONITRATE 30 MG: 30 TABLET ORAL at 06:00

## 2018-10-31 RX ADMIN — GABAPENTIN 300 MG: 300 CAPSULE ORAL at 17:17

## 2018-10-31 RX ADMIN — IOHEXOL 60 ML: 350 INJECTION, SOLUTION INTRAVENOUS at 01:57

## 2018-10-31 RX ADMIN — ONDANSETRON 4 MG: 2 INJECTION INTRAMUSCULAR; INTRAVENOUS at 00:01

## 2018-10-31 RX ADMIN — NITROGLYCERIN 0.4 MG: 0.4 TABLET, ORALLY DISINTEGRATING SUBLINGUAL at 00:00

## 2018-10-31 RX ADMIN — HEPARIN SODIUM 5000 UNITS: 1000 INJECTION, SOLUTION INTRAVENOUS; SUBCUTANEOUS at 00:43

## 2018-10-31 RX ADMIN — ATORVASTATIN CALCIUM 80 MG: 80 TABLET, FILM COATED ORAL at 21:35

## 2018-10-31 RX ADMIN — CARVEDILOL 6.25 MG: 6.25 TABLET, FILM COATED ORAL at 06:49

## 2018-10-31 RX ADMIN — CARVEDILOL 6.25 MG: 6.25 TABLET, FILM COATED ORAL at 17:17

## 2018-10-31 RX ADMIN — LATANOPROST 1 DROP: 50 SOLUTION OPHTHALMIC at 21:35

## 2018-10-31 RX ADMIN — PANTOPRAZOLE SODIUM 40 MG: 40 INJECTION, POWDER, LYOPHILIZED, FOR SOLUTION INTRAVENOUS at 17:17

## 2018-10-31 RX ADMIN — ACETAMINOPHEN 650 MG: 325 TABLET, FILM COATED ORAL at 21:49

## 2018-10-31 RX ADMIN — GABAPENTIN 300 MG: 300 CAPSULE ORAL at 06:49

## 2018-10-31 ASSESSMENT — COGNITIVE AND FUNCTIONAL STATUS - GENERAL
MOBILITY SCORE: 21
SUGGESTED CMS G CODE MODIFIER MOBILITY: CJ
SUGGESTED CMS G CODE MODIFIER DAILY ACTIVITY: CJ
DAILY ACTIVITIY SCORE: 22
MOVING FROM LYING ON BACK TO SITTING ON SIDE OF FLAT BED: A LITTLE
DRESSING REGULAR LOWER BODY CLOTHING: A LITTLE
WALKING IN HOSPITAL ROOM: A LITTLE
TOILETING: A LITTLE
CLIMB 3 TO 5 STEPS WITH RAILING: A LITTLE

## 2018-10-31 ASSESSMENT — LIFESTYLE VARIABLES
PACK_YEARS: 27
PACK_YEARS: 27
EVER_SMOKED: YES
EVER_SMOKED: YES

## 2018-10-31 ASSESSMENT — PATIENT HEALTH QUESTIONNAIRE - PHQ9
1. LITTLE INTEREST OR PLEASURE IN DOING THINGS: NOT AT ALL
SUM OF ALL RESPONSES TO PHQ9 QUESTIONS 1 AND 2: 0
2. FEELING DOWN, DEPRESSED, IRRITABLE, OR HOPELESS: NOT AT ALL

## 2018-10-31 ASSESSMENT — ENCOUNTER SYMPTOMS
EYES NEGATIVE: 1
PSYCHIATRIC NEGATIVE: 1
RESPIRATORY NEGATIVE: 1
NEUROLOGICAL NEGATIVE: 1
CONSTITUTIONAL NEGATIVE: 1
MUSCULOSKELETAL NEGATIVE: 1
GASTROINTESTINAL NEGATIVE: 1

## 2018-10-31 ASSESSMENT — PAIN SCALES - GENERAL
PAINLEVEL_OUTOF10: 4
PAINLEVEL_OUTOF10: 6
PAINLEVEL_OUTOF10: 0
PAINLEVEL_OUTOF10: 6
PAINLEVEL_OUTOF10: 4

## 2018-10-31 ASSESSMENT — COPD QUESTIONNAIRES
COPD SCREENING SCORE: 4
DURING THE PAST 4 WEEKS HOW MUCH DID YOU FEEL SHORT OF BREATH: NONE/LITTLE OF THE TIME
HAVE YOU SMOKED AT LEAST 100 CIGARETTES IN YOUR ENTIRE LIFE: YES
DO YOU EVER COUGH UP ANY MUCUS OR PHLEGM?: NO/ONLY WITH OCCASIONAL COLDS OR INFECTIONS

## 2018-10-31 NOTE — ED PROVIDER NOTES
ED Provider Note    Scribed for Brandon Dawson M.D. by Brandon Dawson. 10/30/2018,  11:37 PM.    CHIEF COMPLAINT  Chief Complaint   Patient presents with   • Chest Pain     Dull substernal chest pain with radation to the RUE, onset two hours ago. Hx. CAD with stents.  Currently 4/10 pain.       HPI  Brooke Diana is a 84 y.o. female who presents to the Emergency Department because of 3 hours of dull retrosternal chest pain with radiation to her right upper extremity.  She reports a history of coronary artery disease with at least one stent.  She says that pain started at rest.  She rates it as 4 out of 10, but appears very uncomfortable.  She says that within the past few weeks, she was taken off of her Plavix because of a GI bleed.    85 y/o female presents to ED via EMS with complaint of dull, substernal, chest pain with radiation down her RUE, onset ~2 hours prior to arrival tonight. She states pain is similar to prior MI.  The patient does have a hx of CAD with stent placement.  Fentanyl 100mcg IV and NTG SL x1 en route with some relief, however still describes 4/10 CP with mild associated SOB and nausea. She was noted to be 84% RA on arrival to ED and also to have depression in her lateral EKG leads. Additionally, patient is two weeks s/p discharge for GI bleed admission.      REVIEW OF SYSTEMS  See HPI for further details. All other systems are negative.     PAST MEDICAL HISTORY   has a past medical history of Acute myocardial infarction, true posterior wall infarction, episode of care unspecified; Anxiety; Bowel habit changes; Breath shortness (2017); CAD (coronary artery disease); Cholesterol blood decreased; Dental disorder; Dizziness; GI bleed; Glaucoma; Heart burn; Hyperlipidemia; Hypertension; Myocardial infarct (HCC) (6/10/10); Peripheral vascular disease (HCC); Pulmonary disease; TIA (transient ischemic attack); Unspecified hemorrhagic conditions; Unspecified urinary incontinence; and Urinary  "bladder disorder.    SOCIAL HISTORY  Social History     Social History Main Topics   • Smoking status: Former Smoker     Packs/day: 0.25     Years: 45.00     Types: Cigarettes     Quit date: 2/1/2016   • Smokeless tobacco: Never Used   • Alcohol use 0.0 - 0.6 oz/week      Comment: 1 drink per month   • Drug use: No   • Sexual activity: Not on file     History   Drug Use No       SURGICAL HISTORY   has a past surgical history that includes rectus repair (7/13/2012); recovery (11/26/2012); recovery (4/9/2013); femoral endarterectomy (10/4/2013); angioplasty balloon (10/4/2013); hysterectomy, total abdominal; other cardiac surgery; cholecystectomy; other (12/10/10); other (6/10/10); other (2005); recovery (7/24/2014); cardiac cath (6/2010); cardiac cath (7/24/14); stent placement; gastroscopy with biopsy (2/13/2016); carotid endarterectomy (Right, 9/3/2018); gastroscopy-endo (N/A, 10/22/2018); and colonoscopy - endo (N/A, 10/23/2018).    CURRENT MEDICATIONS  Home Medications    **Home medications have not yet been reviewed for this encounter**         ALLERGIES  Allergies   Allergen Reactions   • Penicillins Rash and Vomiting     Rxn = unknown  Pt tolerates cephalosporins   • Hydrocodone Vomiting and Nausea     Rxn = unknown   • Tape Rash     RASH       PHYSICAL EXAM  VITAL SIGNS: /88   Pulse (!) 104   Temp 36.5 °C (97.7 °F)   Resp 18   Ht 1.753 m (5' 9\")   Wt 61.4 kg (135 lb 5.8 oz)   SpO2 94%   BMI 19.99 kg/m²   Pulse ox interpretation: I interpret this pulse ox as normal.  Constitutional: Alert in moderate distress.  HENT: No signs of trauma, Bilateral external ears normal, Nose normal.   Eyes: Conjunctiva normal, Non-icteric.   Neck: Normal range of motion, Supple, No stridor.   Lymphatic: No lymphadenopathy noted.   Cardiovascular: Regular slightly tachycardic rate and rhythm, no murmurs.   Thorax & Lungs: Normal breath sounds, No respiratory distress, No wheezing, No chest tenderness.   Abdomen: " Bowel sounds normal, Soft, No tenderness, No masses, No pulsatile masses. No peritoneal signs.  Skin: Warm, Dry, No erythema, No rash.   Extremities: Intact distal pulses, No edema, No cyanosis.  Musculoskeletal: Good range of motion in all major joints. No or major deformities noted.   Neurologic: Alert , Normal motor function, Normal sensory function, No focal deficits noted.   Psychiatric: Affect anxious, Judgment normal, Mood normal.     DIAGNOSTIC STUDIES / PROCEDURES    EKG 11:28 PM:  Interpreted by me    Rhythm: Sinus tachycardia  Rate: 115  Axis: normal  Intervals: normal  Ectopy: none  Conduction: normal  ST Segments: deep new depressions in V4-6  T Waves: no acute change  Q Waves: none  Old EKG from 10/22/18 shows new ST depressions.    EKG 11:47 PM:   Interpreted by me    Rhythm:  Sinus tachycardia  Rate: 102  Axis: normal  Intervals: normal  Ectopy: none  Conduction: normal  ST Segments: Persistent but slightly improved ST depressions in V4 through V6  T Waves: no acute change  Q Waves: none  Old EKG from 11:28 PM shows no significant changes.    LABS  Labs Reviewed   CBC WITH DIFFERENTIAL - Abnormal; Notable for the following:        Result Value    WBC 16.0 (*)     RBC 3.08 (*)     Hemoglobin 8.5 (*)     Hematocrit 28.1 (*)     MCHC 30.2 (*)     RDW 51.7 (*)     Platelet Count 452 (*)     Neutrophils-Polys 79.20 (*)     Lymphocytes 10.30 (*)     Neutrophils (Absolute) 12.69 (*)     Monos (Absolute) 1.45 (*)     All other components within normal limits    Narrative:     Indicate which anticoagulants the patient is on:->UNKNOWN   COMP METABOLIC PANEL - Abnormal; Notable for the following:     Anion Gap 12.0 (*)     Glucose 145 (*)     All other components within normal limits    Narrative:     Indicate which anticoagulants the patient is on:->UNKNOWN   BTYPE NATRIURETIC PEPTIDE - Abnormal; Notable for the following:     B Natriuretic Peptide 454 (*)     All other components within normal limits     Narrative:     Indicate which anticoagulants the patient is on:->UNKNOWN   TROPONIN - Abnormal; Notable for the following:     Troponin I 0.32 (*)     All other components within normal limits    Narrative:     Indicate which anticoagulants the patient is on:->UNKNOWN   PROTHROMBIN TIME    Narrative:     Indicate which anticoagulants the patient is on:->UNKNOWN   APTT    Narrative:     Indicate which anticoagulants the patient is on:->UNKNOWN   LIPASE    Narrative:     Indicate which anticoagulants the patient is on:->UNKNOWN   ESTIMATED GFR    Narrative:     Indicate which anticoagulants the patient is on:->UNKNOWN   TROPONIN     All labs reviewed by me.    RADIOLOGY  CT-CTA CHEST PULMONARY ARTERY W/ RECONS   Final Result         1.  No pulmonary embolus appreciated.   2.  Small bilateral pleural effusions   3.  Hazy interstitial bilateral lung base opacities suggests atelectasis, component of infiltrate not definitively excluded.   4.  Atherosclerosis and atherosclerotic coronary artery disease.      DX-CHEST-PORTABLE (1 VIEW)   Final Result         1.  Hazy interstitial pulmonary infiltrates or edema, new since prior.   2.  Atherosclerosis        The radiologist's interpretation of all radiological studies have been reviewed by me.    COURSE & MEDICAL DECISION MAKING  Nursing notes, VS, PMSFHx reviewed in chart.     11:37 PM Patient seen and examined at bedside immediately after noting ischemic appearing changes compared to her EKG from 8 days ago.. Differential diagnosis includes but is not limited to acute coronary syndrome, unstable angina, dissection, reflux, in-stent restenosis, and the pulmonary embolism is also a possibility, given the patient's current hypoxia and her history as a significant vasculopath, recently taken off her anticoagulation. Ordered for chest pain protocol labs and imaging and cardiac monitoring to evaluate. Patient will be treated with aspirin, morphine, zofran, heparin, and  nitroglycerin for her symptoms.  I think a CT pulmonary angiogram to evaluate for pulmonary embolism and other thoracic vascular causes of her pain will be helpful.    11:42 PM I've paged cardiology due to high level of concern for ongoing ischemic chest pain.    11:52 PM I have spoken with Dr. Ashley, on-call for cardiology, who is reviewing the patient's history, recommends conservative management for the moment, but will also review the patient's EKG changes with Dr. San, the on-call interventional cardiologist.    12:32 AM this patient's troponin is elevated at 0.32.  Her chest x-ray shows hazy interstitial pulmonary infiltrates versus edema, which is new since her most recent x-ray.  I think this is unlikely to represent infection, given the ischemic sounding pain she is presenting with, her elevated BNP, and her borderline hypoxia. She was afebrile.  The results of her CT is still pending.  She will certainly need to be admitted.  I spoken with Dr. Ashley, cardiology, at the bedside, and he agrees with the management that has been put in place.    1:00 AM The patient reports her pain has resolved.    2:25 AM The patient has returned from CT, and her results are pending.    2:45 AM the patient's CT scan does not show any evidence of pulmonary embolism or aortic pathology.  It does suggest a component of heart failure, which is consistent with her laboratory tests.  The patient continues to rest comfortably.  As above cardiology has seen her and recommends medical management for now.  I have paged the hospitalist for admission.    2:51 AM Dr. Gonzalez agrees to admit.      DISPOSITION:  Patient will be admitted to Dr. Gonzalez in guarded condition.      FINAL IMPRESSION  1. NSTEMI (non-ST elevated myocardial infarction) (HCC)    2. Acute congestive heart failure, unspecified heart failure type (HCC)      Critical Care: The total critical care time on this patient is 45 minutes, resuscitating patient, speaking with  admitting physician and consulting cardiologist, reevaluating the patient, and deciphering test results. This 45 minutes is exclusive of separately billable procedures.

## 2018-10-31 NOTE — ED TRIAGE NOTES
"Chief Complaint   Patient presents with   • Chest Pain     Dull substernal chest pain with radation to the RUE, onset two hours ago. Hx. CAD with stents.  Currently 4/10 pain.     /56   Pulse (!) 104   Temp 36.5 °C (97.7 °F)   Resp (!) 22   Ht 1.753 m (5' 9\")   Wt 65 kg (143 lb 4.8 oz)   SpO2 93%   BMI 21.16 kg/m²     83 y/o female presents to ED via EMS with complaint of dull, substernal, chest pain with radiation down her RUE, onset ~2 hours prior to arrival tonight. She states pain is similar to prior MI.  The patient does have a hx of CAD with stent placement.  Fentanyl 100mcg IV and NTG SL x1 en route with some relief, however still describes 4/10 CP with mild associated SOB and nausea. She was noted to be 84% RA on arrival to ED and also to have depression in her lateral EKG leads. Additionally, patient is two weeks s/p discharge for GI bleed admission.    "

## 2018-10-31 NOTE — PROGRESS NOTES
She is seen at the  Bedside and  Chart is reviewd,she was admitted early this morning by the admitting physician.will get cbc now.

## 2018-10-31 NOTE — H&P
Hospital Medicine History & Physical Note    Date of Service  10/31/2018    Primary Care Physician  Pcp Not In Computer    Consultants  Cardiology Dr. Ashley    Code Status  DNAR/DNI    Chief Complaint  Chest pain    History of Presenting Illness  84 y.o. female with history of coronary artery disease with prior percutaneous coronary intervention, essential hypertension which is controlled, and dyslipidemia on statin therapy, was in her usual state of health until a few days prior to admission, when she was discharged for treatment for gastrointestinal hemorrhage.  She reports initially she was feeling well, however on the day prior to admission, she began to have chest pressure.  This is associated with right shoulder pain.  This was oddly similar to her prior myocardial infarction, and she attempted to rest in order to relieve the pain.  She was able to sleep, however she awoke with worsening of the pain, associated with shortness of breath and anxiety.  She subsequently presents to the emergency department for further evaluation.  At this point there are no alleviating factors.      Review of Systems  Review of Systems   Constitutional: Negative.    HENT: Negative.    Eyes: Negative.    Respiratory: Negative.    Cardiovascular: Positive for chest pain.   Gastrointestinal: Negative.    Genitourinary: Negative.    Musculoskeletal: Negative.    Skin: Negative.    Neurological: Negative.    Endo/Heme/Allergies: Negative.    Psychiatric/Behavioral: Negative.        Past Medical History   has a past medical history of Acute myocardial infarction, true posterior wall infarction, episode of care unspecified; Anxiety; Bowel habit changes; Breath shortness (2017); CAD (coronary artery disease); Cholesterol blood decreased; Dental disorder; Dizziness; GI bleed; Glaucoma; Heart burn; Hyperlipidemia; Hypertension; Myocardial infarct (HCC) (6/10/10); Peripheral vascular disease (HCC); Pulmonary disease; TIA (transient ischemic  attack); Unspecified hemorrhagic conditions; Unspecified urinary incontinence; and Urinary bladder disorder.    Surgical History   has a past surgical history that includes rectus repair (7/13/2012); recovery (11/26/2012); recovery (4/9/2013); femoral endarterectomy (10/4/2013); angioplasty balloon (10/4/2013); hysterectomy, total abdominal; other cardiac surgery; cholecystectomy; other (12/10/10); other (6/10/10); other (2005); recovery (7/24/2014); cardiac cath (6/2010); cardiac cath (7/24/14); stent placement; gastroscopy with biopsy (2/13/2016); carotid endarterectomy (Right, 9/3/2018); gastroscopy-endo (N/A, 10/22/2018); and colonoscopy - endo (N/A, 10/23/2018).     Family History  family history includes Heart Disease (age of onset: 65) in her mother; Other in her son; Stroke (age of onset: 68) in her mother.     Social History   reports that she quit smoking about 2 years ago. Her smoking use included Cigarettes. She has a 11.25 pack-year smoking history. She has never used smokeless tobacco. She reports that she drinks alcohol. She reports that she does not use drugs.    Allergies  Allergies   Allergen Reactions   • Penicillins Rash and Vomiting     Rxn = unknown  Pt tolerates cephalosporins   • Hydrocodone Vomiting and Nausea     Rxn = unknown   • Tape Rash     RASH       Medications  Prior to Admission Medications   Prescriptions Last Dose Informant Patient Reported? Taking?   Cholecalciferol (VITAMIN D) 2000 UNIT Tab Taking at AM Patient's Home Pharmacy Yes No   Sig: Take 2,000 Units by mouth every day.   amLODIPine (NORVASC) 5 MG Tab Taking at AM Patient's Home Pharmacy No No   Sig: TAKE 1 TABLET DAILY   aspirin (ASA) 81 MG Chew Tab chewable tablet Taking at AM  No No   Sig: Take 1 Tab by mouth every day.   atorvastatin (LIPITOR) 80 MG tablet Taking at PM Patient's Home Pharmacy No No   Sig: Take 1 Tab by mouth every bedtime.   benazepril (LOTENSIN) 20 MG Tab Taking at AM Patient's Home Pharmacy No No    Sig: Take 1 Tab by mouth every day.   bimatoprost (LUMIGAN) 0.01 % Solution Taking at UNK Patient's Home Pharmacy Yes No   Sig: Place 1 Drop in both eyes every bedtime. 1 gtts both eyes   gabapentin (NEURONTIN) 300 MG Cap Taking at AM Patient's Home Pharmacy Yes No   Sig: Take 300 mg by mouth 2 Times a Day.   isosorbide mononitrate SR (IMDUR) 30 MG TABLET SR 24 HR Taking at AM Patient's Home Pharmacy Yes No   Sig: Take 1 Tab by mouth every day.      Facility-Administered Medications: None       Physical Exam  Temp:  [36.5 °C (97.7 °F)] 36.5 °C (97.7 °F)  Pulse:  [104] 104  Resp:  [16-37] 18  BP: (145-151)/(56-88) 151/88    Physical Exam   Constitutional: She is oriented to person, place, and time. She appears well-developed and well-nourished. No distress.   HENT:   Head: Normocephalic and atraumatic.   Eyes: Pupils are equal, round, and reactive to light. Conjunctivae are normal.   Neck: Normal range of motion. Neck supple. No tracheal deviation present. No thyromegaly present.   Cardiovascular: Normal rate, regular rhythm and normal heart sounds.  Exam reveals no gallop and no friction rub.    No murmur heard.  Pulmonary/Chest: Effort normal and breath sounds normal. No respiratory distress. She has no wheezes. She has no rales.   Abdominal: Soft. Bowel sounds are normal. She exhibits no distension. There is no tenderness. There is no rebound.   Musculoskeletal: Normal range of motion. She exhibits no edema.   Lymphadenopathy:     She has no cervical adenopathy.   Neurological: She is alert and oriented to person, place, and time. No cranial nerve deficit.   Skin: Skin is warm and dry. She is not diaphoretic.   Psychiatric: She has a normal mood and affect.   Nursing note and vitals reviewed.      Laboratory:  Recent Labs      10/30/18   2325   WBC  16.0*   RBC  3.08*   HEMOGLOBIN  8.5*   HEMATOCRIT  28.1*   MCV  91.2   MCH  27.6   MCHC  30.2*   RDW  51.7*   PLATELETCT  452*   MPV  9.5     Recent Labs       10/30/18   2325   SODIUM  141   POTASSIUM  4.1   CHLORIDE  108   CO2  21   GLUCOSE  145*   BUN  17   CREATININE  0.90   CALCIUM  9.6     Recent Labs      10/30/18   2325   ALTSGPT  10   ASTSGOT  20   ALKPHOSPHAT  69   TBILIRUBIN  0.3   LIPASE  15   GLUCOSE  145*     Recent Labs      10/30/18   2325   APTT  27.8   INR  0.96     Recent Labs      10/30/18   2325   BNPBTYPENAT  454*         Recent Labs      10/30/18   2325   TROPONINI  0.32*       Urinalysis:    No results found     Imaging:  CT-CTA CHEST PULMONARY ARTERY W/ RECONS   Final Result         1.  No pulmonary embolus appreciated.   2.  Small bilateral pleural effusions   3.  Hazy interstitial bilateral lung base opacities suggests atelectasis, component of infiltrate not definitively excluded.   4.  Atherosclerosis and atherosclerotic coronary artery disease.      DX-CHEST-PORTABLE (1 VIEW)   Final Result         1.  Hazy interstitial pulmonary infiltrates or edema, new since prior.   2.  Atherosclerosis            Assessment/Plan:  I anticipate this patient will require at least two midnights for appropriate medical management, necessitating inpatient admission.    * NSTEMI (non-ST elevated myocardial infarction) ([facility])   Assessment & Plan    Admit, trend troponin, heparin infusion as per cardiology.        Normocytic anemia- (present on admission)   Assessment & Plan    Likely of chronic disease, did have episodes of gastrointestinal hemorrhage as well.  Monitor. Transfuse if needed for hemoglobin less than 7 gm/dL          CKD (chronic kidney disease) stage 3, GFR 30-59 ml/min ([facility])- (present on admission)   Assessment & Plan    Stable.  Avoid nephrotoxins.        CAD (coronary artery disease)- (present on admission)   Assessment & Plan    With prior myocardial infarction.  On medication regimen.  Continue.  Monitor.        Hypertension- (present on admission)   Assessment & Plan    Controlled with current medication regimen.  Monitor.         Hyperlipidemia- (present on admission)   Assessment & Plan    On statin therapy.  Continue.            VTE prophylaxis: SCD, heparin infusion

## 2018-10-31 NOTE — CARE PLAN
Problem: Safety  Goal: Will remain free from falls    Intervention: Implement fall precautions   10/31/18 6752   OTHER   Environmental Precautions Treaded Slipper Socks on Patient;Personal Belongings, Wastebasket, Call Bell etc. in Easy Reach;Transferred to Stronger Side;Communication Sign for Patients & Families;Bed in Low Position;Report Given to Other Health Care Providers Regarding Fall Risk;Mobility Assessed & Appropriate Sign Placed   Bedrails Bedrails Closest to Bathroom Down         Problem: Knowledge Deficit  Goal: Knowledge of disease process/condition, treatment plan, diagnostic tests, and medications will improve    Intervention: Assess knowledge level of disease process/condition, treatment plan, diagnostic tests, and medications  Rn will provided education to patient regarding disease process, treatment plan, diagnostic tests and medications pertinent to the patient's condition. RN will answer questions that the patient has related to their condition.

## 2018-10-31 NOTE — PROGRESS NOTES
2 RN skin check    Patient has a skin tear present on her right lower leg, blanching redness present on her sacrum, and blanching red boggy heals present bilaterally.    Patient is refusing heal float boots at this time but does have her heels floated with a pillow.    Otherwise patient's skin is warm, dry, and intact.

## 2018-10-31 NOTE — PROGRESS NOTES
RN spoke with pharmacist regarding hep gtt being off protocol, RN then spoke with MD. Orders received to resume hep gtt according to protocol. aptt taken 10/31at 1350 will be considered first dose change, no change in gtt required. Will order aptt according to protocl.

## 2018-10-31 NOTE — ED NOTES
Med Rec Updated and Complete per PT at bedside and a phone call to Home Pharmacy  Allergies Reviewed  No PO ABX last 30 days    Pt states she was taking Plavix 75mg daily and Brilinta 90mg twice daily together on a regular basis up until a week ago.     Pt no longer taking Plavix and Brilinta.     Pt takes a daily low-dose Aspirin.

## 2018-10-31 NOTE — ED NOTES
Upon reviewing pt's chart, repeat trop was never run. Blood drawn and in lab now. Pt updated on NPO status and plan of care.

## 2018-10-31 NOTE — ASSESSMENT & PLAN NOTE
Cardiology input is noted  H/h are stable  On plavix  On asa  On lipitor  bp meds are dced in light of CVA and low BP

## 2018-10-31 NOTE — PROGRESS NOTES
Cardiology brief note    Patient seen and examined, chest pain-free and acute coronary syndrome.  Medical strategy (no invasive strategy) is most appropriate in the setting of her recent GI bleed.  She is chest pain-free medically managed with beta-blocker, calcium channel blocker, long-acting nitrate, ACE inhibitor (benazepril was given though not charted in the more is given).  She is on aspirin, atorvastatin, and a heparin drip, which should be continued empirically for 48 hours unless her hemoglobin drops.    Ajay Morgan MD  Cardiologist, [facility]

## 2018-10-31 NOTE — ED NOTES
Pt moved to room 60 for holding until admission. Pt sleeping comfortably w/ respirations even/unlabored and skin warm/dry. Heparin infusing, rate verified per MAR. VS stable w/ pt on 4L o2 via NC. Bed is locked and in lowest position; call light in reach. Will continue to monitor closely.

## 2018-10-31 NOTE — PROGRESS NOTES
Art from Lab called with critical result of pttt 103.5 at 1449. Critical lab result read back to Art.   Dr. King notified of critical lab result at 1450.  Critical lab result read back by Dr. King.

## 2018-10-31 NOTE — ED NOTES
Brooke Mendosa from Lab called with critical result of trop of 15.47  at   11:41. Critical lab result read back to Brooke Diana.   Dr. Wayne notified of critical lab result at 11:45.  Critical lab result read back by Dr. King.

## 2018-10-31 NOTE — PROGRESS NOTES
"Patient transported to floor from ed. Patient was able to ambulate to bed from hallway, gait assessed as steady but patient exhibits gen weakness. Patient A&Ox4. Plan of care discussed with patient. Patient continues to have complaints of \"crushing\" chest pain 4/10, although \"getting better\". Patient oriented to floor and surroundings. Patient currently on room air. VSS. Patient is not expressing any complaints of pain at this time. Tele box placed. Monitor room notified. 2 rn skin check performed. Patient educated to call for assistance before ambulating. Patient education regarding fall risk. Call light within reach. Fall precautions in place.      "

## 2018-10-31 NOTE — ED NOTES
When pt was being moved between rooms and nurses, it was lost in communication for ordering repeat aPTT after 6 hours of heparin infusion. Timing is now off for this lab draw. Heparin has been paused and aptt drawn.

## 2018-10-31 NOTE — ASSESSMENT & PLAN NOTE
As per MI  cta showed;1.  No pulmonary embolus appreciated.  2.  Small bilateral pleural effusions  3.  Hazy interstitial bilateral lung base opacities suggests atelectasis, component of infiltrate not definitively excluded.  4.  Atherosclerosis and atherosclerotic coronary artery disease.

## 2018-10-31 NOTE — CONSULTS
Reason for Consult:  Asked by Dr Cheek to see this patient with abnormal EKG  Patient's PCP: Pcp Not In Computer    CC:   Chief Complaint   Patient presents with   • Chest Pain     Dull substernal chest pain with radation to the RUE, onset two hours ago. Hx. CAD with stents.  Currently 4/10 pain.       HPI: This is a 84-year-old woman with a prior history of severe coronary artery disease with a known chronic total occlusion of the LAD and stenting to her circumflex in 2010 last angiogram was in 2014 with stable disease from prior last stress test was last year showing ischemia in this area of the LAD who presents with substernal chest pain moderate in nature after recent hospitalization for lower GI bleed status post clipping and blood transfusion in the setting of dual antiplatelet which was recently increased in the setting of stroke and prior carotid endarterectomy and September of this year.  Her chest pain is substernal associated with nausea and hypertension she also has radiation to the right shoulder and arm she is given but fentanyl and nitroglycerin by EMS and while evaluated in the emergency room she was given nitroglycerin with improvement in her pain but not complete resolution she believes this is similar to the pain she experienced when she had a MI back in 2010 [place]    Medications / Drug list prior to admission:  No current facility-administered medications on file prior to encounter.      Current Outpatient Prescriptions on File Prior to Encounter   Medication Sig Dispense Refill   • aspirin (ASA) 81 MG Chew Tab chewable tablet Take 1 Tab by mouth every day. 100 Tab 0   • bimatoprost (LUMIGAN) 0.01 % Solution Place 1 Drop in both eyes every bedtime. 1 gtts both eyes     • atorvastatin (LIPITOR) 80 MG tablet Take 1 Tab by mouth every bedtime. 30 Tab    • benazepril (LOTENSIN) 20 MG Tab Take 1 Tab by mouth every day. 30 Tab    • isosorbide mononitrate SR (IMDUR) 30 MG TABLET SR 24 HR Take 1  Tab by mouth every day. 30 Tab 11   • amLODIPine (NORVASC) 5 MG Tab TAKE 1 TABLET DAILY 90 Tab 2   • Cholecalciferol (VITAMIN D) 2000 UNIT Tab Take 2,000 Units by mouth every day.     • gabapentin (NEURONTIN) 300 MG Cap Take 300 mg by mouth 2 Times a Day.         Current list of administered Medications:    Current Facility-Administered Medications:   •  nitroglycerin (NITROSTAT) tablet 0.4 mg, 0.4 mg, Sublingual, Q5 MIN PRN, Brandon Dawson M.D., 0.4 mg at 10/30/18 5889    Current Outpatient Prescriptions:   •  aspirin (ASA) 81 MG Chew Tab chewable tablet, Take 1 Tab by mouth every day., Disp: 100 Tab, Rfl: 0  •  bimatoprost (LUMIGAN) 0.01 % Solution, Place 1 Drop in both eyes every bedtime. 1 gtts both eyes, Disp: , Rfl:   •  atorvastatin (LIPITOR) 80 MG tablet, Take 1 Tab by mouth every bedtime., Disp: 30 Tab, Rfl:   •  benazepril (LOTENSIN) 20 MG Tab, Take 1 Tab by mouth every day., Disp: 30 Tab, Rfl:   •  isosorbide mononitrate SR (IMDUR) 30 MG TABLET SR 24 HR, Take 1 Tab by mouth every day., Disp: 30 Tab, Rfl: 11  •  amLODIPine (NORVASC) 5 MG Tab, TAKE 1 TABLET DAILY, Disp: 90 Tab, Rfl: 2  •  Cholecalciferol (VITAMIN D) 2000 UNIT Tab, Take 2,000 Units by mouth every day., Disp: , Rfl:   •  gabapentin (NEURONTIN) 300 MG Cap, Take 300 mg by mouth 2 Times a Day., Disp: , Rfl:     Past Medical History:   Diagnosis Date   • Acute myocardial infarction, true posterior wall infarction, episode of care unspecified    • Anxiety    • Bowel habit changes    • Breath shortness 2017    uses oxygen at 2 liters/min; AT NIGHT ONLY   • CAD (coronary artery disease)     S/P stent placement   • Cholesterol blood decreased    • Dental disorder     Partial upper   • Dizziness    • GI bleed    • Glaucoma    • Heart burn    • Hyperlipidemia    • Hypertension    • Myocardial infarct (HCC) 6/10/10   • Peripheral vascular disease (HCC)    • Pulmonary disease     bronchitis   • TIA (transient ischemic attack)    • Unspecified  hemorrhagic conditions     pt takes plavix   • Unspecified urinary incontinence    • Urinary bladder disorder        Past Surgical History:   Procedure Laterality Date   • COLONOSCOPY - ENDO N/A 10/23/2018    Procedure: COLONOSCOPY - ENDO;  Surgeon: Anatoly Barragan M.D.;  Location: [facility];  Service: Gastroenterology   • GASTROSCOPY-ENDO N/A 10/22/2018    Procedure: GASTROSCOPY-ENDO;  Surgeon: Bakari Brown M.D.;  Location: ENDOSCOPY [facility];  Service: Gastroenterology   • CAROTID ENDARTERECTOMY Right 9/3/2018    Procedure: CAROTID ENDARTERECTOMY WITH NEUROMONITORING;  Surgeon: Naga Abad M.D.;  Location: SURGERY [facility];  Service: Vascular   • GASTROSCOPY WITH BIOPSY  2/13/2016    Procedure: GASTROSCOPY WITH BIOPSY;  Surgeon: Susan Miller M.D.;  Location: [facility];  Service:    • RECOVERY  7/24/2014    Performed by Cath-Recovery Surgery at SURGERY SAME DAY [facility]   • CARDIAC CATH  7/24/14    Diffuse disease, see report.  % with collterals.   • FEMORAL ENDARTERECTOMY  10/4/2013    Performed by Kacie Baker M.D. at SURGERY [facility]   • ANGIOPLASTY BALLOON  10/4/2013    Performed by Kacie Baker M.D. at [facility]   • RECOVERY  4/9/2013    Performed by Ir-Recovery Surgery at SURGERY SAME DAY [facility]   • RECOVERY  11/26/2012    Performed by Ir-Recovery Surgery at SURGERY SAME DAY [facility]   • RECTUS REPAIR  7/13/2012    Performed by SHEILA PLATT at SURGERY [facility]   • OTHER  12/10/10    stents in legs   • OTHER  6/10/10    cardiac stents   • CARDIAC CATH  6/2010    BMS to Om   • OTHER  2005    KNEE SURGERY   • CHOLECYSTECTOMY     • HYSTERECTOMY, TOTAL ABDOMINAL     • OTHER CARDIAC SURGERY      stent in heart   • STENT PLACEMENT      treated by Dr Baker, multiple surgeries to legs.       Family History   Problem Relation Age of Onset   • Stroke Mother 68        CVA   •  "Heart Disease Mother 65        valve surgery   • Other Son         wgt 465 lbs     Patient family history was personally reviewed, no pertinent family history to current presentation    Social History     Social History   • Marital status:      Spouse name: N/A   • Number of children: N/A   • Years of education: N/A     Occupational History   • Not on file.     Social History Main Topics   • Smoking status: Former Smoker     Packs/day: 0.25     Years: 45.00     Types: Cigarettes     Quit date: 2/1/2016   • Smokeless tobacco: Never Used   • Alcohol use 0.0 - 0.6 oz/week      Comment: 1 drink per month   • Drug use: No   • Sexual activity: Not on file     Other Topics Concern   • Not on file     Social History Narrative   • No narrative on file       ALLERGIES:  Allergies   Allergen Reactions   • Penicillins Rash and Vomiting     Rxn = unknown  Pt tolerates cephalosporins   • Hydrocodone Vomiting and Nausea     Rxn = unknown   • Tape Rash     RASH       Review of systems:  A complete review of symptoms was reviewed with patient. This is reviewed in H&P and PMH. ALL OTHERS reviewed and negative    Physical exam:  Patient Vitals for the past 24 hrs:   BP Temp Pulse Resp SpO2 Height Weight   10/30/18 2346 151/88 - - - - - -   10/30/18 2333 145/56 36.5 °C (97.7 °F) (!) 104 (!) 22 93 % - -   10/30/18 2330 - - - (!) 37 (!) 82 % - -   10/30/18 2329 - - - - - 1.753 m (5' 9\") 65 kg (143 lb 4.8 oz)       General: Mild distress appearing with chest discomfort improved on exam  EYES: no jaundice  HEENT: OP clear   Neck: No bruits No JVD.   CVS:  RRR. S1 + S2. No M/R/G  Resp: CTAB. No wheezing or crackles/rhonchi.  Abdomen: Soft, NT, ND,  Skin: Grossly nothing acute no obvious rashes  Neurological: Alert, Moves all extremities, no cranial nerve defects on limited exam  Extremities: Pulse 2+ in b/l LE. no edema. No cyanosis.       Data:  Laboratory studies personally reviewed by me:  Recent Results (from the past 24 hour(s)) "   EKG (NOW)    Collection Time: 10/30/18 11:28 PM   Result Value Ref Range    Report       [facility] Emergency Dept[facility]    Test Date:  2018-10-30  Pt Name:    LEIDA RENNER                Department: ER  MRN:        [id number]                      Room:       RD 12  Gender:     Female                       Technician: [id number]  :        [id number]                   Requested By:ER TRIAGE PROTOCOL  Order #:    [id number]                    Reading MD:    Measurements  Intervals                                Axis  Rate:       115                          P:          91  CO:         164                          QRS:        74  QRSD:       86                           T:          241  QT:         320  QTc:        443    Interpretive Statements  SINUS TACHYCARDIA  RIGHT ATRIAL ABNORMALITY  ANTERIOR INFARCT, AGE INDETERMINATE  REPOL ABNRM, PROBABLE ISCHEMIA, DIFFUSE LEADS  LATERAL LEADS ARE ALSO INVOLVED  Compared to ECG 10/22/2018 05:59:31  Atrial abnormality now present  Myocardial infarct finding now present  Possible ischemia now present  Sinus rhythm no longer  present         Imaging:  DX-CHEST-PORTABLE (1 VIEW)    (Results Pending)     Labs reviewed her hemoglobin is stable from prior improved after transfusion and her kidney function is stable troponin is mildly elevated at 0.32    Her cath report from  reviewed known chronic total occlusion of the LAD and stable stent to the circumflex with minimal disease in the right coronary artery    Echocardiogram within the month shows a preserved ejection fraction    PET scan shows moderate area of ischemia in the area of known chronic total occlusion    EKG : personally reviewed by me sinus rhythm with flat ST depressions in the lateral leads concerning for acute ischemia    All pertinent features of laboratory and imaging reviewed including primary images where applicable      Patient Active Problem List   Diagnosis   • Peripheral arterial disease  ([facility])   • PVD (peripheral vascular disease) ([facility])   • Anxiety   • Hyperlipidemia   • Hypertension   • Peripheral neuropathy   • CAD (coronary artery disease)   • Fall   • Tobacco abuse   • GI bleed   • Leukocytosis   • Stented coronary artery   • Nocturnal hypoxia   • Bilateral carotid bruits   • Right arm weakness   • Lightheadedness   • Clumsiness   • CVA (cerebral vascular accident), acute, right frontal deep white matter   • Iron deficiency anemia   • CKD (chronic kidney disease) stage 3, GFR 30-59 ml/min ([facility])   • Mild atherosclerosis of left carotid artery   • Cerebrovascular accident (CVA) due to stenosis of left middle cerebral artery ([facility])   • Syncope and collapse   • Anemia   • History of CVA (cerebrovascular accident)       Assessment / Plan:  Acute coronary syndrome non-STEMI her presentation is quite alarming and highly concerning with NSTEMI in the setting of known severe coronary artery disease.  We discussed given her severe peripheral vascular disease her recent lower GI bleed requiring a blood transfusion and overall her goals of care to be in DNR/DNI conservative therapy is likely the best approach here she has responded somewhat to nitroglycerin we will have to monitor we discussed the use of heparin IV which is certainly high risk given the recent GI bleed but likely important for coronary perfusion.  Depending on the results may need to explore an angiogram but this is certainly high risk and so hopefully she will be managed with medical therapy it does not seem that she is having ongoing lower GI bleeding now but certainly that needs to be monitored closely  If she is able to remain stable add back Plavix, and the use of Brilinta is unclear given her recent bleeding in the setting of Brilinta  Keep her n.p.o. just in case she needs urgent heart catheterization  Would use Nitropaste for now  Depending on course optimize antianginal therapy    Lower GI bleed with anemia  Continue to monitor  closely on heparin and aspirin    Future Appointments  Date Time Provider Department Center   11/19/2018 1:20 PM Severo Mancilla M.D. [facility] None     It is my pleasure to participate in the care of Ms. Diana.  Please do not hesitate to contact me with questions or concerns.    Danielito Ashley MD PhD [facility]  Cardiologist [facility]    10/30/2018    Her medical care is highly complex with a severe presentation and acute illness and review of old records and extensive discussion about the risks and benefits of conservative versus invasive evaluation

## 2018-10-31 NOTE — ED NOTES
Patient resting at this time.  Her pain is currently resolved.  Heparin bolus has been administered. Patient advised to call for help.  No needs at this time.

## 2018-10-31 NOTE — ASSESSMENT & PLAN NOTE
H/h are stable  No sign of bleed  Pt has a history of recent gi bleed,however no active bleed with H/H stable.  I d/w gi/dr Rhodes and he is ok with plavix and asa and stated if she bleeds then consult  prilosec  Dc protonix iv

## 2018-11-01 ENCOUNTER — APPOINTMENT (OUTPATIENT)
Dept: RADIOLOGY | Facility: MEDICAL CENTER | Age: 83
DRG: 064 | End: 2018-11-01
Attending: FAMILY MEDICINE
Payer: MEDICARE

## 2018-11-01 ENCOUNTER — APPOINTMENT (OUTPATIENT)
Dept: RADIOLOGY | Facility: MEDICAL CENTER | Age: 83
DRG: 064 | End: 2018-11-01
Attending: PSYCHIATRY & NEUROLOGY
Payer: MEDICARE

## 2018-11-01 PROBLEM — G93.40 ACUTE ENCEPHALOPATHY: Status: ACTIVE | Noted: 2018-11-01

## 2018-11-01 LAB
ANION GAP SERPL CALC-SCNC: 7 MMOL/L (ref 0–11.9)
ANISOCYTOSIS BLD QL SMEAR: NORMAL
APTT PPP: 112.3 SEC (ref 24.7–36)
APTT PPP: 147.4 SEC (ref 24.7–36)
APTT PPP: 36.7 SEC (ref 24.7–36)
BASOPHILS # BLD AUTO: 0.4 % (ref 0–1.8)
BASOPHILS # BLD AUTO: 0.9 % (ref 0–1.8)
BASOPHILS # BLD: 0.04 K/UL (ref 0–0.12)
BASOPHILS # BLD: 0.09 K/UL (ref 0–0.12)
BUN SERPL-MCNC: 20 MG/DL (ref 8–22)
CALCIUM SERPL-MCNC: 8.6 MG/DL (ref 8.5–10.5)
CHLORIDE SERPL-SCNC: 108 MMOL/L (ref 96–112)
CO2 SERPL-SCNC: 26 MMOL/L (ref 20–33)
CREAT SERPL-MCNC: 1.06 MG/DL (ref 0.5–1.4)
EKG IMPRESSION: NORMAL
EOSINOPHIL # BLD AUTO: 0.09 K/UL (ref 0–0.51)
EOSINOPHIL # BLD AUTO: 0.1 K/UL (ref 0–0.51)
EOSINOPHIL NFR BLD: 0.9 % (ref 0–6.9)
EOSINOPHIL NFR BLD: 0.9 % (ref 0–6.9)
ERYTHROCYTE [DISTWIDTH] IN BLOOD BY AUTOMATED COUNT: 51.2 FL (ref 35.9–50)
ERYTHROCYTE [DISTWIDTH] IN BLOOD BY AUTOMATED COUNT: 52.2 FL (ref 35.9–50)
GLUCOSE BLD-MCNC: 118 MG/DL (ref 65–99)
GLUCOSE SERPL-MCNC: 114 MG/DL (ref 65–99)
HCT VFR BLD AUTO: 23.3 % (ref 37–47)
HCT VFR BLD AUTO: 23.9 % (ref 37–47)
HGB BLD-MCNC: 7.2 G/DL (ref 12–16)
HGB BLD-MCNC: 7.3 G/DL (ref 12–16)
HYPOCHROMIA BLD QL SMEAR: NORMAL
IMM GRANULOCYTES # BLD AUTO: 0.04 K/UL (ref 0–0.11)
IMM GRANULOCYTES NFR BLD AUTO: 0.4 % (ref 0–0.9)
INR PPP: 1.08 (ref 0.87–1.13)
LACTATE BLD-SCNC: 0.8 MMOL/L (ref 0.5–2)
LYMPHOCYTES # BLD AUTO: 0.66 K/UL (ref 1–4.8)
LYMPHOCYTES # BLD AUTO: 1.39 K/UL (ref 1–4.8)
LYMPHOCYTES NFR BLD: 12.7 % (ref 22–41)
LYMPHOCYTES NFR BLD: 6.4 % (ref 22–41)
MANUAL DIFF BLD: ABNORMAL
MCH RBC QN AUTO: 27.7 PG (ref 27–33)
MCH RBC QN AUTO: 28 PG (ref 27–33)
MCHC RBC AUTO-ENTMCNC: 30.5 G/DL (ref 33.6–35)
MCHC RBC AUTO-ENTMCNC: 30.9 G/DL (ref 33.6–35)
MCV RBC AUTO: 90.5 FL (ref 81.4–97.8)
MCV RBC AUTO: 90.7 FL (ref 81.4–97.8)
MICROCYTES BLD QL SMEAR: NORMAL
MONOCYTES # BLD AUTO: 0.92 K/UL (ref 0–0.85)
MONOCYTES # BLD AUTO: 0.95 K/UL (ref 0–0.85)
MONOCYTES NFR BLD AUTO: 8.4 % (ref 0–13.4)
MONOCYTES NFR BLD AUTO: 9.2 % (ref 0–13.4)
NEUTROPHILS # BLD AUTO: 8.42 K/UL (ref 2–7.15)
NEUTROPHILS # BLD AUTO: 8.51 K/UL (ref 2–7.15)
NEUTROPHILS NFR BLD: 77.2 % (ref 44–72)
NEUTROPHILS NFR BLD: 82.6 % (ref 44–72)
NRBC # BLD AUTO: 0 K/UL
NRBC BLD-RTO: 0 /100 WBC
PLATELET # BLD AUTO: 305 K/UL (ref 164–446)
PLATELET # BLD AUTO: 315 K/UL (ref 164–446)
PLATELET BLD QL SMEAR: NORMAL
PMV BLD AUTO: 9.4 FL (ref 9–12.9)
PMV BLD AUTO: 9.6 FL (ref 9–12.9)
POLYCHROMASIA BLD QL SMEAR: NORMAL
POTASSIUM SERPL-SCNC: 3.9 MMOL/L (ref 3.6–5.5)
PROTHROMBIN TIME: 14.2 SEC (ref 12–14.6)
RBC # BLD AUTO: 2.57 M/UL (ref 4.2–5.4)
RBC # BLD AUTO: 2.64 M/UL (ref 4.2–5.4)
RBC BLD AUTO: PRESENT
SODIUM SERPL-SCNC: 141 MMOL/L (ref 135–145)
WBC # BLD AUTO: 10.3 K/UL (ref 4.8–10.8)
WBC # BLD AUTO: 10.9 K/UL (ref 4.8–10.8)

## 2018-11-01 PROCEDURE — 80048 BASIC METABOLIC PNL TOTAL CA: CPT

## 2018-11-01 PROCEDURE — 770020 HCHG ROOM/CARE - TELE (206)

## 2018-11-01 PROCEDURE — 70496 CT ANGIOGRAPHY HEAD: CPT

## 2018-11-01 PROCEDURE — A9270 NON-COVERED ITEM OR SERVICE: HCPCS | Performed by: HOSPITALIST

## 2018-11-01 PROCEDURE — 70498 CT ANGIOGRAPHY NECK: CPT

## 2018-11-01 PROCEDURE — 99232 SBSQ HOSP IP/OBS MODERATE 35: CPT | Performed by: INTERNAL MEDICINE

## 2018-11-01 PROCEDURE — 70553 MRI BRAIN STEM W/O & W/DYE: CPT

## 2018-11-01 PROCEDURE — 70450 CT HEAD/BRAIN W/O DYE: CPT

## 2018-11-01 PROCEDURE — 700111 HCHG RX REV CODE 636 W/ 250 OVERRIDE (IP): Performed by: FAMILY MEDICINE

## 2018-11-01 PROCEDURE — 85610 PROTHROMBIN TIME: CPT

## 2018-11-01 PROCEDURE — C9113 INJ PANTOPRAZOLE SODIUM, VIA: HCPCS | Performed by: FAMILY MEDICINE

## 2018-11-01 PROCEDURE — 82962 GLUCOSE BLOOD TEST: CPT

## 2018-11-01 PROCEDURE — 36415 COLL VENOUS BLD VENIPUNCTURE: CPT

## 2018-11-01 PROCEDURE — 85007 BL SMEAR W/DIFF WBC COUNT: CPT

## 2018-11-01 PROCEDURE — 85025 COMPLETE CBC W/AUTO DIFF WBC: CPT

## 2018-11-01 PROCEDURE — 700102 HCHG RX REV CODE 250 W/ 637 OVERRIDE(OP): Performed by: HOSPITALIST

## 2018-11-01 PROCEDURE — 93010 ELECTROCARDIOGRAM REPORT: CPT | Mod: 76 | Performed by: INTERNAL MEDICINE

## 2018-11-01 PROCEDURE — 83605 ASSAY OF LACTIC ACID: CPT

## 2018-11-01 PROCEDURE — 99233 SBSQ HOSP IP/OBS HIGH 50: CPT | Performed by: FAMILY MEDICINE

## 2018-11-01 PROCEDURE — 85730 THROMBOPLASTIN TIME PARTIAL: CPT

## 2018-11-01 PROCEDURE — 93005 ELECTROCARDIOGRAM TRACING: CPT | Performed by: FAMILY MEDICINE

## 2018-11-01 PROCEDURE — 85027 COMPLETE CBC AUTOMATED: CPT

## 2018-11-01 PROCEDURE — 700102 HCHG RX REV CODE 250 W/ 637 OVERRIDE(OP): Performed by: FAMILY MEDICINE

## 2018-11-01 PROCEDURE — 0042T CT-CEREBRAL PERFUSION ANALYSIS: CPT

## 2018-11-01 PROCEDURE — 70549 MR ANGIOGRAPH NECK W/O&W/DYE: CPT

## 2018-11-01 PROCEDURE — 700117 HCHG RX CONTRAST REV CODE 255: Performed by: FAMILY MEDICINE

## 2018-11-01 PROCEDURE — A9270 NON-COVERED ITEM OR SERVICE: HCPCS | Performed by: FAMILY MEDICINE

## 2018-11-01 PROCEDURE — 700111 HCHG RX REV CODE 636 W/ 250 OVERRIDE (IP): Performed by: INTERNAL MEDICINE

## 2018-11-01 PROCEDURE — A9585 GADOBUTROL INJECTION: HCPCS | Performed by: FAMILY MEDICINE

## 2018-11-01 RX ORDER — OMEPRAZOLE 20 MG/1
20 CAPSULE, DELAYED RELEASE ORAL 2 TIMES DAILY
Status: DISCONTINUED | OUTPATIENT
Start: 2018-11-01 | End: 2018-11-02

## 2018-11-01 RX ORDER — GADOBUTROL 604.72 MG/ML
7.5 INJECTION INTRAVENOUS ONCE
Status: COMPLETED | OUTPATIENT
Start: 2018-11-01 | End: 2018-11-01

## 2018-11-01 RX ADMIN — GABAPENTIN 300 MG: 300 CAPSULE ORAL at 18:00

## 2018-11-01 RX ADMIN — GABAPENTIN 300 MG: 300 CAPSULE ORAL at 05:55

## 2018-11-01 RX ADMIN — HEPARIN SODIUM 25000 UNITS: 5000 INJECTION, SOLUTION INTRAVENOUS at 05:45

## 2018-11-01 RX ADMIN — ISOSORBIDE MONONITRATE 30 MG: 30 TABLET ORAL at 05:55

## 2018-11-01 RX ADMIN — OMEPRAZOLE 20 MG: 20 CAPSULE, DELAYED RELEASE ORAL at 19:31

## 2018-11-01 RX ADMIN — CARVEDILOL 6.25 MG: 6.25 TABLET, FILM COATED ORAL at 17:30

## 2018-11-01 RX ADMIN — CARVEDILOL 6.25 MG: 6.25 TABLET, FILM COATED ORAL at 09:57

## 2018-11-01 RX ADMIN — ACETAMINOPHEN 650 MG: 325 TABLET, FILM COATED ORAL at 19:31

## 2018-11-01 RX ADMIN — IOHEXOL 120 ML: 350 INJECTION, SOLUTION INTRAVENOUS at 22:44

## 2018-11-01 RX ADMIN — AMLODIPINE BESYLATE 5 MG: 5 TABLET ORAL at 05:55

## 2018-11-01 RX ADMIN — PANTOPRAZOLE SODIUM 40 MG: 40 INJECTION, POWDER, LYOPHILIZED, FOR SOLUTION INTRAVENOUS at 05:58

## 2018-11-01 RX ADMIN — GADOBUTROL 7.5 ML: 604.72 INJECTION INTRAVENOUS at 18:10

## 2018-11-01 RX ADMIN — ASPIRIN 325 MG: 325 TABLET, COATED ORAL at 05:55

## 2018-11-01 RX ADMIN — BENAZEPRIL HYDROCHLORIDE 20 MG: 20 TABLET, COATED ORAL at 05:55

## 2018-11-01 ASSESSMENT — ENCOUNTER SYMPTOMS
HEMOPTYSIS: 0
APNEA: 0
PALPITATIONS: 0
SHORTNESS OF BREATH: 0
TINGLING: 0
CHEST TIGHTNESS: 0
CHILLS: 0
COUGH: 0
SPUTUM PRODUCTION: 0
DIZZINESS: 0
ORTHOPNEA: 0
BLURRED VISION: 0
DOUBLE VISION: 0
HEADACHES: 0
CHOKING: 0
VOMITING: 0
PHOTOPHOBIA: 0
FEVER: 0
STRIDOR: 0
WHEEZING: 0
DIAPHORESIS: 0
NAUSEA: 0
HEARTBURN: 0

## 2018-11-01 ASSESSMENT — PAIN SCALES - GENERAL
PAINLEVEL_OUTOF10: 0
PAINLEVEL_OUTOF10: 8

## 2018-11-01 NOTE — PROGRESS NOTES
Cardiology Follow Up Progress Note    Date of Service  11/1/2018    Attending Physician  MARIO Webb.*    Chief Complaint     Chest pain with radiation to the right upper extremity        HPI  Brooke Diana is a 84 y.o. female admitted 10/30/2018 with substernal chest pain in the setting of recent lower GI bleed status post clipping and blood transfusion in the setting of dual antiplatelet therapy        History of severe coronary artery disease with known chronic  of the LAD, PCI to circumflex in 2010, recent coronary angiography was in 2014 ( of the LAD, patent stent to circumflex, minimal disease in the RCA), MPI last year showing ischemia in the area of the LAD, CVA, hypertension, recent GI bleed on 10/22/18 requiring transfusion s/p EGD and colonoscopy showed AVM, was treated with clips and epinephrine (the patient was on aspirin and Brilinta at that time)    Interim Events    11/1/18, hemoglobin 7.3, no rhythm issues overnight, denies angina  Review of Systems  Review of Systems   Respiratory: Negative for apnea, cough, choking, chest tightness, shortness of breath, wheezing and stridor.    Cardiovascular: Negative for chest pain and leg swelling.       Vital signs in last 24 hours    Temp:  [36.3 °C (97.3 °F)-37.3 °C (99.2 °F)] 36.5 °C (97.7 °F)Pulse:  [67-88] 74  Resp:  [15-21] 18  BP: ()/(41-59) 102/47    Physical Exam  Physical Exam   Constitutional: She is oriented to person, place, and time.   HENT:   Head: Normocephalic.   Eyes: Conjunctivae are normal.   Neck: No JVD present. No thyromegaly present.   Cardiovascular: Normal rate and regular rhythm.    Pulses:       Carotid pulses are 2+ on the right side, and 2+ on the left side.       Radial pulses are 2+ on the right side, and 2+ on the left side.   Pulmonary/Chest: She has no wheezes.   Abdominal: Soft.   Musculoskeletal: She exhibits no edema.   Neurological: She is alert and oriented to person, place, and time.   Skin:  Skin is warm and dry.       Lab Review  Lab Results   Component Value Date/Time    WBC 10.9 (H) 11/01/2018 03:44 AM    RBC 2.64 (L) 11/01/2018 03:44 AM    HEMOGLOBIN 7.3 (L) 11/01/2018 03:44 AM    HEMATOCRIT 23.9 (L) 11/01/2018 03:44 AM    MCV 90.5 11/01/2018 03:44 AM    MCH 27.7 11/01/2018 03:44 AM    MCHC 30.5 (L) 11/01/2018 03:44 AM    MPV 9.6 11/01/2018 03:44 AM      Lab Results   Component Value Date/Time    SODIUM 141 11/01/2018 03:44 AM    POTASSIUM 3.9 11/01/2018 03:44 AM    CHLORIDE 108 11/01/2018 03:44 AM    CO2 26 11/01/2018 03:44 AM    GLUCOSE 114 (H) 11/01/2018 03:44 AM    BUN 20 11/01/2018 03:44 AM    CREATININE 1.06 11/01/2018 03:44 AM      Lab Results   Component Value Date/Time    ASTSGOT 20 10/30/2018 11:25 PM    ALTSGPT 10 10/30/2018 11:25 PM     Lab Results   Component Value Date/Time    CHOLSTRLTOT 91 (L) 10/13/2018 02:27 AM    LDL 28 10/13/2018 02:27 AM    HDL 42 10/13/2018 02:27 AM    TRIGLYCERIDE 107 10/13/2018 02:27 AM    TROPONINI 12.87 (H) 10/31/2018 03:10 PM       Recent Labs      10/30/18   2325   BNPBTYPENAT  454*           Assessment/Plan    Non-STEMI  Troponin peaked at 15.47  Plan for medical therapy  Recent history of GI bleed(colonoscopy and EGD showed AVM status post clipping and epinephrine injection on 10/23/18)  Currently on IV heparin (10/31/18)  On aspirin 81, Lipitor 80, carvedilol 6.25 mg BID, Lotensin 20 mg, isosorbide 30 mg  H/H = 7.3/23.9  Repeat CBC this afternoon, will order  Well preserved EF, LVEF 65% on 10/14/18  Angina free  No rhythm issues overnight  Will follow

## 2018-11-01 NOTE — PROGRESS NOTES
Received bedside report from RN Melody, pt care assumed, VSS, pt assessment complete. Pt AAOx4, no c/o chest pain or pressure at this time. No signs of acute distress noted at this time. POC discussed with pt and verbalizes no questions. Pt denies any additional needs at this time. Bed in lowest position, bed alarm off, pt educated on fall risk and verbalized understanding, call light within reach, hourly rounding initiated.

## 2018-11-01 NOTE — ASSESSMENT & PLAN NOTE
Appears to be 2nd to ischemic CVA  Better this morning  Head mri showed:1.  There are tiny areas of restricted diffusion in the left parietal lobe likely representing subacute infarcts.  2.  Moderate chronic microvascular ischemic disease.  3.  Age-appropriate cerebral atrophy.  4.  Irregular narrow caliber flow void of the left M1 segment likely representing significant atherosclerotic disease. The cerebral angiogram done on 10/16/2018 demonstrates focal occlusion at the distal M1 segment. If there are repeated strokes in the   distribution of left middle cerebral artery not responding to the dual antiplatelet treatment, intracranial stenting/angioplasty may be considered. Neuro interventional radiology consultation is recommended.  MRA showed:1.  There is ulcerated atherosclerotic disease in the left carotid bulb and proximal left internal carotid artery causing approximately 20-30% stenosis.  2.  Severe atherosclerotic disease in the left M1 segment.  3.  There is no significant stenosis at the origin of the right internal carotid artery.  On asa  On plavix  lipitor is dced  crestor is added  Neurology input is noted  I.R stated:intracranial stenting/angioplasty may be considered. Neuro interventional radiology consultation is recommended.     Interventional Radiology Recommendation:  Continue Plavix and aspirin. If stroke symptoms occur on dual antiplatelet therapy, will pursue IA PTA at left M1 stenosis. Pt to follow up in Neuro IR clinic after discharge. Contact information provided to pt and daughter.  Pt has a history of recent gi bleed,however no active bleed with H/H stable.  I d/w gi/dr Rhodes and he is ok with plavix and asa and stated if she bleeds then consult

## 2018-11-01 NOTE — PROGRESS NOTES
Spoke with Dr. Santo concerning Diet order given that no notes or orders provide rationale for continuing NPO status. Received diet order for Cardiac diet and to have client NPO at midnight.

## 2018-11-01 NOTE — PROGRESS NOTES
Pt is having difficulty finding words,and is having droop of the left eye.as per family members in the room these are new for her.her head ct is negative.i explained to the pt and the family members in the room that we have to do mri of the head to make sure there is no small bleed with this new neurological findings.she also has 4/5 right upper ext weakness which has been there with her old stroke.i explained to them that we will have to dc heparin drip and asa that are helping her  MI until MRI of the head result is back and is negative and they agree with the plan.i called cardiologist and he agreed to stop heparin drip and asa.i have placed the mri and mra stat and the pt's nurse is also aware of the plan since he was in the room.

## 2018-11-01 NOTE — DIETARY
"Nutrition services: Day 1 of admit.  Brooke Diana is a 84 y.o. female with admitting DX of NSTEMI.  Consult received for cardiac diet instruction.  Also noted with wt loss on nutrition admit screen.  RD visited pt at bedside to discuss cardiac education - pt was very receptive and accepted handouts.  Pt's questions were answered at this time.  Pt reported her UBW to be ~140 lbs.  Wt upon current admit is 148 lbs.  Pt had no further needs or questions at this time and continued to eat her lunch.    Assessment:  Height: 175.3 cm (5' 9\")  Weight: 67.4 kg (148 lb 9.4 oz)  Body mass index is 21.94 kg/m². - WNL.  Diet/Intake: Cardiac.      Evaluation:   1. Pt noted with normocytic anemia, CKD stage 3, CAD, HTN, and acute encephalopathy.  2. Pt's wt appears stable.  3. Labs: Glucose: 114, A1C: 5.9.  4. Meds: Protonix.  5. Last BM: 10/30.    Recommendations/Plan:  1. Encourage intake of meals.  2. Document intake of all meals as % taken in ADL's to provide interdisciplinary communication across all shifts.   3. Monitor weight.  4. Nutrition rep will continue to see patient for ongoing meal and snack preferences.     RD available as needed.            "

## 2018-11-01 NOTE — PROGRESS NOTES
Two RN skin verified. Scattered bruising on upper arms, dry skin on left sole, 2 skin tear on right forearm, no redness on buttock or any open wound at this time.    No

## 2018-11-01 NOTE — PROGRESS NOTES
Received bedside report from RN, pt care assumed, VSS, pt assessment complete. Pt AAOx3, c/o  pain at tis time. No signs of acute distress noted at this time. POC discussed with pt and verbalizes no questions. Pt denies any additional needs at this time. Bed in lowest position, bed alarm on pt educated on fall risk and verbalized understanding, call light within reach, hourly rounding initiated. In a sinus rhythm.

## 2018-11-01 NOTE — CARE PLAN
Problem: Safety  Goal: Will remain free from falls  Outcome: PROGRESSING AS EXPECTED  Patient educated to use call light for assistance. Fall precautions in place. Staff will assist with mobilization. Hourly rounding in place.    Problem: Knowledge Deficit  Goal: Knowledge of disease process/condition, treatment plan, diagnostic tests, and medications will improve  Outcome: PROGRESSING AS EXPECTED  Provided patient with a verbal discussion and printed handout related to recognizing the early warning signs of a Heart Attack / AMI. Discussed stable v unstable angina, discussed signs and symptoms that are unique to women and the elderly. Patient verbalized understanding and agreed to notify Staff if any symptoms should occur.     Problem: Pain Management  Goal: Pain level will decrease to patient's comfort goal  Outcome: PROGRESSING AS EXPECTED  Assessed client's using an EBP RN Assessment tool; 0 to 10 pain scale. Medicated client for pain per PRN orders. Patient resting well and has no complaint of pain at this time.

## 2018-11-01 NOTE — PROGRESS NOTES
[facility] Hospitalist Progress Note    Date of Service: 2018    Chief Complaint  84 y.o. female admitted 10/30/2018 with NSTEMI    Interval Problem Update  ANEMIA    Consultants/Specialty  CARDIOLOGY    Disposition  PENDING        Review of Systems   Constitutional: Negative for chills, diaphoresis and fever.   HENT: Negative for ear discharge, ear pain and tinnitus.    Eyes: Negative for blurred vision, double vision and photophobia.   Respiratory: Negative for hemoptysis, sputum production and shortness of breath.    Cardiovascular: Negative for chest pain, palpitations and orthopnea.   Gastrointestinal: Negative for heartburn, nausea and vomiting.   Genitourinary: Negative for dysuria, frequency and urgency.   Skin: Negative for itching and rash.   Neurological: Negative for dizziness, tingling and headaches.      Physical Exam  Laboratory/Imaging   Hemodynamics  Temp (24hrs), Av.8 °C (98.2 °F), Min:36.3 °C (97.3 °F), Max:37.3 °C (99.2 °F)   Temperature: 36.5 °C (97.7 °F)  Pulse  Av.8  Min: 66  Max: 104 Heart Rate (Monitored): 83  Blood Pressure : (!) 99/55 (Rn notified), NIBP: 145/50      Respiratory      Respiration: 18, Pulse Oximetry: 96 %        RUL Breath Sounds: Clear, RML Breath Sounds: Diminished, RLL Breath Sounds: Diminished, REYNALDO Breath Sounds: Clear, LLL Breath Sounds: Diminished    Fluids    Intake/Output Summary (Last 24 hours) at 18 1234  Last data filed at 18 0600   Gross per 24 hour   Intake           704.03 ml   Output              550 ml   Net           154.03 ml       Nutrition  Orders Placed This Encounter   Procedures   • Diet Order Cardiac     Standing Status:   Standing     Number of Occurrences:   1     Order Specific Question:   Diet:     Answer:   Cardiac [6]     Physical Exam   Constitutional: No distress.   HENT:   Head: Normocephalic and atraumatic.   Eyes: Pupils are equal, round, and reactive to light. Conjunctivae are normal.   Neck: Normal range of motion.  Neck supple.   Cardiovascular: Normal rate and regular rhythm.    Pulmonary/Chest: Effort normal and breath sounds normal.   Abdominal: Soft. Bowel sounds are normal.   Musculoskeletal: She exhibits no edema or tenderness.   Neurological: She is alert.   Skin: Skin is warm and dry. She is not diaphoretic.       Recent Labs      10/31/18   1350  11/01/18   0344  11/01/18   1201   WBC  16.4*  10.9*  10.3   RBC  2.98*  2.64*  2.57*   HEMOGLOBIN  8.4*  7.3*  7.2*   HEMATOCRIT  26.5*  23.9*  23.3*   MCV  88.9  90.5  90.7   MCH  28.2  27.7  28.0   MCHC  31.7*  30.5*  30.9*   RDW  51.0*  52.2*  51.2*   PLATELETCT  384  315  305   MPV  9.5  9.6  9.4     Recent Labs      10/30/18   2325  11/01/18   0344   SODIUM  141  141   POTASSIUM  4.1  3.9   CHLORIDE  108  108   CO2  21  26   GLUCOSE  145*  114*   BUN  17  20   CREATININE  0.90  1.06   CALCIUM  9.6  8.6     Recent Labs      10/30/18   2325  10/31/18   1350  10/31/18   2109  11/01/18   0344   APTT  27.8  103.5*  109.6*  147.4*   INR  0.96   --    --    --      Recent Labs      10/30/18   2325   BNPBTYPENAT  454*              Assessment/Plan     * NSTEMI (non-ST elevated myocardial infarction) ([facility])   Assessment & Plan    D/w cardiology/dr calle  Will continue with heparin drip  H/h have dropped and will get cbc now and if dropping further then  Heparin drip  Most likley has to be dced  On asa  On lipitor        Normocytic anemia- (present on admission)   Assessment & Plan    Will get cbc now  Will get fecal occult  No report of bleed from the staff          CKD (chronic kidney disease) stage 3, GFR 30-59 ml/min ([facility])- (present on admission)   Assessment & Plan    Stable.  Avoid nephrotoxins.        CAD (coronary artery disease)- (present on admission)   Assessment & Plan    As per MI  cta showed;1.  No pulmonary embolus appreciated.  2.  Small bilateral pleural effusions  3.  Hazy interstitial bilateral lung base opacities suggests atelectasis, component of infiltrate  not definitively excluded.  4.  Atherosclerosis and atherosclerotic coronary artery disease.        Hypertension- (present on admission)   Assessment & Plan    Controlled with current medication regimen.  Monitor.        Acute encephalopathy   Assessment & Plan    Possibly hospital delirium  However she is on heparin drip  Will get head ct no contrast stat         Hyperlipidemia- (present on admission)   Assessment & Plan    On statin therapy.  Continue.          Quality-Core Measures   Benavides catheter::  No Benavides  DVT prophylaxis pharmacological::  Heparin

## 2018-11-01 NOTE — RESPIRATORY CARE
COPD EDUCATION by COPD CLINICAL EDUCATOR  11/1/2018 at 6:22 AM by Theresa Green     Patient reviewed by COPD education team. Patient does not qualify for COPD program.

## 2018-11-02 PROBLEM — I63.9 ISCHEMIC CEREBROVASCULAR ACCIDENT (CVA) (HCC): Status: ACTIVE | Noted: 2018-10-12

## 2018-11-02 LAB
ALBUMIN SERPL BCP-MCNC: 3.2 G/DL (ref 3.2–4.9)
ALBUMIN/GLOB SERPL: 1.7 G/DL
ALP SERPL-CCNC: 52 U/L (ref 30–99)
ALT SERPL-CCNC: 12 U/L (ref 2–50)
ANION GAP SERPL CALC-SCNC: 6 MMOL/L (ref 0–11.9)
APTT PPP: 38.9 SEC (ref 24.7–36)
AST SERPL-CCNC: 21 U/L (ref 12–45)
BASOPHILS # BLD AUTO: 0.3 % (ref 0–1.8)
BASOPHILS # BLD: 0.03 K/UL (ref 0–0.12)
BILIRUB SERPL-MCNC: 0.3 MG/DL (ref 0.1–1.5)
BUN SERPL-MCNC: 21 MG/DL (ref 8–22)
CALCIUM SERPL-MCNC: 8.4 MG/DL (ref 8.5–10.5)
CHLORIDE SERPL-SCNC: 108 MMOL/L (ref 96–112)
CO2 SERPL-SCNC: 25 MMOL/L (ref 20–33)
CREAT SERPL-MCNC: 0.97 MG/DL (ref 0.5–1.4)
EKG IMPRESSION: NORMAL
EKG IMPRESSION: NORMAL
EOSINOPHIL # BLD AUTO: 0.12 K/UL (ref 0–0.51)
EOSINOPHIL NFR BLD: 1.1 % (ref 0–6.9)
ERYTHROCYTE [DISTWIDTH] IN BLOOD BY AUTOMATED COUNT: 52.6 FL (ref 35.9–50)
GLOBULIN SER CALC-MCNC: 1.9 G/DL (ref 1.9–3.5)
GLUCOSE SERPL-MCNC: 97 MG/DL (ref 65–99)
HCT VFR BLD AUTO: 24.8 % (ref 37–47)
HGB BLD-MCNC: 7.3 G/DL (ref 12–16)
IMM GRANULOCYTES # BLD AUTO: 0.04 K/UL (ref 0–0.11)
IMM GRANULOCYTES NFR BLD AUTO: 0.4 % (ref 0–0.9)
LYMPHOCYTES # BLD AUTO: 0.82 K/UL (ref 1–4.8)
LYMPHOCYTES NFR BLD: 7.8 % (ref 22–41)
MCH RBC QN AUTO: 27.3 PG (ref 27–33)
MCHC RBC AUTO-ENTMCNC: 29.4 G/DL (ref 33.6–35)
MCV RBC AUTO: 92.9 FL (ref 81.4–97.8)
MONOCYTES # BLD AUTO: 0.78 K/UL (ref 0–0.85)
MONOCYTES NFR BLD AUTO: 7.5 % (ref 0–13.4)
NEUTROPHILS # BLD AUTO: 8.67 K/UL (ref 2–7.15)
NEUTROPHILS NFR BLD: 82.9 % (ref 44–72)
NRBC # BLD AUTO: 0 K/UL
NRBC BLD-RTO: 0 /100 WBC
PLATELET # BLD AUTO: 314 K/UL (ref 164–446)
PMV BLD AUTO: 9.5 FL (ref 9–12.9)
POTASSIUM SERPL-SCNC: 4.5 MMOL/L (ref 3.6–5.5)
PROT SERPL-MCNC: 5.1 G/DL (ref 6–8.2)
RBC # BLD AUTO: 2.67 M/UL (ref 4.2–5.4)
SODIUM SERPL-SCNC: 139 MMOL/L (ref 135–145)
WBC # BLD AUTO: 10.5 K/UL (ref 4.8–10.8)

## 2018-11-02 PROCEDURE — G8979 MOBILITY GOAL STATUS: HCPCS | Mod: CI

## 2018-11-02 PROCEDURE — G8980 MOBILITY D/C STATUS: HCPCS | Mod: CI

## 2018-11-02 PROCEDURE — A9270 NON-COVERED ITEM OR SERVICE: HCPCS | Performed by: NURSE PRACTITIONER

## 2018-11-02 PROCEDURE — G8989 SELF CARE D/C STATUS: HCPCS | Mod: CI

## 2018-11-02 PROCEDURE — 99232 SBSQ HOSP IP/OBS MODERATE 35: CPT | Performed by: FAMILY MEDICINE

## 2018-11-02 PROCEDURE — 36415 COLL VENOUS BLD VENIPUNCTURE: CPT

## 2018-11-02 PROCEDURE — 85025 COMPLETE CBC W/AUTO DIFF WBC: CPT

## 2018-11-02 PROCEDURE — 4A10X4Z MONITORING OF CENTRAL NERVOUS ELECTRICAL ACTIVITY, EXTERNAL APPROACH: ICD-10-PCS | Performed by: PSYCHIATRY & NEUROLOGY

## 2018-11-02 PROCEDURE — A9270 NON-COVERED ITEM OR SERVICE: HCPCS | Performed by: HOSPITALIST

## 2018-11-02 PROCEDURE — 97161 PT EVAL LOW COMPLEX 20 MIN: CPT

## 2018-11-02 PROCEDURE — 700111 HCHG RX REV CODE 636 W/ 250 OVERRIDE (IP): Performed by: FAMILY MEDICINE

## 2018-11-02 PROCEDURE — 770020 HCHG ROOM/CARE - TELE (206)

## 2018-11-02 PROCEDURE — G8996 SWALLOW CURRENT STATUS: HCPCS | Mod: CI

## 2018-11-02 PROCEDURE — 97165 OT EVAL LOW COMPLEX 30 MIN: CPT

## 2018-11-02 PROCEDURE — 85730 THROMBOPLASTIN TIME PARTIAL: CPT

## 2018-11-02 PROCEDURE — 99232 SBSQ HOSP IP/OBS MODERATE 35: CPT | Performed by: INTERNAL MEDICINE

## 2018-11-02 PROCEDURE — 80053 COMPREHEN METABOLIC PANEL: CPT

## 2018-11-02 PROCEDURE — 92610 EVALUATE SWALLOWING FUNCTION: CPT

## 2018-11-02 PROCEDURE — G8987 SELF CARE CURRENT STATUS: HCPCS | Mod: CI

## 2018-11-02 PROCEDURE — G8997 SWALLOW GOAL STATUS: HCPCS | Mod: CI

## 2018-11-02 PROCEDURE — C9113 INJ PANTOPRAZOLE SODIUM, VIA: HCPCS | Performed by: FAMILY MEDICINE

## 2018-11-02 PROCEDURE — G8978 MOBILITY CURRENT STATUS: HCPCS | Mod: CI

## 2018-11-02 PROCEDURE — 700102 HCHG RX REV CODE 250 W/ 637 OVERRIDE(OP): Performed by: HOSPITALIST

## 2018-11-02 PROCEDURE — G8988 SELF CARE GOAL STATUS: HCPCS | Mod: CI

## 2018-11-02 PROCEDURE — 99221 1ST HOSP IP/OBS SF/LOW 40: CPT | Performed by: PSYCHIATRY & NEUROLOGY

## 2018-11-02 PROCEDURE — 700102 HCHG RX REV CODE 250 W/ 637 OVERRIDE(OP): Performed by: NURSE PRACTITIONER

## 2018-11-02 PROCEDURE — 95951 EEG: CPT | Mod: 26,52 | Performed by: PSYCHIATRY & NEUROLOGY

## 2018-11-02 PROCEDURE — 95951 EEG: CPT | Mod: 52 | Performed by: PSYCHIATRY & NEUROLOGY

## 2018-11-02 RX ORDER — BENAZEPRIL HYDROCHLORIDE 20 MG/1
10 TABLET ORAL DAILY
Status: DISCONTINUED | OUTPATIENT
Start: 2018-11-03 | End: 2018-11-02

## 2018-11-02 RX ORDER — ASPIRIN 81 MG/1
81 TABLET, CHEWABLE ORAL DAILY
Status: DISCONTINUED | OUTPATIENT
Start: 2018-11-02 | End: 2018-11-04 | Stop reason: HOSPADM

## 2018-11-02 RX ORDER — CLOPIDOGREL BISULFATE 75 MG/1
75 TABLET ORAL DAILY
Status: DISCONTINUED | OUTPATIENT
Start: 2018-11-03 | End: 2018-11-04 | Stop reason: HOSPADM

## 2018-11-02 RX ORDER — PANTOPRAZOLE SODIUM 40 MG/10ML
40 INJECTION, POWDER, LYOPHILIZED, FOR SOLUTION INTRAVENOUS 2 TIMES DAILY
Status: DISCONTINUED | OUTPATIENT
Start: 2018-11-02 | End: 2018-11-03

## 2018-11-02 RX ORDER — CLOPIDOGREL BISULFATE 75 MG/1
300 TABLET ORAL ONCE
Status: COMPLETED | OUTPATIENT
Start: 2018-11-02 | End: 2018-11-02

## 2018-11-02 RX ORDER — CLOPIDOGREL BISULFATE 75 MG/1
75 TABLET ORAL DAILY
Status: DISCONTINUED | OUTPATIENT
Start: 2018-11-02 | End: 2018-11-02

## 2018-11-02 RX ADMIN — LATANOPROST 1 DROP: 50 SOLUTION OPHTHALMIC at 01:02

## 2018-11-02 RX ADMIN — ATORVASTATIN CALCIUM 80 MG: 80 TABLET, FILM COATED ORAL at 20:24

## 2018-11-02 RX ADMIN — GABAPENTIN 300 MG: 300 CAPSULE ORAL at 18:00

## 2018-11-02 RX ADMIN — PANTOPRAZOLE SODIUM 40 MG: 40 INJECTION, POWDER, LYOPHILIZED, FOR SOLUTION INTRAVENOUS at 10:30

## 2018-11-02 RX ADMIN — ASPIRIN 81 MG: 81 TABLET, CHEWABLE ORAL at 09:52

## 2018-11-02 RX ADMIN — PANTOPRAZOLE SODIUM 40 MG: 40 INJECTION, POWDER, LYOPHILIZED, FOR SOLUTION INTRAVENOUS at 18:00

## 2018-11-02 RX ADMIN — LATANOPROST 1 DROP: 50 SOLUTION OPHTHALMIC at 20:24

## 2018-11-02 RX ADMIN — CLOPIDOGREL 300 MG: 75 TABLET, FILM COATED ORAL at 09:53

## 2018-11-02 ASSESSMENT — COGNITIVE AND FUNCTIONAL STATUS - GENERAL
CLIMB 3 TO 5 STEPS WITH RAILING: A LITTLE
DAILY ACTIVITIY SCORE: 24
MOBILITY SCORE: 23
SUGGESTED CMS G CODE MODIFIER DAILY ACTIVITY: CH
SUGGESTED CMS G CODE MODIFIER MOBILITY: CI

## 2018-11-02 ASSESSMENT — ENCOUNTER SYMPTOMS
SPUTUM PRODUCTION: 0
ABDOMINAL DISTENTION: 0
MYALGIAS: 0
ABDOMINAL PAIN: 0
STRIDOR: 0
FLANK PAIN: 0
DIARRHEA: 0
DIAPHORESIS: 0
EYE DISCHARGE: 0
CLAUDICATION: 0
BACK PAIN: 0
CHEST TIGHTNESS: 0
APNEA: 0
CHILLS: 0
AGITATION: 0
VOMITING: 0
TREMORS: 0
ORTHOPNEA: 0
SHORTNESS OF BREATH: 0
PHOTOPHOBIA: 0
DIZZINESS: 0
SPEECH CHANGE: 1
WHEEZING: 0
CHOKING: 0
TINGLING: 0
EYE PAIN: 0
COUGH: 0
NECK PAIN: 0

## 2018-11-02 ASSESSMENT — ACTIVITIES OF DAILY LIVING (ADL): TOILETING: INDEPENDENT

## 2018-11-02 ASSESSMENT — PAIN SCALES - GENERAL
PAINLEVEL_OUTOF10: 4
PAINLEVEL_OUTOF10: 0

## 2018-11-02 ASSESSMENT — GAIT ASSESSMENTS
ASSISTIVE DEVICE: FRONT WHEEL WALKER
GAIT LEVEL OF ASSIST: STAND BY ASSIST
DISTANCE (FEET): 250

## 2018-11-02 NOTE — PROGRESS NOTES
DATE OF SERVICE: 11/2/2018    NOTE AUTHOR: Winnie Alberts, PGY-2    NEUROLOGY PROGRESS NOTE    HPI and Hospital Course:   Ms. Diana is an 84F with PMHx of two prior strokes in September and October 2018. She had poor response to plavix as found on TEG + platelet mapping, then underwent R CEA and was switched to ticagrelor + ASA after October 2018 stroke. After this, she experienced GI bleed on 10/22/18 due to upper and lower AVMs, and after this was switched to ASA only (with statin). This admission she was admitted for NSTEMI and placed on heparin drip; however, last night (11/1) she developed new-onset global aphasia, R facial droop and R-sided weakness, NIH 13; symptoms resolved 45 minutes after stroke code was called. CT head non-con at that time revealed no acute abnormality; MR brain revealed M1 occlusion which has previously been seen on imaging, subacute infarct in left temporo-parietal area per Dr. Solorzano's read. CTA neck also revealed 53% stenosis of left carotid bifurcation.    At the time of interview she was undergoing EEG which has not yet been read. She was A&O x 4, however she reported some confusion compared to her baseline and her responses were sometimes (MILDLY) slow in coming.      IMAGING:   MR-MRA NECK-WITH & W/O AND SEQUENCES   Final Result      1.  There is ulcerated atherosclerotic disease in the left carotid bulb and proximal left internal carotid artery causing approximately 20-30% stenosis.   2.  Severe atherosclerotic disease in the left M1 segment.   3.  There is no significant stenosis at the origin of the right internal carotid artery.      MR-BRAIN-WITH & W/O   Final Result      1.  There are tiny areas of restricted diffusion in the left parietal lobe likely representing subacute infarcts.   2.  Moderate chronic microvascular ischemic disease.   3.  Age-appropriate cerebral atrophy.   4.  Irregular narrow caliber flow void of the left M1 segment likely representing significant  atherosclerotic disease. The cerebral angiogram done on 10/16/2018 demonstrates focal occlusion at the distal M1 segment. If there are repeated strokes in the    distribution of left middle cerebral artery not responding to the dual antiplatelet treatment, intracranial stenting/angioplasty may be considered. Neuro interventional radiology consultation is recommended.            CT-CTA HEAD WITH & W/O-POST PROCESS   Final Result         1.  Left M1 segment occlusion. Similar to prior study.   2.  Hypoplastic or partially occluded left M1 segment.   3.  Narrowing of the right posterior cerebral artery, could represent spasm, vasculitis, or partial occlusion.      CT-CTA NECK WITH & W/O-POST PROCESSING   Final Result         1.  Atherosclerosis of the bilateral carotid bifurcation resulting in 53% stenosis on the left and no significant stenosis on the right.   2.  Small layering bilateral pleural effusions   3.  Hazy dependent bilateral pulmonary opacities suggests atelectasis or possible component of infiltrate      CT-CEREBRAL PERFUSION ANALYSIS   Final Result         1.  CT perfusion examination over the limited section of brain reveals 0 mL of brain parenchyma has less than 30% of cerebral blood flow (CBF).   2.  Mismatch volume 38 mL.   3.  Please note that the cerebral perfusion was performed on the limited brain tissue around the basal ganglia region. Infarct/ischemia outside the CT perfusion sections can be missed in this study.      CT-HEAD W/O   Final Result         1.  No acute intracranial abnormality is identified, there are nonspecific white matter changes, commonly associated with small vessel ischemic disease.  Associated mild cerebral atrophy is noted.      CT-HEAD W/O   Final Result      1.  No evidence of acute intracranial process.      2.  Cerebral atrophy as well as periventricular chronic small vessel ischemic change.      CT-CTA CHEST PULMONARY ARTERY W/ RECONS   Final Result         1.  No  pulmonary embolus appreciated.   2.  Small bilateral pleural effusions   3.  Hazy interstitial bilateral lung base opacities suggests atelectasis, component of infiltrate not definitively excluded.   4.  Atherosclerosis and atherosclerotic coronary artery disease.      DX-CHEST-PORTABLE (1 VIEW)   Final Result         1.  Hazy interstitial pulmonary infiltrates or edema, new since prior.   2.  Atherosclerosis      IR-NEURO INTERVENTIONAL CONSULT-IP    (Results Pending)       LABS:   Lab Results   Component Value Date/Time    CHOLSTRLTOT 91 (L) 10/13/2018 02:27 AM    LDL 28 10/13/2018 02:27 AM    HDL 42 10/13/2018 02:27 AM    TRIGLYCERIDE 107 10/13/2018 02:27 AM       Lab Results   Component Value Date/Time    SODIUM 139 11/02/2018 08:03 AM    POTASSIUM 4.5 11/02/2018 08:03 AM    CHLORIDE 108 11/02/2018 08:03 AM    CO2 25 11/02/2018 08:03 AM    GLUCOSE 97 11/02/2018 08:03 AM    BUN 21 11/02/2018 08:03 AM    CREATININE 0.97 11/02/2018 08:03 AM     Lab Results   Component Value Date/Time    ALKPHOSPHAT 52 11/02/2018 08:03 AM    ASTSGOT 21 11/02/2018 08:03 AM    ALTSGPT 12 11/02/2018 08:03 AM    TBILIRUBIN 0.3 11/02/2018 08:03 AM          PHYSICAL EXAM:   GEN: Pleasant elderly woman in no acute distress.   HENT: normocephalic, atraumatic. There is mild ptosis noted of left eye (patient reports this is chronic/predates this hospitalization).  Eyes: PERRLA, EOMI  Neuro:    -Mentation: alert, oriented x 4 (for time, she got date wrong but got month and year correct). Linear thought processes and normal thought content. Responses sometimes mildly slow and she does report some confusion compared to baseline.   -Speech: no receptive or expressive aphasia noted one exam   -Sensorimotor: intact light touch sensation all extremities, 5/5 strength all extremities.   -Cranial nerves: mild ptosis as noted above. Otherwise, all cranial nerves tested and intact.    -Gait: deferred   -Cerebellar: intact bilaterally. No resting or  action tremor on exam.    ASSESSMENT AND PLAN:   # Ischemic CVA  # Left carotid stenosis (53%)  # History of GI bleed due to AVMs  -this patient is not currently on maximal medical therapy and was on ASA prior to this CVA. However, with GI bleed following more aggressive therapy previously (and recently!), intensifying her therapy with DAPT (not including plavix, to which she had poor response) may put her at risk of repeat bleed.   -Home meds include atorvastatin 80 mg due to recent strokes; consider switch to rosuvastatin 40 mg   -recommend lipid panel, A1C, TSH  -recommend GI consult given very recent bleed of AVMs with potential need for DAPT in near future--need for repeat endoscopy prior to moving forward?

## 2018-11-02 NOTE — PROGRESS NOTES
Berna the pharmacist helped to verify the re-start rate of 650 units/hr on the heparin gtt. Orders per MD to restart at previous rate.

## 2018-11-02 NOTE — PROGRESS NOTES
Spoke with Dr. King, if MRI comes back with no acute findings must restart heparin gtt at rate it was stopped at.

## 2018-11-02 NOTE — PROGRESS NOTES
Cardiology Follow Up Progress Note    Date of Service  11/2/2018    Attending Physician  MARIO Webb.*    Chief Complaint   Chest pain with radiation to the right upper extremity    HPI  Brooke Diana is a 84 y.o. female admitted 10/30/2018 with substernal chest pain in the setting of recent lower GI bleed status post clipping and blood transfusion in the setting of dual antiplatelet therapy      History of severe coronary artery disease with known chronic  of the LAD, PCI to circumflex in 2010, recent coronary angiography was in 2014 ( of the LAD, patent stent to circumflex, minimal disease in the RCA), MPI last year showing ischemia in the area of the LAD, CVA, hypertension, recent GI bleed on 10/22/18 requiring transfusion s/p EGD and colonoscopy showed AVM, was treated with clips and epinephrine (the patient was on aspirin and Brilinta at that time)    Interim Events  11/2/18: patient resting in bed, had episode of altered mental status last night, MRI was ordered. Not confusion has resolved. Blood pressure is soft, asymptomatic. No rhythm issue overnight.     Review of Systems  Review of Systems   Respiratory: Negative for apnea, cough, choking, chest tightness, shortness of breath, wheezing and stridor.    Cardiovascular: Negative for chest pain and leg swelling.   Gastrointestinal: Negative for abdominal distention and abdominal pain.   Genitourinary: Negative for hematuria.   Neurological: Negative for dizziness.   Psychiatric/Behavioral: Negative for agitation.       Vital signs in last 24 hours    Temp:  [36.2 °C (97.2 °F)-36.9 °C (98.4 °F)] 36.2 °C (97.2 °F)  Pulse:  [66-79] 76  Resp:  [16-20] 18  BP: ()/(42-55) 90/54    Physical Exam  Physical Exam   Constitutional: She is oriented to person, place, and time. No distress.   HENT:   Head: Normocephalic.   Eyes: Conjunctivae are normal.   Neck: No JVD present. No thyromegaly present.   Cardiovascular: Normal rate and regular  rhythm.    No murmur heard.  Pulses:       Carotid pulses are 2+ on the right side, and 2+ on the left side.       Radial pulses are 2+ on the right side, and 2+ on the left side.   Pulmonary/Chest: She has no wheezes.   Abdominal: Soft. She exhibits no distension.   Musculoskeletal: She exhibits no edema.   Neurological: She is alert and oriented to person, place, and time.   Skin: Skin is warm and dry. She is not diaphoretic.       Lab Review  Lab Results   Component Value Date/Time    WBC 10.3 11/01/2018 12:01 PM    RBC 2.57 (L) 11/01/2018 12:01 PM    HEMOGLOBIN 7.2 (L) 11/01/2018 12:01 PM    HEMATOCRIT 23.3 (L) 11/01/2018 12:01 PM    MCV 90.7 11/01/2018 12:01 PM    MCH 28.0 11/01/2018 12:01 PM    MCHC 30.9 (L) 11/01/2018 12:01 PM    MPV 9.4 11/01/2018 12:01 PM      Lab Results   Component Value Date/Time    SODIUM 141 11/01/2018 03:44 AM    POTASSIUM 3.9 11/01/2018 03:44 AM    CHLORIDE 108 11/01/2018 03:44 AM    CO2 26 11/01/2018 03:44 AM    GLUCOSE 114 (H) 11/01/2018 03:44 AM    BUN 20 11/01/2018 03:44 AM    CREATININE 1.06 11/01/2018 03:44 AM      Lab Results   Component Value Date/Time    ASTSGOT 20 10/30/2018 11:25 PM    ALTSGPT 10 10/30/2018 11:25 PM     Lab Results   Component Value Date/Time    CHOLSTRLTOT 91 (L) 10/13/2018 02:27 AM    LDL 28 10/13/2018 02:27 AM    HDL 42 10/13/2018 02:27 AM    TRIGLYCERIDE 107 10/13/2018 02:27 AM    TROPONINI 12.87 (H) 10/31/2018 03:10 PM       Recent Labs      10/30/18   2325   BNPBTYPENAT  454*     Echocardiography Laboratory  10/14/18  Compared to prior 9-2-2018, no significant change.  Left ventricular ejection fraction is visually estimated to be 65%.  Mild concentric left ventricular hypertrophy.  Moderate mitral annular calcification.  Mild mitral regurgitation.  Moderate to severe aortic insufficiency.  Mild tricuspid regurgitation.  Right ventricular systolic pressure is estimated to be 40 mmHg.      Assessment/Plan    1. Non-STEMI  - Troponin peaked at 15.47  -  Plan for medical therapy  - continue asa and plavix 300mg loading today, then plavix 75mg qd  - continue coreg 6.25mg BID, Lipitor 80mg qd, and  isosorbide 30 mg, blood pressure soft this morning, reduce benazepril to 10mg qd.  - monitor H and H, recent history of GI bleed(colonoscopy and EGD showed AVM status post clipping and epinephrine injection on 10/23/18)  - ECHO: Well preserved EF, LVEF 65% on 10/14/18  - Angina free  - cardiac rehab consult placed      No rhythm issues overnight

## 2018-11-02 NOTE — CONSULTS
85 yo F admitted to [facility] for NSTEMI, history of previous stroke in Sept . Stroke code activated at 10:01pm on 11/1/18. Per Rapid Response Team, patient had a normal examination until 10pm when she was observed to be globally aphasic with right facial droop and right sided weakness. NIH was 13. /95 and pulse of 67, sat'ing 95% on 4L NC. She was on a heparin drip until 1600 this afternoon. Last PTT was 112.3 at 12:01pm. Her blood glucose 118. Platelets were 305.     Chart review reveals patient had MRI earlier today. I reviewed these images personally and it showed a subacute infarct in the left temporo-parietal area. She had two strokes, one in September and another in October. After the first stroke she had a R CEA procedure. She was put on brilinta and aspirin following the second stroke and subsequently developed a GI bleed.     At 10:46, spoke with the rapid response team who report she is back to baseline, speaking clearly. She may be hypoperfusing due to her carotid stenosis versus subclinical seizure activity.     Recommendations:   1. Keep BP's above 120 systolic   2. Neurology will evaluate the patient in the morning  3. Check an EEG  4. Not a TPA candidate because of recent stroke within the past 3 months      Kayla Solorzano D.O., M.P.H  MS specialist.   Board Certified Neurologist.  Neurology Clerkship Director, [facility].    Neurology,  [facility].   Tel: [phone]  Fax: [phone]

## 2018-11-02 NOTE — THERAPY
"  Speech Language Therapy Clinical Swallow Evaluation completed.  Functional Status: Pt WNL for oral motor assessment. She does have mild slowness and impreciseness with her speech. Pt noticing some difficulty finding her words. She will benefit from high level speech eval for aphasia with pt stating she lives indep. Pt assesed with ice, thick and thin liquids from the spoon and cup, puree, and one cup of chopped fruit. Pt with no delay in swallow and with no coughing or changes in her vocal quality during the eval. Pt is not able to tolerate her upper and lower partials during this admit and is with limited dentition. At this time D3 thins is recommended (cardiac). Please order aphasia eval.   Recommendations - Diet: Diet / Liquid Recommendation: Dysphagia III, Thin Liquid                          Strategies: Head of Bed at 90 Degrees, small bites and sips and slow rate.                           Medication Administration: Medication Administration : Whole with Liquid Wash  Plan of Care: Will benefit from Speech Therapy 3 times per week  Post-Acute Therapy: will benefit from higher level aphasia eval. Thanks Musa    See \"Rehab Therapy-Acute\" Patient Summary Report for complete documentation.   "

## 2018-11-02 NOTE — THERAPY
"Occupational Therapy Evaluation completed.   Functional Status:  V  Plan of Care: Patient with no further skilled OT needs in the acute care setting at this time  Discharge Recommendations: Post-acute therapy Currently anticipate no further skilled therapy needs once patient is discharged from the inpatient setting.    See \"Rehab Therapy-Acute\" Patient Summary Report for complete documentation.    "

## 2018-11-02 NOTE — PROGRESS NOTES
Spoke with Dr Santo about the Pts condition and the results that were relayed to this RN from the rapid team. Orders to restart Heparin gtt at previous rate.

## 2018-11-02 NOTE — THERAPY
"Physical Therapy Evaluation completed.   Bed Mobility:  Supine to Sit: Supervised  Transfers: Sit to Stand: Supervised  Gait: Level Of Assist: Stand by Assist with Front-Wheel Walker       Plan of Care: Patient with no further skilled PT needs in the acute care setting at this time  Discharge Recommendations: Equipment: No Equipment Needed (already owns FWW, and 4WW)  Pt presents with NSTEMI and rapid response called for TIA. Findings for  a subacute infarct in the left temporo-parietal area, symptoms of R sided weakness, R droop and aphasia are mostly resolved. Pt was able to get OOB with supervision, ambulate x 250 feet using FWW with supervision. Pt spoke fluidly with a few issues with word finding, but improved in her opinion. Plan is for medical management in regards to heart.  Pt lives alone, but has family support for groceries and housework as needed. No further inpt PT needs. Pt will be up to walk with nsg using FWW 1-2x per day.  Pt would benefit from home health.     See \"Rehab Therapy-Acute\" Patient Summary Report for complete documentation.     "

## 2018-11-02 NOTE — PROCEDURES
EEG 11/02/18 5:54 PM      VIDEO ELECTROENCEPHALOGRAM / EPILEPSY MONITORING UNIT REPORT      Referring provider: DR. WARE    DOS:   11/2/2018      INDICATION:  Brooke Diana 84 y.o. female presenting with speech slow not fully able to get words out    CURRENT ANTIEPILEPTIC REGIMEN: Gabapentin 300mg bid     DURATION: 27 minutes      TECHNIQUE: A 30-channel video electroencephalogram (VEEG) was performed in accordance with the international 10-20 system. This digital study was reviewed in bipolar and referential montages. The recording examined the patient during wakeful, drowsy and sleep states.         DESCRIPTION OF THE RECORD:  During the awake state, background shows symmetrical 9-10 Hz alpha activity posteriorly with amplitude of 70 mV.  There was reactivity with eye opening/closure.  Normal anterior-posterior gradient was noted with faster beta frequencies seen anteriorly.  During drowsiness, high-amplitude delta frequencies were seen. There are monomorphic delta slow over the frontal L>R    During the sleep state, background shows diffuse high-amplitude 4-5 Hz delta activity.  Symmetrical high-amplitude sleep spindles and vertex sharp activities were seen in the central leads.    ACTIVATION PROCEDURES:   Hyperventilation was not performed by the patient.     Intermittent Photic stimulation was performed in a stepwise fashion from 1 to 30 Hz and elicited a normal response (photic driving), most noticeable in the posterior leads.      ICTAL AND/OR INTERICTAL FINDINGS:   No focal or generalized epileptiform activity was noted. No regional slowing was seen during this study.  No seizures were recorded during the study.     EVENT(S):  NONE    EKG: sampling review of EKG recording demonstrated regular rhythm      INTERPRETATION:       ________________________________________________________________________    This is abnormal routine video EEG recording in the awake and drowsy/sleep state(s).    This scalp EEG  denotes      1. Focal cortical dysfunction more over left hemisphere  --this patten would increase the risk of seizure - advise clinical correlation regarding management       EEG 11/02/18 6:09 PM    ________________________________________________________________________

## 2018-11-02 NOTE — CONSULTS
Procedure Requested: Neurointerventional consultation for left MCA stenosis  Date of request: 11/02/2018  Date of consultation: 11/02/2018  Requesting Provider: Benoit Galan MD    Summary:  Prior admission 10/12/18 for CVA on aspirin and clopidogrel. TEG with platelet mapping showed poor response to clopidogrel and it was unclear if pt was compliant with medication. Switched to ticagrelor + ASA. Neurointerventional Service was consulted for Ia stenosis during that admission and angiogram performed and the recommendation was medical management and f/u as OP in our clinic. Subsequently, pt was admitted 10/22/18 for GIB and underwent intervention for upper and lower GI AVMs. She was taken off Brilinta. Currently admitted 10/30/18 for nSTEMI MI and treated medically; developed stroke symptoms on ASA and heparin but no new stroke on MRI. Has now been loaded with clopidogrel and remains on ASA. Clinically her symptoms have largely improved.     Imaging Findings:  Catheter angiogram 10/16/18:  1.  Very high-grade stenosis/subtotal occlusion of the peripheral LEFT M1 segment  2.  Less than 50% stenosis of the LEFT internal carotid artery      MRI 11/1/18:  1.  There are tiny areas of restricted diffusion in the left parietal lobe likely representing subacute infarcts.  2.  Moderate chronic microvascular ischemic disease.  3.  Age-appropriate cerebral atrophy.  4.  Irregular narrow caliber flow void of the left M1 segment likely representing significant atherosclerotic disease. The cerebral angiogram done on 10/16/2018 demonstrates focal occlusion at the distal M1 segment. If there are repeated strokes in the   distribution of left middle cerebral artery not responding to the dual antiplatelet treatment, intracranial stenting/angioplasty may be considered. Neuro interventional radiology consultation is recommended.    Interventional Radiology Recommendation:  Continue Plavix and aspirin. If stroke symptoms occur on dual  antiplatelet therapy, will pursue IA PTA at left M1 stenosis. Pt to follow up in Neuro IR clinic after discharge. Contact information provided to pt and daughter.

## 2018-11-02 NOTE — CONSULTS
CC:  COULDN'T GET WORDS OUT    Date of Admission: 10/30/2018    Today's Date: 11/02/18    Consulting Physician: KATY Webb*      HPI:    Brooke Diana is a 84 y.o. female notes speech slow not fully able to get words out, moderate now mild much improved still slight delays.  Chest pain rad to left arm was associated symptom,improved.  No exacerbating or alleviating factors.  No other complaints. Not taking plavix 75mg for past 2wks due to GIB, has been taking aspirin.        ROS:   PERTINENT POSITIVES IN HPI  All other systems were reviewed and were negative.       Past Medical History:   Past Medical History:   Diagnosis Date   • Breath shortness 2017    uses oxygen at 2 liters/min; AT NIGHT ONLY   • Myocardial infarct (HCC) 6/10/10   • Acute myocardial infarction, true posterior wall infarction, episode of care unspecified    • Anxiety    • Bowel habit changes    • CAD (coronary artery disease)     S/P stent placement   • Cholesterol blood decreased    • Dental disorder     Partial upper   • Dizziness    • GI bleed    • Glaucoma    • Heart burn    • Hyperlipidemia    • Hypertension    • Peripheral vascular disease (HCC)    • Pulmonary disease     bronchitis   • TIA (transient ischemic attack)    • Unspecified hemorrhagic conditions     pt takes plavix   • Unspecified urinary incontinence    • Urinary bladder disorder        Past Surgical History:   Past Surgical History:   Procedure Laterality Date   • COLONOSCOPY - ENDO N/A 10/23/2018    Procedure: COLONOSCOPY - ENDO;  Surgeon: Anatoly Barragan M.D.;  Location: ENDOSCOPY [facility];  Service: Gastroenterology   • GASTROSCOPY-ENDO N/A 10/22/2018    Procedure: GASTROSCOPY-ENDO;  Surgeon: Bakari Brown M.D.;  Location: ENDOSCOPY [facility];  Service: Gastroenterology   • CAROTID ENDARTERECTOMY Right 9/3/2018    Procedure: CAROTID ENDARTERECTOMY WITH NEUROMONITORING;  Surgeon: Naga Abad M.D.;  Location: SURGERY [facility]  [facility];  Service: Vascular   • GASTROSCOPY WITH BIOPSY  2/13/2016    Procedure: GASTROSCOPY WITH BIOPSY;  Surgeon: Susan Miller M.D.;  Location: [facility];  Service:    • RECOVERY  7/24/2014    Performed by Cath-Recovery Surgery at SURGERY SAME DAY [facility]   • CARDIAC CATH  7/24/14    Diffuse disease, see report.  % with collterals.   • FEMORAL ENDARTERECTOMY  10/4/2013    Performed by Kacie Baker M.D. at SURGERY [facility]   • ANGIOPLASTY BALLOON  10/4/2013    Performed by Kacie Baker M.D. at SURGERY [facility]   • RECOVERY  4/9/2013    Performed by Ir-Recovery Surgery at SURGERY SAME DAY [facility]   • RECOVERY  11/26/2012    Performed by Ir-Recovery Surgery at SURGERY SAME DAY [facility]   • RECTUS REPAIR  7/13/2012    Performed by SHEILA PLATT at SURGERY [facility]   • OTHER  12/10/10    stents in legs   • OTHER  6/10/10    cardiac stents   • CARDIAC CATH  6/2010    BMS to Om   • OTHER  2005    KNEE SURGERY   • CHOLECYSTECTOMY     • HYSTERECTOMY, TOTAL ABDOMINAL     • OTHER CARDIAC SURGERY      stent in heart   • STENT PLACEMENT      treated by Dr Baker, multiple surgeries to legs.       Social History:   Social History     Social History   • Marital status:      Spouse name: N/A   • Number of children: N/A   • Years of education: N/A     Occupational History   • Not on file.     Social History Main Topics   • Smoking status: Former Smoker     Packs/day: 0.25     Years: 45.00     Types: Cigarettes     Quit date: 2/1/2016   • Smokeless tobacco: Never Used   • Alcohol use 0.0 - 0.6 oz/week      Comment: 1 drink per month   • Drug use: No   • Sexual activity: Not on file     Other Topics Concern   • Not on file     Social History Narrative   • No narrative on file       Family History:   Family History   Problem Relation Age of Onset   • Stroke Mother 68        CVA   • Heart Disease Mother 65        valve surgery   • Other Son          wgt 465 lbs       HISTORIES AS ABOVE ARE INCOMPLETE IF PATIENT IS INTUBATED, OR UNABLE TO COMMUNICATE OR ELUCIDATE.    Allergies:   Allergies   Allergen Reactions   • Penicillins Rash and Vomiting     Rxn = unknown  Pt tolerates cephalosporins   • Hydrocodone Vomiting and Nausea     Rxn = unknown   • Tape Rash     RASH         Current Facility-Administered Medications:   •  heparin infusion 25,000 units in 500 ml 0.45% nacl, , Intravenous, Continuous, Stopped at 11/02/18 0726 **AND** Protocol 440 Heparin Weight Based DO NOT GIVE ANY HEPARIN BOLUS TO STROKE PATIENT, , , CONTINUOUS **AND** Protocol 440 Heparin Weight Based Discontinue Enoxaparin (Lovenox), Dabigatran (Pradaxa), Rivaroxaban (Xarelto), Apixaban (Eliquis), Edoxaban (Savaysa, Lixiana), Fondaparinux (Arixtra) and Argatroban prior to heparin administration, , , CONTINUOUS **AND** Protocol 440 Heparin Weight Based Draw baseline aPTT, PT, and platelet count if not already done, , , CONTINUOUS **AND** Protocol 440 Heparin Weight Based Draw aPTT 6 hours after beginning infusion, , , CONTINUOUS **AND** Protocol 440 Heparin Weight Based Record Patient Data, , , CONTINUOUS **AND** Protocol 440 Heparin Weight Based INSTRUCTIONS, , , CONTINUOUS **AND** Protocol 440 Heparin Weight Based Review aPTT results 6 hours after infusion is begun as detailed, , , CONTINUOUS **AND** Protocol 440 Heparin Weight Based Draw Platelet count every three days. Contact MD if platelet is 50% lower than baseline count., , , CONTINUOUS **AND** Protocol 440 Heparin Weight Based Adjust heparin to maintain aPTT between 55-96 sec, , , CONTINUOUS **AND** Protocol 440 Heparin Weight Based Order aPTT 6 hours after any rate change or hold until aPTT is therapeutic (55-96 seconds), , , CONTINUOUS **AND** Protocol 440 Heparin Weight Based Documentation and verification, , , CONTINUOUS, Misael Santo D.O.  •  aspirin (ASA) chewable tab 81 mg, 81 mg, Oral, DAILY, CADEN KathleenP.RLAURENT.  •   [START ON 11/3/2018] clopidogrel (PLAVIX) tablet 75 mg, 75 mg, Oral, DAILY, Redet Asad A.P.R.N.  •  clopidogrel (PLAVIX) tablet 300 mg, 300 mg, Oral, Once, Redet Asad A.P.R.N.  •  [START ON 11/3/2018] benazepril (LOTENSIN) tablet 10 mg, 10 mg, Oral, DAILY, Redet Asad A.P.R.N.  •  omeprazole (PRILOSEC) capsule 20 mg, 20 mg, Oral, BID, Aleena King M.D., 20 mg at 11/01/18 1931  •  amLODIPine (NORVASC) tablet 5 mg, 5 mg, Oral, Q DAY, Rubén Gonzalez M.D., 5 mg at 11/01/18 0555  •  atorvastatin (LIPITOR) tablet 80 mg, 80 mg, Oral, QHS, Rubén Gonzalez M.D., Stopped at 11/01/18 2100  •  latanoprost (XALATAN) 0.005 % ophthalmic solution 1 Drop, 1 Drop, Both Eyes, QHS, Rubén Gonzalez M.D., 1 Drop at 11/02/18 0102  •  gabapentin (NEURONTIN) capsule 300 mg, 300 mg, Oral, BID, Rubén Gonzalez M.D., 300 mg at 11/01/18 1800  •  isosorbide mononitrate SR (IMDUR) tablet 30 mg, 30 mg, Oral, DAILY, Rubén Gonzalez M.D., 30 mg at 11/01/18 0555  •  acetaminophen (TYLENOL) tablet 650 mg, 650 mg, Oral, Q6HRS PRN, Rubén Gonzalez M.D., 650 mg at 11/01/18 1931  •  ondansetron (ZOFRAN) syringe/vial injection 4 mg, 4 mg, Intravenous, Q4HRS PRN, Rubén Gonzalez M.D.  •  ondansetron (ZOFRAN ODT) dispertab 4 mg, 4 mg, Oral, Q4HRS PRN, Rubén Gonzalez M.D.  •  senna-docusate (PERICOLACE or SENOKOT S) 8.6-50 MG per tablet 2 Tab, 2 Tab, Oral, BID **AND** polyethylene glycol/lytes (MIRALAX) PACKET 1 Packet, 1 Packet, Oral, QDAY PRN **AND** magnesium hydroxide (MILK OF MAGNESIA) suspension 30 mL, 30 mL, Oral, QDAY PRN **AND** bisacodyl (DULCOLAX) suppository 10 mg, 10 mg, Rectal, QDAY PRN, Rubén Gonzalez M.D.  •  Respiratory Care per Protocol, , Nebulization, Continuous RT, Rubén Gonzalez M.D.  •  nitroglycerin (NITROSTAT) tablet 0.4 mg, 0.4 mg, Sublingual, Q5 MIN PRN, Rubén Gonzalez M.D.  •  morphine (pf) 4 mg/ml injection 2-4 mg, 2-4 mg, Intravenous, Q5 MIN PRN, Rubén Gonzalez M.D.  •  carvedilol (COREG) tablet 6.25 mg,  6.25 mg, Oral, BID WITH MEALS, Rubén Gonzalez M.D., 6.25 mg at 11/01/18 1730  •  magnesium hydroxide (MILK OF MAGNESIA) suspension 30 mL, 30 mL, Oral, Q4HRS PRN, Rubén Gonzalez M.D.      PHYSICAL EXAM    Vitals:    11/01/18 2135 11/01/18 2243 11/01/18 2314 11/02/18 0320   BP: (!) 91/47 110/49 (!) 92/42 (!) 90/54   Pulse: 75 76 79 76   Resp: 16 20 16 18   Temp:   36.9 °C (98.4 °F) 36.2 °C (97.2 °F)   SpO2:  98% 97% 98%   Weight:       Height:           Head/Neck: NCAT. no meningismus neg kernig neg brudzinski. No obvious mass or heard bruit. No tender arteries or lost pulses. No rash of head or neck. NO JVD.    Skin: Warm, dry, intact. No rashes observed head/neck or body    Eyes/Funduscopic:  unable    Mental Status: Awake, alert, oriented x 3. Slow speech mild motor aphasia, Name/repeat/fluent/command follow intact. No neglect/extinction. Attention and concentration, recent & remote memory, Fund of Knowledge wnl.     Cranial Nerves:  CN II-XII intact. PERRL 3mm.   No afferent pupillary defect. EOM full. VF full. No nystagmus.       Motor:  intact, no abn mvmts.  bulk & tone wnl.      Sensory: symmetric to sharp     Coordination: dysmetria absent    DTR's: intact/sym. no clonus. Toes mute.    Gait/Station: n/a fall concern       NIHSS: 0    Labs:  Recent Labs      10/31/18   1350  11/01/18   0344  11/01/18   1201   WBC  16.4*  10.9*  10.3   RBC  2.98*  2.64*  2.57*   HEMOGLOBIN  8.4*  7.3*  7.2*   HEMATOCRIT  26.5*  23.9*  23.3*   MCV  88.9  90.5  90.7   MCH  28.2  27.7  28.0   MCHC  31.7*  30.5*  30.9*   RDW  51.0*  52.2*  51.2*   PLATELETCT  384  315  305   MPV  9.5  9.6  9.4     Recent Labs      10/30/18   2325  11/01/18   0344   SODIUM  141  141   POTASSIUM  4.1  3.9   CHLORIDE  108  108   CO2  21  26   GLUCOSE  145*  114*   BUN  17  20   CREATININE  0.90  1.06   CALCIUM  9.6  8.6     Recent Labs      10/30/18   2325   11/01/18   0344  11/01/18   1201  11/01/18   2205   APTT  27.8   < >  147.4*  112.3*  36.7*    INR  0.96   --    --    --   1.08    < > = values in this interval not displayed.     Recent Labs      10/30/18   2325   BNPBTYPENAT  454*     Recent Labs      10/30/18   2325  10/31/18   1035  10/31/18   1510   TROPONINI  0.32*  15.47*  12.87*   BNPBTYPENAT  454*   --    --          Recent Labs      10/30/18   2325  11/01/18   0344   SODIUM  141  141   POTASSIUM  4.1  3.9   CHLORIDE  108  108   CO2  21  26   GLUCOSE  145*  114*   BUN  17  20     Recent Labs      10/30/18   2325  11/01/18   0344   SODIUM  141  141   POTASSIUM  4.1  3.9   CHLORIDE  108  108   CO2  21  26   BUN  17  20   CREATININE  0.90  1.06   CALCIUM  9.6  8.6     Recent Labs      10/30/18   2325   11/01/18   0344  11/01/18   1201  11/01/18   2205   APTT  27.8   < >  147.4*  112.3*  36.7*   INR  0.96   --    --    --   1.08    < > = values in this interval not displayed.     Results for orders placed or performed during the hospital encounter of 09/01/18   Echocardiogram Comp W/O cont   Result Value Ref Range    Eject.Frac. MOD BP 72.45     Eject.Frac. MOD 4C 76.57     Eject.Frac. MOD 2C 65.97     Left Ventrical Ejection Fraction 70               Imaging: neuroimaging reviewed and directly visualized by me  MR-MRA NECK-WITH & W/O AND SEQUENCES   Final Result      1.  There is ulcerated atherosclerotic disease in the left carotid bulb and proximal left internal carotid artery causing approximately 20-30% stenosis.   2.  Severe atherosclerotic disease in the left M1 segment.   3.  There is no significant stenosis at the origin of the right internal carotid artery.      MR-BRAIN-WITH & W/O   Final Result      1.  There are tiny areas of restricted diffusion in the left parietal lobe likely representing subacute infarcts.   2.  Moderate chronic microvascular ischemic disease.   3.  Age-appropriate cerebral atrophy.   4.  Irregular narrow caliber flow void of the left M1 segment likely representing significant atherosclerotic disease. The cerebral  angiogram done on 10/16/2018 demonstrates focal occlusion at the distal M1 segment. If there are repeated strokes in the    distribution of left middle cerebral artery not responding to the dual antiplatelet treatment, intracranial stenting/angioplasty may be considered. Neuro interventional radiology consultation is recommended.            CT-CTA HEAD WITH & W/O-POST PROCESS   Final Result         1.  Left M1 segment occlusion. Similar to prior study.   2.  Hypoplastic or partially occluded left M1 segment.   3.  Narrowing of the right posterior cerebral artery, could represent spasm, vasculitis, or partial occlusion.      CT-CTA NECK WITH & W/O-POST PROCESSING   Final Result         1.  Atherosclerosis of the bilateral carotid bifurcation resulting in 53% stenosis on the left and no significant stenosis on the right.   2.  Small layering bilateral pleural effusions   3.  Hazy dependent bilateral pulmonary opacities suggests atelectasis or possible component of infiltrate      CT-CEREBRAL PERFUSION ANALYSIS   Final Result         1.  CT perfusion examination over the limited section of brain reveals 0 mL of brain parenchyma has less than 30% of cerebral blood flow (CBF).   2.  Mismatch volume 38 mL.   3.  Please note that the cerebral perfusion was performed on the limited brain tissue around the basal ganglia region. Infarct/ischemia outside the CT perfusion sections can be missed in this study.      CT-HEAD W/O   Final Result         1.  No acute intracranial abnormality is identified, there are nonspecific white matter changes, commonly associated with small vessel ischemic disease.  Associated mild cerebral atrophy is noted.      CT-HEAD W/O   Final Result      1.  No evidence of acute intracranial process.      2.  Cerebral atrophy as well as periventricular chronic small vessel ischemic change.      CT-CTA CHEST PULMONARY ARTERY W/ RECONS   Final Result         1.  No pulmonary embolus appreciated.   2.   Small bilateral pleural effusions   3.  Hazy interstitial bilateral lung base opacities suggests atelectasis, component of infiltrate not definitively excluded.   4.  Atherosclerosis and atherosclerotic coronary artery disease.      DX-CHEST-PORTABLE (1 VIEW)   Final Result         1.  Hazy interstitial pulmonary infiltrates or edema, new since prior.   2.  Atherosclerosis          Assessment/Plan:    TINY ACUTE ISCHEMIC STROKES, LEFT MCA TERRITORY 2' TO L MCA STENOSIS SEVERE  NSTEMI, ON HEPARIN GTT    NIHSS score:  1a. LOC:   1b. LOC Questions:   1c. LOC Commands:   2. Best Gaze:   3. Visual Fields:   4. Facial Paresis:   5a. Motor arm left:   5b. Motor arm right:   6a. Motor leg left:   6b. Motor leg right:   7. Limb Ataxia:   8. Sensory:   9. Best Language:  1  10. Dysarthria:   11. Extinction/Inattention:     Total NIHSS: 1      Decision not to give IV alteplase due to contraindication : PER ED MD NOTE     Presumed mechanism by TOAST:  _x_Large Artery Atherosclerosis  __Small Vessel (Lacunar)  __Cardioembolic  __Other (Sickle Cell, Vasculitis, Hypercoagulable)  __Unknown      Stroke Risk factors: IC STENOSIS AS ABOVE, NSTEMI, AGE CAD HLD HTN SMOKING  Antithrombotic use prior to stroke: ASA only    CONSULT JAMIN MALIK contemplating angioplasty vs stent; has not failed max med therapy with DAPT/max statin as has been on only aspirin and statin; We win not needing excess antithrombotics, with known GIB risks, if we can succeed with angioplasty especially with need for anticoagulation in setting of NSTEMI, may need DAPT for cardiac stent in any case & therefore need for same with neuro stent may be same treatment. If need for DAPT is present for cardiac concerns, might consider utilizing this similar treatment for severe IC stenosis and if fails then IC neuro stent.     GI consult- Need GIB assessment, risk of bleed is present as may need antiplatelets post stent if needed    TTE with bubble, telemetry. IF  ABN, Card MD consult for need for long term ICM or LINNETTE    Stroke labs include lipids/TFT/a1c/B12/homocysteine/trop/UA     need full anticoagulation, then should wait 1 days from initial stroke symptom onset, and on approximately 11/2  remains neuro stable ok to continue full anticoagulation. As above, patient may need DAPT in addition to anticoagulation(for NSTEMI) for near future due to IC stenosis unless resolved. Tiny low volume stroke lesions unlikely to bleed, benefits outweigh.    STATIN FULL DOSE CRESTOR OR LIPITOR    ACEI/ARB, HCTZ, AND/OR CCB FOR JNC GOALS     BG MONITOR/CNTRL, DIET & LIFESTYLE/AVOID SMOKING DISCUSSED. Discussed stroke risks & mechanism of stroke injury, all questions answered.    No further neuro workup as inpatient if aforementioned studies WNL & maintains neuro baseline.  Neuro sign off, reconsult PRN.  F/u otpt Neuro MD in coming months.       Consulting Physician: KATY Webb M.D.  , Neurohospitalist & Stroke  Clinical Professor, [employer]  Diplomate, Neurology & Neuroimaging

## 2018-11-02 NOTE — CODE DOCUMENTATION
Dr. Solorzano with neurology called back; CT negative for hemorrhage.  Pt not a candidate for tPA.  Per Dr. Solorzano, try to keep SBP elevated if possible.

## 2018-11-02 NOTE — PROGRESS NOTES
MRI said they would have a DrWes to interpret preliminary results and they would contact the hospitalist with findings.

## 2018-11-02 NOTE — PROGRESS NOTES
Informed physician that patient had was having difficulty finding words. She was able to move all extremities. No headache noted.

## 2018-11-02 NOTE — PROGRESS NOTES
Dr. Mcrae from MRI called with the preliminary results suggesting bilateral narrowing of the carotid arteries, and a small TIA.

## 2018-11-02 NOTE — PROGRESS NOTES
PT transferred back from CT, rapid team informed this RN that there was no evidence of a bleed and the Pt was improving by the time she was brought back to the floor. Monitor room notified, Pt in NSR.

## 2018-11-02 NOTE — PROGRESS NOTES
Called Dr. Santo to update on current symptoms, R sided facial droop, change in LOC A&Ox0, R sided weakness. Was instructed to call and get results for MRI that was done today. No further orders at this time.

## 2018-11-02 NOTE — PROGRESS NOTES
[facility] Hospitalist Progress Note    Date of Service: 2018    Chief Complaint  84 y.o. female admitted 10/30/2018 with NSTEMI    Interval Problem Update  ANEMIA and CVA    Consultants/Specialty  CARDIOLOGY  neurology    Disposition  PENDING        Review of Systems   Constitutional: Negative for chills, diaphoresis and malaise/fatigue.   HENT: Negative for congestion, ear discharge and nosebleeds.    Eyes: Negative for photophobia, pain and discharge.   Respiratory: Negative for sputum production, shortness of breath and wheezing.    Cardiovascular: Negative for orthopnea, claudication and leg swelling.   Gastrointestinal: Negative for abdominal pain, diarrhea and vomiting.   Genitourinary: Negative for flank pain, frequency and hematuria.   Musculoskeletal: Negative for back pain, myalgias and neck pain.   Skin: Negative for itching and rash.   Neurological: Positive for speech change. Negative for tingling and tremors.      Physical Exam  Laboratory/Imaging   Hemodynamics  Temp (24hrs), Av.6 °C (97.9 °F), Min:36.2 °C (97.2 °F), Max:36.9 °C (98.4 °F)   Temperature: 36.2 °C (97.2 °F)  Pulse  Av.7  Min: 66  Max: 104    Blood Pressure : (!) 90/54      Respiratory      Respiration: 18, Pulse Oximetry: 98 %        RUL Breath Sounds: Clear, RML Breath Sounds: Diminished, RLL Breath Sounds: Diminished, REYNALDO Breath Sounds: Clear, LLL Breath Sounds: Diminished    Fluids    Intake/Output Summary (Last 24 hours) at 18 0930  Last data filed at 18 0320   Gross per 24 hour   Intake                0 ml   Output              600 ml   Net             -600 ml       Nutrition  Orders Placed This Encounter   Procedures   • Diet Order Cardiac     Standing Status:   Standing     Number of Occurrences:   1     Order Specific Question:   Diet:     Answer:   Cardiac [6]     Physical Exam   Constitutional: She appears well-developed and well-nourished.   HENT:   Right Ear: External ear normal.   Left Ear: External  ear normal.   Eyes: Right eye exhibits no discharge. Left eye exhibits no discharge.   Neck: No JVD present.   Cardiovascular: Normal heart sounds.    Pulmonary/Chest: No stridor. No respiratory distress. She has no wheezes. She has no rales.   Abdominal: She exhibits no distension. There is no tenderness. There is no rebound.   Musculoskeletal: She exhibits no edema or tenderness.   Neurological: She is alert.   Difficulty word finding  4/5 motor on the right upper ext which  Is chronic   Skin: Skin is warm and dry.       Recent Labs      11/01/18   0344  11/01/18   1201  11/02/18   0803   WBC  10.9*  10.3  10.5   RBC  2.64*  2.57*  2.67*   HEMOGLOBIN  7.3*  7.2*  7.3*   HEMATOCRIT  23.9*  23.3*  24.8*   MCV  90.5  90.7  92.9   MCH  27.7  28.0  27.3   MCHC  30.5*  30.9*  29.4*   RDW  52.2*  51.2*  52.6*   PLATELETCT  315  305  314   MPV  9.6  9.4  9.5     Recent Labs      10/30/18   2325  11/01/18   0344  11/02/18   0803   SODIUM  141  141  139   POTASSIUM  4.1  3.9  4.5   CHLORIDE  108  108  108   CO2  21  26  25   GLUCOSE  145*  114*  97   BUN  17  20  21   CREATININE  0.90  1.06  0.97   CALCIUM  9.6  8.6  8.4*     Recent Labs      10/30/18   2325   11/01/18   1201  11/01/18   2205  11/02/18   0803   APTT  27.8   < >  112.3*  36.7*  38.9*   INR  0.96   --    --   1.08   --     < > = values in this interval not displayed.     Recent Labs      10/30/18   2325   BNPBTYPENAT  454*              Assessment/Plan     * NSTEMI (non-ST elevated myocardial infarction) (HCC)   Assessment & Plan    D/w cardiology/dr calle   heparin drip is dced 2nd to CVA  H/h are stable  On plavix  On asa  On lipitor  bp meds are dced in light of CVA and low BP        Normocytic anemia- (present on admission)   Assessment & Plan    H/h are stable  No sign of bleed          CKD (chronic kidney disease) stage 3, GFR 30-59 ml/min ([facility])- (present on admission)   Assessment & Plan    Stable.  Avoid nephrotoxins.        CAD (coronary artery  disease)- (present on admission)   Assessment & Plan    As per MI  cta showed;1.  No pulmonary embolus appreciated.  2.  Small bilateral pleural effusions  3.  Hazy interstitial bilateral lung base opacities suggests atelectasis, component of infiltrate not definitively excluded.  4.  Atherosclerosis and atherosclerotic coronary artery disease.        Hypertension- (present on admission)   Assessment & Plan    On the low side        Acute encephalopathy   Assessment & Plan    Appears to be 2nd to ischemic CVA  Still has difficulty finding words  Head mri showed:1.  There are tiny areas of restricted diffusion in the left parietal lobe likely representing subacute infarcts.  2.  Moderate chronic microvascular ischemic disease.  3.  Age-appropriate cerebral atrophy.  4.  Irregular narrow caliber flow void of the left M1 segment likely representing significant atherosclerotic disease. The cerebral angiogram done on 10/16/2018 demonstrates focal occlusion at the distal M1 segment. If there are repeated strokes in the   distribution of left middle cerebral artery not responding to the dual antiplatelet treatment, intracranial stenting/angioplasty may be considered. Neuro interventional radiology consultation is recommended.  MRA showed:1.  There is ulcerated atherosclerotic disease in the left carotid bulb and proximal left internal carotid artery causing approximately 20-30% stenosis.  2.  Severe atherosclerotic disease in the left M1 segment.  3.  There is no significant stenosis at the origin of the right internal carotid artery.  On asa  On plavix  On lipitor  Neurology input is noted  Contacted  for further recommendations  Speech eval npo for now        Hyperlipidemia- (present on admission)   Assessment & Plan    On statin therapy.  Continue.          Quality-Core Measures   Benavides catheter::  No Benavides

## 2018-11-02 NOTE — PROGRESS NOTES
I spoke to /neurology and he stated that he will consult neuro-radiology.he agrees with asa and plavix and will continue with lipitor.  I d/w dr calle/cardiology and no need to continue with heparin.

## 2018-11-03 PROCEDURE — A9270 NON-COVERED ITEM OR SERVICE: HCPCS | Performed by: HOSPITALIST

## 2018-11-03 PROCEDURE — 700102 HCHG RX REV CODE 250 W/ 637 OVERRIDE(OP): Performed by: FAMILY MEDICINE

## 2018-11-03 PROCEDURE — 700102 HCHG RX REV CODE 250 W/ 637 OVERRIDE(OP): Performed by: NURSE PRACTITIONER

## 2018-11-03 PROCEDURE — 770020 HCHG ROOM/CARE - TELE (206)

## 2018-11-03 PROCEDURE — 99232 SBSQ HOSP IP/OBS MODERATE 35: CPT | Performed by: INTERNAL MEDICINE

## 2018-11-03 PROCEDURE — 99232 SBSQ HOSP IP/OBS MODERATE 35: CPT | Performed by: FAMILY MEDICINE

## 2018-11-03 PROCEDURE — 700111 HCHG RX REV CODE 636 W/ 250 OVERRIDE (IP): Performed by: FAMILY MEDICINE

## 2018-11-03 PROCEDURE — A9270 NON-COVERED ITEM OR SERVICE: HCPCS | Performed by: FAMILY MEDICINE

## 2018-11-03 PROCEDURE — A9270 NON-COVERED ITEM OR SERVICE: HCPCS | Performed by: NURSE PRACTITIONER

## 2018-11-03 PROCEDURE — C9113 INJ PANTOPRAZOLE SODIUM, VIA: HCPCS | Performed by: FAMILY MEDICINE

## 2018-11-03 PROCEDURE — 700102 HCHG RX REV CODE 250 W/ 637 OVERRIDE(OP): Performed by: HOSPITALIST

## 2018-11-03 RX ORDER — OMEPRAZOLE 20 MG/1
40 CAPSULE, DELAYED RELEASE ORAL DAILY
Status: DISCONTINUED | OUTPATIENT
Start: 2018-11-04 | End: 2018-11-04 | Stop reason: HOSPADM

## 2018-11-03 RX ORDER — ROSUVASTATIN CALCIUM 20 MG/1
40 TABLET, COATED ORAL EVERY EVENING
Status: DISCONTINUED | OUTPATIENT
Start: 2018-11-03 | End: 2018-11-04 | Stop reason: HOSPADM

## 2018-11-03 RX ADMIN — LATANOPROST 1 DROP: 50 SOLUTION OPHTHALMIC at 20:05

## 2018-11-03 RX ADMIN — ROSUVASTATIN CALCIUM 40 MG: 20 TABLET, FILM COATED ORAL at 17:35

## 2018-11-03 RX ADMIN — ASPIRIN 81 MG: 81 TABLET, CHEWABLE ORAL at 05:47

## 2018-11-03 RX ADMIN — CLOPIDOGREL 75 MG: 75 TABLET, FILM COATED ORAL at 05:48

## 2018-11-03 RX ADMIN — GABAPENTIN 300 MG: 300 CAPSULE ORAL at 05:48

## 2018-11-03 RX ADMIN — PANTOPRAZOLE SODIUM 40 MG: 40 INJECTION, POWDER, LYOPHILIZED, FOR SOLUTION INTRAVENOUS at 05:48

## 2018-11-03 RX ADMIN — GABAPENTIN 300 MG: 300 CAPSULE ORAL at 17:36

## 2018-11-03 ASSESSMENT — ENCOUNTER SYMPTOMS
HEARTBURN: 0
PALPITATIONS: 0
BLURRED VISION: 0
DIZZINESS: 0
CHILLS: 0
WHEEZING: 0
ABDOMINAL PAIN: 0
NAUSEA: 0
ORTHOPNEA: 0
DOUBLE VISION: 0
AGITATION: 0
CHOKING: 0
PHOTOPHOBIA: 0
FATIGUE: 1
SHORTNESS OF BREATH: 0
DIAPHORESIS: 0
ABDOMINAL DISTENTION: 0
SPUTUM PRODUCTION: 0
COUGH: 0
FEVER: 0
BACK PAIN: 0
STRIDOR: 0
DIARRHEA: 0
HEADACHES: 0
APNEA: 0
COUGH: 1
CHEST TIGHTNESS: 0
TINGLING: 0
HEMOPTYSIS: 0
NECK PAIN: 0
VOMITING: 0

## 2018-11-03 ASSESSMENT — PAIN SCALES - GENERAL
PAINLEVEL_OUTOF10: 0

## 2018-11-03 NOTE — PROGRESS NOTES
Bedside report received from night nurse. Assumed care of pt. Pt resting comfortably in bed. A/Ox4, VSS, denies pain, no complaints at this time. POC reviewed and white board updated. Tele box on. Call light in reach. Bed locked in lowest position with 2 upper bed rails up.

## 2018-11-03 NOTE — PROGRESS NOTES
Verbal order from Dr. King to discontinue Heparin drip. Patient has periods of being orient to person and place but cannot find the words to express self and needs. Orders to have PT/OT/ and speech evaluate.

## 2018-11-03 NOTE — PROGRESS NOTES
[facility] Hospitalist Progress Note    Date of Service: 11/3/2018    Chief Complaint  84 y.o. female admitted 10/30/2018 with NSTEMI    Interval Problem Update  ANEMIA and CVA    Consultants/Specialty  CARDIOLOGY  Neurology  I.R    Disposition  PENDING        Review of Systems   Constitutional: Negative for chills, diaphoresis and fever.   HENT: Negative for ear discharge, ear pain and tinnitus.    Eyes: Negative for blurred vision, double vision and photophobia.   Respiratory: Negative for cough, hemoptysis and sputum production.    Cardiovascular: Negative for chest pain, palpitations and orthopnea.   Gastrointestinal: Negative for diarrhea, heartburn, nausea and vomiting.   Genitourinary: Negative for dysuria, frequency and urgency.   Musculoskeletal: Negative for back pain, joint pain and neck pain.   Skin: Negative for itching and rash.   Neurological: Negative for dizziness, tingling and headaches.      Physical Exam  Laboratory/Imaging   Hemodynamics  Temp (24hrs), Av.3 °C (97.3 °F), Min:35.9 °C (96.6 °F), Max:36.6 °C (97.9 °F)   Temperature: 36.6 °C (97.9 °F)  Pulse  Av.8  Min: 66  Max: 104    Blood Pressure : 114/49      Respiratory      Respiration: 18, Pulse Oximetry: 98 %        RUL Breath Sounds: Clear, RML Breath Sounds: Diminished, RLL Breath Sounds: Diminished, REYNALDO Breath Sounds: Clear, LLL Breath Sounds: Diminished    Fluids    Intake/Output Summary (Last 24 hours) at 18 1155  Last data filed at 18   Gross per 24 hour   Intake              240 ml   Output                0 ml   Net              240 ml       Nutrition  Orders Placed This Encounter   Procedures   • Diet Order Cardiac     Standing Status:   Standing     Number of Occurrences:   1     Order Specific Question:   Diet:     Answer:   Cardiac [6]     Order Specific Question:   Texture/Fiber modifications:     Answer:   Dysphagia 3(Mechanical Soft)specify fluid consistency(question 6) [3]     Order Specific Question:    Consistency/Fluid modifications:     Answer:   Thin Liquids [3]     Physical Exam   Constitutional: No distress.   HENT:   Head: Normocephalic and atraumatic.   Eyes: Pupils are equal, round, and reactive to light. Conjunctivae are normal.   Neck: Normal range of motion. Neck supple.   Cardiovascular: Normal rate and regular rhythm.    Pulmonary/Chest: Effort normal and breath sounds normal.   Abdominal: Soft. Bowel sounds are normal.   Musculoskeletal: She exhibits no edema or tenderness.   Neurological: She is alert.   Difficulty word finding  4/5 motor on the right upper ext which  Is chronic   Skin: Skin is warm and dry. She is not diaphoretic.       Recent Labs      11/01/18   0344  11/01/18   1201  11/02/18   0803   WBC  10.9*  10.3  10.5   RBC  2.64*  2.57*  2.67*   HEMOGLOBIN  7.3*  7.2*  7.3*   HEMATOCRIT  23.9*  23.3*  24.8*   MCV  90.5  90.7  92.9   MCH  27.7  28.0  27.3   MCHC  30.5*  30.9*  29.4*   RDW  52.2*  51.2*  52.6*   PLATELETCT  315  305  314   MPV  9.6  9.4  9.5     Recent Labs      11/01/18   0344  11/02/18   0803   SODIUM  141  139   POTASSIUM  3.9  4.5   CHLORIDE  108  108   CO2  26  25   GLUCOSE  114*  97   BUN  20  21   CREATININE  1.06  0.97   CALCIUM  8.6  8.4*     Recent Labs      11/01/18   1201  11/01/18   2205  11/02/18   0803   APTT  112.3*  36.7*  38.9*   INR   --   1.08   --                   Assessment/Plan     * NSTEMI (non-ST elevated myocardial infarction) ([facility])   Assessment & Plan    Cardiology input is noted  H/h are stable  On plavix  On asa  On lipitor  bp meds are dced in light of CVA and low BP        Normocytic anemia- (present on admission)   Assessment & Plan    H/h are stable  No sign of bleed  Pt has a history of recent gi bleed,however no active bleed with H/H stable.  I d/w gi/dr Rhodes and he is ok with plavix and asa and stated if she bleeds then consult  prilosec  Dc protonix iv          CKD (chronic kidney disease) stage 3, GFR 30-59 ml/min (HCC)- (present on  admission)   Assessment & Plan    Stable.  Avoid nephrotoxins.        CAD (coronary artery disease)- (present on admission)   Assessment & Plan    As per MI  cta showed;1.  No pulmonary embolus appreciated.  2.  Small bilateral pleural effusions  3.  Hazy interstitial bilateral lung base opacities suggests atelectasis, component of infiltrate not definitively excluded.  4.  Atherosclerosis and atherosclerotic coronary artery disease.        Hypertension- (present on admission)   Assessment & Plan    On the low side        Acute encephalopathy   Assessment & Plan    Appears to be 2nd to ischemic CVA  Better this morning  Head mri showed:1.  There are tiny areas of restricted diffusion in the left parietal lobe likely representing subacute infarcts.  2.  Moderate chronic microvascular ischemic disease.  3.  Age-appropriate cerebral atrophy.  4.  Irregular narrow caliber flow void of the left M1 segment likely representing significant atherosclerotic disease. The cerebral angiogram done on 10/16/2018 demonstrates focal occlusion at the distal M1 segment. If there are repeated strokes in the   distribution of left middle cerebral artery not responding to the dual antiplatelet treatment, intracranial stenting/angioplasty may be considered. Neuro interventional radiology consultation is recommended.  MRA showed:1.  There is ulcerated atherosclerotic disease in the left carotid bulb and proximal left internal carotid artery causing approximately 20-30% stenosis.  2.  Severe atherosclerotic disease in the left M1 segment.  3.  There is no significant stenosis at the origin of the right internal carotid artery.  On asa  On plavix  lipitor is dced  crestor is added  Neurology input is noted  I.R stated:intracranial stenting/angioplasty may be considered. Neuro interventional radiology consultation is recommended.     Interventional Radiology Recommendation:  Continue Plavix and aspirin. If stroke symptoms occur on dual  antiplatelet therapy, will pursue IA PTA at left M1 stenosis. Pt to follow up in Neuro IR clinic after discharge. Contact information provided to pt and daughter.  Pt has a history of recent gi bleed,however no active bleed with H/H stable.  I d/w gi/dr Rhodes and he is ok with plavix and asa and stated if she bleeds then consult        Hyperlipidemia- (present on admission)   Assessment & Plan    On statin therapy.  Continue.          Quality-Core Measures   Benavides catheter::  No Benavides

## 2018-11-03 NOTE — PROGRESS NOTES
Cardiology Follow Up Progress Note    Date of Service  11/3/2018    Attending Physician  MARIO Webb.*    Chief Complaint   Chest pain with radiation to the right upper extremity    HPI  Brooke Diana is a 84 y.o. female admitted 10/30/2018 with substernal chest pain in the setting of recent lower GI bleed status post clipping and blood transfusion in the setting of dual antiplatelet therapy      History of severe coronary artery disease with known chronic  of the LAD, PCI to circumflex in 2010, recent coronary angiography was in 2014 ( of the LAD, patent stent to circumflex, minimal disease in the RCA), MPI last year showing ischemia in the area of the LAD, CVA, hypertension, recent GI bleed on 10/22/18 requiring transfusion s/p EGD and colonoscopy showed AVM, was treated with clips and epinephrine (the patient was on aspirin and Brilinta at that time)    Interim Events  11/3/18: patient resting in bed, more alert able to tell me [facility] and current month. Denies any chest pain or SOB. Vital sign stable. No rhythm issue overnight.     Review of Systems  Review of Systems   Constitutional: Positive for fatigue.   Respiratory: Positive for cough. Negative for apnea, choking, chest tightness, shortness of breath, wheezing and stridor.    Cardiovascular: Negative for chest pain and leg swelling.   Gastrointestinal: Negative for abdominal distention and abdominal pain.   Genitourinary: Negative for hematuria.   Neurological: Negative for dizziness.   Psychiatric/Behavioral: Negative for agitation.       Vital signs in last 24 hours    Temp:  [35.9 °C (96.6 °F)-36.4 °C (97.6 °F)] 35.9 °C (96.6 °F)  Pulse:  [69-93] 93  Resp:  [12-18] 18  BP: (112-124)/(40-50) 115/50    Physical Exam  Physical Exam   Constitutional: She is oriented to person, place, and time. No distress.   HENT:   Head: Normocephalic.   Eyes: Conjunctivae are normal.   Neck: No JVD present. No thyromegaly present.    Cardiovascular: Normal rate and regular rhythm.    No murmur heard.  Pulses:       Carotid pulses are 2+ on the right side, and 2+ on the left side.       Radial pulses are 2+ on the right side, and 2+ on the left side.   Pulmonary/Chest: Effort normal and breath sounds normal. She has no wheezes.   Abdominal: Soft. She exhibits no distension.   Musculoskeletal: She exhibits no edema.   Neurological: She is alert and oriented to person, place, and time.   Skin: Skin is warm and dry. She is not diaphoretic.       Lab Review  Lab Results   Component Value Date/Time    WBC 10.5 11/02/2018 08:03 AM    RBC 2.67 (L) 11/02/2018 08:03 AM    HEMOGLOBIN 7.3 (L) 11/02/2018 08:03 AM    HEMATOCRIT 24.8 (L) 11/02/2018 08:03 AM    MCV 92.9 11/02/2018 08:03 AM    MCH 27.3 11/02/2018 08:03 AM    MCHC 29.4 (L) 11/02/2018 08:03 AM    MPV 9.5 11/02/2018 08:03 AM      Lab Results   Component Value Date/Time    SODIUM 139 11/02/2018 08:03 AM    POTASSIUM 4.5 11/02/2018 08:03 AM    CHLORIDE 108 11/02/2018 08:03 AM    CO2 25 11/02/2018 08:03 AM    GLUCOSE 97 11/02/2018 08:03 AM    BUN 21 11/02/2018 08:03 AM    CREATININE 0.97 11/02/2018 08:03 AM      Lab Results   Component Value Date/Time    ASTSGOT 21 11/02/2018 08:03 AM    ALTSGPT 12 11/02/2018 08:03 AM     Lab Results   Component Value Date/Time    CHOLSTRLTOT 91 (L) 10/13/2018 02:27 AM    LDL 28 10/13/2018 02:27 AM    HDL 42 10/13/2018 02:27 AM    TRIGLYCERIDE 107 10/13/2018 02:27 AM    TROPONINI 12.87 (H) 10/31/2018 03:10 PM           Echocardiography Laboratory  10/14/18  Compared to prior 9-2-2018, no significant change.  Left ventricular ejection fraction is visually estimated to be 65%.  Mild concentric left ventricular hypertrophy.  Moderate mitral annular calcification.  Mild mitral regurgitation.  Moderate to severe aortic insufficiency.  Mild tricuspid regurgitation.  Right ventricular systolic pressure is estimated to be 40 mmHg.      Assessment/Plan    1. Non-STEMI:  -  Troponin peaked at 15.47  - suffered ischemia CVA and heparin was stopped,but ok with neurology for DAPT therapy  - continue asa and plavix 75mg qd, Lipitor 80mg qd   - in light of CVA coreg and benazepril was held, perfer resuming when ok by neurology  - monitor H and H, recent history of GI bleed(colonoscopy and EGD showed AVM status post clipping and epinephrine injection on 10/23/18)  - ECHO: Well preserved EF, LVEF 65% on 10/14/18  - Angina free  - cardiac rehab consult placed    No rhythm issues overnight    Future Appointments  Date Time Provider Department Center   11/19/2018 1:20 PM Severo Mancilla M.D. [facility] None     Cardiology will Sign off, follow up as planned.

## 2018-11-03 NOTE — PROGRESS NOTES
Bedside report received from day RN. Assumed pt care. Pt a&0x4. POC discussed. Pt declines any needs at this time. Bed alarm on. Bed locked and in lowest position. Call light within reach. Hourly rounding in place.

## 2018-11-03 NOTE — CARE PLAN
Problem: Infection  Goal: Will remain free from infection    Intervention: Implement standard precautions and perform hand washing before and after patient contact  RN will follow protocols and necessary steps to minimize the spread of infection. RN educated pt and and any visitors on proper hand hygiene.       Problem: Knowledge Deficit  Goal: Knowledge of disease process/condition, treatment plan, diagnostic tests, and medications will improve    Intervention: Explain information regarding disease process/condition, treatment plan, diagnostic tests, and medications and document in education  Patient educated on plan of care, medications, pain management, and disease processes. Patient showed verbal understanding and acceptance. Will continue to answer questions regarding medication administration, care, procedures, and upcoming tests.

## 2018-11-04 VITALS
DIASTOLIC BLOOD PRESSURE: 58 MMHG | HEIGHT: 69 IN | HEART RATE: 72 BPM | WEIGHT: 152.12 LBS | SYSTOLIC BLOOD PRESSURE: 150 MMHG | RESPIRATION RATE: 18 BRPM | TEMPERATURE: 97 F | OXYGEN SATURATION: 93 % | BODY MASS INDEX: 22.53 KG/M2

## 2018-11-04 LAB
ALBUMIN SERPL BCP-MCNC: 2.9 G/DL (ref 3.2–4.9)
ALBUMIN/GLOB SERPL: 1.5 G/DL
ALP SERPL-CCNC: 51 U/L (ref 30–99)
ALT SERPL-CCNC: 9 U/L (ref 2–50)
ANION GAP SERPL CALC-SCNC: 8 MMOL/L (ref 0–11.9)
AST SERPL-CCNC: 12 U/L (ref 12–45)
BASOPHILS # BLD AUTO: 0.4 % (ref 0–1.8)
BASOPHILS # BLD: 0.03 K/UL (ref 0–0.12)
BILIRUB SERPL-MCNC: 0.3 MG/DL (ref 0.1–1.5)
BUN SERPL-MCNC: 15 MG/DL (ref 8–22)
CALCIUM SERPL-MCNC: 8.5 MG/DL (ref 8.5–10.5)
CHLORIDE SERPL-SCNC: 108 MMOL/L (ref 96–112)
CO2 SERPL-SCNC: 27 MMOL/L (ref 20–33)
CREAT SERPL-MCNC: 0.88 MG/DL (ref 0.5–1.4)
EOSINOPHIL # BLD AUTO: 0.23 K/UL (ref 0–0.51)
EOSINOPHIL NFR BLD: 2.7 % (ref 0–6.9)
ERYTHROCYTE [DISTWIDTH] IN BLOOD BY AUTOMATED COUNT: 49.8 FL (ref 35.9–50)
GLOBULIN SER CALC-MCNC: 2 G/DL (ref 1.9–3.5)
GLUCOSE SERPL-MCNC: 95 MG/DL (ref 65–99)
HCT VFR BLD AUTO: 24.4 % (ref 37–47)
HGB BLD-MCNC: 7.6 G/DL (ref 12–16)
IMM GRANULOCYTES # BLD AUTO: 0.03 K/UL (ref 0–0.11)
IMM GRANULOCYTES NFR BLD AUTO: 0.4 % (ref 0–0.9)
LYMPHOCYTES # BLD AUTO: 1.19 K/UL (ref 1–4.8)
LYMPHOCYTES NFR BLD: 14.1 % (ref 22–41)
MCH RBC QN AUTO: 27.8 PG (ref 27–33)
MCHC RBC AUTO-ENTMCNC: 31.1 G/DL (ref 33.6–35)
MCV RBC AUTO: 89.4 FL (ref 81.4–97.8)
MONOCYTES # BLD AUTO: 0.78 K/UL (ref 0–0.85)
MONOCYTES NFR BLD AUTO: 9.3 % (ref 0–13.4)
NEUTROPHILS # BLD AUTO: 6.16 K/UL (ref 2–7.15)
NEUTROPHILS NFR BLD: 73.1 % (ref 44–72)
NRBC # BLD AUTO: 0 K/UL
NRBC BLD-RTO: 0 /100 WBC
PLATELET # BLD AUTO: 309 K/UL (ref 164–446)
PMV BLD AUTO: 9.4 FL (ref 9–12.9)
POTASSIUM SERPL-SCNC: 3.6 MMOL/L (ref 3.6–5.5)
PROT SERPL-MCNC: 4.9 G/DL (ref 6–8.2)
RBC # BLD AUTO: 2.73 M/UL (ref 4.2–5.4)
SODIUM SERPL-SCNC: 143 MMOL/L (ref 135–145)
WBC # BLD AUTO: 8.4 K/UL (ref 4.8–10.8)

## 2018-11-04 PROCEDURE — 700102 HCHG RX REV CODE 250 W/ 637 OVERRIDE(OP): Performed by: HOSPITALIST

## 2018-11-04 PROCEDURE — 85025 COMPLETE CBC W/AUTO DIFF WBC: CPT

## 2018-11-04 PROCEDURE — A9270 NON-COVERED ITEM OR SERVICE: HCPCS | Performed by: FAMILY MEDICINE

## 2018-11-04 PROCEDURE — 99239 HOSP IP/OBS DSCHRG MGMT >30: CPT | Performed by: FAMILY MEDICINE

## 2018-11-04 PROCEDURE — 80053 COMPREHEN METABOLIC PANEL: CPT

## 2018-11-04 PROCEDURE — 700102 HCHG RX REV CODE 250 W/ 637 OVERRIDE(OP): Performed by: FAMILY MEDICINE

## 2018-11-04 PROCEDURE — A9270 NON-COVERED ITEM OR SERVICE: HCPCS | Performed by: HOSPITALIST

## 2018-11-04 PROCEDURE — 700102 HCHG RX REV CODE 250 W/ 637 OVERRIDE(OP): Performed by: NURSE PRACTITIONER

## 2018-11-04 PROCEDURE — A9270 NON-COVERED ITEM OR SERVICE: HCPCS | Performed by: NURSE PRACTITIONER

## 2018-11-04 PROCEDURE — 36415 COLL VENOUS BLD VENIPUNCTURE: CPT

## 2018-11-04 RX ORDER — ROSUVASTATIN CALCIUM 40 MG/1
40 TABLET, COATED ORAL EVERY EVENING
Qty: 30 TAB | Refills: 0 | Status: SHIPPED | OUTPATIENT
Start: 2018-11-04 | End: 2018-12-26 | Stop reason: CLARIF

## 2018-11-04 RX ORDER — OMEPRAZOLE 40 MG/1
40 CAPSULE, DELAYED RELEASE ORAL DAILY
Qty: 30 CAP | Refills: 0 | Status: SHIPPED | OUTPATIENT
Start: 2018-11-05 | End: 2018-12-26 | Stop reason: CLARIF

## 2018-11-04 RX ORDER — METOPROLOL SUCCINATE 25 MG/1
25 TABLET, EXTENDED RELEASE ORAL DAILY
Qty: 30 TAB | Refills: 0 | Status: SHIPPED | OUTPATIENT
Start: 2018-11-04 | End: 2018-12-26 | Stop reason: CLARIF

## 2018-11-04 RX ADMIN — CLOPIDOGREL 75 MG: 75 TABLET, FILM COATED ORAL at 06:02

## 2018-11-04 RX ADMIN — OMEPRAZOLE 40 MG: 20 CAPSULE, DELAYED RELEASE ORAL at 06:03

## 2018-11-04 RX ADMIN — GABAPENTIN 300 MG: 300 CAPSULE ORAL at 06:03

## 2018-11-04 RX ADMIN — ASPIRIN 81 MG: 81 TABLET, CHEWABLE ORAL at 06:02

## 2018-11-04 ASSESSMENT — PATIENT HEALTH QUESTIONNAIRE - PHQ9
2. FEELING DOWN, DEPRESSED, IRRITABLE, OR HOPELESS: NOT AT ALL
1. LITTLE INTEREST OR PLEASURE IN DOING THINGS: NOT AT ALL
SUM OF ALL RESPONSES TO PHQ9 QUESTIONS 1 AND 2: 0

## 2018-11-04 ASSESSMENT — PAIN SCALES - GENERAL: PAINLEVEL_OUTOF10: 0

## 2018-11-04 NOTE — DISCHARGE INSTRUCTIONS
Discharge Instructions    Discharged to home by car with relative. Discharged via wheelchair, hospital escort: Yes.  Special equipment needed: Not Applicable    Be sure to schedule a follow-up appointment with your primary care doctor or any specialists as instructed.     Discharge Plan:   Diet Plan: Discussed  Activity Level: Discussed  Smoking Cessation Offered: Patient Refused  Confirmed Follow up Appointment: Patient to Call and Schedule Appointment  Confirmed Symptoms Management: Discussed  Medication Reconciliation Updated: Yes  Pneumococcal Vaccine Administered/Refused: Not given - Patient refused pneumococcal vaccine  Influenza Vaccine Indication: Not indicated: Previously immunized this influenza season and > 8 years of age    I understand that a diet low in cholesterol, fat, and sodium is recommended for good health. Unless I have been given specific instructions below for another diet, I accept this instruction as my diet prescription.   Other diet: Cardiac    Special Instructions: Diagnosis:  Acute Coronary Syndrome (ACS) is a diagnosis that encompasses cardiac-related chest pain and heart attack. ACS occurs when the blood flow to the heart muscle is severely reduced or cut off completely due to a slow process called atherosclerosis.  Atherosclerosis is a disease in which the coronary arteries become narrow from a buildup of fat, cholesterol, and other substances that combine to form plaque. If the plaque breaks, a blood clot will form and block the blood flow to the heart muscle. This lack of blood flow can cause damage or death to the heart muscle which is called a heart attack or Myocardial Infarction (MI). There are two different types of MIs:  ST Elevation Myocardial Infarction or STEMI (the most severe type of heart attack) and Non-ST Elevation Myocardial Infarction or NSTEMI.    Treatment Plan:  · Cardiac Diet  - Low fat, low salt, low cholesterol   · Cardiac Rehab  - Your doctor has ordered you a  referral to [facility].  Call [phone] to schedule an appointment.  · Attend my follow-up appointment with my Cardiologist.  · Take my medications as prescribed by my doctor  · Exercise daily  · Lower my bad cholesterol and raise my good cholesterol, lower my blood pressure and Reduce stress    Medications:  Certain medications are used to treat ACS.  Remember to always take medications as prescribed and never stop talking medications unless told by your doctor.    You have been prescribed the following medicatons:    Aspirin - Aspirin is used as a blood thinning medication and you will require this medication indefinitely.  Anti-platelet/blood thinner - Your Anti-platelet/Blood thinning medication is called Plavix, and is used in combination with aspirin to prevent clots from forming in your heart and/or around your stent.  Your doctor will determine how long you need to be on this medicine.  Beta-Blocker - Beta-Blocker Metoprolol is used to lower blood pressure and heart rate, and/or helps your heart heal after a heart attack.  Statin - Statin Crestor is used to lower cholesterol.    · Is patient discharged on Warfarin / Coumadin?   No     Depression / Suicide Risk    As you are discharged from this [facility] facility, it is important to learn how to keep safe from harming yourself.    Recognize the warning signs:  · Abrupt changes in personality, positive or negative- including increase in energy   · Giving away possessions  · Change in eating patterns- significant weight changes-  positive or negative  · Change in sleeping patterns- unable to sleep or sleeping all the time   · Unwillingness or inability to communicate  · Depression  · Unusual sadness, discouragement and loneliness  · Talk of wanting to die  · Neglect of personal appearance   · Rebelliousness- reckless behavior  · Withdrawal from people/activities they love  · Confusion- inability to concentrate     If you or a loved one observes any of  these behaviors or has concerns about self-harm, here's what you can do:  · Talk about it- your feelings and reasons for harming yourself  · Remove any means that you might use to hurt yourself (examples: pills, rope, extension cords, firearm)  · Get professional help from the community (Mental Health, Substance Abuse, psychological counseling)  · Do not be alone:Call your Safe Contact- someone whom you trust who will be there for you.  · Call your local CRISIS HOTLINE 063-7690 or 367-941-0938  · Call your local Children's Mobile Crisis Response Team [place] (679) 056-4552 or www.Drimmi  · Call the toll free National Suicide Prevention Hotlines   · National Suicide Prevention Lifeline 004-509-ITRR (8322)  · National Hope Line Network 800-SUICIDE (760-2537)

## 2018-11-04 NOTE — DISCHARGE PLANNING
Anticipated Discharge Disposition: Home    Action: Pt had HHC with [facility] prior to arrival.  There is no order for HHC. Pt told that HHC is not open today to process referrals. Pt did not want to wait to discharge and will follow up with PCP tomorrow.     Barriers to Discharge: none    Plan: Home

## 2018-11-04 NOTE — DISCHARGE SUMMARY
Discharge Summary    CHIEF COMPLAINT ON ADMISSION  Chief Complaint   Patient presents with   • Chest Pain     Dull substernal chest pain with radation to the RUE, onset two hours ago. Hx. CAD with stents.  Currently 4/10 pain.       Reason for Admission  EMS     Admission Date  10/30/2018    CODE STATUS  DNAR/DNI    HPI & HOSPITAL COURSE  This is a 84 y.o. female here with  history of coronary artery disease with prior percutaneous coronary intervention, essential hypertension which is controlled, and dyslipidemia on statin therapy, was in her usual state of health until a few days prior to admission, when she was discharged for treatment for gastrointestinal hemorrhage.  She reports initially she was feeling well, however on the day prior to admission, she began to have chest pressure.  This is associated with right shoulder pain.  This was oddly similar to her prior myocardial infarction, and she attempted to rest in order to relieve the pain.  She was able to sleep, however she awoke with worsening of the pain, associated with shortness of breath and anxiety.  She subsequently presents to the emergency department for further evaluation.  At this point there are no alleviating factors.  She was admitted with NSTEMI and was started on iv heparin and asa and lipitor and her trop was elevated.she was noted to have chronic normocytic anemi with stable h/h.  Pt was noted to have acute expressive aphasia and heparin drip was dced and ct of the head was done stat and was negative for any bleed.pt was later on noted to have global aphasia and code stroke was called and had head mri and mra.no tpa was given since she had another stroke event less then 3 months ago.   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  There are tiny areas of restricted diffusion in the left parietal lobe likely representing subacute infarcts.  2.  Moderate chronic microvascular ischemic disease.  3.   Age-appropriate cerebral atrophy.  4.  Irregular narrow caliber flow void of the left M1 segment likely representing significant atherosclerotic disease. The cerebral angiogram done on 10/16/2018 demonstrates focal occlusion at the distal M1 segment. If there are repeated strokes in the   distribution of left middle cerebral artery not responding to the dual antiplatelet treatment, intracranial stenting/angioplasty may be considered. Neuro interventional radiology consultation is recommended.  MRA showed:1.  There is ulcerated atherosclerotic disease in the left carotid bulb and proximal left internal carotid artery causing approximately 20-30% stenosis.  2.  Severe atherosclerotic disease in the left M1 segment.  3.  There is no significant stenosis at the origin of the right internal carotid artery.     I also had multiple conversations with the family members and informed them about the pt's status.  Neurology was on consult and recommended I.R consult and I.R stated:Interventional Radiology Recommendation:  Continue Plavix and aspirin. If stroke symptoms occur on dual antiplatelet therapy, will pursue IA PTA at left M1 stenosis. Pt to follow up in Neuro IR clinic after discharge. Contact information provided to pt and daughter.  Her BP meds were dced since the bp was low in ligt of new stroke.cardiology has referred the pt to cardiac rehab and signed off.pt is seen at the bed side and she is at her baseline and doing well and able to ambulate.we will continie with asa and plavix and crestor and lipitor is dced.i spoke to dr Rhodes/koby and he is ok with asa and plavix and she is on omeprazole.i have started the pt on low dose of toprol xl  For now.she will be discharged if cleared by neurology.i d/w the pt and the nurse in the room.       Therefore, she is discharged in good and stable condition to home with close outpatient follow-up.    The patient met 2-midnight criteria for an inpatient stay at the time of  discharge.    Discharge Date  11/4/2018    FOLLOW UP ITEMS POST DISCHARGE  pcp next week    DISCHARGE DIAGNOSES  Principal Problem:    NSTEMI (non-ST elevated myocardial infarction) ([facility]) POA: Unknown  Active Problems:    Hypertension POA: Yes    CAD (coronary artery disease) POA: Yes    CKD (chronic kidney disease) stage 3, GFR 30-59 ml/min ([facility]) POA: Yes    Normocytic anemia POA: Yes    Hyperlipidemia POA: Yes    Acute encephalopathy POA: Unknown  Resolved Problems:    * No resolved hospital problems. *      FOLLOW UP  Future Appointments  Date Time Provider Department Center   11/19/2018 1:20 PM Severo Mancilla M.D. [facility] None     No follow-up provider specified.    MEDICATIONS ON DISCHARGE     Medication List      START taking these medications      Instructions   metoprolol SR 25 MG Tb24  Commonly known as:  TOPROL XL   Take 1 Tab by mouth every day.  Dose:  25 mg     omeprazole 40 MG delayed-release capsule  Start taking on:  11/5/2018  Commonly known as:  PRILOSEC   Take 1 Cap by mouth every day.  Dose:  40 mg     rosuvastatin 40 MG tablet  Commonly known as:  CRESTOR   Take 1 Tab by mouth every evening.  Dose:  40 mg        CONTINUE taking these medications      Instructions   acetaminophen 500 MG Tabs  Commonly known as:  TYLENOL   Take 1,500 mg by mouth 1 time daily as needed for Moderate Pain.  Dose:  1500 mg     aspirin 81 MG Chew chewable tablet  Commonly known as:  ASA   Take 1 Tab by mouth every day.  Dose:  81 mg     LUMIGAN 0.01 % Soln  Generic drug:  bimatoprost   Place 1 Drop in both eyes every bedtime. 1 gtts both eyes  Dose:  1 Drop     NEURONTIN 300 MG Caps  Generic drug:  gabapentin   Take 300 mg by mouth 2 Times a Day.  Dose:  300 mg     PLAVIX 75 MG Tabs  Generic drug:  clopidogrel   Take 75 mg by mouth every day.  Dose:  75 mg     therapeutic multivitamin-minerals Tabs   Take 1 Tab by mouth every day.  Dose:  1 Tab        STOP taking these medications    amLODIPine 5 MG Tabs  Commonly known  as:  NORVASC     atorvastatin 80 MG tablet  Commonly known as:  LIPITOR     benazepril 20 MG Tabs  Commonly known as:  LOTENSIN     BRILINTA 90 MG Tabs tablet  Generic drug:  ticagrelor     isosorbide mononitrate SR 30 MG Tb24  Commonly known as:  IMDUR     metoprolol 50 MG Tabs  Commonly known as:  LOPRESSOR            Allergies  Allergies   Allergen Reactions   • Penicillins Rash and Vomiting     Rxn = unknown  Pt tolerates cephalosporins   • Hydrocodone Vomiting and Nausea     Rxn = unknown   • Tape Rash     RASH       DIET  Orders Placed This Encounter   Procedures   • Diet Order Cardiac     Standing Status:   Standing     Number of Occurrences:   1     Order Specific Question:   Diet:     Answer:   Cardiac [6]     Order Specific Question:   Texture/Fiber modifications:     Answer:   Dysphagia 3(Mechanical Soft)specify fluid consistency(question 6) [3]     Order Specific Question:   Consistency/Fluid modifications:     Answer:   Thin Liquids [3]       ACTIVITY  As tolerated.  Weight bearing as tolerated    CONSULTATIONS  Cardiology  Neurology  I.R    PROCEDURES  none    LABORATORY  Lab Results   Component Value Date    SODIUM 143 11/04/2018    POTASSIUM 3.6 11/04/2018    CHLORIDE 108 11/04/2018    CO2 27 11/04/2018    GLUCOSE 95 11/04/2018    BUN 15 11/04/2018    CREATININE 0.88 11/04/2018        Lab Results   Component Value Date    WBC 8.4 11/04/2018    HEMOGLOBIN 7.6 (L) 11/04/2018    HEMATOCRIT 24.4 (L) 11/04/2018    PLATELETCT 309 11/04/2018        Total time of the discharge process exceeds 40 minutes.

## 2018-11-04 NOTE — PROGRESS NOTES
Report received at bedside, pt. Denies pain or discomfort at this time. She is laying In bed watching TV, she is A&Ox4, 0.5 L oxygen via NC with no WOB.  POC resumed

## 2018-11-04 NOTE — PROGRESS NOTES
Discharge instructions provided to pt.  Pt verbalizes understanding and has no additional questions at this time.  Pt discharged home with daughter in law. All personal belongings with patient  Wheelchair transport to car provided.

## 2018-11-04 NOTE — DISCHARGE PLANNING
"MOHINDER RN spoke with patient regarding home environment and home needs.     Prior to admission patient lives alone and is completely independent. Patient does have HH and is very pleased with their services. Patient to be discharged home. Patient states she has a strong support system. Her grandchildren check on her often and she has friends that come by as well.     Patient has a walker at home. Her daughter-in-law plans on getting her a Life Alert necklace as well. Patient has [facility]. PT recommended HH. Patient said \"I'm going home today\" and said \"I'll call Fredy when I get home and the will continue care\"  Patient educated that she may need her PCP to reinstate HH order and she was familiar with the process and said she would take care of it.     Patient educated on safety in the living place, the risk of falls and taking blood thinners, her medications, s/sxs of stroke, GI bleeds, a heart healthy diet, her disease processes, and her follow up appointments. Patient agrees and shows understanding through teach back. Patient claims compliance with medication regiment and follow up appointments.     Patient denies any further needs.   "

## 2018-11-05 ENCOUNTER — PATIENT OUTREACH (OUTPATIENT)
Dept: HEALTH INFORMATION MANAGEMENT | Facility: OTHER | Age: 83
End: 2018-11-05

## 2018-11-05 NOTE — PROGRESS NOTES
Placed discharge outreach phone call to pt s/p hospital discharge 11/4/18.  Left voicemail providing my contact information and instructions to call with any questions or concerns.

## 2018-11-06 NOTE — PROGRESS NOTES
Did bedside dysphagia screening and patient was able to swallow 2 pills crushed with applesauce. Awaiting full speech evaluation.

## 2018-11-15 ENCOUNTER — APPOINTMENT (OUTPATIENT)
Dept: RADIOLOGY | Facility: MEDICAL CENTER | Age: 83
End: 2018-11-15
Attending: NURSE PRACTITIONER
Payer: MEDICARE

## 2018-11-28 NOTE — ADDENDUM NOTE
Encounter addended by: Benoit Galan M.D. on: 11/28/2018 10:57 AM<BR>    Actions taken: Sign clinical note

## 2018-12-16 DIAGNOSIS — E78.5 HYPERLIPIDEMIA, UNSPECIFIED HYPERLIPIDEMIA TYPE: ICD-10-CM

## 2018-12-16 DIAGNOSIS — I10 ESSENTIAL HYPERTENSION: ICD-10-CM

## 2018-12-16 DIAGNOSIS — I25.119 CORONARY ARTERY DISEASE INVOLVING NATIVE CORONARY ARTERY OF NATIVE HEART WITH ANGINA PECTORIS (HCC): ICD-10-CM

## 2018-12-16 LAB — EKG IMPRESSION: NORMAL

## 2018-12-17 RX ORDER — BENAZEPRIL HYDROCHLORIDE 20 MG/1
TABLET ORAL
Qty: 180 TAB | Refills: 2 | Status: SHIPPED | OUTPATIENT
Start: 2018-12-17 | End: 2018-12-26 | Stop reason: CLARIF

## 2018-12-26 ENCOUNTER — HOSPITAL ENCOUNTER (INPATIENT)
Facility: MEDICAL CENTER | Age: 83
LOS: 3 days | DRG: 280 | End: 2018-12-29
Attending: EMERGENCY MEDICINE | Admitting: HOSPITALIST
Payer: MEDICARE

## 2018-12-26 ENCOUNTER — APPOINTMENT (OUTPATIENT)
Dept: RADIOLOGY | Facility: MEDICAL CENTER | Age: 83
DRG: 280 | End: 2018-12-26
Attending: HOSPITALIST
Payer: MEDICARE

## 2018-12-26 ENCOUNTER — APPOINTMENT (OUTPATIENT)
Dept: RADIOLOGY | Facility: MEDICAL CENTER | Age: 83
DRG: 280 | End: 2018-12-26
Attending: EMERGENCY MEDICINE
Payer: MEDICARE

## 2018-12-26 ENCOUNTER — APPOINTMENT (OUTPATIENT)
Dept: CARDIOLOGY | Facility: MEDICAL CENTER | Age: 83
DRG: 280 | End: 2018-12-26
Attending: INTERNAL MEDICINE
Payer: MEDICARE

## 2018-12-26 DIAGNOSIS — I25.10 CORONARY ARTERY DISEASE INVOLVING NATIVE CORONARY ARTERY OF NATIVE HEART, ANGINA PRESENCE UNSPECIFIED: ICD-10-CM

## 2018-12-26 PROBLEM — J81.0 ACUTE PULMONARY EDEMA (HCC): Status: ACTIVE | Noted: 2018-12-26

## 2018-12-26 PROBLEM — N17.9 ACUTE KIDNEY INJURY (HCC): Status: ACTIVE | Noted: 2018-12-26

## 2018-12-26 PROBLEM — I24.9 ACUTE CORONARY SYNDROME (HCC): Status: ACTIVE | Noted: 2018-12-26

## 2018-12-26 LAB
ABO GROUP BLD: NORMAL
ALBUMIN SERPL BCP-MCNC: 3.9 G/DL (ref 3.2–4.9)
ALBUMIN/GLOB SERPL: 1.6 G/DL
ALP SERPL-CCNC: 50 U/L (ref 30–99)
ALT SERPL-CCNC: 10 U/L (ref 2–50)
ANION GAP SERPL CALC-SCNC: 11 MMOL/L (ref 0–11.9)
ANISOCYTOSIS BLD QL SMEAR: ABNORMAL
APTT PPP: 27.7 SEC (ref 24.7–36)
AST SERPL-CCNC: 26 U/L (ref 12–45)
BARCODED ABORH UBTYP: 9500
BARCODED ABORH UBTYP: 9500
BARCODED PRD CODE UBPRD: NORMAL
BARCODED PRD CODE UBPRD: NORMAL
BARCODED UNIT NUM UBUNT: NORMAL
BARCODED UNIT NUM UBUNT: NORMAL
BASOPHILS # BLD AUTO: 0.8 % (ref 0–1.8)
BASOPHILS # BLD: 0.08 K/UL (ref 0–0.12)
BILIRUB SERPL-MCNC: 0.6 MG/DL (ref 0.1–1.5)
BLD GP AB SCN SERPL QL: NORMAL
BNP SERPL-MCNC: 1200 PG/ML (ref 0–100)
BUN SERPL-MCNC: 30 MG/DL (ref 8–22)
CALCIUM SERPL-MCNC: 9 MG/DL (ref 8.5–10.5)
CHLORIDE SERPL-SCNC: 115 MMOL/L (ref 96–112)
CO2 SERPL-SCNC: 16 MMOL/L (ref 20–33)
COMMENT 1642: NORMAL
COMPONENT R 8504R: NORMAL
COMPONENT R 8504R: NORMAL
CREAT SERPL-MCNC: 2.35 MG/DL (ref 0.5–1.4)
CREAT UR-MCNC: 144.9 MG/DL
EKG IMPRESSION: NORMAL
EKG IMPRESSION: NORMAL
EOSINOPHIL # BLD AUTO: 0.11 K/UL (ref 0–0.51)
EOSINOPHIL NFR BLD: 1.1 % (ref 0–6.9)
ERYTHROCYTE [DISTWIDTH] IN BLOOD BY AUTOMATED COUNT: 57.4 FL (ref 35.9–50)
GLOBULIN SER CALC-MCNC: 2.5 G/DL (ref 1.9–3.5)
GLUCOSE SERPL-MCNC: 127 MG/DL (ref 65–99)
HCT VFR BLD AUTO: 25.6 % (ref 37–47)
HCT VFR BLD AUTO: 28.6 % (ref 37–47)
HGB BLD-MCNC: 6.5 G/DL (ref 12–16)
HGB BLD-MCNC: 7.3 G/DL (ref 12–16)
HGB BLD-MCNC: 8.3 G/DL (ref 12–16)
HYPOCHROMIA BLD QL SMEAR: ABNORMAL
IMM GRANULOCYTES # BLD AUTO: 0.03 K/UL (ref 0–0.11)
IMM GRANULOCYTES NFR BLD AUTO: 0.3 % (ref 0–0.9)
INR PPP: 1.06 (ref 0.87–1.13)
LIPASE SERPL-CCNC: 29 U/L (ref 11–82)
LYMPHOCYTES # BLD AUTO: 1.13 K/UL (ref 1–4.8)
LYMPHOCYTES NFR BLD: 11.3 % (ref 22–41)
MCH RBC QN AUTO: 25.5 PG (ref 27–33)
MCHC RBC AUTO-ENTMCNC: 28.5 G/DL (ref 33.6–35)
MCV RBC AUTO: 89.5 FL (ref 81.4–97.8)
MICROCYTES BLD QL SMEAR: ABNORMAL
MONOCYTES # BLD AUTO: 0.9 K/UL (ref 0–0.85)
MONOCYTES NFR BLD AUTO: 9 % (ref 0–13.4)
MORPHOLOGY BLD-IMP: NORMAL
NEUTROPHILS # BLD AUTO: 7.77 K/UL (ref 2–7.15)
NEUTROPHILS NFR BLD: 77.5 % (ref 44–72)
NRBC # BLD AUTO: 0 K/UL
NRBC BLD-RTO: 0 /100 WBC
OVALOCYTES BLD QL SMEAR: NORMAL
PLATELET # BLD AUTO: 254 K/UL (ref 164–446)
PLATELET BLD QL SMEAR: NORMAL
PMV BLD AUTO: 9.7 FL (ref 9–12.9)
POIKILOCYTOSIS BLD QL SMEAR: NORMAL
POLYCHROMASIA BLD QL SMEAR: NORMAL
POTASSIUM SERPL-SCNC: 4.1 MMOL/L (ref 3.6–5.5)
PRODUCT TYPE UPROD: NORMAL
PRODUCT TYPE UPROD: NORMAL
PROT SERPL-MCNC: 6.4 G/DL (ref 6–8.2)
PROTHROMBIN TIME: 13.9 SEC (ref 12–14.6)
RBC # BLD AUTO: 2.86 M/UL (ref 4.2–5.4)
RBC BLD AUTO: PRESENT
RH BLD: NORMAL
SCHISTOCYTES BLD QL SMEAR: NORMAL
SODIUM SERPL-SCNC: 142 MMOL/L (ref 135–145)
SODIUM UR-SCNC: 83 MMOL/L
TROPONIN I SERPL-MCNC: 4.45 NG/ML (ref 0–0.04)
TROPONIN I SERPL-MCNC: 6.83 NG/ML (ref 0–0.04)
TROPONIN I SERPL-MCNC: 7.34 NG/ML (ref 0–0.04)
UNIT STATUS USTAT: NORMAL
UNIT STATUS USTAT: NORMAL
WBC # BLD AUTO: 10 K/UL (ref 4.8–10.8)

## 2018-12-26 PROCEDURE — 36415 COLL VENOUS BLD VENIPUNCTURE: CPT

## 2018-12-26 PROCEDURE — 93308 TTE F-UP OR LMTD: CPT

## 2018-12-26 PROCEDURE — 96375 TX/PRO/DX INJ NEW DRUG ADDON: CPT

## 2018-12-26 PROCEDURE — 30233N1 TRANSFUSION OF NONAUTOLOGOUS RED BLOOD CELLS INTO PERIPHERAL VEIN, PERCUTANEOUS APPROACH: ICD-10-PCS | Performed by: HOSPITALIST

## 2018-12-26 PROCEDURE — 84300 ASSAY OF URINE SODIUM: CPT

## 2018-12-26 PROCEDURE — 85025 COMPLETE CBC W/AUTO DIFF WBC: CPT

## 2018-12-26 PROCEDURE — 93005 ELECTROCARDIOGRAM TRACING: CPT | Performed by: EMERGENCY MEDICINE

## 2018-12-26 PROCEDURE — 85018 HEMOGLOBIN: CPT

## 2018-12-26 PROCEDURE — 86901 BLOOD TYPING SEROLOGIC RH(D): CPT

## 2018-12-26 PROCEDURE — 86900 BLOOD TYPING SEROLOGIC ABO: CPT

## 2018-12-26 PROCEDURE — 71045 X-RAY EXAM CHEST 1 VIEW: CPT

## 2018-12-26 PROCEDURE — 85730 THROMBOPLASTIN TIME PARTIAL: CPT

## 2018-12-26 PROCEDURE — 700105 HCHG RX REV CODE 258

## 2018-12-26 PROCEDURE — P9016 RBC LEUKOCYTES REDUCED: HCPCS

## 2018-12-26 PROCEDURE — 36430 TRANSFUSION BLD/BLD COMPNT: CPT

## 2018-12-26 PROCEDURE — 96374 THER/PROPH/DIAG INJ IV PUSH: CPT

## 2018-12-26 PROCEDURE — 84484 ASSAY OF TROPONIN QUANT: CPT

## 2018-12-26 PROCEDURE — 700111 HCHG RX REV CODE 636 W/ 250 OVERRIDE (IP): Performed by: HOSPITALIST

## 2018-12-26 PROCEDURE — 99223 1ST HOSP IP/OBS HIGH 75: CPT | Performed by: INTERNAL MEDICINE

## 2018-12-26 PROCEDURE — 86922 COMPATIBILITY TEST ANTIGLOB: CPT

## 2018-12-26 PROCEDURE — 80053 COMPREHEN METABOLIC PANEL: CPT

## 2018-12-26 PROCEDURE — 700102 HCHG RX REV CODE 250 W/ 637 OVERRIDE(OP): Performed by: HOSPITALIST

## 2018-12-26 PROCEDURE — 93010 ELECTROCARDIOGRAM REPORT: CPT | Performed by: INTERNAL MEDICINE

## 2018-12-26 PROCEDURE — 700111 HCHG RX REV CODE 636 W/ 250 OVERRIDE (IP): Performed by: EMERGENCY MEDICINE

## 2018-12-26 PROCEDURE — A9270 NON-COVERED ITEM OR SERVICE: HCPCS | Performed by: HOSPITALIST

## 2018-12-26 PROCEDURE — 83880 ASSAY OF NATRIURETIC PEPTIDE: CPT

## 2018-12-26 PROCEDURE — 86850 RBC ANTIBODY SCREEN: CPT

## 2018-12-26 PROCEDURE — 304561 HCHG STAT O2

## 2018-12-26 PROCEDURE — 83690 ASSAY OF LIPASE: CPT

## 2018-12-26 PROCEDURE — A9270 NON-COVERED ITEM OR SERVICE: HCPCS | Performed by: INTERNAL MEDICINE

## 2018-12-26 PROCEDURE — 93308 TTE F-UP OR LMTD: CPT | Mod: 26 | Performed by: INTERNAL MEDICINE

## 2018-12-26 PROCEDURE — 85014 HEMATOCRIT: CPT

## 2018-12-26 PROCEDURE — 93005 ELECTROCARDIOGRAM TRACING: CPT | Performed by: HOSPITALIST

## 2018-12-26 PROCEDURE — 85610 PROTHROMBIN TIME: CPT

## 2018-12-26 PROCEDURE — 700102 HCHG RX REV CODE 250 W/ 637 OVERRIDE(OP): Performed by: INTERNAL MEDICINE

## 2018-12-26 PROCEDURE — 770022 HCHG ROOM/CARE - ICU (200)

## 2018-12-26 PROCEDURE — 76775 US EXAM ABDO BACK WALL LIM: CPT

## 2018-12-26 PROCEDURE — 82570 ASSAY OF URINE CREATININE: CPT

## 2018-12-26 PROCEDURE — 99223 1ST HOSP IP/OBS HIGH 75: CPT | Mod: AI | Performed by: HOSPITALIST

## 2018-12-26 PROCEDURE — 99291 CRITICAL CARE FIRST HOUR: CPT

## 2018-12-26 RX ORDER — METOPROLOL SUCCINATE 50 MG/1
50 TABLET, EXTENDED RELEASE ORAL DAILY
COMMUNITY
End: 2018-12-31

## 2018-12-26 RX ORDER — POLYETHYLENE GLYCOL 3350 17 G/17G
1 POWDER, FOR SOLUTION ORAL
Status: DISCONTINUED | OUTPATIENT
Start: 2018-12-26 | End: 2018-12-29 | Stop reason: HOSPADM

## 2018-12-26 RX ORDER — FUROSEMIDE 10 MG/ML
40 INJECTION INTRAMUSCULAR; INTRAVENOUS ONCE
Status: DISCONTINUED | OUTPATIENT
Start: 2018-12-26 | End: 2018-12-27

## 2018-12-26 RX ORDER — ROSUVASTATIN CALCIUM 40 MG/1
40 TABLET, COATED ORAL EVERY EVENING
COMMUNITY

## 2018-12-26 RX ORDER — ASPIRIN 325 MG
325 TABLET ORAL DAILY
Status: DISCONTINUED | OUTPATIENT
Start: 2018-12-27 | End: 2018-12-29

## 2018-12-26 RX ORDER — NITROGLYCERIN 20 MG/100ML
0-200 INJECTION INTRAVENOUS CONTINUOUS
Status: DISCONTINUED | OUTPATIENT
Start: 2018-12-26 | End: 2018-12-29 | Stop reason: HOSPADM

## 2018-12-26 RX ORDER — HEPARIN SODIUM 1000 [USP'U]/ML
2200 INJECTION, SOLUTION INTRAVENOUS; SUBCUTANEOUS PRN
Status: DISCONTINUED | OUTPATIENT
Start: 2018-12-26 | End: 2018-12-27

## 2018-12-26 RX ORDER — HEPARIN SODIUM 1000 [USP'U]/ML
4000 INJECTION, SOLUTION INTRAVENOUS; SUBCUTANEOUS ONCE
Status: DISCONTINUED | OUTPATIENT
Start: 2018-12-26 | End: 2018-12-26

## 2018-12-26 RX ORDER — AMLODIPINE BESYLATE 5 MG/1
5 TABLET ORAL DAILY
Status: ON HOLD | COMMUNITY
End: 2018-12-29

## 2018-12-26 RX ORDER — AMOXICILLIN 250 MG
2 CAPSULE ORAL 2 TIMES DAILY
Status: DISCONTINUED | OUTPATIENT
Start: 2018-12-26 | End: 2018-12-29 | Stop reason: HOSPADM

## 2018-12-26 RX ORDER — ONDANSETRON 2 MG/ML
4 INJECTION INTRAMUSCULAR; INTRAVENOUS EVERY 4 HOURS PRN
Status: DISCONTINUED | OUTPATIENT
Start: 2018-12-26 | End: 2018-12-29 | Stop reason: HOSPADM

## 2018-12-26 RX ORDER — PANTOPRAZOLE SODIUM 40 MG/1
40 TABLET, DELAYED RELEASE ORAL 2 TIMES DAILY
COMMUNITY

## 2018-12-26 RX ORDER — ISOSORBIDE MONONITRATE 30 MG/1
30 TABLET, EXTENDED RELEASE ORAL EVERY MORNING
Status: ON HOLD | COMMUNITY
End: 2018-12-29

## 2018-12-26 RX ORDER — ONDANSETRON 2 MG/ML
4 INJECTION INTRAMUSCULAR; INTRAVENOUS ONCE
Status: COMPLETED | OUTPATIENT
Start: 2018-12-26 | End: 2018-12-26

## 2018-12-26 RX ORDER — OMEPRAZOLE 20 MG/1
20 CAPSULE, DELAYED RELEASE ORAL 2 TIMES DAILY
Status: DISCONTINUED | OUTPATIENT
Start: 2018-12-26 | End: 2018-12-29 | Stop reason: HOSPADM

## 2018-12-26 RX ORDER — NITROGLYCERIN 0.4 MG/1
0.4 TABLET SUBLINGUAL
Status: DISCONTINUED | OUTPATIENT
Start: 2018-12-26 | End: 2018-12-29 | Stop reason: HOSPADM

## 2018-12-26 RX ORDER — ASPIRIN 81 MG/1
81 TABLET, CHEWABLE ORAL DAILY
COMMUNITY
End: 2019-08-05

## 2018-12-26 RX ORDER — GABAPENTIN 300 MG/1
300 CAPSULE ORAL 2 TIMES DAILY
Status: DISCONTINUED | OUTPATIENT
Start: 2018-12-26 | End: 2018-12-26

## 2018-12-26 RX ORDER — ROSUVASTATIN CALCIUM 20 MG/1
40 TABLET, COATED ORAL EVERY EVENING
Status: DISCONTINUED | OUTPATIENT
Start: 2018-12-26 | End: 2018-12-29 | Stop reason: HOSPADM

## 2018-12-26 RX ORDER — ONDANSETRON 4 MG/1
4 TABLET, ORALLY DISINTEGRATING ORAL EVERY 4 HOURS PRN
Status: DISCONTINUED | OUTPATIENT
Start: 2018-12-26 | End: 2018-12-29 | Stop reason: HOSPADM

## 2018-12-26 RX ORDER — ISOSORBIDE MONONITRATE 30 MG/1
60 TABLET, EXTENDED RELEASE ORAL
Status: DISCONTINUED | OUTPATIENT
Start: 2018-12-26 | End: 2018-12-29 | Stop reason: HOSPADM

## 2018-12-26 RX ORDER — HEPARIN SODIUM 1000 [USP'U]/ML
2200 INJECTION, SOLUTION INTRAVENOUS; SUBCUTANEOUS PRN
Status: DISCONTINUED | OUTPATIENT
Start: 2018-12-26 | End: 2018-12-26

## 2018-12-26 RX ORDER — MORPHINE SULFATE 10 MG/ML
2 INJECTION, SOLUTION INTRAMUSCULAR; INTRAVENOUS ONCE
Status: COMPLETED | OUTPATIENT
Start: 2018-12-26 | End: 2018-12-26

## 2018-12-26 RX ORDER — SODIUM CHLORIDE 9 MG/ML
INJECTION, SOLUTION INTRAVENOUS CONTINUOUS
Status: DISCONTINUED | OUTPATIENT
Start: 2018-12-26 | End: 2018-12-26

## 2018-12-26 RX ORDER — BISACODYL 10 MG
10 SUPPOSITORY, RECTAL RECTAL
Status: DISCONTINUED | OUTPATIENT
Start: 2018-12-26 | End: 2018-12-29 | Stop reason: HOSPADM

## 2018-12-26 RX ORDER — ASPIRIN 300 MG/1
300 SUPPOSITORY RECTAL DAILY
Status: DISCONTINUED | OUTPATIENT
Start: 2018-12-27 | End: 2018-12-29

## 2018-12-26 RX ORDER — ASPIRIN 81 MG/1
324 TABLET, CHEWABLE ORAL DAILY
Status: DISCONTINUED | OUTPATIENT
Start: 2018-12-27 | End: 2018-12-29

## 2018-12-26 RX ORDER — BENAZEPRIL HYDROCHLORIDE 20 MG/1
20 TABLET ORAL 2 TIMES DAILY
Status: ON HOLD | COMMUNITY
End: 2018-12-29

## 2018-12-26 RX ORDER — METOPROLOL SUCCINATE 50 MG/1
50 TABLET, EXTENDED RELEASE ORAL DAILY
Status: DISCONTINUED | OUTPATIENT
Start: 2018-12-26 | End: 2018-12-29 | Stop reason: HOSPADM

## 2018-12-26 RX ORDER — M-VIT,TX,IRON,MINS/CALC/FOLIC 27MG-0.4MG
1 TABLET ORAL DAILY
Status: DISCONTINUED | OUTPATIENT
Start: 2018-12-26 | End: 2018-12-26

## 2018-12-26 RX ORDER — SODIUM CHLORIDE 9 MG/ML
INJECTION, SOLUTION INTRAVENOUS
Status: COMPLETED
Start: 2018-12-26 | End: 2018-12-26

## 2018-12-26 RX ADMIN — SODIUM CHLORIDE: 9 INJECTION, SOLUTION INTRAVENOUS at 15:45

## 2018-12-26 RX ADMIN — MORPHINE SULFATE 2 MG: 10 INJECTION INTRAVENOUS at 12:15

## 2018-12-26 RX ADMIN — ROSUVASTATIN CALCIUM 40 MG: 20 TABLET ORAL at 18:00

## 2018-12-26 RX ADMIN — HEPARIN SODIUM 900 UNITS/HR: 5000 INJECTION, SOLUTION INTRAVENOUS at 19:10

## 2018-12-26 RX ADMIN — ISOSORBIDE MONONITRATE 60 MG: 30 TABLET ORAL at 15:56

## 2018-12-26 RX ADMIN — ONDANSETRON 4 MG: 2 INJECTION INTRAMUSCULAR; INTRAVENOUS at 12:15

## 2018-12-26 RX ADMIN — OMEPRAZOLE 20 MG: 20 CAPSULE, DELAYED RELEASE ORAL at 17:27

## 2018-12-26 ASSESSMENT — LIFESTYLE VARIABLES
DO YOU DRINK ALCOHOL: NO
EVER_SMOKED: YES
PACK_YEARS: 30
EVER_SMOKED: YES

## 2018-12-26 ASSESSMENT — COGNITIVE AND FUNCTIONAL STATUS - GENERAL
DAILY ACTIVITIY SCORE: 24
MOBILITY SCORE: 24
SUGGESTED CMS G CODE MODIFIER MOBILITY: CH
SUGGESTED CMS G CODE MODIFIER DAILY ACTIVITY: CH

## 2018-12-26 ASSESSMENT — PAIN SCALES - GENERAL
PAINLEVEL_OUTOF10: 3
PAINLEVEL_OUTOF10: 6
PAINLEVEL_OUTOF10: 7
PAINLEVEL_OUTOF10: 4
PAINLEVEL_OUTOF10: 9

## 2018-12-26 NOTE — ED NOTES
Med Rec Updated and Complete per Mail Order Pharmacy  Allergies Reviewed  No PO ABX last 30 days per Pt and Pharmacy    Pt reports that it has been 6 weeks since she stopped taking Plavix.     Pt reports daily baby asa.

## 2018-12-26 NOTE — ASSESSMENT & PLAN NOTE
With history of GI bleed secondary to AVMs  She was transfused 1 unit of packed red blood cells on 12/28  Follow-up labs reviewed,   Heparin drip stopped

## 2018-12-26 NOTE — DISCHARGE PLANNING
Medical Social Worker    MSW responded to STEMI. MSW met with pt who requested her sister Erin ([phone]) be contacted. MSW called and updated Erin. Erin is will also go check on pt's dog today. No other needs from pt at this time.

## 2018-12-26 NOTE — H&P
Hospital Medicine History & Physical Note    Date of Service  12/26/2018    Primary Care Physician  Pcp Not In Computer    Consultants  Cardiology; Dr. Ajay Morgan    Code Status  DNR/DNI    Chief Complaint  Chest pain    History of Presenting Illness  84 y.o. female who presented 12/26/2018 with history of coronary artery disease status post MI in 2010 with PCI to her OM in 2010 she presented to the emergency room for evaluation of dyspnea and chest pain that started earlier this morning the pain is described as a pressure sensation to severe across her chest associated with dyspnea and orthopnea.  She has been having increased swelling in her lower extremities over the past 2 days.  She denies any fever or chills.  No hemoptysis no melena or rectal bleeding.  She was hospitalized last November for NSTEMI and was treated conservatively.  Her hospital course was complicated by stroke.  She has had a history of recurrent GI bleed she had EGD and colonoscopy in October of this year revealing AVMs which were treated.  She reports that she was also hospitalized about a month ago at [facility] for GI bleed her Plavix was discontinued at that time she has been maintained on low-dose aspirin.  She is complaining of generalized malaise and fatigue.    Review of Systems  Review of Systems   All other systems reviewed and are negative.      Past Medical History   has a past medical history of Acute myocardial infarction, true posterior wall infarction, episode of care unspecified; Anxiety; Bowel habit changes; Breath shortness (2017); CAD (coronary artery disease); Cholesterol blood decreased; Dental disorder; Dizziness; GI bleed; Glaucoma; Heart burn; Hyperlipidemia; Hypertension; Myocardial infarct (HCC) (6/10/10); Peripheral vascular disease (HCC); Pulmonary disease; TIA (transient ischemic attack); Unspecified hemorrhagic conditions; Unspecified urinary incontinence; and Urinary bladder disorder.    Surgical  History   has a past surgical history that includes rectus repair (7/13/2012); recovery (11/26/2012); recovery (4/9/2013); femoral endarterectomy (10/4/2013); angioplasty balloon (10/4/2013); hysterectomy, total abdominal; other cardiac surgery; cholecystectomy; other (12/10/10); other (6/10/10); other (2005); recovery (7/24/2014); cardiac cath (6/2010); cardiac cath (7/24/14); stent placement; gastroscopy with biopsy (2/13/2016); carotid endarterectomy (Right, 9/3/2018); gastroscopy-endo (N/A, 10/22/2018); and colonoscopy - endo (N/A, 10/23/2018).     Family History  family history includes Heart Disease (age of onset: 65) in her mother; Other in her son; Stroke (age of onset: 68) in her mother.     Social History   reports that she quit smoking about 2 years ago. Her smoking use included Cigarettes. She has a 11.25 pack-year smoking history. She has never used smokeless tobacco. She reports that she drinks alcohol. She reports that she does not use drugs.    Allergies  Allergies   Allergen Reactions   • Penicillins Rash and Vomiting     Rxn = unknown  Pt tolerates cephalosporins   • Hydrocodone Vomiting and Nausea     Rxn = unknown   • Tape Rash     RASH       Medications  Prior to Admission Medications   Prescriptions Last Dose Informant Patient Reported? Taking?   amLODIPine (NORVASC) 5 MG Tab 12/25/2018 at AM Patient's Home Pharmacy Yes Yes   Sig: Take 5 mg by mouth every day.   aspirin (ASA) 81 MG Chew Tab chewable tablet 12/25/2018 at AM Patient's Home Pharmacy Yes Yes   Sig: Take 81 mg by mouth every day.   benazepril (LOTENSIN) 20 MG Tab 12/25/2018 at PM Patient's Home Pharmacy Yes Yes   Sig: Take 20 mg by mouth 2 Times a Day.   bimatoprost (LUMIGAN) 0.01 % Solution 12/25/2018 at PM Patient's Home Pharmacy Yes No   Sig: Place 1 Drop in both eyes every bedtime. 1 gtts both eyes   isosorbide mononitrate SR (IMDUR) 30 MG TABLET SR 24 HR 12/25/2018 at AM Patient's Home Pharmacy Yes Yes   Sig: Take 30 mg by  mouth every morning.   metoprolol SR (TOPROL XL) 50 MG TABLET SR 24 HR 12/25/2018 at AM Patient's Home Pharmacy Yes Yes   Sig: Take 50 mg by mouth every day.   pantoprazole (PROTONIX) 40 MG Tablet Delayed Response 12/25/2018 at PM Patient's Home Pharmacy Yes Yes   Sig: Take 40 mg by mouth 2 Times a Day.   rosuvastatin (CRESTOR) 40 MG tablet 12/25/2018 at PM Patient's Home Pharmacy Yes Yes   Sig: Take 40 mg by mouth every evening.      Facility-Administered Medications: None       Physical Exam  Temp:  [37 °C (98.6 °F)] 37 °C (98.6 °F)  Pulse:  [] 85  Resp:  [14-22] 14  BP: (126)/(81) 126/81    Physical Exam   Constitutional: She is oriented to person, place, and time. She appears well-developed and well-nourished.   HENT:   Head: Normocephalic and atraumatic.   Right Ear: External ear normal.   Left Ear: External ear normal.   Mouth/Throat: No oropharyngeal exudate.   Eyes: Conjunctivae are normal. Right eye exhibits no discharge. Left eye exhibits no discharge. No scleral icterus.   Neck: Neck supple. No JVD present. No tracheal deviation present.   Cardiovascular: Normal rate and regular rhythm.  Exam reveals no gallop and no friction rub.    Murmur heard.  Pulmonary/Chest: Effort normal. No stridor. No respiratory distress. She has no wheezes. She has rales. She exhibits no tenderness.   Abdominal: Soft. Bowel sounds are normal. She exhibits no distension and no mass. There is no tenderness. There is no rebound and no guarding.   Musculoskeletal: She exhibits edema. She exhibits no tenderness.   Neurological: She is alert and oriented to person, place, and time. No cranial nerve deficit. She exhibits normal muscle tone.   Skin: Skin is warm and dry. She is not diaphoretic. No cyanosis. Nails show no clubbing.   Psychiatric: Her behavior is normal. Thought content normal.   Anxious   Nursing note and vitals reviewed.      Laboratory:  Recent Labs      12/26/18   1045   WBC  10.0   RBC  2.86*   HEMOGLOBIN   7.3*   HEMATOCRIT  25.6*   MCV  89.5   MCH  25.5*   MCHC  28.5*   RDW  57.4*   PLATELETCT  254   MPV  9.7     Recent Labs      12/26/18   1045   SODIUM  142   POTASSIUM  4.1   CHLORIDE  115*   CO2  16*   GLUCOSE  127*   BUN  30*   CREATININE  2.35*   CALCIUM  9.0     Recent Labs      12/26/18   1045   ALTSGPT  10   ASTSGOT  26   ALKPHOSPHAT  50   TBILIRUBIN  0.6   LIPASE  29   GLUCOSE  127*     Recent Labs      12/26/18   1045   APTT  27.7   INR  1.06     Recent Labs      12/26/18   1045   BNPBTYPENAT  1200*         Recent Labs      12/26/18   1045   TROPONINI  4.45*       Urinalysis:    No results found     Imaging:  DX-CHEST-PORTABLE (1 VIEW)   Final Result      Bibasilar atelectasis or pneumonia and small bilateral pleural effusions.      EC-ECHOCARDIOGRAM LTD W/ CONT    (Results Pending)   US-RENAL    (Results Pending)         Assessment/Plan:  I anticipate this patient will require at least two midnights for appropriate medical management, necessitating inpatient admission.    * Acute coronary syndrome (HCC)   Assessment & Plan    Patient will be admitted given her persistent chest pain of she will be started on heparin and nitro drips  We will continue with aspirin metoprolol and atorvastatin  We will hold her lisinopril given her AK I  We will trend her troponin and check echocardiogram  Case discussed with Dr. Morgan from cardiology given her comorbidities recent history of strokes and GI bleed he is recommending conservative management       Normocytic anemia- (present on admission)   Assessment & Plan    With history of GI bleed secondary to AVMs  Patient will be transfused to keep hemoglobin greater than 8 given her acute coronary syndrome  We will monitor for any signs of active bleeding with anticoagulation       PVD (peripheral vascular disease) (HCC)- (present on admission)   Assessment & Plan    Continue medical therapy     Acute pulmonary edema (HCC)   Assessment & Plan    Likely cardiogenic  We  will give 1 dose of Lasix and monitor  Patient also being started on nitro drip     Acute kidney injury (HCC)   Assessment & Plan    We will hold her lisinopril  We will check urine electrolytes and renal ultrasound  ?  Cardiorenal  Given her fluid overload we will give her 1 dose of Lasix and monitor urine output and renal function  If not improved consider nephrology consultation     Hyperlipidemia- (present on admission)   Assessment & Plan    Continue atorvastatin         Overall prognosis is guarded    VTE prophylaxis: Heparin

## 2018-12-26 NOTE — ASSESSMENT & PLAN NOTE
Baseline creatinine is normal, she presents with acute renal failure  Likely cardiorenal  Monitor closely with diuresis  Avoid nephrotoxins  Repeat labs AM

## 2018-12-26 NOTE — ED NOTES
Critical lab result called to RN at this time, TROP 4.46, read back to lab. ERP notified, read back to RN at this time.

## 2018-12-26 NOTE — CONSULTS
Cardiology Consult Note    Date of note:    12/26/2018      Patient ID:    Name:             Brooke Diana   YOB: 1934  Age:                 84 y.o.  female   MRN:               [id number]                                                             Consulting Physician: Lalit Duran MD    Consult question: Evaluation of elevated troponin    History of Present Illness:      Brooke Diana is an 84-year-old woman with a history of coronary artery disease status post previous anterior MI in 2010 with a chronic total occlusion of her LAD found at that time, and PCI to her OM as well at that time.  She has mild disease of her right coronary artery.  Her obtuse marginal stent was shown to be patent by repeat angiography in 2014.    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    She had a transient ischemic attack, and related to high-grade right carotid disease she had a right carotid endarterectomy on 9/3/2018.    She is then had a gastrointestinal bleed on 10/22/2018 at which time endoscopy revealed AVMs in the colon with stigmata of rebleeding treated with argon plasma coagulation, epinephrine injection, and Endo Clip placement.    She presented again in late October with a non-ST elevation myocardial infarction at which time she was treated conservatively with medical management given her recent gastrointestinal bleed and DNR/DNI status.  During her hospitalization for her non-ST elevation MI she was shown to have a left MCA stroke, and treated medically for that as well.    I first became involved with the care of the patient when a code STEMI was called, which I deactivated.    In speaking with the patient, she describes to me 2 days of midsternal chest discomfort.  I asked her about her original and chest discomfort prior to PCI of her OM in 2010, and she had a bit of difficulty recalling the circumstances.  She did  relate though that her anginal equivalent was chest discomfort at that time though she feels that her chest discomfort presently is different in character.  Her chest discomfort is fairly severe, sounds to be somewhat exertional, nonradiating.  She identified no specific palliating or provoking factors.  She has had preceding that dyspnea, orthopnea, and lower extremity edema.    Review of Systems:  All others reviewed and negative.    Past Medical History:   Past Medical History:   Diagnosis Date   • Acute myocardial infarction, true posterior wall infarction, episode of care unspecified    • Anxiety    • Bowel habit changes    • Breath shortness 2017    uses oxygen at 2 liters/min; AT NIGHT ONLY   • CAD (coronary artery disease)     S/P stent placement   • Cholesterol blood decreased    • Dental disorder     Partial upper   • Dizziness    • GI bleed    • Glaucoma    • Heart burn    • Hyperlipidemia    • Hypertension    • Myocardial infarct (HCC) 6/10/10   • Peripheral vascular disease (HCC)    • Pulmonary disease     bronchitis   • TIA (transient ischemic attack)    • Unspecified hemorrhagic conditions     pt takes plavix   • Unspecified urinary incontinence    • Urinary bladder disorder      There are no active hospital problems to display for this patient.      Past Surgical History:  Past Surgical History:   Procedure Laterality Date   • COLONOSCOPY - ENDO N/A 10/23/2018    Procedure: COLONOSCOPY - ENDO;  Surgeon: Anatoly Barragan M.D.;  Location: ENDOSCOPY [facility];  Service: Gastroenterology   • GASTROSCOPY-ENDO N/A 10/22/2018    Procedure: GASTROSCOPY-ENDO;  Surgeon: Bakari Brown M.D.;  Location: ENDOSCOPY [facility];  Service: Gastroenterology   • CAROTID ENDARTERECTOMY Right 9/3/2018    Procedure: CAROTID ENDARTERECTOMY WITH NEUROMONITORING;  Surgeon: Naga Abad M.D.;  Location: SURGERY [facility];  Service: Vascular   • GASTROSCOPY WITH BIOPSY  2/13/2016     Procedure: GASTROSCOPY WITH BIOPSY;  Surgeon: Susan Miller M.D.;  Location: ENDOSCOPY [facility];  Service:    • RECOVERY  7/24/2014    Performed by Cath-Recovery Surgery at SURGERY SAME DAY [facility]   • CARDIAC CATH  7/24/14    Diffuse disease, see report.  % with collterals.   • FEMORAL ENDARTERECTOMY  10/4/2013    Performed by Kacie Baker M.D. at SURGERY [facility]   • ANGIOPLASTY BALLOON  10/4/2013    Performed by Kacie Baker M.D. at SURGERY [facility]   • RECOVERY  4/9/2013    Performed by Ir-Recovery Surgery at SURGERY SAME DAY [facility]   • RECOVERY  11/26/2012    Performed by Ir-Recovery Surgery at SURGERY SAME DAY [facility]   • RECTUS REPAIR  7/13/2012    Performed by SHEILA PLATT at SURGERY [facility]   • OTHER  12/10/10    stents in legs   • OTHER  6/10/10    cardiac stents   • CARDIAC CATH  6/2010    BMS to Om   • OTHER  2005    KNEE SURGERY   • CHOLECYSTECTOMY     • HYSTERECTOMY, TOTAL ABDOMINAL     • OTHER CARDIAC SURGERY      stent in heart   • STENT PLACEMENT      treated by Dr Baker, multiple surgeries to legs.       Hospital Medications:    Current Facility-Administered Medications:   •  nitroglycerin (NITROSTAT) tablet 0.4 mg, 0.4 mg, Sublingual, Q5 MIN PRN, Lalit Duran M.D.  •  MD Alert...HEPARIN WEIGHT BASED PROTOCOL Pharmacist to implement 1 Each, 1 Each, Other, PRN, Lalit Duran M.D.    Current Outpatient Prescriptions:   •  benazepril (LOTENSIN) 20 MG Tab, TAKE 1 TABLET TWICE A DAY, Disp: 180 Tab, Rfl: 2  •  omeprazole (PRILOSEC) 40 MG delayed-release capsule, Take 1 Cap by mouth every day., Disp: 30 Cap, Rfl: 0  •  rosuvastatin (CRESTOR) 40 MG tablet, Take 1 Tab by mouth every evening., Disp: 30 Tab, Rfl: 0  •  metoprolol SR (TOPROL XL) 25 MG TABLET SR 24 HR, Take 1 Tab by mouth every day., Disp: 30 Tab, Rfl: 0  •  therapeutic multivitamin-minerals (THERAGRAN-M) Tab, Take 1 Tab by mouth every day., Disp: , Rfl:   •   "acetaminophen (TYLENOL) 500 MG Tab, Take 1,500 mg by mouth 1 time daily as needed for Moderate Pain., Disp: , Rfl:   •  clopidogrel (PLAVIX) 75 MG Tab, Take 75 mg by mouth every day., Disp: , Rfl:   •  aspirin (ASA) 81 MG Chew Tab chewable tablet, Take 1 Tab by mouth every day., Disp: 100 Tab, Rfl: 0  •  bimatoprost (LUMIGAN) 0.01 % Solution, Place 1 Drop in both eyes every bedtime. 1 gtts both eyes, Disp: , Rfl:   •  gabapentin (NEURONTIN) 300 MG Cap, Take 300 mg by mouth 2 Times a Day., Disp: , Rfl:     Current Outpatient Medications:    (Not in a hospital admission)    Medication Allergy:  Allergies   Allergen Reactions   • Penicillins Rash and Vomiting     Rxn = unknown  Pt tolerates cephalosporins   • Hydrocodone Vomiting and Nausea     Rxn = unknown   • Tape Rash     RASH       Family History:  Family History   Problem Relation Age of Onset   • Stroke Mother 68        CVA   • Heart Disease Mother 65        valve surgery   • Other Son         wgt 465 lbs       Social History:  Social History     Social History   • Marital status:      Spouse name: N/A   • Number of children: N/A   • Years of education: N/A     Occupational History   • Not on file.     Social History Main Topics   • Smoking status: Former Smoker     Packs/day: 0.25     Years: 45.00     Types: Cigarettes     Quit date: 2/1/2016   • Smokeless tobacco: Never Used   • Alcohol use 0.0 - 0.6 oz/week      Comment: 1 drink per month   • Drug use: No   • Sexual activity: Not on file     Other Topics Concern   • Not on file     Social History Narrative   • No narrative on file         Physical Exam:   Vitals/   Weight/BMI: Body mass index is 19.2 kg/m².  Blood pressure 126/81, pulse 87, temperature 37 °C (98.6 °F), temperature source Temporal, resp. rate 17, height 1.753 m (5' 9\"), weight 59 kg (130 lb), SpO2 96 %, not currently breastfeeding.  Vitals:    12/26/18 1058 12/26/18 1059 12/26/18 1130   BP:  126/81    Pulse:  (!) 105 87   Resp:  18 17 " "  Temp:  37 °C (98.6 °F)    TempSrc:  Temporal    SpO2:  94% 96%   Weight: 59 kg (130 lb)     Height: 1.753 m (5' 9\")       Oxygen Therapy:  Pulse Oximetry: 96 %, O2 (LPM): 2, O2 Delivery: Nasal Cannula    Physical Exam   Constitutional: She is oriented to person, place, and time. She appears well-developed and well-nourished. No distress.   Thin, frail, elderly woman in no distress   HENT:   Head: Normocephalic and atraumatic.   Eyes: EOM are normal. No scleral icterus.   Neck: No JVD present.   Cardiovascular: Normal rate, regular rhythm and intact distal pulses.  Exam reveals no gallop and no friction rub.    Murmur heard.   Systolic (Blowing systolic murmur at left lower sternal border) murmur is present with a grade of 2/6   Pulmonary/Chest: Effort normal. No respiratory distress. She has wheezes. She has no rales. She exhibits no tenderness.   Abdominal: Soft. Bowel sounds are normal. She exhibits no distension.   Musculoskeletal: She exhibits edema (2+ bilateral lower extremity edema).   Neurological: She is alert and oriented to person, place, and time.   Skin: No rash noted. She is not diaphoretic. No erythema. No pallor.   Psychiatric: She has a normal mood and affect. Her behavior is normal. Judgment and thought content normal.   Nursing note and vitals reviewed.      MDM (Data Review):     Records reviewed and summarized in current documentation    Lab Data Review:  Recent Results (from the past 24 hour(s))   EKG (NOW)    Collection Time: 18 10:43 AM   Result Value Ref Range    Report       [facility] Emergency Dept.    Test Date:  2018  Pt Name:    LEIDA RENNER                Department: ER  MRN:        [id number]                      Room:       RD 03  Gender:     Female                       Technician: [id number]  :        1934                   Requested By:JORY KATE  Order #:    [id number]                    Juli MD: JORY KATE, " MD    Measurements  Intervals                                Axis  Rate:       105                          P:          95  AR:         184                          QRS:        4  QRSD:       76                           T:          171  QT:         340  QTc:        450    Interpretive Statements  SINUS TACHYCARDIA  PROBABLE LEFT ATRIAL ABNORMALITY  EXTENSIVE ANTERIOR INFARCT, POSSIBLY ACUTE  BASELINE WANDER IN LEAD(S) V5,V6  Compared to ECG 11/01/2018 23:48:29  Sinus rhythm no longer present  Myocardial infarct finding still present    Electronically Signed On 12- 11:09:25 PST by JORY KATE MD     CBC WITH DIFFERENTIAL    Collection Time: 12/26/18 10:45 AM   Result Value Ref Range    WBC 10.0 4.8 - 10.8 K/uL    RBC 2.86 (L) 4.20 - 5.40 M/uL    Hemoglobin 7.3 (L) 12.0 - 16.0 g/dL    Hematocrit 25.6 (L) 37.0 - 47.0 %    MCV 89.5 81.4 - 97.8 fL    MCH 25.5 (L) 27.0 - 33.0 pg    MCHC 28.5 (L) 33.6 - 35.0 g/dL    RDW 57.4 (H) 35.9 - 50.0 fL    Platelet Count 254 164 - 446 K/uL    MPV 9.7 9.0 - 12.9 fL    Neutrophils-Polys 77.50 (H) 44.00 - 72.00 %    Lymphocytes 11.30 (L) 22.00 - 41.00 %    Monocytes 9.00 0.00 - 13.40 %    Eosinophils 1.10 0.00 - 6.90 %    Basophils 0.80 0.00 - 1.80 %    Immature Granulocytes 0.30 0.00 - 0.90 %    Nucleated RBC 0.00 /100 WBC    Lymphs (Absolute) 1.13 1.00 - 4.80 K/uL    Monos (Absolute) 0.90 (H) 0.00 - 0.85 K/uL    Eos (Absolute) 0.11 0.00 - 0.51 K/uL    Baso (Absolute) 0.08 0.00 - 0.12 K/uL    Immature Granulocytes (abs) 0.03 0.00 - 0.11 K/uL    NRBC (Absolute) 0.00 K/uL    Neutrophils (Absolute) 7.77 (H) 2.00 - 7.15 K/uL    Hypochromia 1+     Anisocytosis 1+     Microcytosis 1+    COMP METABOLIC PANEL    Collection Time: 12/26/18 10:45 AM   Result Value Ref Range    Sodium 142 135 - 145 mmol/L    Potassium 4.1 3.6 - 5.5 mmol/L    Chloride 115 (H) 96 - 112 mmol/L    Co2 16 (L) 20 - 33 mmol/L    Anion Gap 11.0 0.0 - 11.9    Glucose 127 (H) 65 - 99 mg/dL    Bun 30 (H) 8 - 22  "mg/dL    Creatinine 2.35 (H) 0.50 - 1.40 mg/dL    Calcium 9.0 8.5 - 10.5 mg/dL    AST(SGOT) 26 12 - 45 U/L    ALT(SGPT) 10 2 - 50 U/L    Alkaline Phosphatase 50 30 - 99 U/L    Total Bilirubin 0.6 0.1 - 1.5 mg/dL    Albumin 3.9 3.2 - 4.9 g/dL    Total Protein 6.4 6.0 - 8.2 g/dL    Globulin 2.5 1.9 - 3.5 g/dL    A-G Ratio 1.6 g/dL   LIPASE    Collection Time: 12/26/18 10:45 AM   Result Value Ref Range    Lipase 29 11 - 82 U/L   TROPONIN    Collection Time: 12/26/18 10:45 AM   Result Value Ref Range    Troponin I 4.45 (H) 0.00 - 0.04 ng/mL   Prothrombin Time    Collection Time: 12/26/18 10:45 AM   Result Value Ref Range    PT 13.9 12.0 - 14.6 sec    INR 1.06 0.87 - 1.13   BTYPE NATRIURETIC PEPTIDE    Collection Time: 12/26/18 10:45 AM   Result Value Ref Range    B Natriuretic Peptide 1200 (H) 0 - 100 pg/mL   APTT    Collection Time: 12/26/18 10:45 AM   Result Value Ref Range    APTT 27.7 24.7 - 36.0 sec   PERIPHERAL SMEAR REVIEW    Collection Time: 12/26/18 10:45 AM   Result Value Ref Range    Peripheral Smear Review see below    PLATELET ESTIMATE    Collection Time: 12/26/18 10:45 AM   Result Value Ref Range    Plt Estimation Normal    MORPHOLOGY    Collection Time: 12/26/18 10:45 AM   Result Value Ref Range    RBC Morphology Present     Polychromia 1+     Poikilocytosis 1+     Ovalocytes 1+     Schistocytes 1+    DIFFERENTIAL COMMENT    Collection Time: 12/26/18 10:45 AM   Result Value Ref Range    Comments-Diff see below    ESTIMATED GFR    Collection Time: 12/26/18 10:45 AM   Result Value Ref Range    GFR If  24 (A) >60 mL/min/1.73 m 2    GFR If Non African American 20 (A) >60 mL/min/1.73 m 2       Imaging/Procedures Review (all reviewed and pertinent for):      Cardiac catheterization, 7/24/2014 (images and report reviewed):  \"2.  Left anterior descending artery:  The LAD gives rise to a large first septal branch simultaneously with a tiny first diagonal branch.  The proximal LAD has dense " "calcification and diffuse luminal narrowing with at least an 80% stenosis prior to the first large septal branch.  Immediately after the first septal branch, the LAD is 100% occluded.  There is collateral circulation and   some filling of the distal LAD.     3.  Circumflex artery:  The circumflex consists of 2 marginal branches.  Midcircumflex has a 40% stenosis.  Stent of the proximal first marginal branch  is widely patent with no evidence of in-stent restenosis.  Proximal   circumflex has diffuse luminal narrowing of up to 40-50%.     4.  Right coronary artery:  The RCA gives rise to a posterior descending artery and a bifurcating posterolateral branch.  Proximal RCA has a smooth 50%   stenosis.  Mid RCA has diffuse noncritical atheroma.  The distal bifurcation has diffuse luminal narrowing but no high-grade stenotic lesions. There is some partial right to left\"    PET myocardial perfusion imaging, 9/11/2017:  \"SCINTOGRAPHIC FINDINGS:  There is positive evidence of large area of ischemia seen at the mid to apical septo-anterior wall of the left ventricle.    GATED WALL MOTION FINDINGS:  The left ventricle wall motion is normal with stress and rest  imagings.  Measured resting ejection fraction is 64 %.\" -The larger anterior ischemia was attributed to the  of her LAD at that time    EKG (12/26/2018, tracings and report reviewed, my interpretation): Demonstrates left ventricular hypertrophy with repolarization abnormality, anterior Q waves, and slightly more pronounced anterior ST elevation consistent with repolarization abnormality from left ventricular hypertrophy.  She does also have borderline pathologic inferior Q waves.    Echocardiogram, 10/14/2018:  \"CONCLUSIONS  Compared to prior 9-2-2018, no significant change.  Left ventricular ejection fraction is visually estimated to be 65%.  Mild concentric left ventricular hypertrophy.  Moderate mitral annular calcification.  Mild mitral regurgitation.  Moderate " "to severe aortic insufficiency.  Mild tricuspid regurgitation.  Right ventricular systolic pressure is estimated to be 40 mmHg\"    Chest x-ray, 12/26/2018, images and report reviewed, my interpretation: Bibasilar effusions, and pulmonary edema.  Hyperinflated.    Impression and Recommendations:       1.  Troponin elevation: Likely this is consistent with demand ischemia in the setting of decompensated heart failure from acute kidney injury.  However, I cannot rule out acute coronary syndrome and as such I would continue aspirin, and heparin drip.  Beta-blocker is outpatient Toprol. ACE inhibitor is relatively contraindicated in the setting of acute kidney injury.    2.  Decompensated heart failure: This appears to be related to acute kidney injury though cardiorenal syndrome is also possible.  I will defer workup to the primary team though I think it would be reasonable to attempt diuresis as she is clearly volume overloaded.  Again, beta-blocker is Toprol. ACE inhibitor is relatively contraindicated as is an aldosterone antagonist related to renal insufficiency.  Repeat echocardiogram ordered.    3.  Anginal chest pain: I do think this is probably related to high-grade proximal LAD disease though certainly she could have progression of other coronary atherosclerosis.  I again discussed with her in the context of her acute kidney injury, DNR/DNI CODE STATUS the option of an interventional strategy versus a medical strategy.  I certainly do not think that an interventional strategy is appropriate, and I would be reticent to even offer it.  Fortunately, she agrees with the medical strategy especially again in light of recent strokes, GI bleeds, and multiple other complications in her health history.    4.  Coronary artery disease: Continue aspirin, statin as above.  Beta-blocker.  ACE inhibitor contraindicated.  Imdur prescribed for anginal chest discomfort, possibly ranolazine as well.    Ajay Morgan, " MD  Cardiologist, [facility] [place]

## 2018-12-26 NOTE — ED PROVIDER NOTES
ED Provider Note    CHIEF COMPLAINT  Chief Complaint   Patient presents with   • Chest Pain       HPI  Brooke Diana is a 84 y.o. female who presents for evaluation of shortness of breath leg swelling chest discomfort.  The patient has a complex and extensive medical history as listed below.  She has known coronary artery disease that but has been medically managed.  She is also been evaluated for TIA and strokelike symptoms recently over the past several months.  She comes in by paramedics with chest discomfort.  She does have a left bundle branch block and therefore was initially triaged as a code STEMI.  On arrival she does report 2 out of 10 dull aching chest discomfort nonradiating.  No associated cough but she does report orthopnea dyspnea on exertion and leg swelling.  No hemoptysis no pleuritic chest pain      REVIEW OF SYSTEMS  See HPI for further details.  No night sweats weight loss numbness tingling weakness rash all other systems are negative.     PAST MEDICAL HISTORY  Past Medical History:   Diagnosis Date   • Breath shortness 2017    uses oxygen at 2 liters/min; AT NIGHT ONLY   • Myocardial infarct (HCC) 6/10/10   • Acute myocardial infarction, true posterior wall infarction, episode of care unspecified    • Anxiety    • Bowel habit changes    • CAD (coronary artery disease)     S/P stent placement   • Cholesterol blood decreased    • Dental disorder     Partial upper   • Dizziness    • GI bleed    • Glaucoma    • Heart burn    • Hyperlipidemia    • Hypertension    • Peripheral vascular disease (HCC)    • Pulmonary disease     bronchitis   • TIA (transient ischemic attack)    • Unspecified hemorrhagic conditions     pt takes plavix   • Unspecified urinary incontinence    • Urinary bladder disorder        FAMILY HISTORY  Noncontributory    SOCIAL HISTORY  Social History     Social History   • Marital status:      Spouse name: N/A   • Number of children: N/A   • Years of education: N/A      Social History Main Topics   • Smoking status: Former Smoker     Packs/day: 0.25     Years: 45.00     Types: Cigarettes     Quit date: 2/1/2016   • Smokeless tobacco: Never Used   • Alcohol use 0.0 - 0.6 oz/week      Comment: 1 drink per month   • Drug use: No   • Sexual activity: Not on file     Other Topics Concern   • Not on file     Social History Narrative   • No narrative on file     Former smoker  SURGICAL HISTORY  Past Surgical History:   Procedure Laterality Date   • COLONOSCOPY - ENDO N/A 10/23/2018    Procedure: COLONOSCOPY - ENDO;  Surgeon: Anatoly Barragan M.D.;  Location: ENDOSCOPY [facility];  Service: Gastroenterology   • GASTROSCOPY-ENDO N/A 10/22/2018    Procedure: GASTROSCOPY-ENDO;  Surgeon: Bakari Brown M.D.;  Location: [facility];  Service: Gastroenterology   • CAROTID ENDARTERECTOMY Right 9/3/2018    Procedure: CAROTID ENDARTERECTOMY WITH NEUROMONITORING;  Surgeon: Naga Abad M.D.;  Location: SURGERY [facility];  Service: Vascular   • GASTROSCOPY WITH BIOPSY  2/13/2016    Procedure: GASTROSCOPY WITH BIOPSY;  Surgeon: Susan Miller M.D.;  Location: [facility];  Service:    • RECOVERY  7/24/2014    Performed by Cath-Recovery Surgery at SURGERY SAME DAY [facility]   • CARDIAC CATH  7/24/14    Diffuse disease, see report.  % with collterals.   • FEMORAL ENDARTERECTOMY  10/4/2013    Performed by Kacie Baker M.D. at SURGERY [facility]   • ANGIOPLASTY BALLOON  10/4/2013    Performed by Kacie Baker M.D. at [facility]   • RECOVERY  4/9/2013    Performed by Ir-Recovery Surgery at SURGERY SAME DAY [facility]   • RECOVERY  11/26/2012    Performed by Ir-Recovery Surgery at SURGERY SAME DAY [facility]   • RECTUS REPAIR  7/13/2012    Performed by SHEILA PLATT at [facility] SURGICAL [facility]   • OTHER  12/10/10    stents in legs   • OTHER  6/10/10    cardiac stents   • CARDIAC CATH  6/2010     BMS to Om   • OTHER  2005    KNEE SURGERY   • CHOLECYSTECTOMY     • HYSTERECTOMY, TOTAL ABDOMINAL     • OTHER CARDIAC SURGERY      stent in heart   • STENT PLACEMENT      treated by Dr Baker, multiple surgeries to legs.       CURRENT MEDICATIONS  Home Medications     Reviewed by Chelsie Escobar R.N. (Registered Nurse) on 12/26/18 at 1102  Med List Status: Not Addressed   Medication Last Dose Status   acetaminophen (TYLENOL) 500 MG Tab  Active   aspirin (ASA) 81 MG Chew Tab chewable tablet  Active   benazepril (LOTENSIN) 20 MG Tab  Active   bimatoprost (LUMIGAN) 0.01 % Solution  Active   clopidogrel (PLAVIX) 75 MG Tab  Active   gabapentin (NEURONTIN) 300 MG Cap  Active   metoprolol SR (TOPROL XL) 25 MG TABLET SR 24 HR  Active   omeprazole (PRILOSEC) 40 MG delayed-release capsule  Active   rosuvastatin (CRESTOR) 40 MG tablet  Active   therapeutic multivitamin-minerals (THERAGRAN-M) Tab  Active              Current Facility-Administered Medications:   •  nitroglycerin (NITROSTAT) tablet 0.4 mg, 0.4 mg, Sublingual, Q5 MIN PRN, Lalit Duran M.D.  •  MD Alert...HEPARIN WEIGHT BASED PROTOCOL Pharmacist to implement 1 Each, 1 Each, Other, PRN, Lalit Duran M.D.    Current Outpatient Prescriptions:   •  benazepril (LOTENSIN) 20 MG Tab, TAKE 1 TABLET TWICE A DAY, Disp: 180 Tab, Rfl: 2  •  omeprazole (PRILOSEC) 40 MG delayed-release capsule, Take 1 Cap by mouth every day., Disp: 30 Cap, Rfl: 0  •  rosuvastatin (CRESTOR) 40 MG tablet, Take 1 Tab by mouth every evening., Disp: 30 Tab, Rfl: 0  •  metoprolol SR (TOPROL XL) 25 MG TABLET SR 24 HR, Take 1 Tab by mouth every day., Disp: 30 Tab, Rfl: 0  •  therapeutic multivitamin-minerals (THERAGRAN-M) Tab, Take 1 Tab by mouth every day., Disp: , Rfl:   •  acetaminophen (TYLENOL) 500 MG Tab, Take 1,500 mg by mouth 1 time daily as needed for Moderate Pain., Disp: , Rfl:   •  clopidogrel (PLAVIX) 75 MG Tab, Take 75 mg by mouth every day., Disp: , Rfl:   •  aspirin (ASA) 81 MG  Chew Tab chewable tablet, Take 1 Tab by mouth every day., Disp: 100 Tab, Rfl: 0  •  bimatoprost (LUMIGAN) 0.01 % Solution, Place 1 Drop in both eyes every bedtime. 1 gtts both eyes, Disp: , Rfl:   •  gabapentin (NEURONTIN) 300 MG Cap, Take 300 mg by mouth 2 Times a Day., Disp: , Rfl:       ALLERGIES  Allergies   Allergen Reactions   • Penicillins Rash and Vomiting     Rxn = unknown  Pt tolerates cephalosporins   • Hydrocodone Vomiting and Nausea     Rxn = unknown   • Tape Rash     RASH       PHYSICAL EXAM  VITAL SIGNS:         Temp  37 °C (98.6 °F)           Temp src  Temporal           BP  126/81           Patient BP Position  Supine           BP Location  Right;Upper Arm           BP Method  Automatic           Pulse  105           Resp  18           SpO2  94 %           O2 (LPM)  2           O2 Delivery  Nasal Cannula           Level of Consciousness  None/Wide Awake           BMI (Calculated)    19.2         BSA (Calculated)                 Constitutional: Elderly, cachectic   hENT: Normocephalic, Atraumatic, Bilateral external ears normal, Oropharynx moist, No oral exudates, Nose normal.   Eyes: PERRLA, EOMI, Conjunctiva normal, No discharge.   Neck: Normal range of motion, No tenderness, Supple, No stridor.   Cardiovascular: Normal heart rate, Normal rhythm, No murmurs, No rubs, No gallops.   Thorax & Lungs: Normal breath sounds, No respiratory distress, No wheezing, No chest tenderness.   Abdomen: Bowel sounds normal, Soft, No tenderness, No masses, No pulsatile masses.   Skin: Warm, Dry, No erythema, No rash.   Back: No tenderness, No CVA tenderness.   Extremities: Intact distal pulses, No edema, No tenderness, No cyanosis, No clubbing.   Neurologic: Alert & oriented x 3, Normal motor function, Normal sensory function, No focal deficits noted.   Psychiatric: Anxious  EKG  Interpretation by me sinus tachycardia LVH and repolarization is noted with ST elevation which is unchanged from prior.  No pathological T  wave inversions mild to moderate motion artifact    RADIOLOGY/PROCEDURES  DX-CHEST-PORTABLE (1 VIEW)   Final Result      Bibasilar atelectasis or pneumonia and small bilateral pleural effusions.        Results for orders placed or performed during the hospital encounter of 12/26/18   CBC WITH DIFFERENTIAL   Result Value Ref Range    WBC 10.0 4.8 - 10.8 K/uL    RBC 2.86 (L) 4.20 - 5.40 M/uL    Hemoglobin 7.3 (L) 12.0 - 16.0 g/dL    Hematocrit 25.6 (L) 37.0 - 47.0 %    MCV 89.5 81.4 - 97.8 fL    MCH 25.5 (L) 27.0 - 33.0 pg    MCHC 28.5 (L) 33.6 - 35.0 g/dL    RDW 57.4 (H) 35.9 - 50.0 fL    Platelet Count 254 164 - 446 K/uL    MPV 9.7 9.0 - 12.9 fL    Neutrophils-Polys 77.50 (H) 44.00 - 72.00 %    Lymphocytes 11.30 (L) 22.00 - 41.00 %    Monocytes 9.00 0.00 - 13.40 %    Eosinophils 1.10 0.00 - 6.90 %    Basophils 0.80 0.00 - 1.80 %    Immature Granulocytes 0.30 0.00 - 0.90 %    Nucleated RBC 0.00 /100 WBC    Lymphs (Absolute) 1.13 1.00 - 4.80 K/uL    Monos (Absolute) 0.90 (H) 0.00 - 0.85 K/uL    Eos (Absolute) 0.11 0.00 - 0.51 K/uL    Baso (Absolute) 0.08 0.00 - 0.12 K/uL    Immature Granulocytes (abs) 0.03 0.00 - 0.11 K/uL    NRBC (Absolute) 0.00 K/uL    Neutrophils (Absolute) 7.77 (H) 2.00 - 7.15 K/uL    Hypochromia 1+     Anisocytosis 1+     Microcytosis 1+    COMP METABOLIC PANEL   Result Value Ref Range    Sodium 142 135 - 145 mmol/L    Potassium 4.1 3.6 - 5.5 mmol/L    Chloride 115 (H) 96 - 112 mmol/L    Co2 16 (L) 20 - 33 mmol/L    Anion Gap 11.0 0.0 - 11.9    Glucose 127 (H) 65 - 99 mg/dL    Bun 30 (H) 8 - 22 mg/dL    Creatinine 2.35 (H) 0.50 - 1.40 mg/dL    Calcium 9.0 8.5 - 10.5 mg/dL    AST(SGOT) 26 12 - 45 U/L    ALT(SGPT) 10 2 - 50 U/L    Alkaline Phosphatase 50 30 - 99 U/L    Total Bilirubin 0.6 0.1 - 1.5 mg/dL    Albumin 3.9 3.2 - 4.9 g/dL    Total Protein 6.4 6.0 - 8.2 g/dL    Globulin 2.5 1.9 - 3.5 g/dL    A-G Ratio 1.6 g/dL   LIPASE   Result Value Ref Range    Lipase 29 11 - 82 U/L   TROPONIN   Result  Value Ref Range    Troponin I 4.45 (H) 0.00 - 0.04 ng/mL   Prothrombin Time   Result Value Ref Range    PT 13.9 12.0 - 14.6 sec    INR 1.06 0.87 - 1.13   BTYPE NATRIURETIC PEPTIDE   Result Value Ref Range    B Natriuretic Peptide 1200 (H) 0 - 100 pg/mL   APTT   Result Value Ref Range    APTT 27.7 24.7 - 36.0 sec   PERIPHERAL SMEAR REVIEW   Result Value Ref Range    Peripheral Smear Review see below    PLATELET ESTIMATE   Result Value Ref Range    Plt Estimation Normal    MORPHOLOGY   Result Value Ref Range    RBC Morphology Present     Polychromia 1+     Poikilocytosis 1+     Ovalocytes 1+     Schistocytes 1+    DIFFERENTIAL COMMENT   Result Value Ref Range    Comments-Diff see below    ESTIMATED GFR   Result Value Ref Range    GFR If  24 (A) >60 mL/min/1.73 m 2    GFR If Non African American 20 (A) >60 mL/min/1.73 m 2   EKG (NOW)   Result Value Ref Range    Report       [facility] Emergency Dept.    Test Date:  2018  Pt Name:    LEIDA RENNER                Department: ER  MRN:        [id number]                      Room:        03  Gender:     Female                       Technician: [id number]  :        [id number]                   Requested By:JORY KATE  Order #:    [id number]                    Reading MD: JORY KATE MD    Measurements  Intervals                                Axis  Rate:       105                          P:          95  MD:         184                          QRS:        4  QRSD:       76                           T:          171  QT:         340  QTc:        450    Interpretive Statements  SINUS TACHYCARDIA  PROBABLE LEFT ATRIAL ABNORMALITY  EXTENSIVE ANTERIOR INFARCT, POSSIBLY ACUTE  BASELINE WANDER IN LEAD(S) V5,V6  Compared to ECG 2018 23:48:29  Sinus rhythm no longer present  Myocardial infarct finding still present    Electronically Signed On 2018 11:09:25 PST by JORY KATE MD          COURSE & MEDICAL DECISION  MAKING  Pertinent Labs & Imaging studies reviewed. (See chart for details)  Patient was triaged as a code STEMI based on prehospital EKG.  Dr. Morgan evaluated the patient on arrival reviewed her old EKGs and does not feel that this is a STEMI.  He recommended further evaluation.  Here she has evidence of acute renal insufficiency, worsening symptomatic anemia, elevated troponin.  I would consider this a non-STEMI, acute coronary syndrome.  She also has worsening renal function and has a hemoglobin that is trending down and acute coronary syndrome does merit transfusion.  The patient will be transfused a unit of packed red cells.  She was consented regarding the risks and benefits of blood transfusion.  Dr. Morgan is aware of the abnormalities of the lab studies including the elevated troponin and they will follow on board.  He did recommended heparinizing the patient which was performed.  The patient will be admitted in guarded condition    CRITICAL CARE TIME:    The patient required approximately 38 minutes worth of critical care time. This excludes any procedures. This includes time spent directly at caring for the patient, making critical medical decisions, involving consultants and speaking with the family.  FINAL IMPRESSION  1.  Acute coronary syndrome  2.  Non-ST segment elevation myocardial infarction  3.  Symptomatic anemia requiring blood transfusion  4.  Worsening renal insufficiency       Admission  Electronically signed by: Lalit Duran, 12/26/2018 10:47 AM

## 2018-12-26 NOTE — ED TRIAGE NOTES
Pt BIB EMS divert from [facility] for STEMI alert. Pt given ASA en route, no nitro due to low blood pressure en route. Per EMS, pt with BP in systolic 90's on arrival. Pt on home O2, 2 LPM at 94%. Alert and oriented x3, sleepy.

## 2018-12-26 NOTE — ASSESSMENT & PLAN NOTE
I discussed with Dr. Ajay Morgan of Cardiology  Limited options for intervention  Nitroglycerin as needed with goal to keep the patient pain-free  Continue metoprolol  Continue isosorbide  Continue aspirin and Crestor  Continuous hemodynamic monitoring on telemetry  No heparin due to anemia requiring transfusion  OK to transfer out of the ICU

## 2018-12-26 NOTE — ED NOTES
Pt states she was taken off blood thinners due to GI bleed.  Pt reports her last GI bleed was about 1 month ago.  Dr. Guadalupe notified and ordered to hold heparin.

## 2018-12-27 ENCOUNTER — PATIENT OUTREACH (OUTPATIENT)
Dept: HEALTH INFORMATION MANAGEMENT | Facility: OTHER | Age: 83
End: 2018-12-27

## 2018-12-27 PROBLEM — I50.21 ACUTE SYSTOLIC HEART FAILURE (HCC): Status: ACTIVE | Noted: 2018-12-27

## 2018-12-27 LAB
AMORPH CRY #/AREA URNS HPF: PRESENT /HPF
ANION GAP SERPL CALC-SCNC: 9 MMOL/L (ref 0–11.9)
APPEARANCE UR: ABNORMAL
APTT PPP: 70.2 SEC (ref 24.7–36)
BACTERIA #/AREA URNS HPF: NEGATIVE /HPF
BASOPHILS # BLD AUTO: 0.8 % (ref 0–1.8)
BASOPHILS # BLD: 0.06 K/UL (ref 0–0.12)
BILIRUB UR QL STRIP.AUTO: NEGATIVE
BUN SERPL-MCNC: 26 MG/DL (ref 8–22)
CALCIUM SERPL-MCNC: 8.1 MG/DL (ref 8.5–10.5)
CHLORIDE SERPL-SCNC: 116 MMOL/L (ref 96–112)
CHOLEST SERPL-MCNC: 65 MG/DL (ref 100–199)
CO2 SERPL-SCNC: 15 MMOL/L (ref 20–33)
COLOR UR: YELLOW
CREAT SERPL-MCNC: 1.98 MG/DL (ref 0.5–1.4)
EKG IMPRESSION: NORMAL
EOSINOPHIL # BLD AUTO: 0.1 K/UL (ref 0–0.51)
EOSINOPHIL NFR BLD: 1.3 % (ref 0–6.9)
EPI CELLS #/AREA URNS HPF: ABNORMAL /HPF
ERYTHROCYTE [DISTWIDTH] IN BLOOD BY AUTOMATED COUNT: 56 FL (ref 35.9–50)
GLUCOSE SERPL-MCNC: 83 MG/DL (ref 65–99)
GLUCOSE UR STRIP.AUTO-MCNC: NEGATIVE MG/DL
GRAN CASTS #/AREA URNS LPF: >20 /LPF
HCT VFR BLD AUTO: 29.6 % (ref 37–47)
HCT VFR BLD AUTO: 31.4 % (ref 37–47)
HDLC SERPL-MCNC: 36 MG/DL
HGB BLD-MCNC: 7.1 G/DL (ref 12–16)
HGB BLD-MCNC: 8.7 G/DL (ref 12–16)
HGB BLD-MCNC: 8.8 G/DL (ref 12–16)
HGB BLD-MCNC: 9.8 G/DL (ref 12–16)
IMM GRANULOCYTES # BLD AUTO: 0.03 K/UL (ref 0–0.11)
IMM GRANULOCYTES NFR BLD AUTO: 0.4 % (ref 0–0.9)
KETONES UR STRIP.AUTO-MCNC: NEGATIVE MG/DL
LDLC SERPL CALC-MCNC: 6 MG/DL
LEUKOCYTE ESTERASE UR QL STRIP.AUTO: NEGATIVE
LV EJECT FRACT  99904: 25
LV EJECT FRACT MOD 2C 99903: 41.18
LV EJECT FRACT MOD 4C 99902: 35.6
LV EJECT FRACT MOD BP 99901: 37.48
LYMPHOCYTES # BLD AUTO: 0.84 K/UL (ref 1–4.8)
LYMPHOCYTES NFR BLD: 10.6 % (ref 22–41)
MCH RBC QN AUTO: 27.6 PG (ref 27–33)
MCHC RBC AUTO-ENTMCNC: 29.7 G/DL (ref 33.6–35)
MCV RBC AUTO: 92.8 FL (ref 81.4–97.8)
MICRO URNS: ABNORMAL
MONOCYTES # BLD AUTO: 0.56 K/UL (ref 0–0.85)
MONOCYTES NFR BLD AUTO: 7.1 % (ref 0–13.4)
MORPHOLOGY BLD-IMP: NORMAL
NEUTROPHILS # BLD AUTO: 6.35 K/UL (ref 2–7.15)
NEUTROPHILS NFR BLD: 79.8 % (ref 44–72)
NITRITE UR QL STRIP.AUTO: NEGATIVE
NRBC # BLD AUTO: 0 K/UL
NRBC BLD-RTO: 0 /100 WBC
PH UR STRIP.AUTO: 5 [PH]
PLATELET # BLD AUTO: 160 K/UL (ref 164–446)
PMV BLD AUTO: 9.9 FL (ref 9–12.9)
POTASSIUM SERPL-SCNC: 4 MMOL/L (ref 3.6–5.5)
PROT UR QL STRIP: 100 MG/DL
RBC # BLD AUTO: 3.19 M/UL (ref 4.2–5.4)
RBC # URNS HPF: ABNORMAL /HPF
RBC UR QL AUTO: ABNORMAL
SODIUM SERPL-SCNC: 140 MMOL/L (ref 135–145)
SP GR UR STRIP.AUTO: 1.02
TRIGL SERPL-MCNC: 114 MG/DL (ref 0–149)
TROPONIN I SERPL-MCNC: 5.82 NG/ML (ref 0–0.04)
UROBILINOGEN UR STRIP.AUTO-MCNC: 0.2 MG/DL
WBC # BLD AUTO: 7.9 K/UL (ref 4.8–10.8)
WBC #/AREA URNS HPF: ABNORMAL /HPF

## 2018-12-27 PROCEDURE — 30233N1 TRANSFUSION OF NONAUTOLOGOUS RED BLOOD CELLS INTO PERIPHERAL VEIN, PERCUTANEOUS APPROACH: ICD-10-PCS | Performed by: HOSPITALIST

## 2018-12-27 PROCEDURE — 85025 COMPLETE CBC W/AUTO DIFF WBC: CPT

## 2018-12-27 PROCEDURE — 700102 HCHG RX REV CODE 250 W/ 637 OVERRIDE(OP): Performed by: HOSPITALIST

## 2018-12-27 PROCEDURE — 700111 HCHG RX REV CODE 636 W/ 250 OVERRIDE (IP): Performed by: INTERNAL MEDICINE

## 2018-12-27 PROCEDURE — 93010 ELECTROCARDIOGRAM REPORT: CPT | Performed by: INTERNAL MEDICINE

## 2018-12-27 PROCEDURE — 80061 LIPID PANEL: CPT

## 2018-12-27 PROCEDURE — 99233 SBSQ HOSP IP/OBS HIGH 50: CPT | Performed by: HOSPITALIST

## 2018-12-27 PROCEDURE — 85018 HEMOGLOBIN: CPT | Mod: 91

## 2018-12-27 PROCEDURE — 99232 SBSQ HOSP IP/OBS MODERATE 35: CPT | Performed by: INTERNAL MEDICINE

## 2018-12-27 PROCEDURE — 93005 ELECTROCARDIOGRAM TRACING: CPT | Performed by: INTERNAL MEDICINE

## 2018-12-27 PROCEDURE — 700102 HCHG RX REV CODE 250 W/ 637 OVERRIDE(OP): Performed by: INTERNAL MEDICINE

## 2018-12-27 PROCEDURE — 99291 CRITICAL CARE FIRST HOUR: CPT | Performed by: INTERNAL MEDICINE

## 2018-12-27 PROCEDURE — 770022 HCHG ROOM/CARE - ICU (200)

## 2018-12-27 PROCEDURE — 85730 THROMBOPLASTIN TIME PARTIAL: CPT

## 2018-12-27 PROCEDURE — A9270 NON-COVERED ITEM OR SERVICE: HCPCS | Performed by: HOSPITALIST

## 2018-12-27 PROCEDURE — 84484 ASSAY OF TROPONIN QUANT: CPT

## 2018-12-27 PROCEDURE — 81001 URINALYSIS AUTO W/SCOPE: CPT

## 2018-12-27 PROCEDURE — A9270 NON-COVERED ITEM OR SERVICE: HCPCS | Performed by: INTERNAL MEDICINE

## 2018-12-27 PROCEDURE — 80048 BASIC METABOLIC PNL TOTAL CA: CPT

## 2018-12-27 PROCEDURE — 700105 HCHG RX REV CODE 258

## 2018-12-27 PROCEDURE — 85014 HEMATOCRIT: CPT

## 2018-12-27 PROCEDURE — P9016 RBC LEUKOCYTES REDUCED: HCPCS

## 2018-12-27 PROCEDURE — 36430 TRANSFUSION BLD/BLD COMPNT: CPT

## 2018-12-27 RX ORDER — SODIUM CHLORIDE 9 MG/ML
INJECTION, SOLUTION INTRAVENOUS
Status: COMPLETED
Start: 2018-12-27 | End: 2018-12-27

## 2018-12-27 RX ORDER — FUROSEMIDE 10 MG/ML
40 INJECTION INTRAMUSCULAR; INTRAVENOUS
Status: DISCONTINUED | OUTPATIENT
Start: 2018-12-27 | End: 2018-12-29

## 2018-12-27 RX ADMIN — ISOSORBIDE MONONITRATE 60 MG: 30 TABLET ORAL at 05:27

## 2018-12-27 RX ADMIN — FUROSEMIDE 40 MG: 10 INJECTION, SOLUTION INTRAMUSCULAR; INTRAVENOUS at 10:04

## 2018-12-27 RX ADMIN — SODIUM CHLORIDE: 9 INJECTION, SOLUTION INTRAVENOUS at 02:15

## 2018-12-27 RX ADMIN — OMEPRAZOLE 20 MG: 20 CAPSULE, DELAYED RELEASE ORAL at 05:27

## 2018-12-27 RX ADMIN — ROSUVASTATIN CALCIUM 40 MG: 20 TABLET ORAL at 18:08

## 2018-12-27 RX ADMIN — STANDARDIZED SENNA CONCENTRATE AND DOCUSATE SODIUM 1 TABLET: 8.6; 5 TABLET, FILM COATED ORAL at 05:27

## 2018-12-27 RX ADMIN — METOPROLOL SUCCINATE 50 MG: 25 TABLET, EXTENDED RELEASE ORAL at 05:27

## 2018-12-27 RX ADMIN — ASPIRIN 325 MG: 325 TABLET ORAL at 05:27

## 2018-12-27 RX ADMIN — OMEPRAZOLE 20 MG: 20 CAPSULE, DELAYED RELEASE ORAL at 18:08

## 2018-12-27 ASSESSMENT — ENCOUNTER SYMPTOMS
WEAKNESS: 1
SPUTUM PRODUCTION: 0
PSYCHIATRIC NEGATIVE: 1
FATIGUE: 1
DIZZINESS: 0
MUSCULOSKELETAL NEGATIVE: 1
GASTROINTESTINAL NEGATIVE: 1
CHEST TIGHTNESS: 1
NEUROLOGICAL NEGATIVE: 1
COUGH: 0
HEMOPTYSIS: 0
EYES NEGATIVE: 1
ENDOCRINE NEGATIVE: 1
PALPITATIONS: 0
HEMATOLOGIC/LYMPHATIC NEGATIVE: 1
NAUSEA: 0
VOMITING: 0
SHORTNESS OF BREATH: 1
ORTHOPNEA: 0
CHILLS: 0

## 2018-12-27 ASSESSMENT — PAIN SCALES - GENERAL
PAINLEVEL_OUTOF10: 0
PAINLEVEL_OUTOF10: 4
PAINLEVEL_OUTOF10: 5
PAINLEVEL_OUTOF10: 3
PAINLEVEL_OUTOF10: 0

## 2018-12-27 NOTE — PROGRESS NOTES
Dr. Yossi Krishnan updated on hemoglobin post transfusion and continued chest pain. Per MD start heparin gtt with no initial bolus, trend trop and h/h. Lab orders entered by MD.

## 2018-12-27 NOTE — CARE PLAN
Problem: Unstable Angina/Chest Pain/Non Stemi  Goal: Optimal Care of the Angina/Chest Pain/Non Stemi Patient    Intervention: Vital Signs and Cardiac Monitoring  Done   Intervention: Troponin, CBC, Chem Panel, Lipid Profile, INR, PT, PTT, Chest X-Ray per orders  Done   Intervention: EKG on admission and every 6 hours: obtain with all episodes of chest pain  Done. Dr. Morgan notified of ongoing chest pain   Intervention: Morphine, Oxygen, Nitrates and Aspirin  Morphine administered in ED. Aspirin given prior to arrival. Imdur, see MAR. Oxygen provided.   Intervention: Echo if signs and symptoms of CHF or hemodynamic instability  Echo ordered by MD  Intervention: Heparin Protocol or enoxaparin  Heparin held per Dr. Yossi Krishnan due to hemoglobin of 6.5 and blood transfusion  Intervention: Nitroglycerin drip (Critical Care)  Held due to hypotension. Discussed with Dr. Morgan.  Intervention: Bedrest if femoral sheath in  Not applicable

## 2018-12-27 NOTE — PROGRESS NOTES
[facility] Medicine Daily Progress Note    Date of Service  12/27/2018    Chief Complaint  84 y.o. female admitted 12/26/2018 with chest pain    Hospital Course    Ms Diana has a history of coronary artery disease.  She suffered a TIA in September 2018 and workup revealed high grade right carotid disease and she subsequently underwent CEA.  She had GI bleed in October 2018 and colonoscopy showed AVM's in the colon that were treated with APC, epinephrine and endoclip.  Later in October she was treated conservatively for an NSTEMI.  On 12/26/18 she presented to the Emergency Department on 12/26/18 with chest pain, shortness of breath and LE edema.  Cardiology was consulted.  She was admitted to the ICU and treated with heparin drip      Interval Problem Update  Admitted yesterday  Intermittent chest pain overnight, substernal, this morning she is chest pain-free  750 mL of urine charted since admission, her breathing is improved, complains of lower extremity edema although there is not much findings  1 unit PRBC's transfused overnight, she is not seeing any blood in the stool    Consultants/Specialty  Cardiology    Code Status  DNAR/DNI    Disposition  Prognosis guarded, she has multiorgan failure as a result of extensive vascular disease, hospice may be appropriate, I placed a palliative care consult    Review of Systems  Review of Systems   Constitutional: Positive for malaise/fatigue. Negative for chills.   Respiratory: Positive for shortness of breath. Negative for cough, hemoptysis and sputum production.    Cardiovascular: Positive for chest pain and leg swelling. Negative for palpitations and orthopnea.   Gastrointestinal: Negative for nausea and vomiting.   Skin: Negative for itching and rash.   Neurological: Positive for weakness. Negative for dizziness.   All other systems reviewed and are negative.       Physical Exam  Temp:  [35.8 °C (96.4 °F)-37 °C (98.6 °F)] 36.1 °C (97 °F)  Pulse:  [] 84  Resp:   [13-22] 15  BP: (104-126)/(42-81) 119/50    Physical Exam   Constitutional: She is oriented to person, place, and time. She appears well-developed and well-nourished.   HENT:   Head: Normocephalic and atraumatic.   Eyes: Conjunctivae and EOM are normal. Right eye exhibits no discharge. Left eye exhibits no discharge.   Cardiovascular: Normal rate, regular rhythm and intact distal pulses.    No murmur heard.  2+ Radial Pulses  Brisk Capillary Refill   Pulmonary/Chest: Effort normal. No respiratory distress. She has no wheezes. She has rales.   Abdominal: Soft. Bowel sounds are normal. She exhibits no distension. There is no tenderness. There is no rebound.   Musculoskeletal: Normal range of motion. She exhibits edema.   Scant ankle edema   Neurological: She is alert and oriented to person, place, and time. No cranial nerve deficit.   Skin: Skin is warm and dry. She is not diaphoretic. No erythema.   Skin is warm and well perfused   Psychiatric: She has a normal mood and affect.       Fluids    Intake/Output Summary (Last 24 hours) at 12/27/18 0741  Last data filed at 12/27/18 0600   Gross per 24 hour   Intake            837.5 ml   Output              750 ml   Net             87.5 ml       Laboratory  Recent Labs      12/26/18   1045   12/26/18   1705  12/27/18   0023  12/27/18   0520   WBC  10.0   --    --    --   7.9   RBC  2.86*   --    --    --   3.19*   HEMOGLOBIN  7.3*   < >  8.3*  7.1*  8.8*   HEMATOCRIT  25.6*   --   28.6*   --   29.6*   MCV  89.5   --    --    --   92.8   MCH  25.5*   --    --    --   27.6   MCHC  28.5*   --    --    --   29.7*   RDW  57.4*   --    --    --   56.0*   PLATELETCT  254   --    --    --   160*   MPV  9.7   --    --    --   9.9    < > = values in this interval not displayed.     Recent Labs      12/26/18   1045  12/27/18   0520   SODIUM  142  140   POTASSIUM  4.1  4.0   CHLORIDE  115*  116*   CO2  16*  15*   GLUCOSE  127*  83   BUN  30*  26*   CREATININE  2.35*  1.98*   CALCIUM   9.0  8.1*     Recent Labs      12/26/18   1045  12/27/18   0023   APTT  27.7  70.2*   INR  1.06   --      Recent Labs      12/26/18   1045   BNPBTYPENAT  1200*     Recent Labs      12/27/18   0520   TRIGLYCERIDE  114   HDL  36*   LDL  6       Imaging  EC-ECHOCARDIOGRAM LTD W/O CONT   Final Result      US-RENAL   Final Result      1.  Echogenic kidneys suggesting medical renal disease.   2.  No hydronephrosis.   3.  Small postvoid residual in bladder.   4.  Bilateral pleural effusions.      DX-CHEST-PORTABLE (1 VIEW)   Final Result      Bibasilar atelectasis or pneumonia and small bilateral pleural effusions.           Assessment/Plan  * Acute coronary syndrome (HCC)- (present on admission)   Assessment & Plan    Patient continues to have intermittent chest pain  Limited options for intervention  Cardiology is consulted  Nitroglycerin as needed with goal to keep the patient pain-free  Continue metoprolol  Continue isosorbide  Continue aspirin and Crestor  Continuous hemodynamic monitoring on telemetry     Acute systolic heart failure (HCC)- (present on admission)   Assessment & Plan    Patient presents with pulmonary edema, her ejection fraction is 25%  Continue metoprolol XL, ACE/ARB contraindicated due to renal failure  Cardiology following     Normocytic anemia- (present on admission)   Assessment & Plan    With history of GI bleed secondary to AVMs  She was transfused 1 unit of packed red blood cells this morning  Follow-up labs reviewed her, her hemoglobin is 8.8  Monitor closely  Heparin drip stop         PVD (peripheral vascular disease) (HCC)- (present on admission)   Assessment & Plan    Medical management     Acute pulmonary edema (HCC)- (present on admission)   Assessment & Plan    Cardiogenic, she has acute decrease in her ejection fraction  Symptoms improved with Lasix       Acute kidney injury (HCC)- (present on admission)   Assessment & Plan    Baseline creatinine is normal, she presents with acute  renal failure  Likely cardiorenal  Monitor closely with diuresis  Avoid nephrotoxins     Hyperlipidemia- (present on admission)   Assessment & Plan    Continue statin          VTE prophylaxis: Heparin stopped due to anemia requiring transfusion

## 2018-12-27 NOTE — HEART FAILURE PROGRAM
.Cardiovascular Nurse Navigator () Advanced Heart Failure Program Inpatient Consult Note:     Hospital Admit Date: 12/26/18. I found patient during review of cardiology rounding list for ACS patients. HF is not on her problems list but she did receive a diagnosis from cardiology yesterday.    Per Dr. NIKKI Morgan, troponin elevation is likely due to demand ischemia and decompensated HF appears to be related to STEPHANIE. Cannot rule out ACS, treating as such.     HFrEF (25%)  De Alec? yes    Demographics:    · Insurance: Medicare and   · Residence: [facility] [place] Secondary Prevention interventions:    · Pneumococcal vaccine: not indicated per admit perlita  · Influenza vaccine: prev this season per admit perlita    HFrEF GDMT:    · ACE-I/ARB/ARNI: contraindicated per Dr. NIKKI Morgan on 12/26 - renal isufficiency  · Evidence Based BB (bisoprolol, carvedilol, or Toprol XL): Toprol XL  · Aldosterone receptor antagonist: contraindicated per Dr. NIKKI Morgan on 12/26 - renal isufficiency   · AC for AF?: n/a      BEDSIDE NURSING Daily Weights and Intake and Output Weighs and I's and O's were not ordered, I have ordered them.    · Every HF patient should have daily weights and I's and O's  · Please weigh patient daily on a standing scale if not clinically contraindicated.   · While doing this, teach patient to write weight down on the Symptom Tracker in the HF book - and ask them what they would do with that weight at home (ie: call for 3# weight gain). This is an excellent opportunity for impactful, in the moment teaching.  Providers rely on your complete documentation of weights and I's and O's to decide whether or not our treatment is working and whether or not a HF patient is ready to discharge!    If pt does not own a working scale and cannot afford to purchase one, please provide one. Scales can be found in the Care Coordination Rooms on T-7&T-8.    [facility] Plan Notes:   None pertaining to discharge  Therapy Notes:  "  none  Advanced Care Planning:  No AD on file. DNAR/DNI code status at the time of this note's filing.    The ACC recommends engaging palliative care as part of optimization of HFrEF treatment to solicit goals of care and focus on quality of life throughout the clinical course of HF.    Once all diagnostics are in, please consider an order for palliative to discuss Advanced Care Planning.      Speaking with patients frankly about their end-of-life wishes is one of the most important things a palliative care team can do. This is especially important in the context of heart failure, since it’s such an unpredictable disease.    Follow up appointment:   If discharged from acute care to home (exception hospice discharge), pt must have an appointment scheduled within 7 days of discharge (Cardiology, PCP, or DC Clinic).    If discharged to Transitional Care Facility (LTAC, SNF, IRH), appointment should be made about a month out for after TCF.     Bedside Nursing Education:  Please provide HF booklet and repeated, ongoing education while administering medications, weighing patient, discussing management of symptoms, diet and need to follow up and act on changes.    Bedside Nursing Discharge:  When completing the after visit summary (discharge instructions) please select \"Cardiac Diagnosis, and Heart Failure\" in the special instructions section to populate the heart failure specific discharge instructions.     Referrals/Orders Placed:    Hospital Schedulers for HF f/u?  yes  Social Work   no  Registered Dietician  yes  REMSA CP Program for patients with Medicaid, [facility], or longterm Plus coverage?  no  Outpatient Care Coordination for patients with [facility] Plus coverage and with 3 or more admissions within a year?  no    Stacy KIRAN RN, [facility] ext. [phone] M-F        "

## 2018-12-27 NOTE — CONSULTS
"Reason for PC Consult: Advance Care Planning    Consulted by: Jakub Alcocer MD    Assessment:  General: 86yo lady BIB EMS and admitted to CICU through ED  with chest pain and elevated troponin - being treated as ACS but per cards likely HF from STEPHANIE. Initially NTG and heparin gtts, heparin d/c'ed with drop in hgb and need for PRBC transfusion.  PMH: GIBs 2/2 AVMs, carotid endarterectomy (2018) NSTEMI with CVA during hospitalization (), MI (), CAD s/p PCI to OM, PVD, quit smoking two years ago    Dyspnea: No-    Last BM:  (pta)-    Pain: Yes- 4/10 chest \"pressure\" which pt's stated is WNL for her daily life and \"letting up a lot\" since ED  Depression: Mood appropriate for situation-      Spiritual:  Is Moravian or spirituality important for coping with this illness? Yes- Pt's Hindu  is a friend/good source of strength for pt. Pt shared struggling with her beliefs about heaven (\"Where could all those people be?\"), but while she would \"be ok\" with nothing after death, she stated her fear of death as her greatest fear.  Explored this - pt stated she did not feel she had any unfinished business nor regrets.  Has a  or spiritual provider visit been requested? No    Palliative Performance Scale: 60    Advanced Directive: None- Pt stated she does have an AD naming her sister Erin as DPOA-HC and that she and Erin have spoken extensively about her wishes since they have made decisions to withdraw care on their mother, as well as pt's  (who  5years ago) and son (who  4 years ago).  DPOA:  -    POLST:No- Pt stated she does have DNR/DNI POLST at home on fridge and she believes a copy on file at [facility].  Pt verbalized understanding that both her AD and POST can be scanned in by any [facility] MD office so to please bring at next appt.    Code Status: DNR- DNI    Social:   Pt lives with her 16yo lab Jose, manages all ADLs including taking dog for daily walk with her " "O2 and sometimes her walker.  She is close with her sister Erin Carter and her carson who locally.  She and her sister speak everyday. Pt's son moved in with her after her 's death but then he  soon after in her home.  Pt is estranged from her other son but close with his dtr Ingrid Moon and other grandchildren.  She has four great grandchildren whom she enjoys seeing - all were at her home [place].  Pt is also very close with her ex-dil Kalee who phones frequently from her home in [place] and will order pt's grocery list to be delivered.  Pt grew up in [place], then traveled \"all over\" with her [employer]  before settling in [place] and working as an  for decades - a profession she enjoyed very much.    Outcome:  Introduced self and role of Palliative Care.  Assessed pt's understanding of her current medical status, overall health picture, and options for future care.  Pt verbalized good understanding of her acute and chronic issues and the difficult balance in helping one system \"and then the other one gives out.\"  Validated this thought and expanded conversation to burden vs benefit, QoL and GOC.    Explored pt's values, beliefs, and preferences in order to identify GOC.  Pt values her independence and while she enjoyed the limited socialization at [facility] during one of her rehab stays, she does not want to consider [facility].  The move from home to [facility] four years ago \"was difficult enough\" emotionally.  Pt does have a 1st Alert bracelet.  Pt expressed being \"tired of being tired\" and of her multi-admissions.  Discussed hospice as care option as pt's chest pan may be managed by them at home.  Pt stated that she has [facility] and had an appt set up with their hospice at their suggestion, but canceled it \"because I'm not quite there.\"  Suggested pt have a hospice evaluation during this admission to learn more details and see if she meets criteria. Pt " declined.    Active listening, reflection, reminiscing, and empathic support utilized throughout this encounter.  All questions answered.  PC contact information given.     Discussed with/Updated: ICU RN Ranjana    Plan: Pt amenable to SNF d/c to [facility] if it is warranted, otherwise home.    Recommendations:  I do not recommend an ethics or hospice consult at this time because pt is quite aware of this option as it was presented by me and her HH provider, and she declines referral at this time..    Thank you for allowing Palliative Care to participate in this patient's care. Please feel free to call [phone] with any questions or concerns.

## 2018-12-27 NOTE — PROGRESS NOTES
MD Galarza paged for updates. MD updated on pt's drop in hemoglobin from 8.5 to 7.1 since 1700 last night. MD updated on heparin gtt being paused. Per MD administer 1 unit PRBC and consult cardiology regarding heparin gtt.

## 2018-12-27 NOTE — PROGRESS NOTES
Report received from DOUGLAS Lim. Pt up to unit. Pt hypotensive upon arrival to unit. Hemoglobin down to 6.5, RBCs not administered by ED RN. Chest pain 7/10. Critical trop of 7.3. Dr. Yossi Krishnan and Dr. Morgan notified. Per Dr. Yossi Krishnan, hold heparin gtt, transfuse previously ordered unit and recheck H/H. Per Dr. Morgan, hold nitro gtt and give Imdur.

## 2018-12-27 NOTE — CARE PLAN
Problem: Safety  Goal: Will remain free from falls    Intervention: Implement fall precautions  Bed in lowest and locked position, lower bed rails up, bed alarm set, fall wrist band, yellow non skid socks and fall risk sign outside pt room. Call light and personal belongings within reach. Pt calls appropriately, hourly rounding in progress.       Problem: Unstable Angina/Chest Pain/Non Stemi  Goal: Optimal Care of the Angina/Chest Pain/Non Stemi Patient    Intervention: Heparin Protocol or enoxaparin  Heparin gtt initiated per MD order. Close monitoring for signs and symptoms of bleeding in place. Serial APTT's ordered per protocol.

## 2018-12-27 NOTE — CONSULTS
Critical Care Consultation    Date of consult: 12/27/2018    Referring Physician  Jakub Alcocer M.D.    Reason for Consultation  Chest pain    History of Presenting Illness  84 y.o. female who presented 12/26/2018 with history of coronary artery disease presented with chest pain.  She was thought to be having acute coronary syndrome.  Patient was seen by cardiology.  Her non-ST elevation MI was medically managed due to her poor health status with recent GI bleed and recent series of strokes.    Code Status  DNAR/DNI    Review of Systems  Review of Systems   Constitutional: Positive for fatigue.   HENT: Negative.    Eyes: Negative.    Respiratory: Positive for chest tightness and shortness of breath.    Cardiovascular: Positive for chest pain.   Gastrointestinal: Negative.    Endocrine: Negative.    Genitourinary: Negative.    Musculoskeletal: Negative.    Skin: Negative.    Neurological: Negative.    Hematological: Negative.    Psychiatric/Behavioral: Negative.        Past Medical History   has a past medical history of Acute myocardial infarction, true posterior wall infarction, episode of care unspecified; Anxiety; Bowel habit changes; Breath shortness (2017); CAD (coronary artery disease); Cholesterol blood decreased; Dental disorder; Dizziness; GI bleed; Glaucoma; Heart burn; Hyperlipidemia; Hypertension; Myocardial infarct (HCC) (6/10/10); Peripheral vascular disease (HCC); Pulmonary disease; TIA (transient ischemic attack); Unspecified hemorrhagic conditions; Unspecified urinary incontinence; and Urinary bladder disorder.    Surgical History   has a past surgical history that includes rectus repair (7/13/2012); recovery (11/26/2012); recovery (4/9/2013); femoral endarterectomy (10/4/2013); angioplasty balloon (10/4/2013); hysterectomy, total abdominal; other cardiac surgery; cholecystectomy; other (12/10/10); other (6/10/10); other (2005); recovery (7/24/2014); cardiac cath (6/2010); cardiac cath (7/24/14);  stent placement; gastroscopy with biopsy (2/13/2016); carotid endarterectomy (Right, 9/3/2018); gastroscopy-endo (N/A, 10/22/2018); and colonoscopy - endo (N/A, 10/23/2018).    Family History  family history includes Heart Disease (age of onset: 65) in her mother; Other in her son; Stroke (age of onset: 68) in her mother.    Social History   reports that she quit smoking about 2 years ago. Her smoking use included Cigarettes. She has a 11.25 pack-year smoking history. She has never used smokeless tobacco. She reports that she drinks alcohol. She reports that she does not use drugs.    Medications  Home Medications     Reviewed by Roxanna Kirkland (Pharmacy Tech) on 12/26/18 at 0872  Med List Status: Complete   Medication Last Dose Status   amLODIPine (NORVASC) 5 MG Tab 12/25/2018 Active   aspirin (ASA) 81 MG Chew Tab chewable tablet 12/25/2018 Active   benazepril (LOTENSIN) 20 MG Tab 12/25/2018 Active   bimatoprost (LUMIGAN) 0.01 % Solution 12/25/2018 Active   isosorbide mononitrate SR (IMDUR) 30 MG TABLET SR 24 HR 12/25/2018 Active   metoprolol SR (TOPROL XL) 50 MG TABLET SR 24 HR 12/25/2018 Active   pantoprazole (PROTONIX) 40 MG Tablet Delayed Response 12/25/2018 Active   rosuvastatin (CRESTOR) 40 MG tablet 12/25/2018 Active              Current Facility-Administered Medications   Medication Dose Route Frequency Provider Last Rate Last Dose   • nitroglycerin (NITROSTAT) tablet 0.4 mg  0.4 mg Sublingual Q5 MIN PRN Lalit Duran M.D.       • metoprolol SR (TOPROL XL) tablet 50 mg  50 mg Oral DAILY Ghulam Krishnan M.D.   50 mg at 12/27/18 0527   • omeprazole (PRILOSEC) capsule 20 mg  20 mg Oral BID Ghulam Krishnan M.D.   20 mg at 12/27/18 0527   • rosuvastatin (CRESTOR) tablet 40 mg  40 mg Oral Q EVENING Ghulam Krishnan M.D.   40 mg at 12/26/18 1800   • senna-docusate (PERICOLACE or SENOKOT S) 8.6-50 MG per tablet 2 Tab  2 Tab Oral BID Ghulam Krishnan M.D.   1 Tab at 12/27/18 0527    And    • polyethylene glycol/lytes (MIRALAX) PACKET 1 Packet  1 Packet Oral QDAY PRN Ghulam Krishnan M.D.        And   • magnesium hydroxide (MILK OF MAGNESIA) suspension 30 mL  30 mL Oral QDAY PRN Ghulam Krishnan M.D.        And   • bisacodyl (DULCOLAX) suppository 10 mg  10 mg Rectal QDAY PRN Ghulam Krishnan M.D.       • nitroglycerin 50 mg in D5W 250 ml infusion  0-200 mcg/min Intravenous Continuous Ghulam Krishnan M.D.       • aspirin (ASA) tablet 325 mg  325 mg Oral DAILY Ghulam Krishnan M.D.   325 mg at 12/27/18 0527    Or   • aspirin (ASA) chewable tab 324 mg  324 mg Oral DAILY Ghulam Krishnan M.D.        Or   • aspirin (ASA) suppository 300 mg  300 mg Rectal DAILY Ghulam Krishnan M.D.       • ondansetron (ZOFRAN) syringe/vial injection 4 mg  4 mg Intravenous Q4HRS PRN Ghulam Krishnan M.D.       • ondansetron (ZOFRAN ODT) dispertab 4 mg  4 mg Oral Q4HRS PRN Ghulam Krishnan M.D.       • furosemide (LASIX) injection 40 mg  40 mg Intravenous Once Ghulam Krishnan M.D.   Stopped at 12/26/18 1415   • isosorbide mononitrate SR (IMDUR) tablet 60 mg  60 mg Oral Q DAY Ajay Morgan M.D.   60 mg at 12/27/18 0527       Allergies  Allergies   Allergen Reactions   • Penicillins Rash and Vomiting     Rxn = unknown  Pt tolerates cephalosporins   • Hydrocodone Vomiting and Nausea     Rxn = unknown   • Tape Rash     RASH       Vital Signs last 24 hours  Temp:  [35.8 °C (96.4 °F)-37 °C (98.6 °F)] 36.2 °C (97.2 °F)  Pulse:  [] 80  Resp:  [13-22] 15  BP: (104-126)/(42-81) 119/50    Physical Exam  Physical Exam  Constitutional: She is oriented to person, place, and time. She appears well-developed and well-nourished.   HENT:   Head: Normocephalic and atraumatic.   Right Ear: External ear normal.   Left Ear: External ear normal.   Mouth/Throat: No oropharyngeal exudate.   Eyes: Conjunctivae are normal. Right eye exhibits no discharge. Left eye exhibits no discharge. No scleral  icterus.   Neck: Neck supple. No JVD present. No tracheal deviation present.   Cardiovascular: Normal rate and regular rhythm.  Exam reveals no gallop and no friction rub.    Murmur heard.  Pulmonary/Chest: Effort normal. No stridor. No respiratory distress. She has no wheezes. She has rales. She exhibits no tenderness.   Abdominal: Soft. Bowel sounds are normal. She exhibits no distension and no mass. There is no tenderness. There is no rebound and no guarding.   Musculoskeletal: She exhibits edema. She exhibits no tenderness.   Neurological: She is alert and oriented to person, place, and time. No cranial nerve deficit. She exhibits normal muscle tone.   Skin: Skin is warm and dry. She is not diaphoretic. No cyanosis. Nails show no clubbing.   Psychiatric: Her behavior is normal. Thought content norm  Fluids    Intake/Output Summary (Last 24 hours) at 18 0826  Last data filed at 18 0800   Gross per 24 hour   Intake            897.5 ml   Output              750 ml   Net            147.5 ml       Laboratory  Recent Results (from the past 48 hour(s))   EKG (NOW)    Collection Time: 18 10:43 AM   Result Value Ref Range    Report       [facility]    Test Date:  2018  Pt Name:    LEIDA RENNER                Department: ER  MRN:        [id number]                      Room:       RD 03  Gender:     Female                       Technician: [id number]  :        1934                   Requested By:JORY KATE  Order #:    [id number]                    Reading MD: JORY KATE MD    Measurements  Intervals                                Axis  Rate:       105                          P:          95  AK:         184                          QRS:        4  QRSD:       76                           T:          171  QT:         340  QTc:        450    Interpretive Statements  SINUS TACHYCARDIA  PROBABLE LEFT ATRIAL ABNORMALITY  EXTENSIVE ANTERIOR INFARCT, POSSIBLY  ACUTE  BASELINE WANDER IN LEAD(S) V5,V6  Compared to ECG 11/01/2018 23:48:29  Sinus rhythm no longer present  Myocardial infarct finding still present    Electronically Signed On 12- 11:09:25 [facility] by JORY KATE MD     CBC WITH DIFFERENTIAL    Collection Time: 12/26/18 10:45 AM   Result Value Ref Range    WBC 10.0 4.8 - 10.8 K/uL    RBC 2.86 (L) 4.20 - 5.40 M/uL    Hemoglobin 7.3 (L) 12.0 - 16.0 g/dL    Hematocrit 25.6 (L) 37.0 - 47.0 %    MCV 89.5 81.4 - 97.8 fL    MCH 25.5 (L) 27.0 - 33.0 pg    MCHC 28.5 (L) 33.6 - 35.0 g/dL    RDW 57.4 (H) 35.9 - 50.0 fL    Platelet Count 254 164 - 446 K/uL    MPV 9.7 9.0 - 12.9 fL    Neutrophils-Polys 77.50 (H) 44.00 - 72.00 %    Lymphocytes 11.30 (L) 22.00 - 41.00 %    Monocytes 9.00 0.00 - 13.40 %    Eosinophils 1.10 0.00 - 6.90 %    Basophils 0.80 0.00 - 1.80 %    Immature Granulocytes 0.30 0.00 - 0.90 %    Nucleated RBC 0.00 /100 WBC    Lymphs (Absolute) 1.13 1.00 - 4.80 K/uL    Monos (Absolute) 0.90 (H) 0.00 - 0.85 K/uL    Eos (Absolute) 0.11 0.00 - 0.51 K/uL    Baso (Absolute) 0.08 0.00 - 0.12 K/uL    Immature Granulocytes (abs) 0.03 0.00 - 0.11 K/uL    NRBC (Absolute) 0.00 K/uL    Neutrophils (Absolute) 7.77 (H) 2.00 - 7.15 K/uL    Hypochromia 1+     Anisocytosis 1+     Microcytosis 1+    COMP METABOLIC PANEL    Collection Time: 12/26/18 10:45 AM   Result Value Ref Range    Sodium 142 135 - 145 mmol/L    Potassium 4.1 3.6 - 5.5 mmol/L    Chloride 115 (H) 96 - 112 mmol/L    Co2 16 (L) 20 - 33 mmol/L    Anion Gap 11.0 0.0 - 11.9    Glucose 127 (H) 65 - 99 mg/dL    Bun 30 (H) 8 - 22 mg/dL    Creatinine 2.35 (H) 0.50 - 1.40 mg/dL    Calcium 9.0 8.5 - 10.5 mg/dL    AST(SGOT) 26 12 - 45 U/L    ALT(SGPT) 10 2 - 50 U/L    Alkaline Phosphatase 50 30 - 99 U/L    Total Bilirubin 0.6 0.1 - 1.5 mg/dL    Albumin 3.9 3.2 - 4.9 g/dL    Total Protein 6.4 6.0 - 8.2 g/dL    Globulin 2.5 1.9 - 3.5 g/dL    A-G Ratio 1.6 g/dL   LIPASE    Collection Time: 12/26/18 10:45 AM   Result  Value Ref Range    Lipase 29 11 - 82 U/L   TROPONIN    Collection Time: 12/26/18 10:45 AM   Result Value Ref Range    Troponin I 4.45 (H) 0.00 - 0.04 ng/mL   Prothrombin Time    Collection Time: 12/26/18 10:45 AM   Result Value Ref Range    PT 13.9 12.0 - 14.6 sec    INR 1.06 0.87 - 1.13   BTYPE NATRIURETIC PEPTIDE    Collection Time: 12/26/18 10:45 AM   Result Value Ref Range    B Natriuretic Peptide 1200 (H) 0 - 100 pg/mL   APTT    Collection Time: 12/26/18 10:45 AM   Result Value Ref Range    APTT 27.7 24.7 - 36.0 sec   PERIPHERAL SMEAR REVIEW    Collection Time: 12/26/18 10:45 AM   Result Value Ref Range    Peripheral Smear Review see below    PLATELET ESTIMATE    Collection Time: 12/26/18 10:45 AM   Result Value Ref Range    Plt Estimation Normal    MORPHOLOGY    Collection Time: 12/26/18 10:45 AM   Result Value Ref Range    RBC Morphology Present     Polychromia 1+     Poikilocytosis 1+     Ovalocytes 1+     Schistocytes 1+    DIFFERENTIAL COMMENT    Collection Time: 12/26/18 10:45 AM   Result Value Ref Range    Comments-Diff see below    ESTIMATED GFR    Collection Time: 12/26/18 10:45 AM   Result Value Ref Range    GFR If  24 (A) >60 mL/min/1.73 m 2    GFR If Non African American 20 (A) >60 mL/min/1.73 m 2   COD - Adult (Type and Screen)    Collection Time: 12/26/18 12:14 PM   Result Value Ref Range    ABO Grouping Only O     Rh Grouping Only NEG     Antibody Screen-Cod NEG     Component R       R3                  Red Blood Cells3    [id number]   transfused   12/26/18   15:33      Product Type Red Blood Cells LR Pheresis     Dispense Status Transfused     Unit Number (Barcoded) [id number]     Product Code (Barcoded) Z8159K18     Blood Type (Barcoded) 9500     Component R       R4                  Red Blood Cells     [id number]   issued       12/27/18   02:36      Product Type Red Blood Cells LR Pheresis     Dispense Status Issued     Unit Number (Barcoded) [id number]     Product  Code (Barcoded) K4466T10     Blood Type (Barcoded) 9500    Troponin in four (4) hours    Collection Time: 18  2:45 PM   Result Value Ref Range    Troponin I 7.34 (H) 0.00 - 0.04 ng/mL   HGB    Collection Time: 18  2:45 PM   Result Value Ref Range    Hemoglobin 6.5 (L) 12.0 - 16.0 g/dL   HEMOGLOBIN AND HEMATOCRIT    Collection Time: 18  5:05 PM   Result Value Ref Range    Hemoglobin 8.3 (L) 12.0 - 16.0 g/dL    Hematocrit 28.6 (L) 37.0 - 47.0 %   EKG in four (4) hours    Collection Time: 18  5:18 PM   Result Value Ref Range    Report       [facility]    Test Date:  2018  Pt Name:    LEIDA RENNER                Department: ER  MRN:        [id number]                      Room:       [facility]  Gender:     Female                       Technician: YAMILA  :        1934                   Requested By:ERIK MORALES  Order #:    [id number]                    Reading MD: Rubén Lei MD    Measurements  Intervals                                Axis  Rate:       83                           P:          50  TN:         172                          QRS:        -70  QRSD:       80                           T:          119  QT:         380  QTc:        447    Interpretive Statements  SINUS RHYTHM  LEFT ATRIAL ABNORMALITY  LVH WITH SECONDARY REPOLARIZATION ABNORMALITY  INFERIOR INFARCT, OLD  ANTERIOR INFARCT, AGE INDETERMINATE  ST CHANGES, LATERAL LEADS, CONSIDER ISHEMIA VS REPOL ABNORMALITY  Compared to ECG 2018 10:43:26  Sinus tachycardia no longer present      Electronically Signed On 2018 17:36:49 [facility] T by Rubén Lei MD     Urine Sodium Random    Collection Time: 18  5:20 PM   Result Value Ref Range    Sodium, Urine -per volume 83 mmol/L   Urine Creatinine Random    Collection Time: 18  5:20 PM   Result Value Ref Range    Creatinine, Random Urine 144.90 mg/dL   TROPONIN    Collection Time: 18  8:41 PM   Result Value Ref Range    Troponin I 6.83 (H) 0.00  - 0.04 ng/mL   HGB    Collection Time: 18 12:23 AM   Result Value Ref Range    Hemoglobin 7.1 (L) 12.0 - 16.0 g/dL   APTT    Collection Time: 18 12:23 AM   Result Value Ref Range    APTT 70.2 (H) 24.7 - 36.0 sec   EKG    Collection Time: 18  1:47 AM   Result Value Ref Range    Report       [facility]    Test Date:  2018  Pt Name:    LEIDA RENNER                Department: 161  MRN:        [id number]                      Room:       T630  Gender:     Female                       Technician: JUDSON  :        1934                   Requested By:CJ MARQUES  Order #:    [id number]                    Reading MD:    Measurements  Intervals                                Axis  Rate:       83                           P:          70  PA:         168                          QRS:        19  QRSD:       84                           T:          200  QT:         376  QTc:        442    Interpretive Statements  SINUS RHYTHM  LEFT ATRIAL ABNORMALITY  ANTERIOR INFARCT, AGE INDETERMINATE  BORDERLINE T ABNORMALITIES, INFERIOR LEADS  Compared to ECG 2018 17:18:37  T-wave abnormality now present  Left ventricular hypertrophy no longer present  Early repolarization no longer present  ST (T wave) deviation no longer present  Myocardial infarct finding still present     CBC with Differential    Collection Time: 18  5:20 AM   Result Value Ref Range    WBC 7.9 4.8 - 10.8 K/uL    RBC 3.19 (L) 4.20 - 5.40 M/uL    Hemoglobin 8.8 (L) 12.0 - 16.0 g/dL    Hematocrit 29.6 (L) 37.0 - 47.0 %    MCV 92.8 81.4 - 97.8 fL    MCH 27.6 27.0 - 33.0 pg    MCHC 29.7 (L) 33.6 - 35.0 g/dL    RDW 56.0 (H) 35.9 - 50.0 fL    Platelet Count 160 (L) 164 - 446 K/uL    MPV 9.9 9.0 - 12.9 fL    Neutrophils-Polys 79.80 (H) 44.00 - 72.00 %    Lymphocytes 10.60 (L) 22.00 - 41.00 %    Monocytes 7.10 0.00 - 13.40 %    Eosinophils 1.30 0.00 - 6.90 %    Basophils 0.80 0.00 - 1.80 %    Immature Granulocytes 0.40 0.00 - 0.90 %     Nucleated RBC 0.00 /100 WBC    Lymphs (Absolute) 0.84 (L) 1.00 - 4.80 K/uL    Monos (Absolute) 0.56 0.00 - 0.85 K/uL    Eos (Absolute) 0.10 0.00 - 0.51 K/uL    Baso (Absolute) 0.06 0.00 - 0.12 K/uL    Immature Granulocytes (abs) 0.03 0.00 - 0.11 K/uL    NRBC (Absolute) 0.00 K/uL    Neutrophils (Absolute) 6.35 2.00 - 7.15 K/uL   Basic Metabolic Panel (BMP)    Collection Time: 12/27/18  5:20 AM   Result Value Ref Range    Sodium 140 135 - 145 mmol/L    Potassium 4.0 3.6 - 5.5 mmol/L    Chloride 116 (H) 96 - 112 mmol/L    Co2 15 (L) 20 - 33 mmol/L    Glucose 83 65 - 99 mg/dL    Bun 26 (H) 8 - 22 mg/dL    Creatinine 1.98 (H) 0.50 - 1.40 mg/dL    Calcium 8.1 (L) 8.5 - 10.5 mg/dL    Anion Gap 9.0 0.0 - 11.9   Lipid Profile (Lipid Panel) Fasting    Collection Time: 12/27/18  5:20 AM   Result Value Ref Range    Cholesterol,Tot 65 (L) 100 - 199 mg/dL    Triglycerides 114 0 - 149 mg/dL    HDL 36 (A) >=40 mg/dL    LDL 6 <100 mg/dL   TROPONIN    Collection Time: 12/27/18  5:20 AM   Result Value Ref Range    Troponin I 5.82 (H) 0.00 - 0.04 ng/mL   ESTIMATED GFR    Collection Time: 12/27/18  5:20 AM   Result Value Ref Range    GFR If  29 (A) >60 mL/min/1.73 m 2    GFR If Non  24 (A) >60 mL/min/1.73 m 2   PERIPHERAL SMEAR REVIEW    Collection Time: 12/27/18  5:20 AM   Result Value Ref Range    Peripheral Smear Review see below        Imaging  EC-ECHOCARDIOGRAM LTD W/O CONT   Final Result      US-RENAL   Final Result      1.  Echogenic kidneys suggesting medical renal disease.   2.  No hydronephrosis.   3.  Small postvoid residual in bladder.   4.  Bilateral pleural effusions.      DX-CHEST-PORTABLE (1 VIEW)   Final Result      Bibasilar atelectasis or pneumonia and small bilateral pleural effusions.          Assessment/Plan  Acute coronary syndrome (HCC)  Medical management as per cardiology.      Acute kidney injury (HCC)  Probably prerenal  Avoid nephrotoxic medications  Continue to  monitor    Acute pulmonary edema (HCC)  Symptoms improved with Lasix      Hyperlipidemia  Continue statin    Normocytic anemia  With history of GI bleed secondary to AVMs  transfused 1 unit of packed red blood cells this morning  Monitor H&H  Monitor closely  Heparin drip stop        PVD (peripheral vascular disease) (HCC)  Medical management    Acute systolic heart failure (HCC)  Patient presents with pulmonary edema, her ejection fraction is 25%  Continue metoprolol XL, ACE/ARB contraindicated due to renal failure  Cardiology following    No new Assessment & Plan notes have been filed under this hospital service since the last note was generated.  Service: Pulmonary      Discussed patient condition and risk of morbidity and/or mortality with Hospitalist, RN, RT, Pharmacy, Charge nurse / hot rounds and Patient.    The patient remains critically ill.  Critical care time = 32 minutes in directly providing and coordinating critical care and extensive data review.  No time overlap and excludes procedures.

## 2018-12-27 NOTE — ASSESSMENT & PLAN NOTE
Patient presents with pulmonary edema, her ejection fraction is 25%  Continue metoprolol XL, ACE/ARB contraindicated due to renal failure  Cardiology following

## 2018-12-28 LAB
ANION GAP SERPL CALC-SCNC: 7 MMOL/L (ref 0–11.9)
BUN SERPL-MCNC: 29 MG/DL (ref 8–22)
CALCIUM SERPL-MCNC: 9.3 MG/DL (ref 8.5–10.5)
CHLORIDE SERPL-SCNC: 110 MMOL/L (ref 96–112)
CO2 SERPL-SCNC: 24 MMOL/L (ref 20–33)
CREAT SERPL-MCNC: 1.99 MG/DL (ref 0.5–1.4)
GLUCOSE SERPL-MCNC: 97 MG/DL (ref 65–99)
POTASSIUM SERPL-SCNC: 3.7 MMOL/L (ref 3.6–5.5)
SODIUM SERPL-SCNC: 141 MMOL/L (ref 135–145)

## 2018-12-28 PROCEDURE — G8978 MOBILITY CURRENT STATUS: HCPCS | Mod: CI

## 2018-12-28 PROCEDURE — 770020 HCHG ROOM/CARE - TELE (206)

## 2018-12-28 PROCEDURE — 700111 HCHG RX REV CODE 636 W/ 250 OVERRIDE (IP): Performed by: INTERNAL MEDICINE

## 2018-12-28 PROCEDURE — 99231 SBSQ HOSP IP/OBS SF/LOW 25: CPT | Performed by: INTERNAL MEDICINE

## 2018-12-28 PROCEDURE — A9270 NON-COVERED ITEM OR SERVICE: HCPCS | Performed by: INTERNAL MEDICINE

## 2018-12-28 PROCEDURE — 700102 HCHG RX REV CODE 250 W/ 637 OVERRIDE(OP): Performed by: HOSPITALIST

## 2018-12-28 PROCEDURE — 99233 SBSQ HOSP IP/OBS HIGH 50: CPT | Performed by: HOSPITALIST

## 2018-12-28 PROCEDURE — 80048 BASIC METABOLIC PNL TOTAL CA: CPT

## 2018-12-28 PROCEDURE — G8979 MOBILITY GOAL STATUS: HCPCS | Mod: CI

## 2018-12-28 PROCEDURE — A9270 NON-COVERED ITEM OR SERVICE: HCPCS | Performed by: HOSPITALIST

## 2018-12-28 PROCEDURE — 700102 HCHG RX REV CODE 250 W/ 637 OVERRIDE(OP): Performed by: INTERNAL MEDICINE

## 2018-12-28 PROCEDURE — 97535 SELF CARE MNGMENT TRAINING: CPT

## 2018-12-28 PROCEDURE — G8980 MOBILITY D/C STATUS: HCPCS | Mod: CI

## 2018-12-28 RX ADMIN — OMEPRAZOLE 20 MG: 20 CAPSULE, DELAYED RELEASE ORAL at 05:11

## 2018-12-28 RX ADMIN — OMEPRAZOLE 20 MG: 20 CAPSULE, DELAYED RELEASE ORAL at 17:15

## 2018-12-28 RX ADMIN — METOPROLOL SUCCINATE 50 MG: 25 TABLET, EXTENDED RELEASE ORAL at 05:12

## 2018-12-28 RX ADMIN — ROSUVASTATIN CALCIUM 40 MG: 20 TABLET ORAL at 18:00

## 2018-12-28 RX ADMIN — ASPIRIN 325 MG: 325 TABLET ORAL at 05:12

## 2018-12-28 RX ADMIN — FUROSEMIDE 40 MG: 10 INJECTION, SOLUTION INTRAMUSCULAR; INTRAVENOUS at 05:12

## 2018-12-28 RX ADMIN — ISOSORBIDE MONONITRATE 60 MG: 30 TABLET ORAL at 05:11

## 2018-12-28 ASSESSMENT — PAIN SCALES - GENERAL
PAINLEVEL_OUTOF10: 0

## 2018-12-28 ASSESSMENT — COGNITIVE AND FUNCTIONAL STATUS - GENERAL
CLIMB 3 TO 5 STEPS WITH RAILING: A LITTLE
MOBILITY SCORE: 21
TURNING FROM BACK TO SIDE WHILE IN FLAT BAD: A LITTLE
MOVING TO AND FROM BED TO CHAIR: A LITTLE
SUGGESTED CMS G CODE MODIFIER MOBILITY: CJ

## 2018-12-28 ASSESSMENT — ENCOUNTER SYMPTOMS
DIZZINESS: 0
SPUTUM PRODUCTION: 0
PALPITATIONS: 0
ORTHOPNEA: 0
HEMOPTYSIS: 0
WEAKNESS: 1
VOMITING: 0
NAUSEA: 0
SHORTNESS OF BREATH: 1
ABDOMINAL PAIN: 0
CHILLS: 0
COUGH: 0

## 2018-12-28 ASSESSMENT — GAIT ASSESSMENTS
ASSISTIVE DEVICE: FRONT WHEEL WALKER
DISTANCE (FEET): 500
GAIT LEVEL OF ASSIST: STAND BY ASSIST

## 2018-12-28 NOTE — PROGRESS NOTES
[facility] Follow-up Consult Note    Date of note:    12/27/2018      Consulting Physician: Jakub Alcocer M.D.    Patient ID:  Name:   Brooke Diana   YOB: 1934  Age:   84 y.o.  female   MRN:   [id number]    Chief Complaint   Patient presents with   • Chest Pain       ID: 84-year-old woman with a history of coronary artery disease status post previous anterior MI in 2010 with a chronic total occlusion of her LAD found at that time, and PCI to her OM as well at that time.  She has mild disease of her right coronary artery.  Her obtuse marginal stent was shown to be patent by repeat angiography in 2014.  She has recent GI bleed, and serial strokes.  She comes in with a non-ST elevation MI, medically managed, and acute kidney injury.    Interim Events:  Renal function slightly better with diuresis      ROS  No NV, No Bleeding, No dizziness   All other review of systems reviewed and negative.    Past medical, surgical, social, and family history reviewed and unchanged from admission except as noted in assessment and plan.    Medications: Reviewed in MAR  Current Facility-Administered Medications   Medication Dose Frequency Provider Last Rate Last Dose   • furosemide (LASIX) injection 40 mg  40 mg Q DAY Ajay Morgan M.D.   40 mg at 12/27/18 1004   • nitroglycerin (NITROSTAT) tablet 0.4 mg  0.4 mg Q5 MIN PRN Lalit Duran M.D.       • metoprolol SR (TOPROL XL) tablet 50 mg  50 mg DAILY Ghulam Krishnan M.D.   50 mg at 12/27/18 0527   • omeprazole (PRILOSEC) capsule 20 mg  20 mg BID Ghulam Krishnan M.D.   20 mg at 12/27/18 0527   • rosuvastatin (CRESTOR) tablet 40 mg  40 mg Q EVENING Ghulam Krishnan M.D.   40 mg at 12/26/18 1800   • senna-docusate (PERICOLACE or SENOKOT S) 8.6-50 MG per tablet 2 Tab  2 Tab BID Ghulam Krishnan M.D.   1 Tab at 12/27/18 0527    And   • polyethylene glycol/lytes (MIRALAX) PACKET 1 Packet  1 Packet QDAY PRN Ghulam Krishnan M.D.        And   •  "magnesium hydroxide (MILK OF MAGNESIA) suspension 30 mL  30 mL QDAY PRN Ghulam Krishnan M.D.        And   • bisacodyl (DULCOLAX) suppository 10 mg  10 mg QDAY PRN Ghulam Krishnan M.D.       • nitroglycerin 50 mg in D5W 250 ml infusion  0-200 mcg/min Continuous Ghulam Krishnan M.D.       • aspirin (ASA) tablet 325 mg  325 mg DAILY Ghulam Krishnan M.D.   325 mg at 12/27/18 0527    Or   • aspirin (ASA) chewable tab 324 mg  324 mg DAILY Ghulam Krishnan M.D.        Or   • aspirin (ASA) suppository 300 mg  300 mg DAILY Ghulam Krihsnan M.D.       • ondansetron (ZOFRAN) syringe/vial injection 4 mg  4 mg Q4HRS PRN Ghulam Krishnan M.D.       • ondansetron (ZOFRAN ODT) dispertab 4 mg  4 mg Q4HRS PRN Ghulam Krishnan M.D.       • isosorbide mononitrate SR (IMDUR) tablet 60 mg  60 mg Q DAY Ajay Morgan M.D.   60 mg at 12/27/18 0527     Last reviewed on 12/26/2018  1:52 PM by Deepa Mckeon, [facility]  Allergies   Allergen Reactions   • Penicillins Rash and Vomiting     Rxn = unknown  Pt tolerates cephalosporins   • Hydrocodone Vomiting and Nausea     Rxn = unknown   • Tape Rash     RASH       Physical Exam  Body mass index is 20.84 kg/m². Blood pressure 119/50, pulse 81, temperature 36.4 °C (97.6 °F), temperature source Temporal, resp. rate (!) 21, height 1.753 m (5' 9\"), weight 64 kg (141 lb 1.5 oz), SpO2 94 %, not currently breastfeeding. Vitals:    12/27/18 0600 12/27/18 0800 12/27/18 0900 12/27/18 1200   BP:       Pulse: 84 80 91 81   Resp: 15 15 15 (!) 21   Temp:  36.2 °C (97.2 °F)  36.4 °C (97.6 °F)   TempSrc:  Temporal  Temporal   SpO2: 94% 97% 92% 94%   Weight:       Height:        Oxygen Therapy:  Pulse Oximetry: 94 %, O2 (LPM): 3, O2 Delivery: Silicone Nasal Cannula    General: Pleasant, thin, frail, elderly woman no apparent distress  Eyes: nl conjunctiva  ENT: OP clear  Neck: no JVD   Lungs: normal respiratory effort, CTAB  Heart: RRR, no murmurs, no rubs or gallops,   EXT 2+ " edema bilateral lower extremities. 2+ pedal pulses. no cyanosis  Abdomen: soft, non tender, non distended,  Neurological: No focal deficits  Psychiatric: Appropriate affect, A/O x 3  Skin: Warm extremities    Labs (personally reviewed and notable for):   Recent Results (from the past 24 hour(s))   TROPONIN    Collection Time: 18  8:41 PM   Result Value Ref Range    Troponin I 6.83 (H) 0.00 - 0.04 ng/mL   EC-ECHOCARDIOGRAM LTD W/O CONT    Collection Time: 18  8:41 PM   Result Value Ref Range    Eject.Frac. MOD BP 37.48     Eject.Frac. MOD 4C 35.6     Eject.Frac. MOD 2C 41.18     Left Ventrical Ejection Fraction 25    HGB    Collection Time: 18 12:23 AM   Result Value Ref Range    Hemoglobin 7.1 (L) 12.0 - 16.0 g/dL   APTT    Collection Time: 18 12:23 AM   Result Value Ref Range    APTT 70.2 (H) 24.7 - 36.0 sec   EKG    Collection Time: 18  1:47 AM   Result Value Ref Range    Report       [facility]    Test Date:  2018  Pt Name:    LEIDA RENNER                Department: 161  MRN:        [id number]                      Room:       T630  Gender:     Female                       Technician: JUDSON  :        [id number]                   Requested By:CJ MARQUES  Order #:    [id number]                    Reading MD: Macy Morgan MD    Measurements  Intervals                                Axis  Rate:       83                           P:          70  NM:         168                          QRS:        19  QRSD:       84                           T:          200  QT:         376  QTc:        442    Interpretive Statements  SINUS RHYTHM  LEFT ATRIAL ABNORMALITY  ANTERIOR INFARCT, AGE INDETERMINATE  Compared to ECG 2018 17:18:37  NO SIGNIFICANT CHANGE  Electronically Signed On 2018 10:22:57 PST by Macy Morgan MD     CBC with Differential    Collection Time: 18  5:20 AM   Result Value Ref Range    WBC 7.9 4.8 - 10.8 K/uL    RBC 3.19 (L) 4.20 - 5.40 M/uL     Hemoglobin 8.8 (L) 12.0 - 16.0 g/dL    Hematocrit 29.6 (L) 37.0 - 47.0 %    MCV 92.8 81.4 - 97.8 fL    MCH 27.6 27.0 - 33.0 pg    MCHC 29.7 (L) 33.6 - 35.0 g/dL    RDW 56.0 (H) 35.9 - 50.0 fL    Platelet Count 160 (L) 164 - 446 K/uL    MPV 9.9 9.0 - 12.9 fL    Neutrophils-Polys 79.80 (H) 44.00 - 72.00 %    Lymphocytes 10.60 (L) 22.00 - 41.00 %    Monocytes 7.10 0.00 - 13.40 %    Eosinophils 1.30 0.00 - 6.90 %    Basophils 0.80 0.00 - 1.80 %    Immature Granulocytes 0.40 0.00 - 0.90 %    Nucleated RBC 0.00 /100 WBC    Lymphs (Absolute) 0.84 (L) 1.00 - 4.80 K/uL    Monos (Absolute) 0.56 0.00 - 0.85 K/uL    Eos (Absolute) 0.10 0.00 - 0.51 K/uL    Baso (Absolute) 0.06 0.00 - 0.12 K/uL    Immature Granulocytes (abs) 0.03 0.00 - 0.11 K/uL    NRBC (Absolute) 0.00 K/uL    Neutrophils (Absolute) 6.35 2.00 - 7.15 K/uL   Basic Metabolic Panel (BMP)    Collection Time: 12/27/18  5:20 AM   Result Value Ref Range    Sodium 140 135 - 145 mmol/L    Potassium 4.0 3.6 - 5.5 mmol/L    Chloride 116 (H) 96 - 112 mmol/L    Co2 15 (L) 20 - 33 mmol/L    Glucose 83 65 - 99 mg/dL    Bun 26 (H) 8 - 22 mg/dL    Creatinine 1.98 (H) 0.50 - 1.40 mg/dL    Calcium 8.1 (L) 8.5 - 10.5 mg/dL    Anion Gap 9.0 0.0 - 11.9   Lipid Profile (Lipid Panel) Fasting    Collection Time: 12/27/18  5:20 AM   Result Value Ref Range    Cholesterol,Tot 65 (L) 100 - 199 mg/dL    Triglycerides 114 0 - 149 mg/dL    HDL 36 (A) >=40 mg/dL    LDL 6 <100 mg/dL   TROPONIN    Collection Time: 12/27/18  5:20 AM   Result Value Ref Range    Troponin I 5.82 (H) 0.00 - 0.04 ng/mL   ESTIMATED GFR    Collection Time: 12/27/18  5:20 AM   Result Value Ref Range    GFR If  29 (A) >60 mL/min/1.73 m 2    GFR If Non  24 (A) >60 mL/min/1.73 m 2   PERIPHERAL SMEAR REVIEW    Collection Time: 12/27/18  5:20 AM   Result Value Ref Range    Peripheral Smear Review see below    URINALYSIS    Collection Time: 12/27/18 10:01 AM   Result Value Ref Range    Color  Yellow     Character Turbid (A)     Specific Gravity 1.021 <1.035    Ph 5.0 5.0 - 8.0    Glucose Negative Negative mg/dL    Ketones Negative Negative mg/dL    Protein 100 (A) Negative mg/dL    Bilirubin Negative Negative    Urobilinogen, Urine 0.2 Negative    Nitrite Negative Negative    Leukocyte Esterase Negative Negative    Occult Blood Moderate (A) Negative    Micro Urine Req Microscopic    URINE MICROSCOPIC (W/UA)    Collection Time: 12/27/18 10:01 AM   Result Value Ref Range    WBC 0-2 /hpf    RBC 0-2 /hpf    Bacteria Negative None /hpf    Epithelial Cells Moderate (A) /hpf    Amorphous Crystal Present /hpf    Granular Casts >20 /lpf   HEMOGLOBIN AND HEMATOCRIT    Collection Time: 12/27/18  2:28 PM   Result Value Ref Range    Hemoglobin 9.8 (L) 12.0 - 16.0 g/dL    Hematocrit 31.4 (L) 37.0 - 47.0 %       Cardiac Imaging and Procedures Review:    EKG and telemetry tracings personally reviewed      Impression and Medical Decision Making:  Principal Problem:    Acute coronary syndrome (HCC) POA: Yes  Active Problems:    Acute systolic heart failure (HCC) POA: Yes    PVD (peripheral vascular disease) (HCC) POA: Yes    Normocytic anemia POA: Yes    Hyperlipidemia POA: Yes    Acute kidney injury (HCC) POA: Yes    Acute pulmonary edema (HCC) POA: Yes  Resolved Problems:    * No resolved hospital problems. *    Non-ST elevation MI: Continue to treat with dual antiplatelet therapy, heparin drip, beta-blocker.  ACE inhibitor contraindicated in the setting of her acute kidney injury.  Continue rosuvastatin as her high-dose statin.    Acute kidney injury: Appears to be cardiorenal, continue gentle diuresis with Lasix    Anginal chest pain: Continue M Schuyler, as needed sublingual nitroglycerin      I personally discussed his case with  Dr Jakub Alcocer M.D.    No future appointments.    Thank you for allowing me to participate in the care of this patient.  Please call or text via [employer] if clarification would be  useful.    Ajay Morgan MD  Cardiologist, [facility] [place] [facility]

## 2018-12-28 NOTE — CARE PLAN
Problem: Acute Care of the Heart Failure Patient  Goal: Optimal Outcome for the HF Patient    Intervention: EKG, Chest XRay  Completed.   Intervention: Echocardiogram  Completed, EF of 25%.  Intervention: HF Education: Activity, Low Salt Diet, Follow Up Appointment Scheduled, Medications, Worsening Symptoms, Daily Weight monitoring  HF booklet provided.   Intervention: Consider Cardiac Navigator Consult  Completed during day today.       Problem: Hemodynamic Status  Goal: Stable Vital Signs and Fluid Balance  Outcome: PROGRESSING AS EXPECTED  Now tele status.

## 2018-12-28 NOTE — PROGRESS NOTES
Assumed care of patient at 0700.Received bedside report from DOUGLAS Finney. Patient comfortable in bed with no needs or concerns at this time. Bed alarm set, call light within reach, bed in lowest position, treaded slipper socks on. See flowsheets for VS. Monitor ranges appropriate. Patient's pain is 0 / 10. No family at bed side. Will continue to monitor closely.     Patient declined bed alarm. Education given. Charge DOUGLAS Malagon at bedside.     Bed alarm on.

## 2018-12-28 NOTE — RESPIRATORY CARE
COPD EDUCATION by COPD CLINICAL EDUCATOR  12/28/2018 at 1:22 PM by Theresa Green     Patient reviewed by COPD education team. Patient does not qualify for COPD program.

## 2018-12-28 NOTE — PROGRESS NOTES
Hospital Medicine Daily Progress Note    Date of Service  12/28/2018    Chief Complaint  84 y.o. female admitted 12/26/2018 with chest pain    Hospital Course    Ms Diana has a history of coronary artery disease.  She suffered a TIA in September 2018 and workup revealed high grade right carotid disease and she subsequently underwent CEA.  She had GI bleed in October 2018 and colonoscopy showed AVM's in the colon that were treated with APC, epinephrine and endoclip.  Later in October she was treated conservatively for an NSTEMI.  On 12/26/18 she presented to the Emergency Department on 12/26/18 with chest pain, shortness of breath and LE edema.  Cardiology was consulted.  She was admitted to the ICU and treated with heparin drip      Interval Problem Update  No chest pain since yesterday, she is breathing comfortably  Complains of swelling at the ankles, exam remains unimpressive  No cough, she is afebrile   Heparin drip stopped yesterday for anemia requiring transfusion    Consultants/Specialty  Cardiology    Code Status  DNAR/DNI    Disposition  Prognosis guarded, she has multiorgan failure as a result of extensive vascular disease  Discussed hospice, she declines placement at this time but she is established with [facility] and could transition to hospice with them when she is ready    Review of Systems  Review of Systems   Constitutional: Positive for malaise/fatigue. Negative for chills.   Respiratory: Positive for shortness of breath. Negative for cough, hemoptysis and sputum production.    Cardiovascular: Negative for chest pain, palpitations, orthopnea and leg swelling.   Gastrointestinal: Negative for abdominal pain, nausea and vomiting.   Skin: Negative for itching and rash.   Neurological: Positive for weakness. Negative for dizziness.   All other systems reviewed and are negative.       Physical Exam  Temp:  [36.1 °C (96.9 °F)-36.6 °C (97.8 °F)] 36.1 °C (97 °F)  Pulse:  [65-89] 77  Resp:   [12-21] 18  BP: (108-131)/(43-53) 131/53    Physical Exam   Constitutional: She is oriented to person, place, and time. She appears well-developed and well-nourished.   HENT:   Head: Normocephalic and atraumatic.   Eyes: Conjunctivae and EOM are normal. Right eye exhibits no discharge. Left eye exhibits no discharge.   Cardiovascular: Normal rate, regular rhythm and intact distal pulses.    No murmur heard.  2+ Radial Pulses  Brisk Capillary Refill   Pulmonary/Chest: Effort normal. No respiratory distress. She has no wheezes. She has rales.   Abdominal: Soft. Bowel sounds are normal. She exhibits no distension. There is no tenderness. There is no rebound.   Musculoskeletal: Normal range of motion. She exhibits edema.   Scant ankle edema   Neurological: She is alert and oriented to person, place, and time. No cranial nerve deficit.   Skin: Skin is warm and dry. She is not diaphoretic. No erythema.   Skin is warm and well perfused   Psychiatric: She has a normal mood and affect.       Fluids    Intake/Output Summary (Last 24 hours) at 12/28/18 1044  Last data filed at 12/28/18 0800   Gross per 24 hour   Intake              720 ml   Output             2170 ml   Net            -1450 ml       Laboratory  Recent Labs      12/26/18   1045   12/26/18   1705   12/27/18   0520  12/27/18   1428  12/27/18   2200   WBC  10.0   --    --    --   7.9   --    --    RBC  2.86*   --    --    --   3.19*   --    --    HEMOGLOBIN  7.3*   < >  8.3*   < >  8.8*  9.8*  8.7*   HEMATOCRIT  25.6*   --   28.6*   --   29.6*  31.4*   --    MCV  89.5   --    --    --   92.8   --    --    MCH  25.5*   --    --    --   27.6   --    --    MCHC  28.5*   --    --    --   29.7*   --    --    RDW  57.4*   --    --    --   56.0*   --    --    PLATELETCT  254   --    --    --   160*   --    --    MPV  9.7   --    --    --   9.9   --    --     < > = values in this interval not displayed.     Recent Labs      12/26/18   1045  12/27/18   0520   SODIUM  142   140   POTASSIUM  4.1  4.0   CHLORIDE  115*  116*   CO2  16*  15*   GLUCOSE  127*  83   BUN  30*  26*   CREATININE  2.35*  1.98*   CALCIUM  9.0  8.1*     Recent Labs      12/26/18   1045  12/27/18   0023   APTT  27.7  70.2*   INR  1.06   --      Recent Labs      12/26/18   1045   BNPBTYPENAT  1200*     Recent Labs      12/27/18   0520   TRIGLYCERIDE  114   HDL  36*   LDL  6       Imaging  EC-ECHOCARDIOGRAM LTD W/O CONT   Final Result      US-RENAL   Final Result      1.  Echogenic kidneys suggesting medical renal disease.   2.  No hydronephrosis.   3.  Small postvoid residual in bladder.   4.  Bilateral pleural effusions.      DX-CHEST-PORTABLE (1 VIEW)   Final Result      Bibasilar atelectasis or pneumonia and small bilateral pleural effusions.           Assessment/Plan  * Acute coronary syndrome (HCC)- (present on admission)   Assessment & Plan    I discussed with Dr. Ajay Morgan of Cardiology  Limited options for intervention  Nitroglycerin as needed with goal to keep the patient pain-free  Continue metoprolol  Continue isosorbide  Continue aspirin and Crestor  Continuous hemodynamic monitoring on telemetry  No heparin due to anemia requiring transfusion  OK to transfer out of the ICU     Acute systolic heart failure (HCC)- (present on admission)   Assessment & Plan    Patient presents with pulmonary edema, her ejection fraction is 25%  Continue metoprolol XL, ACE/ARB contraindicated due to renal failure  Cardiology following     Normocytic anemia- (present on admission)   Assessment & Plan    With history of GI bleed secondary to AVMs  She was transfused 1 unit of packed red blood cells on 12/28  Follow-up labs reviewed,   Heparin drip stopped         PVD (peripheral vascular disease) (HCC)- (present on admission)   Assessment & Plan    Medical management     Acute pulmonary edema (HCC)- (present on admission)   Assessment & Plan    Cardiogenic, she has acute decrease in her ejection fraction  Symptoms improved  with Lasix       Acute kidney injury (HCC)- (present on admission)   Assessment & Plan    Baseline creatinine is normal, she presents with acute renal failure  Likely cardiorenal  Monitor closely with diuresis  Avoid nephrotoxins  Repeat labs AM     Hyperlipidemia- (present on admission)   Assessment & Plan    Continue statin          VTE prophylaxis: Heparin stopped due to anemia requiring transfusion

## 2018-12-28 NOTE — PROGRESS NOTES
Patient refuses education on bed alarm. Patient adamantly declines bed alarm activation. Patient steady on feet without assistance. Fall risk bracelet in place. Bed is low, locked and call light within reach. Patient educated to use call light for staff presence during ambulation/mobilization.

## 2018-12-28 NOTE — PROGRESS NOTES
12HR CC    Monitor Summary    NSR 70-80bpm with occasional PVC    .18/.08/.40    2 RN Skin assessment: No new areas of skin integrity concern observed.

## 2018-12-28 NOTE — THERAPY
"Physical Therapy Cardiac Rehab Evaluation completed.   Bed Mobility: NT  Transfers: Sit to Stand: Stand by Assist  Gait: Level Of Assist: Stand by Assist with Front-Wheel Walker       Plan of Care: Patient with no further skilled PT needs in the acute care setting at this time  Discharge Recommendations: Equipment: No Equipment Needed. Post-acute therapy: see below.    See \"Rehab Therapy-Acute\" Patient Summary Report for complete documentation.       Patient is an 83 YO female that presented with complaints of SOB, LE edema, and chest pain. Patient is s/p NSTEMI managed medically with EF of 25% per Echo. Patient with PMHx significant for CAD and complex cardiac history. Patient able to tolerate approximately 500ft of ambulation with FWW and SBA with appropriate hemodynamic response though PT evaluation performed with patient on metaoprolol. No positive talk test or increase in RPE from rest during activity. Patient demonstrated understanding of appropriate activity monitoring with talk test. Please refer to outpatient cardiac rehab once medically appropriate given extensive cardiac history.  "

## 2018-12-28 NOTE — PROGRESS NOTES
[facility] Cardiology Follow-up Consult Note    Date of note:    12/27/2018      Consulting Physician: Jakub Alcocer M.D.    Patient ID:  Name:   Brooke Diana   YOB: 1934  Age:   84 y.o.  female   MRN:   [id number]    Chief Complaint   Patient presents with   • Chest Pain       ID: 84-year-old woman with a history of coronary artery disease status post previous anterior MI in 2010 with a chronic total occlusion of her LAD found at that time, and PCI to her OM as well at that time.  She has mild disease of her right coronary artery.  Her obtuse marginal stent was shown to be patent by repeat angiography in 2014.  She has recent GI bleed, and serial strokes.  She comes in with a non-ST elevation MI, medically managed, and acute kidney injury.    Interim Events:  No acute events, much improved urine output      ROS  No NV, No Bleeding, No dizziness   All other review of systems reviewed and negative.    Past medical, surgical, social, and family history reviewed and unchanged from admission except as noted in assessment and plan.    Medications: Reviewed in MAR  Current Facility-Administered Medications   Medication Dose Frequency Provider Last Rate Last Dose   • furosemide (LASIX) injection 40 mg  40 mg Q DAY Ajay Morgan M.D.   40 mg at 12/28/18 0512   • nitroglycerin (NITROSTAT) tablet 0.4 mg  0.4 mg Q5 MIN PRN Lalit Duran M.D.       • metoprolol SR (TOPROL XL) tablet 50 mg  50 mg DAILY Ghulam Krishnan M.D.   50 mg at 12/28/18 0512   • omeprazole (PRILOSEC) capsule 20 mg  20 mg BID Ghulam Krishnan M.D.   20 mg at 12/28/18 0511   • rosuvastatin (CRESTOR) tablet 40 mg  40 mg Q EVENING Ghulam Krishnan M.D.   40 mg at 12/27/18 1808   • senna-docusate (PERICOLACE or SENOKOT S) 8.6-50 MG per tablet 2 Tab  2 Tab BID Ghulam Krishnan M.D.   1 Tab at 12/27/18 0527    And   • polyethylene glycol/lytes (MIRALAX) PACKET 1 Packet  1 Packet QDAY PRN Ghulam Krishnan M.D.        And   •  "magnesium hydroxide (MILK OF MAGNESIA) suspension 30 mL  30 mL QDAY PRN Ghulam Krishnan M.D.        And   • bisacodyl (DULCOLAX) suppository 10 mg  10 mg QDAY PRN Ghulam Krishnan M.D.       • nitroglycerin 50 mg in D5W 250 ml infusion  0-200 mcg/min Continuous Ghulam Krishnan M.D.       • aspirin (ASA) tablet 325 mg  325 mg DAILY Ghulam Krishnan M.D.   325 mg at 12/28/18 0512    Or   • aspirin (ASA) chewable tab 324 mg  324 mg DAILY Ghulam Krishnan M.D.        Or   • aspirin (ASA) suppository 300 mg  300 mg DAILY Ghulam Krishnan M.D.       • ondansetron (ZOFRAN) syringe/vial injection 4 mg  4 mg Q4HRS PRN Ghulam Krishnan M.D.       • ondansetron (ZOFRAN ODT) dispertab 4 mg  4 mg Q4HRS PRN Ghulam Krishnan M.D.       • isosorbide mononitrate SR (IMDUR) tablet 60 mg  60 mg Q DAY Ajay Morgan M.D.   60 mg at 12/28/18 0511     Last reviewed on 12/26/2018  1:52 PM by Deepa Mckeon, [facility]  Allergies   Allergen Reactions   • Penicillins Rash and Vomiting     Rxn = unknown  Pt tolerates cephalosporins   • Hydrocodone Vomiting and Nausea     Rxn = unknown   • Tape Rash     RASH       Physical Exam  Body mass index is 19.57 kg/m². Blood pressure 131/53, pulse 77, temperature 36.1 °C (97 °F), resp. rate 18, height 1.753 m (5' 9\"), weight 60.1 kg (132 lb 7.9 oz), SpO2 98 %, not currently breastfeeding.   Vitals:    12/28/18 0512 12/28/18 0600 12/28/18 0700 12/28/18 0800   BP: 108/47 108/47 131/53    Pulse: 79 65 82 77   Resp:  13 13 18   Temp:   36.1 °C (97 °F)    TempSrc:       SpO2:   97% 98%   Weight:  60.1 kg (132 lb 7.9 oz)     Height:        Oxygen Therapy:  Pulse Oximetry: 98 %, O2 (LPM): 2, O2 Delivery: Silicone Nasal Cannula    General: Pleasant, thin, frail, elderly woman no apparent distress  Eyes: nl conjunctiva  ENT: OP clear  Neck: no JVD   Lungs: normal respiratory effort, CTAB  Heart: RRR, no murmurs, no rubs or gallops,   EXT 1+ edema bilateral lower extremities. 2+ " pedal pulses. no cyanosis  Abdomen: soft, non tender, non distended,  Neurological: No focal deficits  Psychiatric: Appropriate affect, A/O x 3  Skin: Warm extremities    Labs (personally reviewed and notable for):   Recent Results (from the past 24 hour(s))   HEMOGLOBIN AND HEMATOCRIT    Collection Time: 12/27/18  2:28 PM   Result Value Ref Range    Hemoglobin 9.8 (L) 12.0 - 16.0 g/dL    Hematocrit 31.4 (L) 37.0 - 47.0 %   HGB    Collection Time: 12/27/18 10:00 PM   Result Value Ref Range    Hemoglobin 8.7 (L) 12.0 - 16.0 g/dL       Cardiac Imaging and Procedures Review:    EKG and telemetry tracings personally reviewed      Impression and Medical Decision Making:  Principal Problem:    Acute coronary syndrome (HCC) POA: Yes  Active Problems:    Acute systolic heart failure (HCC) POA: Yes    PVD (peripheral vascular disease) (HCC) POA: Yes    Normocytic anemia POA: Yes    Hyperlipidemia POA: Yes    Acute kidney injury (HCC) POA: Yes    Acute pulmonary edema (HCC) POA: Yes  Resolved Problems:    * No resolved hospital problems. *    Non-ST elevation MI: Continue dual antiplatelet therapy, heparin drip stopped.  ACE inhibitor again contraindicated, beta-blocker is again Toprol 50 mg p.o. nightly.  Heparin drip stopped secondary to blood loss requiring transfusion.    Acute kidney injury: Appears to be cardiorenal, continue gentle diuresis with Lasix    Anginal chest pain: Continue Imdur, as needed sublingual nitroglycerin      I personally discussed his case with  Dr Jakub Alcocer M.D.    No future appointments.    Thank you for allowing me to participate in the care of this patient.  Please call or text via [employer] if clarification would be useful.    Ajay Morgan MD  Cardiologist, [facility] [place] -

## 2018-12-28 NOTE — PROGRESS NOTES
Monitor Summary:  SR 70-80s  .18/.10/.36    12 hour chart check  2RN skin assessment, all skin intact.

## 2018-12-29 VITALS
SYSTOLIC BLOOD PRESSURE: 104 MMHG | HEIGHT: 69 IN | BODY MASS INDEX: 18.58 KG/M2 | OXYGEN SATURATION: 98 % | RESPIRATION RATE: 18 BRPM | HEART RATE: 63 BPM | TEMPERATURE: 97 F | DIASTOLIC BLOOD PRESSURE: 48 MMHG | WEIGHT: 125.44 LBS

## 2018-12-29 PROBLEM — J81.0 ACUTE PULMONARY EDEMA (HCC): Status: RESOLVED | Noted: 2018-12-26 | Resolved: 2018-12-29

## 2018-12-29 LAB
ANION GAP SERPL CALC-SCNC: 9 MMOL/L (ref 0–11.9)
BASOPHILS # BLD AUTO: 0.6 % (ref 0–1.8)
BASOPHILS # BLD: 0.06 K/UL (ref 0–0.12)
BUN SERPL-MCNC: 27 MG/DL (ref 8–22)
CALCIUM SERPL-MCNC: 8.5 MG/DL (ref 8.5–10.5)
CHLORIDE SERPL-SCNC: 107 MMOL/L (ref 96–112)
CO2 SERPL-SCNC: 25 MMOL/L (ref 20–33)
CREAT SERPL-MCNC: 1.73 MG/DL (ref 0.5–1.4)
EOSINOPHIL # BLD AUTO: 0.16 K/UL (ref 0–0.51)
EOSINOPHIL NFR BLD: 1.6 % (ref 0–6.9)
ERYTHROCYTE [DISTWIDTH] IN BLOOD BY AUTOMATED COUNT: 56 FL (ref 35.9–50)
GLUCOSE SERPL-MCNC: 133 MG/DL (ref 65–99)
HCT VFR BLD AUTO: 30.8 % (ref 37–47)
HGB BLD-MCNC: 9.4 G/DL (ref 12–16)
IMM GRANULOCYTES # BLD AUTO: 0.04 K/UL (ref 0–0.11)
IMM GRANULOCYTES NFR BLD AUTO: 0.4 % (ref 0–0.9)
LYMPHOCYTES # BLD AUTO: 1.45 K/UL (ref 1–4.8)
LYMPHOCYTES NFR BLD: 14.6 % (ref 22–41)
MCH RBC QN AUTO: 27.4 PG (ref 27–33)
MCHC RBC AUTO-ENTMCNC: 30.5 G/DL (ref 33.6–35)
MCV RBC AUTO: 89.8 FL (ref 81.4–97.8)
MONOCYTES # BLD AUTO: 0.98 K/UL (ref 0–0.85)
MONOCYTES NFR BLD AUTO: 9.9 % (ref 0–13.4)
NEUTROPHILS # BLD AUTO: 7.25 K/UL (ref 2–7.15)
NEUTROPHILS NFR BLD: 72.9 % (ref 44–72)
NRBC # BLD AUTO: 0.02 K/UL
NRBC BLD-RTO: 0.2 /100 WBC
PLATELET # BLD AUTO: 189 K/UL (ref 164–446)
PMV BLD AUTO: 10.5 FL (ref 9–12.9)
POTASSIUM SERPL-SCNC: 3.2 MMOL/L (ref 3.6–5.5)
RBC # BLD AUTO: 3.43 M/UL (ref 4.2–5.4)
SODIUM SERPL-SCNC: 141 MMOL/L (ref 135–145)
WBC # BLD AUTO: 9.9 K/UL (ref 4.8–10.8)

## 2018-12-29 PROCEDURE — A9270 NON-COVERED ITEM OR SERVICE: HCPCS | Performed by: INTERNAL MEDICINE

## 2018-12-29 PROCEDURE — 85025 COMPLETE CBC W/AUTO DIFF WBC: CPT

## 2018-12-29 PROCEDURE — A9270 NON-COVERED ITEM OR SERVICE: HCPCS | Performed by: NURSE PRACTITIONER

## 2018-12-29 PROCEDURE — 99239 HOSP IP/OBS DSCHRG MGMT >30: CPT | Performed by: INTERNAL MEDICINE

## 2018-12-29 PROCEDURE — 80048 BASIC METABOLIC PNL TOTAL CA: CPT

## 2018-12-29 PROCEDURE — 700111 HCHG RX REV CODE 636 W/ 250 OVERRIDE (IP): Performed by: INTERNAL MEDICINE

## 2018-12-29 PROCEDURE — 700102 HCHG RX REV CODE 250 W/ 637 OVERRIDE(OP): Performed by: NURSE PRACTITIONER

## 2018-12-29 PROCEDURE — 700102 HCHG RX REV CODE 250 W/ 637 OVERRIDE(OP): Performed by: INTERNAL MEDICINE

## 2018-12-29 PROCEDURE — 36415 COLL VENOUS BLD VENIPUNCTURE: CPT

## 2018-12-29 PROCEDURE — 700102 HCHG RX REV CODE 250 W/ 637 OVERRIDE(OP): Performed by: HOSPITALIST

## 2018-12-29 PROCEDURE — A9270 NON-COVERED ITEM OR SERVICE: HCPCS | Performed by: HOSPITALIST

## 2018-12-29 PROCEDURE — 99232 SBSQ HOSP IP/OBS MODERATE 35: CPT | Performed by: INTERNAL MEDICINE

## 2018-12-29 RX ORDER — POTASSIUM CHLORIDE 20 MEQ/1
40 TABLET, EXTENDED RELEASE ORAL ONCE
Status: COMPLETED | OUTPATIENT
Start: 2018-12-29 | End: 2018-12-29

## 2018-12-29 RX ORDER — CLOPIDOGREL BISULFATE 75 MG/1
75 TABLET ORAL DAILY
Qty: 30 TAB | Refills: 1 | Status: ON HOLD | OUTPATIENT
Start: 2018-12-30 | End: 2019-01-09 | Stop reason: CLARIF

## 2018-12-29 RX ORDER — ISOSORBIDE MONONITRATE 60 MG/1
60 TABLET, EXTENDED RELEASE ORAL EVERY MORNING
Qty: 30 TAB | Refills: 1 | Status: ON HOLD | OUTPATIENT
Start: 2018-12-29 | End: 2019-01-09 | Stop reason: CLARIF

## 2018-12-29 RX ORDER — FUROSEMIDE 40 MG/1
40 TABLET ORAL 2 TIMES DAILY
Qty: 60 TAB | Refills: 1 | Status: ON HOLD | OUTPATIENT
Start: 2018-12-30 | End: 2019-01-09 | Stop reason: CLARIF

## 2018-12-29 RX ORDER — CLOPIDOGREL BISULFATE 75 MG/1
75 TABLET ORAL DAILY
Status: DISCONTINUED | OUTPATIENT
Start: 2018-12-29 | End: 2018-12-29 | Stop reason: HOSPADM

## 2018-12-29 RX ORDER — FUROSEMIDE 40 MG/1
40 TABLET ORAL
Status: DISCONTINUED | OUTPATIENT
Start: 2018-12-29 | End: 2018-12-29 | Stop reason: HOSPADM

## 2018-12-29 RX ADMIN — ASPIRIN 325 MG: 325 TABLET ORAL at 05:06

## 2018-12-29 RX ADMIN — POTASSIUM CHLORIDE 40 MEQ: 1500 TABLET, EXTENDED RELEASE ORAL at 12:03

## 2018-12-29 RX ADMIN — FUROSEMIDE 40 MG: 40 TABLET ORAL at 12:02

## 2018-12-29 RX ADMIN — METOPROLOL SUCCINATE 50 MG: 25 TABLET, EXTENDED RELEASE ORAL at 05:06

## 2018-12-29 RX ADMIN — OMEPRAZOLE 20 MG: 20 CAPSULE, DELAYED RELEASE ORAL at 05:06

## 2018-12-29 RX ADMIN — ISOSORBIDE MONONITRATE 60 MG: 30 TABLET ORAL at 05:06

## 2018-12-29 RX ADMIN — FUROSEMIDE 40 MG: 10 INJECTION, SOLUTION INTRAMUSCULAR; INTRAVENOUS at 05:06

## 2018-12-29 ASSESSMENT — ENCOUNTER SYMPTOMS
BLOOD IN STOOL: 0
PALPITATIONS: 0
ABDOMINAL PAIN: 0
CHEST TIGHTNESS: 0
NERVOUS/ANXIOUS: 1
WEAKNESS: 1
FEVER: 0
DIZZINESS: 0
SHORTNESS OF BREATH: 0

## 2018-12-29 ASSESSMENT — PAIN SCALES - GENERAL
PAINLEVEL_OUTOF10: 0
PAINLEVEL_OUTOF10: 0

## 2018-12-29 NOTE — DISCHARGE INSTRUCTIONS
Discharge Instructions    Discharged to home by car with relative. Discharged via wheelchair, hospital escort: Yes.  Special equipment needed: Oxygen    Be sure to schedule a follow-up appointment with your primary care doctor or any specialists as instructed.     Discharge Plan:   Diet Plan: Discussed  Activity Level: Discussed  Smoking Cessation Offered: Patient Counseled  Confirmed Follow up Appointment: Appointment Scheduled  Confirmed Symptoms Management: Discussed  Medication Reconciliation Updated: Yes  Influenza Vaccine Indication: Not indicated: Previously immunized this influenza season and > 8 years of age    I understand that a diet low in cholesterol, fat, and sodium is recommended for good health. Unless I have been given specific instructions below for another diet, I accept this instruction as my diet prescription.   Other diet: cardiac, heart healthy    Special Instructions: Diagnosis:  Acute Coronary Syndrome (ACS) is a diagnosis that encompasses cardiac-related chest pain and heart attack. ACS occurs when the blood flow to the heart muscle is severely reduced or cut off completely due to a slow process called atherosclerosis.  Atherosclerosis is a disease in which the coronary arteries become narrow from a buildup of fat, cholesterol, and other substances that combine to form plaque. If the plaque breaks, a blood clot will form and block the blood flow to the heart muscle. This lack of blood flow can cause damage or death to the heart muscle which is called a heart attack or Myocardial Infarction (MI). There are two different types of MIs:  ST Elevation Myocardial Infarction or STEMI (the most severe type of heart attack) and Non-ST Elevation Myocardial Infarction or NSTEMI.    Treatment Plan:  · Cardiac Diet  - Low fat, low salt, low cholesterol   · Cardiac Rehab  - Your doctor has ordered you a referral to [facility].  Call [phone] to schedule an appointment.  · Attend my follow-up  appointment with my Cardiologist.  · Take my medications as prescribed by my doctor  · Exercise daily  · Lower my bad cholesterol and raise my good cholesterol    Medications:  Certain medications are used to treat ACS.  Remember to always take medications as prescribed and never stop talking medications unless told by your doctor.    You have been prescribed the following medicatons:    Aspirin - Aspirin is used as a blood thinning medication and you will require this medication indefinitely.    · Is patient discharged on Warfarin / Coumadin?   No       Special Instructions:   HF Patient Discharge Instructions  · Monitor your weight daily, and maintain a weight chart, to track your weight changes.   · Activity as tolerated, unless your Doctor has ordered otherwise. Other activity order: as tolerated.  · Follow a low fat, low cholesterol, low salt diet unless instructed otherwise by your Doctor. Read the labels on the back of food products and track your intake of fat, cholesterol and salt.   · Fluid Restriction No. If a Fluid Restriction has been ordered by your Doctor, measure fluids with a measuring cup to ensure that you are not exceeding the restriction.   · No smoking.  · Oxygen Yes. If your Doctor has ordered that you wear Oxygen at home, it is important to wear it as ordered.  · Did you receive an explanation from staff on the importance of taking each of your medications and why it is necessary to keep taking them unless your doctor says to stop? Yes  · Were all of your questions answered about how to manage your heart failure and what to do if you have increased signs and symptoms after you go home? Yes  · Do you feel like your heart failure care team involved you in the care treatment plan and allowed you to make decisions regarding your care while in the hospital and addressed any discharge needs you might have? Yes    See the educational handout provided at discharge for more information on monitoring  your daily weight, activity and diet. This also explains more about Heart Failure, symptoms of a flare-up and some of the tests that you have undergone.     Warning Signs of a Flare-Up include:  · Swelling in the ankles or lower legs.  · Shortness of breath, while at rest, or while doing normal activities.   · Shortness of breath at night when in bed, or coughing in bed.   · Requiring more pillows to sleep at night, or needing to sit up at night to sleep.  · Feeling weak, dizzy or fatigued.     When to call your Doctor:  · Call [employer] seven days a week from 8:00 a.m. to 8:00 p.m. for medical questions [phone].  · Call your Primary Care Physician or Cardiologist if:   1. You experience any pain radiating to your jaw or neck.  2. You have any difficulty breathing.  3. You experience weight gain of 3 lbs in a day or 5 lbs in a week.   4. You feel any palpitations or irregular heartbeats.  5. You become dizzy or lose consciousness.   If you have had an angiogram or had a pacemaker or AICD placed, and experience:  1. Bleeding, drainage or swelling at the surgical / puncture site.  2. Fever greater than 100.0 F  3. Shock from internal defibrillator.  4. Cool and / or numb extremities.              Heart Failure  Heart failure means your heart has trouble pumping blood. This makes it hard for your body to work well. Heart failure is usually a long-term (chronic) condition. You must take good care of yourself and follow your doctor's treatment plan.  HOME CARE  · Take your heart medicine as told by your doctor.  ¨ Do not stop taking medicine unless your doctor tells you to.  ¨ Do not skip any dose of medicine.  ¨ Refill your medicines before they run out.  ¨ Take other medicines only as told by your doctor or pharmacist.  · Stay active if told by your doctor. The elderly and people with severe heart failure should talk with a doctor about physical activity.  · Eat heart-healthy foods. Choose foods that  are without trans fat and are low in saturated fat, cholesterol, and salt (sodium). This includes fresh or frozen fruits and vegetables, fish, lean meats, fat-free or low-fat dairy foods, whole grains, and high-fiber foods. Lentils and dried peas and beans (legumes) are also good choices.  · Limit salt if told by your doctor.  · Cook in a healthy way. Roast, grill, broil, bake, poach, steam, or stir-nichols foods.  · Limit fluids as told by your doctor.  · Weigh yourself every morning. Do this after you pee (urinate) and before you eat breakfast. Write down your weight to give to your doctor.  · Take your blood pressure and write it down if your doctor tells you to.  · Ask your doctor how to check your pulse. Check your pulse as told.  · Lose weight if told by your doctor.  · Stop smoking or chewing tobacco. Do not use gum or patches that help you quit without your doctor's approval.  · Schedule and go to doctor visits as told.  · Nonpregnant women should have no more than 1 drink a day. Men should have no more than 2 drinks a day. Talk to your doctor about drinking alcohol.  · Stop illegal drug use.  · Stay current with shots (immunizations).  · Manage your health conditions as told by your doctor.  · Learn to manage your stress.  · Rest when you are tired.  · If it is really hot outside:  ¨ Avoid intense activities.  ¨ Use air conditioning or fans, or get in a cooler place.  ¨ Avoid caffeine and alcohol.  ¨ Wear loose-fitting, lightweight, and light-colored clothing.  · If it is really cold outside:  ¨ Avoid intense activities.  ¨ Layer your clothing.  ¨ Wear mittens or gloves, a hat, and a scarf when going outside.  ¨ Avoid alcohol.  · Learn about heart failure and get support as needed.  · Get help to maintain or improve your quality of life and your ability to care for yourself as needed.  GET HELP IF:   · You gain weight quickly.  · You are more short of breath than usual.  · You cannot do your normal  activities.  · You tire easily.  · You cough more than normal, especially with activity.  · You have any or more puffiness (swelling) in areas such as your hands, feet, ankles, or belly (abdomen).  · You cannot sleep because it is hard to breathe.  · You feel like your heart is beating fast (palpitations).  · You get dizzy or light-headed when you stand up.  GET HELP RIGHT AWAY IF:   · You have trouble breathing.  · There is a change in mental status, such as becoming less alert or not being able to focus.  · You have chest pain or discomfort.  · You faint.  MAKE SURE YOU:   · Understand these instructions.  · Will watch your condition.  · Will get help right away if you are not doing well or get worse.  This information is not intended to replace advice given to you by your health care provider. Make sure you discuss any questions you have with your health care provider.  Document Released: 09/26/2009 Document Revised: 01/08/2016 Document Reviewed: 02/03/2014  [employer] Interactive Patient Education © 2017 [employer]    Depression / Suicide Risk    As you are discharged from this [facility] facility, it is important to learn how to keep safe from harming yourself.    Recognize the warning signs:  · Abrupt changes in personality, positive or negative- including increase in energy   · Giving away possessions  · Change in eating patterns- significant weight changes-  positive or negative  · Change in sleeping patterns- unable to sleep or sleeping all the time   · Unwillingness or inability to communicate  · Depression  · Unusual sadness, discouragement and loneliness  · Talk of wanting to die  · Neglect of personal appearance   · Rebelliousness- reckless behavior  · Withdrawal from people/activities they love  · Confusion- inability to concentrate     If you or a loved one observes any of these behaviors or has concerns about self-harm, here's what you can do:  · Talk about it- your feelings and reasons for  harming yourself  · Remove any means that you might use to hurt yourself (examples: pills, rope, extension cords, firearm)  · Get professional help from the community (Mental Health, Substance Abuse, psychological counseling)  · Do not be alone:Call your Safe Contact- someone whom you trust who will be there for you.  · Call your local CRISIS HOTLINE 694-9894 or 520-689-4231  · Call your local Children's Mobile Crisis Response Team [place] (995) 449-9200 or www.[employer] Doctor  · Call the toll free National Suicide Prevention Hotlines   · National Suicide Prevention Lifeline 515-645-CPJD (1187)  · National Hope Line Network 800-SUICIDE (744-2076)

## 2018-12-29 NOTE — PROGRESS NOTES
Per Dr. Morgan and Dr. Espinosa, okay to give patient scheduled dose of PO lasix at 1200 instead of 1600 so that patient may go home this evening. Per both doctors, pt must urinate 2 hours after dose of lasix is given before she can leave.

## 2018-12-29 NOTE — PROGRESS NOTES
2 RN skin check completed with Ritika COLE. Bilateral ears with blanchable redness from NC, will offload. Scattered bruising on lower extremities, bilateral heels blanchable redness, offloaded with pillow. No other skin breakdown noted.

## 2018-12-29 NOTE — PROGRESS NOTES
Cardiology Follow Up Progress Note    Date of Service  12/29/2018    Attending Physician  MARIO Ferreira*    Chief Complaint   Dyspnea, LE swelling and CP.    Consulted for evaluation      HPI  Brooke Diana is a 84 y.o. female admitted 12/26/2018 with complaints of dyspnea, lower extremity swelling and chest pain. Patient was fund to have an elevated troponin in ER.     Past medical history significant for CAD with MI in 2010 ( of LAD and PCI to OM), HTN, HLD, glaucoma, GIB and serial strokes.     Interim Events  12/29/18: Resting on edge of bed. Denies chest pain or shortness of breath. Anxious to go home. No significant events overnight.     Monitor: SR 70-80    Review of Systems  Review of Systems   Constitutional: Negative for fever.   Respiratory: Negative for chest tightness and shortness of breath.    Cardiovascular: Negative for chest pain, palpitations and leg swelling.   Gastrointestinal: Negative for abdominal pain and blood in stool.   Genitourinary: Negative for hematuria.   Musculoskeletal: Negative for gait problem.   Neurological: Positive for weakness. Negative for dizziness and syncope.   Psychiatric/Behavioral: The patient is nervous/anxious.    All other systems reviewed and are negative.      Vital signs in last 24 hours  Temp:  [35.8 °C (96.5 °F)-36.8 °C (98.2 °F)] 35.8 °C (96.5 °F)  Pulse:  [62-80] 62  Resp:  [13-20] 18  BP: (110-121)/(46-55) 110/53    Physical Exam  Physical Exam   Constitutional: She is oriented to person, place, and time. Vital signs are normal.   Thin and elderly   HENT:   Head: Normocephalic.   Eyes: EOM are normal.   Neck: No JVD present.   Cardiovascular: Normal rate, regular rhythm, normal heart sounds and intact distal pulses.    Pulmonary/Chest: Effort normal and breath sounds normal.   Abdominal: Soft. There is no tenderness.   Musculoskeletal: Normal range of motion. She exhibits edema (Trace).   Neurological: She is alert and oriented to person,  place, and time.   Skin: Skin is warm and dry.   Psychiatric: She has a normal mood and affect. Her behavior is normal. Thought content normal.   Nursing note and vitals reviewed.         Intake/Output Summary (Last 24 hours) at 12/29/18 0943  Last data filed at 12/28/18 2200   Gross per 24 hour   Intake              600 ml   Output              450 ml   Net              150 ml       Lab Review  Lab Results   Component Value Date/Time    WBC 9.9 12/29/2018 03:31 AM    RBC 3.43 (L) 12/29/2018 03:31 AM    HEMOGLOBIN 9.4 (L) 12/29/2018 03:31 AM    HEMATOCRIT 30.8 (L) 12/29/2018 03:31 AM    MCV 89.8 12/29/2018 03:31 AM    MCH 27.4 12/29/2018 03:31 AM    MCHC 30.5 (L) 12/29/2018 03:31 AM    MPV 10.5 12/29/2018 03:31 AM      Lab Results   Component Value Date/Time    SODIUM 141 12/28/2018 05:25 AM    POTASSIUM 3.7 12/28/2018 05:25 AM    CHLORIDE 110 12/28/2018 05:25 AM    CO2 24 12/28/2018 05:25 AM    GLUCOSE 97 12/28/2018 05:25 AM    BUN 29 (H) 12/28/2018 05:25 AM    CREATININE 1.99 (H) 12/28/2018 05:25 AM      Lab Results   Component Value Date/Time    ASTSGOT 26 12/26/2018 10:45 AM    ALTSGPT 10 12/26/2018 10:45 AM     Lab Results   Component Value Date/Time    CHOLSTRLTOT 65 (L) 12/27/2018 05:20 AM    LDL 6 12/27/2018 05:20 AM    HDL 36 (A) 12/27/2018 05:20 AM    TRIGLYCERIDE 114 12/27/2018 05:20 AM    TROPONINI 5.82 (H) 12/27/2018 05:20 AM       Recent Labs      12/26/18   1045   BNPBTYPENAT  1200*       Cardiac Imaging and Procedures Review  Limited Transthoracic Echocardiogram 12/26/18:  CONCLUSIONS  Prior echocardiogram 10/14/2018 - the ejection fraction heas declined and wall motion abnormality is new.  Technically difficult study - adequate information is obtained.     Limited.   Akinesis of the anterior wall and apex.  No thrombus noted.  The base works best, suggesting even a component of stress-induced cardiomyopathy.  Rounding cardiology team aware at time of reading.    Left Ventricle  Normal left  ventricular chamber size. Mild concentric left ventricular hypertrophy. Moderately reduced left ventricular systolic function. Left ventricular ejection fraction is visually estimated to be 25%. Global hypokinesis with regional variation.  Akinesis of  the apex and apical septal wall segment.  False tendon noted.    Cardiac Catheterization 7/24/14:  CONCLUSION:  1.  Normal left ventricular function.  EF 70%.  Normal wall motion.  2.  Proximal LAD, diffuse calcific 80%, leading into a large first septal branch.  Mid % with faint collateral circulation from the right and left coronary system filling the distal vessel.  3.  Noncritical proximal diffuse circumflex disease.  Noncritical proximal anddistal bifurcation RCA disease.    Imaging  Chest X-Ray 12/26/18:  Bibasilar atelectasis or pneumonia and small bilateral pleural effusions.    Assessment/Plan  NSTEMI:  -In setting of known CAD.   -Troponin trending down, peak of 7.34.  -Denies chest pain.   -Plan is to optimize medical management.   -Continue  IMDUR 60 mg daily, Toprol XL 50 mg daily and statin therapy.   -No ACE d/t STEPHANIE.   -ASA decreased to 81 mg daily.  -Start Plavix 75 mg daily.     Acute Systolic Dysfunction:  -LVEF estimated to be 25% per TTE on 12/26/18.  -CXR on 12/26/18 showing bilateral pleural effusions.   -Net out put 1 L.  -Continue BB as above.  -Transition to oral lasix 40 mg BID.   -BMP in am.     HTN:  -Stable.  -Continue BB and IMDUR as above.     HLD:  -Controlled.  -LDL on 12/27/18 was 6.  -Continue Crestor 40 mg daily.     STEPHANIE:  -Thought to be cardio-renal.  -Continue gentle diuresis as above.       Thank you for allowing us to participate in the care of this patient. Please let us know if you have any questions or concerns. We will continue to follow alongside you in the care of this patient.     Future Appointments  Date Time Provider Department Center   12/31/2018 1:15 PM Abdiel Kapoor M.D. [facility] None       Shaina CORTEZ  AMISHA Malin.   [facility]  [phone]

## 2018-12-29 NOTE — CARE PLAN
Problem: Communication  Goal: The ability to communicate needs accurately and effectively will improve  Outcome: PROGRESSING AS EXPECTED  Patient is able to communicate effectively.     Problem: Safety  Goal: Will remain free from injury  Outcome: PROGRESSING AS EXPECTED

## 2018-12-29 NOTE — PROGRESS NOTES
Report received at bedside. Assumed care of patient at this time. I agree with previous assessment. Patient notified of change in RN.

## 2018-12-29 NOTE — PROGRESS NOTES
"Attempted to give pt Plavix. Pt declined, and stated, \" I am afraid of blood thinner medications, I don't want to take it\". Pt was educated on importance of Plavix given her past cardiac history. Pt still declined   "

## 2018-12-29 NOTE — CARE PLAN
Problem: Safety  Goal: Will remain free from falls  Outcome: PROGRESSING AS EXPECTED  Pt educated to use call light before getting out of bed. Call light in reach. Bed locked in lowest position with 2 upper bed rails up. Pt encouraged to sit at edge of bed before standing up. No c/o dizziness. Stand by assistance given to help pt to the commode and back to bed. Room floor is free from clutter. Treaded socks on pt. Bed alarm on.       Problem: Knowledge Deficit  Goal: Knowledge of disease process/condition, treatment plan, diagnostic tests, and medications will improve    Intervention: Explain information regarding disease process/condition, treatment plan, diagnostic tests, and medications and document in education  Pt educated on plan of care, medications, pain management, and disease processes. Pt verbalized understanding and was encouraged to ask questions. All questions answered.

## 2018-12-29 NOTE — PROGRESS NOTES
Bedside report received from night nurse. Assumed care of pt. Pt resting comfortable in bed without any signs of distress. A/Ox4, VSS. No complaints at this time. POC reviewed and white board updated. Tele box on. Call light in reach. Bed locked in lowest position with 2 upper bed rails up. Bed alarm on.

## 2018-12-29 NOTE — PROGRESS NOTES
Bedside report received from DOUGLAS Rice. Patient arrived to  T723-1 @2100.  Patient's chart and MAR reviewed. Pt denies pain at this time. Pt is A & O x4. Patient was updated on plan of care for the day. Questions answered and concerns addressed.  Pt denies any additional needs at this time. White board updated. Call light, phone and personal belongings within reach.

## 2018-12-30 ENCOUNTER — PATIENT OUTREACH (OUTPATIENT)
Dept: HEALTH INFORMATION MANAGEMENT | Facility: OTHER | Age: 83
End: 2018-12-30

## 2018-12-30 NOTE — PROGRESS NOTES
Discharge instructions given and discussed, signed copy in chart. Pt verbalized understanding and all questions answered. All prescriptions reviewed. Patient is Alert & Oriented and discharged home in stable condition on 2L O2 (patient's baseline) via wheelchair escorted by staff. Personal belongings with patient. IV removed and tolerated well. Tele box removed, monitor room notified.

## 2018-12-30 NOTE — DISCHARGE SUMMARY
Discharge Summary    CHIEF COMPLAINT ON ADMISSION  Chief Complaint   Patient presents with   • Chest Pain       Reason for Admission  Chest pain    Admission Date  12/26/2018    CODE STATUS  Prior    HPI & HOSPITAL COURSE  This is a 84 y.o. female with past medical history of coronary disease status post MI in 2010 with PCI presented to the hospital with complaint of chest pain.  On presentation she also reported bilateral lower extremity swelling.  She has a history of GI bleed and she had EGD and colonoscopy in October of this year revealing AVM and she underwent treatment.  She is admitted to the ICU with a diagnosis of an NSTEMI.  She was started on dual antiplatelet therapy, heparin drip, beta-blocker and statin.  Her heparin drip was discontinued later due to GI bleed requiring transfusion on December 28, 2018.  Later she transferred to telemetry floor for further management.  I evaluated her on the day of discharge with Dr. Morgan and patient expressed that she would like to go home and she refused to take Plavix due to concern of bleeding.  Dr. Morgan and I discussed with her at length regarding use of antiplatelet therapy but patient declined.  I prescribed her Plavix and discussed with her that she needs to discuss with cardiology as outpatient regarding use of Plavix and she expressed understanding.  Also Dr. Morgan recommended her IV Lasix changed to p.o. Lasix this afternoon and she responded well to p.o. diuresis and she can be discharged.  She also found to have acute kidney injury and her kidney functions has been improving.  She underwent echocardiogram which showed ejection fraction of 25%.  I recommended her to follow-up with cardiology and primary care provider.  Today she expressed that she would like to go home she does not want to stay in hospital further.  I discussed plan of care with her and I answered all her questions.  She denies any acute complaint on the day of discharge.   Physical therapy also evaluated her and recommended that she does not have any further inpatient therapy.       Therefore, she is discharged in fair and stable condition to home with close outpatient follow-up.    The patient met 2-midnight criteria for an inpatient stay at the time of discharge.    Discharge Date  12/29/2018    FOLLOW UP ITEMS POST DISCHARGE  Renal functions  Cardiology recommendations    DISCHARGE DIAGNOSES  Principal Problem:    Acute coronary syndrome (HCC) POA: Yes  Active Problems:    Acute systolic heart failure (HCC) POA: Yes    PVD (peripheral vascular disease) (HCC) POA: Yes    Normocytic anemia POA: Yes    Hyperlipidemia POA: Yes    Acute kidney injury (HCC) POA: Yes  Resolved Problems:    Acute pulmonary edema (HCC) POA: Yes      FOLLOW UP  Future Appointments  Date Time Provider Department Center   12/31/2018 1:15 PM Abdiel Kapoor M.D. [facility] None     [facility]  [address]  [address]  [facility] [id number] [phone]  Call  Your physician has referred you to Intensive Cardiac Rehab. You cannot begin ICR for 6 weeks to allow time for your heart to heal. If you do not hear from ICR in about 5 weeks, please call them to schedule. Thank you!    DR VILLASEÑOR  [address], [place] [address]  : [phone]  On 1/11/2019  Please check in at 130pm for your appointment thank you       MEDICATIONS ON DISCHARGE     Medication List      START taking these medications      Instructions   clopidogrel 75 MG Tabs  Start taking on:  12/30/2018  Commonly known as:  PLAVIX   Take 1 Tab by mouth every day.  Dose:  75 mg     furosemide 40 MG Tabs  Start taking on:  12/30/2018  Commonly known as:  LASIX   Take 1 Tab by mouth 2 Times a Day.  Dose:  40 mg        CHANGE how you take these medications      Instructions   isosorbide mononitrate SR 60 MG Tb24  What changed:  · medication strength  · how much to take  Commonly known as:  IMDUR   Take 1 Tab by mouth  every morning.  Dose:  60 mg        CONTINUE taking these medications      Instructions   aspirin 81 MG Chew chewable tablet  Commonly known as:  ASA   Take 81 mg by mouth every day.  Dose:  81 mg     CRESTOR 40 MG tablet  Generic drug:  rosuvastatin   Take 40 mg by mouth every evening.  Dose:  40 mg     LUMIGAN 0.01 % Soln  Generic drug:  bimatoprost   Place 1 Drop in both eyes every bedtime. 1 gtts both eyes  Dose:  1 Drop     metoprolol SR 50 MG Tb24  Commonly known as:  TOPROL XL   Take 50 mg by mouth every day.  Dose:  50 mg     pantoprazole 40 MG Tbec  Commonly known as:  PROTONIX   Take 40 mg by mouth 2 Times a Day.  Dose:  40 mg        STOP taking these medications    amLODIPine 5 MG Tabs  Commonly known as:  NORVASC     benazepril 20 MG Tabs  Commonly known as:  LOTENSIN            Allergies  Allergies   Allergen Reactions   • Penicillins Rash and Vomiting     Rxn = unknown  Pt tolerates cephalosporins   • Hydrocodone Vomiting and Nausea     Rxn = unknown   • Tape Rash     RASH       DIET  No orders of the defined types were placed in this encounter.      ACTIVITY  As tolerated.  Weight bearing as tolerated    CONSULTATIONS  Cardiology  Critical care    PROCEDURES  None    LABORATORY  Lab Results   Component Value Date    SODIUM 141 12/29/2018    POTASSIUM 3.2 (L) 12/29/2018    CHLORIDE 107 12/29/2018    CO2 25 12/29/2018    GLUCOSE 133 (H) 12/29/2018    BUN 27 (H) 12/29/2018    CREATININE 1.73 (H) 12/29/2018        Lab Results   Component Value Date    WBC 9.9 12/29/2018    HEMOGLOBIN 9.4 (L) 12/29/2018    HEMATOCRIT 30.8 (L) 12/29/2018    PLATELETCT 189 12/29/2018        Total time of the discharge process exceeds 38 minutes.

## 2018-12-31 ENCOUNTER — OFFICE VISIT (OUTPATIENT)
Dept: CARDIOLOGY | Facility: MEDICAL CENTER | Age: 83
End: 2018-12-31
Attending: INTERNAL MEDICINE
Payer: MEDICARE

## 2018-12-31 VITALS
OXYGEN SATURATION: 91 % | SYSTOLIC BLOOD PRESSURE: 110 MMHG | DIASTOLIC BLOOD PRESSURE: 52 MMHG | HEIGHT: 69 IN | BODY MASS INDEX: 18.29 KG/M2 | WEIGHT: 123.5 LBS | HEART RATE: 82 BPM

## 2018-12-31 DIAGNOSIS — I50.9 HEART FAILURE, NYHA CLASS 3 (HCC): ICD-10-CM

## 2018-12-31 DIAGNOSIS — I50.20 ACC/AHA STAGE C SYSTOLIC HEART FAILURE (HCC): ICD-10-CM

## 2018-12-31 DIAGNOSIS — Z87.19 HISTORY OF GI BLEED: ICD-10-CM

## 2018-12-31 DIAGNOSIS — N18.30 CKD (CHRONIC KIDNEY DISEASE) STAGE 3, GFR 30-59 ML/MIN (HCC): ICD-10-CM

## 2018-12-31 DIAGNOSIS — I50.22 CHRONIC SYSTOLIC HEART FAILURE (HCC): ICD-10-CM

## 2018-12-31 DIAGNOSIS — I10 ESSENTIAL HYPERTENSION: ICD-10-CM

## 2018-12-31 PROCEDURE — 99214 OFFICE O/P EST MOD 30 MIN: CPT | Performed by: INTERNAL MEDICINE

## 2018-12-31 RX ORDER — METOPROLOL SUCCINATE 50 MG/1
50 TABLET, EXTENDED RELEASE ORAL DAILY
Qty: 30 TAB | Refills: 11 | Status: ON HOLD | OUTPATIENT
Start: 2018-12-31 | End: 2019-01-09 | Stop reason: CLARIF

## 2018-12-31 RX ORDER — GABAPENTIN 300 MG/1
300 CAPSULE ORAL 3 TIMES DAILY
Status: ON HOLD | COMMUNITY
End: 2019-01-09 | Stop reason: CLARIF

## 2018-12-31 ASSESSMENT — MINNESOTA LIVING WITH HEART FAILURE QUESTIONNAIRE (MLHF)
TOTAL_SCORE: 66
DIFFICULTY WORKING TO EARN A LIVING: 0
EATING LESS FOODS YOU LIKE: 4
DIFFICULTY WITH SEXUAL ACTIVITIES: 0
MAKING YOU WORRY: 4
MAKING YOU STAY IN A HOSPITAL: 3
DIFFICULTY SLEEPING WELL AT NIGHT: 2
GIVING YOU SIDE EFFECTS FROM TREATMENTS: 3
DIFFICULTY WITH RECREATIONAL PASTIMES, SPORTS, HOBBIES: 4
SWELLING IN ANKLES OR LEGS: 4
LOSS OF SELF CONTROL IN YOUR LIFE: 4
HAVING TO SIT OR LIE DOWN DURING THE DAY: 4
WORKING AROUND THE HOUSE OR YARD DIFFICULT: 4
MAKING YOU SHORT OF BREATH: 5
MAKING YOU FEEL DEPRESSED: 0
COSTING YOU MONEY FOR MEDICAL CARE: 2
TIRED, FATIGUED OR LOW ON ENERGY: 4
WALKING ABOUT OR CLIMBING STAIRS DIFFICULT: 4
DIFFICULTY GOING AWAY FROM HOME: 5
FEELING LIKE A BURDEN TO FAMILY AND FRIENDS: 3
DIFFICULTY TO CONCENTRATE OR REMEMBERING THINGS: 5
DIFFICULTY SOCIALIZING WITH FAMILY OR FRIENDS: 2

## 2018-12-31 ASSESSMENT — 6 MINUTE WALK TEST (6MWT): TOTAL DISTANCE WALKED (METERS): 0

## 2018-12-31 NOTE — PROGRESS NOTES
Chief Complaint   Patient presents with   • Congestive Heart Failure     NP- unable to do 6MWT       Subjective:   Brooke Diana is a 84 y.o. female who presents today for follow-up of her ischemic cardiomyopathy and congestive heart failure.  She is a new patient in heart failure clinic.  She is recently hospitalized for a non-STEMI and has had multiple recurrent CVAs.  Unfortunately, she also had a GI bleed.  She has had both EGD and colonoscopy.  She apparently had cautery both in her stomach and her colon.    She has dyspnea on exertion is about 20 feet.  She is noted no PND orthopnea but is on oxygen therapy 24/7.  She was having significant difficulty with edema before her recent hospitalization.  This resolved with diuretic therapy.  She has had no palpitations or lightheadedness.  She did have chest discomfort prior to her recent admission and had a significant elevation in her troponin and BNP.  Because of her GI bleed, she is not a candidate for coronary intervention.  She has had no recurrent chest discomfort.        Past Medical History:   Diagnosis Date   • Acute myocardial infarction, true posterior wall infarction, episode of care unspecified    • Anxiety    • Bowel habit changes    • Breath shortness 2017    uses oxygen at 2 liters/min; AT NIGHT ONLY   • CAD (coronary artery disease)     S/P stent placement   • Cholesterol blood decreased    • Dental disorder     Partial upper   • Dizziness    • GI bleed    • Glaucoma    • Heart burn    • Hyperlipidemia    • Hypertension    • Myocardial infarct (HCC) 6/10/10   • Peripheral vascular disease (HCC)    • Pulmonary disease     bronchitis   • TIA (transient ischemic attack)    • Unspecified hemorrhagic conditions     pt takes plavix   • Unspecified urinary incontinence    • Urinary bladder disorder      Past Surgical History:   Procedure Laterality Date   • COLONOSCOPY - ENDO N/A 10/23/2018    Procedure: COLONOSCOPY - ENDO;  Surgeon: Anatoly MOSQUEDA  DAVON Barragan;  Location: [facility];  Service: Gastroenterology   • GASTROSCOPY-ENDO N/A 10/22/2018    Procedure: GASTROSCOPY-ENDO;  Surgeon: Bakari Brown M.D.;  Location: [facility];  Service: Gastroenterology   • CAROTID ENDARTERECTOMY Right 9/3/2018    Procedure: CAROTID ENDARTERECTOMY WITH NEUROMONITORING;  Surgeon: Naga Abad M.D.;  Location: SURGERY [facility];  Service: Vascular   • GASTROSCOPY WITH BIOPSY  2/13/2016    Procedure: GASTROSCOPY WITH BIOPSY;  Surgeon: Susan Miller M.D.;  Location: [facility];  Service:    • RECOVERY  7/24/2014    Performed by Cath-Recovery Surgery at SURGERY SAME DAY [facility]   • CARDIAC CATH  7/24/14    Diffuse disease, see report.  % with collterals.   • FEMORAL ENDARTERECTOMY  10/4/2013    Performed by Kacie Baker M.D. at SURGERY [facility]   • ANGIOPLASTY BALLOON  10/4/2013    Performed by Kacie Baker M.D. at SURGERY [facility]   • RECOVERY  4/9/2013    Performed by Ir-Recovery Surgery at SURGERY SAME DAY [facility]   • RECOVERY  11/26/2012    Performed by Ir-Recovery Surgery at SURGERY SAME DAY [facility]   • RECTUS REPAIR  7/13/2012    Performed by SHEILA PLATT at SURGERY [facility]   • OTHER  12/10/10    stents in legs   • OTHER  6/10/10    cardiac stents   • CARDIAC CATH  6/2010    BMS to Om   • OTHER  2005    KNEE SURGERY   • CHOLECYSTECTOMY     • HYSTERECTOMY, TOTAL ABDOMINAL     • OTHER CARDIAC SURGERY      stent in heart   • STENT PLACEMENT      treated by Dr Baker, multiple surgeries to legs.     Family History   Problem Relation Age of Onset   • Stroke Mother 68        CVA   • Heart Disease Mother 65        valve surgery   • Other Son         wgt 465 lbs     Social History     Social History   • Marital status:      Spouse name: N/A   • Number of children: N/A   • Years of education: N/A     Occupational History   • Not on file.  "    Social History Main Topics   • Smoking status: Former Smoker     Packs/day: 0.25     Years: 45.00     Types: Cigarettes     Quit date: 2/1/2016   • Smokeless tobacco: Never Used   • Alcohol use 0.0 - 0.6 oz/week      Comment: 1 drink per month   • Drug use: No   • Sexual activity: Not on file     Other Topics Concern   • Not on file     Social History Narrative   • No narrative on file     Allergies   Allergen Reactions   • Penicillins Rash and Vomiting     Rxn = unknown  Pt tolerates cephalosporins   • Hydrocodone Vomiting and Nausea     Rxn = unknown   • Tape Rash     RASH     Outpatient Encounter Prescriptions as of 12/31/2018   Medication Sig Dispense Refill   • gabapentin (NEURONTIN) 300 MG Cap Take 300 mg by mouth 3 times a day.     • isosorbide mononitrate SR (IMDUR) 60 MG TABLET SR 24 HR Take 1 Tab by mouth every morning. 30 Tab 1   • aspirin (ASA) 81 MG Chew Tab chewable tablet Take 81 mg by mouth every day.     • rosuvastatin (CRESTOR) 40 MG tablet Take 40 mg by mouth every evening.     • pantoprazole (PROTONIX) 40 MG Tablet Delayed Response Take 40 mg by mouth 2 Times a Day.     • bimatoprost (LUMIGAN) 0.01 % Solution Place 1 Drop in both eyes every bedtime. 1 gtts both eyes     • furosemide (LASIX) 40 MG Tab Take 1 Tab by mouth 2 Times a Day. (Patient not taking: Reported on 12/31/2018) 60 Tab 1   • clopidogrel (PLAVIX) 75 MG Tab Take 1 Tab by mouth every day. (Patient not taking: Reported on 12/31/2018) 30 Tab 1   • metoprolol SR (TOPROL XL) 50 MG TABLET SR 24 HR Take 50 mg by mouth every day.       No facility-administered encounter medications on file as of 12/31/2018.      ROS     Objective:   /52 (BP Location: Left arm, Patient Position: Sitting, BP Cuff Size: Adult)   Pulse 82   Ht 1.753 m (5' 9\")   Wt 56 kg (123 lb 8 oz)   SpO2 91%   BMI 18.24 kg/m²     Physical Exam   Constitutional: She appears well-developed and well-nourished.   Neck: No JVD present.   Cardiovascular: Normal " rate and regular rhythm.    No murmur heard.  Pulmonary/Chest: Effort normal and breath sounds normal. She has no rales.   Abdominal: Soft. There is no tenderness.   Musculoskeletal: She exhibits no edema.     Lab Results   Component Value Date/Time    CHOLSTRLTOT 65 (L) 12/27/2018 05:20 AM    LDL 6 12/27/2018 05:20 AM    HDL 36 (A) 12/27/2018 05:20 AM    TRIGLYCERIDE 114 12/27/2018 05:20 AM       Lab Results   Component Value Date/Time    SODIUM 141 12/29/2018 09:40 AM    POTASSIUM 3.2 (L) 12/29/2018 09:40 AM    CHLORIDE 107 12/29/2018 09:40 AM    CO2 25 12/29/2018 09:40 AM    GLUCOSE 133 (H) 12/29/2018 09:40 AM    BUN 27 (H) 12/29/2018 09:40 AM    CREATININE 1.73 (H) 12/29/2018 09:40 AM     Lab Results   Component Value Date/Time    ALKPHOSPHAT 50 12/26/2018 10:45 AM    ASTSGOT 26 12/26/2018 10:45 AM    ALTSGPT 10 12/26/2018 10:45 AM    TBILIRUBIN 0.6 12/26/2018 10:45 AM      Lab Results   Component Value Date/Time    BNPBTYPENAT 1200 (H) 12/26/2018 10:45 AM      Transthoracic  Echo Report      Echocardiography Laboratory    CONCLUSIONS  Prior echocardiogram 10/14/2018 - the ejection fraction heas declined   and wall motion abnormality is new.  Technically difficult study - adequate information is obtained.     Limited.   Akinesis of the anterior wall and apex.  No thrombus noted.  The base   works best, suggesting even a component of stress-induced   cardiomyopathy.  Rounding cardiology team aware at time of reading.    LEIDA DIANA  Exam Date:         12/26/2018       Assessment:     1. Chronic systolic heart failure (HCC)     2. Heart failure, NYHA class 3 ([facility])     3. History of GI bleed     4. Essential hypertension     5. CKD (chronic kidney disease) stage 3, GFR 30-59 ml/min ([facility])     6. ACC/AHA stage C systolic heart failure (HCC)         Medical Decision Making:  Today's Assessment / Status / Plan:     Ms. Diana is relatively stable.  She has not been taking furosemide, Plavix or isosorbide.  Since  she is not having any edema and appears euvolemic at the present time, we will have her discontinue furosemide. In addition, we will place her on metoprolol ER 50 mg daily.  She was apparently going to take this but did not get a prescription.  She will follow-up in a week with a BMP.  We will continue to increase her medical therapy as tolerated.

## 2019-01-09 ENCOUNTER — APPOINTMENT (OUTPATIENT)
Dept: CARDIOLOGY | Facility: MEDICAL CENTER | Age: 84
DRG: 377 | End: 2019-01-09
Attending: INTERNAL MEDICINE
Payer: MEDICARE

## 2019-01-09 ENCOUNTER — APPOINTMENT (OUTPATIENT)
Dept: RADIOLOGY | Facility: MEDICAL CENTER | Age: 84
DRG: 377 | End: 2019-01-09
Attending: EMERGENCY MEDICINE
Payer: MEDICARE

## 2019-01-09 ENCOUNTER — HOSPITAL ENCOUNTER (INPATIENT)
Facility: MEDICAL CENTER | Age: 84
LOS: 7 days | DRG: 377 | End: 2019-01-16
Attending: EMERGENCY MEDICINE | Admitting: HOSPITALIST
Payer: MEDICARE

## 2019-01-09 DIAGNOSIS — I20.0 UNSTABLE ANGINA PECTORIS (HCC): ICD-10-CM

## 2019-01-09 DIAGNOSIS — I21.4 NSTEMI (NON-ST ELEVATED MYOCARDIAL INFARCTION) (HCC): ICD-10-CM

## 2019-01-09 PROBLEM — I24.9 ACUTE CORONARY SYNDROME (HCC): Status: RESOLVED | Noted: 2018-12-26 | Resolved: 2019-01-09

## 2019-01-09 PROBLEM — I21.3 STEMI (ST ELEVATION MYOCARDIAL INFARCTION) (HCC): Status: RESOLVED | Noted: 2019-01-09 | Resolved: 2019-01-09

## 2019-01-09 PROBLEM — K92.2 GIB (GASTROINTESTINAL BLEEDING): Status: ACTIVE | Noted: 2019-01-09

## 2019-01-09 PROBLEM — Z86.73 HISTORY OF CVA (CEREBROVASCULAR ACCIDENT): Status: ACTIVE | Noted: 2018-09-02

## 2019-01-09 PROBLEM — I21.3 STEMI (ST ELEVATION MYOCARDIAL INFARCTION) (HCC): Status: ACTIVE | Noted: 2019-01-09

## 2019-01-09 PROBLEM — I65.22: Status: ACTIVE | Noted: 2018-11-02

## 2019-01-09 PROBLEM — I65.22 MILD ATHEROSCLEROSIS OF LEFT CAROTID ARTERY: Status: RESOLVED | Noted: 2018-09-04 | Resolved: 2019-01-09

## 2019-01-09 PROBLEM — I63.9 ISCHEMIC CEREBROVASCULAR ACCIDENT (CVA) (HCC): Status: RESOLVED | Noted: 2018-10-12 | Resolved: 2019-01-09

## 2019-01-09 PROBLEM — I25.2 HISTORY OF ACUTE ANTERIOR WALL MI: Status: ACTIVE | Noted: 2019-01-09

## 2019-01-09 PROBLEM — N18.4 STAGE 4 CHRONIC KIDNEY DISEASE (HCC): Status: ACTIVE | Noted: 2019-01-09

## 2019-01-09 LAB
ANION GAP SERPL CALC-SCNC: 10 MMOL/L (ref 0–11.9)
ANISOCYTOSIS BLD QL SMEAR: ABNORMAL
APTT PPP: 28.4 SEC (ref 24.7–36)
BASOPHILS # BLD AUTO: 0.5 % (ref 0–1.8)
BASOPHILS # BLD: 0.07 K/UL (ref 0–0.12)
BNP SERPL-MCNC: 1603 PG/ML (ref 0–100)
BUN SERPL-MCNC: 33 MG/DL (ref 8–22)
BURR CELLS BLD QL SMEAR: NORMAL
CALCIUM SERPL-MCNC: 8.8 MG/DL (ref 8.5–10.5)
CHLORIDE SERPL-SCNC: 109 MMOL/L (ref 96–112)
CO2 SERPL-SCNC: 20 MMOL/L (ref 20–33)
COMMENT 1642: NORMAL
CREAT SERPL-MCNC: 1.8 MG/DL (ref 0.5–1.4)
EKG IMPRESSION: NORMAL
EOSINOPHIL # BLD AUTO: 0.06 K/UL (ref 0–0.51)
EOSINOPHIL NFR BLD: 0.4 % (ref 0–6.9)
ERYTHROCYTE [DISTWIDTH] IN BLOOD BY AUTOMATED COUNT: 61 FL (ref 35.9–50)
GLUCOSE SERPL-MCNC: 140 MG/DL (ref 65–99)
HCT VFR BLD AUTO: 23.8 % (ref 37–47)
HCT VFR BLD AUTO: 27 % (ref 37–47)
HGB BLD-MCNC: 3.8 G/DL (ref 12–16)
HGB BLD-MCNC: 7 G/DL (ref 12–16)
HGB BLD-MCNC: 8 G/DL (ref 12–16)
HYPOCHROMIA BLD QL SMEAR: ABNORMAL
IMM GRANULOCYTES # BLD AUTO: 0.06 K/UL (ref 0–0.11)
IMM GRANULOCYTES NFR BLD AUTO: 0.4 % (ref 0–0.9)
INR PPP: 1.06 (ref 0.87–1.13)
LV EJECT FRACT MOD 2C 99903: 57.99
LV EJECT FRACT MOD 4C 99902: 45.88
LV EJECT FRACT MOD BP 99901: 52.9
LYMPHOCYTES # BLD AUTO: 1.15 K/UL (ref 1–4.8)
LYMPHOCYTES NFR BLD: 7.7 % (ref 22–41)
MCH RBC QN AUTO: 27.6 PG (ref 27–33)
MCHC RBC AUTO-ENTMCNC: 29.6 G/DL (ref 33.6–35)
MCV RBC AUTO: 93.1 FL (ref 81.4–97.8)
MICROCYTES BLD QL SMEAR: ABNORMAL
MONOCYTES # BLD AUTO: 1.28 K/UL (ref 0–0.85)
MONOCYTES NFR BLD AUTO: 8.6 % (ref 0–13.4)
MORPHOLOGY BLD-IMP: NORMAL
NEUTROPHILS # BLD AUTO: 12.34 K/UL (ref 2–7.15)
NEUTROPHILS NFR BLD: 82.4 % (ref 44–72)
NRBC # BLD AUTO: 0 K/UL
NRBC BLD-RTO: 0 /100 WBC
OVALOCYTES BLD QL SMEAR: NORMAL
PLATELET # BLD AUTO: 387 K/UL (ref 164–446)
PLATELET BLD QL SMEAR: NORMAL
PMV BLD AUTO: 10.5 FL (ref 9–12.9)
POIKILOCYTOSIS BLD QL SMEAR: NORMAL
POTASSIUM SERPL-SCNC: 5.1 MMOL/L (ref 3.6–5.5)
PROTHROMBIN TIME: 14 SEC (ref 12–14.6)
RBC # BLD AUTO: 2.9 M/UL (ref 4.2–5.4)
RBC BLD AUTO: PRESENT
SCHISTOCYTES BLD QL SMEAR: NORMAL
SODIUM SERPL-SCNC: 139 MMOL/L (ref 135–145)
TROPONIN I SERPL-MCNC: 0.9 NG/ML (ref 0–0.04)
TROPONIN I SERPL-MCNC: 1 NG/ML (ref 0–0.04)
WBC # BLD AUTO: 15 K/UL (ref 4.8–10.8)

## 2019-01-09 PROCEDURE — 99223 1ST HOSP IP/OBS HIGH 75: CPT | Mod: AI | Performed by: HOSPITALIST

## 2019-01-09 PROCEDURE — C9113 INJ PANTOPRAZOLE SODIUM, VIA: HCPCS | Performed by: HOSPITALIST

## 2019-01-09 PROCEDURE — 93308 TTE F-UP OR LMTD: CPT

## 2019-01-09 PROCEDURE — 96375 TX/PRO/DX INJ NEW DRUG ADDON: CPT

## 2019-01-09 PROCEDURE — 96366 THER/PROPH/DIAG IV INF ADDON: CPT

## 2019-01-09 PROCEDURE — 99233 SBSQ HOSP IP/OBS HIGH 50: CPT | Mod: GC | Performed by: INTERNAL MEDICINE

## 2019-01-09 PROCEDURE — 700111 HCHG RX REV CODE 636 W/ 250 OVERRIDE (IP): Performed by: EMERGENCY MEDICINE

## 2019-01-09 PROCEDURE — 85025 COMPLETE CBC W/AUTO DIFF WBC: CPT

## 2019-01-09 PROCEDURE — 99223 1ST HOSP IP/OBS HIGH 75: CPT | Performed by: INTERNAL MEDICINE

## 2019-01-09 PROCEDURE — 700102 HCHG RX REV CODE 250 W/ 637 OVERRIDE(OP): Performed by: HOSPITALIST

## 2019-01-09 PROCEDURE — A9270 NON-COVERED ITEM OR SERVICE: HCPCS | Performed by: HOSPITALIST

## 2019-01-09 PROCEDURE — 96365 THER/PROPH/DIAG IV INF INIT: CPT

## 2019-01-09 PROCEDURE — 82272 OCCULT BLD FECES 1-3 TESTS: CPT

## 2019-01-09 PROCEDURE — 83880 ASSAY OF NATRIURETIC PEPTIDE: CPT

## 2019-01-09 PROCEDURE — 85018 HEMOGLOBIN: CPT

## 2019-01-09 PROCEDURE — 84484 ASSAY OF TROPONIN QUANT: CPT

## 2019-01-09 PROCEDURE — 700111 HCHG RX REV CODE 636 W/ 250 OVERRIDE (IP): Performed by: HOSPITALIST

## 2019-01-09 PROCEDURE — 93005 ELECTROCARDIOGRAM TRACING: CPT | Performed by: EMERGENCY MEDICINE

## 2019-01-09 PROCEDURE — A9270 NON-COVERED ITEM OR SERVICE: HCPCS | Performed by: INTERNAL MEDICINE

## 2019-01-09 PROCEDURE — 80048 BASIC METABOLIC PNL TOTAL CA: CPT

## 2019-01-09 PROCEDURE — 85014 HEMATOCRIT: CPT

## 2019-01-09 PROCEDURE — 93308 TTE F-UP OR LMTD: CPT | Mod: 26 | Performed by: INTERNAL MEDICINE

## 2019-01-09 PROCEDURE — 96368 THER/DIAG CONCURRENT INF: CPT

## 2019-01-09 PROCEDURE — 700105 HCHG RX REV CODE 258: Performed by: HOSPITALIST

## 2019-01-09 PROCEDURE — 36415 COLL VENOUS BLD VENIPUNCTURE: CPT

## 2019-01-09 PROCEDURE — C9113 INJ PANTOPRAZOLE SODIUM, VIA: HCPCS | Performed by: EMERGENCY MEDICINE

## 2019-01-09 PROCEDURE — 71045 X-RAY EXAM CHEST 1 VIEW: CPT

## 2019-01-09 PROCEDURE — 770022 HCHG ROOM/CARE - ICU (200)

## 2019-01-09 PROCEDURE — 700102 HCHG RX REV CODE 250 W/ 637 OVERRIDE(OP): Performed by: INTERNAL MEDICINE

## 2019-01-09 PROCEDURE — 85730 THROMBOPLASTIN TIME PARTIAL: CPT

## 2019-01-09 PROCEDURE — 304561 HCHG STAT O2

## 2019-01-09 PROCEDURE — 85610 PROTHROMBIN TIME: CPT

## 2019-01-09 PROCEDURE — 94760 N-INVAS EAR/PLS OXIMETRY 1: CPT

## 2019-01-09 PROCEDURE — 99291 CRITICAL CARE FIRST HOUR: CPT

## 2019-01-09 RX ORDER — HEPARIN SODIUM,PORCINE 1000/ML
VIAL (ML) INJECTION
Status: COMPLETED
Start: 2019-01-09 | End: 2019-01-09

## 2019-01-09 RX ORDER — AMOXICILLIN 250 MG
2 CAPSULE ORAL 2 TIMES DAILY
Status: DISCONTINUED | OUTPATIENT
Start: 2019-01-09 | End: 2019-01-16 | Stop reason: HOSPADM

## 2019-01-09 RX ORDER — BISACODYL 10 MG
10 SUPPOSITORY, RECTAL RECTAL
Status: DISCONTINUED | OUTPATIENT
Start: 2019-01-09 | End: 2019-01-16 | Stop reason: HOSPADM

## 2019-01-09 RX ORDER — ONDANSETRON 4 MG/1
4 TABLET, ORALLY DISINTEGRATING ORAL EVERY 4 HOURS PRN
Status: DISCONTINUED | OUTPATIENT
Start: 2019-01-09 | End: 2019-01-16 | Stop reason: HOSPADM

## 2019-01-09 RX ORDER — LIDOCAINE HYDROCHLORIDE 20 MG/ML
INJECTION, SOLUTION INFILTRATION; PERINEURAL
Status: COMPLETED
Start: 2019-01-09 | End: 2019-01-09

## 2019-01-09 RX ORDER — MORPHINE SULFATE 4 MG/ML
2-4 INJECTION, SOLUTION INTRAMUSCULAR; INTRAVENOUS
Status: DISCONTINUED | OUTPATIENT
Start: 2019-01-09 | End: 2019-01-11

## 2019-01-09 RX ORDER — FUROSEMIDE 40 MG/1
40 TABLET ORAL 2 TIMES DAILY
Status: ON HOLD | COMMUNITY
End: 2019-01-16

## 2019-01-09 RX ORDER — POLYETHYLENE GLYCOL 3350 17 G/17G
1 POWDER, FOR SOLUTION ORAL
Status: DISCONTINUED | OUTPATIENT
Start: 2019-01-09 | End: 2019-01-16 | Stop reason: HOSPADM

## 2019-01-09 RX ORDER — PANTOPRAZOLE SODIUM 40 MG/10ML
80 INJECTION, POWDER, LYOPHILIZED, FOR SOLUTION INTRAVENOUS ONCE
Status: COMPLETED | OUTPATIENT
Start: 2019-01-09 | End: 2019-01-09

## 2019-01-09 RX ORDER — ONDANSETRON 2 MG/ML
4 INJECTION INTRAMUSCULAR; INTRAVENOUS EVERY 4 HOURS PRN
Status: DISCONTINUED | OUTPATIENT
Start: 2019-01-09 | End: 2019-01-16 | Stop reason: HOSPADM

## 2019-01-09 RX ORDER — ISOSORBIDE MONONITRATE 60 MG/1
60 TABLET, EXTENDED RELEASE ORAL EVERY MORNING
Status: ON HOLD | COMMUNITY
End: 2019-01-16

## 2019-01-09 RX ORDER — MORPHINE SULFATE 4 MG/ML
4 INJECTION, SOLUTION INTRAMUSCULAR; INTRAVENOUS ONCE
Status: COMPLETED | OUTPATIENT
Start: 2019-01-09 | End: 2019-01-09

## 2019-01-09 RX ORDER — METOPROLOL SUCCINATE 50 MG/1
50 TABLET, EXTENDED RELEASE ORAL DAILY
Status: ON HOLD | COMMUNITY
End: 2019-01-16

## 2019-01-09 RX ORDER — ROSUVASTATIN CALCIUM 20 MG/1
40 TABLET, COATED ORAL EVERY EVENING
Status: DISCONTINUED | OUTPATIENT
Start: 2019-01-09 | End: 2019-01-16 | Stop reason: HOSPADM

## 2019-01-09 RX ORDER — ISOSORBIDE MONONITRATE 30 MG/1
90 TABLET, EXTENDED RELEASE ORAL
Status: DISCONTINUED | OUTPATIENT
Start: 2019-01-09 | End: 2019-01-14

## 2019-01-09 RX ORDER — NITROGLYCERIN 20 MG/100ML
0-200 INJECTION INTRAVENOUS ONCE
Status: DISCONTINUED | OUTPATIENT
Start: 2019-01-09 | End: 2019-01-09

## 2019-01-09 RX ORDER — CLOPIDOGREL BISULFATE 75 MG/1
75 TABLET ORAL DAILY
COMMUNITY
End: 2019-02-13

## 2019-01-09 RX ORDER — LATANOPROST 50 UG/ML
1 SOLUTION/ DROPS OPHTHALMIC EVERY EVENING
Status: DISCONTINUED | OUTPATIENT
Start: 2019-01-09 | End: 2019-01-16 | Stop reason: HOSPADM

## 2019-01-09 RX ORDER — SODIUM CHLORIDE 9 MG/ML
INJECTION, SOLUTION INTRAVENOUS CONTINUOUS
Status: DISCONTINUED | OUTPATIENT
Start: 2019-01-09 | End: 2019-01-12

## 2019-01-09 RX ORDER — NITROGLYCERIN 20 MG/100ML
0-200 INJECTION INTRAVENOUS CONTINUOUS
Status: DISCONTINUED | OUTPATIENT
Start: 2019-01-09 | End: 2019-01-14

## 2019-01-09 RX ORDER — ONDANSETRON 2 MG/ML
4 INJECTION INTRAMUSCULAR; INTRAVENOUS ONCE
Status: COMPLETED | OUTPATIENT
Start: 2019-01-09 | End: 2019-01-09

## 2019-01-09 RX ORDER — ACETAMINOPHEN 325 MG/1
650 TABLET ORAL EVERY 6 HOURS PRN
Status: DISCONTINUED | OUTPATIENT
Start: 2019-01-09 | End: 2019-01-16 | Stop reason: HOSPADM

## 2019-01-09 RX ADMIN — ONDANSETRON HYDROCHLORIDE 4 MG: 2 INJECTION, SOLUTION INTRAMUSCULAR; INTRAVENOUS at 02:45

## 2019-01-09 RX ADMIN — ISOSORBIDE MONONITRATE 90 MG: 60 TABLET ORAL at 08:53

## 2019-01-09 RX ADMIN — SODIUM CHLORIDE: 9 INJECTION, SOLUTION INTRAVENOUS at 20:48

## 2019-01-09 RX ADMIN — ROSUVASTATIN CALCIUM 40 MG: 20 TABLET, FILM COATED ORAL at 19:29

## 2019-01-09 RX ADMIN — MORPHINE SULFATE 4 MG: 4 INJECTION INTRAVENOUS at 02:45

## 2019-01-09 RX ADMIN — SODIUM CHLORIDE 8 MG/HR: 9 INJECTION, SOLUTION INTRAVENOUS at 17:44

## 2019-01-09 RX ADMIN — NITROGLYCERIN 15 MCG/MIN: 20 INJECTION INTRAVENOUS at 09:23

## 2019-01-09 RX ADMIN — MORPHINE SULFATE 4 MG: 4 INJECTION INTRAVENOUS at 08:48

## 2019-01-09 RX ADMIN — SODIUM CHLORIDE 8 MG/HR: 9 INJECTION, SOLUTION INTRAVENOUS at 07:26

## 2019-01-09 RX ADMIN — PANTOPRAZOLE SODIUM 80 MG: 40 INJECTION, POWDER, LYOPHILIZED, FOR SOLUTION INTRAVENOUS at 05:08

## 2019-01-09 RX ADMIN — SODIUM CHLORIDE: 9 INJECTION, SOLUTION INTRAVENOUS at 09:22

## 2019-01-09 RX ADMIN — METOPROLOL TARTRATE 12.5 MG: 25 TABLET, FILM COATED ORAL at 17:43

## 2019-01-09 RX ADMIN — LATANOPROST 1 DROP: 50 SOLUTION OPHTHALMIC at 17:45

## 2019-01-09 ASSESSMENT — LIFESTYLE VARIABLES
EVER_SMOKED: YES
EVER_SMOKED: YES

## 2019-01-09 ASSESSMENT — ENCOUNTER SYMPTOMS
SHORTNESS OF BREATH: 1
FOCAL WEAKNESS: 1
GASTROINTESTINAL NEGATIVE: 1
CONSTITUTIONAL NEGATIVE: 1
MUSCULOSKELETAL NEGATIVE: 1
RESPIRATORY NEGATIVE: 1
SENSORY CHANGE: 1
EYES NEGATIVE: 1
PSYCHIATRIC NEGATIVE: 1

## 2019-01-09 ASSESSMENT — PAIN SCALES - GENERAL
PAINLEVEL_OUTOF10: 2
PAINLEVEL_OUTOF10: 5
PAINLEVEL_OUTOF10: 0
PAINLEVEL_OUTOF10: 8
PAINLEVEL_OUTOF10: 5
PAINLEVEL_OUTOF10: 2
PAINLEVEL_OUTOF10: 0
PAINLEVEL_OUTOF10: 2
PAINLEVEL_OUTOF10: 2

## 2019-01-09 ASSESSMENT — COGNITIVE AND FUNCTIONAL STATUS - GENERAL
SUGGESTED CMS G CODE MODIFIER MOBILITY: CK
TURNING FROM BACK TO SIDE WHILE IN FLAT BAD: A LITTLE
TOILETING: A LITTLE
MOVING TO AND FROM BED TO CHAIR: A LITTLE
MOVING FROM LYING ON BACK TO SITTING ON SIDE OF FLAT BED: A LITTLE
STANDING UP FROM CHAIR USING ARMS: A LOT
MOBILITY SCORE: 17
DRESSING REGULAR UPPER BODY CLOTHING: A LITTLE
EATING MEALS: A LITTLE
SUGGESTED CMS G CODE MODIFIER DAILY ACTIVITY: CK
CLIMB 3 TO 5 STEPS WITH RAILING: A LITTLE
HELP NEEDED FOR BATHING: A LITTLE
PERSONAL GROOMING: A LITTLE
DAILY ACTIVITIY SCORE: 18
WALKING IN HOSPITAL ROOM: A LITTLE
DRESSING REGULAR LOWER BODY CLOTHING: A LITTLE

## 2019-01-09 NOTE — PROGRESS NOTES
Hospital Medicine Daily Progress Note    Date of Service  1/9/2019    Chief Complaint  84 y.o. female admitted 1/9/2019 with STEMI, evaluated by cardiology and medical therapy recommended given history of GI bleed and melena    Hospital Course          Interval Problem Update  SBP   Still having chest pain but improved  On protonix drip        Consultants/Specialty  Cardiology    Code Status  DNR/DNR    Disposition  ICU    Review of Systems  Review of Systems   Respiratory: Positive for shortness of breath.    Cardiovascular: Positive for chest pain.   Gastrointestinal: Positive for melena.        Physical Exam  Temp:  [37.2 °C (98.9 °F)] 37.2 °C (98.9 °F)  Pulse:  [69-94] 70  Resp:  [12-25] 14  BP: (131)/(46) 131/46    Physical Exam   Cardiovascular: Normal rate.    Pulmonary/Chest: Effort normal. She has no rales. She exhibits no tenderness.   Abdominal: Soft. She exhibits no distension. There is no tenderness.       Fluids  No intake or output data in the 24 hours ending 01/09/19 0905    Laboratory  Recent Labs      01/09/19   0233   WBC  15.0*   RBC  2.90*   HEMOGLOBIN  8.0*   HEMATOCRIT  27.0*   MCV  93.1   MCH  27.6   MCHC  29.6*   RDW  61.0*   PLATELETCT  387   MPV  10.5     Recent Labs      01/09/19   0233   SODIUM  139   POTASSIUM  5.1   CHLORIDE  109   CO2  20   GLUCOSE  140*   BUN  33*   CREATININE  1.80*   CALCIUM  8.8     Recent Labs      01/09/19   0233   APTT  28.4   INR  1.06     Recent Labs      01/09/19   0233   BNPBTYPENAT  1603*           Imaging  EC-ECHOCARDIOGRAM LTD W/O CONT         DX-CHEST-LIMITED (1 VIEW)   Final Result         1.  Pulmonary edema and/or infiltrates.   2.  Atherosclerosis   3.  Hyperexpansion of lungs favors changes of COPD.           Assessment/Plan  ACC/AHA stage C systolic heart failure (HCC)- (present on admission)   Assessment & Plan    Without current exacerbation.  Monitor.  Will hold treatment regimen given profound hypotension.      Normocytic anemia-  (present on admission)   Assessment & Plan    Likely of chronic disease with component of acute blood loss.  Monitor.  Transfuse if needed for hemoglobin less than 7 gm/dL       Essential hypertension- (present on admission)   Assessment & Plan    Hypotensive currently.  Hold medication regimen.      GIB (gastrointestinal bleeding)   Assessment & Plan    Unclear source.  Will monitor.  Transfuse if needed for hemoglobin less than 7 gm/dL       Stage 4 chronic kidney disease (HCC)   Assessment & Plan    Monitor.  Precludes angiography per cardiology           Overall prognosis guarded discussed treatment options and goals of care with patient    VTE prophylaxis: SCD

## 2019-01-09 NOTE — ED NOTES
Dr. Yossi Krishnan will be this patient's attending physician beginning at 0700 today. Please reach out to him with updates/questions.

## 2019-01-09 NOTE — CARE PLAN
Problem: Communication  Goal: The ability to communicate needs accurately and effectively will improve  Outcome: PROGRESSING AS EXPECTED  Educated patient on the importance of communicating needs and wants. Patient expressed understanding.    Problem: Pain Management  Goal: Pain level will decrease to patient's comfort goal  Outcome: PROGRESSING AS EXPECTED  Assessed patient for signs and symptoms of pain frequently, administered medication when necessary. Patient is on a nitroglycerin gtt and prn morphine.

## 2019-01-09 NOTE — PROGRESS NOTES
Patient transported to T6 via gurney by CCT and ICU RN. Chart, medications, and belongings brought with patient. Connected to unit monitors, VSS. Patient states she has 5/10 chest pressure, will administer medication. All other needs met at this time.

## 2019-01-09 NOTE — ED NOTES
Jessie  from Lab called with critical result of Troponin of 0.9 at 0354. Critical lab result read back to Jessie.   Dr. Bernard notified of critical lab result at 0355.  Critical lab result read back by Dr. Bernard.

## 2019-01-09 NOTE — ED PROVIDER NOTES
"ED Provider Note    ED Provider Note      Primary care provider: Pcp Pt States None    CHIEF COMPLAINT  ST elevation myocardial infarction    HPI  Brooke Diana is a 84 y.o. female who presents to the Emergency Department with chief complaint of chest pain.  Patient reports heavy chest pain substernally over the last 2 days getting progressively worse this evening.  She rates it as severe now without alleviating or aggravate fairly profound shortness of breath.  She does state that she has had previous heart attack 10 years prior with stent placed \"in the back of her heart.\"No fevers no chills minimal nausea no vomiting no cough she describes no headache no abdominal pain no distal extremity pain or edema.  Of note patient was recently admitted to the hospital 1 week for non-ST elevation myocardial infarction at that time she also had GI bleed secondary to heparin drip.  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    REVIEW OF SYSTEMS  10 systems reviewed and otherwise negative, pertinent positives and negatives listed in the history of present illness.    PAST MEDICAL HISTORY   has a past medical history of Acute myocardial infarction, true posterior wall infarction, episode of care unspecified; Anxiety; Bowel habit changes; Breath shortness (2017); CAD (coronary artery disease); Cholesterol blood decreased; Dental disorder; Dizziness; GI bleed; Glaucoma; Heart burn; Hyperlipidemia; Hypertension; Myocardial infarct (HCC) (6/10/10); Peripheral vascular disease (HCC); Pulmonary disease; TIA (transient ischemic attack); Unspecified hemorrhagic conditions; Unspecified urinary incontinence; and Urinary bladder disorder.    SURGICAL HISTORY   has a past surgical history that includes rectus repair (7/13/2012); recovery (11/26/2012); recovery (4/9/2013); femoral endarterectomy (10/4/2013); angioplasty balloon (10/4/2013); " hysterectomy, total abdominal; other cardiac surgery; cholecystectomy; other (12/10/10); other (6/10/10); other (2005); recovery (7/24/2014); cardiac cath (6/2010); cardiac cath (7/24/14); stent placement; gastroscopy with biopsy (2/13/2016); carotid endarterectomy (Right, 9/3/2018); gastroscopy-endo (N/A, 10/22/2018); and colonoscopy - endo (N/A, 10/23/2018).    SOCIAL HISTORY  Social History   Substance Use Topics   • Smoking status: Former Smoker     Packs/day: 0.25     Years: 45.00     Types: Cigarettes     Quit date: 2/1/2016   • Smokeless tobacco: Never Used   • Alcohol use 0.0 - 0.6 oz/week      Comment: 1 drink per month      History   Drug Use No       FAMILY HISTORY  Non-Contributory    CURRENT MEDICATIONS  Home Medications     Reviewed by Krystal Guzman R.N. (Registered Nurse) on 01/09/19 at 0256  Med List Status: Partial   Medication Last Dose Status   aspirin (ASA) 81 MG Chew Tab chewable tablet  Active   bimatoprost (LUMIGAN) 0.01 % Solution  Active   clopidogrel (PLAVIX) 75 MG Tab  Active   furosemide (LASIX) 40 MG Tab  Active   gabapentin (NEURONTIN) 300 MG Cap  Active   isosorbide mononitrate SR (IMDUR) 60 MG TABLET SR 24 HR  Active   metoprolol SR (TOPROL XL) 50 MG TABLET SR 24 HR  Active   pantoprazole (PROTONIX) 40 MG Tablet Delayed Response  Active   rosuvastatin (CRESTOR) 40 MG tablet  Active                ALLERGIES  Allergies   Allergen Reactions   • Penicillins Rash and Vomiting     Rxn = unknown  Pt tolerates cephalosporins   • Hydrocodone Vomiting and Nausea     Rxn = unknown   • Tape Rash     RASH       PHYSICAL EXAM  VITAL SIGNS: There were no vitals taken for this visit.  Pulse ox interpretation: I interpret this pulse ox as normal.  Constitutional: Alert and oriented x 3, moderate distress  HEENT: Atraumatic normocephalic, pupils are equal round reactive to light extraocular movements are intact. The nares is clear, external ears are normal, mouth shows moist mucous membranes  Neck:  Supple, no JVD no tracheal deviation  Cardiovascular: Regular rate and rhythm no murmur rub or gallop 2+ pulses peripherally x4  Thorax & Lungs: Tachypneic, crackles in the bases bilaterally    GI: Soft nontender nondistended positive bowel sounds, no peritoneal signs  Rectal: Large amount of melanotic stool normal rectal tone  Skin: Warm dry no acute rash or lesion  Musculoskeletal: Moving all extremities with full range and 5 of 5 strength, no acute  deformity  Neurologic: Cranial nerves III through XII are grossly intact, no sensory deficit, no cerebellar dysfunction   Psychiatric: Appropriate affect for situation at this time      DIAGNOSTIC STUDIES / PROCEDURES  LABS      Results for orders placed or performed during the hospital encounter of 01/09/19   CBC w/ Differential   Result Value Ref Range    WBC 15.0 (H) 4.8 - 10.8 K/uL    RBC 2.90 (L) 4.20 - 5.40 M/uL    Hemoglobin 8.0 (L) 12.0 - 16.0 g/dL    Hematocrit 27.0 (L) 37.0 - 47.0 %    MCV 93.1 81.4 - 97.8 fL    MCH 27.6 27.0 - 33.0 pg    MCHC 29.6 (L) 33.6 - 35.0 g/dL    RDW 61.0 (H) 35.9 - 50.0 fL    Platelet Count 387 164 - 446 K/uL    MPV 10.5 9.0 - 12.9 fL    Neutrophils-Polys 82.40 (H) 44.00 - 72.00 %    Lymphocytes 7.70 (L) 22.00 - 41.00 %    Monocytes 8.60 0.00 - 13.40 %    Eosinophils 0.40 0.00 - 6.90 %    Basophils 0.50 0.00 - 1.80 %    Immature Granulocytes 0.40 0.00 - 0.90 %    Nucleated RBC 0.00 /100 WBC    Lymphs (Absolute) 1.15 1.00 - 4.80 K/uL    Monos (Absolute) 1.28 (H) 0.00 - 0.85 K/uL    Eos (Absolute) 0.06 0.00 - 0.51 K/uL    Baso (Absolute) 0.07 0.00 - 0.12 K/uL    Immature Granulocytes (abs) 0.06 0.00 - 0.11 K/uL    NRBC (Absolute) 0.00 K/uL    Neutrophils (Absolute) 12.34 (H) 2.00 - 7.15 K/uL    Hypochromia 1+     Anisocytosis 1+     Microcytosis 1+    Basic Metabolic Panel (BMP)   Result Value Ref Range    Sodium 139 135 - 145 mmol/L    Potassium 5.1 3.6 - 5.5 mmol/L    Chloride 109 96 - 112 mmol/L    Co2 20 20 - 33 mmol/L    Glucose  140 (H) 65 - 99 mg/dL    Bun 33 (H) 8 - 22 mg/dL    Creatinine 1.80 (H) 0.50 - 1.40 mg/dL    Calcium 8.8 8.5 - 10.5 mg/dL    Anion Gap 10.0 0.0 - 11.9   Troponin STAT   Result Value Ref Range    Troponin I 0.90 (H) 0.00 - 0.04 ng/mL   Btype Natriuretic Peptide   Result Value Ref Range    B Natriuretic Peptide 1603 (H) 0 - 100 pg/mL   PT/INR   Result Value Ref Range    PT 14.0 12.0 - 14.6 sec    INR 1.06 0.87 - 1.13   APTT   Result Value Ref Range    APTT 28.4 24.7 - 36.0 sec   ESTIMATED GFR   Result Value Ref Range    GFR If  32 (A) >60 mL/min/1.73 m 2    GFR If Non  27 (A) >60 mL/min/1.73 m 2   PERIPHERAL SMEAR REVIEW   Result Value Ref Range    Peripheral Smear Review see below    PLATELET ESTIMATE   Result Value Ref Range    Plt Estimation Normal    MORPHOLOGY   Result Value Ref Range    RBC Morphology Present     Poikilocytosis 1+     Ovalocytes 1+     Schistocytes 1+     Echinocytes 1+    DIFFERENTIAL COMMENT   Result Value Ref Range    Comments-Diff see below    EKG (NOW)   Result Value Ref Range    Report       [facility] Emergency Dept.    Test Date:  2019  Pt Name:    LEIDA RENNER                Department: ER  MRN:        [id number]                      Room:       [facility]  Gender:     Female                       Technician: [id number]  :        [id number]                   Requested By:JAYLIN HOOD  Order #:    [id number]                    Reading MD: JAYLIN HOOD MD    Measurements  Intervals                                Axis  Rate:       101                          P:          79  ID:         [id number]                          QRS:        37  QRSD:       82                           T:          128  QT:         352  QTc:        457    Interpretive Statements  Patient has ST elevation V1 V2 V3 reciprocal changes in the inferior and  lateral  leads concerning for acute ST elevation myocardial infarction, previous EKG  from  2018 shows very  "similar morphology.  Electronically Signed On 1-9-2019 2:53:05 PST by JAYLIN HOOD MD         All labs reviewed by me.      RADIOLOGY  DX-CHEST-LIMITED (1 VIEW)   Final Result         1.  Pulmonary edema and/or infiltrates.   2.  Atherosclerosis   3.  Hyperexpansion of lungs favors changes of COPD.      EC-ECHOCARDIOGRAM LTD W/O CONT    (Results Pending)     The radiologist's interpretation of all radiological studies have been reviewed by me.    COURSE & MEDICAL DECISION MAKING  Pertinent Labs & Imaging studies reviewed. (See chart for details)    2:37 AM - Patient seen and examined at bedside.  Patient seen in conjunction with cardiologist Dr. Carter, we are both in agreement the EKG is suggestive of ST elevation myocardial infarction however on last hospital admission patient had GI bleed that was induced after heparin drip she also had renal dysfunction and was deemed at that time did not currently be a candidate for PCI.  Patient will be medically optimized at this time will be admitted to the hospitalist service for further evaluation and treatment with cardiology consultation.      Troponin very mildly elevated patient feeling better after interventions.  Patient was later checked she is guaiac positive given a loading dose of Protonix and will further evaluate for ongoing GI bleed as inpatient.        Patient noted to have slightly elevated blood pressure likely circumstantial secondary to presenting complaint. Referred to primary care physician for further evaluation.            Ht 1.753 m (5' 9\")   Wt 57.2 kg (126 lb)   BMI 18.61 kg/m²             FINAL IMPRESSION  1.  Chest pain  2.  ST elevation myocardial infarction  3.  Congestive heart failure  4.  Pulmonary edema  5.  Probable upper GI bleed  6.  Acute blood loss anemia    This dictation has been created using voice recognition software and/or scribes. The accuracy of the dictation is limited by the abilities of the software and the " expertise of the scribes. I expect there may be some errors of grammar and possibly content. I made every attempt to manually correct the errors within my dictation. However, errors related to voice recognition software and/or scribes may still exist and should be interpreted within the appropriate context.

## 2019-01-09 NOTE — ED NOTES
This RN notified hospitalist of patients current BP. Hospitalist confirms that the patient has been running about these readings on current nitro rate. If pt continues to trend down, hospitalist gives permission to titrate nitro down by 5mcg/min. This RN continues to monitor closely.

## 2019-01-09 NOTE — ASSESSMENT & PLAN NOTE
11/2/2018:  1.  There is ulcerated atherosclerotic disease in the left carotid bulb and proximal left internal carotid artery causing approximately 20-30% stenosis.  2.  Severe atherosclerotic disease in the left M1 segment.  3.  There is no significant stenosis at the origin of the right internal carotid artery.

## 2019-01-09 NOTE — ASSESSMENT & PLAN NOTE
On nitroglycerin infusion.  Not medically stable for cardiac catheterization per cardiology.  Will continue to monitor for symptoms.  Trend troponin.

## 2019-01-09 NOTE — CONSULTS
Cardiology Consult Note    Date note created:    1/9/2019          Patient ID:   Name:             Brooke Diana   YOB: 1934  Age:                 84 y.o.  female   MRN:               [id number]               Referring Physician: Code STEMI                                                  Reason for Consult:      Code STEMI    History of Present Illness:    84-year-old female with known coronary artery disease who was brought in as a code STEMI.  Patient reports being in her usual state of health until yesterday when she started having bilateral nonradiating chest discomfort associated with dyspnea.  She tried taking her medications but her pain persisted and she finally decided to call 911 today.  She appears very uncomfortable and complains of ongoing chest discomfort and dyspnea. Reports compliance with all of her medications.    In 2010, patient had an anterior MI at which time she was found to have a  to her LAD.  She underwent PCI to OM.  Repeat coronary angiography in 2014 showed patent stent.  She has also had multiple CVAs in the past.    Reports having GI bleed history.  In October 2018, she underwent clipping for a gastric AVM.  She was admitted here in December for a NSTEMI for which she was started on heparin drip following which she had a drop in her hemoglobin for which she required a transfusion.  Patient was seen by Dr. Kapoor about 10 days ago and was deemed not to be a good candidate for any coronary intervention due to her recent GI bleed.    Review of Systems:      A full review of systems was completed and all pertinent positives and negatives are included in the HPI above. All others reviewed and negative.     Past Medical History:   Past Medical History:   Diagnosis Date   • Acute myocardial infarction, true posterior wall infarction, episode of care unspecified    • Anxiety    • Bowel habit changes    • Breath shortness 2017    uses oxygen at 2 liters/min; AT NIGHT ONLY    • CAD (coronary artery disease)     S/P stent placement   • Cholesterol blood decreased    • Dental disorder     Partial upper   • Dizziness    • GI bleed    • Glaucoma    • Heart burn    • Hyperlipidemia    • Hypertension    • Myocardial infarct (HCC) 6/10/10   • Peripheral vascular disease (HCC)    • Pulmonary disease     bronchitis   • TIA (transient ischemic attack)    • Unspecified hemorrhagic conditions     pt takes plavix   • Unspecified urinary incontinence    • Urinary bladder disorder        Past Surgical History:  Past Surgical History:   Procedure Laterality Date   • COLONOSCOPY - ENDO N/A 10/23/2018    Procedure: COLONOSCOPY - ENDO;  Surgeon: Anatoly Barragan M.D.;  Location: ENDOSCOPY [facility];  Service: Gastroenterology   • GASTROSCOPY-ENDO N/A 10/22/2018    Procedure: GASTROSCOPY-ENDO;  Surgeon: Bakari Brown M.D.;  Location: [facility];  Service: Gastroenterology   • CAROTID ENDARTERECTOMY Right 9/3/2018    Procedure: CAROTID ENDARTERECTOMY WITH NEUROMONITORING;  Surgeon: Naga Abad M.D.;  [place] [facility]  Service: Vascular   • GASTROSCOPY WITH BIOPSY  2/13/2016    Procedure: GASTROSCOPY WITH BIOPSY;  Surgeon: Susan Miller M.D.;  Location: [facility];  Service:    • RECOVERY  7/24/2014    Performed by Cath-Recovery Surgery at [facility] SAME DAY [facility]   • CARDIAC CATH  7/24/14    Diffuse disease, see report.  % with collterals.   • FEMORAL ENDARTERECTOMY  10/4/2013    Performed [place] Kac[place]. [place] [facility]   • ANGIOPLASTY BALLOON  10/4/2013    Performed by Kacie Baker M.D. at [facility]   • RECOVERY  4/9/2013    Performed by Ir-Recovery Surgery at [facility] SAME DAY [facility]   • RECOVERY  11/26/2012    Performed by Ir-Recovery Surgery at SURGERY SAME DAY [facility]   • RECTUS REPAIR  7/13/2012    Performed by SHEILA PLATT at [facility]    • OTHER  12/10/10    stents in legs   • OTHER  6/10/10    cardiac stents   • CARDIAC CATH  6/2010    BMS to Om   • OTHER  2005    KNEE SURGERY   • CHOLECYSTECTOMY     • HYSTERECTOMY, TOTAL ABDOMINAL     • OTHER CARDIAC SURGERY      stent in heart   • STENT PLACEMENT      treated by Dr Baker, multiple surgeries to legs.       Family History:  Family History   Problem Relation Age of Onset   • Stroke Mother 68        CVA   • Heart Disease Mother 65        valve surgery   • Other Son         wgt 465 lbs       Social History:  Social History     Social History   • Marital status:      Spouse name: N/A   • Number of children: N/A   • Years of education: N/A     Occupational History   • Not on file.     Social History Main Topics   • Smoking status: Former Smoker     Packs/day: 0.25     Years: 45.00     Types: Cigarettes     Quit date: 2/1/2016   • Smokeless tobacco: Never Used   • Alcohol use 0.0 - 0.6 oz/week      Comment: 1 drink per month   • Drug use: No   • Sexual activity: Not on file     Other Topics Concern   • Not on file     Social History Narrative   • No narrative on file       Hospital Medications:    Current Facility-Administered Medications:   •  morphine (pf) 4 mg/ml injection 4 mg, 4 mg, Intravenous, Once, Praful Bernard M.D.  •  ondansetron (ZOFRAN) syringe/vial injection 4 mg, 4 mg, Intravenous, Once, Praful Bernard M.D.  •  nitroglycerin 50 mg in D5W 250 ml infusion, 0-200 mcg/min, Intravenous, Once, Praful Bernard M.D., Last Rate: 9 mL/hr at 01/09/19 0250, 30 mcg/min at 01/09/19 0250    Current Outpatient Prescriptions:   •  gabapentin (NEURONTIN) 300 MG Cap, Take 300 mg by mouth 3 times a day., Disp: , Rfl:   •  metoprolol SR (TOPROL XL) 50 MG TABLET SR 24 HR, Take 1 Tab by mouth every day., Disp: 30 Tab, Rfl: 11  •  isosorbide mononitrate SR (IMDUR) 60 MG TABLET SR 24 HR, Take 1 Tab by mouth every morning., Disp: 30 Tab, Rfl: 1  •  furosemide (LASIX) 40 MG Tab, Take 1 Tab by  "mouth 2 Times a Day. (Patient not taking: Reported on 12/31/2018), Disp: 60 Tab, Rfl: 1  •  clopidogrel (PLAVIX) 75 MG Tab, Take 1 Tab by mouth every day. (Patient not taking: Reported on 12/31/2018), Disp: 30 Tab, Rfl: 1  •  aspirin (ASA) 81 MG Chew Tab chewable tablet, Take 81 mg by mouth every day., Disp: , Rfl:   •  rosuvastatin (CRESTOR) 40 MG tablet, Take 40 mg by mouth every evening., Disp: , Rfl:   •  pantoprazole (PROTONIX) 40 MG Tablet Delayed Response, Take 40 mg by mouth 2 Times a Day., Disp: , Rfl:   •  bimatoprost (LUMIGAN) 0.01 % Solution, Place 1 Drop in both eyes every bedtime. 1 gtts both eyes, Disp: , Rfl:     Medication Allergy:  Allergies   Allergen Reactions   • Penicillins Rash and Vomiting     Rxn = unknown  Pt tolerates cephalosporins   • Hydrocodone Vomiting and Nausea     Rxn = unknown   • Tape Rash     RASH       Physical Exam:  Vitals/ General Appearance:   Weight/BMI: Body mass index is 18.61 kg/m².  Height 1.753 m (5' 9\"), weight 57.2 kg (126 lb), not currently breastfeeding.  Vitals:    01/09/19 0253   Weight: 57.2 kg (126 lb)   Height: 1.753 m (5' 9\")   Blood pressure 140s/90s.  Heart rate 90s-100s.  Patient is tachypneic, respiratory rate in the 20s.  She is satting 90%.    Constitutional:   Well developed, Well nourished, No acute distress   HEENT:  Normocephalic, Atraumatic. Throat is supple.   Eyes: Conjunctiva normal  Neck:  Normal range of motion, JVP could not be visualized.  Cardiovascular: Tachycardic, regular rhythm, No murmurs, No rubs, No gallops. Extremities with intact distal pulses, no cyanosis.  Trace edema bilaterally.  Lungs:  Coarse breath sounds bilaterally about two thirds of the way up from the base.  No wheezes.    Abdomen:  Soft, No tenderness, no distension  Skin: No rash  Neurologic: Alert & oriented x 3   Psychiatric: Affect normal     MDM (Data Review):     Records reviewed and summarized in current documentation    Lab Data Review:  Labs are pending at " this time.  Recent labs about 10 days ago showed creatinine 1.7.  Hemoglobin between 8-9 recently.    Labs reviewed as noted above.    Imaging/Procedures Review:      EKG performed today at 0224 hrs. was personally reviewed and per my interpretation shows sinus tachycardia at 101 bpm.  Q waves in V1 through V3.  ST elevation in the same leads associated with ST depressions in the inferolateral leads.    MDM (Assessment and Plan):     There are no active hospital problems to display for this patient.    Code STEMI: Patient has previously known anteroseptal Q waves.  Mild ST elevation is seen in the same leads, similar to her previous EKG with possible slight worsening.  Given her acute onset of symptoms and ongoing symptoms, this likely represents a STEMI or a high risk NSTEMI.  Ideally the patient needs a coronary angiography however she has had 2 recent GI bleed episodes the latter occurring with heparin drip for less than 24 hours.  Given her significant GI bleeding risk, would not recommend coronary angiography at this time.  Also during her recent admission, she developed renal insufficiency.  I have explained the risks to the patient and recommend medical management alone.  Patient's cardiologist Dr. Kapoor mentioned a similar plan in his most recent note.    Patient also has a new diagnosis of cardiomyopathy.  Her most recent LV systolic function was severely reduced with an ejection fraction of about 25%.  Based on her exam it appears that she is fluid overloaded.  Recommend following up with her labs and considering Lasix.  She will also be referred for a repeat limited echocardiogram to evaluate her LV function and wall motion.    She is currently on aspirin.  Continue metoprolol.  Nitroglycerin drip has been started in the ER.  Would recommend increasing isosorbide mononitrate dose to 90 mg once daily.  Recommend following up with her blood pressure adding other antianginal medications as  tolerated.    Will follow.     Thank you for allowing me to participate in the care of this patient. Please do not hesitate to contact me with any questions.    Nabila Carter MD  Cardiologist  [facility]

## 2019-01-09 NOTE — PROGRESS NOTES
2 RN Skin Check    Patient has generalized bruising and scabs. Ears, elbows, and heels are red and blanching. Sacrum is red and blanches, mepilex in place. 2 peripheral IVs and nasal canula in place.

## 2019-01-09 NOTE — ED NOTES
Erin patients sister called for update. Erin listed as patients emergency contact. Erin updated on POC and patients ICU room number. Erin verbalized understanding. Awaiting ICU RN

## 2019-01-09 NOTE — ED TRIAGE NOTES
"Chief Complaint   Patient presents with   • Chest Pain     Began at home at 0045       /46   Pulse 93   Temp 37.2 °C (98.9 °F) (Temporal)   Resp 18   Ht 1.753 m (5' 9\")   Wt 57.2 kg (126 lb)   SpO2 91%   BMI 18.61 kg/m²     PT BIB [facility], STEMI activation. Pt is unable to go to cath lab due to hx GI bleed. PT brought from trauma bay to [facility].  "

## 2019-01-09 NOTE — H&P
Hospital Medicine History & Physical Note    Date of Service  1/9/2019    Primary Care Physician  Pcp Pt States None    Consultants  Cardiology [facility]    Code Status  DNAR/DNI    Chief Complaint  Chest pain    History of Presenting Illness  84 y.o. female with prior history of cerebrovascular accident with right-sided weakness, prior myocardial infarction with coronary artery disease, and dyslipidemia on statin therapy, was apparently in her usual state of health until 2 days prior to admission.  At that time, she began to develop worsening right upper extremity weakness and numbness.  Additionally she had substernal chest pressure, with no exacerbating or alleviating factors.  She initially decided to rest at home to see if the symptoms would go away, however on the day of admission she did notice worsening.  She subsequently presented to the emergency department for further evaluation.  She currently denies any chest pain, although she is on a nitroglycerin infusion.  She denies shortness of breath, abdominal pain, nausea vomiting, diarrhea or constipation.  She has no other complaints.    Review of Systems  Review of Systems   Constitutional: Negative.    HENT: Negative.    Eyes: Negative.    Respiratory: Negative.    Cardiovascular: Positive for chest pain.   Gastrointestinal: Negative.    Genitourinary: Negative.    Musculoskeletal: Negative.    Skin: Negative.    Neurological: Positive for sensory change and focal weakness.   Endo/Heme/Allergies: Negative.    Psychiatric/Behavioral: Negative.        Past Medical History   has a past medical history of Acute myocardial infarction, true posterior wall infarction, episode of care unspecified; Anxiety; Bowel habit changes; Breath shortness (2017); CAD (coronary artery disease); Cholesterol blood decreased; Dental disorder; Dizziness; GI bleed; Glaucoma; Heart burn; Hyperlipidemia; Hypertension; Myocardial infarct (HCC) (6/10/10); Peripheral vascular disease  (HCC); Pulmonary disease; TIA (transient ischemic attack); Unspecified hemorrhagic conditions; Unspecified urinary incontinence; and Urinary bladder disorder.    Surgical History   has a past surgical history that includes rectus repair (7/13/2012); recovery (11/26/2012); recovery (4/9/2013); femoral endarterectomy (10/4/2013); angioplasty balloon (10/4/2013); hysterectomy, total abdominal; other cardiac surgery; cholecystectomy; other (12/10/10); other (6/10/10); other (2005); recovery (7/24/2014); cardiac cath (6/2010); cardiac cath (7/24/14); stent placement; gastroscopy with biopsy (2/13/2016); carotid endarterectomy (Right, 9/3/2018); gastroscopy-endo (N/A, 10/22/2018); and colonoscopy - endo (N/A, 10/23/2018).     Family History  family history includes Heart Disease (age of onset: 65) in her mother; Other in her son; Stroke (age of onset: 68) in her mother.     Social History   reports that she quit smoking about 2 years ago. Her smoking use included Cigarettes. She has a 11.25 pack-year smoking history. She has never used smokeless tobacco. She reports that she drinks alcohol. She reports that she does not use drugs.    Allergies  Allergies   Allergen Reactions   • Penicillins Rash and Vomiting     Rxn = unknown  Pt tolerates cephalosporins   • Hydrocodone Vomiting and Nausea     Rxn = unknown   • Tape Rash     RASH       Medications  Prior to Admission Medications   Prescriptions Last Dose Informant Patient Reported? Taking?   aspirin (ASA) 81 MG Chew Tab chewable tablet  Patient's Home Pharmacy Yes No   Sig: Take 81 mg by mouth every day.   bimatoprost (LUMIGAN) 0.01 % Solution  Patient's Home Pharmacy Yes No   Sig: Place 1 Drop in both eyes every bedtime. 1 gtts both eyes   clopidogrel (PLAVIX) 75 MG Tab   No No   Sig: Take 1 Tab by mouth every day.   Patient not taking: Reported on 12/31/2018   furosemide (LASIX) 40 MG Tab   No No   Sig: Take 1 Tab by mouth 2 Times a Day.   Patient not taking: Reported  on 12/31/2018   gabapentin (NEURONTIN) 300 MG Cap   Yes No   Sig: Take 300 mg by mouth 3 times a day.   isosorbide mononitrate SR (IMDUR) 60 MG TABLET SR 24 HR   No No   Sig: Take 1 Tab by mouth every morning.   metoprolol SR (TOPROL XL) 50 MG TABLET SR 24 HR   No No   Sig: Take 1 Tab by mouth every day.   pantoprazole (PROTONIX) 40 MG Tablet Delayed Response  Patient's Home Pharmacy Yes No   Sig: Take 40 mg by mouth 2 Times a Day.   rosuvastatin (CRESTOR) 40 MG tablet  Patient's Home Pharmacy Yes No   Sig: Take 40 mg by mouth every evening.      Facility-Administered Medications: None       Physical Exam  Temp:  [37.2 °C (98.9 °F)] 37.2 °C (98.9 °F)  Pulse:  [71-94] 77  Resp:  [13-25] 17  BP: (131)/(46) 131/46    Physical Exam   Constitutional: She is oriented to person, place, and time. She appears well-developed and well-nourished. No distress.   HENT:   Head: Normocephalic and atraumatic.   Eyes: Pupils are equal, round, and reactive to light. Conjunctivae are normal.   Neck: Normal range of motion. Neck supple. No tracheal deviation present. No thyromegaly present.   Cardiovascular: Normal rate, regular rhythm and normal heart sounds.  Exam reveals no gallop and no friction rub.    No murmur heard.  Pulmonary/Chest: Effort normal and breath sounds normal. No respiratory distress. She has no wheezes. She has no rales.   Abdominal: Soft. Bowel sounds are normal. She exhibits no distension. There is no tenderness. There is no rebound.   Musculoskeletal: Normal range of motion. She exhibits no edema.   Lymphadenopathy:     She has no cervical adenopathy.   Neurological: She is alert and oriented to person, place, and time. No cranial nerve deficit.   Skin: Skin is warm and dry. She is not diaphoretic.   Psychiatric: She has a normal mood and affect.   Nursing note and vitals reviewed.      Laboratory:  Recent Labs      01/09/19   0233   WBC  15.0*   RBC  2.90*   HEMOGLOBIN  8.0*   HEMATOCRIT  27.0*   MCV  93.1    MCH  27.6   MCHC  29.6*   RDW  61.0*   PLATELETCT  387   MPV  10.5     Recent Labs      01/09/19   0233   SODIUM  139   POTASSIUM  5.1   CHLORIDE  109   CO2  20   GLUCOSE  140*   BUN  33*   CREATININE  1.80*   CALCIUM  8.8     Recent Labs      01/09/19   0233   GLUCOSE  140*     Recent Labs      01/09/19   0233   APTT  28.4   INR  1.06     Recent Labs      01/09/19   0233   BNPBTYPENAT  1603*         Recent Labs      01/09/19   0233   TROPONINI  0.90*       Urinalysis:    No results found     Imaging:  DX-CHEST-LIMITED (1 VIEW)   Final Result         1.  Pulmonary edema and/or infiltrates.   2.  Atherosclerosis   3.  Hyperexpansion of lungs favors changes of COPD.      EC-ECHOCARDIOGRAM LTD W/O CONT    (Results Pending)         Assessment/Plan:  I anticipate this patient will require at least two midnights for appropriate medical management, necessitating inpatient admission.    * STEMI (ST elevation myocardial infarction) (Former[facility])   Assessment & Plan    On nitroglycerin infusion.  Not medically stable for cardiac catheterization per cardiology.  Will continue to monitor for symptoms.  Trend troponin.      Ischemic cerebrovascular accident (CVA) (Former[facility])- (present on admission)   Assessment & Plan    With resultant right arm weakness, apparently worsened and likely due to underlying STEMI/critical illness.      Normocytic anemia- (present on admission)   Assessment & Plan    Likely of chronic disease with component of acute blood loss.  Monitor.  Transfuse if needed for hemoglobin less than 7 gm/dL       Essential hypertension- (present on admission)   Assessment & Plan    Hypotensive currently.  Hold medication regimen.      GIB (gastrointestinal bleeding)   Assessment & Plan    Unclear source.  Will monitor.  Transfuse if needed for hemoglobin less than 7 gm/dL       Stage 4 chronic kidney disease ([facility])   Assessment & Plan    Monitor.  Precludes angiography per cardiology      ACC/AHA stage C systolic heart failure  (HCC)- (present on admission)   Assessment & Plan    Without current exacerbation.  Monitor.  Will hold treatment regimen given profound hypotension.          VTE prophylaxis: SCD

## 2019-01-09 NOTE — ASSESSMENT & PLAN NOTE
9/2/2018:  1.  Mild cerebral atrophy. Age-appropriate.  2.  Mild-moderate supratentorial white matter disease most consistent with microvascular ischemic change.  3.  Acute lacunar size right frontal deep white matter infarct. No change from yesterday's exam.  4.  No new area of acute cerebral infarction. The questionable punctate foci of slightly increased diffusion signal seen in the left frontal deep white matter and left parietal white matter on yesterday's exam are not conspicuous on today's exam and may   have represented artifact.  5.  10 mm focus of gradient echo hypointensity in the left basal ganglia which may represent hemosiderin deposition from old hemorrhage.  6.  Moderate pontine ischemic gliosis.

## 2019-01-09 NOTE — CONSULTS
Reason for PC Consult: Advance Care Planning    Assessment:  General:  84yF admitted 1/9/18 with heavy chest pressure, chest painSTEMI/NSTEMI.  Patient was admitted 10/2018 with GI bleed and NSTEMI at that time. Patient currently on NTG drip and was deemed not medically stable for heart cath.  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  Social- Patient is  and lives in [place].  Patient is independent in all ADLs, prior to admission.  Her sister, Erin Carter she states is her DPOA- [phone], [phone].  Previous tobacco use; quit 2016. Rare alcohol use, no drug use.    Dyspnea: No- 96% 2L/NC  Last BM: 01/09/19- Per RN assessment  Pain: No-    Depression: Mood appropriate for situation-    Dementia: No;       Spiritual:  Is Gnosticist or spirituality important for coping with this illness? Unable to determine-    Has a  or spiritual provider visit been requested? No    Palliative Performance Scale: 60%    Advance Directive: None on File- Patient states she has completed an Advance Directive.   DPOA: None on File- Erin Carter, sister, [phone], [phone].   POLST: No-   Education provided.  Patient elected DNR with comfort care only, No artifical nutrition.  Document signed, scanned into Epic.  Original placed on patient's chart to be sent home with him/her on discharge.       Code Status: DNR/DNI- Confirmed with the patient at bedside.    Outcome:  I met with the patient briefly at bedside.  I introduced myself, role of Palliative Care in POC and reason for visit. Patient lethargic but able to participate a little.  Patient stated her understanding of admission event and that the plan of care is medical management only.    Patient agreed to another meeting when she is less sleepy and able to engage in  POC.    1600-Phone call to Erin, patient's sister.  No answer, identified voicemail, message left requesting a copy of AD and a return call.      Contact for Palliative Care was provided and patient/family is encourage to call for questions, concerns and/or support.      Plan: TBD- Patient states medical management.    Recommendations: Hospice/Ethics referral is inappropriate at this time as the patient is actively seeking treatment.      Updated: LEO Gaviria RN    Thank you for allowing Palliative Care to participate in this patient's care. Please feel free to call [phone] with any questions or concerns.

## 2019-01-09 NOTE — ASSESSMENT & PLAN NOTE
Bleeding clinically resolved and hemoglobin stable    Continue Protonix and Carafate  Evaluated by GI  Hemoglobin stable at this time continue to monitor

## 2019-01-09 NOTE — ED NOTES
ICU RN at bedside and tranferring patient. Pt is transferred condition unchanged. VSS. Family aware.

## 2019-01-09 NOTE — ASSESSMENT & PLAN NOTE
Unstable angina    Cardiology following  Not candidate for intervention given recurrent GI bleed  Started on captopril and beta-blocker switched    to carvedilol  Not on aspirin secondary to GI bleed  Continue Crestor  Continue close clinical monitoring  Hospice involved and pt considering home under their care

## 2019-01-09 NOTE — ASSESSMENT & PLAN NOTE
Given an anemia and GI bleed patient not on antiplatelet agents or anticoagulation  Continue rosuvastatin nitro drip as tolerated  Low-dose beta-blocker as tolerated  Cardiology following

## 2019-01-09 NOTE — PROGRESS NOTES
Med Rec Updated and Complete per Pt at bedside with list (returned)  Allergies Reviewed  No PO ABX last 30 days    Home Meds verified with Pharmacy.    Revisions were made to the Med Rec regarding the following medications:    Gabapentin    PharmD notified.

## 2019-01-09 NOTE — ASSESSMENT & PLAN NOTE
Started on captopril and carvedilol  Now off Nitro gtts  IV Lasix x1 today and monitor intake and output

## 2019-01-09 NOTE — ASSESSMENT & PLAN NOTE
With resultant right arm weakness, apparently worsened and likely due to underlying STEMI/critical illness.

## 2019-01-09 NOTE — ASSESSMENT & PLAN NOTE
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  Recent interventions precluded by gastrointestinal bleeding which has also limited antiplatelet therapy.

## 2019-01-09 NOTE — PROGRESS NOTES
Cardiology Progress Note    Reason for Consult:  Asked by Dr Ghulam Krishnan for Code STEMI      CC:   Chief Complaint   Patient presents with   • Chest Pain     Began at home at 0045       HPI:     84-year-old female with known coronary artery disease who was brought in as a code STEMI.  Patient reports being in her usual state of health until yesterday when she started having bilateral nonradiating chest discomfort associated with dyspnea.  She tried taking her medications but her pain persisted and she finally decided to call 911 today.  She appears very uncomfortable and complains of ongoing chest discomfort and dyspnea. Reports compliance with all of her medications.     In 2010, patient had an anterior MI at which time she was found to have a  to her LAD.  She underwent PCI to OM.  Repeat coronary angiography in 2014 showed patent stent.  She has also had multiple CVAs in the past.     Reports having GI bleed history.  In October 2018, she underwent clipping for a gastric AVM.  She was admitted here in December for a NSTEMI for which she was started on heparin drip following which she had a drop in her hemoglobin for which she required a transfusion.  Patient was seen by Dr. Kapoor about 10 days ago and was deemed not to be a good candidate for any coronary intervention due to her recent GI bleed.    Interval Events    1/9/2019 patient was evaluated at bedside and found AAOX3 but lethargic. She reporting substernal chest pain. Tele w/NSR 60-70s. Trop at 0.9-->1, BMP 1600.  Patient still with lower GI bleed and hemoglobin trending down.  Treat with medical management, patient is currently on nitroglycerin drip, metoprolol and high-dose statin.  Antiplatelet therapy risk in the setting of GI bleed. Palliative care was consulted and per their note aim to discuss options tomorrow with patient and sister Erin.      Medications / Drug list prior to admission:  No current facility-administered medications on  file prior to encounter.      Current Outpatient Prescriptions on File Prior to Encounter   Medication Sig Dispense Refill   • gabapentin (NEURONTIN) 300 MG Cap Take 300 mg by mouth 3 times a day.     • metoprolol SR (TOPROL XL) 50 MG TABLET SR 24 HR Take 1 Tab by mouth every day. 30 Tab 11   • isosorbide mononitrate SR (IMDUR) 60 MG TABLET SR 24 HR Take 1 Tab by mouth every morning. 30 Tab 1   • furosemide (LASIX) 40 MG Tab Take 1 Tab by mouth 2 Times a Day. (Patient not taking: Reported on 12/31/2018) 60 Tab 1   • clopidogrel (PLAVIX) 75 MG Tab Take 1 Tab by mouth every day. (Patient not taking: Reported on 12/31/2018) 30 Tab 1   • aspirin (ASA) 81 MG Chew Tab chewable tablet Take 81 mg by mouth every day.     • rosuvastatin (CRESTOR) 40 MG tablet Take 40 mg by mouth every evening.     • pantoprazole (PROTONIX) 40 MG Tablet Delayed Response Take 40 mg by mouth 2 Times a Day.     • bimatoprost (LUMIGAN) 0.01 % Solution Place 1 Drop in both eyes every bedtime. 1 gtts both eyes         Current list of administered Medications:    Current Facility-Administered Medications:   •  nitroglycerin 50 mg in D5W 250 ml infusion, 0-200 mcg/min, Intravenous, Once, Praful Bernard M.D., Last Rate: 3 mL/hr at 01/09/19 0511, 10 mcg/min at 01/09/19 0511  •  isosorbide mononitrate SR (IMDUR) tablet 90 mg, 90 mg, Oral, Q DAY, Nabila Carter M.D., Stopped at 01/09/19 0600  •  bimatoprost (LUMIGAN) 0.01 % ophthalmic solution 1 Drop, 1 Drop, Both Eyes, QHS, Rubén Gonzalez M.D.  •  rosuvastatin (CRESTOR) tablet 40 mg, 40 mg, Oral, Q EVENING, Rubén Gonzalez M.D.  •  NS infusion, , Intravenous, Continuous, Rubén Gonzalez M.D.  •  acetaminophen (TYLENOL) tablet 650 mg, 650 mg, Oral, Q6HRS PRN, Rubén Gonzalez M.D.  •  ondansetron (ZOFRAN) syringe/vial injection 4 mg, 4 mg, Intravenous, Q4HRS PRN, Rubén Gonzalez M.D.  •  ondansetron (ZOFRAN ODT) dispertab 4 mg, 4 mg, Oral, Q4HRS PRN, Rubén Gonzalez M.D.  •  senna-docusate (PERICOLACE  or SENOKOT S) 8.6-50 MG per tablet 2 Tab, 2 Tab, Oral, BID **AND** polyethylene glycol/lytes (MIRALAX) PACKET 1 Packet, 1 Packet, Oral, QDAY PRN **AND** magnesium hydroxide (MILK OF MAGNESIA) suspension 30 mL, 30 mL, Oral, QDAY PRN **AND** bisacodyl (DULCOLAX) suppository 10 mg, 10 mg, Rectal, QDAY PRN, Rubén Gonzalez M.D.  •  Respiratory Care per Protocol, , Nebulization, Continuous RT, Rubén Gonzalez M.D.  •  morphine (pf) 4 mg/ml injection 2-4 mg, 2-4 mg, Intravenous, Q5 MIN PRN, Rubén Gonzalez M.D.  •  nitroglycerin 50 mg in D5W 250 ml infusion, 0-200 mcg/min, Intravenous, Continuous, Rubén Gonzalez M.D.  •  pantoprazole (PROTONIX) 80 mg in  mL Infusion, 8 mg/hr, Intravenous, Continuous, Rubén Gonzalez M.D.    Current Outpatient Prescriptions:   •  gabapentin (NEURONTIN) 300 MG Cap, Take 300 mg by mouth 3 times a day., Disp: , Rfl:   •  metoprolol SR (TOPROL XL) 50 MG TABLET SR 24 HR, Take 1 Tab by mouth every day., Disp: 30 Tab, Rfl: 11  •  isosorbide mononitrate SR (IMDUR) 60 MG TABLET SR 24 HR, Take 1 Tab by mouth every morning., Disp: 30 Tab, Rfl: 1  •  furosemide (LASIX) 40 MG Tab, Take 1 Tab by mouth 2 Times a Day. (Patient not taking: Reported on 12/31/2018), Disp: 60 Tab, Rfl: 1  •  clopidogrel (PLAVIX) 75 MG Tab, Take 1 Tab by mouth every day. (Patient not taking: Reported on 12/31/2018), Disp: 30 Tab, Rfl: 1  •  aspirin (ASA) 81 MG Chew Tab chewable tablet, Take 81 mg by mouth every day., Disp: , Rfl:   •  rosuvastatin (CRESTOR) 40 MG tablet, Take 40 mg by mouth every evening., Disp: , Rfl:   •  pantoprazole (PROTONIX) 40 MG Tablet Delayed Response, Take 40 mg by mouth 2 Times a Day., Disp: , Rfl:   •  bimatoprost (LUMIGAN) 0.01 % Solution, Place 1 Drop in both eyes every bedtime. 1 gtts both eyes, Disp: , Rfl:     Past Medical History:   Diagnosis Date   • Acute myocardial infarction, true posterior wall infarction, episode of care unspecified    • Anxiety    • Bowel habit changes    •  Breath shortness 2017    uses oxygen at 2 liters/min; AT NIGHT ONLY   • CAD (coronary artery disease)     S/P stent placement   • Cholesterol blood decreased    • Dental disorder     Partial upper   • Dizziness    • GI bleed    • Glaucoma    • Heart burn    • Hyperlipidemia    • Hypertension    • Myocardial infarct (HCC) 6/10/10   • Peripheral vascular disease (HCC)    • Pulmonary disease     bronchitis   • TIA (transient ischemic attack)    • Unspecified hemorrhagic conditions     pt takes plavix   • Unspecified urinary incontinence    • Urinary bladder disorder        Past Surgical History:   Procedure Laterality Date   • COLONOSCOPY - ENDO N/A 10/23/2018    Procedure: COLONOSCOPY - ENDO;  Surgeon: Anatoly Barragan M.D.;  Location: ENDOSCOPY [facility];  Service: Gastroenterology   • GASTROSCOPY-ENDO N/A 10/22/2018    Procedure: GASTROSCOPY-ENDO;  Surgeon: Bakari Brown M.D.;  Location: [facility];  Service: Gastroenterology   • CAROTID ENDARTERECTOMY Right 9/3/2018    Procedure: CAROTID ENDARTERECTOMY WITH NEUROMONITORING;  Surgeon: Naga Abad M.D.;  Location: SURGERY [facility];  Service: Vascular   • GASTROSCOPY WITH BIOPSY  2/13/2016    Procedure: GASTROSCOPY WITH BIOPSY;  Surgeon: Susan Miller M.D.;  Location: [facility];  Service:    • RECOVERY  7/24/2014    Performed by Cath-Recovery Surgery at SURGERY SAME DAY [facility]   • CARDIAC CATH  7/24/14    Diffuse disease, see report.  % with collterals.   • FEMORAL ENDARTERECTOMY  10/4/2013    Performed by Kacie Baker M.D. at SURGERY [facility]   • ANGIOPLASTY BALLOON  10/4/2013    Performed by Kacie Baker M.D. at [facility]   • RECOVERY  4/9/2013    Performed by Ir-Recovery Surgery at SURGERY SAME DAY [facility]   • RECOVERY  11/26/2012    Performed by Ir-Recovery Surgery at SURGERY SAME DAY [facility]   • RECTUS REPAIR  7/13/2012    Performed by  SHEILA PLATT at SURGERY [facility]   • OTHER  12/10/10    stents in legs   • OTHER  6/10/10    cardiac stents   • CARDIAC CATH  6/2010    BMS to Om   • OTHER  2005    KNEE SURGERY   • CHOLECYSTECTOMY     • HYSTERECTOMY, TOTAL ABDOMINAL     • OTHER CARDIAC SURGERY      stent in heart   • STENT PLACEMENT      treated by Dr Baker, multiple surgeries to legs.       Family History   Problem Relation Age of Onset   • Stroke Mother 68        CVA   • Heart Disease Mother 65        valve surgery   • Other Son         wgt 465 lbs     Patient family history was personally reviewed, no pertinent family history to current presentation    Social History     Social History   • Marital status:      Spouse name: N/A   • Number of children: N/A   • Years of education: N/A     Occupational History   • Not on file.     Social History Main Topics   • Smoking status: Former Smoker     Packs/day: 0.25     Years: 45.00     Types: Cigarettes     Quit date: 2/1/2016   • Smokeless tobacco: Never Used   • Alcohol use 0.0 - 0.6 oz/week      Comment: 1 drink per month   • Drug use: No   • Sexual activity: Not on file     Other Topics Concern   • Not on file     Social History Narrative   • No narrative on file       ALLERGIES:  Allergies   Allergen Reactions   • Penicillins Rash and Vomiting     Rxn = unknown  Pt tolerates cephalosporins   • Hydrocodone Vomiting and Nausea     Rxn = unknown   • Tape Rash     RASH       Review of systems:  A complete review of symptoms was reviewed with patient. This is reviewed in H&P and PMH. ALL OTHERS reviewed and negative    Physical exam:  Patient Vitals for the past 24 hrs:   BP Temp Temp src Pulse Resp SpO2 Height Weight   01/09/19 0710 - - - 71 16 98 % - -   01/09/19 0700 - - - 72 17 100 % - -   01/09/19 0650 - - - 73 14 98 % - -   01/09/19 0640 - - - 71 13 99 % - -   01/09/19 0630 - - - 72 16 98 % - -   01/09/19 0620 - - - 72 19 98 % - -   01/09/19 0615 - - - 74 15 97 % - -  "  01/09/19 0610 - - - 77 17 97 % - -   01/09/19 0605 - - - 72 15 98 % - -   01/09/19 0600 - - - 73 14 98 % - -   01/09/19 0555 - - - 72 13 98 % - -   01/09/19 0550 - - - 71 13 96 % - -   01/09/19 0545 - - - 72 14 97 % - -   01/09/19 0540 - - - 73 14 96 % - -   01/09/19 0535 - - - 72 13 97 % - -   01/09/19 0530 - - - 73 17 96 % - -   01/09/19 0525 - - - 72 15 97 % - -   01/09/19 0520 - - - 73 19 96 % - -   01/09/19 0515 - - - 73 17 96 % - -   01/09/19 0510 - - - 73 16 96 % - -   01/09/19 0505 - - - 74 20 97 % - -   01/09/19 0500 - - - 74 14 95 % - -   01/09/19 0455 - - - 74 13 97 % - -   01/09/19 0450 - - - 73 18 97 % - -   01/09/19 0445 - - - 73 15 96 % - -   01/09/19 0440 - - - 75 16 96 % - -   01/09/19 0430 - - - 94 17 92 % - -   01/09/19 0426 - - - 81 18 (!) 87 % - -   01/09/19 0415 - - - 77 16 96 % - -   01/09/19 0410 - - - 81 (!) 21 96 % - -   01/09/19 0400 - - - 81 (!) 25 97 % - -   01/09/19 0355 - - - 79 17 97 % - -   01/09/19 0350 - - - 79 18 96 % - -   01/09/19 0345 - - - 79 18 96 % - -   01/09/19 0340 - - - 81 18 96 % - -   01/09/19 0335 - - - 83 20 96 % - -   01/09/19 0330 - - - 82 20 96 % - -   01/09/19 0325 - - - 86 (!) 23 95 % - -   01/09/19 0320 - - - 86 (!) 24 95 % - -   01/09/19 0315 - - - 86 (!) 22 94 % - -   01/09/19 0310 - - - 89 (!) 25 94 % - -   01/09/19 0305 - - - 89 (!) 24 94 % - -   01/09/19 0300 - - - 92 - 93 % - -   01/09/19 0255 - - - 94 - 91 % - -   01/09/19 0254 131/46 37.2 °C (98.9 °F) Temporal 93 18 91 % - -   01/09/19 0253 - - - - - - 1.753 m (5' 9\") 57.2 kg (126 lb)       General: Lethargic/sleepy  EYES: no jaundice  HEENT: OP clear   Neck: No bruits No JVD.   CVS:  RRR. S1 + S2. No M/R/G  Resp: CTAB. No wheezing or crackles/rhonchi.  Abdomen: Soft, NT, ND,  Skin: Grossly nothing acute no obvious rashes  Neurological: Alert, Moves all extremities, no cranial nerve defects on limited exam  Extremities: Pulse 2+ in b/l LE. No edema. No cyanosis.       Data:  Laboratory studies " personally reviewed by me:  Recent Results (from the past 24 hour(s))   EKG (NOW)    Collection Time: 19  2:24 AM   Result Value Ref Range    Report       [facility]    Test Date:  2019  Pt Name:    LEIDA RENNER                Department: ER  MRN:        [id number]                      Room:       [facility]  Gender:     Female                       Technician: [id number]  :        [id number]                   Requested By:JAYLIN HOOD  Order #:    [id number]                    Reading MD: JAYLIN HOOD MD    Measurements  Intervals                                Axis  Rate:       101                          P:          79  WV:         172                          QRS:        37  QRSD:       82                           T:          128  QT:         352  QTc:        457    Interpretive Statements  Patient has ST elevation V1 V2 V3 reciprocal changes in the inferior and  lateral  leads concerning for acute ST elevation myocardial infarction, previous EKG  from  2018 shows very similar morphology.  Electronically Signed On 2019 2:53:05 PST by JAYLIN HOOD MD     CBC w/ Differential    Collection Time: 19  2:33 AM   Result Value Ref Range    WBC 15.0 (H) 4.8 - 10.8 K/uL    RBC 2.90 (L) 4.20 - 5.40 M/uL    Hemoglobin 8.0 (L) 12.0 - 16.0 g/dL    Hematocrit 27.0 (L) 37.0 - 47.0 %    MCV 93.1 81.4 - 97.8 fL    MCH 27.6 27.0 - 33.0 pg    MCHC 29.6 (L) 33.6 - 35.0 g/dL    RDW 61.0 (H) 35.9 - 50.0 fL    Platelet Count 387 164 - 446 K/uL    MPV 10.5 9.0 - 12.9 fL    Neutrophils-Polys 82.40 (H) 44.00 - 72.00 %    Lymphocytes 7.70 (L) 22.00 - 41.00 %    Monocytes 8.60 0.00 - 13.40 %    Eosinophils 0.40 0.00 - 6.90 %    Basophils 0.50 0.00 - 1.80 %    Immature Granulocytes 0.40 0.00 - 0.90 %    Nucleated RBC 0.00 /100 WBC    Lymphs (Absolute) 1.15 1.00 - 4.80 K/uL    Monos (Absolute) 1.28 (H) 0.00 - 0.85 K/uL    Eos (Absolute) 0.06 0.00 - 0.51 K/uL    Baso  (Absolute) 0.07 0.00 - 0.12 K/uL    Immature Granulocytes (abs) 0.06 0.00 - 0.11 K/uL    NRBC (Absolute) 0.00 K/uL    Neutrophils (Absolute) 12.34 (H) 2.00 - 7.15 K/uL    Hypochromia 1+     Anisocytosis 1+     Microcytosis 1+    Basic Metabolic Panel (BMP)    Collection Time: 01/09/19  2:33 AM   Result Value Ref Range    Sodium 139 135 - 145 mmol/L    Potassium 5.1 3.6 - 5.5 mmol/L    Chloride 109 96 - 112 mmol/L    Co2 20 20 - 33 mmol/L    Glucose 140 (H) 65 - 99 mg/dL    Bun 33 (H) 8 - 22 mg/dL    Creatinine 1.80 (H) 0.50 - 1.40 mg/dL    Calcium 8.8 8.5 - 10.5 mg/dL    Anion Gap 10.0 0.0 - 11.9   Troponin STAT    Collection Time: 01/09/19  2:33 AM   Result Value Ref Range    Troponin I 0.90 (H) 0.00 - 0.04 ng/mL   Btype Natriuretic Peptide    Collection Time: 01/09/19  2:33 AM   Result Value Ref Range    B Natriuretic Peptide 1603 (H) 0 - 100 pg/mL   PT/INR    Collection Time: 01/09/19  2:33 AM   Result Value Ref Range    PT 14.0 12.0 - 14.6 sec    INR 1.06 0.87 - 1.13   APTT    Collection Time: 01/09/19  2:33 AM   Result Value Ref Range    APTT 28.4 24.7 - 36.0 sec   ESTIMATED GFR    Collection Time: 01/09/19  2:33 AM   Result Value Ref Range    GFR If  32 (A) >60 mL/min/1.73 m 2    GFR If Non  27 (A) >60 mL/min/1.73 m 2   PERIPHERAL SMEAR REVIEW    Collection Time: 01/09/19  2:33 AM   Result Value Ref Range    Peripheral Smear Review see below    PLATELET ESTIMATE    Collection Time: 01/09/19  2:33 AM   Result Value Ref Range    Plt Estimation Normal    MORPHOLOGY    Collection Time: 01/09/19  2:33 AM   Result Value Ref Range    RBC Morphology Present     Poikilocytosis 1+     Ovalocytes 1+     Schistocytes 1+     Echinocytes 1+    DIFFERENTIAL COMMENT    Collection Time: 01/09/19  2:33 AM   Result Value Ref Range    Comments-Diff see below        Imaging:  DX-CHEST-LIMITED (1 VIEW)   Final Result         1.  Pulmonary edema and/or infiltrates.   2.  Atherosclerosis   3.   Hyperexpansion of lungs favors changes of COPD.      EC-ECHOCARDIOGRAM LTD W/O CONT    (Results Pending)           EKG :  Sinus tachycardia rate 100s,  Q waves at V1-V3, ST elevation at V1-V3 and ST depression at inferior leads    All pertinent features of laboratory and imaging reviewed including primary images where applicable      Principal Problem:    STEMI (ST elevation myocardial infarction) (HCC) POA: Unknown  Active Problems:    Ischemic cerebrovascular accident (CVA) (HCC) POA: Yes    Essential hypertension POA: Yes    Normocytic anemia POA: Yes    ACC/AHA stage C systolic heart failure (HCC) POA: Yes    Stage 4 chronic kidney disease (HCC) POA: Unknown    GIB (gastrointestinal bleeding) POA: Unknown  Resolved Problems:    * No resolved hospital problems. *      Assessment / Plan:  #STEMI  #Ischemic cardiomyopathy  #CHF  #Lower GI bleed  #Normocytic anemia  #Hypertension  #CKD4  EKG w/ anterior septal STEMI  Tele w/NSR 60-70s  Troponins at 0.9-> 1  BMP at 1600  CXR w/ pulmonary edema  ECHO showed mildly reduced LV systolic function, EF at 40%, akinesis-hypokinesis at mid anterior septum, apical septum and apex  Hb at 7, Keep Hb > 7  Not a candidate for cath 2/2 LGIB  Continue medical management for STEMI  Continue nitroglycerin drip, beta blocker and high-dose statin therapy  Hold antiplatelet therapy as high risk for worsening LGIB  Continue trending troponins he denies any general medical      Cardiology will continue to follow along    I personally discussed her case with  Dr Ghulam Krishnan M.D.    Future Appointments  Date Time Provider Department Center   1/9/2019 12:30 PM LAB [facility] None   1/10/2019 9:00 AM Abdiel Kapoor M.D. [facility] None       It is my pleasure to participate in the care of Ms. Diana.  Please do not hesitate to contact me with questions or concerns.      Chester Arambula    1/9/2019

## 2019-01-09 NOTE — ASSESSMENT & PLAN NOTE
Chronic EKG findings of anterior myocardial infarction and known chronic total occlusion of left anterior descending coronary artery but severe wall motion abnormality only developing between the echocardiograms of October and December 2018.

## 2019-01-09 NOTE — CONSULTS
Reason for PC Consult: Advance Care Planning    Assessment:  General:  84yF admitted 1/9/18 with heavy chest pressure, chest painSTEMI/NSTEMI.  Patient was admitted 10/2018 with GI bleed and NSTEMI at that time. Patient currently on NTG drip and was deemed not medically stable for heart cath.  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  Social- Patient is  and lives in [place].  Patient is independent in all ADLs, prior to admission.  Her sister, Erin Carter she states is her DPOA- [phone], [phone].  Previous tobacco use; quit 2016. Rare alcohol use, no drug use.    Dyspnea: No- 96% 2L/NC  Last BM: 01/09/19- Per RN assessment  Pain: No-    Depression: Mood appropriate for situation-    Dementia: No;       Spiritual:  Is Restorationist or spirituality important for coping with this illness? Unable to determine-    Has a  or spiritual provider visit been requested? No    Palliative Performance Scale: 60%    Advance Directive: None on File- Patient states she has completed an Advance Directive.   DPOA: None on File- Erin Carter, sister, [phone], [phone].   POLST: None on File    Code Status: DNR/DNI- Confirmed with the patient at bedside.    Outcome:  I met with the patient briefly at bedside.  I introduced myself, role of Palliative Care in POC and reason for visit. Patient lethargic but able to participate a little.  Patient stated her understanding of admission event and that the plan of care is medication management only.    Patient agreed to another meeting when she is less sleepy and able to engage in POC.      0425- Returned call from sister Erin.  I requested a copy of the patient's Advance Directive.  Erin stated that they will bring it tomorrow.  Erin stated that when they were with her  "\"she looks terrible, I've never seen her look so bad\".  Erin stated understanding of past medical history, admission event, current medical status, medication management only.  Presented option of Hospice upon discharge.  Erin stated understanding and will discuss it.  She will be at the hospital around 4pm.  I requested a phone call when she arrives to gather AD/DPOA.      Contact for Palliative Care was provided and patient/family is encourage to call for questions, concerns and/or support.      Plan: TBD- Patient states medication management.    Recommendations: Hospice/Ethics referral is inappropriate at this time as the patient is actively seeking treatment.      Updated: LEO Gaviria RN    Thank you for allowing Palliative Care to participate in this patient's care. Please feel free to call [phone] with any questions or concerns.    "

## 2019-01-09 NOTE — ED NOTES
This RN assumed care of patient at this time. Pt is resting with eyes closed. No signs of discomfort or distress. Nitro infusing. Continue to monitor.

## 2019-01-09 NOTE — DISCHARGE PLANNING
Medical Social Work    Referral: STEMI    Intervention: MSW responded to trauma bay for a STEMI.  Pt is an 84 year old female brought in by [facility] from home.  Pt is Brooke Diana (: 1934).  [facility] get [facility] hold of family at [phone] regarding pt coming to [facility] and pt's dog being at home.  MSW contacted pt's sister, Erin ([phone]) who is aware of pt's location and will be able to take care of pt's dog later.  Pt's sister and brother in law are on their way to [facility].    Plan: SW will continue to follow.

## 2019-01-10 LAB
ABO GROUP BLD: NORMAL
ANION GAP SERPL CALC-SCNC: 6 MMOL/L (ref 0–11.9)
BASOPHILS # BLD AUTO: 0.5 % (ref 0–1.8)
BASOPHILS # BLD: 0.04 K/UL (ref 0–0.12)
BLD GP AB SCN SERPL QL: NORMAL
BUN SERPL-MCNC: 31 MG/DL (ref 8–22)
CALCIUM SERPL-MCNC: 8.2 MG/DL (ref 8.5–10.5)
CHLORIDE SERPL-SCNC: 112 MMOL/L (ref 96–112)
CO2 SERPL-SCNC: 22 MMOL/L (ref 20–33)
CREAT SERPL-MCNC: 1.35 MG/DL (ref 0.5–1.4)
EOSINOPHIL # BLD AUTO: 0.07 K/UL (ref 0–0.51)
EOSINOPHIL NFR BLD: 0.8 % (ref 0–6.9)
ERYTHROCYTE [DISTWIDTH] IN BLOOD BY AUTOMATED COUNT: 60.3 FL (ref 35.9–50)
GLUCOSE SERPL-MCNC: 94 MG/DL (ref 65–99)
HCT VFR BLD AUTO: 22.4 % (ref 37–47)
HGB BLD-MCNC: 6.4 G/DL (ref 12–16)
HGB BLD-MCNC: 7.4 G/DL (ref 12–16)
HGB BLD-MCNC: 8.8 G/DL (ref 12–16)
IMM GRANULOCYTES # BLD AUTO: 0.02 K/UL (ref 0–0.11)
IMM GRANULOCYTES NFR BLD AUTO: 0.2 % (ref 0–0.9)
LYMPHOCYTES # BLD AUTO: 0.99 K/UL (ref 1–4.8)
LYMPHOCYTES NFR BLD: 12 % (ref 22–41)
MAGNESIUM SERPL-MCNC: 2 MG/DL (ref 1.5–2.5)
MCH RBC QN AUTO: 26.8 PG (ref 27–33)
MCHC RBC AUTO-ENTMCNC: 28.6 G/DL (ref 33.6–35)
MCV RBC AUTO: 93.7 FL (ref 81.4–97.8)
MONOCYTES # BLD AUTO: 0.84 K/UL (ref 0–0.85)
MONOCYTES NFR BLD AUTO: 10.2 % (ref 0–13.4)
NEUTROPHILS # BLD AUTO: 6.3 K/UL (ref 2–7.15)
NEUTROPHILS NFR BLD: 76.3 % (ref 44–72)
NRBC # BLD AUTO: 0 K/UL
NRBC BLD-RTO: 0 /100 WBC
PLATELET # BLD AUTO: 335 K/UL (ref 164–446)
PMV BLD AUTO: 10 FL (ref 9–12.9)
POTASSIUM SERPL-SCNC: 4.6 MMOL/L (ref 3.6–5.5)
RBC # BLD AUTO: 2.39 M/UL (ref 4.2–5.4)
RH BLD: NORMAL
SODIUM SERPL-SCNC: 140 MMOL/L (ref 135–145)
WBC # BLD AUTO: 8.3 K/UL (ref 4.8–10.8)

## 2019-01-10 PROCEDURE — 86850 RBC ANTIBODY SCREEN: CPT

## 2019-01-10 PROCEDURE — P9016 RBC LEUKOCYTES REDUCED: HCPCS

## 2019-01-10 PROCEDURE — 700102 HCHG RX REV CODE 250 W/ 637 OVERRIDE(OP): Performed by: INTERNAL MEDICINE

## 2019-01-10 PROCEDURE — 99232 SBSQ HOSP IP/OBS MODERATE 35: CPT | Mod: GC | Performed by: INTERNAL MEDICINE

## 2019-01-10 PROCEDURE — 700102 HCHG RX REV CODE 250 W/ 637 OVERRIDE(OP): Performed by: HOSPITALIST

## 2019-01-10 PROCEDURE — 83735 ASSAY OF MAGNESIUM: CPT

## 2019-01-10 PROCEDURE — 302146: Performed by: HOSPITALIST

## 2019-01-10 PROCEDURE — 80048 BASIC METABOLIC PNL TOTAL CA: CPT

## 2019-01-10 PROCEDURE — 86922 COMPATIBILITY TEST ANTIGLOB: CPT

## 2019-01-10 PROCEDURE — 700105 HCHG RX REV CODE 258: Performed by: HOSPITALIST

## 2019-01-10 PROCEDURE — A9270 NON-COVERED ITEM OR SERVICE: HCPCS | Performed by: HOSPITALIST

## 2019-01-10 PROCEDURE — 700111 HCHG RX REV CODE 636 W/ 250 OVERRIDE (IP): Performed by: HOSPITALIST

## 2019-01-10 PROCEDURE — A9270 NON-COVERED ITEM OR SERVICE: HCPCS | Performed by: INTERNAL MEDICINE

## 2019-01-10 PROCEDURE — 99233 SBSQ HOSP IP/OBS HIGH 50: CPT | Performed by: HOSPITALIST

## 2019-01-10 PROCEDURE — 770022 HCHG ROOM/CARE - ICU (200)

## 2019-01-10 PROCEDURE — 86900 BLOOD TYPING SEROLOGIC ABO: CPT

## 2019-01-10 PROCEDURE — 85018 HEMOGLOBIN: CPT

## 2019-01-10 PROCEDURE — 85025 COMPLETE CBC W/AUTO DIFF WBC: CPT

## 2019-01-10 PROCEDURE — C9113 INJ PANTOPRAZOLE SODIUM, VIA: HCPCS | Performed by: HOSPITALIST

## 2019-01-10 PROCEDURE — 30233N1 TRANSFUSION OF NONAUTOLOGOUS RED BLOOD CELLS INTO PERIPHERAL VEIN, PERCUTANEOUS APPROACH: ICD-10-PCS | Performed by: HOSPITALIST

## 2019-01-10 PROCEDURE — 86901 BLOOD TYPING SEROLOGIC RH(D): CPT

## 2019-01-10 PROCEDURE — 36430 TRANSFUSION BLD/BLD COMPNT: CPT

## 2019-01-10 RX ORDER — FUROSEMIDE 10 MG/ML
20 INJECTION INTRAMUSCULAR; INTRAVENOUS ONCE
Status: ACTIVE | OUTPATIENT
Start: 2019-01-10 | End: 2019-01-11

## 2019-01-10 RX ORDER — PANTOPRAZOLE SODIUM 40 MG/10ML
40 INJECTION, POWDER, LYOPHILIZED, FOR SOLUTION INTRAVENOUS 2 TIMES DAILY
Status: DISCONTINUED | OUTPATIENT
Start: 2019-01-10 | End: 2019-01-12

## 2019-01-10 RX ADMIN — METOPROLOL TARTRATE 12.5 MG: 25 TABLET, FILM COATED ORAL at 06:13

## 2019-01-10 RX ADMIN — ROSUVASTATIN CALCIUM 40 MG: 20 TABLET, FILM COATED ORAL at 18:23

## 2019-01-10 RX ADMIN — METOPROLOL TARTRATE 12.5 MG: 25 TABLET, FILM COATED ORAL at 18:00

## 2019-01-10 RX ADMIN — PANTOPRAZOLE SODIUM 40 MG: 40 INJECTION, POWDER, LYOPHILIZED, FOR SOLUTION INTRAVENOUS at 18:00

## 2019-01-10 RX ADMIN — SODIUM CHLORIDE 83 ML: 9 INJECTION, SOLUTION INTRAVENOUS at 09:31

## 2019-01-10 RX ADMIN — SODIUM CHLORIDE: 9 INJECTION, SOLUTION INTRAVENOUS at 20:41

## 2019-01-10 RX ADMIN — LATANOPROST 1 DROP: 50 SOLUTION OPHTHALMIC at 18:13

## 2019-01-10 RX ADMIN — ISOSORBIDE MONONITRATE 90 MG: 60 TABLET ORAL at 08:01

## 2019-01-10 ASSESSMENT — PAIN SCALES - GENERAL
PAINLEVEL_OUTOF10: 5
PAINLEVEL_OUTOF10: 0

## 2019-01-10 ASSESSMENT — ENCOUNTER SYMPTOMS
BLURRED VISION: 0
FLANK PAIN: 0
WEAKNESS: 1
VOMITING: 0
MEMORY LOSS: 0
NERVOUS/ANXIOUS: 0
EYE PAIN: 0
FALLS: 0
SEIZURES: 0
HEADACHES: 0
FOCAL WEAKNESS: 0
SHORTNESS OF BREATH: 1
SPEECH CHANGE: 0
PALPITATIONS: 0
STRIDOR: 0
WEAKNESS: 0
EYE REDNESS: 0
HALLUCINATIONS: 0
PSYCHIATRIC NEGATIVE: 1
ABDOMINAL PAIN: 0
BLOOD IN STOOL: 1
EYE DISCHARGE: 0
COUGH: 0
ORTHOPNEA: 0
RESPIRATORY NEGATIVE: 1
FEVER: 0
BACK PAIN: 0
DIARRHEA: 0
EYES NEGATIVE: 1
LOSS OF CONSCIOUSNESS: 0
NECK PAIN: 0
HEMOPTYSIS: 0
NAUSEA: 0
MUSCULOSKELETAL NEGATIVE: 1
SPUTUM PRODUCTION: 0

## 2019-01-10 ASSESSMENT — LIFESTYLE VARIABLES: SUBSTANCE_ABUSE: 0

## 2019-01-10 NOTE — PROGRESS NOTES
· 2 RN skin check complete with DOUGLAS White.  · Devices in place Nasal cannula.  · Skin assessed under devices yes.  · Confirmed pressure ulcers found on none .  · New potential pressure ulcers noted on none.   · The following interventions in place grey ear pieces in place on nasal cannula

## 2019-01-10 NOTE — PROGRESS NOTES
Cardiology Progress Note    Reason for Consult:  Asked by Dr Ghulam Krishnan for Code STEMI      CC:   Chief Complaint   Patient presents with   • Chest Pain     Began at home at 0045       HPI:     84-year-old female with known coronary artery disease who was brought in as a code STEMI.  Patient reports being in her usual state of health until yesterday when she started having bilateral nonradiating chest discomfort associated with dyspnea.  She tried taking her medications but her pain persisted and she finally decided to call 911 today.  She appears very uncomfortable and complains of ongoing chest discomfort and dyspnea. Reports compliance with all of her medications.     In 2010, patient had an anterior MI at which time she was found to have a  to her LAD.  She underwent PCI to OM.  Repeat coronary angiography in 2014 showed patent stent.  She has also had multiple CVAs in the past.     Reports having GI bleed history.  In October 2018, she underwent clipping for a gastric AVM.  She was admitted here in December for a NSTEMI for which she was started on heparin drip following which she had a drop in her hemoglobin for which she required a transfusion.  Patient was seen by Dr. Kapoor about 10 days ago and was deemed not to be a good candidate for any coronary intervention due to her recent GI bleed.   Interval Events      1/10/2019 patient was evaluated at bedside and found AAOX3. Still with 5/10 substernal chest pain. Pt more awake today. Tele w/NSR 60-70s. Hb drop to 7, got PRBC transfusion. Continue medical MGMT with nitroglycerin drip, metoprolol and high-dose statin. Continue holding antiplatelet therapy 2/2 LGIB. Palliative following, aim to discuss options patient and sister Erin today.    1/9/2019 patient was evaluated at bedside and found AAOX3 but lethargic. She reporting substernal chest pain. Tele w/NSR 60-70s. Trop at 0.9-->1, BMP 1600.  Patient still with lower GI bleed and hemoglobin  trending down.  Treat with medical management, patient is currently on nitroglycerin drip, metoprolol and high-dose statin.  Antiplatelet therapy risk in the setting of GI bleed. Palliative care was consulted and per their note aim to discuss options tomorrow with patient and sister Erin.      Medications / Drug list prior to admission:  No current facility-administered medications on file prior to encounter.      Current Outpatient Prescriptions on File Prior to Encounter   Medication Sig Dispense Refill   • aspirin (ASA) 81 MG Chew Tab chewable tablet Take 81 mg by mouth every day.     • rosuvastatin (CRESTOR) 40 MG tablet Take 40 mg by mouth every evening.     • pantoprazole (PROTONIX) 40 MG Tablet Delayed Response Take 40 mg by mouth 2 Times a Day.     • bimatoprost (LUMIGAN) 0.01 % Solution Place 1 Drop in both eyes every bedtime. 1 gtts both eyes         Current list of administered Medications:    Current Facility-Administered Medications:   •  isosorbide mononitrate SR (IMDUR) tablet 90 mg, 90 mg, Oral, Q DAY, Nablia Carter M.D., 90 mg at 01/09/19 0853  •  rosuvastatin (CRESTOR) tablet 40 mg, 40 mg, Oral, Q EVENING, Rubén Gonzalez M.D., 40 mg at 01/09/19 1929  •  NS infusion, , Intravenous, Continuous, Rubén Gonzalez M.D., Last Rate: 83 mL/hr at 01/09/19 2048  •  acetaminophen (TYLENOL) tablet 650 mg, 650 mg, Oral, Q6HRS PRN, Rubén Gonzalez M.D.  •  ondansetron (ZOFRAN) syringe/vial injection 4 mg, 4 mg, Intravenous, Q4HRS PRN, Rubén Gonzalez M.D.  •  ondansetron (ZOFRAN ODT) dispertab 4 mg, 4 mg, Oral, Q4HRS PRN, Rubén Gonzalez M.D.  •  senna-docusate (PERICOLACE or SENOKOT S) 8.6-50 MG per tablet 2 Tab, 2 Tab, Oral, BID, Stopped at 01/09/19 0645 **AND** polyethylene glycol/lytes (MIRALAX) PACKET 1 Packet, 1 Packet, Oral, QDAY PRN **AND** magnesium hydroxide (MILK OF MAGNESIA) suspension 30 mL, 30 mL, Oral, QDAY PRN **AND** bisacodyl (DULCOLAX) suppository 10 mg, 10 mg, Rectal, QDAY PRN, Rubén NEGRETE  DAVON Gonzalez  •  Respiratory Care per Protocol, , Nebulization, Continuous RT, Rubén Gonzalez M.D.  •  morphine (pf) 4 mg/ml injection 2-4 mg, 2-4 mg, Intravenous, Q5 MIN PRN, Rubén Gonzalez M.D., 4 mg at 01/09/19 0848  •  nitroglycerin 50 mg in D5W 250 ml infusion, 0-200 mcg/min, Intravenous, Continuous, Rubén Gonzalez M.D., Last Rate: 4.5 mL/hr at 01/09/19 0923, 15 mcg/min at 01/09/19 0923  •  pantoprazole (PROTONIX) 80 mg in  mL Infusion, 8 mg/hr, Intravenous, Continuous, Rubén Gonzalez M.D., Last Rate: 25 mL/hr at 01/09/19 1744, 8 mg/hr at 01/09/19 1744  •  latanoprost (XALATAN) 0.005 % ophthalmic solution 1 Drop, 1 Drop, Both Eyes, Q EVENING, Rubén Gonzalez M.D., 1 Drop at 01/09/19 1745  •  metoprolol (LOPRESSOR) tablet 12.5 mg, 12.5 mg, Oral, BID, Ghulam Krishnan M.D., 12.5 mg at 01/10/19 0613    Past Medical History:   Diagnosis Date   • Acute myocardial infarction, true posterior wall infarction, episode of care unspecified    • Anxiety    • Bowel habit changes    • Breath shortness 2017    uses oxygen at 2 liters/min; AT NIGHT ONLY   • CAD (coronary artery disease)     S/P stent placement   • Cholesterol blood decreased    • Dental disorder     Partial upper   • Dizziness    • GI bleed    • Glaucoma    • Heart burn    • Hyperlipidemia    • Hypertension    • Myocardial infarct (HCC) 6/10/10   • Peripheral vascular disease (HCC)    • Pulmonary disease     bronchitis   • TIA (transient ischemic attack)    • Unspecified hemorrhagic conditions     pt takes plavix   • Unspecified urinary incontinence    • Urinary bladder disorder        Past Surgical History:   Procedure Laterality Date   • COLONOSCOPY - ENDO N/A 10/23/2018    Procedure: COLONOSCOPY - ENDO;  Surgeon: Anatoly Barragan M.D.;  Location: ENDOSCOPY [facility];  Service: Gastroenterology   • GASTROSCOPY-ENDO N/A 10/22/2018    Procedure: GASTROSCOPY-ENDO;  Surgeon: Bakari Brown M.D.;  Location: ENDOSCOPY [facility]  ORS;  Service: Gastroenterology   • CAROTID ENDARTERECTOMY Right 9/3/2018    Procedure: CAROTID ENDARTERECTOMY WITH NEUROMONITORING;  Surgeon: Naga Abad M.D.;  Location: SURGERY [facility];  Service: Vascular   • GASTROSCOPY WITH BIOPSY  2/13/2016    Procedure: GASTROSCOPY WITH BIOPSY;  Surgeon: Susan Miller M.D.;  Location: [facility];  Service:    • RECOVERY  7/24/2014    Performed by Cath-Recovery Surgery at SURGERY SAME DAY [facility]   • CARDIAC CATH  7/24/14    Diffuse disease, see report.  % with collterals.   • FEMORAL ENDARTERECTOMY  10/4/2013    Performed by Kacie Baker M.D. at SURGERY [facility]   • ANGIOPLASTY BALLOON  10/4/2013    Performed by Kacie Baker M.D. at [facility]   • RECOVERY  4/9/2013    Performed by Ir-Recovery Surgery at SURGERY SAME DAY [facility] ORS   • RECOVERY  11/26/2012    Performed by Ir-Recovery Surgery at SURGERY SAME DAY [facility] ORS   • RECTUS REPAIR  7/13/2012    Performed by SHEILA PLATT at SURGERY SURGICAL [facility]   • OTHER  12/10/10    stents in legs   • OTHER  6/10/10    cardiac stents   • CARDIAC CATH  6/2010    BMS to Om   • OTHER  2005    KNEE SURGERY   • CHOLECYSTECTOMY     • HYSTERECTOMY, TOTAL ABDOMINAL     • OTHER CARDIAC SURGERY      stent in heart   • STENT PLACEMENT      treated by Dr Baker, multiple surgeries to legs.       Family History   Problem Relation Age of Onset   • Stroke Mother 68        CVA   • Heart Disease Mother 65        valve surgery   • Other Son         wgt 465 lbs     Patient family history was personally reviewed, no pertinent family history to current presentation    Social History     Social History   • Marital status:      Spouse name: N/A   • Number of children: N/A   • Years of education: N/A     Occupational History   • Not on file.     Social History Main Topics   • Smoking status: Former Smoker     Packs/day: 0.25     Years: 45.00     Types:  Cigarettes     Quit date: 2/1/2016   • Smokeless tobacco: Never Used   • Alcohol use 0.0 - 0.6 oz/week      Comment: 1 drink per month   • Drug use: No   • Sexual activity: Not on file     Other Topics Concern   • Not on file     Social History Narrative   • No narrative on file       ALLERGIES:  Allergies   Allergen Reactions   • Penicillins Rash and Vomiting     Rxn = unknown  Pt tolerates cephalosporins   • Hydrocodone Vomiting and Nausea     Rxn = unknown   • Tape Rash     RASH       Review of systems:  A complete review of symptoms was reviewed with patient. This is reviewed in H&P and PMH. ALL OTHERS reviewed and negative    Physical exam:  Patient Vitals for the past 24 hrs:   BP Temp Temp src Pulse Resp SpO2 Height Weight   01/09/19 0710 - - - 71 16 98 % - -   01/09/19 0700 - - - 72 17 100 % - -   01/09/19 0650 - - - 73 14 98 % - -   01/09/19 0640 - - - 71 13 99 % - -   01/09/19 0630 - - - 72 16 98 % - -   01/09/19 0620 - - - 72 19 98 % - -   01/09/19 0615 - - - 74 15 97 % - -   01/09/19 0610 - - - 77 17 97 % - -   01/09/19 0605 - - - 72 15 98 % - -   01/09/19 0600 - - - 73 14 98 % - -   01/09/19 0555 - - - 72 13 98 % - -   01/09/19 0550 - - - 71 13 96 % - -   01/09/19 0545 - - - 72 14 97 % - -   01/09/19 0540 - - - 73 14 96 % - -   01/09/19 0535 - - - 72 13 97 % - -   01/09/19 0530 - - - 73 17 96 % - -   01/09/19 0525 - - - 72 15 97 % - -   01/09/19 0520 - - - 73 19 96 % - -   01/09/19 0515 - - - 73 17 96 % - -   01/09/19 0510 - - - 73 16 96 % - -   01/09/19 0505 - - - 74 20 97 % - -   01/09/19 0500 - - - 74 14 95 % - -   01/09/19 0455 - - - 74 13 97 % - -   01/09/19 0450 - - - 73 18 97 % - -   01/09/19 0445 - - - 73 15 96 % - -   01/09/19 0440 - - - 75 16 96 % - -   01/09/19 0430 - - - 94 17 92 % - -   01/09/19 0426 - - - 81 18 (!) 87 % - -   01/09/19 0415 - - - 77 16 96 % - -   01/09/19 0410 - - - 81 (!) 21 96 % - -   01/09/19 0400 - - - 81 (!) 25 97 % - -   01/09/19 0355 - - - 79 17 97 % - -   01/09/19  "0350 - - - 79 18 96 % - -   01/09/19 0345 - - - 79 18 96 % - -   01/09/19 0340 - - - 81 18 96 % - -   01/09/19 0335 - - - 83 20 96 % - -   01/09/19 0330 - - - 82 20 96 % - -   01/09/19 0325 - - - 86 (!) 23 95 % - -   01/09/19 0320 - - - 86 (!) 24 95 % - -   01/09/19 0315 - - - 86 (!) 22 94 % - -   01/09/19 0310 - - - 89 (!) 25 94 % - -   01/09/19 0305 - - - 89 (!) 24 94 % - -   01/09/19 0300 - - - 92 - 93 % - -   01/09/19 0255 - - - 94 - 91 % - -   01/09/19 0254 131/46 37.2 °C (98.9 °F) Temporal 93 18 91 % - -   01/09/19 0253 - - - - - - 1.753 m (5' 9\") 57.2 kg (126 lb)       General: Lethargic/sleepy  EYES: no jaundice  HEENT: OP clear   Neck: No bruits No JVD.   CVS:  RRR. S1 + S2. No M/R/G  Resp: CTAB. No wheezing or crackles/rhonchi.  Abdomen: Soft, NT, ND,  Skin: Grossly nothing acute no obvious rashes  Neurological: Alert, Moves all extremities, no cranial nerve defects on limited exam  Extremities: Pulse 2+ in b/l LE. No edema. No cyanosis.       Data:  Laboratory studies personally reviewed by me:  Recent Results (from the past 24 hour(s))   Troponin in four (4) hours    Collection Time: 01/09/19  9:00 AM   Result Value Ref Range    Troponin I 1.00 (H) 0.00 - 0.04 ng/mL   EC-ECHOCARDIOGRAM LTD W/O CONT    Collection Time: 01/09/19  2:00 PM   Result Value Ref Range    Eject.Frac. MOD BP 52.9     Eject.Frac. MOD 4C 45.88     Eject.Frac. MOD 2C 57.99    HGB    Collection Time: 01/09/19  3:15 PM   Result Value Ref Range    Hemoglobin 3.8 (LL) 12.0 - 16.0 g/dL   HEMOGLOBIN AND HEMATOCRIT    Collection Time: 01/09/19  5:35 PM   Result Value Ref Range    Hemoglobin 7.0 (L) 12.0 - 16.0 g/dL    Hematocrit 23.8 (L) 37.0 - 47.0 %   CBC with Differential    Collection Time: 01/10/19  1:05 AM   Result Value Ref Range    WBC 8.3 4.8 - 10.8 K/uL    RBC 2.39 (L) 4.20 - 5.40 M/uL    Hemoglobin 6.4 (L) 12.0 - 16.0 g/dL    Hematocrit 22.4 (L) 37.0 - 47.0 %    MCV 93.7 81.4 - 97.8 fL    MCH 26.8 (L) 27.0 - 33.0 pg    MCHC 28.6 " (L) 33.6 - 35.0 g/dL    RDW 60.3 (H) 35.9 - 50.0 fL    Platelet Count 335 164 - 446 K/uL    MPV 10.0 9.0 - 12.9 fL    Neutrophils-Polys 76.30 (H) 44.00 - 72.00 %    Lymphocytes 12.00 (L) 22.00 - 41.00 %    Monocytes 10.20 0.00 - 13.40 %    Eosinophils 0.80 0.00 - 6.90 %    Basophils 0.50 0.00 - 1.80 %    Immature Granulocytes 0.20 0.00 - 0.90 %    Nucleated RBC 0.00 /100 WBC    Neutrophils (Absolute) 6.30 2.00 - 7.15 K/uL    Lymphs (Absolute) 0.99 (L) 1.00 - 4.80 K/uL    Monos (Absolute) 0.84 0.00 - 0.85 K/uL    Eos (Absolute) 0.07 0.00 - 0.51 K/uL    Baso (Absolute) 0.04 0.00 - 0.12 K/uL    Immature Granulocytes (abs) 0.02 0.00 - 0.11 K/uL    NRBC (Absolute) 0.00 K/uL   Basic Metabolic Panel (BMP)    Collection Time: 01/10/19  1:05 AM   Result Value Ref Range    Sodium 140 135 - 145 mmol/L    Potassium 4.6 3.6 - 5.5 mmol/L    Chloride 112 96 - 112 mmol/L    Co2 22 20 - 33 mmol/L    Glucose 94 65 - 99 mg/dL    Bun 31 (H) 8 - 22 mg/dL    Creatinine 1.35 0.50 - 1.40 mg/dL    Calcium 8.2 (L) 8.5 - 10.5 mg/dL    Anion Gap 6.0 0.0 - 11.9   MAGNESIUM    Collection Time: 01/10/19  1:05 AM   Result Value Ref Range    Magnesium 2.0 1.5 - 2.5 mg/dL   ESTIMATED GFR    Collection Time: 01/10/19  1:05 AM   Result Value Ref Range    GFR If African American 45 (A) >60 mL/min/1.73 m 2    GFR If Non  37 (A) >60 mL/min/1.73 m 2       Imaging:  EC-ECHOCARDIOGRAM LTD W/O CONT   Final Result      DX-CHEST-LIMITED (1 VIEW)   Final Result         1.  Pulmonary edema and/or infiltrates.   2.  Atherosclerosis   3.  Hyperexpansion of lungs favors changes of COPD.              EKG :  Sinus tachycardia rate 100s,  Q waves at V1-V3, ST elevation at V1-V3 and ST depression at inferior leads    All pertinent features of laboratory and imaging reviewed including primary images where applicable      Principal Problem (Resolved):    STEMI (ST elevation myocardial infarction) (HCC) POA: Unknown  Active Problems:    Coronary artery  disease due to calcified coronary lesion POA: Yes    History of CVA (cerebrovascular accident) POA: Yes    Chronic left ventricular systolic heart failure (HCC) POA: Yes    ACC/AHA stage C systolic heart failure (HCC) POA: Yes    History of anterior wall MI POA: Yes    Essential hypertension POA: Yes    Normocytic anemia POA: Yes    Stage 4 chronic kidney disease (HCC) POA: Unknown    GIB (gastrointestinal bleeding) POA: Unknown    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  Resolved Problems:    Ischemic cerebrovascular accident (CVA) (HCC) POA: Yes    STEMI (ST elevation myocardial infarction) (HCC) POA: Unknown      Assessment / Plan:  #STEMI  #Ischemic cardiomyopathy  #CHF  #Lower GI bleed  #Normocytic anemia  #Hypertension  #CKD4  EKG w/ anterior septal STEMI  Tele w/NSR 60-70s  Troponins at 0.9-> 1  BMP at 1600  CXR w/ pulmonary edema  ECHO showed mildly reduced LV systolic function, EF at 40%, akinesis-hypokinesis at mid anterior septum, apical septum and apex  Hb at 7, Keep Hb > 7  Not a candidate for cath 2/2 LGIB  Continue medical management for STEMI with nitroglycerin drip, beta blocker and high-dose statin therapy  Hold antiplatelet therapy as high risk for worsening LGIB      Cardiology will continue to follow along    It is my pleasure to participate in the care of Ms. Diana.  Please do not hesitate to contact us with questions or concerns.      Chester Arambula    1/10/2019

## 2019-01-10 NOTE — CARE PLAN
Problem: Communication  Goal: The ability to communicate needs accurately and effectively will improve  Uses call light appropriately    Problem: Safety  Goal: Will remain free from injury  Outcome: PROGRESSING AS EXPECTED  Bed and chair alarm utilized  Goal: Will remain free from falls  Outcome: PROGRESSING AS EXPECTED  Bed and chair alarm utilized    Problem: Bowel/Gastric:  Goal: Normal bowel function is maintained or improved  Outcome: PROGRESSING AS EXPECTED    Goal: Will not experience complications related to bowel motility  Outcome: PROGRESSING AS EXPECTED      Problem: Pain Management  Goal: Pain level will decrease to patient's comfort goal  Outcome: PROGRESSING AS EXPECTED  Pt has minimal to zero chest pain    Problem: Skin Integrity  Goal: Risk for impaired skin integrity will decrease  Outcome: PROGRESSING AS EXPECTED  mepilex in place

## 2019-01-10 NOTE — PROGRESS NOTES
Pt upon first assessment is alert and oriented x4, pt has c/o minor chest discomfort, some tenderness with breathing rating 5/10.  Pt VSS on a Nitro drip at 15mcg/min, O2 at 4L NC titrated down to 3L.  Oral care done.  New IV started in right forearm as pt has Protonix running and it is not compatible with Nitro.    Pt's granddaughter visiting, Dr Lewis from Cardiology to see pt. Blood started at 0923.    1115 pt had an incontinence episode in bed, pt given full bed bath and up to chair for 90 minutes, with chair alarm.  1234 2nd unit of blood hung.  1300 pt back to bed after having another incontinence episode in the chair.

## 2019-01-10 NOTE — THERAPY
PT eval order received.  Not medically appropriate to particpate in phase I cardiac rehab. Not candidate for cath, definitive plan pending family conference. Will defer eval until this has been completed.

## 2019-01-10 NOTE — HEART FAILURE PROGRAM
Cardiovascular Nurse Navigator () Advanced Heart Failure Program Inpatient Progress Note:    Known HFrEF patient, admitted for STEMI medical management due to GI bleed, still having melena and down trending Hgb.    In T604 on nitro drip. Palliative met with patient yesterday, she was unable to participate due to sleepiness but stated that she does have an AD. Palliative spoke with patient's sister yesterday who stated she will bring paperwork in hospice was discussed.    Will follow for plan. If patient ends up discharging under the care of hospice, she should not have a seven calendar day HF f/u appointment scheduled.    Of note, hospitalist is saying HF not decompensated while cardiology resident is saying decompensated HF.    Thank you and please call MARICARMEN Kumar with questions M-F

## 2019-01-10 NOTE — CONSULTS
Date of Consultation:  1/10/2019    Patient: : Brooke Diana  MRN: [id number]    Referring Physician: Dr. Yossi Krishnan     GI:Tristan Bhatia M.D.     Reason for Consultation: GI bleed/Anemia      History of Present Illness:   Inc. you for allowing me to consult on this patient.  This is a 84-year-old female with history of CVA right-sided weakness prior MI with coronary artery disease, dyslipidemia on statin who presents to the hospital on January 9, 2019 due to chest pain.  Patient reports usual state of health until 2 days prior to admission when she developed intense chest pain right extremity weakness and numbness without relieving or exacerbating factor.  Symptoms never went away then she subsequently developed dark color stool times few episodes.  Ultimately patient reports emergency room and was admitted to the hospital was seen by cardiology with suspected ST elevation MI.  In review of cardiology chart given history of GI bleed patient was not thought to be ideal for angiography.  Patient was also found to have new cardiomyopathy with LV EF of 25% and appears that patient was fluid overloaded on admission as well.  Patient was started on nitroglycerin drip which is being continue to control angina.  GI is consulted for anemia and melena.  Patient reports a few dark color stool overnight none since this morning.  Patient does require transfusion during interview.      I have reviewed patient's procedure record from  10/2018 Colonoscopy:  FINAL IMPRESSION:  1.  Digital rectal exam unremarkable.  2.  Terminal ileum unremarkable.  3.  Colon, mild sigmoid diverticulosis.  4.  Tortuous colon cyst with suspected adhesions.  5.  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  Stigmata of bleeding with central eschar.  Successfully treated with   argon plasma coagulation, which instigated bleeding, epinephrine injection 3   mL, endoclip placement with excellent hemostasis and finally, SPOT  2 mL   tattooed immediately distal to the lesion.    10/2018 EGD  Findings:   1. 2 small gastric fundus AVMs with scant oozing, treated with APC  2. Single duodenal AVM second portion with oozing, treated with APC  3. Small hiatal hernia  4. Schatzki ring, non-obstructing    She did have anemia in 2016 and had EGD and colonoscopy.  EGD showed hiatal hernia, duodenitis.  Colonoscopy showed scattered angioectasias.      Past Medical History:   Diagnosis Date   • Breath shortness 2017    uses oxygen at 2 liters/min; AT NIGHT ONLY   • Myocardial infarct (HCC) 6/10/10   • Acute myocardial infarction, true posterior wall infarction, episode of care unspecified    • Anxiety    • Bowel habit changes    • CAD (coronary artery disease)     S/P stent placement   • Cholesterol blood decreased    • Dental disorder     Partial upper   • Dizziness    • GI bleed    • Glaucoma    • Heart burn    • Hyperlipidemia    • Hypertension    • Peripheral vascular disease (HCC)    • Pulmonary disease     bronchitis   • TIA (transient ischemic attack)    • Unspecified hemorrhagic conditions     pt takes plavix   • Unspecified urinary incontinence    • Urinary bladder disorder          Past Surgical History:   Procedure Laterality Date   • COLONOSCOPY - ENDO N/A 10/23/2018    Procedure: COLONOSCOPY - ENDO;  Surgeon: Anatoly Barragan M.D.;  Location: ENDOSCOPY [facility];  Service: Gastroenterology   • GASTROSCOPY-ENDO N/A 10/22/2018    Procedure: GASTROSCOPY-ENDO;  Surgeon: Bakari Brown M.D.;  Location: ENDOSCOPY [facility];  Service: Gastroenterology   • CAROTID ENDARTERECTOMY Right 9/3/2018    Procedure: CAROTID ENDARTERECTOMY WITH NEUROMONITORING;  Surgeon: Naga Abad M.D.;  Location: SURGERY [facility];  Service: Vascular   • GASTROSCOPY WITH BIOPSY  2/13/2016    Procedure: GASTROSCOPY WITH BIOPSY;  Surgeon: Susan Miller M.D.;  Location: ENDOSCOPY [facility];  Service:    • RECOVERY   7/24/2014    Performed by Cath-Recovery Surgery at SURGERY SAME DAY [facility] ORS   • CARDIAC CATH  7/24/14    Diffuse disease, see report.  % with collterals.   • FEMORAL ENDARTERECTOMY  10/4/2013    Performed by Kacie Baker M.D. at SURGERY [facility] ORS   • ANGIOPLASTY BALLOON  10/4/2013    Performed by Kacie Bkaer M.D. at SURGERY [facility] ORS   • RECOVERY  4/9/2013    Performed by Ir-Recovery Surgery at SURGERY SAME DAY [facility] ORS   • RECOVERY  11/26/2012    Performed by Ir-Recovery Surgery at SURGERY SAME DAY [facility] ORS   • RECTUS REPAIR  7/13/2012    Performed by SHEILA PLATT at SURGERY SURGICAL [facility] ORS   • OTHER  12/10/10    stents in legs   • OTHER  6/10/10    cardiac stents   • CARDIAC CATH  6/2010    BMS to Om   • OTHER  2005    KNEE SURGERY   • CHOLECYSTECTOMY     • HYSTERECTOMY, TOTAL ABDOMINAL     • OTHER CARDIAC SURGERY      stent in heart   • STENT PLACEMENT      treated by Dr Baker, multiple surgeries to legs.       Family History   Problem Relation Age of Onset   • Stroke Mother 68        CVA   • Heart Disease Mother 65        valve surgery   • Other Son         wgt 465 lbs       Social History     Social History   • Marital status:      Spouse name: N/A   • Number of children: N/A   • Years of education: N/A     Social History Main Topics   • Smoking status: Former Smoker     Packs/day: 0.25     Years: 45.00     Types: Cigarettes     Quit date: 2/1/2016   • Smokeless tobacco: Never Used   • Alcohol use 0.0 - 0.6 oz/week      Comment: 1 drink per month   • Drug use: No   • Sexual activity: Not on file     Other Topics Concern   • Not on file     Social History Narrative   • No narrative on file       Review of Systems   Constitutional: Positive for malaise/fatigue.   HENT: Negative.    Eyes: Negative.    Respiratory: Negative.    Cardiovascular: Positive for chest pain.   Gastrointestinal: Positive for blood in stool and melena.   Genitourinary: Negative.     Musculoskeletal: Negative.    Skin: Negative.    Neurological: Positive for weakness.   Endo/Heme/Allergies: Negative.    Psychiatric/Behavioral: Negative.          Physical Exam:  Vitals:    01/10/19 1234 01/10/19 1245 01/10/19 1300 01/10/19 1315   BP: 127/51 125/98     Pulse: 72 73 72 73   Resp: (!) 22 (!) 21 (!) 21 17   Temp: (!) 35.4 °C (95.7 °F) 36.4 °C (97.5 °F)     TempSrc:  Temporal     SpO2: 98% 91% 96% 99%   Weight:       Height:           Physical Exam   Constitutional: She is oriented to person, place, and time and well-developed, well-nourished, and in no distress. No distress.   HENT:   Head: Normocephalic and atraumatic.   Eyes: Pupils are equal, round, and reactive to light. Conjunctivae are normal. Right eye exhibits no discharge. Left eye exhibits no discharge. No scleral icterus.   Neck: Normal range of motion. Neck supple. No JVD present. No tracheal deviation present. No thyromegaly present.   Cardiovascular:   Murmur heard.  Pulmonary/Chest: Effort normal and breath sounds normal. No stridor. No respiratory distress. She has no wheezes. She has no rales. She exhibits no tenderness.   Abdominal: Soft. Bowel sounds are normal. She exhibits no distension and no mass. There is no tenderness. There is no rebound and no guarding.   Musculoskeletal: She exhibits no edema.   Lymphadenopathy:     She has no cervical adenopathy.   Neurological: She is alert and oriented to person, place, and time.   Skin: Skin is warm. She is not diaphoretic.         Labs:  Recent Labs      01/09/19   0233  01/09/19   1515  01/09/19   1735  01/10/19   0105   WBC  15.0*   --    --   8.3   RBC  2.90*   --    --   2.39*   HEMOGLOBIN  8.0*  3.8*  7.0*  6.4*   HEMATOCRIT  27.0*   --   23.8*  22.4*   MCV  93.1   --    --   93.7   MCH  27.6   --    --   26.8*   MCHC  29.6*   --    --   28.6*   RDW  61.0*   --    --   60.3*   PLATELETCT  387   --    --   335   MPV  10.5   --    --   10.0     Recent Labs      01/09/19   0236   01/10/19   0105   SODIUM  139  140   POTASSIUM  5.1  4.6   CHLORIDE  109  112   CO2  20  22   GLUCOSE  140*  94   BUN  33*  31*     Recent Labs      01/09/19   0233   APTT  28.4   INR  1.06         Imaging:  CXR:      1.  Pulmonary edema and/or infiltrates.   2.  Atherosclerosis   3.  Hyperexpansion of lungs favors changes of COPD.            Limited echocardiogram for left ventricular function.  Mildly reduced left ventricular systolic function.  Left ventricular ejection fraction is visually estimated to be 40%.  Akinesis to hypokinesis of the mid anteroseptum, apical septum, and   apex.  Compared to the images of the prior study done 12/26/2018 -  there has   been no significant change.     Impressions:  1. GI bleed (melena per patient)  2. STEMI with Angina  3. CAD  4. HTN  5. CKD  6. Anemia (due to #1)    84-year-old female with history of coronary artery disease as well as history of GI bleed secondary to suspected AV malformation in both upper and lower GI who presents to the hospital with chest pain and the elevation MI with elevated troponin in the setting of chronic kidney disease.  Patient's H&H continues to decrease.  Per cardiology not ideal for coronary cath secondary to GI bleed.  Extensive discussion with patient including risk and benefit of procedure as well as sedation options were discussed.  Given patient's cardiac issues ongoing chest pain fluid overload and current MI discussed high risk for procedural sedation.  Discuss likely upper GI bleed likely due to possible previous AVM versus other such as ulcer.  Patient reports not satisfied with previous endoscopic evaluation and is very hesitant to proceed.  Discussed again high risk procedure with high risk for cardiac pulmonary complication    Recommendations:  1.  Await patient decision for endoscopic evaluation.  Potential preference would be for an endoscopy and if negative colonoscopy to follow.  Best to be done with anesthesiology support  in the operating room given current medical condition. Patient very hesitant despite long discussion would like to think about it  2.  Continue serial H&H keep hemoglobin greater than 8 given ongoing chest discomfort and pain with cardiac ischemia  3. Goals of care discussion  4. PPI drip therapy.    Patient critically ill, high risk for mortality and high risk for procedure    This note was generated using voice recognition software which has a small chance of producing errors of grammar and possibly content. I have made every reasonable attempt to find and correct any obvious errors, but expect that some may not be found prior to finalization of this note.

## 2019-01-10 NOTE — PROGRESS NOTES
[facility] Medicine Daily Progress Note    Date of Service  1/10/2019    Chief Complaint  84 y.o. female admitted 1/9/2019 with STEMI, evaluated by cardiology and medical therapy recommended given history of GI bleed and melena    Hospital Course          Interval Problem Update    Afebrile  CP this am  Last BM last night with melena per pt  Nitro 15  protonix drip  Transfused 2 units this am          Consultants/Specialty  Cardiology    Code Status  DNR/DNR    Disposition  ICU    Review of Systems  Review of Systems   Constitutional: Positive for malaise/fatigue. Negative for fever.   HENT: Negative for congestion, ear discharge and nosebleeds.    Eyes: Negative for blurred vision, pain, discharge and redness.   Respiratory: Positive for shortness of breath. Negative for cough, hemoptysis, sputum production and stridor.    Cardiovascular: Positive for chest pain. Negative for palpitations, orthopnea and leg swelling.   Gastrointestinal: Positive for blood in stool and melena. Negative for abdominal pain, diarrhea, nausea and vomiting.   Genitourinary: Negative for dysuria, flank pain and hematuria.   Musculoskeletal: Negative for back pain, falls, joint pain and neck pain.   Skin: Negative.    Neurological: Negative for speech change, focal weakness, seizures, loss of consciousness, weakness and headaches.   Psychiatric/Behavioral: Negative for hallucinations, memory loss and substance abuse. The patient is not nervous/anxious.    All other systems reviewed and are negative.       Physical Exam  Temp:  [36.1 °C (97 °F)-37.2 °C (98.9 °F)] 37.2 °C (98.9 °F)  Pulse:  [64-77] 76  Resp:  [10-25] 17  BP: (118)/(41) 118/41    Physical Exam   Constitutional: She is oriented to person, place, and time. She appears well-developed and well-nourished.   HENT:   Head: Normocephalic and atraumatic.   Right Ear: External ear normal.   Left Ear: External ear normal.   Mouth/Throat: No oropharyngeal exudate.   Eyes: Conjunctivae are  normal. Right eye exhibits no discharge. Left eye exhibits no discharge. No scleral icterus.   Neck: Neck supple. No JVD present. No tracheal deviation present.   Cardiovascular: Normal rate and regular rhythm.  Exam reveals no gallop and no friction rub.    No murmur heard.  Pulmonary/Chest: Effort normal. No stridor. No respiratory distress. She has no wheezes. She has rales. She exhibits no tenderness.   Abdominal: Soft. Bowel sounds are normal. She exhibits no distension. There is no tenderness. There is no rebound.   Musculoskeletal: She exhibits no edema or tenderness.   Neurological: She is alert and oriented to person, place, and time. No cranial nerve deficit. She exhibits normal muscle tone.   Skin: Skin is warm and dry. She is not diaphoretic. No cyanosis. Nails show no clubbing.   Psychiatric: She has a normal mood and affect. Her behavior is normal.   Nursing note and vitals reviewed.      Fluids    Intake/Output Summary (Last 24 hours) at 01/10/19 0911  Last data filed at 01/10/19 0800   Gross per 24 hour   Intake          2679.17 ml   Output                0 ml   Net          2679.17 ml       Laboratory  Recent Labs      01/09/19   0233  01/09/19   1515  01/09/19   1735  01/10/19   0105   WBC  15.0*   --    --   8.3   RBC  2.90*   --    --   2.39*   HEMOGLOBIN  8.0*  3.8*  7.0*  6.4*   HEMATOCRIT  27.0*   --   23.8*  22.4*   MCV  93.1   --    --   93.7   MCH  27.6   --    --   26.8*   MCHC  29.6*   --    --   28.6*   RDW  61.0*   --    --   60.3*   PLATELETCT  387   --    --   335   MPV  10.5   --    --   10.0     Recent Labs      01/09/19   0233  01/10/19   0105   SODIUM  139  140   POTASSIUM  5.1  4.6   CHLORIDE  109  112   CO2  20  22   GLUCOSE  140*  94   BUN  33*  31*   CREATININE  1.80*  1.35   CALCIUM  8.8  8.2*     Recent Labs      01/09/19 0233   APTT  28.4   INR  1.06     Recent Labs      01/09/19 0233   BNPBTYPENAT  1603*           Imaging  EC-ECHOCARDIOGRAM LTD W/O CONT   Final Result       DX-CHEST-LIMITED (1 VIEW)   Final Result         1.  Pulmonary edema and/or infiltrates.   2.  Atherosclerosis   3.  Hyperexpansion of lungs favors changes of COPD.           Assessment/Plan  ACC/AHA stage C systolic heart failure (HCC)- (present on admission)   Assessment & Plan    Monitor fluid status   Will give 1 dose of post transfusion  Continue close clinical monitoring     Chronic left ventricular systolic heart failure (HCC)- (present on admission)   Assessment & Plan    Repeat echo with EF of 40%  Continue nitro drip metoprolol     Coronary artery disease due to calcified coronary lesion- (present on admission)   Assessment & Plan    Cardiology following and recommending medical therapy as tolerated given history of GI bleed     Normocytic anemia- (present on admission)   Assessment & Plan    Secondary to GI bleed  Monitor H&H and transfuse as needed       Essential hypertension- (present on admission)   Assessment & Plan    Stable continue to monitor on low-dose metoprolol nitro drip     GIB (gastrointestinal bleeding)   Assessment & Plan    Continue Protonix  Serial H&H and transfuse to keep hemoglobin greater than 8 given her cardiac ischemia  We will ask for GI input discussed with Dr. Bhatia         Stage 4 chronic kidney disease (HCC)   Assessment & Plan    Improved to monitor continue to monitor          Patient with recurrent acute current syndrome and GI bleed making management challenging, reviewed treatment options and goals of care and discussed consideration of hospice.  At this time she would like to continue current level of care  Discussed with GI cardiology and nursing staff    VTE prophylaxis: SCD

## 2019-01-11 PROBLEM — E83.51 HYPOCALCEMIA: Status: ACTIVE | Noted: 2019-01-11

## 2019-01-11 PROBLEM — E87.6 HYPOKALEMIA: Status: ACTIVE | Noted: 2019-01-11

## 2019-01-11 LAB
ANION GAP SERPL CALC-SCNC: 6 MMOL/L (ref 0–11.9)
BARCODED ABORH UBTYP: 9500
BARCODED PRD CODE UBPRD: NORMAL
BARCODED UNIT NUM UBUNT: NORMAL
BUN SERPL-MCNC: 20 MG/DL (ref 8–22)
CALCIUM SERPL-MCNC: 6.1 MG/DL (ref 8.5–10.5)
CHLORIDE SERPL-SCNC: 122 MMOL/L (ref 96–112)
CO2 SERPL-SCNC: 16 MMOL/L (ref 20–33)
COMPONENT R 8504R: NORMAL
CREAT SERPL-MCNC: 0.87 MG/DL (ref 0.5–1.4)
EKG IMPRESSION: NORMAL
GLUCOSE SERPL-MCNC: 76 MG/DL (ref 65–99)
HGB BLD-MCNC: 12 G/DL (ref 12–16)
HGB BLD-MCNC: 12.4 G/DL (ref 12–16)
HGB BLD-MCNC: 8.2 G/DL (ref 12–16)
POTASSIUM SERPL-SCNC: 3.1 MMOL/L (ref 3.6–5.5)
PRODUCT TYPE UPROD: NORMAL
SODIUM SERPL-SCNC: 144 MMOL/L (ref 135–145)
TROPONIN I SERPL-MCNC: 0.12 NG/ML (ref 0–0.04)
UNIT STATUS USTAT: NORMAL

## 2019-01-11 PROCEDURE — P9016 RBC LEUKOCYTES REDUCED: HCPCS

## 2019-01-11 PROCEDURE — 36430 TRANSFUSION BLD/BLD COMPNT: CPT

## 2019-01-11 PROCEDURE — 700111 HCHG RX REV CODE 636 W/ 250 OVERRIDE (IP): Performed by: HOSPITALIST

## 2019-01-11 PROCEDURE — 93010 ELECTROCARDIOGRAM REPORT: CPT | Performed by: INTERNAL MEDICINE

## 2019-01-11 PROCEDURE — 700102 HCHG RX REV CODE 250 W/ 637 OVERRIDE(OP): Performed by: INTERNAL MEDICINE

## 2019-01-11 PROCEDURE — 700105 HCHG RX REV CODE 258: Performed by: HOSPITALIST

## 2019-01-11 PROCEDURE — C9113 INJ PANTOPRAZOLE SODIUM, VIA: HCPCS | Performed by: HOSPITALIST

## 2019-01-11 PROCEDURE — 99291 CRITICAL CARE FIRST HOUR: CPT | Performed by: HOSPITALIST

## 2019-01-11 PROCEDURE — 99232 SBSQ HOSP IP/OBS MODERATE 35: CPT | Mod: GC | Performed by: INTERNAL MEDICINE

## 2019-01-11 PROCEDURE — 700101 HCHG RX REV CODE 250: Performed by: HOSPITALIST

## 2019-01-11 PROCEDURE — 85018 HEMOGLOBIN: CPT | Mod: 91

## 2019-01-11 PROCEDURE — A9270 NON-COVERED ITEM OR SERVICE: HCPCS | Performed by: HOSPITALIST

## 2019-01-11 PROCEDURE — A9270 NON-COVERED ITEM OR SERVICE: HCPCS | Performed by: INTERNAL MEDICINE

## 2019-01-11 PROCEDURE — 84484 ASSAY OF TROPONIN QUANT: CPT

## 2019-01-11 PROCEDURE — 700102 HCHG RX REV CODE 250 W/ 637 OVERRIDE(OP): Performed by: HOSPITALIST

## 2019-01-11 PROCEDURE — 93005 ELECTROCARDIOGRAM TRACING: CPT | Performed by: HOSPITALIST

## 2019-01-11 PROCEDURE — 86922 COMPATIBILITY TEST ANTIGLOB: CPT | Mod: 91

## 2019-01-11 PROCEDURE — 770022 HCHG ROOM/CARE - ICU (200)

## 2019-01-11 PROCEDURE — 80048 BASIC METABOLIC PNL TOTAL CA: CPT

## 2019-01-11 RX ORDER — SUCRALFATE ORAL 1 G/10ML
1 SUSPENSION ORAL EVERY 6 HOURS
Status: DISCONTINUED | OUTPATIENT
Start: 2019-01-11 | End: 2019-01-16 | Stop reason: HOSPADM

## 2019-01-11 RX ORDER — MORPHINE SULFATE 4 MG/ML
2-4 INJECTION, SOLUTION INTRAMUSCULAR; INTRAVENOUS EVERY 4 HOURS PRN
Status: DISCONTINUED | OUTPATIENT
Start: 2019-01-11 | End: 2019-01-12

## 2019-01-11 RX ORDER — SODIUM CHLORIDE 9 MG/ML
INJECTION, SOLUTION INTRAVENOUS
Status: ACTIVE
Start: 2019-01-11 | End: 2019-01-11

## 2019-01-11 RX ORDER — POTASSIUM CHLORIDE 20 MEQ/1
40 TABLET, EXTENDED RELEASE ORAL 3 TIMES DAILY
Status: COMPLETED | OUTPATIENT
Start: 2019-01-11 | End: 2019-01-11

## 2019-01-11 RX ORDER — FUROSEMIDE 10 MG/ML
40 INJECTION INTRAMUSCULAR; INTRAVENOUS ONCE
Status: DISCONTINUED | OUTPATIENT
Start: 2019-01-11 | End: 2019-01-12

## 2019-01-11 RX ORDER — METOPROLOL TARTRATE 1 MG/ML
5 INJECTION, SOLUTION INTRAVENOUS
Status: DISCONTINUED | OUTPATIENT
Start: 2019-01-11 | End: 2019-01-16 | Stop reason: HOSPADM

## 2019-01-11 RX ADMIN — SUCRALFATE 1 G: 1 SUSPENSION ORAL at 22:36

## 2019-01-11 RX ADMIN — PANTOPRAZOLE SODIUM 40 MG: 40 INJECTION, POWDER, LYOPHILIZED, FOR SOLUTION INTRAVENOUS at 08:11

## 2019-01-11 RX ADMIN — CALCIUM GLUCONATE 1 G: 98 INJECTION, SOLUTION INTRAVENOUS at 09:49

## 2019-01-11 RX ADMIN — PANTOPRAZOLE SODIUM 40 MG: 40 INJECTION, POWDER, LYOPHILIZED, FOR SOLUTION INTRAVENOUS at 18:02

## 2019-01-11 RX ADMIN — MORPHINE SULFATE 4 MG: 4 INJECTION INTRAVENOUS at 06:42

## 2019-01-11 RX ADMIN — MORPHINE SULFATE 4 MG: 4 INJECTION INTRAVENOUS at 08:08

## 2019-01-11 RX ADMIN — POTASSIUM CHLORIDE 40 MEQ: 1500 TABLET, EXTENDED RELEASE ORAL at 18:03

## 2019-01-11 RX ADMIN — NITROGLYCERIN 10 MCG/MIN: 20 INJECTION INTRAVENOUS at 08:09

## 2019-01-11 RX ADMIN — ROSUVASTATIN CALCIUM 40 MG: 20 TABLET, FILM COATED ORAL at 18:03

## 2019-01-11 RX ADMIN — SUCRALFATE 1 G: 1 SUSPENSION ORAL at 18:03

## 2019-01-11 RX ADMIN — METOPROLOL TARTRATE 12.5 MG: 25 TABLET, FILM COATED ORAL at 18:03

## 2019-01-11 RX ADMIN — SODIUM CHLORIDE 83 ML: 9 INJECTION, SOLUTION INTRAVENOUS at 09:49

## 2019-01-11 RX ADMIN — NITROGLYCERIN 10 MCG/MIN: 20 INJECTION INTRAVENOUS at 19:14

## 2019-01-11 RX ADMIN — POTASSIUM CHLORIDE 40 MEQ: 1500 TABLET, EXTENDED RELEASE ORAL at 12:32

## 2019-01-11 RX ADMIN — ISOSORBIDE MONONITRATE 90 MG: 60 TABLET ORAL at 06:20

## 2019-01-11 RX ADMIN — POTASSIUM CHLORIDE 40 MEQ: 1500 TABLET, EXTENDED RELEASE ORAL at 08:48

## 2019-01-11 RX ADMIN — MORPHINE SULFATE 4 MG: 4 INJECTION INTRAVENOUS at 23:17

## 2019-01-11 RX ADMIN — METOPROLOL TARTRATE 12.5 MG: 25 TABLET, FILM COATED ORAL at 06:20

## 2019-01-11 RX ADMIN — LATANOPROST 1 DROP: 50 SOLUTION OPHTHALMIC at 18:04

## 2019-01-11 RX ADMIN — SODIUM CHLORIDE: 9 INJECTION, SOLUTION INTRAVENOUS at 22:37

## 2019-01-11 RX ADMIN — METOPROLOL TARTRATE 5 MG: 5 INJECTION, SOLUTION INTRAVENOUS at 09:04

## 2019-01-11 ASSESSMENT — ENCOUNTER SYMPTOMS
NERVOUS/ANXIOUS: 1
EYE DISCHARGE: 0
HEADACHES: 0
NAUSEA: 0
BRUISES/BLEEDS EASILY: 0
CONSTIPATION: 0
FLANK PAIN: 0
HEMOPTYSIS: 0
NEUROLOGICAL NEGATIVE: 1
FEVER: 0
ABDOMINAL PAIN: 1
NAUSEA: 1
EYE PAIN: 0
ORTHOPNEA: 1
EYE REDNESS: 0
NECK PAIN: 0
SHORTNESS OF BREATH: 0
VOMITING: 0
SHORTNESS OF BREATH: 1
PSYCHIATRIC NEGATIVE: 1
SPUTUM PRODUCTION: 0
COUGH: 0
DIARRHEA: 1
POLYDIPSIA: 0
BLURRED VISION: 0
BACK PAIN: 0
CHILLS: 0
FOCAL WEAKNESS: 0

## 2019-01-11 ASSESSMENT — LIFESTYLE VARIABLES: SUBSTANCE_ABUSE: 0

## 2019-01-11 ASSESSMENT — PAIN SCALES - GENERAL
PAINLEVEL_OUTOF10: 0
PAINLEVEL_OUTOF10: 8
PAINLEVEL_OUTOF10: 0
PAINLEVEL_OUTOF10: 6
PAINLEVEL_OUTOF10: 0
PAINLEVEL_OUTOF10: 0
PAINLEVEL_OUTOF10: 6
PAINLEVEL_OUTOF10: 0
PAINLEVEL_OUTOF10: ASSUMED PAIN PRESENT
PAINLEVEL_OUTOF10: 0
PAINLEVEL_OUTOF10: 0
PAINLEVEL_OUTOF10: 8
PAINLEVEL_OUTOF10: 2
PAINLEVEL_OUTOF10: 2
PAINLEVEL_OUTOF10: 0
PAINLEVEL_OUTOF10: 0

## 2019-01-11 NOTE — RESPIRATORY CARE
COPD EDUCATION by COPD CLINICAL EDUCATOR  1/11/2019 at 1:47 PM by Theresa Green     Patient reviewed by COPD education team. Patient does not qualify for COPD program.

## 2019-01-11 NOTE — PROGRESS NOTES
Cardiology Progress Note    Reason for Consult:  Asked by Dr Ghulam Krishnan for Code STEMI      CC:   Chief Complaint   Patient presents with   • Chest Pain     Began at home at 0045       HPI:     84-year-old female with known coronary artery disease who was brought in as a code STEMI.  Patient reports being in her usual state of health until yesterday when she started having bilateral nonradiating chest discomfort associated with dyspnea.  She tried taking her medications but her pain persisted and she finally decided to call 911 today.  She appears very uncomfortable and complains of ongoing chest discomfort and dyspnea. Reports compliance with all of her medications.     In 2010, patient had an anterior MI at which time she was found to have a  to her LAD.  She underwent PCI to OM.  Repeat coronary angiography in 2014 showed patent stent. She has also had multiple CVAs in the past.     Reports having GI bleed history.  In October 2018, she underwent clipping for a gastric AVM.  She was admitted here in December for a NSTEMI for which she was started on heparin drip following which she had a drop in her hemoglobin for which she required a transfusion.  Patient was seen by Dr. Kapoor about 10 days ago and was deemed not to be a good candidate for any coronary intervention due to her recent GI bleed.       Interval Events    1/11/2019 patient was evaluated at bedside and found AAOX3. NTG drip was restarted 2/2 reemerging chest pain. EKG w/o significant changes. Trops drop to 0.12 from 1. Discussed case with Interventional Cardiology for which intervention was not recommended 2/2 inability to give antiplatelet therapy due to LGIB and recommended keeping Hb > 10.    1/10/2019 patient was evaluated at bedside and found AAOX3. Still with 5/10 substernal chest pain. Pt more awake today. Tele w/NSR 60-70s. Hb drop to 7, got PRBC transfusion. Continue medical MGMT with nitroglycerin drip, metoprolol and high-dose  statin. Continue holding antiplatelet therapy 2/2 LGIB. Palliative following, aim to discuss options patient and sister Erin today.    1/9/2019 patient was evaluated at bedside and found AAOX3 but lethargic. She reporting substernal chest pain. Tele w/NSR 60-70s. Trop at 0.9-->1, BMP 1600.  Patient still with lower GI bleed and hemoglobin trending down.  Treat with medical management, patient is currently on nitroglycerin drip, metoprolol and high-dose statin.  Antiplatelet therapy risk in the setting of GI bleed. Palliative care was consulted and per their note aim to discuss options tomorrow with patient and sister Erin.      Medications / Drug list prior to admission:  No current facility-administered medications on file prior to encounter.      Current Outpatient Prescriptions on File Prior to Encounter   Medication Sig Dispense Refill   • aspirin (ASA) 81 MG Chew Tab chewable tablet Take 81 mg by mouth every day.     • rosuvastatin (CRESTOR) 40 MG tablet Take 40 mg by mouth every evening.     • pantoprazole (PROTONIX) 40 MG Tablet Delayed Response Take 40 mg by mouth 2 Times a Day.     • bimatoprost (LUMIGAN) 0.01 % Solution Place 1 Drop in both eyes every bedtime. 1 gtts both eyes         Current list of administered Medications:    Current Facility-Administered Medications:   •  potassium chloride SA (Kdur) tablet 40 mEq, 40 mEq, Oral, TID, Ghulam Krishnan M.D., 40 mEq at 01/11/19 1232  •  morphine (pf) 4 mg/ml injection 2-4 mg, 2-4 mg, Intravenous, Q4HRS PRN, Ghulam Krishnan M.D., 4 mg at 01/11/19 0808  •  Metoprolol Tartrate (LOPRESSOR) injection 5 mg, 5 mg, Intravenous, Q5 MIN PRN, Ghulam Krishnan M.D., 5 mg at 01/11/19 0904  •  SODIUM CHLORIDE 0.9 % IV SOLN, , , ,   •  pantoprazole (PROTONIX) injection 40 mg, 40 mg, Intravenous, BID, Ghulam Krishnan M.D., 40 mg at 01/11/19 0811  •  isosorbide mononitrate SR (IMDUR) tablet 90 mg, 90 mg, Oral, Q DAY, Nabila Carter M.D., 90 mg at  01/11/19 0620  •  rosuvastatin (CRESTOR) tablet 40 mg, 40 mg, Oral, Q EVENING, Rubén Gonzalez M.D., 40 mg at 01/10/19 1823  •  NS infusion, , Intravenous, Continuous, Rubén Gonzalez M.D., Last Rate: 83 mL/hr at 01/11/19 0949, 83 mL at 01/11/19 0949  •  acetaminophen (TYLENOL) tablet 650 mg, 650 mg, Oral, Q6HRS PRN, Rubén Gonzalez M.D.  •  ondansetron (ZOFRAN) syringe/vial injection 4 mg, 4 mg, Intravenous, Q4HRS PRN, Rubén Gonzalez M.D.  •  ondansetron (ZOFRAN ODT) dispertab 4 mg, 4 mg, Oral, Q4HRS PRN, Rubén Gonzalez M.D.  •  senna-docusate (PERICOLACE or SENOKOT S) 8.6-50 MG per tablet 2 Tab, 2 Tab, Oral, BID, Stopped at 01/09/19 0645 **AND** polyethylene glycol/lytes (MIRALAX) PACKET 1 Packet, 1 Packet, Oral, QDAY PRN **AND** magnesium hydroxide (MILK OF MAGNESIA) suspension 30 mL, 30 mL, Oral, QDAY PRN **AND** bisacodyl (DULCOLAX) suppository 10 mg, 10 mg, Rectal, QDAY PRN, Rubén Gonzalez M.D.  •  Respiratory Care per Protocol, , Nebulization, Continuous RT, Rubén Gonzalez M.D.  •  nitroglycerin 50 mg in D5W 250 ml infusion, 0-200 mcg/min, Intravenous, Continuous, Rubén Gonzalez M.D., Last Rate: 9 mL/hr at 01/11/19 0840, 30 mcg/min at 01/11/19 0840  •  latanoprost (XALATAN) 0.005 % ophthalmic solution 1 Drop, 1 Drop, Both Eyes, Q EVENING, Rubén Gonzalez M.D., 1 Drop at 01/10/19 1813  •  metoprolol (LOPRESSOR) tablet 12.5 mg, 12.5 mg, Oral, BID, Ghulam Krishnan M.D., 12.5 mg at 01/11/19 0620    Past Medical History:   Diagnosis Date   • Acute myocardial infarction, true posterior wall infarction, episode of care unspecified    • Anxiety    • Bowel habit changes    • Breath shortness 2017    uses oxygen at 2 liters/min; AT NIGHT ONLY   • CAD (coronary artery disease)     S/P stent placement   • Cholesterol blood decreased    • Dental disorder     Partial upper   • Dizziness    • GI bleed    • Glaucoma    • Heart burn    • Hyperlipidemia    • Hypertension    • Myocardial infarct (HCC) 6/10/10   •  Peripheral vascular disease (HCC)    • Pulmonary disease     bronchitis   • TIA (transient ischemic attack)    • Unspecified hemorrhagic conditions     pt takes plavix   • Unspecified urinary incontinence    • Urinary bladder disorder        Past Surgical History:   Procedure Laterality Date   • COLONOSCOPY - ENDO N/A 10/23/2018    Procedure: COLONOSCOPY - ENDO;  Surgeon: Anatoly Barragan M.D.;  Location: [facility];  Service: Gastroenterology   • GASTROSCOPY-ENDO N/A 10/22/2018    Procedure: GASTROSCOPY-ENDO;  Surgeon: Bakari Brown M.D.;  Location: [facility];  Service: Gastroenterology   • CAROTID ENDARTERECTOMY Right 9/3/2018    Procedure: CAROTID ENDARTERECTOMY WITH NEUROMONITORING;  Surgeon: Naga Abad M.D.;  Location: [facility];  Service: Vascular   • GASTROSCOPY WITH BIOPSY  2/13/2016    Procedure: GASTROSCOPY WITH BIOPSY;  Surgeon: Susan Miller M.D.;  Location: [facility];  Service:    • RECOVERY  7/24/2014    Performed by Cath-Recovery Surgery at [facility] SAME DAY [facility]   • CARDIAC CATH  7/24/14    Diffuse disease, see report.  % with collterals.   • FEMORAL ENDARTERECTOMY  10/4/2013    Performed by Kacie Baker M.D. at SURGERY [facility]   • ANGIOPLASTY BALLOON  10/4/2013    Performed by Kacie Baker M.D. at [facility]   • RECOVERY  4/9/2013    Performed by Ir-Recovery Surgery at [facility] SAME DAY [facility]   • RECOVERY  11/26/2012    Performed by Ir-Recovery Surgery at [facility] SAME DAY [facility]   • RECTUS REPAIR  7/13/2012    Performed by SHEILA PLATT at [facility]   • OTHER  12/10/10    stents in legs   • OTHER  6/10/10    cardiac stents   • CARDIAC CATH  6/2010    BMS to Om   • OTHER  2005    KNEE SURGERY   • CHOLECYSTECTOMY     • HYSTERECTOMY, TOTAL ABDOMINAL     • OTHER CARDIAC SURGERY      stent in heart   • STENT PLACEMENT      treated by   Olivia, multiple surgeries to legs.       Family History   Problem Relation Age of Onset   • Stroke Mother 68        CVA   • Heart Disease Mother 65        valve surgery   • Other Son         wgt 465 lbs     Patient family history was personally reviewed, no pertinent family history to current presentation    Social History     Social History   • Marital status:      Spouse name: N/A   • Number of children: N/A   • Years of education: N/A     Occupational History   • Not on file.     Social History Main Topics   • Smoking status: Former Smoker     Packs/day: 0.25     Years: 45.00     Types: Cigarettes     Quit date: 2/1/2016   • Smokeless tobacco: Never Used   • Alcohol use 0.0 - 0.6 oz/week      Comment: 1 drink per month   • Drug use: No   • Sexual activity: Not on file     Other Topics Concern   • Not on file     Social History Narrative   • No narrative on file       ALLERGIES:  Allergies   Allergen Reactions   • Penicillins Rash and Vomiting     Rxn = unknown  Pt tolerates cephalosporins   • Hydrocodone Vomiting and Nausea     Rxn = unknown   • Tape Rash     RASH       Review of systems:  A complete review of symptoms was reviewed with patient. This is reviewed in H&P and PMH. ALL OTHERS reviewed and negative    Physical exam:  Patient Vitals for the past 24 hrs:   BP Temp Temp src Pulse Resp SpO2 Height Weight   01/09/19 0710 - - - 71 16 98 % - -   01/09/19 0700 - - - 72 17 100 % - -   01/09/19 0650 - - - 73 14 98 % - -   01/09/19 0640 - - - 71 13 99 % - -   01/09/19 0630 - - - 72 16 98 % - -   01/09/19 0620 - - - 72 19 98 % - -   01/09/19 0615 - - - 74 15 97 % - -   01/09/19 0610 - - - 77 17 97 % - -   01/09/19 0605 - - - 72 15 98 % - -   01/09/19 0600 - - - 73 14 98 % - -   01/09/19 0555 - - - 72 13 98 % - -   01/09/19 0550 - - - 71 13 96 % - -   01/09/19 0545 - - - 72 14 97 % - -   01/09/19 0540 - - - 73 14 96 % - -   01/09/19 0535 - - - 72 13 97 % - -   01/09/19 0530 - - - 73 17 96 % - -  "  01/09/19 0525 - - - 72 15 97 % - -   01/09/19 0520 - - - 73 19 96 % - -   01/09/19 0515 - - - 73 17 96 % - -   01/09/19 0510 - - - 73 16 96 % - -   01/09/19 0505 - - - 74 20 97 % - -   01/09/19 0500 - - - 74 14 95 % - -   01/09/19 0455 - - - 74 13 97 % - -   01/09/19 0450 - - - 73 18 97 % - -   01/09/19 0445 - - - 73 15 96 % - -   01/09/19 0440 - - - 75 16 96 % - -   01/09/19 0430 - - - 94 17 92 % - -   01/09/19 0426 - - - 81 18 (!) 87 % - -   01/09/19 0415 - - - 77 16 96 % - -   01/09/19 0410 - - - 81 (!) 21 96 % - -   01/09/19 0400 - - - 81 (!) 25 97 % - -   01/09/19 0355 - - - 79 17 97 % - -   01/09/19 0350 - - - 79 18 96 % - -   01/09/19 0345 - - - 79 18 96 % - -   01/09/19 0340 - - - 81 18 96 % - -   01/09/19 0335 - - - 83 20 96 % - -   01/09/19 0330 - - - 82 20 96 % - -   01/09/19 0325 - - - 86 (!) 23 95 % - -   01/09/19 0320 - - - 86 (!) 24 95 % - -   01/09/19 0315 - - - 86 (!) 22 94 % - -   01/09/19 0310 - - - 89 (!) 25 94 % - -   01/09/19 0305 - - - 89 (!) 24 94 % - -   01/09/19 0300 - - - 92 - 93 % - -   01/09/19 0255 - - - 94 - 91 % - -   01/09/19 0254 131/46 37.2 °C (98.9 °F) Temporal 93 18 91 % - -   01/09/19 0253 - - - - - - 1.753 m (5' 9\") 57.2 kg (126 lb)       General: Lethargic/sleepy  EYES: no jaundice  HEENT: OP clear   Neck: No bruits No JVD.   CVS:  RRR. S1 + S2. No M/R/G  Resp: CTAB. No wheezing or crackles/rhonchi.  Abdomen: Soft, NT, ND,  Skin: Grossly nothing acute no obvious rashes  Neurological: Alert, Moves all extremities, no cranial nerve defects on limited exam  Extremities: Pulse 2+ in b/l LE. No edema. No cyanosis.       Data:  Laboratory studies personally reviewed by me:  Recent Results (from the past 24 hour(s))   HGB    Collection Time: 01/10/19  4:35 PM   Result Value Ref Range    Hemoglobin 8.8 (L) 12.0 - 16.0 g/dL   HGB    Collection Time: 01/10/19 11:12 PM   Result Value Ref Range    Hemoglobin 7.4 (L) 12.0 - 16.0 g/dL   HGB    Collection Time: 01/11/19  6:28 AM   Result " Value Ref Range    Hemoglobin 8.2 (L) 12.0 - 16.0 g/dL   BASIC METABOLIC PANEL    Collection Time: 19  6:28 AM   Result Value Ref Range    Sodium 144 135 - 145 mmol/L    Potassium 3.1 (L) 3.6 - 5.5 mmol/L    Chloride 122 (H) 96 - 112 mmol/L    Co2 16 (L) 20 - 33 mmol/L    Glucose 76 65 - 99 mg/dL    Bun 20 8 - 22 mg/dL    Creatinine 0.87 0.50 - 1.40 mg/dL    Calcium 6.1 (LL) 8.5 - 10.5 mg/dL    Anion Gap 6.0 0.0 - 11.9   ESTIMATED GFR    Collection Time: 19  6:28 AM   Result Value Ref Range    GFR If African American >60 >60 mL/min/1.73 m 2    GFR If Non African American >60 >60 mL/min/1.73 m 2   EKG    Collection Time: 19  8:19 AM   Result Value Ref Range    Report       [facility]    Test Date:  2019  Pt Name:    LEIDA RENNER                Department: 161  MRN:        [id number]                      Room:       [facility]  Gender:     Female                       Technician: CORINNE  :        [id number]                   Requested By:ERIK MORALES  Order #:    [id number]                    Reading MD: Rian San MD    Measurements  Intervals                                Axis  Rate:       73                           P:          70  ME:         164                          QRS:        37  QRSD:       90                           T:          179  QT:         412  QTc:        454    Interpretive Statements  SINUS RHYTHM  LEFT ATRIAL ABNORMALITY  ANTERIOR INFARCT, AGE INDETERMINATE  Compared to ECG 2019 02:24:35  No significant changes      Electronically Signed On 2019 8:42:10 PST by Rian San MD     TROPONIN    Collection Time: 19  9:03 AM   Result Value Ref Range    Troponin I 0.12 (H) 0.00 - 0.04 ng/mL       Imaging:  EC-ECHOCARDIOGRAM LTD W/O CONT   Final Result      DX-CHEST-LIMITED (1 VIEW)   Final Result         1.  Pulmonary edema and/or infiltrates.   2.  Atherosclerosis   3.  Hyperexpansion of lungs favors changes of COPD.              EKG :  Sinus  tachycardia rate 70s,  Q waves at V1-V3, ST elevation at V1-V3 and ST depression at inferior leads    All pertinent features of laboratory and imaging reviewed including primary images where applicable      Principal Problem (Resolved):    STEMI (ST elevation myocardial infarction) (HCC) POA: Unknown  Active Problems:    Coronary artery disease due to calcified coronary lesion POA: Yes    History of CVA (cerebrovascular accident) POA: Yes    Chronic left ventricular systolic heart failure (HCC) POA: Yes    ACC/AHA stage C systolic heart failure (HCC) POA: Yes    History of anterior wall MI POA: Yes    Essential hypertension POA: Yes    Normocytic anemia POA: Yes    Stage 4 chronic kidney disease (HCC) POA: Unknown    GIB (gastrointestinal bleeding) POA: Unknown    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  Resolved Problems:    Ischemic cerebrovascular accident (CVA) (HCC) POA: Yes    STEMI (ST elevation myocardial infarction) (HCC) POA: Unknown      Assessment / Plan:  #STEMI  #Ischemic cardiomyopathy  #CHF  #Lower GI bleed  #Normocytic anemia  #Hypertension  #CKD4  EKG w/ anterior septal STEMI on admission  Tele w/NSR 60-70s  ECHO showed mildly reduced LV systolic function, EF at 40%, akinesis-hypokinesis at mid anterior septum, apical septum and apex  Had angina episode today  Back on NTG drip  Repeat EKG w/o significant changes  Trops drop to 0.12 from 1  As per Interventional Cardiology intervention not recommended 2/2 LGIB  Keeping Hb > 10, to reduce angina episodes  Continue medical management for STEMI with NTG drip, beta blocker and high-dose statin therapy  Continue hold antiplatelet therapy as high risk for worsening LGIB (GI following)      Cardiology will continue to follow along    It is my pleasure to participate in the care of Ms. Diana.  Please do not hesitate to contact us with questions or concerns.      Chesetr Arambula    1/11/2019

## 2019-01-11 NOTE — CARE PLAN
Problem: Communication  Goal: The ability to communicate needs accurately and effectively will improve  Outcome: PROGRESSING AS EXPECTED  Pt uses call light appropriately    Problem: Safety  Goal: Will remain free from injury  Outcome: PROGRESSING AS EXPECTED  Bed alarm on   Goal: Will remain free from falls  Outcome: PROGRESSING AS EXPECTED  Bed alarm on    Problem: Bowel/Gastric:  Goal: Normal bowel function is maintained or improved  Outcome: PROGRESSING AS EXPECTED    Goal: Will not experience complications related to bowel motility  Outcome: PROGRESSING AS EXPECTED      Problem: Pain Management  Goal: Pain level will decrease to patient's comfort goal  Outcome: PROGRESSING AS EXPECTED  Nitro drip used for chest pain, pt also has morphine pushes if needed    Problem: Skin Integrity  Goal: Risk for impaired skin integrity will decrease  Outcome: PROGRESSING AS EXPECTED  Pt able to turn self in bed.      Problem: Mobility  Goal: Risk for activity intolerance will decrease  Outcome: PROGRESSING AS EXPECTED  Pt turns appropriatly.  Pt up to chair with minimal assistance.

## 2019-01-11 NOTE — PROGRESS NOTES
"Gastroenterology ([facility]) Note    Date of Service: 19 12:36 PM    Reason for Consultation: GI bleed/anemia    ID:   Brooke Diana is a 84 y.o. female with history of CVA right-sided weakness prior MI with coronary artery disease, dyslipidemia on statin, hiatal hernia, duodenitis, colonic angioectasias, who was admitted on 2019 with suspected ST elevation MI and new cardiomyopathy w LVEF 25%. She was found to be profoundly anemic with melena.    Interval Events:  Patient reports additional bloody BM overnight. H/H stable with appropriate response to transfusion. Still reports having chest pain.    Review Of Systems:  Review of Systems   Constitutional: Negative for chills and fever.   HENT: Negative for congestion and hearing loss.    Eyes: Negative for blurred vision and pain.   Respiratory: Negative for cough, sputum production and shortness of breath.    Cardiovascular: Positive for chest pain. Negative for leg swelling.   Gastrointestinal: Positive for abdominal pain, diarrhea and melena. Negative for constipation and nausea.   Genitourinary: Negative for dysuria and frequency.   Musculoskeletal: Negative for back pain and neck pain.   Skin: Negative for itching and rash.   Neurological: Negative for focal weakness and headaches.   Endo/Heme/Allergies: Negative for polydipsia. Does not bruise/bleed easily.   Psychiatric/Behavioral: Negative for substance abuse. The patient is nervous/anxious.        Most Recent Vital Signs:  /53   Pulse 70   Temp 36 °C (96.8 °F) (Temporal)   Resp 12   Ht 1.753 m (5' 9\")   Wt 63.3 kg (139 lb 8.8 oz)   SpO2 99%   Breastfeeding? No   BMI 20.61 kg/m²   Temp  Av.4 °C (97.6 °F)  Min: 35.4 °C (95.7 °F)  Max: 37.4 °C (99.3 °F)    Physical Exam:  Physical Exam   Constitutional: She is oriented to person, place, and time. She appears cachectic. No distress.   HENT:   Head: Normocephalic and atraumatic.   Eyes: Pupils are equal, round, and reactive to light. " Conjunctivae are normal. No scleral icterus.   Neck: Normal range of motion. Neck supple.   Cardiovascular: Normal rate, regular rhythm and intact distal pulses.    Murmur heard.  Pulmonary/Chest: Effort normal and breath sounds normal. No respiratory distress.   Abdominal: Soft. Bowel sounds are normal. There is no tenderness. There is no guarding.   Musculoskeletal: Normal range of motion. She exhibits no edema or tenderness.   Neurological: She is alert and oriented to person, place, and time. No cranial nerve deficit.   Skin: Skin is warm and dry.   Psychiatric: Mood and affect normal.       Pertinent Lab Results:  Recent Labs      01/09/19   0233  01/10/19   0105   WBC  15.0*  8.3      Recent Labs      01/09/19 0233 01/09/19   1735  01/10/19   0105  01/10/19   1635  01/10/19   2312  01/11/19   0628   HEMOGLOBIN  8.0*   < >  7.0*  6.4*  8.8*  7.4*  8.2*   HEMATOCRIT  27.0*   --   23.8*  22.4*   --    --    --    MCV  93.1   --    --   93.7   --    --    --    MCH  27.6   --    --   26.8*   --    --    --    ANISOCYTOSIS  1+   --    --    --    --    --    --    PLATELETCT  387   --    --   335   --    --    --     < > = values in this interval not displayed.       Recent Labs      01/09/19   0233  01/10/19   0105  01/11/19   0628   SODIUM  139  140  144   POTASSIUM  5.1  4.6  3.1*   CHLORIDE  109  112  122*   CO2  20  22  16*   CREATININE  1.80*  1.35  0.87        No results for input(s): ALBUMIN in the last 72 hours.    Invalid input(s): AST, ALT, ALKPHOS, BILITOT, TOTALBILIRUB, BILIRUBINTOT, BILIRUBINDIR, BILIRUBININD, ALKALINEPHOS   Recent Labs      01/09/19 0233   INR  1.06       Pertinent Micro:  Results     ** No results found for the last 168 hours. **        Blood Culture   Date Value Ref Range Status   02/11/2016 No growth after 5 days of incubation.  Final        Studies:  EC-ECHOCARDIOGRAM LTD W/O CONT   Final Result      DX-CHEST-LIMITED (1 VIEW)   Final Result         1.  Pulmonary edema  and/or infiltrates.   2.  Atherosclerosis   3.  Hyperexpansion of lungs favors changes of COPD.          IMPRESSION:   Brooke Diana is a 84 y.o. female with a history of coronary artery disease, GI bleed secondary to suspected AV malformation in both upper and lower GI tract who presents to the hospital with chest pain and the elevation MI with elevated troponin in the setting of chronic kidney disease.  Patient's H/H has stablized, with appropriate response to 2 U pRBCs, 2 additional units transfused 1/11.  Per cardiology not ideal for coronary cath secondary to GI bleed.  Extensive discussion with patient including risk and benefit of procedure as well as sedation options were discussed on 1/10 and repeated on 1/11.  Given patient's cardiac issues ongoing chest pain fluid overload and current MI discussed high risk for procedural sedation.  Discussed likely upper GI bleed due to possible previous AVM versus other such as ulcer.  Patient reports not satisfied with previous endoscopic evaluation and is very hesitant to proceed.  Discussed again high risk procedure with high risk for cardiac pulmonary complication. She declines EGD, colonoscopy at this time.    PLAN:   -continue to trend H/H closely  -transfuse for Hgb less than 8 due to cardiac ischemia  -continue PPI drip  -continue with goals of care discussions  -no endoscopic evaluation at this time per patient wishes    Thank you for including us in the care of this patient. We will sign off at this time. Please call with any additional questions, concerns, or if the patient decides to proceed with endoscopy.    Aga Banuelos M.D.

## 2019-01-11 NOTE — PROGRESS NOTES
Pt c/o chest pain, called it abdominal pain at first.  Dr Yossi Krishnan notified, EKG ordered,  0809 restarted the nitro drip at 10 mcg/min, 4mg of morphine given for pain at 6/10.  EKG ordered.    0822 up to 20mcg/min.    0830 pt stating chest pain is lessening.      VSS HR 72 /50..    0840 nitro up to 30mcg/min, cardiology notified via ext [phone].  Called back and they are reviewing EKG spoke with Dr Abby Arambula

## 2019-01-11 NOTE — PROGRESS NOTES
Dr Yossi Krishnan paged regarding pts decision to not want an EGD, Pt requested a dinner and to eat    1808 Dr Yossi Krishnan called back, clear liquid diet ordered

## 2019-01-11 NOTE — PROGRESS NOTES
Call to Dr Bhatia to let him know that pt has decided to have an EGD.  Pt to be NPO at midnight.

## 2019-01-11 NOTE — DIETARY
"Nutrition services: Day 2 of admit.  Brooke Diana is a 84 y.o. female with admitting DX of STEMI (ST elevation myocardial infarction) ([facility])    Consult received for cardiac rehab diet education. Pt also noted poor PO intake per nutritional admit screen.    Assessment:  Height: 175.3 cm (5' 9\")  Weight: 63.3 kg (139 lb 8.8 oz)  Body mass index is 20.61 kg/m².     Diet/Intake: Clear liquid (no reds) diet in place. PO intake 25-50% of dinner last night per ADLs. Boost supplements added to meals. RN noted that pt did not eat any breakfast today.    Evaluation:   H/o MI/stent and GIB 10/2018.  Cardiology and GI following. ?Interventions  +Abdominal pain and chest pain today  No indication for cardiac rehab diet education until acute issues resolve. Note: Low total cholesterol and LDL.  Palliative Care on board.    Malnutrition Risk: Unable to diagnose    Recommendations/Plan:  1. Diet per GI.  2. Encourage PO intake of meals and supplements.  3. Document PO intake in ADLs to provide interdisciplinary communication across all shifts.   4. Monitor weight.  5. Nutrition Representative will see pt for ongoing meal and snack preferences as diet permits.  6. ?Consider nutrition support     RD following.                "

## 2019-01-11 NOTE — PROGRESS NOTES
Pt upon first assessment stated she had abdominal pain, upon further assessment it was determined it was chest pain, as she called it upper abdominal pain.  Pt is resting comfortable and pain is at 2/10.  Nitro drip was restarted, see previous note.      Calcium Gluconate ordered and hung.    Dr. Yossi Krishnan ordered a blood transfusion.

## 2019-01-11 NOTE — PROGRESS NOTES
Hospital Medicine Daily Progress Note    Date of Service  1/11/2019    Chief Complaint  84 y.o. female admitted 1/9/2019 with STEMI, evaluated by cardiology and medical therapy recommended given history of GI bleed and melena    Hospital Course          Interval Problem Update    Complains of CP this am  2 BM this am with melena x1  Restarted on nitro at 30   Received IV metoprolol  -130  Afebrile  3 L NC  protonix           Consultants/Specialty  Cardiology    Code Status  DNR/DNR    Disposition  ICU    Review of Systems  Review of Systems   Constitutional: Positive for malaise/fatigue. Negative for chills and fever.   HENT: Negative for congestion and nosebleeds.    Eyes: Negative for discharge and redness.   Respiratory: Positive for shortness of breath. Negative for cough and hemoptysis.    Cardiovascular: Positive for chest pain and orthopnea.   Gastrointestinal: Positive for abdominal pain, melena and nausea. Negative for vomiting.   Genitourinary: Negative for flank pain and hematuria.   Musculoskeletal: Negative for back pain and joint pain.   Skin: Negative for rash.   Neurological: Negative.    Psychiatric/Behavioral: Negative.    All other systems reviewed and are negative.       Physical Exam  Temp:  [35.4 °C (95.7 °F)-37.4 °C (99.3 °F)] 35.9 °C (96.6 °F)  Pulse:  [68-88] 73  Resp:  [13-42] 15  BP: (119-127)/(35-98) 119/48    Physical Exam   Constitutional: She is oriented to person, place, and time. She appears well-developed and well-nourished. She appears distressed.   HENT:   Head: Normocephalic and atraumatic.   Right Ear: External ear normal.   Left Ear: External ear normal.   Mouth/Throat: No oropharyngeal exudate.   Eyes: Conjunctivae are normal. Right eye exhibits no discharge. Left eye exhibits no discharge. No scleral icterus.   Neck: Neck supple. No JVD present. No tracheal deviation present.   Cardiovascular: Normal rate and regular rhythm.  Exam reveals no gallop and no friction rub.     No murmur heard.  Pulmonary/Chest: Effort normal. No stridor. No respiratory distress. She has no wheezes. She has rales. She exhibits no tenderness.   Abdominal: Soft. Bowel sounds are normal. She exhibits no distension. There is no tenderness. There is no rebound.   Musculoskeletal: She exhibits edema. She exhibits no tenderness.   Neurological: She is alert and oriented to person, place, and time. No cranial nerve deficit. She exhibits normal muscle tone.   Skin: Skin is warm and dry. She is not diaphoretic. No cyanosis. Nails show no clubbing.   Psychiatric: Her behavior is normal. Thought content normal.   Anxious   Nursing note and vitals reviewed.      Fluids    Intake/Output Summary (Last 24 hours) at 01/11/19 0902  Last data filed at 01/11/19 0600   Gross per 24 hour   Intake          3157.28 ml   Output                0 ml   Net          3157.28 ml       Laboratory  Recent Labs      01/09/19   0233   01/09/19   1735  01/10/19   0105  01/10/19   1635  01/10/19   2312  01/11/19   0628   WBC  15.0*   --    --   8.3   --    --    --    RBC  2.90*   --    --   2.39*   --    --    --    HEMOGLOBIN  8.0*   < >  7.0*  6.4*  8.8*  7.4*  8.2*   HEMATOCRIT  27.0*   --   23.8*  22.4*   --    --    --    MCV  93.1   --    --   93.7   --    --    --    MCH  27.6   --    --   26.8*   --    --    --    MCHC  29.6*   --    --   28.6*   --    --    --    RDW  61.0*   --    --   60.3*   --    --    --    PLATELETCT  387   --    --   335   --    --    --    MPV  10.5   --    --   10.0   --    --    --     < > = values in this interval not displayed.     Recent Labs      01/09/19   0233  01/10/19   0105 01/11/19   0628   SODIUM  139  140  144   POTASSIUM  5.1  4.6  3.1*   CHLORIDE  109  112  122*   CO2  20  22  16*   GLUCOSE  140*  94  76   BUN  33*  31*  20   CREATININE  1.80*  1.35  0.87   CALCIUM  8.8  8.2*  6.1*     Recent Labs      01/09/19   0233   APTT  28.4   INR  1.06     Recent Labs      01/09/19 0233    BNPBTYPENAT  1603*           Imaging  EC-ECHOCARDIOGRAM LTD W/O CONT   Final Result      DX-CHEST-LIMITED (1 VIEW)   Final Result         1.  Pulmonary edema and/or infiltrates.   2.  Atherosclerosis   3.  Hyperexpansion of lungs favors changes of COPD.           Assessment/Plan  ACC/AHA stage C systolic heart failure (HCC)- (present on admission)   Assessment & Plan    Monitor fluid status with transfusion  We will give Lasix one-time dose between transfusions     Chronic left ventricular systolic heart failure (HCC)- (present on admission)   Assessment & Plan    Repeat echo with EF of 40%       Coronary artery disease due to calcified coronary lesion- (present on admission)   Assessment & Plan    Ca unstable angina with active chest pain    Stat EKG done and reviewed by myself with no significant changes but significantly abnormal with subtle ST elevations in anterior leads  I would restart the patient on nitroglycerin drip  I will give her metoprolol 5 mg IV every 5 minutes x3 until chest pain-free  And will order scheduled morphine sulfate as needed  I spoke with Dr. Lewis from cardiology patient is still not a candidate for any interventions given her high risk of bleeding and continued anemia he suggested transfusing to keep her hemoglobin greater than 10 I will order 2 units of packed RBC with close monitoring  Her overall prognosis is guarded given her GI bleed precluding intervention for her coronary artery disease and active ischemia patient remains DNR but is not ready to transition to hospice or comfort care     Normocytic anemia- (present on admission)   Assessment & Plan    Secondary to GI bleed  Transfuse 2 units of packed RBC and keep hemoglobin greater than 10       Essential hypertension- (present on admission)   Assessment & Plan    Stable continue to monitor     Hypocalcemia   Assessment & Plan    Replete and monitor     Hypokalemia   Assessment & Plan    Replete and monitor     GIB  (gastrointestinal bleeding)   Assessment & Plan    Continue Protonix  Monitor H&H transfuse to keep hemoglobin greater than 10  Appreciate GI assistance       Stage 4 chronic kidney disease (HCC)   Assessment & Plan    Improved to monitor continue to monitor          >Patient is critically ill.   >The patient is having : Unstable angina with active chest pain  >The vital organ system that is effected is the: Cardiovascular  >If untreated there is a high chance of deterioration into: Death  >The critical care that I am providing today is: Restarting nitroglycerin drip IV metoprolol transfusion  >The critical care that has been undertaken is medically complex.   >There has been no overlap in critical care time.   Critical care time not including procedures, no overlap: 34 minutes        VTE prophylaxis: SCD

## 2019-01-11 NOTE — PROGRESS NOTES
1315 Pt seen by GI physician.  Pt still hasn't decided on an EGD, pt asked the doctor how long he was on as she didn't like her other doctor and he is on for 4 days.  She will decide to within the next few days..

## 2019-01-11 NOTE — CARE PLAN
Problem: Nutritional:  Goal: Achieve adequate nutritional intake  Patient will tolerate diet >Clear liquids and consume ~50% of meals/supplements.  Outcome: NOT MET

## 2019-01-12 PROBLEM — I35.1 NONRHEUMATIC AORTIC VALVE INSUFFICIENCY: Status: ACTIVE | Noted: 2019-01-12

## 2019-01-12 PROBLEM — I34.0 NON-RHEUMATIC MITRAL REGURGITATION: Status: ACTIVE | Noted: 2019-01-12

## 2019-01-12 PROBLEM — E83.51 HYPOCALCEMIA: Status: RESOLVED | Noted: 2019-01-11 | Resolved: 2019-01-12

## 2019-01-12 PROBLEM — E87.6 HYPOKALEMIA: Status: RESOLVED | Noted: 2019-01-11 | Resolved: 2019-01-12

## 2019-01-12 LAB
ANION GAP SERPL CALC-SCNC: 7 MMOL/L (ref 0–11.9)
BASOPHILS # BLD AUTO: 0.5 % (ref 0–1.8)
BASOPHILS # BLD: 0.05 K/UL (ref 0–0.12)
BUN SERPL-MCNC: 20 MG/DL (ref 8–22)
CALCIUM SERPL-MCNC: 8.8 MG/DL (ref 8.5–10.5)
CHLORIDE SERPL-SCNC: 111 MMOL/L (ref 96–112)
CO2 SERPL-SCNC: 20 MMOL/L (ref 20–33)
CREAT SERPL-MCNC: 1.23 MG/DL (ref 0.5–1.4)
EKG IMPRESSION: NORMAL
EOSINOPHIL # BLD AUTO: 0.02 K/UL (ref 0–0.51)
EOSINOPHIL NFR BLD: 0.2 % (ref 0–6.9)
ERYTHROCYTE [DISTWIDTH] IN BLOOD BY AUTOMATED COUNT: 55.5 FL (ref 35.9–50)
GLUCOSE SERPL-MCNC: 121 MG/DL (ref 65–99)
HCT VFR BLD AUTO: 40.4 % (ref 37–47)
HGB BLD-MCNC: 12.7 G/DL (ref 12–16)
IMM GRANULOCYTES # BLD AUTO: 0.05 K/UL (ref 0–0.11)
IMM GRANULOCYTES NFR BLD AUTO: 0.5 % (ref 0–0.9)
LYMPHOCYTES # BLD AUTO: 0.94 K/UL (ref 1–4.8)
LYMPHOCYTES NFR BLD: 9 % (ref 22–41)
MAGNESIUM SERPL-MCNC: 1.8 MG/DL (ref 1.5–2.5)
MCH RBC QN AUTO: 28.1 PG (ref 27–33)
MCHC RBC AUTO-ENTMCNC: 31.4 G/DL (ref 33.6–35)
MCV RBC AUTO: 89.4 FL (ref 81.4–97.8)
MONOCYTES # BLD AUTO: 1.12 K/UL (ref 0–0.85)
MONOCYTES NFR BLD AUTO: 10.8 % (ref 0–13.4)
NEUTROPHILS # BLD AUTO: 8.23 K/UL (ref 2–7.15)
NEUTROPHILS NFR BLD: 79 % (ref 44–72)
NRBC # BLD AUTO: 0 K/UL
NRBC BLD-RTO: 0 /100 WBC
PHOSPHATE SERPL-MCNC: 2.6 MG/DL (ref 2.5–4.5)
PLATELET # BLD AUTO: 393 K/UL (ref 164–446)
PMV BLD AUTO: 9.4 FL (ref 9–12.9)
POTASSIUM SERPL-SCNC: 5.2 MMOL/L (ref 3.6–5.5)
RBC # BLD AUTO: 4.52 M/UL (ref 4.2–5.4)
SODIUM SERPL-SCNC: 138 MMOL/L (ref 135–145)
WBC # BLD AUTO: 10.4 K/UL (ref 4.8–10.8)

## 2019-01-12 PROCEDURE — 93010 ELECTROCARDIOGRAM REPORT: CPT | Performed by: INTERNAL MEDICINE

## 2019-01-12 PROCEDURE — 770022 HCHG ROOM/CARE - ICU (200)

## 2019-01-12 PROCEDURE — A9270 NON-COVERED ITEM OR SERVICE: HCPCS | Performed by: HOSPITALIST

## 2019-01-12 PROCEDURE — A9270 NON-COVERED ITEM OR SERVICE: HCPCS | Performed by: INTERNAL MEDICINE

## 2019-01-12 PROCEDURE — 99233 SBSQ HOSP IP/OBS HIGH 50: CPT | Performed by: HOSPITALIST

## 2019-01-12 PROCEDURE — 93005 ELECTROCARDIOGRAM TRACING: CPT | Performed by: HOSPITALIST

## 2019-01-12 PROCEDURE — 700105 HCHG RX REV CODE 258: Performed by: HOSPITALIST

## 2019-01-12 PROCEDURE — 700102 HCHG RX REV CODE 250 W/ 637 OVERRIDE(OP): Performed by: HOSPITALIST

## 2019-01-12 PROCEDURE — 83735 ASSAY OF MAGNESIUM: CPT

## 2019-01-12 PROCEDURE — 80048 BASIC METABOLIC PNL TOTAL CA: CPT

## 2019-01-12 PROCEDURE — 700111 HCHG RX REV CODE 636 W/ 250 OVERRIDE (IP): Performed by: HOSPITALIST

## 2019-01-12 PROCEDURE — 700102 HCHG RX REV CODE 250 W/ 637 OVERRIDE(OP): Performed by: INTERNAL MEDICINE

## 2019-01-12 PROCEDURE — 84100 ASSAY OF PHOSPHORUS: CPT

## 2019-01-12 PROCEDURE — C9113 INJ PANTOPRAZOLE SODIUM, VIA: HCPCS | Performed by: HOSPITALIST

## 2019-01-12 PROCEDURE — 85025 COMPLETE CBC W/AUTO DIFF WBC: CPT

## 2019-01-12 PROCEDURE — 700111 HCHG RX REV CODE 636 W/ 250 OVERRIDE (IP): Performed by: INTERNAL MEDICINE

## 2019-01-12 PROCEDURE — 99233 SBSQ HOSP IP/OBS HIGH 50: CPT | Mod: GC | Performed by: INTERNAL MEDICINE

## 2019-01-12 RX ORDER — OMEPRAZOLE 20 MG/1
20 CAPSULE, DELAYED RELEASE ORAL 2 TIMES DAILY
Status: DISCONTINUED | OUTPATIENT
Start: 2019-01-12 | End: 2019-01-16 | Stop reason: HOSPADM

## 2019-01-12 RX ORDER — MORPHINE SULFATE 4 MG/ML
2 INJECTION, SOLUTION INTRAMUSCULAR; INTRAVENOUS
Status: DISCONTINUED | OUTPATIENT
Start: 2019-01-12 | End: 2019-01-12

## 2019-01-12 RX ORDER — DEXAMETHASONE SODIUM PHOSPHATE 4 MG/ML
4 INJECTION, SOLUTION INTRA-ARTICULAR; INTRALESIONAL; INTRAMUSCULAR; INTRAVENOUS; SOFT TISSUE EVERY 6 HOURS
Status: DISCONTINUED | OUTPATIENT
Start: 2019-01-12 | End: 2019-01-13

## 2019-01-12 RX ORDER — MORPHINE SULFATE 4 MG/ML
2 INJECTION, SOLUTION INTRAMUSCULAR; INTRAVENOUS ONCE
Status: COMPLETED | OUTPATIENT
Start: 2019-01-12 | End: 2019-01-12

## 2019-01-12 RX ORDER — HYDROMORPHONE HYDROCHLORIDE 1 MG/ML
1 INJECTION, SOLUTION INTRAMUSCULAR; INTRAVENOUS; SUBCUTANEOUS
Status: DISCONTINUED | OUTPATIENT
Start: 2019-01-12 | End: 2019-01-12

## 2019-01-12 RX ORDER — MORPHINE SULFATE 4 MG/ML
2 INJECTION, SOLUTION INTRAMUSCULAR; INTRAVENOUS
Status: DISCONTINUED | OUTPATIENT
Start: 2019-01-12 | End: 2019-01-16 | Stop reason: HOSPADM

## 2019-01-12 RX ORDER — LORAZEPAM 0.5 MG/1
0.5 TABLET ORAL EVERY 4 HOURS PRN
Status: DISCONTINUED | OUTPATIENT
Start: 2019-01-12 | End: 2019-01-12

## 2019-01-12 RX ORDER — LORAZEPAM 2 MG/ML
0.5 INJECTION INTRAMUSCULAR ONCE
Status: COMPLETED | OUTPATIENT
Start: 2019-01-12 | End: 2019-01-12

## 2019-01-12 RX ADMIN — NITROGLYCERIN 110 MCG/MIN: 20 INJECTION INTRAVENOUS at 11:23

## 2019-01-12 RX ADMIN — SUCRALFATE 1 G: 1 SUSPENSION ORAL at 23:30

## 2019-01-12 RX ADMIN — HYDROMORPHONE HYDROCHLORIDE 1 MG: 1 INJECTION, SOLUTION INTRAMUSCULAR; INTRAVENOUS; SUBCUTANEOUS at 05:36

## 2019-01-12 RX ADMIN — LATANOPROST 1 DROP: 50 SOLUTION OPHTHALMIC at 17:20

## 2019-01-12 RX ADMIN — SODIUM CHLORIDE: 9 INJECTION, SOLUTION INTRAVENOUS at 10:47

## 2019-01-12 RX ADMIN — MORPHINE SULFATE 4 MG: 4 INJECTION INTRAVENOUS at 05:20

## 2019-01-12 RX ADMIN — DEXAMETHASONE SODIUM PHOSPHATE 4 MG: 4 INJECTION, SOLUTION INTRA-ARTICULAR; INTRALESIONAL; INTRAMUSCULAR; INTRAVENOUS; SOFT TISSUE at 18:28

## 2019-01-12 RX ADMIN — MORPHINE SULFATE 2 MG: 4 INJECTION INTRAVENOUS at 23:28

## 2019-01-12 RX ADMIN — PANTOPRAZOLE SODIUM 40 MG: 40 INJECTION, POWDER, LYOPHILIZED, FOR SOLUTION INTRAVENOUS at 05:24

## 2019-01-12 RX ADMIN — LORAZEPAM 0.5 MG: 2 INJECTION INTRAMUSCULAR at 06:33

## 2019-01-12 RX ADMIN — NITROGLYCERIN 200 MCG/MIN: 20 INJECTION INTRAVENOUS at 06:34

## 2019-01-12 RX ADMIN — DEXAMETHASONE SODIUM PHOSPHATE 4 MG: 4 INJECTION, SOLUTION INTRA-ARTICULAR; INTRALESIONAL; INTRAMUSCULAR; INTRAVENOUS; SOFT TISSUE at 23:31

## 2019-01-12 RX ADMIN — MORPHINE SULFATE 2 MG: 4 INJECTION INTRAVENOUS at 21:35

## 2019-01-12 ASSESSMENT — ENCOUNTER SYMPTOMS
EYE REDNESS: 0
FLANK PAIN: 0
POLYDIPSIA: 0
FATIGUE: 1
SEIZURES: 0
BACK PAIN: 0
CONSTIPATION: 0
NERVOUS/ANXIOUS: 1
DIAPHORESIS: 0
EYE DISCHARGE: 0
FEVER: 0
SPUTUM PRODUCTION: 0
CONFUSION: 0
NECK PAIN: 0
HALLUCINATIONS: 0
BLOOD IN STOOL: 1
ABDOMINAL PAIN: 0
ABDOMINAL PAIN: 1
DIARRHEA: 0
FOCAL WEAKNESS: 0
COUGH: 0
HEADACHES: 0
NAUSEA: 0
SHORTNESS OF BREATH: 0
BRUISES/BLEEDS EASILY: 0
CHILLS: 0
AGITATION: 0
VOMITING: 0
SPEECH DIFFICULTY: 0
BLURRED VISION: 0
EYE PAIN: 0
PALPITATIONS: 0

## 2019-01-12 ASSESSMENT — PAIN SCALES - GENERAL
PAINLEVEL_OUTOF10: 2
PAINLEVEL_OUTOF10: 8
PAINLEVEL_OUTOF10: 6
PAINLEVEL_OUTOF10: 6
PAINLEVEL_OUTOF10: 2
PAINLEVEL_OUTOF10: 0
PAINLEVEL_OUTOF10: 8
PAINLEVEL_OUTOF10: 8
PAINLEVEL_OUTOF10: 0
PAINLEVEL_OUTOF10: 6
PAINLEVEL_OUTOF10: 6
PAINLEVEL_OUTOF10: 0
PAINLEVEL_OUTOF10: 4
PAINLEVEL_OUTOF10: 9

## 2019-01-12 ASSESSMENT — LIFESTYLE VARIABLES: SUBSTANCE_ABUSE: 0

## 2019-01-12 NOTE — PALLIATIVE CARE
Palliative Care follow-up  PC RN picked up advance directive that pt's sister, Erin, dropped off. Unfortunately, advance directive not signed by pt/not notarized. PC RN will meet with pt on 1/12 to execute new AD.     Thank you for allowing Palliative Care to participate in this patient's care. Please feel free to call [phone] with any questions or concerns.

## 2019-01-12 NOTE — PROGRESS NOTES
· 2 RN skin check complete with Spike RN  · Skin assessed under devices yes.  · Confirmed pressure ulcers found on none     .  · New potential pressure ulcers noted on none.   · The following interventions in place grey ear pieces in place on nasal cannula, pt removed.

## 2019-01-12 NOTE — PROGRESS NOTES
Pt still lethargic but more alert now. Able to have coherent conversation with sister over phone. Denies chest pain. Nitro drip titrated.

## 2019-01-12 NOTE — PROGRESS NOTES
Cardiology Follow Up Progress Note    Date of Service  1/12/2019    Attending Physician  Ghulam Krishnan M.D.    Chief Complaint   Recurrent episodes of chest discomfort  Being seen by cardiology for follow up and evaluation of coronary artery disease and recurring chest discomfort responsive to nitroglycerin  HPI  Brooke Diana is a 84 y.o. female admitted 1/9/2019 with left ventricular dysfunction which is new associate with chronic EKG of normalities and prior history of coronary occlusion as described below.  After consultation with interventional cardiology the recommendation was to proceed with transfusion to hemoglobin greater than 10 to see if that would resolve her angina but she has continued to have intermittent chest discomfort.  Congestive symptoms have resolved and she has not been dyspneic at rest.  She has a cardiac history previously described.    Please see the consultations by Dr. Morgan from December 26, 2018 and Dr. Carter from January 9, 2019  Interim Events  EKGs show the chronic abnormalities and troponin did not rise.  Rhythm has been stable.  She was successfully transfused without congestive symptoms.  Renal function is worse.    Review of Systems  Review of Systems   Constitutional: Positive for fatigue. Negative for diaphoresis and fever.   HENT: Negative for congestion and nosebleeds.    Eyes: Negative for discharge and redness.   Respiratory: Negative for cough and shortness of breath.    Cardiovascular: Positive for chest pain. Negative for palpitations.   Gastrointestinal: Positive for abdominal pain and blood in stool. Negative for vomiting.   Endocrine: Negative for polyuria.   Genitourinary: Negative for flank pain, hematuria and vaginal bleeding.   Musculoskeletal:        The medical team has been evaluating her left foot pain which has been aggravating to her but it is not tender today and her pedal pulses good in color and skin temperature are good.   Skin: Positive  for pallor.   Neurological: Negative for seizures and speech difficulty.   Psychiatric/Behavioral: Negative for agitation, confusion and hallucinations.     Past Medical History:   Diagnosis Date   • Acute myocardial infarction, true posterior wall infarction, episode of care unspecified    • Anxiety    • Bowel habit changes    • Breath shortness 2017    uses oxygen at 2 liters/min; AT NIGHT ONLY   • CAD (coronary artery disease)     S/P stent placement   • Cholesterol blood decreased    • Dental disorder     Partial upper   • Dizziness    • GI bleed    • Glaucoma    • Heart burn    • Hyperlipidemia    • Hypertension    • Myocardial infarct (HCC) 6/10/10   • Peripheral vascular disease (HCC)    • Pulmonary disease     bronchitis   • TIA (transient ischemic attack)    • Unspecified hemorrhagic conditions     pt takes plavix   • Unspecified urinary incontinence    • Urinary bladder disorder      Past Surgical History:   Procedure Laterality Date   • COLONOSCOPY - ENDO N/A 10/23/2018    Procedure: COLONOSCOPY - ENDO;  Surgeon: Anatoly Barragan M.D.;  Location: [facility];  Service: Gastroenterology   • GASTROSCOPY-ENDO N/A 10/22/2018    Procedure: GASTROSCOPY-ENDO;  Surgeon: Bakari Brown M.D.;  Location: [facility];  Service: Gastroenterology   • CAROTID ENDARTERECTOMY Right 9/3/2018    Procedure: CAROTID ENDARTERECTOMY WITH NEUROMONITORING;  Surgeon: Naga Abad M.D.;  Location: SURGERY [facility];  Service: Vascular   • GASTROSCOPY WITH BIOPSY  2/13/2016    Procedure: GASTROSCOPY WITH BIOPSY;  Surgeon: Susan Miller M.D.;  Location: [facility];  Service:    • RECOVERY  7/24/2014    Performed by Cath-Recovery Surgery at SURGERY SAME DAY [facility]   • CARDIAC CATH  7/24/14    Diffuse disease, see report.  % with collterals.   • FEMORAL ENDARTERECTOMY  10/4/2013    Performed by Kacie Baker M.D. at SURGERY [facility]   •  ANGIOPLASTY BALLOON  10/4/2013    Performed by Kacie Baker M.D. at SURGERY [facility] ORS   • RECOVERY  4/9/2013    Performed by Ir-Recovery Surgery at SURGERY SAME DAY [facility] ORS   • RECOVERY  11/26/2012    Performed by Ir-Recovery Surgery at SURGERY SAME DAY [facility] ORS   • RECTUS REPAIR  7/13/2012    Performed by SHEILA PLATT at SURGERY SURGICAL [facility] ORS   • OTHER  12/10/10    stents in legs   • OTHER  6/10/10    cardiac stents   • CARDIAC CATH  6/2010    BMS to Om   • OTHER  2005    KNEE SURGERY   • CHOLECYSTECTOMY     • HYSTERECTOMY, TOTAL ABDOMINAL     • OTHER CARDIAC SURGERY      stent in heart   • STENT PLACEMENT      treated by Dr Baker, multiple surgeries to legs.     She does not smoke.    Family history is positive for heart disease and stroke in her mother in her 60s and 70s.    Allergies   Allergen Reactions   • Penicillins Rash and Vomiting     Rxn = unknown  Pt tolerates cephalosporins   • Hydrocodone Vomiting and Nausea     Rxn = unknown   • Tape Rash     RASH     Vital signs in last 24 hours  Temp:  [36.4 °C (97.5 °F)-36.9 °C (98.5 °F)] 36.4 °C (97.5 °F)  Pulse:  [66-96] 79  Resp:  [12-38] 14  BP: (128-157)/(47-61) 157/61    Physical Exam  Physical Exam   Constitutional: She is oriented to person, place, and time.   Eyes: No scleral icterus.   Neck: No JVD present.   Cardiovascular: Normal rate, regular rhythm, S1 normal and S2 normal.  Exam reveals gallop and S4.    Murmur heard.   Systolic murmur is present with a grade of 2/6    Diastolic murmur is present with a grade of 2/6   Pulses:       Dorsalis pedis pulses are 2+ on the right side, and 2+ on the left side.   Both murmurs are aortic and I cannot hear her mitral regurgitation.   Pulmonary/Chest: She has no wheezes. She has no rales.   Abdominal: Soft. Bowel sounds are normal.   Musculoskeletal: She exhibits no edema.   Neurological: She is alert and oriented to person, place, and time.   Skin: She is not diaphoretic.    Psychiatric: She has a normal mood and affect. Thought content normal.       Lab Review  Lab Results   Component Value Date/Time    WBC 10.4 01/12/2019 06:00 AM    RBC 4.52 01/12/2019 06:00 AM    HEMOGLOBIN 12.7 01/12/2019 06:00 AM    HEMATOCRIT 40.4 01/12/2019 06:00 AM    MCV 89.4 01/12/2019 06:00 AM    MCH 28.1 01/12/2019 06:00 AM    MCHC 31.4 (L) 01/12/2019 06:00 AM    MPV 9.4 01/12/2019 06:00 AM      Lab Results   Component Value Date/Time    SODIUM 138 01/12/2019 06:00 AM    POTASSIUM 5.2 01/12/2019 06:00 AM    CHLORIDE 111 01/12/2019 06:00 AM    CO2 20 01/12/2019 06:00 AM    GLUCOSE 121 (H) 01/12/2019 06:00 AM    BUN 20 01/12/2019 06:00 AM    CREATININE 1.23 01/12/2019 06:00 AM      Lab Results   Component Value Date/Time    ASTSGOT 26 12/26/2018 10:45 AM    ALTSGPT 10 12/26/2018 10:45 AM     Lab Results   Component Value Date/Time    CHOLSTRLTOT 65 (L) 12/27/2018 05:20 AM    LDL 6 12/27/2018 05:20 AM    HDL 36 (A) 12/27/2018 05:20 AM    TRIGLYCERIDE 114 12/27/2018 05:20 AM    TROPONINI 0.12 (H) 01/11/2019 09:03 AM             Cardiac Imaging and Procedures Review  EKG:  My personal interpretation of the EKG from earlier this morning agrees with the official interpretation of an anterior infarct which has been present on the entire EKG series.    Echocardiogram: Was done January 9 and is as previously described.  I do not feel that her aortic regurgitation is more than moderate on the October 14, 2018 images.  She does have dense mitral annular calcification and probably mild to moderate mitral regurgitation as well but the striking finding is the deterioration in ventricular function from October 14 - December 26, 2018.  This is regional as noted.  Therefore valvular deterioration is unlikely to be a contributing etiology.    Cardiac Catheterization: Was not repeated during the last admission or this one for the reasons described of prohibitive risk.    Imaging  Chest X-Ray: On the ninth showed some  interstitial edema although surprisingly better than expected and about the same as December 26.  Clinically her congestive failure has dramatically improved.      Assessment/Plan  History of anterior wall MI- (present on admission)   Assessment & Plan    Chronic EKG findings of anterior myocardial infarction and known chronic total occlusion of left anterior descending coronary artery but severe wall motion abnormality only developing between the echocardiograms of October and December 2018.     Chronic left ventricular systolic heart failure (HCC)- (present on admission)   Assessment & Plan    Ejection fraction 25%     History of CVA (cerebrovascular accident)- (present on admission)   Assessment & Plan    9/2/2018:  1.  Mild cerebral atrophy. Age-appropriate.  2.  Mild-moderate supratentorial white matter disease most consistent with microvascular ischemic change.  3.  Acute lacunar size right frontal deep white matter infarct. No change from yesterday's exam.  4.  No new area of acute cerebral infarction. The questionable punctate foci of slightly increased diffusion signal seen in the left frontal deep white matter and left parietal white matter on yesterday's exam are not conspicuous on today's exam and may   have represented artifact.  5.  10 mm focus of gradient echo hypointensity in the left basal ganglia which may represent hemosiderin deposition from old hemorrhage.  6.  Moderate pontine ischemic gliosis.     Coronary artery disease due to calcified coronary lesion- (present on admission)   Assessment & Plan    Chronic total occlusion of left anterior descending coronary artery, 3.0x15 BMS, circumflex coronary artery,2010, current status uncertain but presumed occluded late 2018, right coronary artery without significant disease as of 2014.  Recent interventions precluded by gastrointestinal bleeding which has also limited antiplatelet therapy.     Unstable angina pectoris (HCC)- (present on admission)    Assessment & Plan    Recurring episodes of angina responsive to nitroglycerin.  Persistent EKG changes compatible with anterior injury but unchanging and associated with low level troponins however clinical quality of chest discomfort compatible at least in part with angina with the possibility of gastrointestinal etiology as well.  Interventional cardiology feels that any intervention is precluded by her high gastrointestinal bleeding risk as well as her left ventricular dysfunction.     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  There is ulcerated atherosclerotic disease in the left carotid bulb and proximal left internal carotid artery causing approximately 20-30% stenosis.  2.  Severe atherosclerotic disease in the left M1 segment.  3.  There is no significant stenosis at the origin of the right internal carotid artery.         As previous discussions have noted this represents a very difficult clinical dilemma.  She has not wanted to proceed with pure palliative care but interventional cardiology feels her mortality risk is too high to even consider intervention in the situation is exactly as described by Dr. Bhatia in his note today.  Therefore we are continuing to try medical therapy but clearly more aggressive transfusion has not solved the angina.  Her valvular heart disease does not appear sufficient to explain her deterioration.  It is best described in the echo from October at a time when her ventricular function was normal but I think the aortic regurgitation was probably moderate and not severe and her mitral annular calcification was associated with mild to moderate mitral regurgitation.  Therefore at this point the goal is to attempt to control her angina medically and control her heart failure pending resolution of her gastrointestinal bleeding which at this point appears to have stabilized.  Thank you for allowing me to participate in the care  of this patient.      Please contact me with any questions.    Naveen Lewis M.D.   Cardiologist, [facility]  [phone]

## 2019-01-12 NOTE — PROGRESS NOTES
Report received from NOC RN. Pt sleeping at this time, unarousable. VSS. 4L oxymask in place. No Dyspnea. Drips verified. Nitroglycerin drip infusing at maximum 200 mcg/minute. IVs observed. POC discussed including pending EGD. Consents printed but not yet signed. No family present.

## 2019-01-12 NOTE — PROGRESS NOTES
Hospital Medicine Daily Progress Note    Date of Service  1/12/2019    Chief Complaint  84 y.o. female admitted 1/9/2019 with STEMI, evaluated by cardiology and medical therapy recommended given history of GI bleed and melena    Hospital Course          Interval Problem Update    Recurrent episodes of chest pain overnight was given Dilaudid and lorazepam  Lethargic this am  SR 70-90  SBP   Nitro 180  NS 83  Afebrile  BM last night brown  Hg 12.5          Consultants/Specialty  Cardiology    Code Status  DNR/DNR    Disposition  ICU    Review of Systems  Review of Systems   Unable to perform ROS: Medical condition        Physical Exam  Temp:  [36 °C (96.8 °F)-37 °C (98.6 °F)] 36.4 °C (97.5 °F)  Pulse:  [66-96] 66  Resp:  [10-38] 25  BP: (128-157)/(47-61) 157/61    Physical Exam   Constitutional: She appears well-developed and well-nourished.   HENT:   Head: Normocephalic and atraumatic.   Right Ear: External ear normal.   Left Ear: External ear normal.   Mouth/Throat: No oropharyngeal exudate.   Eyes: Conjunctivae are normal. Right eye exhibits no discharge. Left eye exhibits no discharge. No scleral icterus.   Neck: Neck supple. No JVD present. No tracheal deviation present.   Cardiovascular: Normal rate and regular rhythm.  Exam reveals no gallop and no friction rub.    No murmur heard.  Pulmonary/Chest: Effort normal. No stridor. No respiratory distress. She has no wheezes. She has rales. She exhibits no tenderness.   Abdominal: Soft. Bowel sounds are normal. She exhibits no distension. There is no tenderness. There is no rebound.   Musculoskeletal: She exhibits no edema or tenderness.   Neurological: No cranial nerve deficit. She exhibits normal muscle tone.   Lethargic this morning arousable oriented x3   Skin: Skin is warm and dry. She is not diaphoretic. No cyanosis. Nails show no clubbing.   Psychiatric: She is slowed.   Nursing note and vitals reviewed.      Fluids    Intake/Output Summary (Last 24  hours) at 01/12/19 0910  Last data filed at 01/12/19 0800   Gross per 24 hour   Intake           2438.9 ml   Output              300 ml   Net           2138.9 ml       Laboratory  Recent Labs      01/09/19   1735  01/10/19   0105   01/11/19   1830  01/11/19   2253  01/12/19   0600   WBC   --   8.3   --    --    --   10.4   RBC   --   2.39*   --    --    --   4.52   HEMOGLOBIN  7.0*  6.4*   < >  12.0  12.4  12.7   HEMATOCRIT  23.8*  22.4*   --    --    --   40.4   MCV   --   93.7   --    --    --   89.4   MCH   --   26.8*   --    --    --   28.1   MCHC   --   28.6*   --    --    --   31.4*   RDW   --   60.3*   --    --    --   55.5*   PLATELETCT   --   335   --    --    --   393   MPV   --   10.0   --    --    --   9.4    < > = values in this interval not displayed.     Recent Labs      01/10/19   0105  01/11/19   0628  01/12/19   0600   SODIUM  140  144  138   POTASSIUM  4.6  3.1*  5.2   CHLORIDE  112  122*  111   CO2  22  16*  20   GLUCOSE  94  76  121*   BUN  31*  20  20   CREATININE  1.35  0.87  1.23   CALCIUM  8.2*  6.1*  8.8                   Imaging  EC-ECHOCARDIOGRAM LTD W/O CONT   Final Result      DX-CHEST-LIMITED (1 VIEW)   Final Result         1.  Pulmonary edema and/or infiltrates.   2.  Atherosclerosis   3.  Hyperexpansion of lungs favors changes of COPD.           Assessment/Plan  ACC/AHA stage C systolic heart failure (HCC)- (present on admission)   Assessment & Plan    Monitor intake and output  Continue current medical therapy       Chronic left ventricular systolic heart failure (HCC)- (present on admission)   Assessment & Plan    Repeat echo with EF of 40%       Coronary artery disease due to calcified coronary lesion- (present on admission)   Assessment & Plan    Unstable angina    Continue metoprolol atorvastatin   Wean off nitro drip as tolerated  Given GI bleed no cardiac interventions were recommended  Discussed with Dr. Lewis  Patient not on antiplatelets or heparin secondary to GI  bleed       Normocytic anemia- (present on admission)   Assessment & Plan    Secondary to GI bleed  Hemoglobin stable posttransfusion       Essential hypertension- (present on admission)   Assessment & Plan    Stable continue to monitor     GIB (gastrointestinal bleeding)   Assessment & Plan    Clinically bleeding resolved  Hemoglobin improved posttransfusion  Continue Protonix and Carafate  Discussed with GI given altered mental status and  stable hemoglobin EGD on hold       Stage 4 chronic kidney disease (HCC)   Assessment & Plan    Monitor renal function electrolytes                 VTE prophylaxis: SCD

## 2019-01-12 NOTE — PROGRESS NOTES
Cardiologist at bedside. Updates given and POC discussed. Pt continues to be lethargic and does not answer questions appropriately. Recommendation from cardiologist to reconsider comfort care. No interventions planned at this time from cardiac standpoint due to high risk for PCI.

## 2019-01-12 NOTE — PROGRESS NOTES
MD Bhatia with GI at bedside. Pt assessed, updates given, and POC discussed. Pt severely lethargic at this time. EGD on hold until further notice. BM reported overnight, no blood noted.

## 2019-01-12 NOTE — ASSESSMENT & PLAN NOTE
Recurring episodes of angina responsive to nitroglycerin.  Persistent EKG changes compatible with anterior injury but unchanging and associated with low level troponins however clinical quality of chest discomfort compatible at least in part with angina with the possibility of gastrointestinal etiology as well.  Interventional cardiology feels that any intervention is precluded by her high gastrointestinal bleeding risk as well as her left ventricular dysfunction.

## 2019-01-12 NOTE — PROGRESS NOTES
Sister at bedside. Updates given. Pt continues to be lethargic. Family requests MD to bedside for further updates and prognosis. MD Yossi Krishnan will be on way to bedside shortly.

## 2019-01-12 NOTE — PROGRESS NOTES
"Gastroenterology ([facility]) Note    Date of Service: 19 12:36 PM    Reason for Consultation: GI bleed/anemia    ID:   Brooke Diana is a 84 y.o. female with history of CVA right-sided weakness prior MI with coronary artery disease, dyslipidemia on statin, hiatal hernia, duodenitis, colonic angioectasias, who was admitted on 2019 with suspected ST elevation MI and new cardiomyopathy w LVEF 25%. She was found to be profoundly anemic with melena.    Interval Events:  2019: no blood BM overnight. Very lethargic this AM, difficult to arouse.     Review Of Systems:  Review of Systems   Constitutional: Negative for chills and fever.   HENT: Negative for congestion and hearing loss.    Eyes: Negative for blurred vision and pain.   Respiratory: Negative for cough, sputum production and shortness of breath.    Cardiovascular: Negative for chest pain and leg swelling.   Gastrointestinal: Negative for abdominal pain, constipation, diarrhea, melena and nausea.   Genitourinary: Negative for dysuria and frequency.   Musculoskeletal: Negative for back pain and neck pain.   Skin: Negative for itching and rash.   Neurological: Negative for focal weakness and headaches.   Endo/Heme/Allergies: Negative for polydipsia. Does not bruise/bleed easily.   Psychiatric/Behavioral: Negative for substance abuse. The patient is nervous/anxious.        Most Recent Vital Signs:  /61   Pulse 66   Temp 36.4 °C (97.5 °F) (Temporal)   Resp (!) 25   Ht 1.753 m (5' 9\")   Wt 66.5 kg (146 lb 9.7 oz)   SpO2 96%   Breastfeeding? No   BMI 21.65 kg/m²   Temp  Av.4 °C (97.6 °F)  Min: 35.4 °C (95.7 °F)  Max: 37.4 °C (99.3 °F)    Physical Exam:  Physical Exam   Constitutional: She is oriented to person, place, and time. She appears cachectic. No distress.   HENT:   Head: Normocephalic and atraumatic.   Eyes: Pupils are equal, round, and reactive to light. Conjunctivae are normal. No scleral icterus.   Neck: Normal range of motion. " Neck supple.   Cardiovascular: Normal rate, regular rhythm and intact distal pulses.    Murmur heard.  Pulmonary/Chest: Effort normal and breath sounds normal. No respiratory distress.   Abdominal: Soft. Bowel sounds are normal. There is no tenderness. There is no guarding.   Musculoskeletal: Normal range of motion. She exhibits no edema or tenderness.   Neurological: She is alert and oriented to person, place, and time. No cranial nerve deficit.   Skin: Skin is warm and dry.   Psychiatric: Mood and affect normal.       Pertinent Lab Results:  Recent Labs      01/09/19   0233  01/10/19   0105   WBC  15.0*  8.3      Recent Labs      01/09/19   1735  01/10/19   0105   01/11/19   1830  01/11/19   2253  01/12/19   0600   HEMOGLOBIN  7.0*  6.4*   < >  12.0  12.4  12.7   HEMATOCRIT  23.8*  22.4*   --    --    --   40.4   MCV   --   93.7   --    --    --   89.4   MCH   --   26.8*   --    --    --   28.1   PLATELETCT   --   335   --    --    --   393    < > = values in this interval not displayed.       Recent Labs      01/09/19   0233  01/10/19   0105  01/11/19   0628   SODIUM  139  140  144   POTASSIUM  5.1  4.6  3.1*   CHLORIDE  109  112  122*   CO2  20  22  16*   CREATININE  1.80*  1.35  0.87        No results for input(s): ALBUMIN in the last 72 hours.    Invalid input(s): AST, ALT, ALKPHOS, BILITOT, TOTALBILIRUB, BILIRUBINTOT, BILIRUBINDIR, BILIRUBININD, ALKALINEPHOS         Pertinent Micro:  Results     ** No results found for the last 168 hours. **        Blood Culture   Date Value Ref Range Status   02/11/2016 No growth after 5 days of incubation.  Final        Studies:  EC-ECHOCARDIOGRAM LTD W/O CONT   Final Result      DX-CHEST-LIMITED (1 VIEW)   Final Result         1.  Pulmonary edema and/or infiltrates.   2.  Atherosclerosis   3.  Hyperexpansion of lungs favors changes of COPD.          IMPRESSION:   Brooke Diana is a 84 y.o. 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  Patient's H/H has stablized, with appropriate response to 2 U pRBCs, 2 additional units transfused 1/11.  Per cardiology not ideal for coronary cath secondary to GI bleed.  Extensive discussion with patient including risk and benefit of procedure as well as sedation options were discussed on 1/10 and repeated on 1/11.  Given patient's cardiac issues ongoing chest pain fluid overload and current MI discussed high risk for procedural sedation.  Discussed likely upper GI bleed due to possible previous AVM versus other such as ulcer.  Patient reports not satisfied with previous endoscopic evaluation and is very hesitant to proceed.  Discussed again high risk procedure with high risk for cardiac pulmonary complication.     PLAN:   - Continue to trend H/H closely  - Continue PPI drip for now  - Hold off on EGD for today due to changes in status and high risk for procedural complications (given ongoing angina, stable H/H); it is unclear to me if bleeding stops and even if endoscopic issues are addressed that Cardiology would still proceed with interventional evaluation and therapy.   - Goals of care discussion  - Change PPI to PO.    Tristan Bhatia M.D.

## 2019-01-12 NOTE — PROGRESS NOTES
Patient decided to proceed with EGD given the recurrence in melena overnight. I have emphasized with patient her high cardiovascular risks, and procedure risks with patient and previously with her in presence of medical resident and RN. Patient states understanding, agreeable to proceed.    NPO post midnight except meds  Consent to be signed  Currently scheduled for in the OR on 1/12/2018 @ 3PM

## 2019-01-13 ENCOUNTER — APPOINTMENT (OUTPATIENT)
Dept: RADIOLOGY | Facility: MEDICAL CENTER | Age: 84
DRG: 377 | End: 2019-01-13
Attending: STUDENT IN AN ORGANIZED HEALTH CARE EDUCATION/TRAINING PROGRAM
Payer: MEDICARE

## 2019-01-13 LAB
ANION GAP SERPL CALC-SCNC: 10 MMOL/L (ref 0–11.9)
BASOPHILS # BLD AUTO: 0.2 % (ref 0–1.8)
BASOPHILS # BLD: 0.01 K/UL (ref 0–0.12)
BUN SERPL-MCNC: 15 MG/DL (ref 8–22)
CALCIUM SERPL-MCNC: 8.8 MG/DL (ref 8.5–10.5)
CHLORIDE SERPL-SCNC: 111 MMOL/L (ref 96–112)
CO2 SERPL-SCNC: 17 MMOL/L (ref 20–33)
CREAT SERPL-MCNC: 1.16 MG/DL (ref 0.5–1.4)
EOSINOPHIL # BLD AUTO: 0 K/UL (ref 0–0.51)
EOSINOPHIL NFR BLD: 0 % (ref 0–6.9)
ERYTHROCYTE [DISTWIDTH] IN BLOOD BY AUTOMATED COUNT: 52.9 FL (ref 35.9–50)
GLUCOSE SERPL-MCNC: 139 MG/DL (ref 65–99)
HCT VFR BLD AUTO: 38.6 % (ref 37–47)
HGB BLD-MCNC: 12.5 G/DL (ref 12–16)
IMM GRANULOCYTES # BLD AUTO: 0.03 K/UL (ref 0–0.11)
IMM GRANULOCYTES NFR BLD AUTO: 0.6 % (ref 0–0.9)
LYMPHOCYTES # BLD AUTO: 0.29 K/UL (ref 1–4.8)
LYMPHOCYTES NFR BLD: 5.9 % (ref 22–41)
MCH RBC QN AUTO: 28.7 PG (ref 27–33)
MCHC RBC AUTO-ENTMCNC: 32.4 G/DL (ref 33.6–35)
MCV RBC AUTO: 88.5 FL (ref 81.4–97.8)
MONOCYTES # BLD AUTO: 0.11 K/UL (ref 0–0.85)
MONOCYTES NFR BLD AUTO: 2.2 % (ref 0–13.4)
NEUTROPHILS # BLD AUTO: 4.51 K/UL (ref 2–7.15)
NEUTROPHILS NFR BLD: 91.1 % (ref 44–72)
NRBC # BLD AUTO: 0 K/UL
NRBC BLD-RTO: 0 /100 WBC
PLATELET # BLD AUTO: 332 K/UL (ref 164–446)
PMV BLD AUTO: 9.1 FL (ref 9–12.9)
POTASSIUM SERPL-SCNC: 4.7 MMOL/L (ref 3.6–5.5)
RBC # BLD AUTO: 4.36 M/UL (ref 4.2–5.4)
SODIUM SERPL-SCNC: 138 MMOL/L (ref 135–145)
WBC # BLD AUTO: 5 K/UL (ref 4.8–10.8)

## 2019-01-13 PROCEDURE — 770022 HCHG ROOM/CARE - ICU (200)

## 2019-01-13 PROCEDURE — A9270 NON-COVERED ITEM OR SERVICE: HCPCS | Performed by: HOSPITALIST

## 2019-01-13 PROCEDURE — A9270 NON-COVERED ITEM OR SERVICE: HCPCS | Performed by: INTERNAL MEDICINE

## 2019-01-13 PROCEDURE — 71045 X-RAY EXAM CHEST 1 VIEW: CPT

## 2019-01-13 PROCEDURE — 700111 HCHG RX REV CODE 636 W/ 250 OVERRIDE (IP): Performed by: HOSPITALIST

## 2019-01-13 PROCEDURE — 80048 BASIC METABOLIC PNL TOTAL CA: CPT

## 2019-01-13 PROCEDURE — 700102 HCHG RX REV CODE 250 W/ 637 OVERRIDE(OP): Performed by: HOSPITALIST

## 2019-01-13 PROCEDURE — 99232 SBSQ HOSP IP/OBS MODERATE 35: CPT | Mod: GC | Performed by: INTERNAL MEDICINE

## 2019-01-13 PROCEDURE — 700102 HCHG RX REV CODE 250 W/ 637 OVERRIDE(OP): Performed by: INTERNAL MEDICINE

## 2019-01-13 PROCEDURE — 99233 SBSQ HOSP IP/OBS HIGH 50: CPT | Performed by: HOSPITALIST

## 2019-01-13 PROCEDURE — 85025 COMPLETE CBC W/AUTO DIFF WBC: CPT

## 2019-01-13 RX ORDER — FUROSEMIDE 10 MG/ML
40 INJECTION INTRAMUSCULAR; INTRAVENOUS ONCE
Status: ACTIVE | OUTPATIENT
Start: 2019-01-13 | End: 2019-01-14

## 2019-01-13 RX ADMIN — DEXAMETHASONE SODIUM PHOSPHATE 4 MG: 4 INJECTION, SOLUTION INTRA-ARTICULAR; INTRALESIONAL; INTRAMUSCULAR; INTRAVENOUS; SOFT TISSUE at 05:08

## 2019-01-13 RX ADMIN — ISOSORBIDE MONONITRATE 90 MG: 60 TABLET ORAL at 05:09

## 2019-01-13 RX ADMIN — LATANOPROST 1 DROP: 50 SOLUTION OPHTHALMIC at 16:35

## 2019-01-13 RX ADMIN — OMEPRAZOLE 20 MG: 20 CAPSULE, DELAYED RELEASE ORAL at 16:35

## 2019-01-13 RX ADMIN — SUCRALFATE 1 G: 1 SUSPENSION ORAL at 05:11

## 2019-01-13 RX ADMIN — ROSUVASTATIN CALCIUM 40 MG: 20 TABLET, FILM COATED ORAL at 16:38

## 2019-01-13 RX ADMIN — NITROGLYCERIN 100 MCG/MIN: 20 INJECTION INTRAVENOUS at 11:00

## 2019-01-13 RX ADMIN — METOPROLOL TARTRATE 25 MG: 25 TABLET, FILM COATED ORAL at 16:35

## 2019-01-13 RX ADMIN — NITROGLYCERIN 200 MCG/MIN: 20 INJECTION INTRAVENOUS at 05:10

## 2019-01-13 RX ADMIN — SUCRALFATE 1 G: 1 SUSPENSION ORAL at 16:35

## 2019-01-13 RX ADMIN — METOPROLOL TARTRATE 25 MG: 25 TABLET, FILM COATED ORAL at 05:10

## 2019-01-13 RX ADMIN — OMEPRAZOLE 20 MG: 20 CAPSULE, DELAYED RELEASE ORAL at 05:10

## 2019-01-13 ASSESSMENT — ENCOUNTER SYMPTOMS
HEMOPTYSIS: 0
SPUTUM PRODUCTION: 0
NECK PAIN: 0
POLYDIPSIA: 0
PALPITATIONS: 0
STRIDOR: 0
FEVER: 0
HALLUCINATIONS: 0
NERVOUS/ANXIOUS: 1
VOMITING: 0
EYE REDNESS: 0
SEIZURES: 0
CONSTIPATION: 0
ABDOMINAL PAIN: 0
LOSS OF CONSCIOUSNESS: 0
BLURRED VISION: 0
COUGH: 0
FALLS: 0
CHILLS: 0
BRUISES/BLEEDS EASILY: 0
HEADACHES: 0
SHORTNESS OF BREATH: 1
NAUSEA: 0
WEAKNESS: 0
EYE PAIN: 0
NERVOUS/ANXIOUS: 0
DIARRHEA: 0
ORTHOPNEA: 0
FLANK PAIN: 0
BACK PAIN: 0
FOCAL WEAKNESS: 0
EYE DISCHARGE: 0
SHORTNESS OF BREATH: 0
MEMORY LOSS: 0
SPEECH CHANGE: 0

## 2019-01-13 ASSESSMENT — PAIN SCALES - GENERAL
PAINLEVEL_OUTOF10: 0
PAINLEVEL_OUTOF10: 2
PAINLEVEL_OUTOF10: 0
PAINLEVEL_OUTOF10: 0
PAINLEVEL_OUTOF10: 6
PAINLEVEL_OUTOF10: 0
PAINLEVEL_OUTOF10: 6
PAINLEVEL_OUTOF10: 4
PAINLEVEL_OUTOF10: 0

## 2019-01-13 ASSESSMENT — LIFESTYLE VARIABLES: SUBSTANCE_ABUSE: 0

## 2019-01-13 NOTE — PROGRESS NOTES
Pt appeared to have expiratory wheezes upon entrance into room to give eye drops. Respiratory therapist consulted for nebulizer tx. RT felt it sounded more stridorous than wheezy. Pt states she feels like she is having trouble moving air but doesn't appear to have increased WOB. MD Yossi Krishnan notified. Pt continues to be on 4L oxymask, sating mid 90's. Steroids ordered by MD.

## 2019-01-13 NOTE — CARE PLAN
Problem: Discharge Barriers/Planning  Goal: Patient's continuum of care needs will be met  POC discussed with pt and family. MD to bedside to further discuss options related to pt care. Continues to be severely lethargic. Refusing food at this time.

## 2019-01-13 NOTE — CARE PLAN
Problem: Discharge Barriers/Planning  Goal: Patient's continuum of care needs will be met  Hospice consult placed by Yossi Krishnan. Pt more willing to listen to hospice and palliative care at this time. Awaiting bedside consultation.     Problem: Skin Integrity  Goal: Risk for impaired skin integrity will decrease  Linens changed as needed and skin kept dry. Skin care done PRN.

## 2019-01-13 NOTE — PROGRESS NOTES
12 hour chart check    MS: SR 66-94 with ST elevation in leads v1, v2, v3 from today's 12 lead ekg.   .16/.08/.32  Nitro drip weaned off  Lethargic, denies chest pain.

## 2019-01-13 NOTE — CARE PLAN
Problem: Skin Integrity  Goal: Risk for impaired skin integrity will decrease  Waffle overlay in place, q2h turns and linens changed as needed.

## 2019-01-13 NOTE — PROGRESS NOTES
MD Yossi Krishnan consulted for updates and POC. Diet advanced to full liquids at this time with opportunity to advance as tolerated to regular diet if pt wishes. Supplements added. Nitro gtt discussed. Pt appears more depressed this AM but is much more alert. hgb 12.5.

## 2019-01-13 NOTE — PROGRESS NOTES
Cardiology Progress Note    Reason for Consult:  Asked by Dr Ghulam Krishnan for Code STEMI      CC:   Chief Complaint   Patient presents with   • Chest Pain     Began at home at 0045       HPI:     84-year-old female with known coronary artery disease who was brought in as a code STEMI.  Patient reports being in her usual state of health until yesterday when she started having bilateral nonradiating chest discomfort associated with dyspnea.  She tried taking her medications but her pain persisted and she finally decided to call 911 today.  She appears very uncomfortable and complains of ongoing chest discomfort and dyspnea. Reports compliance with all of her medications.     In 2010, patient had an anterior MI at which time she was found to have a  to her LAD.  She underwent PCI to OM.  Repeat coronary angiography in 2014 showed patent stent. She has also had multiple CVAs in the past.     Reports having GI bleed history.  In October 2018, she underwent clipping for a gastric AVM.  She was admitted here in December for a NSTEMI for which she was started on heparin drip following which she had a drop in her hemoglobin for which she required a transfusion.  Patient was seen by Dr. Kapoor about 10 days ago and was deemed not to be a good candidate for any coronary intervention due to her recent GI bleed.       Interval Events    1/13/2019 Patient reporting chest pain over night that improved with NTG drip. H/H over 10. Spoke to patient after shower, noted dyspneic and speaking in short phrases. Rales at base and CXR with atelectasis. Continue SI. As per GI, Continue to hold off EGD/ invasive procedure for now.  GOC discussion today.        1/11/2019 Patient was evaluated at bedside and found AAOX3. NTG drip was restarted 2/2 reemerging chest pain. EKG w/o significant changes. Trops drop to 0.12 from 1. Discussed case with Interventional Cardiology for which intervention was not recommended 2/2 inability to give  antiplatelet therapy due to LGIB and recommended keeping Hb > 10.    1/10/2019 patient was evaluated at bedside and found AAOX3. Still with 5/10 substernal chest pain. Pt more awake today. Tele w/NSR 60-70s. Hb drop to 7, got PRBC transfusion. Continue medical MGMT with nitroglycerin drip, metoprolol and high-dose statin. Continue holding antiplatelet therapy 2/2 LGIB. Palliative following, aim to discuss options patient and sister Erin today.    1/9/2019 patient was evaluated at bedside and found AAOX3 but lethargic. She reporting substernal chest pain. Tele w/NSR 60-70s. Trop at 0.9-->1, BMP 1600.  Patient still with lower GI bleed and hemoglobin trending down.  Treat with medical management, patient is currently on nitroglycerin drip, metoprolol and high-dose statin.  Antiplatelet therapy risk in the setting of GI bleed. Palliative care was consulted and per their note aim to discuss options tomorrow with patient and sister Erin.      Medications / Drug list prior to admission:  No current facility-administered medications on file prior to encounter.      Current Outpatient Prescriptions on File Prior to Encounter   Medication Sig Dispense Refill   • aspirin (ASA) 81 MG Chew Tab chewable tablet Take 81 mg by mouth every day.     • rosuvastatin (CRESTOR) 40 MG tablet Take 40 mg by mouth every evening.     • pantoprazole (PROTONIX) 40 MG Tablet Delayed Response Take 40 mg by mouth 2 Times a Day.     • bimatoprost (LUMIGAN) 0.01 % Solution Place 1 Drop in both eyes every bedtime. 1 gtts both eyes         Current list of administered Medications:    Current Facility-Administered Medications:   •  omeprazole (PRILOSEC) capsule 20 mg, 20 mg, Oral, BID, Ghulam Krishnan M.D., 20 mg at 01/13/19 0510  •  metoprolol (LOPRESSOR) tablet 25 mg, 25 mg, Oral, BID, Ghulam Krishnan M.D., 25 mg at 01/13/19 0510  •  morphine (pf) 4 mg/ml injection 2 mg, 2 mg, Intravenous, Q3HRS PRN, Mila Espinosa M.D., 2 mg at  01/12/19 2135  •  Metoprolol Tartrate (LOPRESSOR) injection 5 mg, 5 mg, Intravenous, Q5 MIN PRN, Ghulam Krishnan M.D., 5 mg at 01/11/19 0904  •  sucralfate (CARAFATE) 1 GM/10ML suspension 1 g, 1 g, Oral, Q6HRS, Tristan Bhatia M.D., 1 g at 01/13/19 0511  •  isosorbide mononitrate SR (IMDUR) tablet 90 mg, 90 mg, Oral, Q DAY, Nabila Carter M.D., 90 mg at 01/13/19 0509  •  rosuvastatin (CRESTOR) tablet 40 mg, 40 mg, Oral, Q EVENING, Rubén Gonzalez M.D., 40 mg at 01/11/19 1803  •  acetaminophen (TYLENOL) tablet 650 mg, 650 mg, Oral, Q6HRS PRN, Rubén Gonzalez M.D., Stopped at 01/12/19 0525  •  ondansetron (ZOFRAN) syringe/vial injection 4 mg, 4 mg, Intravenous, Q4HRS PRN, Rubén Gonzalez M.D.  •  ondansetron (ZOFRAN ODT) dispertab 4 mg, 4 mg, Oral, Q4HRS PRN, Rubén Gonzalez M.D.  •  senna-docusate (PERICOLACE or SENOKOT S) 8.6-50 MG per tablet 2 Tab, 2 Tab, Oral, BID, Stopped at 01/09/19 0645 **AND** polyethylene glycol/lytes (MIRALAX) PACKET 1 Packet, 1 Packet, Oral, QDAY PRN **AND** magnesium hydroxide (MILK OF MAGNESIA) suspension 30 mL, 30 mL, Oral, QDAY PRN **AND** bisacodyl (DULCOLAX) suppository 10 mg, 10 mg, Rectal, QDAY PRN, Rubén Gonzalez M.D.  •  Respiratory Care per Protocol, , Nebulization, Continuous RT, Rubén Gonzalez M.D.  •  nitroglycerin 50 mg in D5W 250 ml infusion, 0-200 mcg/min, Intravenous, Continuous, Rubén Gonzalez M.D., Last Rate: 39 mL/hr at 01/13/19 0858, 130 mcg/min at 01/13/19 0858  •  latanoprost (XALATAN) 0.005 % ophthalmic solution 1 Drop, 1 Drop, Both Eyes, Q EVENING, Rubén Gonzalez M.D., 1 Drop at 01/12/19 1720    Past Medical History:   Diagnosis Date   • Acute myocardial infarction, true posterior wall infarction, episode of care unspecified    • Anxiety    • Bowel habit changes    • Breath shortness 2017    uses oxygen at 2 liters/min; AT NIGHT ONLY   • CAD (coronary artery disease)     S/P stent placement   • Cholesterol blood decreased    • Dental disorder     Partial upper    • Dizziness    • GI bleed    • Glaucoma    • Heart burn    • Hyperlipidemia    • Hypertension    • Myocardial infarct (HCC) 6/10/10   • Peripheral vascular disease (HCC)    • Pulmonary disease     bronchitis   • TIA (transient ischemic attack)    • Unspecified hemorrhagic conditions     pt takes plavix   • Unspecified urinary incontinence    • Urinary bladder disorder        Past Surgical History:   Procedure Laterality Date   • COLONOSCOPY - ENDO N/A 10/23/2018    Procedure: COLONOSCOPY - ENDO;  Surgeon: Anatoly Barragan M.D.;  Location: ENDOSCOPY [facility];  Service: Gastroenterology   • GASTROSCOPY-ENDO N/A 10/22/2018    Procedure: GASTROSCOPY-ENDO;  Surgeon: Bakari Brown M.D.;  Location: ENDOSCOPY [facility];  Service: Gastroenterology   • CAROTID ENDARTERECTOMY Right 9/3/2018    Procedure: CAROTID ENDARTERECTOMY WITH NEUROMONITORING;  Surgeon: Naga Abad M.D.;  Location: SURGERY [facility];  Service: Vascular   • GASTROSCOPY WITH BIOPSY  2/13/2016    Procedure: GASTROSCOPY WITH BIOPSY;  Surgeon: Susan Miller M.D.;  Location: [facility];  Service:    • RECOVERY  7/24/2014    Performed by Cath-Recovery Surgery at SURGERY SAME DAY [facility]   • CARDIAC CATH  7/24/14    Diffuse disease, see report.  % with collterals.   • FEMORAL ENDARTERECTOMY  10/4/2013    Performed by Kacie Baker M.D. at SURGERY [facility]   • ANGIOPLASTY BALLOON  10/4/2013    Performed by Kacie Baker M.D. at [facility]   • RECOVERY  4/9/2013    Performed by Ir-Recovery Surgery at SURGERY SAME DAY [facility]   • RECOVERY  11/26/2012    Performed by Ir-Recovery Surgery at SURGERY SAME DAY [facility]   • RECTUS REPAIR  7/13/2012    Performed by SHEILA PLATT at [facility]   • OTHER  12/10/10    stents in legs   • OTHER  6/10/10    cardiac stents   • CARDIAC CATH  6/2010    BMS to Om   • OTHER  2005    KNEE SURGERY   •  CHOLECYSTECTOMY     • HYSTERECTOMY, TOTAL ABDOMINAL     • OTHER CARDIAC SURGERY      stent in heart   • STENT PLACEMENT      treated by Dr Baker, multiple surgeries to legs.       Family History   Problem Relation Age of Onset   • Stroke Mother 68        CVA   • Heart Disease Mother 65        valve surgery   • Other Son         wgt 465 lbs     Patient family history was personally reviewed, no pertinent family history to current presentation    Social History     Social History   • Marital status:      Spouse name: N/A   • Number of children: N/A   • Years of education: N/A     Occupational History   • Not on file.     Social History Main Topics   • Smoking status: Former Smoker     Packs/day: 0.25     Years: 45.00     Types: Cigarettes     Quit date: 2/1/2016   • Smokeless tobacco: Never Used   • Alcohol use 0.0 - 0.6 oz/week      Comment: 1 drink per month   • Drug use: No   • Sexual activity: Not on file     Other Topics Concern   • Not on file     Social History Narrative   • No narrative on file       ALLERGIES:  Allergies   Allergen Reactions   • Penicillins Rash and Vomiting     Rxn = unknown  Pt tolerates cephalosporins   • Hydrocodone Vomiting and Nausea     Rxn = unknown   • Tape Rash     RASH       Review of systems:  A complete review of symptoms was reviewed with patient. This is reviewed in H&P and PMH. ALL OTHERS reviewed and negative    Physical exam:  Patient Vitals for the past 24 hrs:   BP Temp Temp src Pulse Resp SpO2 Height Weight   01/09/19 0710 - - - 71 16 98 % - -   01/09/19 0700 - - - 72 17 100 % - -   01/09/19 0650 - - - 73 14 98 % - -   01/09/19 0640 - - - 71 13 99 % - -   01/09/19 0630 - - - 72 16 98 % - -   01/09/19 0620 - - - 72 19 98 % - -   01/09/19 0615 - - - 74 15 97 % - -   01/09/19 0610 - - - 77 17 97 % - -   01/09/19 0605 - - - 72 15 98 % - -   01/09/19 0600 - - - 73 14 98 % - -   01/09/19 0555 - - - 72 13 98 % - -   01/09/19 0550 - - - 71 13 96 % - -   01/09/19 0545 -  "- - 72 14 97 % - -   01/09/19 0540 - - - 73 14 96 % - -   01/09/19 0535 - - - 72 13 97 % - -   01/09/19 0530 - - - 73 17 96 % - -   01/09/19 0525 - - - 72 15 97 % - -   01/09/19 0520 - - - 73 19 96 % - -   01/09/19 0515 - - - 73 17 96 % - -   01/09/19 0510 - - - 73 16 96 % - -   01/09/19 0505 - - - 74 20 97 % - -   01/09/19 0500 - - - 74 14 95 % - -   01/09/19 0455 - - - 74 13 97 % - -   01/09/19 0450 - - - 73 18 97 % - -   01/09/19 0445 - - - 73 15 96 % - -   01/09/19 0440 - - - 75 16 96 % - -   01/09/19 0430 - - - 94 17 92 % - -   01/09/19 0426 - - - 81 18 (!) 87 % - -   01/09/19 0415 - - - 77 16 96 % - -   01/09/19 0410 - - - 81 (!) 21 96 % - -   01/09/19 0400 - - - 81 (!) 25 97 % - -   01/09/19 0355 - - - 79 17 97 % - -   01/09/19 0350 - - - 79 18 96 % - -   01/09/19 0345 - - - 79 18 96 % - -   01/09/19 0340 - - - 81 18 96 % - -   01/09/19 0335 - - - 83 20 96 % - -   01/09/19 0330 - - - 82 20 96 % - -   01/09/19 0325 - - - 86 (!) 23 95 % - -   01/09/19 0320 - - - 86 (!) 24 95 % - -   01/09/19 0315 - - - 86 (!) 22 94 % - -   01/09/19 0310 - - - 89 (!) 25 94 % - -   01/09/19 0305 - - - 89 (!) 24 94 % - -   01/09/19 0300 - - - 92 - 93 % - -   01/09/19 0255 - - - 94 - 91 % - -   01/09/19 0254 131/46 37.2 °C (98.9 °F) Temporal 93 18 91 % - -   01/09/19 0253 - - - - - - 1.753 m (5' 9\") 57.2 kg (126 lb)       General: Lethargic/sleepy  EYES: no jaundice  HEENT: OP clear   Neck: No bruits No JVD.   CVS:  RRR. S1 + S2. No M/R/G  Resp: Rales at base. No wheezing or crackles/rhonchi.  Abdomen: Soft, NT, ND,  Skin: Grossly nothing acute no obvious rashes  Neurological: Alert, Moves all extremities, no cranial nerve defects on limited exam  Extremities: Pulse 2+ in b/l LE. No edema. No cyanosis.       Data:  Laboratory studies personally reviewed by me:  Recent Results (from the past 24 hour(s))   BASIC METABOLIC PANEL    Collection Time: 01/13/19  5:25 AM   Result Value Ref Range    Sodium 138 135 - 145 mmol/L    Potassium " 4.7 3.6 - 5.5 mmol/L    Chloride 111 96 - 112 mmol/L    Co2 17 (L) 20 - 33 mmol/L    Glucose 139 (H) 65 - 99 mg/dL    Bun 15 8 - 22 mg/dL    Creatinine 1.16 0.50 - 1.40 mg/dL    Calcium 8.8 8.5 - 10.5 mg/dL    Anion Gap 10.0 0.0 - 11.9   CBC WITH DIFFERENTIAL    Collection Time: 01/13/19  5:25 AM   Result Value Ref Range    WBC 5.0 4.8 - 10.8 K/uL    RBC 4.36 4.20 - 5.40 M/uL    Hemoglobin 12.5 12.0 - 16.0 g/dL    Hematocrit 38.6 37.0 - 47.0 %    MCV 88.5 81.4 - 97.8 fL    MCH 28.7 27.0 - 33.0 pg    MCHC 32.4 (L) 33.6 - 35.0 g/dL    RDW 52.9 (H) 35.9 - 50.0 fL    Platelet Count 332 164 - 446 K/uL    MPV 9.1 9.0 - 12.9 fL    Neutrophils-Polys 91.10 (H) 44.00 - 72.00 %    Lymphocytes 5.90 (L) 22.00 - 41.00 %    Monocytes 2.20 0.00 - 13.40 %    Eosinophils 0.00 0.00 - 6.90 %    Basophils 0.20 0.00 - 1.80 %    Immature Granulocytes 0.60 0.00 - 0.90 %    Nucleated RBC 0.00 /100 WBC    Neutrophils (Absolute) 4.51 2.00 - 7.15 K/uL    Lymphs (Absolute) 0.29 (L) 1.00 - 4.80 K/uL    Monos (Absolute) 0.11 0.00 - 0.85 K/uL    Eos (Absolute) 0.00 0.00 - 0.51 K/uL    Baso (Absolute) 0.01 0.00 - 0.12 K/uL    Immature Granulocytes (abs) 0.03 0.00 - 0.11 K/uL    NRBC (Absolute) 0.00 K/uL   ESTIMATED GFR    Collection Time: 01/13/19  5:25 AM   Result Value Ref Range    GFR If  54 (A) >60 mL/min/1.73 m 2    GFR If Non  44 (A) >60 mL/min/1.73 m 2       Imaging:  EC-ECHOCARDIOGRAM LTD W/O CONT   Final Result      DX-CHEST-LIMITED (1 VIEW)   Final Result         1.  Pulmonary edema and/or infiltrates.   2.  Atherosclerosis   3.  Hyperexpansion of lungs favors changes of COPD.              EKG :  Sinus tachycardia rate 70s,  Q waves at V1-V3, ST elevation at V1-V3 and ST depression at inferior leads    All pertinent features of laboratory and imaging reviewed including primary images where applicable      Principal Problem (Resolved):    STEMI (ST elevation myocardial infarction) (HCC) POA: Unknown  Active  Problems:    Unstable angina pectoris (HCC) POA: Yes    Coronary artery disease due to calcified coronary lesion POA: Yes    History of CVA (cerebrovascular accident) POA: Yes    Chronic left ventricular systolic heart failure (HCC) POA: Yes    ACC/AHA stage C systolic heart failure (HCC) POA: Yes    History of anterior wall MI POA: Yes    Essential hypertension POA: Yes    Normocytic anemia POA: Yes    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    Nonrheumatic aortic valve insufficiency POA: Yes    Non-rheumatic mitral regurgitation POA: Yes      Overview: Associated with mitral annular calcification    Stage 4 chronic kidney disease (HCC) POA: Unknown    GIB (gastrointestinal bleeding) POA: Unknown  Resolved Problems:    Ischemic cerebrovascular accident (CVA) (HCC) POA: Yes    STEMI (ST elevation myocardial infarction) (HCC) POA: Unknown    Hypokalemia POA: Unknown    Hypocalcemia POA: Unknown      Assessment / Plan:  #STEMI  #Ischemic cardiomyopathy  #CHF  #Lower GI bleed  #Normocytic anemia  #Hypertension  #CKD4  EKG w/ anterior septal STEMI on admission  Tele w/NSR 60-70s  ECHO showed mildly reduced LV systolic function, EF at 40%, akinesis-hypokinesis at mid anterior septum, apical septum and apex  Had angina episode today  Back on NTG drip  Repeat EKG w/o significant changes  Trops drop to 0.12 from 1  As per Interventional Cardiology intervention not recommended 2/2 LGIB  Keeping Hb > 10, to reduce angina episodes  Continue medical management for STEMI with NTG drip, beta blocker and high-dose statin therapy  As per GI, hold off on EGD for now  Continue hold antiplatelet therapy as high risk for worsening LGIB (GI following)  GOC discission today      Cardiology will continue to follow along    It is my pleasure to participate in the care of Ms. Diana.  Please do not hesitate to contact us with questions or concerns.      Chester Arambula    1/12/2019

## 2019-01-13 NOTE — PROGRESS NOTES
"Gastroenterology ([facility]) Note    Date of Service: 19 12:36 PM    Reason for Consultation: GI bleed/anemia    ID:   Brooke Diana is a 84 y.o. female with history of CVA right-sided weakness prior MI with coronary artery disease, dyslipidemia on statin, hiatal hernia, duodenitis, colonic angioectasias, who was admitted on 2019 with suspected ST elevation MI and new cardiomyopathy w LVEF 25%. She was found to be profoundly anemic with melena.    Interval Events:  2019: no blood BM overnight. Very lethargic this AM, difficult to arouse.  2019: no issues overnight. No blood BM overnight. Tolerated PO this morning. H/H stable. Chest pain improved more awake today.     Review Of Systems:  Review of Systems   Constitutional: Negative for chills and fever.   HENT: Negative for congestion and hearing loss.    Eyes: Negative for blurred vision and pain.   Respiratory: Negative for cough, sputum production and shortness of breath.    Cardiovascular: Negative for chest pain and leg swelling.   Gastrointestinal: Negative for abdominal pain, constipation, diarrhea, melena and nausea.   Genitourinary: Negative for dysuria and frequency.   Musculoskeletal: Negative for back pain and neck pain.   Skin: Negative for itching and rash.   Neurological: Negative for focal weakness and headaches.   Endo/Heme/Allergies: Negative for polydipsia. Does not bruise/bleed easily.   Psychiatric/Behavioral: Negative for substance abuse. The patient is nervous/anxious.        Most Recent Vital Signs:  /61   Pulse 79   Temp 36.6 °C (97.9 °F)   Resp (!) 30   Ht 1.753 m (5' 9\")   Wt 66.5 kg (146 lb 9.7 oz)   SpO2 95%   Breastfeeding? No   BMI 21.65 kg/m²   Temp  Av.4 °C (97.6 °F)  Min: 35.4 °C (95.7 °F)  Max: 37.4 °C (99.3 °F)    Physical Exam:  Physical Exam   Constitutional: She is oriented to person, place, and time. She appears cachectic. No distress.   HENT:   Head: Normocephalic and atraumatic.   Eyes: " Pupils are equal, round, and reactive to light. Conjunctivae are normal. No scleral icterus.   Neck: Normal range of motion. Neck supple.   Cardiovascular: Normal rate, regular rhythm and intact distal pulses.    Murmur heard.  Pulmonary/Chest: Effort normal and breath sounds normal. No respiratory distress.   Abdominal: Soft. Bowel sounds are normal. There is no tenderness. There is no guarding.   Musculoskeletal: Normal range of motion. She exhibits no edema or tenderness.   Neurological: She is alert and oriented to person, place, and time. No cranial nerve deficit.   Skin: Skin is warm and dry.   Psychiatric: Mood and affect normal.       Pertinent Lab Results:  Recent Labs      01/09/19   0233  01/10/19   0105   WBC  15.0*  8.3      Recent Labs      01/11/19   2253  01/12/19   0600  01/13/19   0525   HEMOGLOBIN  12.4  12.7  12.5   HEMATOCRIT   --   40.4  38.6   MCV   --   89.4  88.5   MCH   --   28.1  28.7   PLATELETCT   --   393  332       Recent Labs      01/09/19   0233  01/10/19   0105  01/11/19   0628   SODIUM  139  140  144   POTASSIUM  5.1  4.6  3.1*   CHLORIDE  109  112  122*   CO2  20  22  16*   CREATININE  1.80*  1.35  0.87        No results for input(s): ALBUMIN in the last 72 hours.    Invalid input(s): AST, ALT, ALKPHOS, BILITOT, TOTALBILIRUB, BILIRUBINTOT, BILIRUBINDIR, BILIRUBININD, ALKALINEPHOS         Pertinent Micro:  Results     ** No results found for the last 168 hours. **        Blood Culture   Date Value Ref Range Status   02/11/2016 No growth after 5 days of incubation.  Final        Studies:  EC-ECHOCARDIOGRAM LTD W/O CONT   Final Result      DX-CHEST-LIMITED (1 VIEW)   Final Result         1.  Pulmonary edema and/or infiltrates.   2.  Atherosclerosis   3.  Hyperexpansion of lungs favors changes of COPD.      DX-CHEST-LIMITED (1 VIEW)    (Results Pending)       IMPRESSION:   Brooke Diana is a 84 y.o. female with a history of coronary artery disease, GI bleed secondary to suspected AV  malformation in both upper and lower GI tract who presents to the hospital with chest pain and the elevation MI with elevated troponin in the setting of chronic kidney disease.  Patient's H/H has stablized, with appropriate response to 2 U pRBCs, 2 additional units transfused 1/11.  Per cardiology not ideal for coronary cath secondary to GI bleed.  Extensive discussion with patient including risk and benefit of procedure as well as sedation options were discussed on 1/10 and repeated on 1/11.  Given patient's cardiac issues ongoing chest pain fluid overload and current MI discussed high risk for procedural sedation.  Discussed likely upper GI bleed due to possible previous AVM versus other such as ulcer.  Patient reports not satisfied with previous endoscopic evaluation and is very hesitant to proceed.  Discussed again high risk procedure with high risk for cardiac pulmonary complication.     PLAN:     - Continue to hold off EGD/ invasive procedure for now to optimize cardia status (I spoke with Dr. Lewis via the telephone) given the high risk for procedural complications (given angina, STEMI, etc stable H/H); it is unclear to me if bleeding stops and even if endoscopic issues are addressed that Interventional Cardiology would still proceed with interventional evaluation (stenting/cath/anticoagulation etc) and therapy. If the ultimate decision is that no cardiac cath intervention will be performed given patient's health status & risk then I do not think the benefit outweighs the risk of invasive endoscopic evaluation due to sedation risk. Patient is in agreement with that understanding. Will re-discuss again with cardiology tomorrow and patient to discuss interventional cardiology's final plan and decision before proceeding  - Goals of care discussion  - Change PPI to PO  - Dr. Rhodes to assume care tomorrow    Tristan Bhatia M.D.

## 2019-01-13 NOTE — PROGRESS NOTES
"Report received from [facility] RN. Pt sitting up in bed, watching TV at this time. Mentation is much more alert than yesterday's assessments. A+Ox4, denies pain (chest/abdomen/head/other). Denies NT in extremities. Follows commands with no delay. Nitro drip titrated down. POC discussed. Pt states she is not hungry. She has been refusing meals. RN asked if she would rather eat a smoothie or milkshake if the MD allows a diet change. Pt stated \"Yes, a chocolate shake sounds really good. I would like that.\" Will address diet with MD this morning. Pt aware it may not be until 0900 rounds and states \"that is fine.\" increased bruising on left arm noted. Skin fragile but otherwise appears intact. Call light in reach. Bed locked and in low position with lower side rails up. Pt verbalizes understanding to call RN for assistance in ADLs and if chest pain returns.   "

## 2019-01-13 NOTE — PROGRESS NOTES
[facility] Medicine Daily Progress Note    Date of Service  1/13/2019    Chief Complaint  84 y.o. female admitted 1/9/2019 with STEMI, evaluated by cardiology and medical therapy recommended given history of GI bleed and melena    Hospital Course          Interval Problem Update    AOx4  Chest pain this morning on nitro drip 130  SR   -140  Afebrile  No melena or rectal bleeding            Consultants/Specialty  Cardiology    Code Status  DNR/DNR    Disposition  ICU    Review of Systems  Review of Systems   Constitutional: Positive for malaise/fatigue. Negative for fever.   HENT: Negative for congestion, ear discharge and nosebleeds.    Eyes: Negative for blurred vision, pain, discharge and redness.   Respiratory: Positive for shortness of breath. Negative for cough, hemoptysis, sputum production and stridor.    Cardiovascular: Positive for chest pain and leg swelling. Negative for palpitations and orthopnea.   Gastrointestinal: Negative for abdominal pain, diarrhea, nausea and vomiting.   Genitourinary: Negative for dysuria, flank pain and hematuria.   Musculoskeletal: Negative for back pain, falls, joint pain and neck pain.   Skin: Negative.    Neurological: Negative for speech change, focal weakness, seizures, loss of consciousness, weakness and headaches.   Psychiatric/Behavioral: Negative for hallucinations, memory loss and substance abuse. The patient is not nervous/anxious.    All other systems reviewed and are negative.       Physical Exam  Temp:  [36.4 °C (97.5 °F)-36.8 °C (98.3 °F)] 36.6 °C (97.9 °F)  Pulse:  [] 77  Resp:  [12-38] 15    Physical Exam   Constitutional: She is oriented to person, place, and time. She appears well-developed and well-nourished.   HENT:   Head: Normocephalic and atraumatic.   Right Ear: External ear normal.   Left Ear: External ear normal.   Mouth/Throat: No oropharyngeal exudate.   Eyes: Conjunctivae are normal. Right eye exhibits no discharge. Left eye  exhibits no discharge. No scleral icterus.   Neck: Neck supple. No JVD present. No tracheal deviation present.   Cardiovascular: Normal rate and regular rhythm.  Exam reveals no gallop and no friction rub.    No murmur heard.  Pulmonary/Chest: Effort normal. No stridor. No respiratory distress. She has decreased breath sounds. She has no wheezes. She has no rhonchi. She has rales. She exhibits no tenderness.   Abdominal: Soft. Bowel sounds are normal. She exhibits no distension. There is no tenderness. There is no rebound.   Musculoskeletal: She exhibits edema. She exhibits no tenderness.   Neurological: She is alert and oriented to person, place, and time. No cranial nerve deficit. She exhibits normal muscle tone.   Skin: Skin is warm and dry. She is not diaphoretic. No cyanosis. Nails show no clubbing.   Psychiatric: She has a normal mood and affect. Her behavior is normal. Thought content normal.   Nursing note and vitals reviewed.      Fluids    Intake/Output Summary (Last 24 hours) at 01/13/19 0905  Last data filed at 01/13/19 0800   Gross per 24 hour   Intake             1025 ml   Output              750 ml   Net              275 ml       Laboratory  Recent Labs      01/11/19   2253  01/12/19   0600  01/13/19   0525   WBC   --   10.4  5.0   RBC   --   4.52  4.36   HEMOGLOBIN  12.4  12.7  12.5   HEMATOCRIT   --   40.4  38.6   MCV   --   89.4  88.5   MCH   --   28.1  28.7   MCHC   --   31.4*  32.4*   RDW   --   55.5*  52.9*   PLATELETCT   --   393  332   MPV   --   9.4  9.1     Recent Labs      01/11/19   0628  01/12/19   0600  01/13/19   0525   SODIUM  144  138  138   POTASSIUM  3.1*  5.2  4.7   CHLORIDE  122*  111  111   CO2  16*  20  17*   GLUCOSE  76  121*  139*   BUN  20  20  15   CREATININE  0.87  1.23  1.16   CALCIUM  6.1*  8.8  8.8                   Imaging  EC-ECHOCARDIOGRAM LTD W/O CONT   Final Result      DX-CHEST-LIMITED (1 VIEW)   Final Result         1.  Pulmonary edema and/or infiltrates.   2.   Atherosclerosis   3.  Hyperexpansion of lungs favors changes of COPD.           Assessment/Plan  ACC/AHA stage C systolic heart failure (HCC)- (present on admission)   Assessment & Plan    Will give 1 dose of IV Lasix and monitor intake and output  Continue metoprolol and nitrates       Chronic left ventricular systolic heart failure (HCC)- (present on admission)   Assessment & Plan    Repeat echo with EF of 40%       Coronary artery disease due to calcified coronary lesion- (present on admission)   Assessment & Plan    Unstable angina    Cardiology following discussed with Dr. Lewis  Not candidate for coronary intervention given recurrent GI bleed  Continue medical therapy as tolerated wean off nitro drip as tolerated  Continue metoprolol and statin  I had a long discussion with the patient regarding goals of care including consideration of hospice she would like to talk to hospice team and inquire about the nature of services they offer prior to making final decision     Normocytic anemia- (present on admission)   Assessment & Plan    Secondary to GI bleed  Hemoglobin stable posttransfusion       Essential hypertension- (present on admission)   Assessment & Plan    Continue current meds and monitor     GIB (gastrointestinal bleeding)   Assessment & Plan    Bleeding clinically resolved and hemoglobin stable  Change Protonix to p.o. Prilosec  Continue Carafate  Advance diet as tolerated  Discussed with GI       Stage 4 chronic kidney disease (HCC)   Assessment & Plan    Continue to monitor with diuresis                 VTE prophylaxis: SCD

## 2019-01-13 NOTE — DISCHARGE PLANNING
Anticipated Discharge Disposition: TBD    Action: Spoke to bedside RN. Hospice order in system. However, pt and family would like more information from palliative before making a decision.    Lsw called Palliative RN Jesse. She will contact bedside RN to acquire timeframe when pt's sister will be at bedside to have another conversation.     Barriers to Discharge: decision by pt of treatment vs hospice     Plan: pt family meeting w/ palliative today

## 2019-01-13 NOTE — CARE PLAN
Problem: Bowel/Gastric:  Goal: Normal bowel function is maintained or improved  Outcome: PROGRESSING AS EXPECTED  Pt currently without tarry/yahaira bloody stool.    Problem: Pain Management  Goal: Pain level will decrease to patient's comfort goal  Outcome: PROGRESSING AS EXPECTED  Pt currently off Nitro GTT.

## 2019-01-14 ENCOUNTER — HOME CARE VISIT (OUTPATIENT)
Dept: HOSPICE | Facility: HOSPICE | Age: 84
End: 2019-01-14
Payer: MEDICARE

## 2019-01-14 ENCOUNTER — HOSPICE ADMISSION (OUTPATIENT)
Dept: HOSPICE | Facility: HOSPICE | Age: 84
End: 2019-01-14
Payer: MEDICARE

## 2019-01-14 LAB
ANION GAP SERPL CALC-SCNC: 8 MMOL/L (ref 0–11.9)
BASOPHILS # BLD AUTO: 0.2 % (ref 0–1.8)
BASOPHILS # BLD: 0.03 K/UL (ref 0–0.12)
BUN SERPL-MCNC: 19 MG/DL (ref 8–22)
CALCIUM SERPL-MCNC: 8.6 MG/DL (ref 8.5–10.5)
CHLORIDE SERPL-SCNC: 111 MMOL/L (ref 96–112)
CO2 SERPL-SCNC: 21 MMOL/L (ref 20–33)
CREAT SERPL-MCNC: 1.14 MG/DL (ref 0.5–1.4)
EOSINOPHIL # BLD AUTO: 0.02 K/UL (ref 0–0.51)
EOSINOPHIL NFR BLD: 0.1 % (ref 0–6.9)
ERYTHROCYTE [DISTWIDTH] IN BLOOD BY AUTOMATED COUNT: 54.1 FL (ref 35.9–50)
GLUCOSE SERPL-MCNC: 113 MG/DL (ref 65–99)
HCT VFR BLD AUTO: 41 % (ref 37–47)
HGB BLD-MCNC: 12.5 G/DL (ref 12–16)
IMM GRANULOCYTES # BLD AUTO: 0.06 K/UL (ref 0–0.11)
IMM GRANULOCYTES NFR BLD AUTO: 0.4 % (ref 0–0.9)
LYMPHOCYTES # BLD AUTO: 1.01 K/UL (ref 1–4.8)
LYMPHOCYTES NFR BLD: 6.8 % (ref 22–41)
MAGNESIUM SERPL-MCNC: 1.7 MG/DL (ref 1.5–2.5)
MCH RBC QN AUTO: 27.5 PG (ref 27–33)
MCHC RBC AUTO-ENTMCNC: 30.5 G/DL (ref 33.6–35)
MCV RBC AUTO: 90.3 FL (ref 81.4–97.8)
MONOCYTES # BLD AUTO: 1.27 K/UL (ref 0–0.85)
MONOCYTES NFR BLD AUTO: 8.6 % (ref 0–13.4)
NEUTROPHILS # BLD AUTO: 12.44 K/UL (ref 2–7.15)
NEUTROPHILS NFR BLD: 83.9 % (ref 44–72)
NRBC # BLD AUTO: 0 K/UL
NRBC BLD-RTO: 0 /100 WBC
PLATELET # BLD AUTO: 378 K/UL (ref 164–446)
PMV BLD AUTO: 9.2 FL (ref 9–12.9)
POTASSIUM SERPL-SCNC: 4.2 MMOL/L (ref 3.6–5.5)
RBC # BLD AUTO: 4.54 M/UL (ref 4.2–5.4)
SODIUM SERPL-SCNC: 140 MMOL/L (ref 135–145)
WBC # BLD AUTO: 14.8 K/UL (ref 4.8–10.8)

## 2019-01-14 PROCEDURE — 700111 HCHG RX REV CODE 636 W/ 250 OVERRIDE (IP): Performed by: HOSPITALIST

## 2019-01-14 PROCEDURE — A9270 NON-COVERED ITEM OR SERVICE: HCPCS | Performed by: STUDENT IN AN ORGANIZED HEALTH CARE EDUCATION/TRAINING PROGRAM

## 2019-01-14 PROCEDURE — 770022 HCHG ROOM/CARE - ICU (200)

## 2019-01-14 PROCEDURE — A9270 NON-COVERED ITEM OR SERVICE: HCPCS | Performed by: INTERNAL MEDICINE

## 2019-01-14 PROCEDURE — A9270 NON-COVERED ITEM OR SERVICE: HCPCS | Performed by: HOSPITALIST

## 2019-01-14 PROCEDURE — 99233 SBSQ HOSP IP/OBS HIGH 50: CPT | Mod: GC | Performed by: INTERNAL MEDICINE

## 2019-01-14 PROCEDURE — 700111 HCHG RX REV CODE 636 W/ 250 OVERRIDE (IP): Performed by: STUDENT IN AN ORGANIZED HEALTH CARE EDUCATION/TRAINING PROGRAM

## 2019-01-14 PROCEDURE — 85025 COMPLETE CBC W/AUTO DIFF WBC: CPT

## 2019-01-14 PROCEDURE — 700102 HCHG RX REV CODE 250 W/ 637 OVERRIDE(OP): Performed by: HOSPITALIST

## 2019-01-14 PROCEDURE — 700102 HCHG RX REV CODE 250 W/ 637 OVERRIDE(OP): Performed by: INTERNAL MEDICINE

## 2019-01-14 PROCEDURE — 700111 HCHG RX REV CODE 636 W/ 250 OVERRIDE (IP): Performed by: INTERNAL MEDICINE

## 2019-01-14 PROCEDURE — 83735 ASSAY OF MAGNESIUM: CPT

## 2019-01-14 PROCEDURE — 94668 MNPJ CHEST WALL SBSQ: CPT

## 2019-01-14 PROCEDURE — 94640 AIRWAY INHALATION TREATMENT: CPT

## 2019-01-14 PROCEDURE — 80048 BASIC METABOLIC PNL TOTAL CA: CPT

## 2019-01-14 PROCEDURE — 700101 HCHG RX REV CODE 250

## 2019-01-14 PROCEDURE — 99233 SBSQ HOSP IP/OBS HIGH 50: CPT | Performed by: HOSPITALIST

## 2019-01-14 PROCEDURE — 94667 MNPJ CHEST WALL 1ST: CPT

## 2019-01-14 PROCEDURE — 700102 HCHG RX REV CODE 250 W/ 637 OVERRIDE(OP): Performed by: STUDENT IN AN ORGANIZED HEALTH CARE EDUCATION/TRAINING PROGRAM

## 2019-01-14 RX ORDER — CARVEDILOL 12.5 MG/1
12.5 TABLET ORAL 2 TIMES DAILY WITH MEALS
Status: DISCONTINUED | OUTPATIENT
Start: 2019-01-14 | End: 2019-01-16 | Stop reason: HOSPADM

## 2019-01-14 RX ORDER — FUROSEMIDE 10 MG/ML
40 INJECTION INTRAMUSCULAR; INTRAVENOUS ONCE
Status: COMPLETED | OUTPATIENT
Start: 2019-01-14 | End: 2019-01-14

## 2019-01-14 RX ORDER — CAPTOPRIL 12.5 MG/1
6.25 TABLET ORAL EVERY 6 HOURS
Status: DISCONTINUED | OUTPATIENT
Start: 2019-01-14 | End: 2019-01-15

## 2019-01-14 RX ORDER — LORAZEPAM 2 MG/ML
1 INJECTION INTRAMUSCULAR ONCE
Status: COMPLETED | OUTPATIENT
Start: 2019-01-14 | End: 2019-01-14

## 2019-01-14 RX ADMIN — OMEPRAZOLE 20 MG: 20 CAPSULE, DELAYED RELEASE ORAL at 04:49

## 2019-01-14 RX ADMIN — OMEPRAZOLE 20 MG: 20 CAPSULE, DELAYED RELEASE ORAL at 17:20

## 2019-01-14 RX ADMIN — SUCRALFATE 1 G: 1 SUSPENSION ORAL at 00:23

## 2019-01-14 RX ADMIN — CAPTOPRIL 6.25 MG: 12.5 TABLET ORAL at 17:19

## 2019-01-14 RX ADMIN — FUROSEMIDE 40 MG: 10 INJECTION, SOLUTION INTRAMUSCULAR; INTRAVENOUS at 14:41

## 2019-01-14 RX ADMIN — CAPTOPRIL 6.25 MG: 12.5 TABLET ORAL at 13:55

## 2019-01-14 RX ADMIN — CARVEDILOL 12.5 MG: 12.5 TABLET, FILM COATED ORAL at 17:20

## 2019-01-14 RX ADMIN — SUCRALFATE 1 G: 1 SUSPENSION ORAL at 04:49

## 2019-01-14 RX ADMIN — ALBUTEROL SULFATE 2.5 MG: 2.5 SOLUTION RESPIRATORY (INHALATION) at 03:37

## 2019-01-14 RX ADMIN — METOPROLOL TARTRATE 25 MG: 25 TABLET, FILM COATED ORAL at 04:49

## 2019-01-14 RX ADMIN — ROSUVASTATIN CALCIUM 40 MG: 20 TABLET, FILM COATED ORAL at 17:20

## 2019-01-14 RX ADMIN — FUROSEMIDE 40 MG: 10 INJECTION, SOLUTION INTRAMUSCULAR; INTRAVENOUS at 17:58

## 2019-01-14 RX ADMIN — CARVEDILOL 12.5 MG: 12.5 TABLET, FILM COATED ORAL at 13:55

## 2019-01-14 RX ADMIN — MORPHINE SULFATE 2 MG: 4 INJECTION INTRAVENOUS at 02:19

## 2019-01-14 RX ADMIN — STANDARDIZED SENNA CONCENTRATE AND DOCUSATE SODIUM 2 TABLET: 8.6; 5 TABLET, FILM COATED ORAL at 17:20

## 2019-01-14 RX ADMIN — NITROGLYCERIN 190 MCG/MIN: 20 INJECTION INTRAVENOUS at 08:41

## 2019-01-14 RX ADMIN — LATANOPROST 1 DROP: 50 SOLUTION OPHTHALMIC at 17:19

## 2019-01-14 RX ADMIN — Medication 2.5 MG: at 03:37

## 2019-01-14 RX ADMIN — LORAZEPAM 0.5 MG: 2 INJECTION INTRAMUSCULAR at 04:43

## 2019-01-14 RX ADMIN — NITROGLYCERIN 200 MCG/MIN: 20 INJECTION INTRAVENOUS at 04:15

## 2019-01-14 RX ADMIN — SUCRALFATE 1 G: 1 SUSPENSION ORAL at 17:20

## 2019-01-14 RX ADMIN — MORPHINE SULFATE 4 MG: 4 INJECTION INTRAVENOUS at 03:45

## 2019-01-14 RX ADMIN — SUCRALFATE 1 G: 1 SUSPENSION ORAL at 13:55

## 2019-01-14 RX ADMIN — ISOSORBIDE MONONITRATE 90 MG: 60 TABLET ORAL at 04:49

## 2019-01-14 ASSESSMENT — PAIN SCALES - GENERAL
PAINLEVEL_OUTOF10: 0
PAINLEVEL_OUTOF10: 8
PAINLEVEL_OUTOF10: 5
PAINLEVEL_OUTOF10: 0
PAINLEVEL_OUTOF10: 3
PAINLEVEL_OUTOF10: 0
PAINLEVEL_OUTOF10: 0
PAINLEVEL_OUTOF10: 6
PAINLEVEL_OUTOF10: 0
PAINLEVEL_OUTOF10: 0
PAINLEVEL_OUTOF10: 2

## 2019-01-14 ASSESSMENT — ENCOUNTER SYMPTOMS
ORTHOPNEA: 0
CONSTIPATION: 0
BACK PAIN: 0
EYE PAIN: 0
SPUTUM PRODUCTION: 0
ABDOMINAL PAIN: 0
POLYDIPSIA: 0
DIARRHEA: 0
NECK PAIN: 0
FOCAL WEAKNESS: 0
VOMITING: 0
CHILLS: 0
BLURRED VISION: 0
COUGH: 0
FEVER: 0
HEADACHES: 0
BRUISES/BLEEDS EASILY: 0
SHORTNESS OF BREATH: 0
SHORTNESS OF BREATH: 1
NAUSEA: 0
NERVOUS/ANXIOUS: 1
PALPITATIONS: 0

## 2019-01-14 ASSESSMENT — LIFESTYLE VARIABLES
EVER_SMOKED: YES
SUBSTANCE_ABUSE: 0

## 2019-01-14 ASSESSMENT — COPD QUESTIONNAIRES
DO YOU EVER COUGH UP ANY MUCUS OR PHLEGM?: NO/ONLY WITH OCCASIONAL COLDS OR INFECTIONS
COPD SCREENING SCORE: 4
HAVE YOU SMOKED AT LEAST 100 CIGARETTES IN YOUR ENTIRE LIFE: YES
DURING THE PAST 4 WEEKS HOW MUCH DID YOU FEEL SHORT OF BREATH: NONE/LITTLE OF THE TIME

## 2019-01-14 ASSESSMENT — NEW YORK HEART ASSOCIATION (NYHA) CLASSIFICATION: PATIENT MEETS NYHA AND SIGNIFICANT SYMPTOMS CRITERIA: 1

## 2019-01-14 NOTE — DISCHARGE PLANNING
Episode linked.     Received Choice form at 3554  Agency/Facility Name: [facility]  Referral sent per Choice form @ 2163

## 2019-01-14 NOTE — DISCHARGE PLANNING
Anticipated Discharge Disposition: d/c w/ hospice    Action: Spoke to hospitalist.    Met w/ pt at bedside and acquired choice [facility] and choice [facility].    Pt would like more information about hospice at bedside.    [facility] to [facility]. skyped [facility] to advise of pending fax.    Barriers to Discharge: acceptance by both pt and hospice, transport     Plan: f/u w/ [facility], medical team

## 2019-01-14 NOTE — DISCHARGE PLANNING
Anticipated Discharge Disposition: d/c home w/ hospice    Action: RN w/ [facility] spoke to pt and family at bedside. She answered their questions and provided resource for [facility] to provide in home care when hospice is not in place. Pt's sister is expecting hospice to be at pt's house 24/7.      Sister wants to speak w/ [facility] now, per [facility] RN.    LSw called admissions RN from [facility]. The referral has been sent to them. Varsha Daniel will call pt's sister and meet them at bedside to answer questions.      Barriers to Discharge: acceptance to hospice from pt and family    Plan: f/u w/ medical team

## 2019-01-14 NOTE — HEART FAILURE PROGRAM
Cardiovascular Nurse Navigator () Advanced Heart Failure Program Inpatient Progress Note:    Patient has made hospice choices per today's SW note. Same note however indicates that this is not a firm decision on patient's part and of course, she has to be accepted by agencies selected as well.     No current HF f/u appointments scheduled.     If she chooses to not go home with hospice, she will require the HF care bundle as below.    Hospital schedulers are here seven days a week, [phone] and can be reached at extension [phone], they will handle the seven calendar day follow up appointment for you if you call them!    HF Measures:  1. Documentation of LV systolic function (echo or cath) PTA, during this hospitalization, or plan to assess post discharge or reason for not assessing documented.  2. ACE-I, ARNI or ARB prescribed on discharge for LVEF <40%  3. For HF patients with LVEF less than or equal to 40% evidence based beta blocker must be prescribed upon discharge one of the following: carvedilol, bisoprolol, Toprol XL  4. For EF less than or equal to 35% aldosterone blockade prescribed upon discharge  5. Nutrition consult for diet education  6. HF education documented daily  7. Screening for and administering immunizations as long as no contraindications: Pneumonia and Influenza  8. Written discharge instructions include:  ? Daily weights  ? Record weight on tracker  ? Bring tracker to appointments  ? Call MD for weight gain of 3lb /day or 5lb/week  ? HF medication teaching  ? Low sodium diet  ? Follow up appointment within seven calendar days of d/c must include: date, time and location  ? Activity  ? Worsening symptoms  What if any of the above AMI/HF measures are contraindicated?  ? Request that the discharging provider document the medication/intervention and the contraindication specifically in a progress note  ? For example: “no CHF meds due to hypotension” is not enough. It needs to say: “No ACE-I,  ARNI, ARB due to hypotension”; “No Beta Blockade due to bradycardia”…     Thank you, and please call Stacy with questions, M-F.

## 2019-01-14 NOTE — PROGRESS NOTES
Report received from day shift RN. Assumed care of patient, patient resting quietly in bed. No complaints of chest pain at this time. Discussed plan of care and nitroglycerin drip with patient, she verbalizes to notify this RN if at any point chest pain increases.

## 2019-01-14 NOTE — PROGRESS NOTES
0330- Patient complaining of sever chest pain and experiencing increased WOB with some expiratory wheezes. RT performed a nebulizing treatment for patient. Nitroglycerin drip maxed at this time. MD paged for updates  0340- Dr. Gonzalez returned page, orders received for 4 mg morphine.

## 2019-01-14 NOTE — PROGRESS NOTES
Patient's chest pain persisting. Dr. Gonzalez paged again. Orders received for 0.5-1 mg Ativan, see MAR.

## 2019-01-14 NOTE — DISCHARGE PLANNING
Anticipated Discharge Disposition: d/c home w/ hospice    Action: Spoke to intake RN w/ [facility]. She spoke to pt at bedside. Pt has requested RN be at bedside w/ family to discuss the hospice services. Pt's sister is p/u their brother from airport then will be at hospital. RN left VM w/ sister.    Barriers to Discharge: TBD    Plan: pt and family to meet w/ [facility].

## 2019-01-14 NOTE — PROGRESS NOTES
Cardiology Progress Note    Reason for Consult:  Asked by Dr Ghulam Krishnan for Code STEMI      CC:   Chief Complaint   Patient presents with   • Chest Pain     Began at home at 0045       HPI:     84-year-old female with known coronary artery disease who was brought in as a code STEMI.  Patient reports being in her usual state of health until yesterday when she started having bilateral nonradiating chest discomfort associated with dyspnea.  She tried taking her medications but her pain persisted and she finally decided to call 911 today.  She appears very uncomfortable and complains of ongoing chest discomfort and dyspnea. Reports compliance with all of her medications.     In 2010, patient had an anterior MI at which time she was found to have a  to her LAD.  She underwent PCI to OM.  Repeat coronary angiography in 2014 showed patent stent. She has also had multiple CVAs in the past.     Reports having GI bleed history.  In October 2018, she underwent clipping for a gastric AVM.  She was admitted here in December for a NSTEMI for which she was started on heparin drip following which she had a drop in her hemoglobin for which she required a transfusion.  Patient was seen by Dr. Kapoor about 10 days ago and was deemed not to be a good candidate for any coronary intervention due to her recent GI bleed.       Interval Events    1/14/2019 Patient reporting chest pain episode this early morning which she associated with a feeling of anxiety. Pain resolved with ativan. SI at bedside,  on importance of frequent SI use to improve atelectasis and prevent pneumonia. CXR and presentation suggestive of CHF exacerbation. Received one does of IV Lasix 40 mg  This AM. Patient reporting improvement with respiratory function and incontinence. Sill with B/L rales at base on afternoon assessment. Place miller, measure I/Os and IV Lasix 40 mg. Labs on AM.      1/13/2019 Patient reporting chest pain over night that  improved with NTG drip. H/H over 10. Spoke to patient after shower, noted dyspneic and speaking in short phrases. Rales at base and CXR with atelectasis. Continue SI. As per GI, Continue to hold off EGD/ invasive procedure for now.  GOC discussion today.        1/11/2019 Patient was evaluated at bedside and found AAOX3. NTG drip was restarted 2/2 reemerging chest pain. EKG w/o significant changes. Trops drop to 0.12 from 1. Discussed case with Interventional Cardiology for which intervention was not recommended 2/2 inability to give antiplatelet therapy due to LGIB and recommended keeping Hb > 10.    1/10/2019 patient was evaluated at bedside and found AAOX3. Still with 5/10 substernal chest pain. Pt more awake today. Tele w/NSR 60-70s. Hb drop to 7, got PRBC transfusion. Continue medical MGMT with nitroglycerin drip, metoprolol and high-dose statin. Continue holding antiplatelet therapy 2/2 LGIB. Palliative following, aim to discuss options patient and sister Erin today.    1/9/2019 patient was evaluated at bedside and found AAOX3 but lethargic. She reporting substernal chest pain. Tele w/NSR 60-70s. Trop at 0.9-->1, BMP 1600.  Patient still with lower GI bleed and hemoglobin trending down.  Treat with medical management, patient is currently on nitroglycerin drip, metoprolol and high-dose statin.  Antiplatelet therapy risk in the setting of GI bleed. Palliative care was consulted and per their note aim to discuss options tomorrow with patient and sister Erin.      Medications / Drug list prior to admission:  No current facility-administered medications on file prior to encounter.      Current Outpatient Prescriptions on File Prior to Encounter   Medication Sig Dispense Refill   • aspirin (ASA) 81 MG Chew Tab chewable tablet Take 81 mg by mouth every day.     • rosuvastatin (CRESTOR) 40 MG tablet Take 40 mg by mouth every evening.     • pantoprazole (PROTONIX) 40 MG Tablet Delayed Response Take 40 mg by mouth 2  Times a Day.     • bimatoprost (LUMIGAN) 0.01 % Solution Place 1 Drop in both eyes every bedtime. 1 gtts both eyes         Current list of administered Medications:    Current Facility-Administered Medications:   •  albuterol (PROVENTIL) 2.5mg/0.5ml nebulizer solution 2.5 mg, 2.5 mg, Nebulization, Q4H PRN (RT), Ghulam Krishnan M.D., 2.5 mg at 01/14/19 0337  •  furosemide (LASIX) injection 40 mg, 40 mg, Intravenous, Once, Naveen Lewis M.D.  •  omeprazole (PRILOSEC) capsule 20 mg, 20 mg, Oral, BID, Ghulam Krishnan M.D., 20 mg at 01/14/19 0449  •  metoprolol (LOPRESSOR) tablet 25 mg, 25 mg, Oral, BID, Ghulam Krishnan M.D., 25 mg at 01/14/19 0449  •  morphine (pf) 4 mg/ml injection 2 mg, 2 mg, Intravenous, Q3HRS PRN, Mila Espinosa M.D., 4 mg at 01/14/19 0345  •  Metoprolol Tartrate (LOPRESSOR) injection 5 mg, 5 mg, Intravenous, Q5 MIN PRN, Ghulam Krishnan M.D., 5 mg at 01/11/19 0904  •  sucralfate (CARAFATE) 1 GM/10ML suspension 1 g, 1 g, Oral, Q6HRS, Tristan Bhatia M.D., 1 g at 01/14/19 0449  •  isosorbide mononitrate SR (IMDUR) tablet 90 mg, 90 mg, Oral, Q DAY, Nabila Carter M.D., 90 mg at 01/14/19 0449  •  rosuvastatin (CRESTOR) tablet 40 mg, 40 mg, Oral, Q EVENING, Rubén Gonzalez M.D., 40 mg at 01/13/19 1638  •  acetaminophen (TYLENOL) tablet 650 mg, 650 mg, Oral, Q6HRS PRN, Rubén Gonzalez M.D., Stopped at 01/12/19 0525  •  ondansetron (ZOFRAN) syringe/vial injection 4 mg, 4 mg, Intravenous, Q4HRS PRN, Rubén Gonzalez M.D.  •  ondansetron (ZOFRAN ODT) dispertab 4 mg, 4 mg, Oral, Q4HRS PRN, Rubén Gonzalez M.D.  •  senna-docusate (PERICOLACE or SENOKOT S) 8.6-50 MG per tablet 2 Tab, 2 Tab, Oral, BID, Stopped at 01/09/19 0645 **AND** polyethylene glycol/lytes (MIRALAX) PACKET 1 Packet, 1 Packet, Oral, QDAY PRN **AND** magnesium hydroxide (MILK OF MAGNESIA) suspension 30 mL, 30 mL, Oral, QDAY PRN **AND** bisacodyl (DULCOLAX) suppository 10 mg, 10 mg, Rectal, QDAY PRN, Rubén Gonzalez,  M.D.  •  Respiratory Care per Protocol, , Nebulization, Continuous RT, Rubén Gonzalez M.D.  •  nitroglycerin 50 mg in D5W 250 ml infusion, 0-200 mcg/min, Intravenous, Continuous, Rubén Gonzalez M.D., Last Rate: 60 mL/hr at 01/14/19 0415, 200 mcg/min at 01/14/19 0415  •  latanoprost (XALATAN) 0.005 % ophthalmic solution 1 Drop, 1 Drop, Both Eyes, Q EVENING, Rubén Gonzalez M.D., 1 Drop at 01/13/19 1635    Past Medical History:   Diagnosis Date   • Acute myocardial infarction, true posterior wall infarction, episode of care unspecified    • Anxiety    • Bowel habit changes    • Breath shortness 2017    uses oxygen at 2 liters/min; AT NIGHT ONLY   • CAD (coronary artery disease)     S/P stent placement   • Cholesterol blood decreased    • Dental disorder     Partial upper   • Dizziness    • GI bleed    • Glaucoma    • Heart burn    • Hyperlipidemia    • Hypertension    • Myocardial infarct (HCC) 6/10/10   • Peripheral vascular disease (HCC)    • Pulmonary disease     bronchitis   • TIA (transient ischemic attack)    • Unspecified hemorrhagic conditions     pt takes plavix   • Unspecified urinary incontinence    • Urinary bladder disorder        Past Surgical History:   Procedure Laterality Date   • COLONOSCOPY - ENDO N/A 10/23/2018    Procedure: COLONOSCOPY - ENDO;  Surgeon: Anatoly Barragan M.D.;  Location: [facility];  Service: Gastroenterology   • GASTROSCOPY-ENDO N/A 10/22/2018    Procedure: GASTROSCOPY-ENDO;  Surgeon: Bakari Brown M.D.;  Location: ENDOSCOPY [facility];  Service: Gastroenterology   • CAROTID ENDARTERECTOMY Right 9/3/2018    Procedure: CAROTID ENDARTERECTOMY WITH NEUROMONITORING;  Surgeon: Naga Abad M.D.;  Location: SURGERY [facility];  Service: Vascular   • GASTROSCOPY WITH BIOPSY  2/13/2016    Procedure: GASTROSCOPY WITH BIOPSY;  Surgeon: Susan Miller M.D.;  Location: ENDOSCOPY [facility];  Service:    • RECOVERY  7/24/2014     Performed by Cath-Recovery Surgery at SURGERY SAME DAY [facility] ER ORS   • CARDIAC CATH  7/24/14    Diffuse disease, see report.  % with collterals.   • FEMORAL ENDARTERECTOMY  10/4/2013    Performed by Kacie Baker M.D. at SURGERY [facility] ORS   • ANGIOPLASTY BALLOON  10/4/2013    Performed by Kacie Baker M.D. at SURGERY [facility] ORS   • RECOVERY  4/9/2013    Performed by Ir-Recovery Surgery at SURGERY SAME DAY [facility] ER ORS   • RECOVERY  11/26/2012    Performed by Ir-Recovery Surgery at SURGERY SAME DAY [facility] ER ORS   • RECTUS REPAIR  7/13/2012    Performed by SHIELA PLATT at SURGERY [facility]GICAL [facility] ORS   • OTHER  12/10/10    stents in legs   • OTHER  6/10/10    cardiac stents   • CARDIAC CATH  6/2010    BMS to Om   • OTHER  2005    KNEE SURGERY   • CHOLECYSTECTOMY     • HYSTERECTOMY, TOTAL ABDOMINAL     • OTHER CARDIAC SURGERY      stent in heart   • STENT PLACEMENT      treated by Dr Baker, multiple surgeries to legs.       Family History   Problem Relation Age of Onset   • Stroke Mother 68        CVA   • Heart Disease Mother 65        valve surgery   • Other Son         wgt 465 lbs     Patient family history was personally reviewed, no pertinent family history to current presentation    Social History     Social History   • Marital status:      Spouse name: N/A   • Number of children: N/A   • Years of education: N/A     Occupational History   • Not on file.     Social History Main Topics   • Smoking status: Former Smoker     Packs/day: 0.25     Years: 45.00     Types: Cigarettes     Quit date: 2/1/2016   • Smokeless tobacco: Never Used   • Alcohol use 0.0 - 0.6 oz/week      Comment: 1 drink per month   • Drug use: No   • Sexual activity: Not on file     Other Topics Concern   • Not on file     Social History Narrative   • No narrative on file       ALLERGIES:  Allergies   Allergen Reactions   • Penicillins Rash and Vomiting     Rxn = unknown  Pt tolerates cephalosporins   •  Hydrocodone Vomiting and Nausea     Rxn = unknown   • Tape Rash     RASH       Review of systems:  A complete review of symptoms was reviewed with patient. This is reviewed in H&P and PMH. ALL OTHERS reviewed and negative    Physical exam:  Patient Vitals for the past 24 hrs:   BP Temp Temp src Pulse Resp SpO2 Height Weight   01/09/19 0710 - - - 71 16 98 % - -   01/09/19 0700 - - - 72 17 100 % - -   01/09/19 0650 - - - 73 14 98 % - -   01/09/19 0640 - - - 71 13 99 % - -   01/09/19 0630 - - - 72 16 98 % - -   01/09/19 0620 - - - 72 19 98 % - -   01/09/19 0615 - - - 74 15 97 % - -   01/09/19 0610 - - - 77 17 97 % - -   01/09/19 0605 - - - 72 15 98 % - -   01/09/19 0600 - - - 73 14 98 % - -   01/09/19 0555 - - - 72 13 98 % - -   01/09/19 0550 - - - 71 13 96 % - -   01/09/19 0545 - - - 72 14 97 % - -   01/09/19 0540 - - - 73 14 96 % - -   01/09/19 0535 - - - 72 13 97 % - -   01/09/19 0530 - - - 73 17 96 % - -   01/09/19 0525 - - - 72 15 97 % - -   01/09/19 0520 - - - 73 19 96 % - -   01/09/19 0515 - - - 73 17 96 % - -   01/09/19 0510 - - - 73 16 96 % - -   01/09/19 0505 - - - 74 20 97 % - -   01/09/19 0500 - - - 74 14 95 % - -   01/09/19 0455 - - - 74 13 97 % - -   01/09/19 0450 - - - 73 18 97 % - -   01/09/19 0445 - - - 73 15 96 % - -   01/09/19 0440 - - - 75 16 96 % - -   01/09/19 0430 - - - 94 17 92 % - -   01/09/19 0426 - - - 81 18 (!) 87 % - -   01/09/19 0415 - - - 77 16 96 % - -   01/09/19 0410 - - - 81 (!) 21 96 % - -   01/09/19 0400 - - - 81 (!) 25 97 % - -   01/09/19 0355 - - - 79 17 97 % - -   01/09/19 0350 - - - 79 18 96 % - -   01/09/19 0345 - - - 79 18 96 % - -   01/09/19 0340 - - - 81 18 96 % - -   01/09/19 0335 - - - 83 20 96 % - -   01/09/19 0330 - - - 82 20 96 % - -   01/09/19 0325 - - - 86 (!) 23 95 % - -   01/09/19 0320 - - - 86 (!) 24 95 % - -   01/09/19 0315 - - - 86 (!) 22 94 % - -   01/09/19 0310 - - - 89 (!) 25 94 % - -   01/09/19 0305 - - - 89 (!) 24 94 % - -   01/09/19 0300 - - - 92 - 93 % - -  "  01/09/19 0255 - - - 94 - 91 % - -   01/09/19 0254 131/46 37.2 °C (98.9 °F) Temporal 93 18 91 % - -   01/09/19 0253 - - - - - - 1.753 m (5' 9\") 57.2 kg (126 lb)       General: Lethargic/sleepy  EYES: no jaundice  HEENT: OP clear   Neck: No bruits No JVD.   CVS:  RRR. S1 + S2. No M/R/G  Resp: Rales B/L at base. No wheezing or crackles/rhonchi.  Abdomen: Soft, NT, ND,  Skin: Grossly nothing acute no obvious rashes  Neurological: Alert, Moves all extremities, no cranial nerve defects on limited exam  Extremities: Pulse 2+ in b/l LE. No edema. No cyanosis.       Data:  Laboratory studies personally reviewed by me:  Recent Results (from the past 24 hour(s))   BASIC METABOLIC PANEL    Collection Time: 01/14/19  3:30 AM   Result Value Ref Range    Sodium 140 135 - 145 mmol/L    Potassium 4.2 3.6 - 5.5 mmol/L    Chloride 111 96 - 112 mmol/L    Co2 21 20 - 33 mmol/L    Glucose 113 (H) 65 - 99 mg/dL    Bun 19 8 - 22 mg/dL    Creatinine 1.14 0.50 - 1.40 mg/dL    Calcium 8.6 8.5 - 10.5 mg/dL    Anion Gap 8.0 0.0 - 11.9   MAGNESIUM    Collection Time: 01/14/19  3:30 AM   Result Value Ref Range    Magnesium 1.7 1.5 - 2.5 mg/dL   ESTIMATED GFR    Collection Time: 01/14/19  3:30 AM   Result Value Ref Range    GFR If  55 (A) >60 mL/min/1.73 m 2    GFR If Non African American 45 (A) >60 mL/min/1.73 m 2   CBC WITH DIFFERENTIAL    Collection Time: 01/14/19  5:15 AM   Result Value Ref Range    WBC 14.8 (H) 4.8 - 10.8 K/uL    RBC 4.54 4.20 - 5.40 M/uL    Hemoglobin 12.5 12.0 - 16.0 g/dL    Hematocrit 41.0 37.0 - 47.0 %    MCV 90.3 81.4 - 97.8 fL    MCH 27.5 27.0 - 33.0 pg    MCHC 30.5 (L) 33.6 - 35.0 g/dL    RDW 54.1 (H) 35.9 - 50.0 fL    Platelet Count 378 164 - 446 K/uL    MPV 9.2 9.0 - 12.9 fL    Neutrophils-Polys 83.90 (H) 44.00 - 72.00 %    Lymphocytes 6.80 (L) 22.00 - 41.00 %    Monocytes 8.60 0.00 - 13.40 %    Eosinophils 0.10 0.00 - 6.90 %    Basophils 0.20 0.00 - 1.80 %    Immature Granulocytes 0.40 0.00 - 0.90 " %    Nucleated RBC 0.00 /100 WBC    Neutrophils (Absolute) 12.44 (H) 2.00 - 7.15 K/uL    Lymphs (Absolute) 1.01 1.00 - 4.80 K/uL    Monos (Absolute) 1.27 (H) 0.00 - 0.85 K/uL    Eos (Absolute) 0.02 0.00 - 0.51 K/uL    Baso (Absolute) 0.03 0.00 - 0.12 K/uL    Immature Granulocytes (abs) 0.06 0.00 - 0.11 K/uL    NRBC (Absolute) 0.00 K/uL       Imaging:  DX-CHEST-LIMITED (1 VIEW)   Final Result      1.  Small to moderate bilateral effusions and bibasilar opacities, likely atelectasis.   2.  Interstitial edema has essentially resolved.      EC-ECHOCARDIOGRAM LTD W/O CONT   Final Result      DX-CHEST-LIMITED (1 VIEW)   Final Result         1.  Pulmonary edema and/or infiltrates.   2.  Atherosclerosis   3.  Hyperexpansion of lungs favors changes of COPD.          All pertinent features of laboratory and imaging reviewed including primary images where applicable      Principal Problem (Resolved):    STEMI (ST elevation myocardial infarction) (HCC) POA: Unknown  Active Problems:    Unstable angina pectoris (HCC) POA: Yes    Coronary artery disease due to calcified coronary lesion POA: Yes    History of CVA (cerebrovascular accident) POA: Yes    Chronic left ventricular systolic heart failure (HCC) POA: Yes    ACC/AHA stage C systolic heart failure (HCC) POA: Yes    History of anterior wall MI POA: Yes    Essential hypertension POA: Yes    Normocytic anemia POA: Yes    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    Nonrheumatic aortic valve insufficiency POA: Yes    Non-rheumatic mitral regurgitation POA: Yes      Overview: Associated with mitral annular calcification    Stage 4 chronic kidney disease (HCC) POA: Unknown    GIB (gastrointestinal bleeding) POA: Unknown  Resolved Problems:    Ischemic cerebrovascular accident (CVA) (HCC) POA: Yes    STEMI (ST elevation myocardial infarction) (HCC) POA: Unknown    Hypokalemia POA: Unknown    Hypocalcemia POA: Unknown      Assessment / Plan:  #STEMI  #Ischemic  cardiomyopathy  #CHF  #Lower GI bleed  #Normocytic anemia  #Hypertension  #CKD4  EKG w/ anterior septal STEMI on admission  Tele w/NSR 60-70s  ECHO showed mildly reduced LV systolic function, EF at 40%, akinesis-hypokinesis at mid anterior septum, apical septum and apex  Had angina episode today  Back on NTG drip  Repeat EKG w/o significant changes  Trops drop to 0.12 from 1  As per Interventional Cardiology intervention not recommended 2/2 LGIB  Keeping Hb > 10, to reduce angina episodes  Continue medical management for STEMI   DC IV NTG, started topical nitro  Beta blocker  High-dose statin therapy  Start Captopril QID  IV Lasix 40 mg twice today, will re-evaluate on AM  Placed miller for I/Os measurement   CXR on AM  Labs on AM      Cardiology will continue to follow along    It is my pleasure to participate in the care of Ms. Diana.  Please do not hesitate to contact us with questions or concerns.      Chester Arambula    1/14/2019

## 2019-01-14 NOTE — HOSPICE
Disscused at length hospice services with pt and her brother that flew into [place] from [place] and her sister that lives here in [place]. Pt stated that she needs full time care. She feels like what hospice can do does not seem like it will meet her needs. She is fearful of not of being able to call 911 at the time of needing services ie SOB or chest pain and having to call Hospice. Family emphazied that she needs Care on a daily basis like a private Care giver. Pt is getting more weak and has fallen in the past few weeks. High risk for injury due to falls.  Pt does not have family to provide care for her. Lives alone. I gave her sister a list of Care providers in the [place] Area.  They also want to interview [facility], MSW was going to call [facility] and see when they can come to meet with the pt and family.

## 2019-01-14 NOTE — CARE PLAN
Problem: Nutritional:  Goal: Achieve adequate nutritional intake  Patient will tolerate diet >Clear liquids and consume ~50% of meals/supplements.   Outcome: PROGRESSING AS EXPECTED

## 2019-01-14 NOTE — THERAPY
Not appropriate for cardiac rehab due to medical status. Tentative plan for hospice, will likely need PT mobility orders once POC established.

## 2019-01-14 NOTE — DISCHARGE PLANNING
Agency/Facility Name: [facility]  Spoke To: YOUNG Juárez  Outcome: Asked to send referral to [place] hosp[facility].

## 2019-01-15 ENCOUNTER — APPOINTMENT (OUTPATIENT)
Dept: RADIOLOGY | Facility: MEDICAL CENTER | Age: 84
DRG: 377 | End: 2019-01-15
Attending: STUDENT IN AN ORGANIZED HEALTH CARE EDUCATION/TRAINING PROGRAM
Payer: MEDICARE

## 2019-01-15 LAB
ANION GAP SERPL CALC-SCNC: 7 MMOL/L (ref 0–11.9)
BASOPHILS # BLD AUTO: 0.5 % (ref 0–1.8)
BASOPHILS # BLD: 0.06 K/UL (ref 0–0.12)
BNP SERPL-MCNC: 2250 PG/ML (ref 0–100)
BUN SERPL-MCNC: 24 MG/DL (ref 8–22)
CALCIUM SERPL-MCNC: 9.3 MG/DL (ref 8.5–10.5)
CHLORIDE SERPL-SCNC: 104 MMOL/L (ref 96–112)
CO2 SERPL-SCNC: 27 MMOL/L (ref 20–33)
CREAT SERPL-MCNC: 1.21 MG/DL (ref 0.5–1.4)
EOSINOPHIL # BLD AUTO: 0.17 K/UL (ref 0–0.51)
EOSINOPHIL NFR BLD: 1.5 % (ref 0–6.9)
ERYTHROCYTE [DISTWIDTH] IN BLOOD BY AUTOMATED COUNT: 52.8 FL (ref 35.9–50)
GLUCOSE SERPL-MCNC: 92 MG/DL (ref 65–99)
HCT VFR BLD AUTO: 40.5 % (ref 37–47)
HGB BLD-MCNC: 12.9 G/DL (ref 12–16)
IMM GRANULOCYTES # BLD AUTO: 0.05 K/UL (ref 0–0.11)
IMM GRANULOCYTES NFR BLD AUTO: 0.5 % (ref 0–0.9)
LYMPHOCYTES # BLD AUTO: 1.02 K/UL (ref 1–4.8)
LYMPHOCYTES NFR BLD: 9.2 % (ref 22–41)
MAGNESIUM SERPL-MCNC: 1.6 MG/DL (ref 1.5–2.5)
MCH RBC QN AUTO: 28 PG (ref 27–33)
MCHC RBC AUTO-ENTMCNC: 31.9 G/DL (ref 33.6–35)
MCV RBC AUTO: 88 FL (ref 81.4–97.8)
MONOCYTES # BLD AUTO: 0.96 K/UL (ref 0–0.85)
MONOCYTES NFR BLD AUTO: 8.7 % (ref 0–13.4)
NEUTROPHILS # BLD AUTO: 8.79 K/UL (ref 2–7.15)
NEUTROPHILS NFR BLD: 79.6 % (ref 44–72)
NRBC # BLD AUTO: 0 K/UL
NRBC BLD-RTO: 0 /100 WBC
PLATELET # BLD AUTO: 323 K/UL (ref 164–446)
PMV BLD AUTO: 9.3 FL (ref 9–12.9)
POTASSIUM SERPL-SCNC: 3.4 MMOL/L (ref 3.6–5.5)
RBC # BLD AUTO: 4.6 M/UL (ref 4.2–5.4)
SODIUM SERPL-SCNC: 138 MMOL/L (ref 135–145)
WBC # BLD AUTO: 11.1 K/UL (ref 4.8–10.8)

## 2019-01-15 PROCEDURE — A9270 NON-COVERED ITEM OR SERVICE: HCPCS | Performed by: STUDENT IN AN ORGANIZED HEALTH CARE EDUCATION/TRAINING PROGRAM

## 2019-01-15 PROCEDURE — 83880 ASSAY OF NATRIURETIC PEPTIDE: CPT

## 2019-01-15 PROCEDURE — 770020 HCHG ROOM/CARE - TELE (206)

## 2019-01-15 PROCEDURE — A9270 NON-COVERED ITEM OR SERVICE: HCPCS | Performed by: HOSPITALIST

## 2019-01-15 PROCEDURE — 700102 HCHG RX REV CODE 250 W/ 637 OVERRIDE(OP): Performed by: INTERNAL MEDICINE

## 2019-01-15 PROCEDURE — 83735 ASSAY OF MAGNESIUM: CPT

## 2019-01-15 PROCEDURE — 99232 SBSQ HOSP IP/OBS MODERATE 35: CPT | Performed by: HOSPITALIST

## 2019-01-15 PROCEDURE — 80048 BASIC METABOLIC PNL TOTAL CA: CPT

## 2019-01-15 PROCEDURE — 71045 X-RAY EXAM CHEST 1 VIEW: CPT

## 2019-01-15 PROCEDURE — 700102 HCHG RX REV CODE 250 W/ 637 OVERRIDE(OP): Performed by: HOSPITALIST

## 2019-01-15 PROCEDURE — 700111 HCHG RX REV CODE 636 W/ 250 OVERRIDE (IP): Performed by: STUDENT IN AN ORGANIZED HEALTH CARE EDUCATION/TRAINING PROGRAM

## 2019-01-15 PROCEDURE — 700102 HCHG RX REV CODE 250 W/ 637 OVERRIDE(OP): Performed by: STUDENT IN AN ORGANIZED HEALTH CARE EDUCATION/TRAINING PROGRAM

## 2019-01-15 PROCEDURE — 85025 COMPLETE CBC W/AUTO DIFF WBC: CPT

## 2019-01-15 PROCEDURE — 94668 MNPJ CHEST WALL SBSQ: CPT

## 2019-01-15 PROCEDURE — 99233 SBSQ HOSP IP/OBS HIGH 50: CPT | Mod: GC | Performed by: INTERNAL MEDICINE

## 2019-01-15 PROCEDURE — A9270 NON-COVERED ITEM OR SERVICE: HCPCS | Performed by: INTERNAL MEDICINE

## 2019-01-15 RX ORDER — MAGNESIUM SULFATE HEPTAHYDRATE 40 MG/ML
4 INJECTION, SOLUTION INTRAVENOUS ONCE
Status: COMPLETED | OUTPATIENT
Start: 2019-01-15 | End: 2019-01-15

## 2019-01-15 RX ORDER — FUROSEMIDE 10 MG/ML
40 INJECTION INTRAMUSCULAR; INTRAVENOUS ONCE
Status: COMPLETED | OUTPATIENT
Start: 2019-01-15 | End: 2019-01-15

## 2019-01-15 RX ORDER — CAPTOPRIL 12.5 MG/1
12.5 TABLET ORAL EVERY 6 HOURS
Status: DISCONTINUED | OUTPATIENT
Start: 2019-01-15 | End: 2019-01-16 | Stop reason: HOSPADM

## 2019-01-15 RX ORDER — ISOSORBIDE MONONITRATE 30 MG/1
90 TABLET, EXTENDED RELEASE ORAL
Status: DISCONTINUED | OUTPATIENT
Start: 2019-01-15 | End: 2019-01-16 | Stop reason: HOSPADM

## 2019-01-15 RX ORDER — POTASSIUM CHLORIDE 20 MEQ/1
40 TABLET, EXTENDED RELEASE ORAL ONCE
Status: COMPLETED | OUTPATIENT
Start: 2019-01-15 | End: 2019-01-15

## 2019-01-15 RX ORDER — FUROSEMIDE 20 MG/1
40 TABLET ORAL
Status: DISCONTINUED | OUTPATIENT
Start: 2019-01-16 | End: 2019-01-16 | Stop reason: HOSPADM

## 2019-01-15 RX ADMIN — MAGNESIUM SULFATE HEPTAHYDRATE 4 G: 40 INJECTION, SOLUTION INTRAVENOUS at 09:08

## 2019-01-15 RX ADMIN — SUCRALFATE 1 G: 1 SUSPENSION ORAL at 11:55

## 2019-01-15 RX ADMIN — CARVEDILOL 12.5 MG: 12.5 TABLET, FILM COATED ORAL at 11:55

## 2019-01-15 RX ADMIN — CAPTOPRIL 6.25 MG: 12.5 TABLET ORAL at 05:00

## 2019-01-15 RX ADMIN — ISOSORBIDE MONONITRATE 90 MG: 30 TABLET ORAL at 11:55

## 2019-01-15 RX ADMIN — SUCRALFATE 1 G: 1 SUSPENSION ORAL at 00:29

## 2019-01-15 RX ADMIN — OMEPRAZOLE 20 MG: 20 CAPSULE, DELAYED RELEASE ORAL at 05:00

## 2019-01-15 RX ADMIN — LATANOPROST 1 DROP: 50 SOLUTION OPHTHALMIC at 18:19

## 2019-01-15 RX ADMIN — FUROSEMIDE 40 MG: 10 INJECTION, SOLUTION INTRAMUSCULAR; INTRAVENOUS at 09:04

## 2019-01-15 RX ADMIN — OMEPRAZOLE 20 MG: 20 CAPSULE, DELAYED RELEASE ORAL at 18:03

## 2019-01-15 RX ADMIN — CAPTOPRIL 12.5 MG: 12.5 TABLET ORAL at 11:55

## 2019-01-15 RX ADMIN — CAPTOPRIL 12.5 MG: 12.5 TABLET ORAL at 18:03

## 2019-01-15 RX ADMIN — SUCRALFATE 1 G: 1 SUSPENSION ORAL at 18:03

## 2019-01-15 RX ADMIN — ROSUVASTATIN CALCIUM 40 MG: 20 TABLET, FILM COATED ORAL at 18:03

## 2019-01-15 RX ADMIN — SUCRALFATE 1 G: 1 SUSPENSION ORAL at 05:00

## 2019-01-15 RX ADMIN — CARVEDILOL 12.5 MG: 12.5 TABLET, FILM COATED ORAL at 18:03

## 2019-01-15 RX ADMIN — POTASSIUM CHLORIDE 40 MEQ: 1500 TABLET, EXTENDED RELEASE ORAL at 09:04

## 2019-01-15 RX ADMIN — CAPTOPRIL 6.25 MG: 12.5 TABLET ORAL at 00:29

## 2019-01-15 ASSESSMENT — PAIN SCALES - GENERAL
PAINLEVEL_OUTOF10: 0

## 2019-01-15 ASSESSMENT — ENCOUNTER SYMPTOMS
SHORTNESS OF BREATH: 0
DIARRHEA: 0
COUGH: 0
VOMITING: 0
LOSS OF CONSCIOUSNESS: 0
ABDOMINAL PAIN: 0
HEADACHES: 0
WEAKNESS: 1
DIZZINESS: 0
CHILLS: 0
NAUSEA: 0
FEVER: 0
BACK PAIN: 0

## 2019-01-15 NOTE — CARE PLAN
Problem: Fluid Volume:  Goal: Will maintain balanced intake and output    Intervention: Monitor, educate, and encourage compliance with therapeutic intake of liquids  Benavides catheter inserted for accurate I/O with lasix administration per cardiology      Problem: Mobility  Goal: Risk for activity intolerance will decrease    Intervention: Assess and monitor signs of activity intolerance  Patient mobilizing to restroom and up to chair

## 2019-01-15 NOTE — CARE PLAN
Problem: Knowledge Deficit  Goal: Knowledge of disease process/condition, treatment plan, diagnostic tests, and medications will improve  Outcome: PROGRESSING AS EXPECTED  Educate on POC    Problem: Fluid Volume:  Goal: Will maintain balanced intake and output  Outcome: PROGRESSING AS EXPECTED  Monitor strict I&O. Encourage PO intake

## 2019-01-15 NOTE — PROGRESS NOTES
[facility] Medicine Daily Progress Note    Date of Service  1/15/2019    Chief Complaint  84 y.o. female admitted 1/9/2019 with CP and R arm pain/weakness.      Hospital Course    Pt with known Hx of CAD (Stent to OM in 2010) and noted to have EKG changes consistent with STEMI.  Pt was not taken to cath lab as PMHx of UGIB precludes further antiplatelet or CA therapy.        Interval Problem Update  Pt in good spirits.  Has had no CP overnight.  Eating well, slept well.  Eager to try getting out of bed.  Quiet night  Sinus  SBPs 120-140s  AFebrile  Tolerating po  UOP 2700ml/12hrs on lasix  4 LNC; on 2 LNC at home    Consultants/Specialty  cardiology    Code Status  DNAR/I    Disposition  Home with Hospice though formal dicision has not been made yet    Review of Systems  Review of Systems   Constitutional: Negative for chills and fever.   Respiratory: Negative for cough and shortness of breath.    Cardiovascular: Negative for chest pain.   Gastrointestinal: Negative for abdominal pain, diarrhea, nausea and vomiting.   Musculoskeletal: Negative for back pain.   Skin: Negative for rash.   Neurological: Positive for weakness. Negative for dizziness, loss of consciousness and headaches.        Physical Exam  Temp:  [36.3 °C (97.4 °F)-36.8 °C (98.3 °F)] 36.4 °C (97.6 °F)  Pulse:  [59-89] 65  Resp:  [13-27] 15    Physical Exam   Constitutional: She is oriented to person, place, and time. She appears well-developed and well-nourished. No distress.   HENT:   Head: Normocephalic and atraumatic.   Neck: No JVD present.   Cardiovascular: Normal rate and regular rhythm.    Murmur heard.  Pulmonary/Chest: Effort normal. No stridor. No respiratory distress. She has no wheezes. She has no rales.   Abdominal: Soft. There is no tenderness. There is no rebound and no guarding.   Musculoskeletal: She exhibits edema.   Neurological: She is oriented to person, place, and time.   Skin: Skin is warm and dry. No rash noted. She is not  diaphoretic.   Psychiatric: She has a normal mood and affect. Thought content normal.   Nursing note and vitals reviewed.      Fluids    Intake/Output Summary (Last 24 hours) at 01/15/19 0418  Last data filed at 01/15/19 0200   Gross per 24 hour   Intake           663.25 ml   Output             2450 ml   Net         -1786.75 ml       Laboratory  Recent Labs      01/12/19   0600  01/13/19   0525  01/14/19   0515   WBC  10.4  5.0  14.8*   RBC  4.52  4.36  4.54   HEMOGLOBIN  12.7  12.5  12.5   HEMATOCRIT  40.4  38.6  41.0   MCV  89.4  88.5  90.3   MCH  28.1  28.7  27.5   MCHC  31.4*  32.4*  30.5*   RDW  55.5*  52.9*  54.1*   PLATELETCT  393  332  378   MPV  9.4  9.1  9.2     Recent Labs      01/12/19   0600  01/13/19   0525  01/14/19   0330   SODIUM  138  138  140   POTASSIUM  5.2  4.7  4.2   CHLORIDE  111  111  111   CO2  20  17*  21   GLUCOSE  121*  139*  113*   BUN  20  15  19   CREATININE  1.23  1.16  1.14   CALCIUM  8.8  8.8  8.6                   Imaging  DX-CHEST-PORTABLE (1 VIEW)   Final Result         1.  The extensive bilateral perihilar opacities have worsened from previous exam consistent with atelectasis, pneumonitis, and/or pulmonary edema.      2.  6 small bilateral pleural effusions         DX-CHEST-LIMITED (1 VIEW)   Final Result      1.  Small to moderate bilateral effusions and bibasilar opacities, likely atelectasis.   2.  Interstitial edema has essentially resolved.      EC-ECHOCARDIOGRAM LTD W/O CONT   Final Result      DX-CHEST-LIMITED (1 VIEW)   Final Result         1.  Pulmonary edema and/or infiltrates.   2.  Atherosclerosis   3.  Hyperexpansion of lungs favors changes of COPD.           Assessment/Plan  ACC/AHA stage C systolic heart failure (HCC)- (present on admission)   Assessment & Plan      Started on captopril and carvedilol  Now off Nitro gtts  IV Lasix x1 today and monitor intake and output     Chronic left ventricular systolic heart failure (HCC)- (present on admission)   Assessment  & Plan    Repeat echo with EF of 40%       History of CVA (cerebrovascular accident)- (present on admission)   Assessment & Plan    ASA  statin     Coronary artery disease due to calcified coronary lesion- (present on admission)   Assessment & Plan    Unstable angina    Cardiology following  Not candidate for intervention given recurrent GI bleed  Started on captopril and beta-blocker switched    to carvedilol  Not on aspirin secondary to GI bleed  Continue Crestor  Continue close clinical monitoring  Hospice involved and pt considering home under their care     Unstable angina pectoris (HCC)- (present on admission)   Assessment & Plan    Not an interventional candidate  CAMILLA Prater  Likely end stage disease     Normocytic anemia- (present on admission)   Assessment & Plan    Secondary to GI bleed    Hemoglobin 12.5 stable       Essential hypertension- (present on admission)   Assessment & Plan    Monitor blood pressure and adjust     GIB (gastrointestinal bleeding)   Assessment & Plan    Bleeding clinically resolved and hemoglobin stable    Continue Protonix and Carafate  Evaluated by GI  Hemoglobin stable at this time continue to monitor       Stage 4 chronic kidney disease (HCC)   Assessment & Plan    Monitor with diuresis          VTE prophylaxis: heparin

## 2019-01-15 NOTE — CARE PLAN
Problem: Hyperinflation:  Goal: Prevent or improve atelectasis    Intervention: Instruct incentive spirometry usage  IS ~750  Intervention: Perform hyperinflation therapy as indicated by assessment  PEP QID

## 2019-01-15 NOTE — DISCHARGE PLANNING
Anticipated Discharge Disposition: d/c w/ hospice    Action: Received VM from [facility] admissions RN, prakash.    She will meet w/ pt and sister at bedside today 3PM.    Barriers to Discharge: TBD    Plan: f/u w/ medical team, acquire decision from family

## 2019-01-15 NOTE — PROGRESS NOTES
Cardiology Progress Note    Reason for Consult:  Asked by Dr Ghulam Krishnan for Code STEMI      CC:   Chief Complaint   Patient presents with   • Chest Pain     Began at home at 0045       HPI:     84-year-old female with known coronary artery disease who was brought in as a code STEMI.  Patient reports being in her usual state of health until yesterday when she started having bilateral nonradiating chest discomfort associated with dyspnea.  She tried taking her medications but her pain persisted and she finally decided to call 911 today.  She appears very uncomfortable and complains of ongoing chest discomfort and dyspnea. Reports compliance with all of her medications.     In 2010, patient had an anterior MI at which time she was found to have a  to her LAD.  She underwent PCI to OM.  Repeat coronary angiography in 2014 showed patent stent. She has also had multiple CVAs in the past.     Reports having GI bleed history.  In October 2018, she underwent clipping for a gastric AVM.  She was admitted here in December for a NSTEMI for which she was started on heparin drip following which she had a drop in her hemoglobin for which she required a transfusion.  Patient was seen by Dr. Kapoor about 10 days ago and was deemed not to be a good candidate for any coronary intervention due to her recent GI bleed.       Interval Events    1/15/2019 Patient reporting improvement with chest pain and dyspnea. No blood with BM. BNP in the 2250s. CXR showing extensive bilateral perihilar opacities but lungs CTA B/L after IV lasix this AM. Output net neg w/in the last 24hrs. Increased captopril dose. Start PO lasix daily and Isosorbide daily.    1/14/2019 Patient reporting chest pain episode this early morning which she associated with a feeling of anxiety. Pain resolved with ativan. SI at bedside,  on importance of frequent SI use to improve atelectasis and prevent pneumonia. CXR and presentation suggestive of CHF  exacerbation. Received one does of IV Lasix 40 mg  This AM. Patient reporting improvement with respiratory function and incontinence. Sill with B/L rales at base on afternoon assessment. Place miller, measure I/Os and IV Lasix 40 mg. Labs on AM.      1/13/2019 Patient reporting chest pain over night that improved with NTG drip. H/H over 10. Spoke to patient after shower, noted dyspneic and speaking in short phrases. Rales at base and CXR with atelectasis. Continue SI. As per GI, Continue to hold off EGD/ invasive procedure for now.  GOC discussion today.        1/11/2019 Patient was evaluated at bedside and found AAOX3. NTG drip was restarted 2/2 reemerging chest pain. EKG w/o significant changes. Trops drop to 0.12 from 1. Discussed case with Interventional Cardiology for which intervention was not recommended 2/2 inability to give antiplatelet therapy due to LGIB and recommended keeping Hb > 10.    1/10/2019 patient was evaluated at bedside and found AAOX3. Still with 5/10 substernal chest pain. Pt more awake today. Tele w/NSR 60-70s. Hb drop to 7, got PRBC transfusion. Continue medical MGMT with nitroglycerin drip, metoprolol and high-dose statin. Continue holding antiplatelet therapy 2/2 LGIB. Palliative following, aim to discuss options patient and sister Erin today.    1/9/2019 patient was evaluated at bedside and found AAOX3 but lethargic. She reporting substernal chest pain. Tele w/NSR 60-70s. Trop at 0.9-->1, BMP 1600.  Patient still with lower GI bleed and hemoglobin trending down.  Treat with medical management, patient is currently on nitroglycerin drip, metoprolol and high-dose statin.  Antiplatelet therapy risk in the setting of GI bleed. Palliative care was consulted and per their note aim to discuss options tomorrow with patient and sister Erin.      Medications / Drug list prior to admission:  No current facility-administered medications on file prior to encounter.      Current Outpatient  Prescriptions on File Prior to Encounter   Medication Sig Dispense Refill   • aspirin (ASA) 81 MG Chew Tab chewable tablet Take 81 mg by mouth every day.     • rosuvastatin (CRESTOR) 40 MG tablet Take 40 mg by mouth every evening.     • pantoprazole (PROTONIX) 40 MG Tablet Delayed Response Take 40 mg by mouth 2 Times a Day.     • bimatoprost (LUMIGAN) 0.01 % Solution Place 1 Drop in both eyes every bedtime. 1 gtts both eyes         Current list of administered Medications:    Current Facility-Administered Medications:   •  magnesium sulfate IVPB premix 4 g, 4 g, Intravenous, Once, Chester Arambula M.D., Last Rate: 25 mL/hr at 01/15/19 0908, 4 g at 01/15/19 0908  •  albuterol (PROVENTIL) 2.5mg/0.5ml nebulizer solution 2.5 mg, 2.5 mg, Nebulization, Q4H PRN (RT), Ghulam Krishnan M.D., 2.5 mg at 01/14/19 0337  •  carvedilol (COREG) tablet 12.5 mg, 12.5 mg, Oral, BID WITH MEALS, Chester Arambula M.D., 12.5 mg at 01/14/19 1720  •  captopril (CAPOTEN) tablet 6.25 mg, 6.25 mg, Oral, Q6HRS, Chester Arambula M.D., 6.25 mg at 01/15/19 0500  •  nitroglycerin (NITRO-BID) 2 % ointment 1 Inch, 1 Inch, Topical, Q6HRS, Severo Hanley M.D., Stopped at 01/15/19 0000  •  omeprazole (PRILOSEC) capsule 20 mg, 20 mg, Oral, BID, Ghulam Krishnan M.D., 20 mg at 01/15/19 0500  •  morphine (pf) 4 mg/ml injection 2 mg, 2 mg, Intravenous, Q3HRS PRN, Mila Espinosa M.D., 4 mg at 01/14/19 0345  •  Metoprolol Tartrate (LOPRESSOR) injection 5 mg, 5 mg, Intravenous, Q5 MIN PRN, Ghulam Krishnan M.D., 5 mg at 01/11/19 0904  •  sucralfate (CARAFATE) 1 GM/10ML suspension 1 g, 1 g, Oral, Q6HRS, Tristan Bhatia M.D., 1 g at 01/15/19 0500  •  rosuvastatin (CRESTOR) tablet 40 mg, 40 mg, Oral, Q EVENING, Rubén Gonzalez M.D., 40 mg at 01/14/19 1720  •  acetaminophen (TYLENOL) tablet 650 mg, 650 mg, Oral, Q6HRS PRN, Rubén Gonzalez M.D., Stopped at 01/12/19 0525  •  ondansetron (ZOFRAN) syringe/vial injection 4  mg, 4 mg, Intravenous, Q4HRS PRN, Rubén Gonzalez M.D.  •  ondansetron (ZOFRAN ODT) dispertab 4 mg, 4 mg, Oral, Q4HRS PRN, Rubén Gonzalez M.D.  •  senna-docusate (PERICOLACE or SENOKOT S) 8.6-50 MG per tablet 2 Tab, 2 Tab, Oral, BID, 2 Tab at 01/14/19 1720 **AND** polyethylene glycol/lytes (MIRALAX) PACKET 1 Packet, 1 Packet, Oral, QDAY PRN **AND** magnesium hydroxide (MILK OF MAGNESIA) suspension 30 mL, 30 mL, Oral, QDAY PRN **AND** bisacodyl (DULCOLAX) suppository 10 mg, 10 mg, Rectal, QDAY PRN, Rubén Gonzalez M.D.  •  Respiratory Care per Protocol, , Nebulization, Continuous RT, Rubén Gonzalez M.D.  •  latanoprost (XALATAN) 0.005 % ophthalmic solution 1 Drop, 1 Drop, Both Eyes, Q EVENING, Rubén Gonzalez M.D., 1 Drop at 01/14/19 1719    Past Medical History:   Diagnosis Date   • Acute myocardial infarction, true posterior wall infarction, episode of care unspecified    • Anxiety    • Bowel habit changes    • Breath shortness 2017    uses oxygen at 2 liters/min; AT NIGHT ONLY   • CAD (coronary artery disease)     S/P stent placement   • Cholesterol blood decreased    • Dental disorder     Partial upper   • Dizziness    • GI bleed    • Glaucoma    • Heart burn    • Hyperlipidemia    • Hypertension    • Myocardial infarct (HCC) 6/10/10   • Peripheral vascular disease (HCC)    • Pulmonary disease     bronchitis   • TIA (transient ischemic attack)    • Unspecified hemorrhagic conditions     pt takes plavix   • Unspecified urinary incontinence    • Urinary bladder disorder        Past Surgical History:   Procedure Laterality Date   • COLONOSCOPY - ENDO N/A 10/23/2018    Procedure: COLONOSCOPY - ENDO;  Surgeon: Anatoly Barragan M.D.;  Location: ENDOSCOPY [facility];  Service: Gastroenterology   • GASTROSCOPY-ENDO N/A 10/22/2018    Procedure: GASTROSCOPY-ENDO;  Surgeon: Bakari Brown M.D.;  Location: ENDOSCOPY [facility];  Service: Gastroenterology   • CAROTID ENDARTERECTOMY Right 9/3/2018     Procedure: CAROTID ENDARTERECTOMY WITH NEUROMONITORING;  Surgeon: Naga Abad M.D.;  Location: SURGERY [facility];  Service: Vascular   • GASTROSCOPY WITH BIOPSY  2/13/2016    Procedure: GASTROSCOPY WITH BIOPSY;  Surgeon: Susan Miller M.D.;  Location: ENDOSCOPY [facility];  Service:    • RECOVERY  7/24/2014    Performed by Cath-Recovery Surgery at SURGERY SAME DAY [facility]   • CARDIAC CATH  7/24/14    Diffuse disease, see report.  % with collterals.   • FEMORAL ENDARTERECTOMY  10/4/2013    Performed by Kacie Baker M.D. at SURGERY [facility]   • ANGIOPLASTY BALLOON  10/4/2013    Performed by Kacie Baker M.D. at SURGERY [facility]   • RECOVERY  4/9/2013    Performed by Ir-Recovery Surgery at SURGERY SAME DAY [facility] ORS   • RECOVERY  11/26/2012    Performed by Ir-Recovery Surgery at SURGERY SAME DAY [facility]   • RECTUS REPAIR  7/13/2012    Performed by SHEILA PLATT at SURGERY SURGICAL [facility]   • OTHER  12/10/10    stents in legs   • OTHER  6/10/10    cardiac stents   • CARDIAC CATH  6/2010    BMS to Om   • OTHER  2005    KNEE SURGERY   • CHOLECYSTECTOMY     • HYSTERECTOMY, TOTAL ABDOMINAL     • OTHER CARDIAC SURGERY      stent in heart   • STENT PLACEMENT      treated by Dr Baker, multiple surgeries to legs.       Family History   Problem Relation Age of Onset   • Stroke Mother 68        CVA   • Heart Disease Mother 65        valve surgery   • Other Son         wgt 465 lbs     Patient family history was personally reviewed, no pertinent family history to current presentation    Social History     Social History   • Marital status:      Spouse name: N/A   • Number of children: N/A   • Years of education: N/A     Occupational History   • Not on file.     Social History Main Topics   • Smoking status: Former Smoker     Packs/day: 0.25     Years: 45.00     Types: Cigarettes     Quit date: 2/1/2016   • Smokeless tobacco: Never Used   • Alcohol  use 0.0 - 0.6 oz/week      Comment: 1 drink per month   • Drug use: No   • Sexual activity: Not on file     Other Topics Concern   • Not on file     Social History Narrative   • No narrative on file       ALLERGIES:  Allergies   Allergen Reactions   • Penicillins Rash and Vomiting     Rxn = unknown  Pt tolerates cephalosporins   • Hydrocodone Vomiting and Nausea     Rxn = unknown   • Tape Rash     RASH       Review of systems:  A complete review of symptoms was reviewed with patient. This is reviewed in H&P and PMH. ALL OTHERS reviewed and negative    Physical exam:  Patient Vitals for the past 24 hrs:   BP Temp Temp src Pulse Resp SpO2 Height Weight   01/09/19 0710 - - - 71 16 98 % - -   01/09/19 0700 - - - 72 17 100 % - -   01/09/19 0650 - - - 73 14 98 % - -   01/09/19 0640 - - - 71 13 99 % - -   01/09/19 0630 - - - 72 16 98 % - -   01/09/19 0620 - - - 72 19 98 % - -   01/09/19 0615 - - - 74 15 97 % - -   01/09/19 0610 - - - 77 17 97 % - -   01/09/19 0605 - - - 72 15 98 % - -   01/09/19 0600 - - - 73 14 98 % - -   01/09/19 0555 - - - 72 13 98 % - -   01/09/19 0550 - - - 71 13 96 % - -   01/09/19 0545 - - - 72 14 97 % - -   01/09/19 0540 - - - 73 14 96 % - -   01/09/19 0535 - - - 72 13 97 % - -   01/09/19 0530 - - - 73 17 96 % - -   01/09/19 0525 - - - 72 15 97 % - -   01/09/19 0520 - - - 73 19 96 % - -   01/09/19 0515 - - - 73 17 96 % - -   01/09/19 0510 - - - 73 16 96 % - -   01/09/19 0505 - - - 74 20 97 % - -   01/09/19 0500 - - - 74 14 95 % - -   01/09/19 0455 - - - 74 13 97 % - -   01/09/19 0450 - - - 73 18 97 % - -   01/09/19 0445 - - - 73 15 96 % - -   01/09/19 0440 - - - 75 16 96 % - -   01/09/19 0430 - - - 94 17 92 % - -   01/09/19 0426 - - - 81 18 (!) 87 % - -   01/09/19 0415 - - - 77 16 96 % - -   01/09/19 0410 - - - 81 (!) 21 96 % - -   01/09/19 0400 - - - 81 (!) 25 97 % - -   01/09/19 0355 - - - 79 17 97 % - -   01/09/19 0350 - - - 79 18 96 % - -   01/09/19 0345 - - - 79 18 96 % - -   01/09/19 0340 - -  "- 81 18 96 % - -   01/09/19 0335 - - - 83 20 96 % - -   01/09/19 0330 - - - 82 20 96 % - -   01/09/19 0325 - - - 86 (!) 23 95 % - -   01/09/19 0320 - - - 86 (!) 24 95 % - -   01/09/19 0315 - - - 86 (!) 22 94 % - -   01/09/19 0310 - - - 89 (!) 25 94 % - -   01/09/19 0305 - - - 89 (!) 24 94 % - -   01/09/19 0300 - - - 92 - 93 % - -   01/09/19 0255 - - - 94 - 91 % - -   01/09/19 0254 131/46 37.2 °C (98.9 °F) Temporal 93 18 91 % - -   01/09/19 0253 - - - - - - 1.753 m (5' 9\") 57.2 kg (126 lb)       General: Lethargic/sleepy  EYES: no jaundice  HEENT: OP clear   Neck: No bruits No JVD.   CVS:  RRR. S1 + S2. No M/R/G  Resp: CTA B/L. No wheezing or crackles/rhonchi.  Abdomen: Soft, NT, ND,  Skin: Grossly nothing acute no obvious rashes  Neurological: Alert, Moves all extremities, no cranial nerve defects on limited exam  Extremities: Pulse 2+ in b/l LE. No edema. No cyanosis.       Data:  Laboratory studies personally reviewed by me:  Recent Results (from the past 24 hour(s))   BASIC METABOLIC PANEL    Collection Time: 01/15/19  5:04 AM   Result Value Ref Range    Sodium 138 135 - 145 mmol/L    Potassium 3.4 (L) 3.6 - 5.5 mmol/L    Chloride 104 96 - 112 mmol/L    Co2 27 20 - 33 mmol/L    Glucose 92 65 - 99 mg/dL    Bun 24 (H) 8 - 22 mg/dL    Creatinine 1.21 0.50 - 1.40 mg/dL    Calcium 9.3 8.5 - 10.5 mg/dL    Anion Gap 7.0 0.0 - 11.9   MAGNESIUM    Collection Time: 01/15/19  5:04 AM   Result Value Ref Range    Magnesium 1.6 1.5 - 2.5 mg/dL   ESTIMATED GFR    Collection Time: 01/15/19  5:04 AM   Result Value Ref Range    GFR If  51 (A) >60 mL/min/1.73 m 2    GFR If Non  42 (A) >60 mL/min/1.73 m 2   CBC WITH DIFFERENTIAL    Collection Time: 01/15/19  5:05 AM   Result Value Ref Range    WBC 11.1 (H) 4.8 - 10.8 K/uL    RBC 4.60 4.20 - 5.40 M/uL    Hemoglobin 12.9 12.0 - 16.0 g/dL    Hematocrit 40.5 37.0 - 47.0 %    MCV 88.0 81.4 - 97.8 fL    MCH 28.0 27.0 - 33.0 pg    MCHC 31.9 (L) 33.6 - 35.0 " g/dL    RDW 52.8 (H) 35.9 - 50.0 fL    Platelet Count 323 164 - 446 K/uL    MPV 9.3 9.0 - 12.9 fL    Neutrophils-Polys 79.60 (H) 44.00 - 72.00 %    Lymphocytes 9.20 (L) 22.00 - 41.00 %    Monocytes 8.70 0.00 - 13.40 %    Eosinophils 1.50 0.00 - 6.90 %    Basophils 0.50 0.00 - 1.80 %    Immature Granulocytes 0.50 0.00 - 0.90 %    Nucleated RBC 0.00 /100 WBC    Neutrophils (Absolute) 8.79 (H) 2.00 - 7.15 K/uL    Lymphs (Absolute) 1.02 1.00 - 4.80 K/uL    Monos (Absolute) 0.96 (H) 0.00 - 0.85 K/uL    Eos (Absolute) 0.17 0.00 - 0.51 K/uL    Baso (Absolute) 0.06 0.00 - 0.12 K/uL    Immature Granulocytes (abs) 0.05 0.00 - 0.11 K/uL    NRBC (Absolute) 0.00 K/uL   BTYPE NATRIURETIC PEPTIDE    Collection Time: 01/15/19  5:05 AM   Result Value Ref Range    B Natriuretic Peptide 2250 (H) 0 - 100 pg/mL       Imaging:  DX-CHEST-PORTABLE (1 VIEW)   Final Result         1.  The extensive bilateral perihilar opacities have worsened from previous exam consistent with atelectasis, pneumonitis, and/or pulmonary edema.      2.  6 small bilateral pleural effusions         DX-CHEST-LIMITED (1 VIEW)   Final Result      1.  Small to moderate bilateral effusions and bibasilar opacities, likely atelectasis.   2.  Interstitial edema has essentially resolved.      EC-ECHOCARDIOGRAM LTD W/O CONT   Final Result      DX-CHEST-LIMITED (1 VIEW)   Final Result         1.  Pulmonary edema and/or infiltrates.   2.  Atherosclerosis   3.  Hyperexpansion of lungs favors changes of COPD.          All pertinent features of laboratory and imaging reviewed including primary images where applicable      Principal Problem (Resolved):    STEMI (ST elevation myocardial infarction) (HCC) POA: Unknown  Active Problems:    Unstable angina pectoris (HCC) POA: Yes    Coronary artery disease due to calcified coronary lesion POA: Yes    History of CVA (cerebrovascular accident) POA: Yes    Chronic left ventricular systolic heart failure (HCC) POA: Yes    ACC/AHA stage  C systolic heart failure (HCC) POA: Yes    History of anterior wall MI POA: Yes    Essential hypertension POA: Yes    Normocytic anemia POA: Yes    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    Nonrheumatic aortic valve insufficiency POA: Yes    Non-rheumatic mitral regurgitation POA: Yes      Overview: Associated with mitral annular calcification    Stage 4 chronic kidney disease (HCC) POA: Unknown    GIB (gastrointestinal bleeding) POA: Unknown  Resolved Problems:    Ischemic cerebrovascular accident (CVA) ([facility]) POA: Yes    STEMI (ST elevation myocardial infarction) ([facility]) POA: Unknown    Hypokalemia POA: Unknown    Hypocalcemia POA: Unknown      Assessment / Plan:  #STEMI  #Ischemic cardiomyopathy  #CHF  #Lower GI bleed  #Normocytic anemia  #Hypertension  #CKD4  EKG w/ anterior septal STEMI on admission  Tele w/NSR 60-70s  ECHO showed mildly reduced LV systolic function, EF at 40%, akinesis-hypokinesis at mid anterior septum, apical septum and apex  No angina episode today  As per Interventional Cardiology intervention not recommended 2/2 LGIB  Keeping Hb > 10, to reduce angina episodes  Continue medical management for STEMI   Beta blocker therapy  High-dose statin therapy  Continue Captopril QID  Started topical Isosorbide  Start PO Lasix daily  DC topical NTG  CXR on AM  Labs on AM      Cardiology will continue to follow along    It is my pleasure to participate in the care of Ms. Diana.  Please do not hesitate to contact us with questions or concerns.      Cehster Arambula    1/14/2019

## 2019-01-15 NOTE — DISCHARGE PLANNING
Anticipated Discharge Disposition: TBD    Action: Spoke to [facility] admissions RN.    Pt does not appear to be interested in hospice. She did not interact w/ the discussion very much. Family asked if hospice will walk pt's dog. RN provided list of private caregivers for extra support.     It appears pt may want to return to  from [facility] and add private care givers to buffer her. She had OT/PT w/ HH recently as well.      Barriers to Discharge: TBD    Plan: f/u w/ medical team, pt, cca

## 2019-01-16 ENCOUNTER — APPOINTMENT (OUTPATIENT)
Dept: RADIOLOGY | Facility: MEDICAL CENTER | Age: 84
DRG: 377 | End: 2019-01-16
Attending: STUDENT IN AN ORGANIZED HEALTH CARE EDUCATION/TRAINING PROGRAM
Payer: MEDICARE

## 2019-01-16 ENCOUNTER — PATIENT OUTREACH (OUTPATIENT)
Dept: HEALTH INFORMATION MANAGEMENT | Facility: OTHER | Age: 84
End: 2019-01-16

## 2019-01-16 VITALS
BODY MASS INDEX: 19.66 KG/M2 | WEIGHT: 132.72 LBS | DIASTOLIC BLOOD PRESSURE: 48 MMHG | HEART RATE: 70 BPM | HEIGHT: 69 IN | TEMPERATURE: 98.2 F | SYSTOLIC BLOOD PRESSURE: 127 MMHG | OXYGEN SATURATION: 97 % | RESPIRATION RATE: 20 BRPM

## 2019-01-16 LAB
ANION GAP SERPL CALC-SCNC: 7 MMOL/L (ref 0–11.9)
BUN SERPL-MCNC: 27 MG/DL (ref 8–22)
CALCIUM SERPL-MCNC: 9.2 MG/DL (ref 8.5–10.5)
CHLORIDE SERPL-SCNC: 101 MMOL/L (ref 96–112)
CO2 SERPL-SCNC: 31 MMOL/L (ref 20–33)
CREAT SERPL-MCNC: 1.2 MG/DL (ref 0.5–1.4)
GLUCOSE SERPL-MCNC: 102 MG/DL (ref 65–99)
MAGNESIUM SERPL-MCNC: 2.4 MG/DL (ref 1.5–2.5)
POTASSIUM SERPL-SCNC: 3.6 MMOL/L (ref 3.6–5.5)
SODIUM SERPL-SCNC: 139 MMOL/L (ref 135–145)

## 2019-01-16 PROCEDURE — A9270 NON-COVERED ITEM OR SERVICE: HCPCS | Performed by: INTERNAL MEDICINE

## 2019-01-16 PROCEDURE — 99232 SBSQ HOSP IP/OBS MODERATE 35: CPT | Mod: GC | Performed by: INTERNAL MEDICINE

## 2019-01-16 PROCEDURE — 80048 BASIC METABOLIC PNL TOTAL CA: CPT

## 2019-01-16 PROCEDURE — 83735 ASSAY OF MAGNESIUM: CPT

## 2019-01-16 PROCEDURE — 94668 MNPJ CHEST WALL SBSQ: CPT

## 2019-01-16 PROCEDURE — 71045 X-RAY EXAM CHEST 1 VIEW: CPT

## 2019-01-16 PROCEDURE — 700102 HCHG RX REV CODE 250 W/ 637 OVERRIDE(OP): Performed by: HOSPITALIST

## 2019-01-16 PROCEDURE — A9270 NON-COVERED ITEM OR SERVICE: HCPCS | Performed by: STUDENT IN AN ORGANIZED HEALTH CARE EDUCATION/TRAINING PROGRAM

## 2019-01-16 PROCEDURE — 99239 HOSP IP/OBS DSCHRG MGMT >30: CPT | Performed by: HOSPITALIST

## 2019-01-16 PROCEDURE — 700102 HCHG RX REV CODE 250 W/ 637 OVERRIDE(OP): Performed by: STUDENT IN AN ORGANIZED HEALTH CARE EDUCATION/TRAINING PROGRAM

## 2019-01-16 PROCEDURE — 700102 HCHG RX REV CODE 250 W/ 637 OVERRIDE(OP): Performed by: INTERNAL MEDICINE

## 2019-01-16 PROCEDURE — A9270 NON-COVERED ITEM OR SERVICE: HCPCS | Performed by: HOSPITALIST

## 2019-01-16 RX ORDER — POTASSIUM CHLORIDE 20 MEQ/1
40 TABLET, EXTENDED RELEASE ORAL ONCE
Status: COMPLETED | OUTPATIENT
Start: 2019-01-16 | End: 2019-01-16

## 2019-01-16 RX ORDER — CARVEDILOL 12.5 MG/1
12.5 TABLET ORAL 2 TIMES DAILY WITH MEALS
Qty: 60 TAB | Refills: 0 | Status: SHIPPED | OUTPATIENT
Start: 2019-01-16 | End: 2019-02-19 | Stop reason: SDUPTHER

## 2019-01-16 RX ORDER — CAPTOPRIL 12.5 MG/1
12.5 TABLET ORAL EVERY 6 HOURS
Qty: 90 TAB | Refills: 0 | Status: SHIPPED | OUTPATIENT
Start: 2019-01-16 | End: 2019-01-23

## 2019-01-16 RX ORDER — FUROSEMIDE 40 MG/1
40 TABLET ORAL DAILY
Qty: 60 TAB | Refills: 0 | Status: SHIPPED | OUTPATIENT
Start: 2019-01-17 | End: 2019-03-26 | Stop reason: SDUPTHER

## 2019-01-16 RX ORDER — LATANOPROST 50 UG/ML
1 SOLUTION/ DROPS OPHTHALMIC EVERY EVENING
Qty: 1 BOTTLE | Refills: 0 | Status: SHIPPED | OUTPATIENT
Start: 2019-01-16 | End: 2019-02-13

## 2019-01-16 RX ORDER — ISOSORBIDE MONONITRATE 30 MG/1
90 TABLET, EXTENDED RELEASE ORAL DAILY
Qty: 30 TAB | Refills: 0 | Status: SHIPPED | OUTPATIENT
Start: 2019-01-17 | End: 2019-03-07 | Stop reason: SDUPTHER

## 2019-01-16 RX ADMIN — CAPTOPRIL 12.5 MG: 12.5 TABLET ORAL at 05:28

## 2019-01-16 RX ADMIN — FUROSEMIDE 40 MG: 20 TABLET ORAL at 09:41

## 2019-01-16 RX ADMIN — CARVEDILOL 12.5 MG: 12.5 TABLET, FILM COATED ORAL at 07:55

## 2019-01-16 RX ADMIN — CAPTOPRIL 12.5 MG: 12.5 TABLET ORAL at 11:21

## 2019-01-16 RX ADMIN — CAPTOPRIL 12.5 MG: 12.5 TABLET ORAL at 00:15

## 2019-01-16 RX ADMIN — POTASSIUM CHLORIDE 40 MEQ: 1500 TABLET, EXTENDED RELEASE ORAL at 09:41

## 2019-01-16 RX ADMIN — SUCRALFATE 1 G: 1 SUSPENSION ORAL at 05:28

## 2019-01-16 RX ADMIN — OMEPRAZOLE 20 MG: 20 CAPSULE, DELAYED RELEASE ORAL at 05:28

## 2019-01-16 RX ADMIN — SUCRALFATE 1 G: 1 SUSPENSION ORAL at 00:15

## 2019-01-16 RX ADMIN — ISOSORBIDE MONONITRATE 90 MG: 30 TABLET ORAL at 05:28

## 2019-01-16 RX ADMIN — SUCRALFATE 1 G: 1 SUSPENSION ORAL at 11:21

## 2019-01-16 ASSESSMENT — ENCOUNTER SYMPTOMS
CHILLS: 0
BACK PAIN: 0
LOSS OF CONSCIOUSNESS: 0
WEAKNESS: 1
ABDOMINAL PAIN: 0
HEADACHES: 0
DIZZINESS: 0
DIARRHEA: 0
SHORTNESS OF BREATH: 0
NAUSEA: 0
VOMITING: 0
FEVER: 0
COUGH: 0

## 2019-01-16 ASSESSMENT — PAIN SCALES - GENERAL
PAINLEVEL_OUTOF10: 0

## 2019-01-16 NOTE — CARE PLAN
Problem: Nutritional:  Goal: Achieve adequate nutritional intake  Patient will tolerate diet >Clear liquids and consume ~50% of meals/supplements.   Outcome: MET Date Met: 01/16/19

## 2019-01-16 NOTE — CARE PLAN
Problem: Hyperinflation:  Goal: Prevent or improve atelectasis  PEP-QID  IS- best value  1500  Will continue to monitor

## 2019-01-16 NOTE — DISCHARGE INSTRUCTIONS
Discharge Instructions    Discharged to home by car with relative. Discharged via wheelchair, hospital escort: Yes.  Special equipment needed: Oxygen    Be sure to schedule a follow-up appointment with your primary care doctor or any specialists as instructed.     Discharge Plan:   Diet Plan: Discussed  Activity Level: Discussed  Confirmed Follow up Appointment: Appointment Scheduled  Confirmed Symptoms Management: Discussed  Medication Reconciliation Updated: Yes  Influenza Vaccine Indication: Not indicated: Previously immunized this influenza season and > 8 years of age    I understand that a diet low in cholesterol, fat, and sodium is recommended for good health. Unless I have been given specific instructions below for another diet, I accept this instruction as my diet prescription.   Other diet: Cardiac    Special Instructions: Diagnosis:  Acute Coronary Syndrome (ACS) is a diagnosis that encompasses cardiac-related chest pain and heart attack. ACS occurs when the blood flow to the heart muscle is severely reduced or cut off completely due to a slow process called atherosclerosis.  Atherosclerosis is a disease in which the coronary arteries become narrow from a buildup of fat, cholesterol, and other substances that combine to form plaque. If the plaque breaks, a blood clot will form and block the blood flow to the heart muscle. This lack of blood flow can cause damage or death to the heart muscle which is called a heart attack or Myocardial Infarction (MI). There are two different types of MIs:  ST Elevation Myocardial Infarction or STEMI (the most severe type of heart attack) and Non-ST Elevation Myocardial Infarction or NSTEMI.    Treatment Plan:  · Cardiac Diet  - Low fat, low salt, low cholesterol   · Cardiac Rehab  - Your doctor has ordered you a referral to [facility].  Call [phone] to schedule an appointment.  · Attend my follow-up appointment with my Cardiologist.  · Take my medications as prescribed  by my doctor  · Exercise daily  · Lower my bad cholesterol and raise my good cholesterol    Medications:  Certain medications are used to treat ACS.  Remember to always take medications as prescribed and never stop talking medications unless told by your doctor.    You have been prescribed the following medicatons:    Angiotensin Converting Enzyme (ACE) Inhibitor - Angiotensin Converting Enzyme Inhibitor Captopril is used to lower blood pressure and treat heart failure.    · Is patient discharged on Warfarin / Coumadin?   No     Heart Failure  Heart failure is a condition in which the heart has trouble pumping blood because it has become weak or stiff. This means that the heart does not pump blood efficiently for the body to work well. For some people with heart failure, fluid may back up into the lungs and there may be swelling (edema) in the lower legs. Heart failure is usually a long-term (chronic) condition. It is important for you to take good care of yourself and follow the treatment plan from your health care provider.  What are the causes?  This condition is caused by some health problems, including:  · High blood pressure (hypertension). Hypertension causes the heart muscle to work harder than normal. High blood pressure eventually causes the heart to become stiff and weak.  · Coronary artery disease (CAD). CAD is the buildup of cholesterol and fat (plaques) in the arteries of the heart.  · Heart attack (myocardial infarction). Injured tissue, which is caused by the heart attack, does not contract as well and the heart's ability to pump blood is weakened.  · Abnormal heart valves. When the heart valves do not open and close properly, the heart muscle must pump harder to keep the blood flowing.  · Heart muscle disease (cardiomyopathy or myocarditis). Heart muscle disease is damage to the heart muscle from a variety of causes, such as drug or alcohol abuse, infections, or unknown causes. These can increase  the risk of heart failure.  · Lung disease. When the lungs do not work properly, the heart must work harder.  What increases the risk?  Risk of heart failure increases as a person ages. This condition is also more likely to develop in people who:  · Are overweight.  · Are male.  · Smoke or chew tobacco.  · Abuse alcohol or illegal drugs.  · Have taken medicines that can damage the heart, such as chemotherapy drugs.  · Have diabetes.  ¨ High blood sugar (glucose) is associated with high fat (lipid) levels in the blood.  ¨ Diabetes can also damage tiny blood vessels that carry nutrients to the heart muscle.  · Have abnormal heart rhythms.  · Have thyroid problems.  · Have low blood counts (anemia).  What are the signs or symptoms?  Symptoms of this condition include:  · Shortness of breath with activity, such as when climbing stairs.  · Persistent cough.  · Swelling of the feet, ankles, legs, or abdomen.  · Unexplained weight gain.  · Difficulty breathing when lying flat (orthopnea).  · Waking from sleep because of the need to sit up and get more air.  · Rapid heartbeat.  · Fatigue and loss of energy.  · Feeling light-headed, dizzy, or close to fainting.  · Loss of appetite.  · Nausea.  · Increased urination during the night (nocturia).  · Confusion.  How is this diagnosed?  This condition is diagnosed based on:  · Medical history, symptoms, and a physical exam.  · Diagnostic tests, which may include:  ¨ Echocardiogram.  ¨ Electrocardiogram (ECG).  ¨ Chest X-ray.  ¨ Blood tests.  ¨ Exercise stress test.  ¨ Radionuclide scans.  ¨ Cardiac catheterization and angiogram.  How is this treated?  Treatment for this condition is aimed at managing the symptoms of heart failure. Medicines, behavioral changes, or other treatments may be necessary to treat heart failure.  Medicines   These may include:  · Angiotensin-converting enzyme (ACE) inhibitors. This type of medicine blocks the effects of a blood protein called  angiotensin-converting enzyme. ACE inhibitors relax (dilate) the blood vessels and help to lower blood pressure.  · Angiotensin receptor blockers (ARBs). This type of medicine blocks the actions of a blood protein called angiotensin. ARBs dilate the blood vessels and help to lower blood pressure.  · Water pills (diuretics). Diuretics cause the kidneys to remove salt and water from the blood. The extra fluid is removed through urination, leaving a lower volume of blood that the heart has to pump.  · Beta blockers. These improve heart muscle strength and they prevent the heart from beating too quickly.  · Digoxin. This increases the force of the heartbeat.  Healthy behavior changes   These may include:  · Reaching and maintaining a healthy weight.  · Stopping smoking or chewing tobacco.  · Eating heart-healthy foods.  · Limiting or avoiding alcohol.  · Stopping use of street drugs (illegal drugs).  · Physical activity.  Other treatments   These may include:  · Surgery to open blocked coronary arteries or repair damaged heart valves.  · Placement of a biventricular pacemaker to improve heart muscle function (cardiac resynchronization therapy). This device paces both the right ventricle and left ventricle.  · Placement of a device to treat serious abnormal heart rhythms (implantable cardioverter defibrillator, or ICD).  · Placement of a device to improve the pumping ability of the heart (left ventricular assist device, or LVAD).  · Heart transplant. This can cure heart failure, and it is considered for certain patients who do not improve with other therapies.  Follow these instructions at home:  Medicines  · Take over-the-counter and prescription medicines only as told by your health care provider. Medicines are important in reducing the workload of your heart, slowing the progression of heart failure, and improving your symptoms.  ¨ Do not stop taking your medicine unless your health care provider told you to do  that.  ¨ Do not skip any dose of medicine.  ¨ Refill your prescriptions before you run out of medicine. You need your medicines every day.  Eating and drinking  · Eat heart-healthy foods. Talk with a dietitian to make an eating plan that is right for you.  ¨ Choose foods that contain no trans fat and are low in saturated fat and cholesterol. Healthy choices include fresh or frozen fruits and vegetables, fish, lean meats, legumes, fat-free or low-fat dairy products, and whole-grain or high-fiber foods.  ¨ Limit salt (sodium) if directed by your health care provider. Sodium restriction may reduce symptoms of heart failure. Ask a dietitian to recommend heart-healthy seasonings.  ¨ Use healthy cooking methods instead of frying. Healthy methods include roasting, grilling, broiling, baking, poaching, steaming, and stir-frying.  · Limit your fluid intake if directed by your health care provider. Fluid restriction may reduce symptoms of heart failure.  Lifestyle  · Stop smoking or using chewing tobacco. Nicotine and tobacco can damage your heart and your blood vessels. Do not use nicotine gum or patches before talking to your health care provider.  · Limit alcohol intake to no more than 1 drink per day for non-pregnant women and 2 drinks per day for men. One drink equals 12 oz of beer, 5 oz of wine, or 1½ oz of hard liquor.  ¨ Drinking more than that is harmful to your heart. Tell your health care provider if you drink alcohol several times a week.  ¨ Talk with your health care provider about whether any level of alcohol use is safe for you.  ¨ If your heart has already been damaged by alcohol or you have severe heart failure, drinking alcohol should be stopped completely.  · Stop use of illegal drugs.  · Lose weight if directed by your health care provider. Weight loss may reduce symptoms of heart failure.  · Do moderate physical activity if directed by your health care provider. People who are elderly and people with  severe heart failure should consult with a health care provider for physical activity recommendations.  Monitor important information  · Weigh yourself every day. Keeping track of your weight daily helps you to notice excess fluid sooner.  ¨ Weigh yourself every morning after you urinate and before you eat breakfast.  ¨ Wear the same amount of clothing each time you weigh yourself.  ¨ Record your daily weight. Provide your health care provider with your weight record.  · Monitor and record your blood pressure as told by your health care provider.  · Check your pulse as told by your health care provider.  Dealing with extreme temperatures  · If the weather is extremely hot:  ¨ Avoid vigorous physical activity.  ¨ Use air conditioning or fans or seek a cooler location.  ¨ Avoid caffeine and alcohol.  ¨ Wear loose-fitting, lightweight, and light-colored clothing.  · If the weather is extremely cold:  ¨ Avoid vigorous physical activity.  ¨ Layer your clothes.  ¨ Wear mittens or gloves, a hat, and a scarf when you go outside.  ¨ Avoid alcohol.  General instructions  · Manage other health conditions such as hypertension, diabetes, thyroid disease, or abnormal heart rhythms as told by your health care provider.  · Learn to manage stress. If you need help to do this, ask your health care provider.  · Plan rest periods when fatigued.  · Get ongoing education and support as needed.  · Participate in or seek rehabilitation as needed to maintain or improve independence and quality of life.  · Stay up to date with immunizations. Keeping current on pneumococcal and influenza immunizations is especially important to prevent respiratory infections.  · Keep all follow-up visits as told by your health care provider. This is important.  Contact a health care provider if:  · You have a rapid weight gain.  · You have increasing shortness of breath that is unusual for you.  · You are unable to participate in your usual physical  activities.  · You tire easily.  · You cough more than normal, especially with physical activity.  · You have any swelling or more swelling in areas such as your hands, feet, ankles, or abdomen.  · You are unable to sleep because it is hard to breathe.  · You feel like your heart is beating quickly (palpitations).  · You become dizzy or light-headed when you stand up.  Get help right away if:  · You have difficulty breathing.  · You notice or your family notices a change in your awareness, such as having trouble staying awake or having difficulty with concentration.  · You have pain or discomfort in your chest.  · You have an episode of fainting (syncope).  This information is not intended to replace advice given to you by your health care provider. Make sure you discuss any questions you have with your health care provider.  Document Released: 12/18/2006 Document Revised: 08/22/2017 Document Reviewed: 07/12/2017  [employer] Interactive Patient Education © 2017 [employer]    Depression / Suicide Risk    As you are discharged from this [facility] facility, it is important to learn how to keep safe from harming yourself.    Recognize the warning signs:  · Abrupt changes in personality, positive or negative- including increase in energy   · Giving away possessions  · Change in eating patterns- significant weight changes-  positive or negative  · Change in sleeping patterns- unable to sleep or sleeping all the time   · Unwillingness or inability to communicate  · Depression  · Unusual sadness, discouragement and loneliness  · Talk of wanting to die  · Neglect of personal appearance   · Rebelliousness- reckless behavior  · Withdrawal from people/activities they love  · Confusion- inability to concentrate     If you or a loved one observes any of these behaviors or has concerns about self-harm, here's what you can do:  · Talk about it- your feelings and reasons for harming yourself  · Remove any means that you might  use to hurt yourself (examples: pills, rope, extension cords, firearm)  · Get professional help from the community (Mental Health, Substance Abuse, psychological counseling)  · Do not be alone:Call your Safe Contact- someone whom you trust who will be there for you.  · Call your local CRISIS HOTLINE 555-1776 or 455-611-3297  · Call your local Children's Mobile Crisis Response Team [place] (781) 749-6426 or [employer]  · Call the toll free National Suicide Prevention Hotlines   · National Suicide Prevention Lifeline 062-250-UVJL (4725)  · [employer] Line Network 800-SUICIDE (117-9758)      Acute Coronary Syndrome  Acute coronary syndrome (ACS) is a serious problem in which there is suddenly not enough blood and oxygen supplied to the heart. ACS may mean that one or more of the blood vessels in your heart (coronary arteries) may be blocked. ACS can result in chest pain or a heart attack (myocardial infarction or MI).  What are the causes?  This condition is caused by atherosclerosis, which is the buildup of fat and cholesterol (plaque) on the inside of the arteries. Over time, the plaque may narrow or block the artery, and this will lessen blood flow to the heart. Plaque can also become weak and break off within a coronary artery to form a clot and cause a sudden blockage.  What increases the risk?  The risk factors of this condition include:  · High cholesterol levels.  · High blood pressure (hypertension).  · Smoking.  · Diabetes.  · Age.  · Family history of chest pain, heart disease, or stroke.  · Lack of exercise.  What are the signs or symptoms?  The most common signs of this condition include:  · Chest pain, which can be:  ¨ A crushing or squeezing in the chest.  ¨ A tightness, pressure, fullness, or heaviness in the chest.  ¨ Present for more than a few minutes, or it can stop and recur.  · Pain in the arms, neck, jaw, or back.  · Unexplained heartburn or indigestion.  · Shortness of  breath.  · Nausea.  · Sudden cold sweats.  · Feeling light-headed or dizzy.  Sometimes, this condition has no symptoms.  How is this diagnosed?  ACS may be diagnosed through the following tests:  · Electrocardiogram (ECG).  · Blood tests.  · Coronary angiogram. This is a procedure to look at the coronary arteries to see if there is any blockage.  How is this treated?  Treatment for ACS may include:  · Healthy behavioral changes to reduce or control risk factors.  · Medicine.  · Coronary stenting. A stent helps to keep an artery open.  · Coronary angioplasty. This procedure widens a narrowed or blocked artery.  · Coronary artery bypass surgery. This will allow your blood to pass the blockage (bypass) to reach your heart.  Follow these instructions at home:  Eating and drinking  · Follow a heart-healthy diet. A dietitian can you help to educate you about healthy food options and changes.  · Use healthy cooking methods such as roasting, grilling, broiling, baking, poaching, steaming, or stir-frying. Talk to a dietitian to learn more about healthy cooking methods.  Medicines  · Take medicines only as directed by your health care provider.  · Do not take the following medicines unless your health care provider approves:  ¨ Nonsteroidal anti-inflammatory drugs (NSAIDs), such as ibuprofen, naproxen, or celecoxib.  ¨ Vitamin supplements that contain vitamin A, vitamin E, or both.  ¨ Hormone replacement therapy that contains estrogen with or without progestin.  · Stop illegal drug use.  Activity  · Follow an exercise program that is approved by your health care provider.  · Plan rest periods when you are fatigued.  Lifestyle  · Do not use any tobacco products, including cigarettes, chewing tobacco, or electronic cigarettes. If you need help quitting, ask your health care provider.  · If you drink alcohol, and your health care provider approves, limit your alcohol intake to no more than 1 drink per day. One drink equals 12  ounces of beer, 5 ounces of wine, or 1½ ounces of hard liquor.  · Learn to manage stress.  · Maintain a healthy weight. Lose weight as approved by your health care provider.  General instructions  · Manage other health conditions, such as hypertension and diabetes, as directed by your health care provider.  · Keep all follow-up visits as directed by your health care provider. This is important.  · Your health care provider may ask you to monitor your blood pressure. A blood pressure reading consists of a higher number over a lower number, such as 110 over 72, written as 110/72. Ideally, your blood pressure should be:  ¨ Below 140/90 if you have no other medical conditions.  ¨ Below 130/80 if you have diabetes or kidney disease.  Get help right away if:  · You have pain in your chest, neck, arm, jaw, stomach, or back that lasts more than a few minutes, is recurring, or is not relieved by taking medicine under your tongue (sublingual nitroglycerin).  · You have profuse sweating without cause.  · You have unexplained:  ¨ Heartburn or indigestion.  ¨ Shortness of breath or difficulty breathing.  ¨ Nausea or vomiting.  ¨ Fatigue.  ¨ Feelings of nervousness or anxiety.  ¨ Weakness.  ¨ Diarrhea.  · You have sudden light-headedness or dizziness.  · You faint.  These symptoms may represent a serious problem that is an emergency. Do not wait to see if the symptoms will go away. Get medical help right away. Call your local emergency services (911 in the [place] Do not drive yourself to the clinic or hospital.   This information is not intended to replace advice given to you by your health care provider. Make sure you discuss any questions you have with your health care provider.  Document Released: 12/18/2006 Document Revised: 05/31/2017 Document Reviewed: 04/21/2015  [employer] Interactive Patient Education © 2017 [employer]    Gastrointestinal Bleeding  Introduction  Gastrointestinal bleeding is bleeding somewhere along the  path food travels through the body (digestive tract). This path is anywhere between the mouth and the opening of the butt (anus). You may have blood in your poop (stools) or have black poop. If you throw up (vomit), there may be blood in it.  This condition can be mild, serious, or even life-threatening. If you have a lot of bleeding, you may need to stay in the hospital.  Follow these instructions at home:  · Take over-the-counter and prescription medicines only as told by your doctor.  · Eat foods that have a lot of fiber in them. These foods include whole grains, fruits, and vegetables. You can also try eating 1-3 prunes each day.  · Drink enough fluid to keep your pee (urine) clear or pale yellow.  · Keep all follow-up visits as told by your doctor. This is important.  Contact a doctor if:  · Your symptoms do not get better.  Get help right away if:  · Your bleeding gets worse.  · You feel dizzy or you pass out (faint).  · You feel weak.  · You have very bad cramps in your back or belly (abdomen).  · You pass large clumps of blood (clots) in your poop.  · Your symptoms are getting worse.  This information is not intended to replace advice given to you by your health care provider. Make sure you discuss any questions you have with your health care provider.  Document Released: 09/26/2009 Document Revised: 05/25/2017 Document Reviewed: 06/06/2016  © 2017 [employer]

## 2019-01-16 NOTE — CARE PLAN
Problem: Safety  Goal: Will remain free from falls  Outcome: PROGRESSING AS EXPECTED    Intervention: Assess risk factors for falls  Renay linares fall risk assessment complete.   Intervention: Implement fall precautions  Bed locked and in lowest position. Bed alarm on. Pt's belongings and call light within reach. Pt educated on importance of pressing call light PRN.         Problem: Mobility  Goal: Risk for activity intolerance will decrease  Outcome: PROGRESSING AS EXPECTED  Pt walked 250 ft this shift with walker and on 4 L NC. Pt steady, stand by assist. Rest periods provided.

## 2019-01-16 NOTE — PROGRESS NOTES
[facility] Medicine Daily Progress Note    Date of Service  1/16/2019    Chief Complaint  84 y.o. female admitted 1/9/2019 with CP and R arm pain/weakness.      Hospital Course    Pt with known Hx of CAD (Stent to OM in 2010) and noted to have EKG changes consistent with STEMI.  Pt was not taken to cath lab as PMHx of UGIB precludes further antiplatelet or CA therapy.        Interval Problem Update  Pt in good spirits.  Has had no CP overnight.  Eating well, slept well.  Eager to try getting out of bed.  Quiet night  Sinus  SBPs 120-140s  AFebrile  Tolerating po  UOP 2700ml/12hrs on lasix  4 LNC; on 2 LNC at home    Consultants/Specialty  cardiology    Code Status  DNAR/I    Disposition  Home with Hospice though formal dicision has not been made yet    Review of Systems  Review of Systems   Constitutional: Negative for chills and fever.   Respiratory: Negative for cough and shortness of breath.    Cardiovascular: Negative for chest pain.   Gastrointestinal: Negative for abdominal pain, diarrhea, nausea and vomiting.   Musculoskeletal: Negative for back pain.   Skin: Negative for rash.   Neurological: Positive for weakness. Negative for dizziness, loss of consciousness and headaches.        Physical Exam  Temp:  [36.4 °C (97.5 °F)-36.8 °C (98.2 °F)] 36.6 °C (97.8 °F)  Pulse:  [63-80] 71  Resp:  [13-28] 13  SpO2:  [91 %-98 %] 96 %    Physical Exam   Constitutional: She is oriented to person, place, and time. She appears well-developed and well-nourished. No distress.   HENT:   Head: Normocephalic and atraumatic.   Neck: No JVD present.   Cardiovascular: Normal rate and regular rhythm.    Murmur heard.  Pulmonary/Chest: Effort normal. No stridor. No respiratory distress. She has no wheezes. She has no rales.   Abdominal: Soft. There is no tenderness. There is no rebound and no guarding.   Musculoskeletal: She exhibits edema.   Neurological: She is oriented to person, place, and time.   Skin: Skin is warm and dry. No rash  noted. She is not diaphoretic.   Psychiatric: She has a normal mood and affect. Thought content normal.   Nursing note and vitals reviewed.      Fluids    Intake/Output Summary (Last 24 hours) at 01/16/19 0537  Last data filed at 01/15/19 1800   Gross per 24 hour   Intake              700 ml   Output             1350 ml   Net             -650 ml       Laboratory  Recent Labs      01/14/19   0515  01/15/19   0505   WBC  14.8*  11.1*   RBC  4.54  4.60   HEMOGLOBIN  12.5  12.9   HEMATOCRIT  41.0  40.5   MCV  90.3  88.0   MCH  27.5  28.0   MCHC  30.5*  31.9*   RDW  54.1*  52.8*   PLATELETCT  378  323   MPV  9.2  9.3     Recent Labs      01/14/19   0330  01/15/19   0504  01/16/19   0229   SODIUM  140  138  139   POTASSIUM  4.2  3.4*  3.6   CHLORIDE  111  104  101   CO2  21  27  31   GLUCOSE  113*  92  102*   BUN  19  24*  27*   CREATININE  1.14  1.21  1.20   CALCIUM  8.6  9.3  9.2         Recent Labs      01/15/19   0505   BNPBTYPENAT  2250*           Imaging  DX-CHEST-PORTABLE (1 VIEW)   Final Result         1.  The extensive bilateral perihilar opacities have worsened from previous exam consistent with atelectasis, pneumonitis, and/or pulmonary edema.      2.  6 small bilateral pleural effusions         DX-CHEST-LIMITED (1 VIEW)   Final Result      1.  Small to moderate bilateral effusions and bibasilar opacities, likely atelectasis.   2.  Interstitial edema has essentially resolved.      EC-ECHOCARDIOGRAM LTD W/O CONT   Final Result      DX-CHEST-LIMITED (1 VIEW)   Final Result         1.  Pulmonary edema and/or infiltrates.   2.  Atherosclerosis   3.  Hyperexpansion of lungs favors changes of COPD.      DX-CHEST-LIMITED (1 VIEW)    (Results Pending)        Assessment/Plan  ACC/AHA stage C systolic heart failure (HCC)- (present on admission)   Assessment & Plan      Started on captopril and carvedilol  Now off Nitro gtts  IV Lasix x1 today and monitor intake and output     Chronic left ventricular systolic heart  failure (HCC)- (present on admission)   Assessment & Plan    Repeat echo with EF of 40%       History of CVA (cerebrovascular accident)- (present on admission)   Assessment & Plan    ASA  statin     Coronary artery disease due to calcified coronary lesion- (present on admission)   Assessment & Plan    Unstable angina    Cardiology following  Not candidate for intervention given recurrent GI bleed  Started on captopril and beta-blocker switched    to carvedilol  Not on aspirin secondary to GI bleed  Continue Crestor  Continue close clinical monitoring  Hospice involved and pt considering home under their care     Unstable angina pectoris (HCC)- (present on admission)   Assessment & Plan    Not an interventional candidate  CAMILLA Prater  Likely end stage disease     Normocytic anemia- (present on admission)   Assessment & Plan    Secondary to GI bleed    Hemoglobin 12.5 stable       Essential hypertension- (present on admission)   Assessment & Plan    Monitor blood pressure and adjust     GIB (gastrointestinal bleeding)   Assessment & Plan    Bleeding clinically resolved and hemoglobin stable    Continue Protonix and Carafate  Evaluated by GI  Hemoglobin stable at this time continue to monitor       Stage 4 chronic kidney disease (HCC)   Assessment & Plan    Monitor with diuresis          VTE prophylaxis: heparin

## 2019-01-16 NOTE — DIETARY
Nutrition Services: Dietary update    Admit day 7.  RD following for poor PO intake.  Pt currently on a regular diet with supplements TID.  PO intake is variable per ADLs, but pt states PO intake is improving. Observed pt eating well @ lunch today.  Pt says she is being D/c'd home this afternoon.  Offered handouts on low-salt diet, but pt politely declined, stating she has been here 5 times and has the handouts @ home.    No additional RD F/u needed.

## 2019-01-16 NOTE — PROGRESS NOTES
Cardiology Progress Note    Reason for Consult:  Asked by Dr Ghulam Krishnan for Code STEMI      CC:   Chief Complaint   Patient presents with   • Chest Pain     Began at home at 0045       HPI:     84-year-old female with known coronary artery disease who was brought in as a code STEMI.  Patient reports being in her usual state of health until yesterday when she started having bilateral nonradiating chest discomfort associated with dyspnea.  She tried taking her medications but her pain persisted and she finally decided to call 911 today.  She appears very uncomfortable and complains of ongoing chest discomfort and dyspnea. Reports compliance with all of her medications.     In 2010, patient had an anterior MI at which time she was found to have a  to her LAD.  She underwent PCI to OM.  Repeat coronary angiography in 2014 showed patent stent. She has also had multiple CVAs in the past.     Reports having GI bleed history.  In October 2018, she underwent clipping for a gastric AVM.  She was admitted here in December for a NSTEMI for which she was started on heparin drip following which she had a drop in her hemoglobin for which she required a transfusion.  Patient was seen by Dr. Kapoor about 10 days ago and was deemed not to be a good candidate for any coronary intervention due to her recent GI bleed.       Interval Events    1/16/2019 Patient in NAD today. Reports marked improvement with breathing. Family at room today, patient happy. Patient denies chest pain or LGIB. PAtient noted for mild crackles at bases, No LE edema today. CXR showing some improvement with pulmonary edema but still with infiltrates at the LLL. Mild hypokalemia which was repleted. I/Os net neg within the last 24 hrs. Continue with  PO Lasix and ACEi therapy.       1/15/2019 Patient reporting improvement with chest pain and dyspnea. No blood with BM. BNP in the 2250s. CXR showing extensive bilateral perihilar opacities but lungs CTA B/L  after IV lasix this AM. Output net neg w/in the last 24hrs. Increased captopril dose. Start PO lasix daily and Isosorbide daily.    1/14/2019 Patient reporting chest pain episode this early morning which she associated with a feeling of anxiety. Pain resolved with ativan. SI at bedside,  on importance of frequent SI use to improve atelectasis and prevent pneumonia. CXR and presentation suggestive of CHF exacerbation. Received one does of IV Lasix 40 mg  This AM. Patient reporting improvement with respiratory function and incontinence. Sill with B/L rales at base on afternoon assessment. Place miller, measure I/Os and IV Lasix 40 mg. Labs on AM.      1/13/2019 Patient reporting chest pain over night that improved with NTG drip. H/H over 10. Spoke to patient after shower, noted dyspneic and speaking in short phrases. Rales at base and CXR with atelectasis. Continue SI. As per GI, Continue to hold off EGD/ invasive procedure for now.  GOC discussion today.        1/11/2019 Patient was evaluated at bedside and found AAOX3. NTG drip was restarted 2/2 reemerging chest pain. EKG w/o significant changes. Trops drop to 0.12 from 1. Discussed case with Interventional Cardiology for which intervention was not recommended 2/2 inability to give antiplatelet therapy due to LGIB and recommended keeping Hb > 10.    1/10/2019 patient was evaluated at bedside and found AAOX3. Still with 5/10 substernal chest pain. Pt more awake today. Tele w/NSR 60-70s. Hb drop to 7, got PRBC transfusion. Continue medical MGMT with nitroglycerin drip, metoprolol and high-dose statin. Continue holding antiplatelet therapy 2/2 LGIB. Palliative following, aim to discuss options patient and sister Erin today.    1/9/2019 patient was evaluated at bedside and found AAOX3 but lethargic. She reporting substernal chest pain. Tele w/NSR 60-70s. Trop at 0.9-->1, BMP 1600.  Patient still with lower GI bleed and hemoglobin trending down.  Treat with  medical management, patient is currently on nitroglycerin drip, metoprolol and high-dose statin.  Antiplatelet therapy risk in the setting of GI bleed. Palliative care was consulted and per their note aim to discuss options tomorrow with patient and sister Erin.      Medications / Drug list prior to admission:  No current facility-administered medications on file prior to encounter.      Current Outpatient Prescriptions on File Prior to Encounter   Medication Sig Dispense Refill   • aspirin (ASA) 81 MG Chew Tab chewable tablet Take 81 mg by mouth every day.     • rosuvastatin (CRESTOR) 40 MG tablet Take 40 mg by mouth every evening.     • pantoprazole (PROTONIX) 40 MG Tablet Delayed Response Take 40 mg by mouth 2 Times a Day.     • bimatoprost (LUMIGAN) 0.01 % Solution Place 1 Drop in both eyes every bedtime. 1 gtts both eyes         Current list of administered Medications:    Current Facility-Administered Medications:   •  captopril (CAPOTEN) tablet 12.5 mg, 12.5 mg, Oral, Q6HRS, Chester Arambula M.D., 12.5 mg at 01/16/19 0528  •  furosemide (LASIX) tablet 40 mg, 40 mg, Oral, Q DAY, Chester Arambula M.D.  •  isosorbide mononitrate SR (IMDUR) tablet 90 mg, 90 mg, Oral, Q DAY, Chester Arambula M.D., 90 mg at 01/16/19 0528  •  albuterol (PROVENTIL) 2.5mg/0.5ml nebulizer solution 2.5 mg, 2.5 mg, Nebulization, Q4H PRN (RT), Ghulam Krishnan M.D., 2.5 mg at 01/14/19 0337  •  carvedilol (COREG) tablet 12.5 mg, 12.5 mg, Oral, BID WITH MEALS, Chester Arambula M.D., 12.5 mg at 01/15/19 5683  •  omeprazole (PRILOSEC) capsule 20 mg, 20 mg, Oral, BID, Ghulam Krishnan M.D., 20 mg at 01/16/19 0528  •  morphine (pf) 4 mg/ml injection 2 mg, 2 mg, Intravenous, Q3HRS PRN, Mila Espinosa M.D., 4 mg at 01/14/19 0345  •  Metoprolol Tartrate (LOPRESSOR) injection 5 mg, 5 mg, Intravenous, Q5 MIN PRN, Ghulam Krishnan M.D., 5 mg at 01/11/19 0904  •  sucralfate (CARAFATE) 1 GM/10ML  suspension 1 g, 1 g, Oral, Q6HRS, Tristan Bhatia M.D., 1 g at 01/16/19 0528  •  rosuvastatin (CRESTOR) tablet 40 mg, 40 mg, Oral, Q EVENING, Rubén Gonzalez M.D., 40 mg at 01/15/19 1803  •  acetaminophen (TYLENOL) tablet 650 mg, 650 mg, Oral, Q6HRS PRN, Rubén Gonzalez M.D., Stopped at 01/12/19 0525  •  ondansetron (ZOFRAN) syringe/vial injection 4 mg, 4 mg, Intravenous, Q4HRS PRN, Rubén Gonzalez M.D.  •  ondansetron (ZOFRAN ODT) dispertab 4 mg, 4 mg, Oral, Q4HRS PRN, Rubén Gonzalez M.D.  •  senna-docusate (PERICOLACE or SENOKOT S) 8.6-50 MG per tablet 2 Tab, 2 Tab, Oral, BID, Stopped at 01/15/19 1800 **AND** polyethylene glycol/lytes (MIRALAX) PACKET 1 Packet, 1 Packet, Oral, QDAY PRN **AND** magnesium hydroxide (MILK OF MAGNESIA) suspension 30 mL, 30 mL, Oral, QDAY PRN **AND** bisacodyl (DULCOLAX) suppository 10 mg, 10 mg, Rectal, QDAY PRN, Rubén Gonzalez M.D.  •  Respiratory Care per Protocol, , Nebulization, Continuous RT, Rubén Gonzalez M.D.  •  latanoprost (XALATAN) 0.005 % ophthalmic solution 1 Drop, 1 Drop, Both Eyes, Q EVENING, Rubén Gonzalez M.D., 1 Drop at 01/15/19 1819    Past Medical History:   Diagnosis Date   • Acute myocardial infarction, true posterior wall infarction, episode of care unspecified    • Anxiety    • Bowel habit changes    • Breath shortness 2017    uses oxygen at 2 liters/min; AT NIGHT ONLY   • CAD (coronary artery disease)     S/P stent placement   • Cholesterol blood decreased    • Dental disorder     Partial upper   • Dizziness    • GI bleed    • Glaucoma    • Heart burn    • Hyperlipidemia    • Hypertension    • Myocardial infarct (HCC) 6/10/10   • Peripheral vascular disease (HCC)    • Pulmonary disease     bronchitis   • TIA (transient ischemic attack)    • Unspecified hemorrhagic conditions     pt takes plavix   • Unspecified urinary incontinence    • Urinary bladder disorder        Past Surgical History:   Procedure Laterality Date   • COLONOSCOPY - ENDO N/A 10/23/2018     Procedure: COLONOSCOPY - ENDO;  Surgeon: Anatoly Barragan M.D.;  Location: ENDOSCOPY [facility];  Service: Gastroenterology   • GASTROSCOPY-ENDO N/A 10/22/2018    Procedure: GASTROSCOPY-ENDO;  Surgeon: Bakari Brown M.D.;  Location: ENDOSCOPY [facility];  Service: Gastroenterology   • CAROTID ENDARTERECTOMY Right 9/3/2018    Procedure: CAROTID ENDARTERECTOMY WITH NEUROMONITORING;  Surgeon: Naga Abad M.D.;  Location: SURGERY [facility];  Service: Vascular   • GASTROSCOPY WITH BIOPSY  2/13/2016    Procedure: GASTROSCOPY WITH BIOPSY;  Surgeon: Susan Miller M.D.;  Location: ENDOSCOPY [facility];  Service:    • RECOVERY  7/24/2014    Performed by Cath-Recovery Surgery at SURGERY SAME DAY [facility]   • CARDIAC CATH  7/24/14    Diffuse disease, see report.  % with collterals.   • FEMORAL ENDARTERECTOMY  10/4/2013    Performed by Kacie Baker M.D. at SURGERY [facility]   • ANGIOPLASTY BALLOON  10/4/2013    Performed by Kacie Baker M.D. at SURGERY [facility]   • RECOVERY  4/9/2013    Performed by Ir-Recovery Surgery at SURGERY SAME DAY [facility]   • RECOVERY  11/26/2012    Performed by Ir-Recovery Surgery at SURGERY SAME DAY [facility]   • RECTUS REPAIR  7/13/2012    Performed by SHEILA PLATT at SURGERY SURGICAL [facility]   • OTHER  12/10/10    stents in legs   • OTHER  6/10/10    cardiac stents   • CARDIAC CATH  6/2010    BMS to Om   • OTHER  2005    KNEE SURGERY   • CHOLECYSTECTOMY     • HYSTERECTOMY, TOTAL ABDOMINAL     • OTHER CARDIAC SURGERY      stent in heart   • STENT PLACEMENT      treated by Dr Baker, multiple surgeries to legs.       Family History   Problem Relation Age of Onset   • Stroke Mother 68        CVA   • Heart Disease Mother 65        valve surgery   • Other Son         wgt 465 lbs     Patient family history was personally reviewed, no pertinent family history to current presentation    Social History     Social  History   • Marital status:      Spouse name: N/A   • Number of children: N/A   • Years of education: N/A     Occupational History   • Not on file.     Social History Main Topics   • Smoking status: Former Smoker     Packs/day: 0.25     Years: 45.00     Types: Cigarettes     Quit date: 2/1/2016   • Smokeless tobacco: Never Used   • Alcohol use 0.0 - 0.6 oz/week      Comment: 1 drink per month   • Drug use: No   • Sexual activity: Not on file     Other Topics Concern   • Not on file     Social History Narrative   • No narrative on file       ALLERGIES:  Allergies   Allergen Reactions   • Penicillins Rash and Vomiting     Rxn = unknown  Pt tolerates cephalosporins   • Hydrocodone Vomiting and Nausea     Rxn = unknown   • Tape Rash     RASH       Review of systems:  A complete review of symptoms was reviewed with patient. This is reviewed in H&P and PMH. ALL OTHERS reviewed and negative    Physical exam:  Patient Vitals for the past 24 hrs:   BP Temp Temp src Pulse Resp SpO2 Height Weight   01/09/19 0710 - - - 71 16 98 % - -   01/09/19 0700 - - - 72 17 100 % - -   01/09/19 0650 - - - 73 14 98 % - -   01/09/19 0640 - - - 71 13 99 % - -   01/09/19 0630 - - - 72 16 98 % - -   01/09/19 0620 - - - 72 19 98 % - -   01/09/19 0615 - - - 74 15 97 % - -   01/09/19 0610 - - - 77 17 97 % - -   01/09/19 0605 - - - 72 15 98 % - -   01/09/19 0600 - - - 73 14 98 % - -   01/09/19 0555 - - - 72 13 98 % - -   01/09/19 0550 - - - 71 13 96 % - -   01/09/19 0545 - - - 72 14 97 % - -   01/09/19 0540 - - - 73 14 96 % - -   01/09/19 0535 - - - 72 13 97 % - -   01/09/19 0530 - - - 73 17 96 % - -   01/09/19 0525 - - - 72 15 97 % - -   01/09/19 0520 - - - 73 19 96 % - -   01/09/19 0515 - - - 73 17 96 % - -   01/09/19 0510 - - - 73 16 96 % - -   01/09/19 0505 - - - 74 20 97 % - -   01/09/19 0500 - - - 74 14 95 % - -   01/09/19 0455 - - - 74 13 97 % - -   01/09/19 0450 - - - 73 18 97 % - -   01/09/19 0445 - - - 73 15 96 % - -   01/09/19 0440  "- - - 75 16 96 % - -   01/09/19 0430 - - - 94 17 92 % - -   01/09/19 0426 - - - 81 18 (!) 87 % - -   01/09/19 0415 - - - 77 16 96 % - -   01/09/19 0410 - - - 81 (!) 21 96 % - -   01/09/19 0400 - - - 81 (!) 25 97 % - -   01/09/19 0355 - - - 79 17 97 % - -   01/09/19 0350 - - - 79 18 96 % - -   01/09/19 0345 - - - 79 18 96 % - -   01/09/19 0340 - - - 81 18 96 % - -   01/09/19 0335 - - - 83 20 96 % - -   01/09/19 0330 - - - 82 20 96 % - -   01/09/19 0325 - - - 86 (!) 23 95 % - -   01/09/19 0320 - - - 86 (!) 24 95 % - -   01/09/19 0315 - - - 86 (!) 22 94 % - -   01/09/19 0310 - - - 89 (!) 25 94 % - -   01/09/19 0305 - - - 89 (!) 24 94 % - -   01/09/19 0300 - - - 92 - 93 % - -   01/09/19 0255 - - - 94 - 91 % - -   01/09/19 0254 131/46 37.2 °C (98.9 °F) Temporal 93 18 91 % - -   01/09/19 0253 - - - - - - 1.753 m (5' 9\") 57.2 kg (126 lb)       General: Lethargic/sleepy  EYES: no jaundice  HEENT: OP clear   Neck: No bruits No JVD.   CVS:  RRR. S1 + S2. No M/R/G  Resp: B/L crackles at base. No wheezing or crackles/rhonchi.  Abdomen: Soft, NT, ND,  Skin: Grossly nothing acute no obvious rashes  Neurological: Alert, Moves all extremities, no cranial nerve defects on limited exam  Extremities: Pulse 2+ in b/l LE. No edema. No cyanosis.       Data:  Laboratory studies personally reviewed by me:  Recent Results (from the past 24 hour(s))   BASIC METABOLIC PANEL    Collection Time: 01/16/19  2:29 AM   Result Value Ref Range    Sodium 139 135 - 145 mmol/L    Potassium 3.6 3.6 - 5.5 mmol/L    Chloride 101 96 - 112 mmol/L    Co2 31 20 - 33 mmol/L    Glucose 102 (H) 65 - 99 mg/dL    Bun 27 (H) 8 - 22 mg/dL    Creatinine 1.20 0.50 - 1.40 mg/dL    Calcium 9.2 8.5 - 10.5 mg/dL    Anion Gap 7.0 0.0 - 11.9   MAGNESIUM    Collection Time: 01/16/19  2:29 AM   Result Value Ref Range    Magnesium 2.4 1.5 - 2.5 mg/dL   ESTIMATED GFR    Collection Time: 01/16/19  2:29 AM   Result Value Ref Range    GFR If  52 (A) >60 mL/min/1.73 " m 2    GFR If Non  43 (A) >60 mL/min/1.73 m 2       Imaging:  DX-CHEST-LIMITED (1 VIEW)   Final Result      Stable small to moderate bilateral effusions and bibasilar atelectasis versus consolidations, left slightly worse than right, similar to prior study.      DX-CHEST-PORTABLE (1 VIEW)   Final Result         1.  The extensive bilateral perihilar opacities have worsened from previous exam consistent with atelectasis, pneumonitis, and/or pulmonary edema.      2.  6 small bilateral pleural effusions         DX-CHEST-LIMITED (1 VIEW)   Final Result      1.  Small to moderate bilateral effusions and bibasilar opacities, likely atelectasis.   2.  Interstitial edema has essentially resolved.      EC-ECHOCARDIOGRAM LTD W/O CONT   Final Result      DX-CHEST-LIMITED (1 VIEW)   Final Result         1.  Pulmonary edema and/or infiltrates.   2.  Atherosclerosis   3.  Hyperexpansion of lungs favors changes of COPD.          All pertinent features of laboratory and imaging reviewed including primary images where applicable      Principal Problem (Resolved):    STEMI (ST elevation myocardial infarction) (HCC) POA: Unknown  Active Problems:    Unstable angina pectoris (HCC) POA: Yes    Coronary artery disease due to calcified coronary lesion POA: Yes    History of CVA (cerebrovascular accident) POA: Yes    Chronic left ventricular systolic heart failure (HCC) POA: Yes    ACC/AHA stage C systolic heart failure (HCC) POA: Yes    History of anterior wall MI POA: Yes    Essential hypertension POA: Yes    Normocytic anemia POA: Yes    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    Nonrheumatic aortic valve insufficiency POA: Yes    Non-rheumatic mitral regurgitation POA: Yes      Overview: Associated with mitral annular calcification    Stage 4 chronic kidney disease (HCC) POA: Unknown    GIB (gastrointestinal bleeding) POA: Unknown  Resolved Problems:    Ischemic cerebrovascular accident (CVA) (HCC) POA: Yes    STEMI  (ST elevation myocardial infarction) (HCC) POA: Unknown    Hypokalemia POA: Unknown    Hypocalcemia POA: Unknown      Assessment / Plan:  #STEMI  #Ischemic cardiomyopathy  #CHF exacerbation   #Lower GI bleed  #Normocytic anemia  #Hypertension  #CKD4  EKG w/ anterior septal STEMI on admission  Tele w/NSR 60-70s  ECHO showed mildly reduced LV systolic function, EF at 40%, akinesis-hypokinesis at mid anterior septum, apical septum and apex  No angina episode today  As per Interventional Cardiology intervention not recommended 2/2 LGIB  No more GI bleed  Keeping Hb > 10, to reduce angina episodes  Continue Beta blocker therapy  Continue Statin therapy  Continue PO Lasix daily  Continue ACEi therapy  Will need to follow up with heart failure therapy      Cardiology will sign off    It is my pleasure to participate in the care of Ms. Diana.  Please do not hesitate to contact us with questions or concerns.      Chester Arambula  1/16/2019

## 2019-01-16 NOTE — CARE PLAN
Problem: Knowledge Deficit  Goal: Knowledge of disease process/condition, treatment plan, diagnostic tests, and medications will improve  Outcome: PROGRESSING AS EXPECTED  Pt and Family oriented to unit routine. Pt and family educated regarding activity, diet, meds and plan of care; questions answered; verbalized understanding. Pt and Family denies having further needs at this time.    Problem: Mobility  Goal: Risk for activity intolerance will decrease  Outcome: PROGRESSING AS EXPECTED  Pt ambulates with SBA

## 2019-01-16 NOTE — PROGRESS NOTES
Pt was discharged at 1450 to home. IV was d/c'd. Monitor off. Paperwork and prescriptions discussed and questions answered.

## 2019-01-16 NOTE — PROGRESS NOTES
Monitor Summary:     SR HR 60-80's  .16/.08/.4    12 hour cc complete.   2 RN skin check complete.

## 2019-01-17 NOTE — DISCHARGE SUMMARY
Discharge Summary    CHIEF COMPLAINT ON ADMISSION  Chief Complaint   Patient presents with   • Chest Pain     Began at home at 0045       Reason for Admission  Stemi     Admission Date  1/9/2019    CODE STATUS  Prior    HPI & HOSPITAL COURSE  This is a 84 y.o. female here with chest pain.    This patient presented for evaluation on January 9.  She had a previously known history of coronary artery disease, previous stroke, dyslipidemia.  She also has a significant history of GI bleed.  She underwent clipping of a gastric AVM in 2018.  She was subsequently admitted for N STEMI, and started on a heparin drip.  After this she developed further GI bleeding, and this therapy had to be stopped.    On presentation on this occasion the patient had ST elevation in the anterior septal leads.  Given her previous history of GI bleed, it was not felt that the patient was an interventional candidate as she would not be able to tolerate the requisite anticoagulation.  After consultation with cardiology the decision was made to treat the patient medically.    Patient had a medical regimen maximized, and was treated for cardiogenic pulmonary edema.  Palliative care was also consulted.    On the day of discharge patient is markedly improved, and quite eager to go home.  She is able to get up and move around without any difficulties.  Therefore, she is discharged in fair and stable condition to home with close outpatient follow-up.    The patient met 2-midnight criteria for an inpatient stay at the time of discharge.    Discharge Date  1/16/2019    FOLLOW UP ITEMS POST DISCHARGE  Cardiology    DISCHARGE DIAGNOSES  Principal Problem (Resolved):    STEMI (ST elevation myocardial infarction) (HCC) POA: Unknown  Active Problems:    Unstable angina pectoris (HCC) POA: Yes    Coronary artery disease due to calcified coronary lesion POA: Yes    History of CVA (cerebrovascular accident) POA: Yes    Chronic left ventricular systolic heart  failure (HCC) POA: Yes    ACC/AHA stage C systolic heart failure (HCC) POA: Yes    History of anterior wall MI POA: Yes    Essential hypertension POA: Yes    Normocytic anemia POA: Yes    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 [facility] None     MARIA M Fernandes (PCP)  [address]   [address]  [place] [address]  [phone]  Go on 1/22/2019  Please arrive at 11:20 am for your appointment. Thank you     MARIA M Fernandes  [address] [facility] [address]  [place] [address]  [phone]          Debbie Swain M.D.  [address]  [address]  [place] [address] [phone]            MEDICATIONS ON DISCHARGE     Medication List      START taking these medications      Instructions   captopril 12.5 MG Tabs  Commonly known as:  CAPOTEN   Take 1 Tab by mouth every 6 hours.  Dose:  12.5 mg     carvedilol 12.5 MG Tabs  Commonly known as:  COREG   Take 1 Tab by mouth 2 times a day, with meals.  Dose:  12.5 mg     latanoprost 0.005 % Soln  Commonly known as:  XALATAN   Place 1 Drop in both eyes every evening.  Dose:  1 Drop        CHANGE how you take these medications      Instructions   furosemide 40 MG Tabs  What changed:  when to take this  Commonly known as:  LASIX   Take 1 Tab by mouth every day.  Dose:  40 mg     isosorbide mononitrate SR 30 MG Tb24  What changed:  · medication strength  · how much to take  · when to take this  Commonly known  as:  IMDUR   Take 3 Tabs by mouth every day.  Dose:  90 mg        CONTINUE taking these medications      Instructions   aspirin 81 MG Chew chewable tablet  Commonly known as:  ASA   Take 81 mg by mouth every day.  Dose:  81 mg     clopidogrel 75 MG Tabs  Commonly known as:  PLAVIX   Take 75 mg by mouth every day.  Dose:  75 mg     CRESTOR 40 MG tablet  Generic drug:  rosuvastatin   Take 40 mg by mouth every evening.  Dose:  40 mg     LUMIGAN 0.01 % Soln  Generic drug:  bimatoprost   Place 1 Drop in both eyes every bedtime. 1 gtts both eyes  Dose:  1 Drop     pantoprazole 40 MG Tbec  Commonly known as:  PROTONIX   Take 40 mg by mouth 2 Times a Day.  Dose:  40 mg        STOP taking these medications    metoprolol SR 50 MG Tb24  Commonly known as:  TOPROL XL            Allergies  Allergies   Allergen Reactions   • Penicillins Rash and Vomiting     Rxn = unknown  Pt tolerates cephalosporins   • Hydrocodone Vomiting and Nausea     Rxn = unknown   • Tape Rash     RASH       DIET  No orders of the defined types were placed in this encounter.      ACTIVITY  As tolerated.  Weight bearing as tolerated    CONSULTATIONS  Cardiology  GI    PROCEDURES  none    LABORATORY  Lab Results   Component Value Date    SODIUM 139 01/16/2019    POTASSIUM 3.6 01/16/2019    CHLORIDE 101 01/16/2019    CO2 31 01/16/2019    GLUCOSE 102 (H) 01/16/2019    BUN 27 (H) 01/16/2019    CREATININE 1.20 01/16/2019        Lab Results   Component Value Date    WBC 11.1 (H) 01/15/2019    HEMOGLOBIN 12.9 01/15/2019    HEMATOCRIT 40.5 01/15/2019    PLATELETCT 323 01/15/2019        Total time of the discharge process exceeds 35 minutes.  I spent the majority of those 35 minutes with the patient reviewing results of studies discharge planning medications follow-up etc.  All questions were answered and again the patient is eager to leave and go home today.

## 2019-01-23 ENCOUNTER — OFFICE VISIT (OUTPATIENT)
Dept: CARDIOLOGY | Facility: MEDICAL CENTER | Age: 84
End: 2019-01-23
Payer: MEDICARE

## 2019-01-23 VITALS
SYSTOLIC BLOOD PRESSURE: 128 MMHG | HEIGHT: 69 IN | BODY MASS INDEX: 18.81 KG/M2 | OXYGEN SATURATION: 97 % | DIASTOLIC BLOOD PRESSURE: 58 MMHG | HEART RATE: 76 BPM | WEIGHT: 127 LBS

## 2019-01-23 DIAGNOSIS — I51.89 LEFT VENTRICULAR SYSTOLIC DYSFUNCTION, NYHA CLASS 3: ICD-10-CM

## 2019-01-23 DIAGNOSIS — E78.2 MIXED HYPERLIPIDEMIA: ICD-10-CM

## 2019-01-23 DIAGNOSIS — Z87.19 HISTORY OF GI BLEED: ICD-10-CM

## 2019-01-23 DIAGNOSIS — Z79.899 HIGH RISK MEDICATION USE: ICD-10-CM

## 2019-01-23 DIAGNOSIS — I50.20 ACC/AHA STAGE C SYSTOLIC HEART FAILURE (HCC): ICD-10-CM

## 2019-01-23 DIAGNOSIS — I25.2 HISTORY OF HEART ATTACK: ICD-10-CM

## 2019-01-23 DIAGNOSIS — I25.5 ISCHEMIC CARDIOMYOPATHY: ICD-10-CM

## 2019-01-23 DIAGNOSIS — I10 HTN (HYPERTENSION), MALIGNANT: ICD-10-CM

## 2019-01-23 DIAGNOSIS — Z95.5 STENTED CORONARY ARTERY: ICD-10-CM

## 2019-01-23 PROCEDURE — 99214 OFFICE O/P EST MOD 30 MIN: CPT | Performed by: INTERNAL MEDICINE

## 2019-01-23 RX ORDER — LISINOPRIL 20 MG/1
20 TABLET ORAL DAILY
Qty: 90 TAB | Refills: 3 | Status: SHIPPED | OUTPATIENT
Start: 2019-01-23 | End: 2019-02-13

## 2019-01-23 RX ORDER — NITROGLYCERIN 0.3 MG/1
0.3 TABLET SUBLINGUAL SEE ADMIN INSTRUCTIONS
Qty: 20 TAB | Refills: 3 | Status: SHIPPED | OUTPATIENT
Start: 2019-01-23

## 2019-01-23 ASSESSMENT — MINNESOTA LIVING WITH HEART FAILURE QUESTIONNAIRE (MLHF)
COSTING YOU MONEY FOR MEDICAL CARE: 0
GIVING YOU SIDE EFFECTS FROM TREATMENTS: 3
TOTAL_SCORE: 76
DIFFICULTY TO CONCENTRATE OR REMEMBERING THINGS: 5
WALKING ABOUT OR CLIMBING STAIRS DIFFICULT: 5
HAVING TO SIT OR LIE DOWN DURING THE DAY: 2
MAKING YOU WORRY: 5
MAKING YOU STAY IN A HOSPITAL: 5
TIRED, FATIGUED OR LOW ON ENERGY: 5
SWELLING IN ANKLES OR LEGS: 3
MAKING YOU FEEL DEPRESSED: 4
DIFFICULTY SOCIALIZING WITH FAMILY OR FRIENDS: 4
DIFFICULTY GOING AWAY FROM HOME: 4
MAKING YOU SHORT OF BREATH: 5
DIFFICULTY WITH SEXUAL ACTIVITIES: 5
LOSS OF SELF CONTROL IN YOUR LIFE: 4
DIFFICULTY WORKING TO EARN A LIVING: 0
DIFFICULTY SLEEPING WELL AT NIGHT: 4
WORKING AROUND THE HOUSE OR YARD DIFFICULT: 4
EATING LESS FOODS YOU LIKE: 4
DIFFICULTY WITH RECREATIONAL PASTIMES, SPORTS, HOBBIES: 5
FEELING LIKE A BURDEN TO FAMILY AND FRIENDS: 0

## 2019-01-23 ASSESSMENT — ENCOUNTER SYMPTOMS
DIZZINESS: 0
HEADACHES: 0
DOUBLE VISION: 0
MYALGIAS: 0
SHORTNESS OF BREATH: 1
DEPRESSION: 0
PALPITATIONS: 0
COUGH: 0
ABDOMINAL PAIN: 0
FEVER: 0
MEMORY LOSS: 0
BLURRED VISION: 0
BRUISES/BLEEDS EASILY: 0
SENSORY CHANGE: 0
FALLS: 0
DIAPHORESIS: 0

## 2019-01-23 ASSESSMENT — 6 MINUTE WALK TEST (6MWT): TOTAL DISTANCE WALKED (METERS): 0

## 2019-01-23 NOTE — LETTER
[facility]   [address]  [place] [address]  Phone: [phone]  Fax: [phone]              Brooke Diana  2/1/1934    Encounter Date: 1/23/2019    Jessica De Leon M.D.          PROGRESS NOTE:  Patient missed education, asked patient on way out if they had the time to come back and requested next visit.     Chief Complaint   Patient presents with   • CHF (Acute)     HF NEW       Subjective:   Brooke Diana is a 84 y.o. female who presents today for cardiac care and evaluation in our heart failure clinic after her recent hospitalization.  She went into the hospital with myocardial infarction and was treated medically because of GI bleeding.  That happened last week.  She also had another myocardial infarction episode at the end of December 2018 and was also treated medically because of GI bleeding.  Her LV function had a reduction now at 40%.  It was normal in October 2018.    She did have an angiogram done in July 24, 2014 which showed proximal LAD lesion, mid LAD occlusion with collateral circulation from right to left.  Noncritical proximal diffuse circumflex disease.  Noncritical proximal and distal bifurcation of RCA disease.    Patient still gets winded with daily living activities and exertion. No symptoms at rest.      Past Medical History:   Diagnosis Date   • Acute myocardial infarction, true posterior wall infarction, episode of care unspecified    • Anxiety    • Bowel habit changes    • Breath shortness 2017    uses oxygen at 2 liters/min; AT NIGHT ONLY   • CAD (coronary artery disease)     S/P stent placement   • Cholesterol blood decreased    • Dental disorder     Partial upper   • Dizziness    • GI bleed    • Glaucoma    • Heart burn    • Hyperlipidemia    • Hypertension    • Myocardial infarct (HCC) 6/10/10   • Peripheral vascular disease (HCC)    • Pulmonary disease     bronchitis   • TIA (transient ischemic attack)    •  Unspecified hemorrhagic conditions     pt takes plavix   • Unspecified urinary incontinence    • Urinary bladder disorder      Past Surgical History:   Procedure Laterality Date   • COLONOSCOPY - ENDO N/A 10/23/2018    Procedure: COLONOSCOPY - ENDO;  Surgeon: Anatoly Barragan M.D.;  Location: [facility];  Service: Gastroenterology   • GASTROSCOPY-ENDO N/A 10/22/2018    Procedure: GASTROSCOPY-ENDO;  Surgeon: Bakari Brown M.D.;  Location: ENDOSCOPY [facility];  Service: Gastroenterology   • CAROTID ENDARTERECTOMY Right 9/3/2018    Procedure: CAROTID ENDARTERECTOMY WITH NEUROMONITORING;  Surgeon: Naga Abad M.D.;  Location: SURGERY [facility];  Service: Vascular   • GASTROSCOPY WITH BIOPSY  2/13/2016    Procedure: GASTROSCOPY WITH BIOPSY;  Surgeon: Susan Miller M.D.;  Location: [facility];  Service:    • RECOVERY  7/24/2014    Performed by Cath-Recovery Surgery at SURGERY SAME DAY [facility]   • CARDIAC CATH  7/24/14    Diffuse disease, see report.  % with collterals.   • FEMORAL ENDARTERECTOMY  10/4/2013    Performed by Kacie Baker M.D. at SURGERY [facility]   • ANGIOPLASTY BALLOON  10/4/2013    Performed by Kacie Baker M.D. at [facility]   • RECOVERY  4/9/2013    Performed by Ir-Recovery Surgery at SURGERY SAME DAY [facility]   • RECOVERY  11/26/2012    Performed by Ir-Recovery Surgery at SURGERY SAME DAY [facility]   • RECTUS REPAIR  7/13/2012    Performed by SHEILA PLATT at SURGERY SURGICAL [facility]   • OTHER  12/10/10    stents in legs   • OTHER  6/10/10    cardiac stents   • CARDIAC CATH  6/2010    BMS to Om   • OTHER  2005    KNEE SURGERY   • CHOLECYSTECTOMY     • HYSTERECTOMY, TOTAL ABDOMINAL     • OTHER CARDIAC SURGERY      stent in heart   • STENT PLACEMENT      treated by Dr Baker, multiple surgeries to legs.     Family History   Problem Relation Age of Onset   • Stroke Mother 68         CVA   • Heart Disease Mother 65        valve surgery   • Other Son         wgt 465 lbs     Social History     Social History   • Marital status:      Spouse name: N/A   • Number of children: N/A   • Years of education: N/A     Occupational History   • Not on file.     Social History Main Topics   • Smoking status: Former Smoker     Packs/day: 0.25     Years: 45.00     Types: Cigarettes     Quit date: 2/1/2016   • Smokeless tobacco: Never Used   • Alcohol use 0.0 - 0.6 oz/week      Comment: 1 drink per month   • Drug use: No   • Sexual activity: Not on file     Other Topics Concern   • Not on file     Social History Narrative   • No narrative on file     Allergies   Allergen Reactions   • Penicillins Rash and Vomiting     Rxn = unknown  Pt tolerates cephalosporins   • Hydrocodone Vomiting and Nausea     Rxn = unknown   • Tape Rash     RASH     Outpatient Encounter Prescriptions as of 1/23/2019   Medication Sig Dispense Refill   • lisinopril (PRINIVIL) 20 MG Tab Take 1 Tab by mouth every day. 90 Tab 3   • nitroGLYCERIN (NITROSTAT) 0.3 MG SL tablet Place 1 Tab under tongue See Admin Instructions. 20 Tab 3   • carvedilol (COREG) 12.5 MG Tab Take 1 Tab by mouth 2 times a day, with meals. 60 Tab 0   • furosemide (LASIX) 40 MG Tab Take 1 Tab by mouth every day. 60 Tab 0   • isosorbide mononitrate SR (IMDUR) 30 MG TABLET SR 24 HR Take 3 Tabs by mouth every day. 30 Tab 0   • aspirin (ASA) 81 MG Chew Tab chewable tablet Take 81 mg by mouth every day.     • rosuvastatin (CRESTOR) 40 MG tablet Take 40 mg by mouth every evening.     • pantoprazole (PROTONIX) 40 MG Tablet Delayed Response Take 40 mg by mouth 2 Times a Day.     • bimatoprost (LUMIGAN) 0.01 % Solution Place 1 Drop in both eyes every bedtime. 1 gtts both eyes     • latanoprost (XALATAN) 0.005 % Solution Place 1 Drop in both eyes every evening. (Patient not taking: Reported on 1/23/2019) 1 Bottle 0   • [DISCONTINUED] captopril (CAPOTEN) 12.5 MG Tab Take 1 Tab  "by mouth every 6 hours. 90 Tab 0   • clopidogrel (PLAVIX) 75 MG Tab Take 75 mg by mouth every day.       No facility-administered encounter medications on file as of 1/23/2019.      Review of Systems   Constitutional: Negative for diaphoresis and fever.   HENT: Negative for nosebleeds.    Eyes: Negative for blurred vision and double vision.   Respiratory: Positive for shortness of breath. Negative for cough.    Cardiovascular: Negative for chest pain and palpitations.   Gastrointestinal: Negative for abdominal pain.   Genitourinary: Negative for dysuria and frequency.   Musculoskeletal: Negative for falls and myalgias.   Skin: Negative for rash.   Neurological: Negative for dizziness, sensory change and headaches.   Endo/Heme/Allergies: Does not bruise/bleed easily.   Psychiatric/Behavioral: Negative for depression and memory loss.        Objective:   /58 (BP Location: Right arm, Patient Position: Sitting, BP Cuff Size: Adult)   Pulse 76   Ht 1.753 m (5' 9\")   Wt 57.6 kg (127 lb)   SpO2 97%   BMI 18.75 kg/m²      Physical Exam   Constitutional: She is oriented to person, place, and time. No distress.   HENT:   Head: Normocephalic and atraumatic.   Right Ear: External ear normal.   Left Ear: External ear normal.   Eyes: Right eye exhibits no discharge. Left eye exhibits no discharge.   Neck: No JVD present. No thyromegaly present.   Cardiovascular: Normal rate, regular rhythm, normal heart sounds and intact distal pulses.  Exam reveals no gallop and no friction rub.    No murmur heard.  Pulmonary/Chest: Breath sounds normal. No respiratory distress.   Abdominal: Bowel sounds are normal. She exhibits no distension. There is no tenderness.   Musculoskeletal: She exhibits no edema or tenderness.   Neurological: She is alert and oriented to person, place, and time. No cranial nerve deficit.   Skin: Skin is warm and dry. She is not diaphoretic.   Psychiatric: She has a normal mood and affect. Her behavior is " normal.   Nursing note and vitals reviewed.      Assessment:     1. ACC/AHA stage C systolic heart failure (HCC)  lisinopril (PRINIVIL) 20 MG Tab   2. Left ventricular systolic dysfunction, NYHA class 3  lisinopril (PRINIVIL) 20 MG Tab   3. Stented coronary artery  lisinopril (PRINIVIL) 20 MG Tab    nitroGLYCERIN (NITROSTAT) 0.3 MG SL tablet   4. Ischemic cardiomyopathy  lisinopril (PRINIVIL) 20 MG Tab   5. HTN (hypertension), malignant  lisinopril (PRINIVIL) 20 MG Tab   6. Mixed hyperlipidemia  lisinopril (PRINIVIL) 20 MG Tab   7. High risk medication use  lisinopril (PRINIVIL) 20 MG Tab    BASIC METABOLIC PANEL   8. History of heart attack  lisinopril (PRINIVIL) 20 MG Tab   9. History of GI bleed  lisinopril (PRINIVIL) 20 MG Tab       Medical Decision Making:  Today's Assessment / Status / Plan:   Today, based on physical examination findings, patient is euvolemic. No JVD, lungs are clear to auscultation, no pitting edema in bilateral lower extremities, no ascites.    Dry weight is 127 lbs.    Will change Captopril to Lisinopril 20 mg po daily for better dosing frequency.    Continue Coreg 12.5 mg bid, Imdur 30 mg daily, Rosuvastatin 40 mg po daily.    Will continue to closely monitor for side effects of patient's high risk medication(s) including liver, renal function and electrolytes.    I will see patient back in our Heart Failure Clinic with lab tests and studies results in 3 weeks.    I thank you Dr. Lynn for referring patient to our Heart Failure Clinic today.            Mark Lynn D.O.  [address]  [place] [address]  VIA Facsimile: [phone]

## 2019-01-23 NOTE — PROGRESS NOTES
Patient missed education, asked patient on way out if they had the time to come back and requested next visit.

## 2019-01-23 NOTE — PROGRESS NOTES
Chief Complaint   Patient presents with   • CHF (Acute)     HF NEW       Subjective:   Brooke Diana is a 84 y.o. female who presents today for cardiac care and evaluation in our heart failure clinic after her recent hospitalization.  She went into the hospital with myocardial infarction and was treated medically because of GI bleeding.  That happened last week.  She also had another myocardial infarction episode at the end of December 2018 and was also treated medically because of GI bleeding.  Her LV function had a reduction now at 40%.  It was normal in October 2018.    She did have an angiogram done in July 24, 2014 which showed proximal LAD lesion, mid LAD occlusion with collateral circulation from right to left.  Noncritical proximal diffuse circumflex disease.  Noncritical proximal and distal bifurcation of RCA disease.    Patient still gets winded with daily living activities and exertion. No symptoms at rest.      Past Medical History:   Diagnosis Date   • Acute myocardial infarction, true posterior wall infarction, episode of care unspecified    • Anxiety    • Bowel habit changes    • Breath shortness 2017    uses oxygen at 2 liters/min; AT NIGHT ONLY   • CAD (coronary artery disease)     S/P stent placement   • Cholesterol blood decreased    • Dental disorder     Partial upper   • Dizziness    • GI bleed    • Glaucoma    • Heart burn    • Hyperlipidemia    • Hypertension    • Myocardial infarct (HCC) 6/10/10   • Peripheral vascular disease (HCC)    • Pulmonary disease     bronchitis   • TIA (transient ischemic attack)    • Unspecified hemorrhagic conditions     pt takes plavix   • Unspecified urinary incontinence    • Urinary bladder disorder      Past Surgical History:   Procedure Laterality Date   • COLONOSCOPY - ENDO N/A 10/23/2018    Procedure: COLONOSCOPY - ENDO;  Surgeon: Anatoly Barragan M.D.;  Location: ENDOSCOPY [facility];  Service: Gastroenterology   • GASTROSCOPY-ENDO N/A  10/22/2018    Procedure: GASTROSCOPY-ENDO;  Surgeon: Bakari Brown M.D.;  Location: ENDOSCOPY [facility];  Service: Gastroenterology   • CAROTID ENDARTERECTOMY Right 9/3/2018    Procedure: CAROTID ENDARTERECTOMY WITH NEUROMONITORING;  Surgeon: Naga Abad M.D.;  Location: SURGERY [facility];  Service: Vascular   • GASTROSCOPY WITH BIOPSY  2/13/2016    Procedure: GASTROSCOPY WITH BIOPSY;  Surgeon: Susan Miller M.D.;  Location: ENDOSCOPY [facility];  Service:    • RECOVERY  7/24/2014    Performed by Cath-Recovery Surgery at SURGERY SAME DAY [facility]   • CARDIAC CATH  7/24/14    Diffuse disease, see report.  % with collterals.   • FEMORAL ENDARTERECTOMY  10/4/2013    Performed by Kacie Baker M.D. at SURGERY [facility]   • ANGIOPLASTY BALLOON  10/4/2013    Performed by Kacie Baker M.D. at SURGERY [facility]   • RECOVERY  4/9/2013    Performed by Ir-Recovery Surgery at SURGERY SAME DAY [facility]   • RECOVERY  11/26/2012    Performed by Ir-Recovery Surgery at SURGERY SAME DAY [facility]   • RECTUS REPAIR  7/13/2012    Performed by SHEILA PLATT at SURGERY [facility]   • OTHER  12/10/10    stents in legs   • OTHER  6/10/10    cardiac stents   • CARDIAC CATH  6/2010    [facility] to [facility]   • OTHER  2005    KNEE SURGERY   • CHOLECYSTECTOMY     • HYSTERECTOMY, TOTAL ABDOMINAL     • OTHER CARDIAC SURGERY      stent in heart   • STENT PLACEMENT      treated by Dr Baker, multiple surgeries to legs.     Family History   Problem Relation Age of Onset   • Stroke Mother 68        CVA   • Heart Disease Mother 65        valve surgery   • Other Son         wgt 465 lbs     Social History     Social History   • Marital status:      Spouse name: N/A   • Number of children: N/A   • Years of education: N/A     Occupational History   • Not on file.     Social History Main Topics   • Smoking status: Former Smoker     Packs/day: 0.25     Years: 45.00      Types: Cigarettes     Quit date: 2/1/2016   • Smokeless tobacco: Never Used   • Alcohol use 0.0 - 0.6 oz/week      Comment: 1 drink per month   • Drug use: No   • Sexual activity: Not on file     Other Topics Concern   • Not on file     Social History Narrative   • No narrative on file     Allergies   Allergen Reactions   • Penicillins Rash and Vomiting     Rxn = unknown  Pt tolerates cephalosporins   • Hydrocodone Vomiting and Nausea     Rxn = unknown   • Tape Rash     RASH     Outpatient Encounter Prescriptions as of 1/23/2019   Medication Sig Dispense Refill   • lisinopril (PRINIVIL) 20 MG Tab Take 1 Tab by mouth every day. 90 Tab 3   • nitroGLYCERIN (NITROSTAT) 0.3 MG SL tablet Place 1 Tab under tongue See Admin Instructions. 20 Tab 3   • carvedilol (COREG) 12.5 MG Tab Take 1 Tab by mouth 2 times a day, with meals. 60 Tab 0   • furosemide (LASIX) 40 MG Tab Take 1 Tab by mouth every day. 60 Tab 0   • isosorbide mononitrate SR (IMDUR) 30 MG TABLET SR 24 HR Take 3 Tabs by mouth every day. 30 Tab 0   • aspirin (ASA) 81 MG Chew Tab chewable tablet Take 81 mg by mouth every day.     • rosuvastatin (CRESTOR) 40 MG tablet Take 40 mg by mouth every evening.     • pantoprazole (PROTONIX) 40 MG Tablet Delayed Response Take 40 mg by mouth 2 Times a Day.     • bimatoprost (LUMIGAN) 0.01 % Solution Place 1 Drop in both eyes every bedtime. 1 gtts both eyes     • latanoprost (XALATAN) 0.005 % Solution Place 1 Drop in both eyes every evening. (Patient not taking: Reported on 1/23/2019) 1 Bottle 0   • [DISCONTINUED] captopril (CAPOTEN) 12.5 MG Tab Take 1 Tab by mouth every 6 hours. 90 Tab 0   • clopidogrel (PLAVIX) 75 MG Tab Take 75 mg by mouth every day.       No facility-administered encounter medications on file as of 1/23/2019.      Review of Systems   Constitutional: Negative for diaphoresis and fever.   HENT: Negative for nosebleeds.    Eyes: Negative for blurred vision and double vision.   Respiratory: Positive for  "shortness of breath. Negative for cough.    Cardiovascular: Negative for chest pain and palpitations.   Gastrointestinal: Negative for abdominal pain.   Genitourinary: Negative for dysuria and frequency.   Musculoskeletal: Negative for falls and myalgias.   Skin: Negative for rash.   Neurological: Negative for dizziness, sensory change and headaches.   Endo/Heme/Allergies: Does not bruise/bleed easily.   Psychiatric/Behavioral: Negative for depression and memory loss.        Objective:   /58 (BP Location: Right arm, Patient Position: Sitting, BP Cuff Size: Adult)   Pulse 76   Ht 1.753 m (5' 9\")   Wt 57.6 kg (127 lb)   SpO2 97%   BMI 18.75 kg/m²     Physical Exam   Constitutional: She is oriented to person, place, and time. No distress.   HENT:   Head: Normocephalic and atraumatic.   Right Ear: External ear normal.   Left Ear: External ear normal.   Eyes: Right eye exhibits no discharge. Left eye exhibits no discharge.   Neck: No JVD present. No thyromegaly present.   Cardiovascular: Normal rate, regular rhythm, normal heart sounds and intact distal pulses.  Exam reveals no gallop and no friction rub.    No murmur heard.  Pulmonary/Chest: Breath sounds normal. No respiratory distress.   Abdominal: Bowel sounds are normal. She exhibits no distension. There is no tenderness.   Musculoskeletal: She exhibits no edema or tenderness.   Neurological: She is alert and oriented to person, place, and time. No cranial nerve deficit.   Skin: Skin is warm and dry. She is not diaphoretic.   Psychiatric: She has a normal mood and affect. Her behavior is normal.   Nursing note and vitals reviewed.      Assessment:     1. ACC/AHA stage C systolic heart failure (HCC)  lisinopril (PRINIVIL) 20 MG Tab   2. Left ventricular systolic dysfunction, NYHA class 3  lisinopril (PRINIVIL) 20 MG Tab   3. Stented coronary artery  lisinopril (PRINIVIL) 20 MG Tab    nitroGLYCERIN (NITROSTAT) 0.3 MG SL tablet   4. Ischemic cardiomyopathy  " lisinopril (PRINIVIL) 20 MG Tab   5. HTN (hypertension), malignant  lisinopril (PRINIVIL) 20 MG Tab   6. Mixed hyperlipidemia  lisinopril (PRINIVIL) 20 MG Tab   7. High risk medication use  lisinopril (PRINIVIL) 20 MG Tab    BASIC METABOLIC PANEL   8. History of heart attack  lisinopril (PRINIVIL) 20 MG Tab   9. History of GI bleed  lisinopril (PRINIVIL) 20 MG Tab       Medical Decision Making:  Today's Assessment / Status / Plan:   Today, based on physical examination findings, patient is euvolemic. No JVD, lungs are clear to auscultation, no pitting edema in bilateral lower extremities, no ascites.    Dry weight is 127 lbs.    Will change Captopril to Lisinopril 20 mg po daily for better dosing frequency.    Continue Coreg 12.5 mg bid, Imdur 30 mg daily, Rosuvastatin 40 mg po daily.    Will continue to closely monitor for side effects of patient's high risk medication(s) including liver, renal function and electrolytes.    I will see patient back in our Heart Failure Clinic with lab tests and studies results in 3 weeks.    I thank you Dr. Lynn for referring patient to our Heart Failure Clinic today.

## 2019-02-07 ENCOUNTER — TELEPHONE (OUTPATIENT)
Dept: CARDIOLOGY | Facility: MEDICAL CENTER | Age: 84
End: 2019-02-07

## 2019-02-07 NOTE — TELEPHONE ENCOUNTER
S/w patient about non-fasting labs, she stated that a home health nurse will be drawing the labs at her home. Patient is aware of her appt with Dr. De Leon on 02/13/2019.

## 2019-02-13 ENCOUNTER — OFFICE VISIT (OUTPATIENT)
Dept: CARDIOLOGY | Facility: MEDICAL CENTER | Age: 84
End: 2019-02-13
Payer: MEDICARE

## 2019-02-13 VITALS
SYSTOLIC BLOOD PRESSURE: 122 MMHG | DIASTOLIC BLOOD PRESSURE: 46 MMHG | WEIGHT: 129 LBS | OXYGEN SATURATION: 95 % | HEART RATE: 70 BPM | BODY MASS INDEX: 19.11 KG/M2 | HEIGHT: 69 IN

## 2019-02-13 DIAGNOSIS — I25.2 HISTORY OF HEART ATTACK: ICD-10-CM

## 2019-02-13 DIAGNOSIS — I51.89 LEFT VENTRICULAR SYSTOLIC DYSFUNCTION, NYHA CLASS 3: ICD-10-CM

## 2019-02-13 DIAGNOSIS — E78.2 MIXED HYPERLIPIDEMIA: ICD-10-CM

## 2019-02-13 DIAGNOSIS — Z95.5 STENTED CORONARY ARTERY: ICD-10-CM

## 2019-02-13 DIAGNOSIS — Z87.19 HISTORY OF GI BLEED: ICD-10-CM

## 2019-02-13 DIAGNOSIS — Z79.899 HIGH RISK MEDICATION USE: ICD-10-CM

## 2019-02-13 DIAGNOSIS — I10 HTN (HYPERTENSION), MALIGNANT: ICD-10-CM

## 2019-02-13 DIAGNOSIS — I50.20 ACC/AHA STAGE C SYSTOLIC HEART FAILURE (HCC): ICD-10-CM

## 2019-02-13 DIAGNOSIS — I25.5 ISCHEMIC CARDIOMYOPATHY: ICD-10-CM

## 2019-02-13 PROCEDURE — 99497 ADVNCD CARE PLAN 30 MIN: CPT | Performed by: INTERNAL MEDICINE

## 2019-02-13 PROCEDURE — 99214 OFFICE O/P EST MOD 30 MIN: CPT | Mod: 25 | Performed by: INTERNAL MEDICINE

## 2019-02-13 ASSESSMENT — ENCOUNTER SYMPTOMS
PALPITATIONS: 0
SENSORY CHANGE: 0
FEVER: 0
BRUISES/BLEEDS EASILY: 0
ABDOMINAL PAIN: 0
DEPRESSION: 0
MYALGIAS: 0
FALLS: 0
HEADACHES: 0
DOUBLE VISION: 0
BLURRED VISION: 0
DIAPHORESIS: 0
DIZZINESS: 0
SHORTNESS OF BREATH: 1
COUGH: 0
MEMORY LOSS: 0

## 2019-02-13 ASSESSMENT — NEW YORK HEART ASSOCIATION (NYHA) CLASSIFICATION: NYHA FUNCTIONAL CLASS: CLASS III

## 2019-02-13 NOTE — LETTER
[facility]   [address]  [place] [address]  Phone: [phone]  Fax: [phone]              Brooke Diana  2/1/1934    Encounter Date: 2/13/2019    Jessica De Leon M.D.          PROGRESS NOTE:  Chief Complaint   Patient presents with   • CHF (Systolic)     F/V: 3 WEEK       Subjective:   Brooke Diana is a 84 y.o. female who presents today for cardiac care and evaluation in our heart failure clinic for HF care.  She went into the hospital with myocardial infarction and was treated medically because of GI bleeding in 2018. She also had another myocardial infarction episode at the end of December 2018 and was also treated medically because of GI bleeding.  Her LV function had a reduction now at 40%.  It was normal in October 2018.     She did have an angiogram done in July 24, 2014 which showed proximal LAD lesion, mid LAD occlusion with collateral circulation from right to left.  Noncritical proximal diffuse circumflex disease.  Noncritical proximal and distal bifurcation of RCA disease.     Patient still gets winded with daily living activities and exertion. No symptoms at rest.    Not taking Lisinopril due to not feeling well and low blood pressure.    Past Medical History:   Diagnosis Date   • Acute myocardial infarction, true posterior wall infarction, episode of care unspecified    • Anxiety    • Bowel habit changes    • Breath shortness 2017    uses oxygen at 2 liters/min; AT NIGHT ONLY   • CAD (coronary artery disease)     S/P stent placement   • Cholesterol blood decreased    • Dental disorder     Partial upper   • Dizziness    • GI bleed    • Glaucoma    • Heart burn    • Hyperlipidemia    • Hypertension    • Myocardial infarct (HCC) 6/10/10   • Peripheral vascular disease (HCC)    • Pulmonary disease     bronchitis   • TIA (transient ischemic attack)    • Unspecified hemorrhagic conditions     pt takes plavix   • Unspecified urinary  incontinence    • Urinary bladder disorder      Past Surgical History:   Procedure Laterality Date   • COLONOSCOPY - ENDO N/A 10/23/2018    Procedure: COLONOSCOPY - ENDO;  Surgeon: Anatoly Barragan M.D.;  Location: [facility];  Service: Gastroenterology   • GASTROSCOPY-ENDO N/A 10/22/2018    Procedure: GASTROSCOPY-ENDO;  Surgeon: Bakari Brown M.D.;  Location: ENDOSCOPY [facility];  Service: Gastroenterology   • CAROTID ENDARTERECTOMY Right 9/3/2018    Procedure: CAROTID ENDARTERECTOMY WITH NEUROMONITORING;  Surgeon: Naga Abad M.D.;  Location: SURGERY [facility];  Service: Vascular   • GASTROSCOPY WITH BIOPSY  2/13/2016    Procedure: GASTROSCOPY WITH BIOPSY;  Surgeon: Susan Miller M.D.;  Location: ENDOSCOPY [facility];  Service:    • RECOVERY  7/24/2014    Performed by Cath-Recovery Surgery at SURGERY SAME DAY [facility]   • CARDIAC CATH  7/24/14    Diffuse disease, see report.  % with collterals.   • FEMORAL ENDARTERECTOMY  10/4/2013    Performed by Kacie Baker M.D. at SURGERY [facility]   • ANGIOPLASTY BALLOON  10/4/2013    Performed by Kacie Baker M.D. at SURGERY [facility]   • RECOVERY  4/9/2013    Performed by Ir-Recovery Surgery at SURGERY SAME DAY [facility]   • RECOVERY  11/26/2012    Performed by Ir-Recovery Surgery at SURGERY SAME DAY [facility]   • RECTUS REPAIR  7/13/2012    Performed by SHEILA PLATT at SURGERY [facility]   • OTHER  12/10/10    stents in legs   • OTHER  6/10/10    cardiac stents   • CARDIAC CATH  6/2010    [facility] to [facility]   • OTHER  2005    KNEE SURGERY   • CHOLECYSTECTOMY     • HYSTERECTOMY, TOTAL ABDOMINAL     • OTHER CARDIAC SURGERY      stent in heart   • STENT PLACEMENT      treated by Dr Baker, multiple surgeries to legs.     Family History   Problem Relation Age of Onset   • Stroke Mother 68        CVA   • Heart Disease Mother 65        valve surgery   • Other Son         wgt  465 lbs     Social History     Social History   • Marital status:      Spouse name: N/A   • Number of children: N/A   • Years of education: N/A     Occupational History   • Not on file.     Social History Main Topics   • Smoking status: Former Smoker     Packs/day: 0.25     Years: 45.00     Types: Cigarettes     Quit date: 2/1/2016   • Smokeless tobacco: Never Used   • Alcohol use 0.0 - 0.6 oz/week      Comment: 1 drink per month   • Drug use: No   • Sexual activity: Not on file     Other Topics Concern   • Not on file     Social History Narrative   • No narrative on file     Allergies   Allergen Reactions   • Penicillins Rash and Vomiting     Rxn = unknown  Pt tolerates cephalosporins   • Hydrocodone Vomiting and Nausea     Rxn = unknown   • Tape Rash     RASH     Outpatient Encounter Prescriptions as of 2/13/2019   Medication Sig Dispense Refill   • carvedilol (COREG) 12.5 MG Tab Take 1 Tab by mouth 2 times a day, with meals. 60 Tab 0   • furosemide (LASIX) 40 MG Tab Take 1 Tab by mouth every day. 60 Tab 0   • isosorbide mononitrate SR (IMDUR) 30 MG TABLET SR 24 HR Take 3 Tabs by mouth every day. (Patient taking differently: Take 30 mg by mouth every morning.) 30 Tab 0   • aspirin (ASA) 81 MG Chew Tab chewable tablet Take 81 mg by mouth every day.     • rosuvastatin (CRESTOR) 40 MG tablet Take 40 mg by mouth every evening.     • pantoprazole (PROTONIX) 40 MG Tablet Delayed Response Take 40 mg by mouth 2 Times a Day.     • bimatoprost (LUMIGAN) 0.01 % Solution Place 1 Drop in both eyes every bedtime. 1 gtts both eyes     • nitroGLYCERIN (NITROSTAT) 0.3 MG SL tablet Place 1 Tab under tongue See Admin Instructions. 20 Tab 3   • [DISCONTINUED] lisinopril (PRINIVIL) 20 MG Tab Take 1 Tab by mouth every day. (Patient not taking: Reported on 2/13/2019) 90 Tab 3   • [DISCONTINUED] latanoprost (XALATAN) 0.005 % Solution Place 1 Drop in both eyes every evening. (Patient not taking: Reported on 1/23/2019) 1 Bottle 0    "  • [DISCONTINUED] clopidogrel (PLAVIX) 75 MG Tab Take 75 mg by mouth every day.       No facility-administered encounter medications on file as of 2/13/2019.      Review of Systems   Constitutional: Negative for diaphoresis and fever.   HENT: Negative for nosebleeds.    Eyes: Negative for blurred vision and double vision.   Respiratory: Positive for shortness of breath. Negative for cough.    Cardiovascular: Negative for chest pain and palpitations.   Gastrointestinal: Negative for abdominal pain.   Genitourinary: Negative for dysuria and frequency.   Musculoskeletal: Negative for falls and myalgias.   Skin: Negative for rash.   Neurological: Negative for dizziness, sensory change and headaches.   Endo/Heme/Allergies: Does not bruise/bleed easily.   Psychiatric/Behavioral: Negative for depression and memory loss.        Objective:   /46 (BP Location: Right arm, Patient Position: Sitting, BP Cuff Size: Adult)   Pulse 70   Ht 1.753 m (5' 9\")   Wt 58.5 kg (129 lb)   SpO2 95%   BMI 19.05 kg/m²      Physical Exam   Constitutional: She is oriented to person, place, and time. No distress.   HENT:   Head: Normocephalic and atraumatic.   Right Ear: External ear normal.   Left Ear: External ear normal.   Eyes: Right eye exhibits no discharge. Left eye exhibits no discharge.   Neck: No JVD present. No thyromegaly present.   Cardiovascular: Normal rate, regular rhythm, normal heart sounds and intact distal pulses.  Exam reveals no gallop and no friction rub.    No murmur heard.  Pulmonary/Chest: Breath sounds normal. No respiratory distress.   Abdominal: Bowel sounds are normal. She exhibits no distension. There is no tenderness.   Musculoskeletal: She exhibits no edema or tenderness.   Neurological: She is alert and oriented to person, place, and time. No cranial nerve deficit.   Skin: Skin is warm and dry. She is not diaphoretic.   Psychiatric: She has a normal mood and affect. Her behavior is normal.   Nursing " note and vitals reviewed.      Assessment:     1. ACC/AHA stage C systolic heart failure (HCC)     2. Left ventricular systolic dysfunction, NYHA class 3     3. Stented coronary artery     4. Ischemic cardiomyopathy     5. HTN (hypertension), malignant     6. Mixed hyperlipidemia     7. History of GI bleed     8. History of heart attack     9. High risk medication use         Medical Decision Making:  Today's Assessment / Status / Plan:   Today, based on physical examination findings, patient is euvolemic. No JVD, lungs are clear to auscultation, no pitting edema in bilateral lower extremities, no ascites.    Dry weight is 129 lbs.    I spent 30 minutes talking to patient and family members at bedside about end-of-life issue.  At this time, patient has multiple comorbidities including end organ damage.  Mortality is high.  Further invasive cardiac procedure or surgery would not change the overall mortality of this patient.  Patient is not a candidate for further advanced heart failure therapies such as left ventricular assistance device or heart transplant.  She would like to be DNR/DNI now. Will fill out POLST form.    At this time, I had a discussion with patient about her overall prognosis.  I do think that the less we do the better it will be overall.  Therefore I am not going to change her medical therapy at this point.  Continue medications the way they are.    Will continue to closely monitor for side effects of patient's high risk medication(s) including liver, renal function and electrolytes.    I will see patient back in our Heart Failure Clinic with lab tests and studies results in 6 weeks.    I thank you Dr. Lynn for referring patient to our Heart Failure Clinic today.              Mark Lynn D.O.  [address] [address]  VIA Facsimile: [phone]

## 2019-02-13 NOTE — PROGRESS NOTES
"Education Narrative  Reviewed anatomy and physiology of heart failure with patient and sister. Went over heart failure worksheet and patient's individual HF diagnosis, EF, risk factors, general medication classes and indications, as well as personal goals.  Goals: Patient's primary goal is be able to walk with her dog again and be able to go to the store again to shop for herself.     Discussed daily weights, sodium restriction, worsening signs and symptoms to report to physician, heart medications, and importance of adherence to medication regimen. Emphasized recommendation from AHA/AAHFN to keep daily sodium intake between 1500mg-2000mg.      Reviewed dietary handouts, advanced care planning classes, and advance directive planning handout. Discussed dietary considerations and reviewed Seven Day Heart Healthy Meal Plan by [employer].     Invited patient and family members/friends to HF support group and encouraged patient to call Heart Failure clinic during normal business hours with any questions.  Heart Failure program card with number given to patient.           Patient states full understanding of all information given.       OP Heart Failure  Vitals     Weight: 58.5 kg (129 lb)        Height: 175.3 cm (5' 9\")  BMI (Calculated): 19.05  Blood Pressure : 122/46  Pulse: 70    System Assessment  NYHA Functional Class Assessment: Class III  ACC/AHA HF Stage: C    Smoking Hx  Have you Ever Smoked: Yes  Have you Smoked in the Last 12 Mos: No     Confirm Quit Date: 02/01/16       Alcohol Hx  Do you Drink?: No            Illicit Drug Hx  Illicit Drug History: No    Social Hx  Social History: Lives Alone  Level of Support: Good  Advance Directives: POLST completed    Education  Symptoms: Verbalizes understanding  Weighing: Verbalizes Understanding  Weight Gain Response: Verbalizes Understanding   Recording Data: Verbalizes understanding  Teach Back Failures: Teach Back Successful  Compliance: Patient is Compliant   "     Medications  Medication Reconciliation : Complete  Medication Counseling Provided: Verbalizes Understanding  Able to Accurately Identify Medication Indications: Some  Medication Discrepancies: None  Is Patient on an Evidence Based Beta Blocker: Yes  Is Patient on ACE-1 or ARB: No (patient decision to stop)    Dietary Assessment  Food Labels: Verbalizes Understanding  Foods High in Sodium: Verbalizes Understanding  Daily Sodium Intake: Verbalizes Understanding  Diet: Verbalizes Understanding  Food Preparation: Verbalizes Understanding  Eating Out Plan: Verbalizes understanding  Healthier Options: Verbalizes Understanding  Fluid Restriction: Not Applicable    [place] Living with Heart Failure  From 1/23/19- 76       6 Minute Walk Test     Did not walk

## 2019-02-13 NOTE — PROGRESS NOTES
Chief Complaint   Patient presents with   • CHF (Systolic)     F/V: 3 WEEK       Subjective:   Brooke Diana is a 84 y.o. female who presents today for cardiac care and evaluation in our heart failure clinic for HF care.  She went into the hospital with myocardial infarction and was treated medically because of GI bleeding in 2018. She also had another myocardial infarction episode at the end of December 2018 and was also treated medically because of GI bleeding.  Her LV function had a reduction now at 40%.  It was normal in October 2018.     She did have an angiogram done in July 24, 2014 which showed proximal LAD lesion, mid LAD occlusion with collateral circulation from right to left.  Noncritical proximal diffuse circumflex disease.  Noncritical proximal and distal bifurcation of RCA disease.     Patient still gets winded with daily living activities and exertion. No symptoms at rest.    Not taking Lisinopril due to not feeling well and low blood pressure.    Past Medical History:   Diagnosis Date   • Acute myocardial infarction, true posterior wall infarction, episode of care unspecified    • Anxiety    • Bowel habit changes    • Breath shortness 2017    uses oxygen at 2 liters/min; AT NIGHT ONLY   • CAD (coronary artery disease)     S/P stent placement   • Cholesterol blood decreased    • Dental disorder     Partial upper   • Dizziness    • GI bleed    • Glaucoma    • Heart burn    • Hyperlipidemia    • Hypertension    • Myocardial infarct (HCC) 6/10/10   • Peripheral vascular disease (HCC)    • Pulmonary disease     bronchitis   • TIA (transient ischemic attack)    • Unspecified hemorrhagic conditions     pt takes plavix   • Unspecified urinary incontinence    • Urinary bladder disorder      Past Surgical History:   Procedure Laterality Date   • COLONOSCOPY - ENDO N/A 10/23/2018    Procedure: COLONOSCOPY - ENDO;  Surgeon: Anatoly Barragan M.D.;  Location: ENDOSCOPY [facility];  Service:  Gastroenterology   • GASTROSCOPY-ENDO N/A 10/22/2018    Procedure: GASTROSCOPY-ENDO;  Surgeon: Bakari Brown M.D.;  Location: ENDOSCOPY [facility];  Service: Gastroenterology   • CAROTID ENDARTERECTOMY Right 9/3/2018    Procedure: CAROTID ENDARTERECTOMY WITH NEUROMONITORING;  Surgeon: Naga Abad M.D.;  Location: SURGERY [facility];  Service: Vascular   • GASTROSCOPY WITH BIOPSY  2/13/2016    Procedure: GASTROSCOPY WITH BIOPSY;  Surgeon: Susan Miller M.D.;  Location: ENDOSCOPY [facility];  Service:    • RECOVERY  7/24/2014    Performed by Cath-Recovery Surgery at SURGERY SAME DAY [facility]   • CARDIAC CATH  7/24/14    Diffuse disease, see report.  % with collterals.   • FEMORAL ENDARTERECTOMY  10/4/2013    Performed by Kacie Baker M.D. at SURGERY [facility]   • ANGIOPLASTY BALLOON  10/4/2013    Performed by Kacie Baker M.D. at SURGERY [facility]   • RECOVERY  4/9/2013    Performed by Ir-Recovery Surgery at SURGERY SAME DAY [facility]S   • RECOVERY  11/26/2012    Performed by Ir-Recovery Surgery at SURGERY SAME DAY [facility] OR[facility]   • RECTUS REPAIR  7/13/2012    Performed by SHEILA PLATT at SURGERY [facility]   • OTHER  12/10/10    stents in legs   • OTHER  6/10/10    cardiac stents   • CARDIAC CATH  6/2010    BMS to Om   • OTHER  2005    KNEE SURGERY   • CHOLECYSTECTOMY     • HYSTERECTOMY, TOTAL ABDOMINAL     • OTHER CARDIAC SURGERY      stent in heart   • STENT PLACEMENT      treated by Dr Baker, multiple surgeries to legs.     Family History   Problem Relation Age of Onset   • Stroke Mother 68        CVA   • Heart Disease Mother 65        valve surgery   • Other Son         wgt 465 lbs     Social History     Social History   • Marital status:      Spouse name: N/A   • Number of children: N/A   • Years of education: N/A     Occupational History   • Not on file.     Social History Main Topics   • Smoking status: Former Smoker      Packs/day: 0.25     Years: 45.00     Types: Cigarettes     Quit date: 2/1/2016   • Smokeless tobacco: Never Used   • Alcohol use 0.0 - 0.6 oz/week      Comment: 1 drink per month   • Drug use: No   • Sexual activity: Not on file     Other Topics Concern   • Not on file     Social History Narrative   • No narrative on file     Allergies   Allergen Reactions   • Penicillins Rash and Vomiting     Rxn = unknown  Pt tolerates cephalosporins   • Hydrocodone Vomiting and Nausea     Rxn = unknown   • Tape Rash     RASH     Outpatient Encounter Prescriptions as of 2/13/2019   Medication Sig Dispense Refill   • carvedilol (COREG) 12.5 MG Tab Take 1 Tab by mouth 2 times a day, with meals. 60 Tab 0   • furosemide (LASIX) 40 MG Tab Take 1 Tab by mouth every day. 60 Tab 0   • isosorbide mononitrate SR (IMDUR) 30 MG TABLET SR 24 HR Take 3 Tabs by mouth every day. (Patient taking differently: Take 30 mg by mouth every morning.) 30 Tab 0   • aspirin (ASA) 81 MG Chew Tab chewable tablet Take 81 mg by mouth every day.     • rosuvastatin (CRESTOR) 40 MG tablet Take 40 mg by mouth every evening.     • pantoprazole (PROTONIX) 40 MG Tablet Delayed Response Take 40 mg by mouth 2 Times a Day.     • bimatoprost (LUMIGAN) 0.01 % Solution Place 1 Drop in both eyes every bedtime. 1 gtts both eyes     • nitroGLYCERIN (NITROSTAT) 0.3 MG SL tablet Place 1 Tab under tongue See Admin Instructions. 20 Tab 3   • [DISCONTINUED] lisinopril (PRINIVIL) 20 MG Tab Take 1 Tab by mouth every day. (Patient not taking: Reported on 2/13/2019) 90 Tab 3   • [DISCONTINUED] latanoprost (XALATAN) 0.005 % Solution Place 1 Drop in both eyes every evening. (Patient not taking: Reported on 1/23/2019) 1 Bottle 0   • [DISCONTINUED] clopidogrel (PLAVIX) 75 MG Tab Take 75 mg by mouth every day.       No facility-administered encounter medications on file as of 2/13/2019.      Review of Systems   Constitutional: Negative for diaphoresis and fever.   HENT: Negative for  "nosebleeds.    Eyes: Negative for blurred vision and double vision.   Respiratory: Positive for shortness of breath. Negative for cough.    Cardiovascular: Negative for chest pain and palpitations.   Gastrointestinal: Negative for abdominal pain.   Genitourinary: Negative for dysuria and frequency.   Musculoskeletal: Negative for falls and myalgias.   Skin: Negative for rash.   Neurological: Negative for dizziness, sensory change and headaches.   Endo/Heme/Allergies: Does not bruise/bleed easily.   Psychiatric/Behavioral: Negative for depression and memory loss.        Objective:   /46 (BP Location: Right arm, Patient Position: Sitting, BP Cuff Size: Adult)   Pulse 70   Ht 1.753 m (5' 9\")   Wt 58.5 kg (129 lb)   SpO2 95%   BMI 19.05 kg/m²     Physical Exam   Constitutional: She is oriented to person, place, and time. No distress.   Patient is dependent on supplemental oxygen.     HENT:   Head: Normocephalic and atraumatic.   Right Ear: External ear normal.   Left Ear: External ear normal.   Eyes: Right eye exhibits no discharge. Left eye exhibits no discharge.   Neck: No JVD present. No thyromegaly present.   Cardiovascular: Normal rate, regular rhythm, normal heart sounds and intact distal pulses.  Exam reveals no gallop and no friction rub.    No murmur heard.  Pulmonary/Chest: Breath sounds normal. No respiratory distress.   Patient is dependent on supplemental oxygen.     Abdominal: Bowel sounds are normal. She exhibits no distension. There is no tenderness.   Musculoskeletal: She exhibits no edema or tenderness.   Neurological: She is alert and oriented to person, place, and time. No cranial nerve deficit.   Skin: Skin is warm and dry. She is not diaphoretic.   Psychiatric: She has a normal mood and affect. Her behavior is normal.   Nursing note and vitals reviewed.      Assessment:     1. ACC/AHA stage C systolic heart failure (HCC)     2. Left ventricular systolic dysfunction, NYHA class 3     3. " Stented coronary artery     4. Ischemic cardiomyopathy     5. HTN (hypertension), malignant     6. Mixed hyperlipidemia     7. History of GI bleed     8. History of heart attack     9. High risk medication use         Medical Decision Making:  Today's Assessment / Status / Plan:   Today, based on physical examination findings, patient is euvolemic. No JVD, lungs are clear to auscultation, no pitting edema in bilateral lower extremities, no ascites.    Dry weight is 129 lbs.    I spent 30 minutes talking to patient and family members at bedside about end-of-life issue.  At this time, patient has multiple comorbidities including end organ damage.  Mortality is high.  Further invasive cardiac procedure or surgery would not change the overall mortality of this patient.  Patient is not a candidate for further advanced heart failure therapies such as left ventricular assistance device or heart transplant.  She would like to be DNR/DNI now. Will fill out POLST form.    At this time, I had a discussion with patient about her overall prognosis.  I do think that the less we do the better it will be overall.  Therefore I am not going to change her medical therapy at this point.  Continue medications the way they are.    Will continue to closely monitor for side effects of patient's high risk medication(s) including liver, renal function and electrolytes.    I will see patient back in our Heart Failure Clinic with lab tests and studies results in 6 weeks.    I thank you Dr. Lynn for referring patient to our Heart Failure Clinic today.

## 2019-02-19 DIAGNOSIS — I10 ESSENTIAL HYPERTENSION: Primary | ICD-10-CM

## 2019-02-25 RX ORDER — CARVEDILOL 12.5 MG/1
12.5 TABLET ORAL 2 TIMES DAILY WITH MEALS
Qty: 180 TAB | Refills: 3 | Status: SHIPPED | OUTPATIENT
Start: 2019-02-25 | End: 2019-05-08 | Stop reason: SDUPTHER

## 2019-03-07 DIAGNOSIS — I10 ESSENTIAL HYPERTENSION: Primary | ICD-10-CM

## 2019-03-07 RX ORDER — ISOSORBIDE MONONITRATE 30 MG/1
30 TABLET, EXTENDED RELEASE ORAL EVERY MORNING
Qty: 90 TAB | Refills: 3 | Status: SHIPPED | OUTPATIENT
Start: 2019-03-07 | End: 2019-05-08

## 2019-03-26 ENCOUNTER — OFFICE VISIT (OUTPATIENT)
Dept: CARDIOLOGY | Facility: MEDICAL CENTER | Age: 84
End: 2019-03-26
Payer: MEDICARE

## 2019-03-26 VITALS
HEART RATE: 70 BPM | WEIGHT: 129 LBS | SYSTOLIC BLOOD PRESSURE: 126 MMHG | OXYGEN SATURATION: 97 % | DIASTOLIC BLOOD PRESSURE: 48 MMHG | BODY MASS INDEX: 19.11 KG/M2 | HEIGHT: 69 IN

## 2019-03-26 DIAGNOSIS — I25.10 CORONARY ARTERY DISEASE INVOLVING NATIVE CORONARY ARTERY OF NATIVE HEART WITHOUT ANGINA PECTORIS: ICD-10-CM

## 2019-03-26 DIAGNOSIS — I50.9 HEART FAILURE, NYHA CLASS 3 (HCC): ICD-10-CM

## 2019-03-26 DIAGNOSIS — Z87.19 HISTORY OF GI BLEED: ICD-10-CM

## 2019-03-26 DIAGNOSIS — Z95.5 STENTED CORONARY ARTERY: ICD-10-CM

## 2019-03-26 DIAGNOSIS — I10 HTN (HYPERTENSION), MALIGNANT: ICD-10-CM

## 2019-03-26 DIAGNOSIS — E78.2 MIXED HYPERLIPIDEMIA: ICD-10-CM

## 2019-03-26 DIAGNOSIS — I25.5 ISCHEMIC CARDIOMYOPATHY: ICD-10-CM

## 2019-03-26 DIAGNOSIS — Z79.899 HIGH RISK MEDICATION USE: ICD-10-CM

## 2019-03-26 DIAGNOSIS — I50.20 ACC/AHA STAGE C SYSTOLIC HEART FAILURE (HCC): ICD-10-CM

## 2019-03-26 PROCEDURE — 99214 OFFICE O/P EST MOD 30 MIN: CPT | Performed by: NURSE PRACTITIONER

## 2019-03-26 RX ORDER — FUROSEMIDE 40 MG/1
40 TABLET ORAL DAILY
Qty: 90 TAB | Refills: 3 | Status: SHIPPED | OUTPATIENT
Start: 2019-03-26 | End: 2019-06-03 | Stop reason: SDUPTHER

## 2019-03-26 RX ORDER — NITROFURANTOIN 25; 75 MG/1; MG/1
CAPSULE ORAL
Refills: 0 | COMMUNITY
Start: 2019-01-24 | End: 2019-03-26

## 2019-03-26 ASSESSMENT — ENCOUNTER SYMPTOMS
MYALGIAS: 0
COUGH: 0
DIZZINESS: 0
ABDOMINAL PAIN: 0
ORTHOPNEA: 0
SHORTNESS OF BREATH: 1
PALPITATIONS: 0
PND: 0
CLAUDICATION: 0
FEVER: 0

## 2019-03-26 NOTE — PROGRESS NOTES
Chief Complaint   Patient presents with   • CHF (Systolic)   • HTN (Controlled)   • Coronary Artery Disease       Subjective:   Brooke Diana is a 85 y.o. female who presents today for follow up on her heart failure with her sister, Erin.    Patient of Dr. De Leon in the heart failure clinic.  Patient was last seen in clinic on 2/13/2019.  During her last visit, no changes were made to her medical regimen.  Her prognosis was discussed.    For her symptoms, her main complaints are fatigue, slight episodes of unsteadiness on her feet and shortness of breath with some of her ADLs and exertion.  He uses continuous oxygen at 2 L.  She does use a walker for ambulation.    Patient reports her home weights fluctuated from 118 pounds up to 123 pounds.    Patient does report she is been able to walk around the grocery store and walk her dog with some improvement in her breathing.    Additonally, patient has the following medical problems:    -Ischemic cardiomyopathy diagnosed in 12/2018 LVEF 25% improved to 40%    -CAD, history of MI-2010- to LAD, PCI to OM    -History of GI bleed, gastric AVM in 2018    -Right carotid endarterectomy 9/3/2018    -Prior strokes    -Dyslipidemia  Past Medical History:   Diagnosis Date   • Acute myocardial infarction, true posterior wall infarction, episode of care unspecified    • Anxiety    • Bowel habit changes    • Breath shortness 2017    uses oxygen at 2 liters/min; AT NIGHT ONLY   • CAD (coronary artery disease)     S/P stent placement   • Cholesterol blood decreased    • Dental disorder     Partial upper   • Dizziness    • GI bleed    • Glaucoma    • Heart burn    • Hyperlipidemia    • Hypertension    • Myocardial infarct (HCC) 6/10/10   • Peripheral vascular disease (HCC)    • Pulmonary disease     bronchitis   • TIA (transient ischemic attack)    • Unspecified hemorrhagic conditions     pt takes plavix   • Unspecified urinary incontinence    • Urinary bladder disorder      Past  Surgical History:   Procedure Laterality Date   • COLONOSCOPY - ENDO N/A 10/23/2018    Procedure: COLONOSCOPY - ENDO;  Surgeon: Anatoly Barragan M.D.;  Location: [facility];  Service: Gastroenterology   • GASTROSCOPY-ENDO N/A 10/22/2018    Procedure: GASTROSCOPY-ENDO;  Surgeon: Bakari Brown M.D.;  Location: ENDOSCOPY [facility];  Service: Gastroenterology   • CAROTID ENDARTERECTOMY Right 9/3/2018    Procedure: CAROTID ENDARTERECTOMY WITH NEUROMONITORING;  Surgeon: Naga Abad M.D.;  Location: SURGERY [facility];  Service: Vascular   • GASTROSCOPY WITH BIOPSY  2/13/2016    Procedure: GASTROSCOPY WITH BIOPSY;  Surgeon: Susan Miller M.D.;  Location: [facility];  Service:    • RECOVERY  7/24/2014    Performed by Cath-Recovery Surgery at SURGERY SAME DAY [facility] ORS   • ZZZ CARDIAC CATH  7/24/14    Diffuse disease, see report.  % with collterals.   • FEMORAL ENDARTERECTOMY  10/4/2013    Performed by Kacie Baker M.D. at SURGERY [facility]   • ANGIOPLASTY BALLOON  10/4/2013    Performed by Kacie Baker M.D. at SURGERY [facility]   • RECOVERY  4/9/2013    Performed by Ir-Recovery Surgery at SURGERY SAME DAY [facility]   • RECOVERY  11/26/2012    Performed by Ir-Recovery Surgery at SURGERY SAME DAY [facility]   • RECTUS REPAIR  7/13/2012    Performed by SHEILA PLATT at SURGERY SURGICAL [facility]   • OTHER  12/10/10    stents in legs   • OTHER  6/10/10    cardiac stents   • ZZZ CARDIAC CATH  6/2010    BMS to Om   • OTHER  2005    KNEE SURGERY   • CHOLECYSTECTOMY     • HYSTERECTOMY, TOTAL ABDOMINAL     • OTHER CARDIAC SURGERY      stent in heart   • STENT PLACEMENT      treated by Dr Baker, multiple surgeries to legs.     Family History   Problem Relation Age of Onset   • Stroke Mother 68        CVA   • Heart Disease Mother 65        valve surgery   • Other Son         wgt 465 lbs     Social History     Social History    • Marital status:      Spouse name: N/A   • Number of children: N/A   • Years of education: N/A     Occupational History   • Not on file.     Social History Main Topics   • Smoking status: Former Smoker     Packs/day: 0.25     Years: 45.00     Types: Cigarettes     Quit date: 2/1/2016   • Smokeless tobacco: Never Used   • Alcohol use 0.0 - 0.6 oz/week      Comment: 1 drink per month   • Drug use: No   • Sexual activity: Not on file     Other Topics Concern   • Not on file     Social History Narrative   • No narrative on file     Allergies   Allergen Reactions   • Penicillins Rash and Vomiting     Rxn = unknown  Pt tolerates cephalosporins   • Hydrocodone Vomiting and Nausea     Rxn = unknown   • Tape Rash     RASH     Outpatient Encounter Prescriptions as of 3/26/2019   Medication Sig Dispense Refill   • furosemide (LASIX) 40 MG Tab Take 1 Tab by mouth every day. 90 Tab 3   • isosorbide mononitrate SR (IMDUR) 30 MG TABLET SR 24 HR Take 1 Tab by mouth every morning. 90 Tab 3   • carvedilol (COREG) 12.5 MG Tab Take 1 Tab by mouth 2 times a day, with meals. 180 Tab 3   • aspirin (ASA) 81 MG Chew Tab chewable tablet Take 81 mg by mouth every day.     • rosuvastatin (CRESTOR) 40 MG tablet Take 40 mg by mouth every evening.     • pantoprazole (PROTONIX) 40 MG Tablet Delayed Response Take 40 mg by mouth 2 Times a Day.     • bimatoprost (LUMIGAN) 0.01 % Solution Place 1 Drop in both eyes every bedtime. 1 gtts both eyes     • [DISCONTINUED] nitrofurantoin monohyd macro (MACROBID) 100 MG Cap TK 1 C PO BID FOR 5 DAYS  0   • nitroGLYCERIN (NITROSTAT) 0.3 MG SL tablet Place 1 Tab under tongue See Admin Instructions. 20 Tab 3   • [DISCONTINUED] furosemide (LASIX) 40 MG Tab Take 1 Tab by mouth every day. 60 Tab 0     No facility-administered encounter medications on file as of 3/26/2019.      Review of Systems   Constitutional: Positive for malaise/fatigue. Negative for fever.        Unsteady   Respiratory: Positive for  "shortness of breath. Negative for cough.    Cardiovascular: Negative for chest pain, palpitations, orthopnea, claudication, leg swelling and PND.   Gastrointestinal: Negative for abdominal pain.   Musculoskeletal: Negative for myalgias.   Neurological: Negative for dizziness.   All other systems reviewed and are negative.       Objective:   /48 (BP Location: Left arm, Patient Position: Sitting, BP Cuff Size: Adult)   Pulse 70   Ht 1.753 m (5' 9\")   Wt 58.5 kg (129 lb)   SpO2 97%   BMI 19.05 kg/m²      Physical Exam   Constitutional: She is oriented to person, place, and time. She appears well-developed and well-nourished.   HENT:   Head: Normocephalic and atraumatic.   Eyes: Pupils are equal, round, and reactive to light. EOM are normal.   Neck: Normal range of motion. Neck supple. No JVD present.   Cardiovascular: Normal rate, regular rhythm and normal heart sounds.    Pulmonary/Chest: Effort normal and breath sounds normal. No respiratory distress. She has no wheezes. She has no rales.   Abdominal: Soft. Bowel sounds are normal.   Musculoskeletal: She exhibits no edema.   Neurological: She is alert and oriented to person, place, and time.   Skin: Skin is warm and dry.   Psychiatric: She has a normal mood and affect. Her behavior is normal.   Vitals reviewed.    Lab Results   Component Value Date/Time    CHOLSTRLTOT 65 (L) 12/27/2018 05:20 AM    LDL 6 12/27/2018 05:20 AM    HDL 36 (A) 12/27/2018 05:20 AM    TRIGLYCERIDE 114 12/27/2018 05:20 AM       Lab Results   Component Value Date/Time    SODIUM 139 01/16/2019 02:29 AM    POTASSIUM 3.6 01/16/2019 02:29 AM    CHLORIDE 101 01/16/2019 02:29 AM    CO2 31 01/16/2019 02:29 AM    GLUCOSE 102 (H) 01/16/2019 02:29 AM    BUN 27 (H) 01/16/2019 02:29 AM    CREATININE 1.20 01/16/2019 02:29 AM     Lab Results   Component Value Date/Time    ALKPHOSPHAT 50 12/26/2018 10:45 AM    ASTSGOT 26 12/26/2018 10:45 AM    ALTSGPT 10 12/26/2018 10:45 AM    TBILIRUBIN 0.6 " 12/26/2018 10:45 AM      Transthoracic Echo Report 2/12/2016  No prior study is available for comparison.   Normal left ventricular chamber size. Moderate concentric left ventricular hypertrophy. Normal left ventricular systolic function.   Left ventricular ejection fraction is visually estimated to be 65%.   Normal regional wall motion. No significant diastolic dysfunction.  Moderate to severe aortic insufficiency.  Ascending aorta diameter is 3.4 cm.    Myocardial Perfusion Report  5/6/2016   IMPRESSIONS   Moderate sized partial fixed defect of moderately decreased counts in the apical anterior, apical septal, apical inferior wall, and mid anteroseptum consistent with mainly ischemia with some scar.    Hypokinesis of the anteroseptum and septal walls.  LV ejection fraction = 69%.    PET stress test  9/11/2017 ELECTROCARDIOGRAPHIC FINDINGS: Normal sinus rhythm.  ECG response to dipyridamole infusion showed ischemic changes.    SCINTOGRAPHIC FINDINGS:  There is positive evidence of large area of ischemia seen at the mid to apical septo-anterior wall of the left ventricle.    GATED WALL MOTION FINDINGS:  The left ventricle wall motion is normal with stress and rest  imagings.  Measured resting ejection fraction is 64 %.       Transthoracic Echo Report 9/2/2018  Normal left ventricular systolic function.  Left ventricular ejection fraction is visually estimated to be 70%.  Moderate concentric left ventricular hypertrophy.  Aortic sclerosis without stenosis.  Moderate aortic insufficiency.  Mild mitral regurgitation.  Right heart pressures are normal.    Transthoracic Echo Report 10/14/2018  Compared to prior 9-2-2018, no significant change.  Left ventricular ejection fraction is visually estimated to be 65%.  Mild concentric left ventricular hypertrophy.  Moderate mitral annular calcification.  Mild mitral regurgitation.  Moderate to severe aortic insufficiency.  Mild tricuspid regurgitation.  Right ventricular  systolic pressure is estimated to be 40 mmHg.    Transthoracic Echo Report 12/26/2018  Prior echocardiogram 10/14/2018 - the ejection fraction heas declined and wall motion abnormality is new.  Technically difficult study - adequate information is obtained.     Limited.   Akinesis of the anterior wall and apex.  No thrombus noted.  The base works best, suggesting even a component of stress-induced   cardiomyopathy.  Rounding cardiology team aware at time of reading.    Transthoracic Echo Report 1/9/2018  Limited echocardiogram for left ventricular function.  Mildly reduced left ventricular systolic function.  Left ventricular ejection fraction is visually estimated to be 40%.  Akinesis to hypokinesis of the mid anteroseptum, apical septum, and apex.  Compared to the images of the prior study done 12/26/2018 -  there has been no significant change.     Assessment:     1. ACC/AHA stage C systolic heart failure (HCC)  furosemide (LASIX) 40 MG Tab    Basic Metabolic Panel   2. Heart failure, NYHA class 3 (HCC)  furosemide (LASIX) 40 MG Tab    Basic Metabolic Panel   3. Ischemic cardiomyopathy     4. Coronary artery disease involving native coronary artery of native heart without angina pectoris     5. Stented coronary artery     6. Mixed hyperlipidemia     7. HTN (hypertension), malignant     8. High risk medication use     9. History of GI bleed         Medical Decision Making:  Today's Assessment / Status / Plan:   1. HFrEF, Stage C, Class 3, LVEF 40%: Based on physical examination findings, patient is euvolemic. No JVD, lungs are clear to auscultation, no pitting edema in bilateral lower extremities, no ascites.  -Continue furosemide 40 mg daily  -Continue carvedilol 12.5 mg twice a day  -No ACE due to STEPHANIE/hypotension  -Repeat BMP this week  -Reinforced s/sx of worsening heart failure with patient and weight monitoring. Pt verbalizes understanding. Pt to call office or RTC if present.     2.  CAD,  to the LAD/PCI  to OM/HLD: Last LDL 6 on 12/27/2018  -Continue aspirin 81 mg daily (tolerating aspirin okay)  -Unable to take clopidogrel due to history of GI bleeds  -Continue rosuvastatin 40 mg daily  -Continue Imdur 30 mg daily  -has NTG PRN    3.  Hypertension: Stable  -Continue recommendations per above    FU in clinic in 2 months with labs this week. Sooner if needed.    Patient verbalizes understanding and agrees with the plan of care.     Collaborating MD: Alex De Leon MD

## 2019-05-08 ENCOUNTER — OFFICE VISIT (OUTPATIENT)
Dept: CARDIOLOGY | Facility: MEDICAL CENTER | Age: 84
End: 2019-05-08
Payer: MEDICARE

## 2019-05-08 VITALS
DIASTOLIC BLOOD PRESSURE: 70 MMHG | WEIGHT: 129.2 LBS | BODY MASS INDEX: 19.13 KG/M2 | OXYGEN SATURATION: 98 % | SYSTOLIC BLOOD PRESSURE: 132 MMHG | HEART RATE: 74 BPM | HEIGHT: 69 IN

## 2019-05-08 DIAGNOSIS — R09.89 BILATERAL CAROTID BRUITS: ICD-10-CM

## 2019-05-08 DIAGNOSIS — I25.2 HISTORY OF ACUTE ANTERIOR WALL MI: ICD-10-CM

## 2019-05-08 DIAGNOSIS — Z87.19 HISTORY OF GI BLEED: ICD-10-CM

## 2019-05-08 DIAGNOSIS — I25.84 CORONARY ARTERY DISEASE DUE TO CALCIFIED CORONARY LESION: ICD-10-CM

## 2019-05-08 DIAGNOSIS — I50.20 ACC/AHA STAGE C SYSTOLIC HEART FAILURE (HCC): ICD-10-CM

## 2019-05-08 DIAGNOSIS — I25.10 CORONARY ARTERY DISEASE DUE TO CALCIFIED CORONARY LESION: ICD-10-CM

## 2019-05-08 DIAGNOSIS — I10 ESSENTIAL HYPERTENSION: ICD-10-CM

## 2019-05-08 PROCEDURE — 99214 OFFICE O/P EST MOD 30 MIN: CPT | Performed by: INTERNAL MEDICINE

## 2019-05-08 RX ORDER — CARVEDILOL 6.25 MG/1
6.25 TABLET ORAL 2 TIMES DAILY WITH MEALS
Qty: 180 TAB | Refills: 3 | Status: SHIPPED | OUTPATIENT
Start: 2019-05-08 | End: 2019-06-03

## 2019-05-08 RX ORDER — LOSARTAN POTASSIUM 25 MG/1
25 TABLET ORAL DAILY
Qty: 30 TAB | Refills: 6 | Status: SHIPPED | OUTPATIENT
Start: 2019-05-08 | End: 2019-06-03

## 2019-05-08 ASSESSMENT — ENCOUNTER SYMPTOMS
RESPIRATORY NEGATIVE: 1
PALPITATIONS: 0
CONSTITUTIONAL NEGATIVE: 1
GASTROINTESTINAL NEGATIVE: 1
FALLS: 0
DIZZINESS: 1
CARDIOVASCULAR NEGATIVE: 1
MUSCULOSKELETAL NEGATIVE: 1

## 2019-05-08 NOTE — PROGRESS NOTES
Chief Complaint   Patient presents with   • CHF (Acute)     Follow up       Subjective:   Brooke Diana is a 85 y.o. female who presents today for follow-up of recent hospitalization with heart failure and history of coronary artery disease with known occlusion of the mid LAD with collateral filling and previous stenting of her obtuse marginal artery in 2010.  She was hospitalized in January with acute exacerbation of heart failure.  EF at that time was 40%.  She has had a pleural effusion by echo.  The patient is followed heart failure clinic for 2 or 3 visits.  She is on full-time oxygen.  She feels a bit dizzy.  In reviewing the notes ACE inhibitor was stopped due to dizziness.  She had been on nocturnal oxygen previously.  No history of chest pain, edema, or sense of palpitations.  In reviewing her lab from January, the patient's troponin was 1.0 at peak which is not compatible with a non-STEMI and most likely due to supply demand imbalance.  BNP was 2250 during the hospitalization.    Past Medical History:   Diagnosis Date   • Acute myocardial infarction, true posterior wall infarction, episode of care unspecified    • Anxiety    • Bowel habit changes    • Breath shortness 2017    uses oxygen at 2 liters/min; AT NIGHT ONLY   • CAD (coronary artery disease)     S/P stent placement   • Cholesterol blood decreased    • Dental disorder     Partial upper   • Dizziness    • GI bleed    • Glaucoma    • Heart burn    • Hyperlipidemia    • Hypertension    • Myocardial infarct (HCC) 6/10/10   • Peripheral vascular disease (HCC)    • Pulmonary disease     bronchitis   • TIA (transient ischemic attack)    • Unspecified hemorrhagic conditions     pt takes plavix   • Unspecified urinary incontinence    • Urinary bladder disorder      Past Surgical History:   Procedure Laterality Date   • COLONOSCOPY - ENDO N/A 10/23/2018    Procedure: COLONOSCOPY - ENDO;  Surgeon: Anatoly Barragan M.D.;  Location: ENDOSCOPY [facility];  Service: Gastroenterology   • GASTROSCOPY-ENDO N/A 10/22/2018    Procedure: GASTROSCOPY-ENDO;  Surgeon: Bakari Brown M.D.;  Location: ENDOSCOPY [facility];  Service: Gastroenterology   • CAROTID ENDARTERECTOMY Right 9/3/2018    Procedure: CAROTID ENDARTERECTOMY WITH NEUROMONITORING;  Surgeon: Naga Abad M.D.;  Location: SURGERY [facility];  Service: Vascular   • GASTROSCOPY WITH BIOPSY  2/13/2016    Procedure: GASTROSCOPY WITH BIOPSY;  Surgeon: Susan Miller M.D.;  Location: ENDOSCOPY [facility];  Service:    • RECOVERY  7/24/2014    Performed by Cath-Recovery Surgery at SURGERY SAME DAY [facility]   • ZZZ CARDIAC CATH  7/24/14    Diffuse disease, see report.  % with collterals.   • FEMORAL ENDARTERECTOMY  10/4/2013    Performed by Kacie Baker M.D. at SURGERY [facility]   • ANGIOPLASTY BALLOON  10/4/2013    Performed by Kacie Baker M.D. at SURGERY [facility]   • RECOVERY  4/9/2013    Performed by Ir-Recovery Surgery at SURGERY SAME DAY [facility]   • RECOVERY  11/26/2012    Performed by Ir-Recovery Surgery at SURGERY SAME DAY [facility]   • RECTUS REPAIR  7/13/2012    Performed by SHEILA PLATT at SURGERY [facility]   • OTHER  12/10/10    stents in legs   • OTHER  6/10/10    cardiac stents   • ZZZ CARDIAC CATH  6/2010    BMS to Om   • OTHER  2005    KNEE SURGERY   • CHOLECYSTECTOMY     • HYSTERECTOMY, TOTAL ABDOMINAL     • OTHER CARDIAC SURGERY      stent in heart   • STENT PLACEMENT      treated by Dr Baker, multiple surgeries to legs.     Family History   Problem Relation Age of Onset   • Stroke Mother 68        CVA   • Heart Disease Mother 65        valve surgery   • Other Son         wgt 465 lbs     Social History     Social History   • Marital status:      Spouse name: N/A   • Number of children: N/A   • Years of education: N/A     Occupational History   • Not on file.     Social History Main Topics   •  Smoking status: Former Smoker     Packs/day: 0.25     Years: 45.00     Types: Cigarettes     Quit date: 2/1/2016   • Smokeless tobacco: Never Used   • Alcohol use 0.0 - 0.6 oz/week      Comment: 1 drink per month   • Drug use: No   • Sexual activity: Not on file     Other Topics Concern   • Not on file     Social History Narrative   • No narrative on file     Allergies   Allergen Reactions   • Penicillins Rash and Vomiting     Rxn = unknown  Pt tolerates cephalosporins   • Hydrocodone Vomiting and Nausea     Rxn = unknown   • Tape Rash     RASH     Outpatient Encounter Prescriptions as of 5/8/2019   Medication Sig Dispense Refill   • carvedilol (COREG) 6.25 MG Tab Take 1 Tab by mouth 2 times a day, with meals. 180 Tab 3   • losartan (COZAAR) 25 MG Tab Take 1 Tab by mouth every day. 30 Tab 6   • furosemide (LASIX) 40 MG Tab Take 1 Tab by mouth every day. 90 Tab 3   • aspirin (ASA) 81 MG Chew Tab chewable tablet Take 81 mg by mouth every day.     • rosuvastatin (CRESTOR) 40 MG tablet Take 40 mg by mouth every evening.     • pantoprazole (PROTONIX) 40 MG Tablet Delayed Response Take 40 mg by mouth 2 Times a Day.     • bimatoprost (LUMIGAN) 0.01 % Solution Place 1 Drop in both eyes every bedtime. 1 gtts both eyes     • [DISCONTINUED] isosorbide mononitrate SR (IMDUR) 30 MG TABLET SR 24 HR Take 1 Tab by mouth every morning. 90 Tab 3   • [DISCONTINUED] carvedilol (COREG) 12.5 MG Tab Take 1 Tab by mouth 2 times a day, with meals. 180 Tab 3   • nitroGLYCERIN (NITROSTAT) 0.3 MG SL tablet Place 1 Tab under tongue See Admin Instructions. 20 Tab 3     No facility-administered encounter medications on file as of 5/8/2019.      Review of Systems   Constitutional: Negative.    HENT: Negative.    Respiratory: Negative.    Cardiovascular: Negative.  Negative for chest pain, palpitations and leg swelling.   Gastrointestinal: Negative.    Musculoskeletal: Negative.  Negative for falls.   Skin: Negative.    Neurological: Positive for  "dizziness.   Endo/Heme/Allergies: Negative.         Objective:   /70 (BP Location: Left arm, Patient Position: Sitting, BP Cuff Size: Adult)   Pulse 74   Ht 1.753 m (5' 9\")   Wt 58.6 kg (129 lb 3.2 oz)   SpO2 98%   BMI 19.08 kg/m²     Physical Exam   Constitutional: She is oriented to person, place, and time. No distress.   HENT:   Head: Normocephalic and atraumatic.   Eyes: Pupils are equal, round, and reactive to light. No scleral icterus.   Neck: No JVD present.   Cardiovascular: Normal rate and regular rhythm.    No murmur heard.  Pulses:       Carotid pulses are on the right side with bruit, and on the left side with bruit.  Bilateral carotid bruits 4 out of 6 on the right.   Pulmonary/Chest: No respiratory distress. She has no wheezes.   Abdominal: Soft. She exhibits no distension. There is no tenderness.   Musculoskeletal: She exhibits no edema.   Neurological: She is alert and oriented to person, place, and time.   Skin: Skin is warm.   Psychiatric: She has a normal mood and affect.   Nursing note reviewed.      Assessment:     1. Essential hypertension  carvedilol (COREG) 6.25 MG Tab   2. ACC/AHA stage C systolic heart failure (HCC)  Basic Metabolic Panel    BTYPE NATRIURETIC PEPTIDE   3. Coronary artery disease due to calcified coronary lesion  Basic Metabolic Panel    BTYPE NATRIURETIC PEPTIDE   4. History of anterior wall MI  Basic Metabolic Panel    BTYPE NATRIURETIC PEPTIDE   5. History of GI bleed     6. Bilateral carotid bruits  US-CAROTID DOPPLER BILAT       Medical Decision Making:  Today's Assessment / Status / Plan:   Chronic systolic diastolic heart failure stage C:  Oximetry today is 97 to 98% on room air at rest.  She is ambulated with oximetry for 2 laps around the office and dropped to 93%.  At this point she can stop her oxygen completely.  Blood pressure 104 systolic by my reading.  Her carvedilol will be reduced to 6.25 mg twice daily.  Isosorbide mononitrate will be " discontinued.  We will start her on losartan 25 mg a day.  Parenthetically she is thought she might have had a cough with lisinopril in the past.  Check lab with BMP later in the week.  Return in about 3 to 4 weeks for for visit with APN.  Return to see me next available which is about 2 months from now.    Bilateral carotid bruits: History of right carotid endarterectomy.  Patient has bilateral bruits with quite loud bruit on the right.  She underwent endarterectomy on the right in September, 2018.  Recommend a repeat bilateral ultrasound.

## 2019-05-08 NOTE — LETTER
[facility]   [address], [address]  [place] [address]  Phone: [phone]  Fax: [phone]              Brooke Diana  2/1/1934    Encounter Date: 5/8/2019    Severo Mancilla M.D.          PROGRESS NOTE:  Chief Complaint   Patient presents with   • CHF (Acute)     Follow up       Subjective:   Brooke Diana is a 85 y.o. female who presents today for follow-up of recent hospitalization with heart failure and history of coronary artery disease with known occlusion of the mid LAD with collateral filling and previous stenting of her obtuse marginal artery in 2010.  She was hospitalized in January with acute exacerbation of heart failure.  EF at that time was 40%.  She has had a pleural effusion by echo.  The patient is followed heart failure clinic for 2 or 3 visits.  She is on full-time oxygen.  She feels a bit dizzy.  In reviewing the notes ACE inhibitor was stopped due to dizziness.  She had been on nocturnal oxygen previously.  No history of chest pain, edema, or sense of palpitations.  In reviewing her lab from January, the patient's troponin was 1.0 at peak which is not compatible with a non-STEMI and most likely due to supply demand imbalance.  BNP was 2250 during the hospitalization.    Past Medical History:   Diagnosis Date   • Acute myocardial infarction, true posterior wall infarction, episode of care unspecified    • Anxiety    • Bowel habit changes    • Breath shortness 2017    uses oxygen at 2 liters/min; AT NIGHT ONLY   • CAD (coronary artery disease)     S/P stent placement   • Cholesterol blood decreased    • Dental disorder     Partial upper   • Dizziness    • GI bleed    • Glaucoma    • Heart burn    • Hyperlipidemia    • Hypertension    • Myocardial infarct (HCC) 6/10/10   • Peripheral vascular disease (HCC)    • Pulmonary disease     bronchitis   • TIA (transient ischemic attack)    • Unspecified hemorrhagic conditions     pt takes plavix   •  Unspecified urinary incontinence    • Urinary bladder disorder      Past Surgical History:   Procedure Laterality Date   • COLONOSCOPY - ENDO N/A 10/23/2018    Procedure: COLONOSCOPY - ENDO;  Surgeon: Anatoly Barragan M.D.;  Location: [facility];  Service: Gastroenterology   • GASTROSCOPY-ENDO N/A 10/22/2018    Procedure: GASTROSCOPY-ENDO;  Surgeon: Bakari Brown M.D.;  Location: ENDOSCOPY [facility];  Service: Gastroenterology   • CAROTID ENDARTERECTOMY Right 9/3/2018    Procedure: CAROTID ENDARTERECTOMY WITH NEUROMONITORING;  Surgeon: Naga Abad M.D.;  Location: SURGERY [facility];  Service: Vascular   • GASTROSCOPY WITH BIOPSY  2/13/2016    Procedure: GASTROSCOPY WITH BIOPSY;  Surgeon: Susan Miller M.D.;  Location: [facility];  Service:    • RECOVERY  7/24/2014    Performed by Cath-Recovery Surgery at SURGERY SAME DAY [facility]   • ZZZ CARDIAC CATH  7/24/14    Diffuse disease, see report.  % with collterals.   • FEMORAL ENDARTERECTOMY  10/4/2013    Performed by Kacie Baker M.D. at SURGERY [facility]   • ANGIOPLASTY BALLOON  10/4/2013    Performed by Kacie Baker M.D. at SURGERY [facility]   • RECOVERY  4/9/2013    Performed by Ir-Recovery Surgery at SURGERY SAME DAY [facility]   • RECOVERY  11/26/2012    Performed by Ir-Recovery Surgery at SURGERY SAME DAY [facility]   • RECTUS REPAIR  7/13/2012    Performed by SHEILA PLATT at SURGERY [facility]   • OTHER  12/10/10    stents in legs   • OTHER  6/10/10    cardiac stents   • ZZZ CARDIAC CATH  6/2010    BMS to Om   • OTHER  2005    KNEE SURGERY   • CHOLECYSTECTOMY     • HYSTERECTOMY, TOTAL ABDOMINAL     • OTHER CARDIAC SURGERY      stent in heart   • STENT PLACEMENT      treated by Dr Baker, multiple surgeries to legs.     Family History   Problem Relation Age of Onset   • Stroke Mother 68        CVA   • Heart Disease Mother 65        valve surgery     • Other Son         wgt 465 lbs     Social History     Social History   • Marital status:      Spouse name: N/A   • Number of children: N/A   • Years of education: N/A     Occupational History   • Not on file.     Social History Main Topics   • Smoking status: Former Smoker     Packs/day: 0.25     Years: 45.00     Types: Cigarettes     Quit date: 2/1/2016   • Smokeless tobacco: Never Used   • Alcohol use 0.0 - 0.6 oz/week      Comment: 1 drink per month   • Drug use: No   • Sexual activity: Not on file     Other Topics Concern   • Not on file     Social History Narrative   • No narrative on file     Allergies   Allergen Reactions   • Penicillins Rash and Vomiting     Rxn = unknown  Pt tolerates cephalosporins   • Hydrocodone Vomiting and Nausea     Rxn = unknown   • Tape Rash     RASH     Outpatient Encounter Prescriptions as of 5/8/2019   Medication Sig Dispense Refill   • carvedilol (COREG) 6.25 MG Tab Take 1 Tab by mouth 2 times a day, with meals. 180 Tab 3   • losartan (COZAAR) 25 MG Tab Take 1 Tab by mouth every day. 30 Tab 6   • furosemide (LASIX) 40 MG Tab Take 1 Tab by mouth every day. 90 Tab 3   • aspirin (ASA) 81 MG Chew Tab chewable tablet Take 81 mg by mouth every day.     • rosuvastatin (CRESTOR) 40 MG tablet Take 40 mg by mouth every evening.     • pantoprazole (PROTONIX) 40 MG Tablet Delayed Response Take 40 mg by mouth 2 Times a Day.     • bimatoprost (LUMIGAN) 0.01 % Solution Place 1 Drop in both eyes every bedtime. 1 gtts both eyes     • [DISCONTINUED] isosorbide mononitrate SR (IMDUR) 30 MG TABLET SR 24 HR Take 1 Tab by mouth every morning. 90 Tab 3   • [DISCONTINUED] carvedilol (COREG) 12.5 MG Tab Take 1 Tab by mouth 2 times a day, with meals. 180 Tab 3   • nitroGLYCERIN (NITROSTAT) 0.3 MG SL tablet Place 1 Tab under tongue See Admin Instructions. 20 Tab 3     No facility-administered encounter medications on file as of 5/8/2019.      Review of Systems   Constitutional: Negative.   "  HENT: Negative.    Respiratory: Negative.    Cardiovascular: Negative.  Negative for chest pain, palpitations and leg swelling.   Gastrointestinal: Negative.    Musculoskeletal: Negative.  Negative for falls.   Skin: Negative.    Neurological: Positive for dizziness.   Endo/Heme/Allergies: Negative.         Objective:   /70 (BP Location: Left arm, Patient Position: Sitting, BP Cuff Size: Adult)   Pulse 74   Ht 1.753 m (5' 9\")   Wt 58.6 kg (129 lb 3.2 oz)   SpO2 98%   BMI 19.08 kg/m²      Physical Exam   Constitutional: She is oriented to person, place, and time. No distress.   HENT:   Head: Normocephalic and atraumatic.   Eyes: Pupils are equal, round, and reactive to light. No scleral icterus.   Neck: No JVD present.   Cardiovascular: Normal rate and regular rhythm.    No murmur heard.  Pulses:       Carotid pulses are on the right side with bruit, and on the left side with bruit.  Bilateral carotid bruits 4 out of 6 on the right.   Pulmonary/Chest: No respiratory distress. She has no wheezes.   Abdominal: Soft. She exhibits no distension. There is no tenderness.   Musculoskeletal: She exhibits no edema.   Neurological: She is alert and oriented to person, place, and time.   Skin: Skin is warm.   Psychiatric: She has a normal mood and affect.   Nursing note reviewed.      Assessment:     1. Essential hypertension  carvedilol (COREG) 6.25 MG Tab   2. ACC/AHA stage C systolic heart failure (HCC)  Basic Metabolic Panel    BTYPE NATRIURETIC PEPTIDE   3. Coronary artery disease due to calcified coronary lesion  Basic Metabolic Panel    BTYPE NATRIURETIC PEPTIDE   4. History of anterior wall MI  Basic Metabolic Panel    BTYPE NATRIURETIC PEPTIDE   5. History of GI bleed     6. Bilateral carotid bruits  US-CAROTID DOPPLER BILAT       Medical Decision Making:  Today's Assessment / Status / Plan:   Chronic systolic diastolic heart failure stage C:  Oximetry today is 97 to 98% on room air at rest.  She is " ambulated with oximetry for 2 laps around the office and dropped to 93%.  At this point she can stop her oxygen completely.  Blood pressure 104 systolic by my reading.  Her carvedilol will be reduced to 6.25 mg twice daily.  Isosorbide mononitrate will be discontinued.  We will start her on losartan 25 mg a day.  Parenthetically she is thought she might have had a cough with lisinopril in the past.  Check lab with BMP later in the week.  Return in about 3 to 4 weeks for for visit with APN.  Return to see me next available which is about 2 months from now.      Yury Willis M.D.  [address]  [address]  [facility] [address]  VIA Facsimile: [phone]

## 2019-05-10 ENCOUNTER — HOSPITAL ENCOUNTER (OUTPATIENT)
Dept: LAB | Facility: MEDICAL CENTER | Age: 84
End: 2019-05-10
Attending: INTERNAL MEDICINE
Payer: MEDICARE

## 2019-05-10 DIAGNOSIS — I25.2 HISTORY OF ACUTE ANTERIOR WALL MI: ICD-10-CM

## 2019-05-10 DIAGNOSIS — I25.84 CORONARY ARTERY DISEASE DUE TO CALCIFIED CORONARY LESION: ICD-10-CM

## 2019-05-10 DIAGNOSIS — I50.20 ACC/AHA STAGE C SYSTOLIC HEART FAILURE (HCC): ICD-10-CM

## 2019-05-10 DIAGNOSIS — I25.10 CORONARY ARTERY DISEASE DUE TO CALCIFIED CORONARY LESION: ICD-10-CM

## 2019-05-10 LAB
ANION GAP SERPL CALC-SCNC: 6 MMOL/L (ref 0–11.9)
BNP SERPL-MCNC: 167 PG/ML (ref 0–100)
BUN SERPL-MCNC: 30 MG/DL (ref 8–22)
CALCIUM SERPL-MCNC: 9.6 MG/DL (ref 8.5–10.5)
CHLORIDE SERPL-SCNC: 103 MMOL/L (ref 96–112)
CO2 SERPL-SCNC: 31 MMOL/L (ref 20–33)
CREAT SERPL-MCNC: 1.56 MG/DL (ref 0.5–1.4)
FASTING STATUS PATIENT QL REPORTED: NORMAL
GLUCOSE SERPL-MCNC: 97 MG/DL (ref 65–99)
POTASSIUM SERPL-SCNC: 4.1 MMOL/L (ref 3.6–5.5)
SODIUM SERPL-SCNC: 140 MMOL/L (ref 135–145)

## 2019-05-10 PROCEDURE — 80048 BASIC METABOLIC PNL TOTAL CA: CPT

## 2019-05-10 PROCEDURE — 36415 COLL VENOUS BLD VENIPUNCTURE: CPT

## 2019-05-10 PROCEDURE — 83880 ASSAY OF NATRIURETIC PEPTIDE: CPT | Mod: GA

## 2019-05-14 ENCOUNTER — HOSPITAL ENCOUNTER (OUTPATIENT)
Dept: RADIOLOGY | Facility: MEDICAL CENTER | Age: 84
End: 2019-05-14
Attending: INTERNAL MEDICINE
Payer: MEDICARE

## 2019-05-14 DIAGNOSIS — R09.89 BILATERAL CAROTID BRUITS: ICD-10-CM

## 2019-05-14 PROCEDURE — 93880 EXTRACRANIAL BILAT STUDY: CPT

## 2019-05-14 PROCEDURE — 93880 EXTRACRANIAL BILAT STUDY: CPT | Mod: 26 | Performed by: INTERNAL MEDICINE

## 2019-05-15 ENCOUNTER — TELEPHONE (OUTPATIENT)
Dept: CARDIOLOGY | Facility: MEDICAL CENTER | Age: 84
End: 2019-05-15

## 2019-05-15 NOTE — TELEPHONE ENCOUNTER
Severo Mancilla M.D.  RICHARD SimonPLAURENT.             Carotic US shows no high grade stenosis, goodf news. Repeat in about two - three years      Called pt again regarding above. She verbalized understanding.

## 2019-05-15 NOTE — TELEPHONE ENCOUNTER
Nelda Gipson, Med Ass't  Fay Cabrales, R.N.   Phone Number: [phone]             RS pt left a voicemail returning your call   Ph# [phone]      Spoke w/pt regarding results and RS recommendations. She verbalized understanding and also stated she was increase he water intake.

## 2019-05-15 NOTE — TELEPHONE ENCOUNTER
Severo Mancilla M.D.  Fay Cabrales R.N.             Lab reviewed.  She may be a bit over diuresed.  Would recommend reducing her furosemide from 40 mg a day to 40 mg / 20 mg on alternate days.  This will be reevaluated when she returns for follow-up.      Call out to pt, no answer. LVM to call back regarding above.

## 2019-05-29 NOTE — PROGRESS NOTES
"Chief Complaint   Patient presents with   • HTN (Controlled)       Subjective:   Brooke Diana is a 85 y.o. female patient of Dr. Severo Mancilla who presents today for follow-up regarding her heart failure.    Patient was last seen by Dr. Mancilla on 5/8/2019.  Patient was doing well at that time.  Her carvedilol was reduced to 6.25 mg twice daily, her isosorbide mononitrate was discontinued and losartan 25 mg daily was started. A carotid US was also ordered which did not show any high grade stenosis, recommended repeat carotid US in 2-3 years.     Past medical history significant for CAD with stent placement to the  in 2010, hypertension, hyperlipidemia, peripheral vascular disease and TIA.    Today patient states that she has been feeling significantly fatigued and will get episodes of the since her visit with Dr. Mancilla and that she thinks that it is due to the Coreg which she states was recently started.  Patient also complains of having the \"dropsies\" with her right arm which she has had in the past, denies any associated neurologic symptoms such as numbness/tingling or change in bowel or bladder continence.    She states that her lower extremity edema has been well controlled on the every other day dosing of Lasix.  Denies chest pain, palpitations, dizziness or syncope.      Looking back through patient's record it appears that the patient had been on Coreg, the dosage was simply decreased at her last visit.  She was started on losartan at that time.     Past Medical History:   Diagnosis Date   • Acute myocardial infarction, true posterior wall infarction, episode of care unspecified    • Anxiety    • Bowel habit changes    • Breath shortness 2017    uses oxygen at 2 liters/min; AT NIGHT ONLY   • CAD (coronary artery disease)     S/P stent placement   • Cholesterol blood decreased    • Dental disorder     Partial upper   • Dizziness    • GI bleed    • Glaucoma    • Heart burn    • Hyperlipidemia    • " Hypertension    • Myocardial infarct (HCC) 6/10/10   • Peripheral vascular disease (HCC)    • Pulmonary disease     bronchitis   • TIA (transient ischemic attack)    • Unspecified hemorrhagic conditions     pt takes plavix   • Unspecified urinary incontinence    • Urinary bladder disorder      Past Surgical History:   Procedure Laterality Date   • COLONOSCOPY - ENDO N/A 10/23/2018    Procedure: COLONOSCOPY - ENDO;  Surgeon: Anatoly Barragan M.D.;  Location: ENDOSCOPY [facility];  Service: Gastroenterology   • GASTROSCOPY-ENDO N/A 10/22/2018    Procedure: GASTROSCOPY-ENDO;  Surgeon: Bakari Brown M.D.;  Location: ENDOSCOPY [facility];  Service: Gastroenterology   • CAROTID ENDARTERECTOMY Right 9/3/2018    Procedure: CAROTID ENDARTERECTOMY WITH NEUROMONITORING;  Surgeon: Naga Abad M.D.;  Location: SURGERY [facility];  Service: Vascular   • GASTROSCOPY WITH BIOPSY  2/13/2016    Procedure: GASTROSCOPY WITH BIOPSY;  Surgeon: Susan Miller M.D.;  Location: ENDOSCOPY [facility];  Service:    • RECOVERY  7/24/2014    Performed by Cath-Recovery Surgery at SURGERY SAME DAY [facility]   • ZZZ CARDIAC CATH  7/24/14    Diffuse disease, see report.  % with collterals.   • FEMORAL ENDARTERECTOMY  10/4/2013    Performed by Kacie Baker M.D. at SURGERY [facility]   • ANGIOPLASTY BALLOON  10/4/2013    Performed by Kacie Baker M.D. at SURGERY [facility]   • RECOVERY  4/9/2013    Performed by Ir-Recovery Surgery at SURGERY SAME DAY [facility]   • RECOVERY  11/26/2012    Performed by Ir-Recovery Surgery at SURGERY SAME DAY [facility]   • RECTUS REPAIR  7/13/2012    Performed by SHEILA PLATT at SURGERY SURGICAL [facility]   • OTHER  12/10/10    stents in legs   • OTHER  6/10/10    cardiac stents   • ZZZ CARDIAC CATH  6/2010    BMS to Om   • OTHER  2005    KNEE SURGERY   • CHOLECYSTECTOMY     • HYSTERECTOMY, TOTAL ABDOMINAL     • OTHER CARDIAC SURGERY       stent in heart   • STENT PLACEMENT      treated by Dr Baker, multiple surgeries to legs.     Family History   Problem Relation Age of Onset   • Stroke Mother 68        CVA   • Heart Disease Mother 65        valve surgery   • Other Son         wgt 465 lbs     Social History     Social History   • Marital status:      Spouse name: N/A   • Number of children: N/A   • Years of education: N/A     Occupational History   • Not on file.     Social History Main Topics   • Smoking status: Former Smoker     Packs/day: 0.25     Years: 45.00     Types: Cigarettes     Quit date: 2/1/2016   • Smokeless tobacco: Never Used   • Alcohol use 0.0 - 0.6 oz/week      Comment: 1 drink per month   • Drug use: No   • Sexual activity: Not on file     Other Topics Concern   • Not on file     Social History Narrative   • No narrative on file     Allergies   Allergen Reactions   • Penicillins Rash and Vomiting     Rxn = unknown  Pt tolerates cephalosporins   • Hydrocodone Vomiting and Nausea     Rxn = unknown   • Tape Rash     RASH     Outpatient Encounter Prescriptions as of 6/3/2019   Medication Sig Dispense Refill   • furosemide (LASIX) 20 MG Tab Take 1 Tab by mouth every 48 hours. 30 Tab 11   • losartan (COZAAR) 25 MG Tab Take 1 Tab by mouth every evening. 30 Tab 11   • carvedilol (COREG) 6.25 MG Tab Take 1 Tab by mouth 2 times a day, with meals. 180 Tab 3   • aspirin (ASA) 81 MG Chew Tab chewable tablet Take 81 mg by mouth every day.     • rosuvastatin (CRESTOR) 40 MG tablet Take 40 mg by mouth every evening.     • pantoprazole (PROTONIX) 40 MG Tablet Delayed Response Take 40 mg by mouth 2 Times a Day.     • bimatoprost (LUMIGAN) 0.01 % Solution Place 1 Drop in both eyes every bedtime. 1 gtts both eyes     • [DISCONTINUED] furosemide (LASIX) 40 MG Tab Take 0.5 Tabs by mouth every 48 hours. 30 Tab 2   • [DISCONTINUED] metoprolol SR (TOPROL XL) 25 MG TABLET SR 24 HR Take 1 Tab by mouth every evening. 30 Tab 11   • [DISCONTINUED]  "carvedilol (COREG) 6.25 MG Tab Take 1 Tab by mouth 2 times a day, with meals. 180 Tab 3   • [DISCONTINUED] losartan (COZAAR) 25 MG Tab Take 1 Tab by mouth every day. 30 Tab 6   • [DISCONTINUED] furosemide (LASIX) 40 MG Tab Take 1 Tab by mouth every day. 90 Tab 3   • nitroGLYCERIN (NITROSTAT) 0.3 MG SL tablet Place 1 Tab under tongue See Admin Instructions. 20 Tab 3     No facility-administered encounter medications on file as of 6/3/2019.      Review of Systems   Constitutional: Positive for malaise/fatigue. Negative for weight loss.   Respiratory: Negative for shortness of breath.    Cardiovascular: Negative for chest pain, palpitations, orthopnea, claudication, leg swelling and PND.   Gastrointestinal: Negative for abdominal pain and blood in stool.   Genitourinary: Negative for hematuria.   Musculoskeletal:        Right arm weakness   Neurological: Negative for dizziness and weakness.   All other systems reviewed and are negative.       Objective:   /60 (BP Location: Left arm, Patient Position: Sitting, BP Cuff Size: Adult)   Pulse 88   Ht 1.74 m (5' 8.5\")   Wt 58.5 kg (129 lb)   SpO2 97%   BMI 19.33 kg/m²     Physical Exam   Constitutional: She is oriented to person, place, and time. She appears well-developed and well-nourished. No distress.   HENT:   Head: Normocephalic.   Eyes: EOM are normal.   Neck: No JVD present.   Cardiovascular: Normal rate, regular rhythm and normal heart sounds.    Pulmonary/Chest: No respiratory distress. She has decreased breath sounds in the right lower field and the left lower field.   Abdominal: Soft. There is no tenderness.   Musculoskeletal: She exhibits no edema.   Neurological: She is alert and oriented to person, place, and time.   Skin: Skin is warm and dry.   Psychiatric: She has a normal mood and affect. Her behavior is normal.        Coronary angiogram 7/24/2014:  CONCLUSION:  1.  Normal left ventricular function.  EF 70%.  Normal wall motion.  2.  Proximal " LAD, diffuse calcific 80%, leading into a large first septal branch.  Mid % with faint collateral circulation from the right and left coronary system filling the distal vessel.  3.  Noncritical proximal diffuse circumflex disease.  Noncritical proximal and distal bifurcation RCA disease.     Carotid ultrasound 5/14/2019:  CONCLUSIONS  Velocities at the mid internal carotid artery are consistent with 50-69% stenosis of the Right internal carotid artery.   Velocities are consistent with < 50% stenosis of the left internal carotid artery.   Bilateral External carotid artery.  Bilateral vertebral artery waveforms are antegrade and normal in character and velocity stenosis, right more than left.    TTE 1/9/19:  CONCLUSIONS  Limited echocardiogram for left ventricular function.  Mildly reduced left ventricular systolic function.  Left ventricular ejection fraction is visually estimated to be 40%.  Akinesis to hypokinesis of the mid anteroseptum, apical septum, and apex.  Compared to the images of the prior study done 12/26/2018 -  there has been no significant change.     5/10/19:  Component Value Ref Range & Units Status   Sodium 140  135 - 145 mmol/L Final   Potassium 4.1  3.6 - 5.5 mmol/L Final   Chloride 103  96 - 112 mmol/L Final   Co2 31  20 - 33 mmol/L Final   Glucose 97  65 - 99 mg/dL Final   Bun 30   8 - 22 mg/dL Final   Creatinine 1.56   0.50 - 1.40 mg/dL Final   Calcium 9.6  8.5 - 10.5 mg/dL Final   Anion Gap 6.0  0.0 - 11.9 Final     GFR If  38   >60 mL/min/1.73 m 2 Final   GFR If Non  32   >60 mL/min/1.73 m 2 Final     12/26/18:  Cholesterol,Tot 65   100 - 199 mg/dL Final   Triglycerides 114  0 - 149 mg/dL Final   HDL 36   >=40 mg/dL Final   LDL 6  <100 mg/dL Final           Assessment:     1. ACC/AHA stage C systolic heart failure (HCC)  furosemide (LASIX) 20 MG Tab    Basic Metabolic Panel    DISCONTINUED: furosemide (LASIX) 40 MG Tab   2. Fatigue, unspecified type   CBC WITH DIFFERENTIAL   3. Essential hypertension  carvedilol (COREG) 6.25 MG Tab   4. Coronary artery disease due to calcified coronary lesion     5. Stented coronary artery     6. Acute kidney injury (HCC)     7. Bilateral carotid bruits     8. 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   -Last BMP showed increased Cr, lasix was reduced from 40 mg daily to 20 mg every other day.  -BNP on 5/10/19 was down to 167 from 2256 in January.   -Continue Coreg 6.25 mg BID, losartan 25 mg daily and lasix 20 mg every other day.  -Repeat BMP.    Fatigue:  -Discussed with patient that she has been on Coreg and that the dosage was only reduced at her last visit.  -Discussed that she could try taking the losartan at night to see if this helps her fatigue  -Repeat BMP and CBC.     CAD:  -LHC in 2014 showed 80% diffuse calcific proximal LAD disease, 100% mid LAD with faint collateral circulation from right and left coronary system filling the distal vessel and noncritical proximal diffuse LCx disease.  Recommended optimizing medical therapy.  -TTE showing akinesis toe hypokinesis of the mid anteroseptum, apical septum and apex.  -LDL on 12/26/18 was 6.   -Denies chest pain.  -Continue ASA 81 mg daily, Coreg 6.25 mg twice daily, losartan 25 mg daily, Crestor 40 mg daily  and as needed NTG.    Carotid artery stenosis:  -Carotid US on 5/14/19 showed NEELA with velocities consistent with 50-69% stenosis, <50 of LICA.  -Repeat US recommended in 2-3 years.  -Continue statin and ASA as above.     Patient will follow-up with Dr. Mancilla as scheduled on 7/15/2019 or earlier if needed.  Encouraged patient to contact office should any questions or concerns arise in the meantime.   Patient understands and agrees plan of care.    Future Appointments  Date Time Provider Department Center   7/15/2019 1:40 PM Severo Mancilla M.D. [facility] None     Collaborating Provider: Dr. Nabila Carter       Please note that this dictation was created using voice recognition software. I have made every reasonable attempt to correct obvious errors, but I expect that there are errors of grammar and possibly content I did not discover before finalizing the note.

## 2019-06-03 ENCOUNTER — OFFICE VISIT (OUTPATIENT)
Dept: CARDIOLOGY | Facility: MEDICAL CENTER | Age: 84
End: 2019-06-03
Payer: MEDICARE

## 2019-06-03 VITALS
DIASTOLIC BLOOD PRESSURE: 60 MMHG | WEIGHT: 129 LBS | SYSTOLIC BLOOD PRESSURE: 120 MMHG | HEART RATE: 88 BPM | OXYGEN SATURATION: 97 % | BODY MASS INDEX: 19.11 KG/M2 | HEIGHT: 69 IN

## 2019-06-03 DIAGNOSIS — R53.83 FATIGUE, UNSPECIFIED TYPE: ICD-10-CM

## 2019-06-03 DIAGNOSIS — I25.10 CORONARY ARTERY DISEASE DUE TO CALCIFIED CORONARY LESION: ICD-10-CM

## 2019-06-03 DIAGNOSIS — Z95.5 STENTED CORONARY ARTERY: ICD-10-CM

## 2019-06-03 DIAGNOSIS — I10 ESSENTIAL HYPERTENSION: ICD-10-CM

## 2019-06-03 DIAGNOSIS — R09.89 BILATERAL CAROTID BRUITS: ICD-10-CM

## 2019-06-03 DIAGNOSIS — I50.20 ACC/AHA STAGE C SYSTOLIC HEART FAILURE (HCC): ICD-10-CM

## 2019-06-03 DIAGNOSIS — N17.9 ACUTE KIDNEY INJURY (HCC): ICD-10-CM

## 2019-06-03 DIAGNOSIS — I65.23 BILATERAL CAROTID ARTERY STENOSIS: ICD-10-CM

## 2019-06-03 DIAGNOSIS — I25.84 CORONARY ARTERY DISEASE DUE TO CALCIFIED CORONARY LESION: ICD-10-CM

## 2019-06-03 PROBLEM — R42 LIGHTHEADEDNESS: Status: RESOLVED | Noted: 2018-08-28 | Resolved: 2019-06-03

## 2019-06-03 PROBLEM — R55 SYNCOPE AND COLLAPSE: Status: RESOLVED | Noted: 2018-10-14 | Resolved: 2019-06-03

## 2019-06-03 PROBLEM — I25.2 HISTORY OF ACUTE ANTERIOR WALL MI: Status: RESOLVED | Noted: 2019-01-09 | Resolved: 2019-06-03

## 2019-06-03 PROBLEM — G93.40 ACUTE ENCEPHALOPATHY: Status: RESOLVED | Noted: 2018-11-01 | Resolved: 2019-06-03

## 2019-06-03 PROCEDURE — 99214 OFFICE O/P EST MOD 30 MIN: CPT | Performed by: NURSE PRACTITIONER

## 2019-06-03 RX ORDER — FUROSEMIDE 20 MG/1
20 TABLET ORAL
Qty: 30 TAB | Refills: 11 | Status: SHIPPED | OUTPATIENT
Start: 2019-06-03

## 2019-06-03 RX ORDER — FUROSEMIDE 40 MG/1
20 TABLET ORAL
Qty: 30 TAB | Refills: 2 | Status: SHIPPED | OUTPATIENT
Start: 2019-06-03 | End: 2019-06-03 | Stop reason: SDUPTHER

## 2019-06-03 RX ORDER — LOSARTAN POTASSIUM 25 MG/1
25 TABLET ORAL EVERY EVENING
Qty: 30 TAB | Refills: 11 | Status: SHIPPED | OUTPATIENT
Start: 2019-06-03

## 2019-06-03 RX ORDER — METOPROLOL SUCCINATE 25 MG/1
25 TABLET, EXTENDED RELEASE ORAL EVERY EVENING
Qty: 30 TAB | Refills: 11 | Status: SHIPPED | OUTPATIENT
Start: 2019-06-03 | End: 2019-06-03

## 2019-06-03 RX ORDER — CARVEDILOL 6.25 MG/1
6.25 TABLET ORAL 2 TIMES DAILY WITH MEALS
Qty: 180 TAB | Refills: 3 | Status: SHIPPED | OUTPATIENT
Start: 2019-06-03

## 2019-06-03 ASSESSMENT — ENCOUNTER SYMPTOMS
DIZZINESS: 0
CLAUDICATION: 0
ABDOMINAL PAIN: 0
PALPITATIONS: 0
WEIGHT LOSS: 0
BLOOD IN STOOL: 0
ORTHOPNEA: 0
WEAKNESS: 0
PND: 0
SHORTNESS OF BREATH: 0

## 2019-07-30 ENCOUNTER — HOSPITAL ENCOUNTER (OUTPATIENT)
Dept: LAB | Facility: MEDICAL CENTER | Age: 84
End: 2019-07-30
Attending: INTERNAL MEDICINE
Payer: MEDICARE

## 2019-07-30 ENCOUNTER — HOSPITAL ENCOUNTER (OUTPATIENT)
Dept: LAB | Facility: MEDICAL CENTER | Age: 84
End: 2019-07-30
Attending: NURSE PRACTITIONER
Payer: MEDICARE

## 2019-07-30 DIAGNOSIS — R53.83 FATIGUE, UNSPECIFIED TYPE: ICD-10-CM

## 2019-07-30 DIAGNOSIS — I50.20 ACC/AHA STAGE C SYSTOLIC HEART FAILURE (HCC): ICD-10-CM

## 2019-07-30 LAB
ANION GAP SERPL CALC-SCNC: 9 MMOL/L (ref 0–11.9)
BASOPHILS # BLD AUTO: 0.3 % (ref 0–1.8)
BASOPHILS # BLD: 0.03 K/UL (ref 0–0.12)
BUN SERPL-MCNC: 31 MG/DL (ref 8–22)
CALCIUM SERPL-MCNC: 8.7 MG/DL (ref 8.5–10.5)
CHLORIDE SERPL-SCNC: 109 MMOL/L (ref 96–112)
CO2 SERPL-SCNC: 24 MMOL/L (ref 20–33)
CREAT SERPL-MCNC: 1.41 MG/DL (ref 0.5–1.4)
EOSINOPHIL # BLD AUTO: 0.32 K/UL (ref 0–0.51)
EOSINOPHIL NFR BLD: 3.6 % (ref 0–6.9)
ERYTHROCYTE [DISTWIDTH] IN BLOOD BY AUTOMATED COUNT: 51.7 FL (ref 35.9–50)
EST. AVERAGE GLUCOSE BLD GHB EST-MCNC: 114 MG/DL
GLUCOSE SERPL-MCNC: 148 MG/DL (ref 65–99)
HBA1C MFR BLD: 5.6 % (ref 0–5.6)
HCT VFR BLD AUTO: 28.7 % (ref 37–47)
HGB BLD-MCNC: 8.6 G/DL (ref 12–16)
IMM GRANULOCYTES # BLD AUTO: 0.03 K/UL (ref 0–0.11)
IMM GRANULOCYTES NFR BLD AUTO: 0.3 % (ref 0–0.9)
LYMPHOCYTES # BLD AUTO: 1.1 K/UL (ref 1–4.8)
LYMPHOCYTES NFR BLD: 12.5 % (ref 22–41)
MCH RBC QN AUTO: 27.2 PG (ref 27–33)
MCHC RBC AUTO-ENTMCNC: 30 G/DL (ref 33.6–35)
MCV RBC AUTO: 90.8 FL (ref 81.4–97.8)
MONOCYTES # BLD AUTO: 0.76 K/UL (ref 0–0.85)
MONOCYTES NFR BLD AUTO: 8.7 % (ref 0–13.4)
NEUTROPHILS # BLD AUTO: 6.54 K/UL (ref 2–7.15)
NEUTROPHILS NFR BLD: 74.6 % (ref 44–72)
NRBC # BLD AUTO: 0 K/UL
NRBC BLD-RTO: 0 /100 WBC
PLATELET # BLD AUTO: 263 K/UL (ref 164–446)
PMV BLD AUTO: 10.4 FL (ref 9–12.9)
POTASSIUM SERPL-SCNC: 3.7 MMOL/L (ref 3.6–5.5)
RBC # BLD AUTO: 3.16 M/UL (ref 4.2–5.4)
SODIUM SERPL-SCNC: 142 MMOL/L (ref 135–145)
WBC # BLD AUTO: 8.8 K/UL (ref 4.8–10.8)

## 2019-07-30 PROCEDURE — 85025 COMPLETE CBC W/AUTO DIFF WBC: CPT

## 2019-07-30 PROCEDURE — 36415 COLL VENOUS BLD VENIPUNCTURE: CPT | Mod: GA

## 2019-07-30 PROCEDURE — 80048 BASIC METABOLIC PNL TOTAL CA: CPT

## 2019-07-30 PROCEDURE — 83036 HEMOGLOBIN GLYCOSYLATED A1C: CPT | Mod: GA

## 2019-08-05 ENCOUNTER — OFFICE VISIT (OUTPATIENT)
Dept: CARDIOLOGY | Facility: MEDICAL CENTER | Age: 84
End: 2019-08-05
Payer: MEDICARE

## 2019-08-05 VITALS
SYSTOLIC BLOOD PRESSURE: 132 MMHG | HEIGHT: 69 IN | WEIGHT: 133.6 LBS | OXYGEN SATURATION: 93 % | HEART RATE: 91 BPM | DIASTOLIC BLOOD PRESSURE: 62 MMHG | BODY MASS INDEX: 19.79 KG/M2

## 2019-08-05 DIAGNOSIS — R53.83 OTHER FATIGUE: ICD-10-CM

## 2019-08-05 DIAGNOSIS — I73.9 PVD (PERIPHERAL VASCULAR DISEASE) (HCC): ICD-10-CM

## 2019-08-05 DIAGNOSIS — D50.9 NORMOCYTIC HYPOCHROMIC ANEMIA: ICD-10-CM

## 2019-08-05 DIAGNOSIS — I25.10 CORONARY ARTERY DISEASE DUE TO CALCIFIED CORONARY LESION: ICD-10-CM

## 2019-08-05 DIAGNOSIS — I50.20 ACC/AHA STAGE C SYSTOLIC HEART FAILURE (HCC): ICD-10-CM

## 2019-08-05 DIAGNOSIS — I25.84 CORONARY ARTERY DISEASE DUE TO CALCIFIED CORONARY LESION: ICD-10-CM

## 2019-08-05 PROCEDURE — 99214 OFFICE O/P EST MOD 30 MIN: CPT | Performed by: NURSE PRACTITIONER

## 2019-08-05 RX ORDER — KETOROLAC TROMETHAMINE 4 MG/ML
SOLUTION/ DROPS OPHTHALMIC
Refills: 1 | COMMUNITY
Start: 2019-07-12

## 2019-08-05 RX ORDER — MOXIFLOXACIN 5 MG/ML
SOLUTION/ DROPS OPHTHALMIC
Refills: 1 | COMMUNITY
Start: 2019-07-11

## 2019-08-05 RX ORDER — PREDNISOLONE ACETATE 10 MG/ML
SUSPENSION/ DROPS OPHTHALMIC
Refills: 1 | COMMUNITY
Start: 2019-07-11

## 2019-08-05 RX ORDER — GABAPENTIN 300 MG/1
300 CAPSULE ORAL 3 TIMES DAILY
COMMUNITY

## 2019-08-05 RX ORDER — ISOSORBIDE MONONITRATE 30 MG/1
30 TABLET, EXTENDED RELEASE ORAL EVERY MORNING
COMMUNITY

## 2019-08-05 ASSESSMENT — ENCOUNTER SYMPTOMS
WEIGHT LOSS: 0
VOMITING: 0
ORTHOPNEA: 0
DIZZINESS: 0
BLOOD IN STOOL: 0
CLAUDICATION: 0
PALPITATIONS: 0
SHORTNESS OF BREATH: 0
PND: 0
WEAKNESS: 0
ABDOMINAL PAIN: 0

## 2019-08-05 NOTE — PROGRESS NOTES
Chief Complaint   Patient presents with   • CHF (Systolic)       Subjective:   Brooke Diana is a 85 y.o. female patient of Dr. Severo Mancilla who presents today for follow-up regarding her heart failure and coronary artery disease.    Patient was last seen by myself on 6/3/2019.  She was complaining of significant fatigue at that time, it was suggested that patient tried taking her losartan at night to see if it helps.  A CBC and BMP was also ordered.  She did not have her CBC drawn until approximately 6 days ago, it does show new onset of anemia with hemoglobin of 8.6 and hematocrit 28.7.  B MP continued to show elevated creatinine however is downtrending at 1.4.    Past medical history significant for CAD with stent placement to the OM in 2010, hypertension, hyperlipidemia, peripheral vascular disease and TIA.    Today patient states that she continues to have significant fatigue.  Her other complaint is intermittent left arm numbness and tingling.  Denies shortness of breath, chest pain, palpitations, lower extremity edema or dizziness/presyncope.  She continues to wear home oxygen via nasal cannula at 2 L.      Discussed patient's low hemoglobin/hematocrit on her CBC, patient states that she does not eat well.  She denies any hematemesis, vaginal or rectal bleeding.  States that she does have a history of GI bleed, last one she believes was in    Past Medical History:   Diagnosis Date   • Acute myocardial infarction, true posterior wall infarction, episode of care unspecified    • Anxiety    • Bowel habit changes    • Breath shortness 2017    uses oxygen at 2 liters/min; AT NIGHT ONLY   • CAD (coronary artery disease)     S/P stent placement   • Cholesterol blood decreased    • Dental disorder     Partial upper   • Dizziness    • GI bleed    • Glaucoma    • Heart burn    • Hyperlipidemia    • Hypertension    • Myocardial infarct (HCC) 6/10/10   • Peripheral vascular disease (HCC)    • Pulmonary disease      bronchitis   • TIA (transient ischemic attack)    • Unspecified hemorrhagic conditions     pt takes plavix   • Unspecified urinary incontinence    • Urinary bladder disorder      Past Surgical History:   Procedure Laterality Date   • COLONOSCOPY - ENDO N/A 10/23/2018    Procedure: COLONOSCOPY - ENDO;  Surgeon: Anatoly Barragan M.D.;  Location: [facility];  Service: Gastroenterology   • GASTROSCOPY-ENDO N/A 10/22/2018    Procedure: GASTROSCOPY-ENDO;  Surgeon: Bakari Brown M.D.;  Location: ENDOSCOPY [facility];  Service: Gastroenterology   • CAROTID ENDARTERECTOMY Right 9/3/2018    Procedure: CAROTID ENDARTERECTOMY WITH NEUROMONITORING;  Surgeon: Naga Abad M.D.;  Location: SURGERY [facility];  Service: Vascular   • GASTROSCOPY WITH BIOPSY  2/13/2016    Procedure: GASTROSCOPY WITH BIOPSY;  Surgeon: Susan Miller M.D.;  Location: [facility];  Service:    • RECOVERY  7/24/2014    Performed by Cath-Recovery Surgery at SURGERY SAME DAY [facility]   • ZZZ CARDIAC CATH  7/24/14    Diffuse disease, see report.  % with collterals.   • FEMORAL ENDARTERECTOMY  10/4/2013    Performed by Kacie Baker M.D. at SURGERY [facility]   • ANGIOPLASTY BALLOON  10/4/2013    Performed by Kacie Baker M.D. at [facility]   • RECOVERY  4/9/2013    Performed by Ir-Recovery Surgery at SURGERY SAME DAY [facility]   • RECOVERY  11/26/2012    Performed by Ir-Recovery Surgery at SURGERY SAME DAY [facility]   • RECTUS REPAIR  7/13/2012    Performed by SHEILA PLATT at [facility] SURGICAL [facility]   • OTHER  12/10/10    stents in legs   • OTHER  6/10/10    cardiac stents   • ZZZ CARDIAC CATH  6/2010    BMS to Om   • OTHER  2005    KNEE SURGERY   • CHOLECYSTECTOMY     • HYSTERECTOMY, TOTAL ABDOMINAL     • OTHER CARDIAC SURGERY      stent in heart   • STENT PLACEMENT      treated by Dr Baker, multiple surgeries to legs.     Family History    Problem Relation Age of Onset   • Stroke Mother 68        CVA   • Heart Disease Mother 65        valve surgery   • Other Son         wgt 465 lbs     Social History     Socioeconomic History   • Marital status:      Spouse name: Not on file   • Number of children: Not on file   • Years of education: Not on file   • Highest education level: Not on file   Occupational History   • Not on file   Social Needs   • Financial resource strain: Not on file   • Food insecurity:     Worry: Not on file     Inability: Not on file   • Transportation needs:     Medical: Not on file     Non-medical: Not on file   Tobacco Use   • Smoking status: Former Smoker     Packs/day: 0.25     Years: 45.00     Pack years: 11.25     Types: Cigarettes     Last attempt to quit: 2/1/2016     Years since quitting: 3.5   • Smokeless tobacco: Never Used   Substance and Sexual Activity   • Alcohol use: Yes     Alcohol/week: 0.0 - 0.6 oz     Comment: 1 drink per month   • Drug use: No   • Sexual activity: Not on file   Lifestyle   • Physical activity:     Days per week: Not on file     Minutes per session: Not on file   • Stress: Not on file   Relationships   • Social connections:     Talks on phone: Not on file     Gets together: Not on file     Attends Confucianism service: Not on file     Active member of club or organization: Not on file     Attends meetings of clubs or organizations: Not on file     Relationship status: Not on file   • Intimate partner violence:     Fear of current or ex partner: Not on file     Emotionally abused: Not on file     Physically abused: Not on file     Forced sexual activity: Not on file   Other Topics Concern   • Not on file   Social History Narrative   • Not on file     Allergies   Allergen Reactions   • Penicillins Rash and Vomiting     Rxn = unknown  Pt tolerates cephalosporins   • Hydrocodone Vomiting and Nausea     Rxn = unknown   • Tape Rash     RASH     Outpatient Encounter Medications as of 8/5/2019  "  Medication Sig Dispense Refill   • KETOROLAC TROMETHAMINE, OPHTH, 0.4 % Solution INSTILL 1 DROP IN OD QD FOR 4 WEEKS  1   • moxifloxacin (VIGAMOX) 0.5 % Solution INSTILL 1 DROP IN OD BID FOR 1 WEEK. START ON 07/17/2019  1   • prednisoLONE acetate (PRED FORTE) 1 % Suspension   1   • isosorbide mononitrate SR (IMDUR) 30 MG TABLET SR 24 HR Take 30 mg by mouth every morning.     • gabapentin (NEURONTIN) 300 MG Cap Take 300 mg by mouth 3 times a day.     • furosemide (LASIX) 20 MG Tab Take 1 Tab by mouth every 48 hours. 30 Tab 11   • losartan (COZAAR) 25 MG Tab Take 1 Tab by mouth every evening. 30 Tab 11   • carvedilol (COREG) 6.25 MG Tab Take 1 Tab by mouth 2 times a day, with meals. 180 Tab 3   • nitroGLYCERIN (NITROSTAT) 0.3 MG SL tablet Place 1 Tab under tongue See Admin Instructions. 20 Tab 3   • rosuvastatin (CRESTOR) 40 MG tablet Take 40 mg by mouth every evening.     • pantoprazole (PROTONIX) 40 MG Tablet Delayed Response Take 40 mg by mouth 2 Times a Day.     • bimatoprost (LUMIGAN) 0.01 % Solution Place 1 Drop in both eyes every bedtime. 1 gtts both eyes     • [DISCONTINUED] aspirin (ASA) 81 MG Chew Tab chewable tablet Take 81 mg by mouth every day.       No facility-administered encounter medications on file as of 8/5/2019.      Review of Systems   Constitutional: Positive for malaise/fatigue. Negative for weight loss.   Respiratory: Negative for shortness of breath.    Cardiovascular: Negative for chest pain, palpitations, orthopnea, claudication, leg swelling and PND.   Gastrointestinal: Negative for abdominal pain, blood in stool, melena and vomiting.   Genitourinary: Negative for hematuria.   Neurological: Negative for dizziness and weakness.   All other systems reviewed and are negative.       Objective:   /62 (BP Location: Left arm, Patient Position: Sitting, BP Cuff Size: Adult)   Pulse 91   Ht 1.753 m (5' 9\")   Wt 60.6 kg (133 lb 9.6 oz)   SpO2 93%   BMI 19.73 kg/m²     Physical Exam "   Constitutional: She is oriented to person, place, and time. She appears well-nourished. No distress.   Frail, elderly    HENT:   Head: Normocephalic.   Eyes: EOM are normal.   Neck: No JVD present.   Cardiovascular: Normal rate, regular rhythm and intact distal pulses.   Murmur heard.   Systolic murmur is present with a grade of 1/6.  Bilateral carotid bruit   Pulmonary/Chest: Breath sounds normal. No respiratory distress.   Abdominal: Soft. There is no tenderness.   Musculoskeletal: She exhibits no edema.   Neurological: She is alert and oriented to person, place, and time.   Skin: Skin is warm and dry.   Psychiatric: She has a normal mood and affect. Her behavior is normal.        Coronary angiogram 7/24/2014:  CONCLUSION:  1.  Normal left ventricular function.  EF 70%.  Normal wall motion.  2.  Proximal LAD, diffuse calcific 80%, leading into a large first septal branch.  Mid % with faint collateral circulation from the right and left coronary system filling the distal vessel.  3.  Noncritical proximal diffuse circumflex disease.  Noncritical proximal and distal bifurcation RCA disease.     Carotid ultrasound 5/14/2019:  CONCLUSIONS  Velocities at the mid internal carotid artery are consistent with 50-69% stenosis of the Right internal carotid artery.   Velocities are consistent with < 50% stenosis of the left internal carotid artery.   Bilateral External carotid artery.  Bilateral vertebral artery waveforms are antegrade and normal in character and velocity stenosis, right more than left.    TTE 1/9/19:  CONCLUSIONS  Limited echocardiogram for left ventricular function.  Mildly reduced left ventricular systolic function.  Left ventricular ejection fraction is visually estimated to be 40%.  Akinesis to hypokinesis of the mid anteroseptum, apical septum, and apex.  Compared to the images of the prior study done 12/26/2018 -  there has been no significant change.     5/10/19:  Component Value Ref Range &  Units Status   Sodium 140  135 - 145 mmol/L Final   Potassium 4.1  3.6 - 5.5 mmol/L Final   Chloride 103  96 - 112 mmol/L Final   Co2 31  20 - 33 mmol/L Final   Glucose 97  65 - 99 mg/dL Final   Bun 30   8 - 22 mg/dL Final   Creatinine 1.56   0.50 - 1.40 mg/dL Final   Calcium 9.6  8.5 - 10.5 mg/dL Final   Anion Gap 6.0  0.0 - 11.9 Final     GFR If  38   >60 mL/min/1.73 m 2 Final   GFR If Non  32   >60 mL/min/1.73 m 2 Final     12/26/18:  Cholesterol,Tot 65   100 - 199 mg/dL Final   Triglycerides 114  0 - 149 mg/dL Final   HDL 36   >=40 mg/dL Final   LDL 6  <100 mg/dL Final           Assessment:     1. Normocytic hypochromic anemia  VITAMIN B12    FERRITIN    IRON/TOTAL IRON BIND (FEIBC)    CBC WITH DIFFERENTIAL   2. ACC/AHA stage C systolic heart failure (HCC)     3. Other fatigue     4. Coronary artery disease due to calcified coronary lesion     5. 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   -Cr on 7/30/2019 down to 1.4 from 1.5 with reduction in oral Lasix.  -Appears euvolemic.  -Continue Coreg 6.25 mg BID, losartan 25 mg daily and lasix 20 mg every other day.    Fatigue:  -Normocytic hypochromic anemia observed on CBC drawn on 7/30/2019.  -We will order TIBC, ferritin, B12 and repeat CBC with differential.  -PCP Dr. Willis will be notified and a copy of lab work will be faxed to him.  -FU with PCP ASAP.     CAD:  -LHC in 2014 showed 80% diffuse calcific proximal LAD disease, 100% mid LAD with faint collateral circulation from right and left coronary system filling the distal vessel and noncritical proximal diffuse LCx disease.  Recommended optimizing medical therapy.  -TTE showing akinesis toe hypokinesis of  the mid anteroseptum, apical septum and apex.  -LDL on 12/26/18 was 6.   -Denies chest pain.  -Continue Coreg 6.25 mg twice daily, losartan 25 mg daily, Crestor 40 mg daily  and as needed NTG.  -Patient will temporarily stop her ASA 81 mg daily due to new anemia.    Carotid artery stenosis:  -Carotid US on 5/14/19 showed NEELA with velocities consistent with 50-69% stenosis, <50 of LICA.  -Repeat US recommended in 2-3 years.  -Continue statin and ASA as above.     Patient will follow-up with Dr. Mancilla as scheduled on 7/15/2019 or earlier if needed.  Encouraged patient to contact office should any questions or concerns arise in the meantime.  Patient understands and agrees plan of care.    Future Appointments   Date Time Provider Department Center   8/22/2019 11:00 AM JIMENEZ [facility] None       Collaborating Provider: Dr. Nabila Carter       Please note that this dictation was created using voice recognition software. I have made every reasonable attempt to correct obvious errors, but I expect that there are errors of grammar and possibly content I did not discover before finalizing the note.

## 2019-08-06 ENCOUNTER — HOSPITAL ENCOUNTER (OUTPATIENT)
Dept: LAB | Facility: MEDICAL CENTER | Age: 84
End: 2019-08-06
Attending: NURSE PRACTITIONER
Payer: MEDICARE

## 2019-08-06 DIAGNOSIS — D50.9 NORMOCYTIC HYPOCHROMIC ANEMIA: ICD-10-CM

## 2019-08-06 LAB
BASOPHILS # BLD AUTO: 1.1 % (ref 0–1.8)
BASOPHILS # BLD: 0.1 K/UL (ref 0–0.12)
COMMENT 1642: NORMAL
EOSINOPHIL # BLD AUTO: 0.33 K/UL (ref 0–0.51)
EOSINOPHIL NFR BLD: 3.7 % (ref 0–6.9)
ERYTHROCYTE [DISTWIDTH] IN BLOOD BY AUTOMATED COUNT: 51.7 FL (ref 35.9–50)
FERRITIN SERPL-MCNC: 13.4 NG/ML (ref 10–291)
HCT VFR BLD AUTO: 30.1 % (ref 37–47)
HGB BLD-MCNC: 8.8 G/DL (ref 12–16)
IMM GRANULOCYTES # BLD AUTO: 0.03 K/UL (ref 0–0.11)
IMM GRANULOCYTES NFR BLD AUTO: 0.3 % (ref 0–0.9)
IRON SATN MFR SERPL: 5 % (ref 15–55)
IRON SERPL-MCNC: 21 UG/DL (ref 40–170)
LYMPHOCYTES # BLD AUTO: 1.04 K/UL (ref 1–4.8)
LYMPHOCYTES NFR BLD: 11.6 % (ref 22–41)
MCH RBC QN AUTO: 26.7 PG (ref 27–33)
MCHC RBC AUTO-ENTMCNC: 29.2 G/DL (ref 33.6–35)
MCV RBC AUTO: 91.2 FL (ref 81.4–97.8)
MONOCYTES # BLD AUTO: 0.78 K/UL (ref 0–0.85)
MONOCYTES NFR BLD AUTO: 8.7 % (ref 0–13.4)
MORPHOLOGY BLD-IMP: NORMAL
NEUTROPHILS # BLD AUTO: 6.71 K/UL (ref 2–7.15)
NEUTROPHILS NFR BLD: 74.6 % (ref 44–72)
NRBC # BLD AUTO: 0 K/UL
NRBC BLD-RTO: 0 /100 WBC
PLATELET # BLD AUTO: 274 K/UL (ref 164–446)
PLATELET BLD QL SMEAR: NORMAL
PMV BLD AUTO: 10.3 FL (ref 9–12.9)
RBC # BLD AUTO: 3.3 M/UL (ref 4.2–5.4)
RBC BLD AUTO: NORMAL
TIBC SERPL-MCNC: 399 UG/DL (ref 250–450)
VIT B12 SERPL-MCNC: 406 PG/ML (ref 211–911)
WBC # BLD AUTO: 9 K/UL (ref 4.8–10.8)

## 2019-08-06 PROCEDURE — 85025 COMPLETE CBC W/AUTO DIFF WBC: CPT

## 2019-08-06 PROCEDURE — 83550 IRON BINDING TEST: CPT

## 2019-08-06 PROCEDURE — 36415 COLL VENOUS BLD VENIPUNCTURE: CPT

## 2019-08-06 PROCEDURE — 83540 ASSAY OF IRON: CPT

## 2019-08-06 PROCEDURE — 82728 ASSAY OF FERRITIN: CPT

## 2019-08-06 PROCEDURE — 82607 VITAMIN B-12: CPT

## 2019-08-08 ENCOUNTER — TELEPHONE (OUTPATIENT)
Dept: CARDIOLOGY | Facility: MEDICAL CENTER | Age: 84
End: 2019-08-08

## 2019-08-08 NOTE — TELEPHONE ENCOUNTER
AMISHA Mayers.  Fay Cabrales, R.N.             Anemia is no worse. Please fax the CBC, TIBC, Ferritin and B12 results or Dr. Ocampo's office.      Lab results faxed to patients PCP at [phone]

## 2019-08-14 ENCOUNTER — TELEPHONE (OUTPATIENT)
Dept: CARDIOLOGY | Facility: MEDICAL CENTER | Age: 84
End: 2019-08-14

## 2019-08-14 NOTE — TELEPHONE ENCOUNTER
Returned call and s/w Kenneth. Pt  over the weekend and RS was sent an email to sign the electronic death certificate, and Kenneth is wondering why it hasn't been done. Informed him that RS has been out of the office but will return tomorrow. Kenneth is wondering if there is another physician in the office who can sign. Informed him that the physicians typically aren't comfortable signing death certificates for pt's they aren't familiar with. He requests that a message be sent to RS asking that he sign the death certificate as soon as possible    To RS

## 2019-08-14 NOTE — TELEPHONE ENCOUNTER
FRANCES/Sissy Bowman at [facility] is calling to speak to you about this patient, who passed away over the weekend. She can be reached at [phone].

## 2019-09-14 NOTE — PROGRESS NOTES
Anesthesia Post Op Assessment  		(    ) Intubated           TV _____	Rate __sv___	[id number]____  		(   x ) Patent airway. Full return of protective reflexes  		(  x  )Full recovery from anesthesia/sedation to baseline status      Cardiovascular Function:  		BP:	111/68	                  Pulse:		100                  RR:		12                  Temp:		97.7                  O2Sat:     100      Mental Status:  	        (  x  ) awake		  ( x   ) alert		 (    ) drowsy	               (    ) sedated      Nausea/Vomiting:  		(    ) Yes, See post-op orders		   (  x  ) No      Pain Scale: (0-10):			Treatment:     (    ) None	            (  x  ) See Post-Op/PCA Orders      Post-operative Fluids: 	   (    ) Oral	          (  x  ) See post-op Orders        Comments:    Uneventful. No complications from anesthesia.  Discharge when criteria met. Monitor Summary: SR 76-87, RI .20, QRS .10, QT .38 with rare PVC & occasional PAC per strip from monitor room

## 2020-01-23 NOTE — CARE PLAN
Problem: Pain Management  Goal: Pain level will decrease to patient's comfort goal  Outcome: PROGRESSING SLOWER THAN EXPECTED  Patient receiving continuous nitroglycerin drip for chest pain. Patient educated on weaning the medication. Patient agrees to alert RN if any chest pain occurs.    Problem: Skin Integrity  Goal: Risk for impaired skin integrity will decrease  Outcome: PROGRESSING AS EXPECTED  Patient occasionally incontinent. Patient has fragile skin, but intact and blanching. Barrier cream and barrier wipes in use, waffle cushion in place and Q2 hour turning        Oriented - self; Oriented - place; Oriented - time

## 2020-11-21 NOTE — ED NOTES
Per Julissa (endoscopy), patient will go for procedure prior to going to tele floor    Yes-Patient/Caregiver accepts free interpretation services...

## 2022-01-01 NOTE — PROGRESS NOTES
Assumed care of Pt, Pt resting in bed with granddaughter at bedside. Pt A&Ox4 with complaints of a headache 8/10, medicated per MAR. No signs of acute distress noted. POC updated with Pt and family, denies any additional needs at this time. Bed alarm on, educated to call for assistance, verbalized understanding, call light in reach, hourly rounding initiated. In NSR on monitor.   Per mother last dose tylenol at 1300 today     Tra Ugalde RN  11/2022

## 2022-04-05 NOTE — DOCUMENTATION QUERY
"                                                                         [facility]                                                                         Query Response Note      PATIENT:               LEIDA RENNER  ACCT #:                  [id number]  MRN:                       [id number]  :                       1934  ADMIT DATE:       2019 2:29 AM  DISCH DATE:        2019 2:58 PM  RESPONDING  PROVIDER #:        [id number]           RESPONSE TEXT:    Acute on chronic systolic heart failure    QUERY TEXT:    Conflicting Documentation Clarification [email or web address]    The attending physician is required to clarify conflicting documentation in the medical record.  Cardiology consultant has documented  \"#CHF exacerbation\" (type unspecified) in the progress note on 19.  The discharge summary notes Chronic left ventricular systolic heart failure, with no mention of CHF exacerbation.  The acuity of congestive heart failure for this admission is unclear.  Please clarify the acuity of heart failure for this admission.    NOTE: If an appropriate response is not listed below, please respond with a new note.             The patient's Clinical Indicators include:  BNP [id number]  ECHO- EF 40%  STEMI  Cardiogenic pulmonary edema  Lasix 40 IV/PO  Metoprolol   3 units PRBC  Query created by: Jodee Lisa on 2019 11:22 AM        Electronically signed by:  RIA PATEL DO 2019 2:18 PM         " Allergy; Fall Risk; Do Not Use Extremity;

## 2023-03-28 NOTE — DISCHARGE PLANNING
Anticipated Discharge Disposition: Advanced    Action: this RN CM completed [employer] packet.  Dr. Read, this RN CM, Bedside RN and pt signed.    Barriers to Discharge: none    Plan: No dc needs identified at this time.      Topical Steroids Applications Pregnancy And Lactation Text: Most topical steroids are considered safe to use during pregnancy and lactation.  Any topical steroid applied to the breast or nipple should be washed off before breastfeeding.

## 2024-05-06 NOTE — RESPIRATORY CARE
COPD EDUCATION by COPD CLINICAL EDUCATOR  9/4/2018 at 7:51 AM by Aga Perez     Patient reviewed by COPD education team. Patient does not qualify for COPD program.   PA for budesonide started on Cover My Med's
